# Patient Record
Sex: FEMALE | Race: WHITE | NOT HISPANIC OR LATINO | Employment: OTHER | URBAN - METROPOLITAN AREA
[De-identification: names, ages, dates, MRNs, and addresses within clinical notes are randomized per-mention and may not be internally consistent; named-entity substitution may affect disease eponyms.]

---

## 2017-02-16 ENCOUNTER — PATIENT MESSAGE (OUTPATIENT)
Dept: WOUND CARE | Facility: CLINIC | Age: 52
End: 2017-02-16

## 2017-02-17 ENCOUNTER — OFFICE VISIT (OUTPATIENT)
Dept: WOUND CARE | Facility: CLINIC | Age: 52
End: 2017-02-17
Payer: MEDICARE

## 2017-02-17 VITALS — WEIGHT: 247.56 LBS | BODY MASS INDEX: 41.84 KG/M2

## 2017-02-17 DIAGNOSIS — Z43.2 ATTENTION TO ILEOSTOMY: Primary | ICD-10-CM

## 2017-02-17 DIAGNOSIS — L98.499: ICD-10-CM

## 2017-02-17 PROCEDURE — 99999 PR PBB SHADOW E&M-EST. PATIENT-LVL II: CPT | Mod: PBBFAC,,, | Performed by: CLINICAL NURSE SPECIALIST

## 2017-02-17 PROCEDURE — 99213 OFFICE O/P EST LOW 20 MIN: CPT | Mod: S$PBB,,, | Performed by: CLINICAL NURSE SPECIALIST

## 2017-02-17 PROCEDURE — 99212 OFFICE O/P EST SF 10 MIN: CPT | Mod: PBBFAC | Performed by: CLINICAL NURSE SPECIALIST

## 2017-02-17 NOTE — PROGRESS NOTES
"Subjective:       Patient ID: Ida Orozco is a 51 y.o. female.    Chief Complaint: Ileostomy    HPI Comments: This is a follow up visit and she has had ileo for some time, had revision 8/2014 by Dr gamez and now post gastric bypass, she has lost about 80 lbs and this is causing some issue with  Her ileo pch , additionally she has a slight erosion next to stoma that has not totally resolved. , there were problems at first and skin issues seemed to settle down, She needs plan of care and help with keeping pch secure now that "belly" hangs with wt loss. She does look good, has great attitude and faithful to diet restrictions     Review of Systems   Constitutional: Positive for appetite change. Negative for activity change and fever.   HENT: Negative for rhinorrhea and sneezing.    Cardiovascular: Negative for chest pain and leg swelling.   Gastrointestinal: Negative for abdominal pain and nausea.   Genitourinary: Negative.    Neurological: Negative.        Objective:      Physical Exam   Constitutional: She is oriented to person, place, and time. She appears well-developed.   Obese   Pulmonary/Chest: Effort normal.   Abdominal: Soft. She exhibits no distension. There is no tenderness.   RLQ ileostomy 11/8 budded end  She has chronic erosion/   Musculoskeletal: Normal range of motion.   Neurological: She is alert and oriented to person, place, and time.   Skin: No rash noted. No erythema.       Pouch , is GADIEL convex light,  The edges pop off with movement, she is suggested to try the BRAVA  Curve and Y strips, will get samples to her   Nu hope comfort belt should help this issue as well, fitted for belt and ordering info given        Assessment:       1. Attention to ileostomy    2. Chronic skin ulcer, with unspecified severity        Plan:       Fitted for NU HOPE comfort belt for extra support   Strips to be sent by [employer]   Continue wt loss program toward bypass goals  I have reviewed the plan of care with the " patient  she express understanding. I spent over 50% of this 20 min  visit in face to face counseling.

## 2017-08-10 RX ORDER — MONTELUKAST SODIUM 10 MG/1
10 TABLET ORAL NIGHTLY
Qty: 30 TABLET | Refills: 0 | Status: SHIPPED | OUTPATIENT
Start: 2017-08-10 | End: 2018-03-21 | Stop reason: SDUPTHER

## 2017-08-10 NOTE — TELEPHONE ENCOUNTER
----- Message from Mona Radford sent at 8/7/2017  2:45 PM CDT -----  Contact:  call //[phone]    Calling to  Get a refill on   Singular 10  Mg // please call  For   details  [id number] - [place], [place] - [facility] [address] [address]  Phone: [phone] Fax: [phone]

## 2017-09-15 RX ORDER — TRAZODONE HYDROCHLORIDE 100 MG/1
TABLET ORAL
Qty: 120 TABLET | Refills: 4 | Status: SHIPPED | OUTPATIENT
Start: 2017-09-15 | End: 2017-10-13

## 2017-09-20 ENCOUNTER — TELEPHONE (OUTPATIENT)
Dept: PRIMARY CARE CLINIC | Facility: CLINIC | Age: 52
End: 2017-09-20

## 2017-09-20 RX ORDER — FLUCONAZOLE 100 MG/1
100 TABLET ORAL DAILY
Qty: 10 TABLET | Refills: 0 | Status: SHIPPED | OUTPATIENT
Start: 2017-09-20 | End: 2017-09-30

## 2017-09-20 NOTE — TELEPHONE ENCOUNTER
Pt says she has gotten yeast infection sin her creases before. She says this time it hurts and it's bleeding. Pt likes the pills because the cream is too messy

## 2017-09-20 NOTE — TELEPHONE ENCOUNTER
----- Message from Alexandra Disla sent at 2017  1:36 PM CDT -----  Contact: Patient  Ida, patient [phone], Calling for same day appointment, yeast infection in belly button and it is bleeding, site of . Please advise. Thanks.

## 2017-10-02 ENCOUNTER — PATIENT MESSAGE (OUTPATIENT)
Dept: PRIMARY CARE CLINIC | Facility: CLINIC | Age: 52
End: 2017-10-02

## 2017-10-05 RX ORDER — LORATADINE 10 MG/1
TABLET ORAL
Qty: 90 TABLET | Refills: 1 | Status: SHIPPED | OUTPATIENT
Start: 2017-10-05 | End: 2018-03-31 | Stop reason: SDUPTHER

## 2017-10-13 ENCOUNTER — OFFICE VISIT (OUTPATIENT)
Dept: PRIMARY CARE CLINIC | Facility: CLINIC | Age: 52
End: 2017-10-13
Payer: MEDICARE

## 2017-10-13 VITALS
BODY MASS INDEX: 39.15 KG/M2 | TEMPERATURE: 98 F | OXYGEN SATURATION: 96 % | RESPIRATION RATE: 18 BRPM | DIASTOLIC BLOOD PRESSURE: 62 MMHG | SYSTOLIC BLOOD PRESSURE: 97 MMHG | WEIGHT: 235 LBS | HEART RATE: 69 BPM | HEIGHT: 65 IN

## 2017-10-13 DIAGNOSIS — E78.5 TYPE 2 DIABETES MELLITUS WITH HYPERLIPIDEMIA: ICD-10-CM

## 2017-10-13 DIAGNOSIS — Z93.2 ILEOSTOMY STATUS: ICD-10-CM

## 2017-10-13 DIAGNOSIS — E55.9 VITAMIN D DEFICIENCY: ICD-10-CM

## 2017-10-13 DIAGNOSIS — E11.69 TYPE 2 DIABETES MELLITUS WITH HYPERLIPIDEMIA: ICD-10-CM

## 2017-10-13 DIAGNOSIS — Z23 NEED FOR VACCINATION: ICD-10-CM

## 2017-10-13 DIAGNOSIS — E78.00 HYPERCHOLESTEROLEMIA: ICD-10-CM

## 2017-10-13 DIAGNOSIS — E53.8 VITAMIN B12 DEFICIENCY: ICD-10-CM

## 2017-10-13 DIAGNOSIS — K50.919 CROHN'S DISEASE WITH COMPLICATION, UNSPECIFIED GASTROINTESTINAL TRACT LOCATION: Primary | ICD-10-CM

## 2017-10-13 DIAGNOSIS — D52.0 DIETARY FOLATE DEFICIENCY ANEMIA: ICD-10-CM

## 2017-10-13 DIAGNOSIS — Z12.31 ENCOUNTER FOR SCREENING MAMMOGRAM FOR BREAST CANCER: ICD-10-CM

## 2017-10-13 PROBLEM — J45.40 MODERATE PERSISTENT ASTHMA WITHOUT COMPLICATION: Status: ACTIVE | Noted: 2017-10-13

## 2017-10-13 PROBLEM — M51.369 DDD (DEGENERATIVE DISC DISEASE), LUMBAR: Status: ACTIVE | Noted: 2017-10-13

## 2017-10-13 PROBLEM — F32.A DEPRESSION: Status: ACTIVE | Noted: 2017-10-13

## 2017-10-13 PROBLEM — F41.9 ANXIETY: Status: ACTIVE | Noted: 2017-10-13

## 2017-10-13 PROBLEM — M51.36 DDD (DEGENERATIVE DISC DISEASE), LUMBAR: Status: ACTIVE | Noted: 2017-10-13

## 2017-10-13 PROCEDURE — 99214 OFFICE O/P EST MOD 30 MIN: CPT | Mod: S$PBB,,, | Performed by: FAMILY MEDICINE

## 2017-10-13 PROCEDURE — 99499 UNLISTED E&M SERVICE: CPT | Mod: S$PBB,,, | Performed by: FAMILY MEDICINE

## 2017-10-13 PROCEDURE — 99999 PR PBB SHADOW E&M-EST. PATIENT-LVL IV: CPT | Mod: PBBFAC,,, | Performed by: FAMILY MEDICINE

## 2017-10-13 PROCEDURE — G0008 ADMIN INFLUENZA VIRUS VAC: HCPCS | Mod: PBBFAC,PN

## 2017-10-13 PROCEDURE — 99214 OFFICE O/P EST MOD 30 MIN: CPT | Mod: PBBFAC,PN | Performed by: FAMILY MEDICINE

## 2017-10-13 RX ORDER — BUPROPION HYDROCHLORIDE 75 MG/1
75 TABLET ORAL EVERY OTHER DAY
COMMUNITY
Start: 2017-09-13 | End: 2019-02-07

## 2017-10-13 RX ORDER — ESCITALOPRAM OXALATE 20 MG/1
30 TABLET ORAL EVERY MORNING
COMMUNITY
Start: 2017-10-03 | End: 2018-07-06

## 2017-10-13 RX ORDER — MESALAMINE 500 MG/1
500 CAPSULE, EXTENDED RELEASE ORAL 4 TIMES DAILY
Qty: 120 CAPSULE | Refills: 5 | Status: SHIPPED | OUTPATIENT
Start: 2017-10-13 | End: 2018-01-05 | Stop reason: ALTCHOICE

## 2017-10-13 RX ORDER — TRAZODONE HYDROCHLORIDE 50 MG/1
50 TABLET ORAL NIGHTLY
COMMUNITY
End: 2020-03-25 | Stop reason: SDUPTHER

## 2017-10-13 RX ORDER — DICLOFENAC SODIUM 10 MG/G
2 GEL TOPICAL 4 TIMES DAILY
COMMUNITY
End: 2018-04-03

## 2017-10-13 NOTE — PROGRESS NOTES
"Subjective:       Patient ID: Ida Orozco is a 52 y.o. female.    Chief Complaint: Abdominal Cramping (pt states when she made the appt, she was having cramping. )    Had ~1 week of crampy abdominal pain 3 weeks ago, says stool from ileostomy had more mucus than usual, and passed small amount of blood and mucus per rectum, says she had been told her colitis would flare up like this from time to time. Had mild myalgias during the week of GI symptoms, subsequently resolved.   Has been having more frequent hot flashes for ~2 months, but had been doing fine previously.      Review of Systems   Constitutional: Negative for fever and weight loss.   HENT: Negative for trouble swallowing.    Respiratory: Negative for stridor.    Cardiovascular: Negative for chest pain.   Gastrointestinal: Positive for abdominal pain, diarrhea and nausea. Negative for anorexia, constipation, flatus, hematochezia, melena and vomiting.   Genitourinary: Negative for dysuria, frequency and hematuria.   Musculoskeletal: Positive for arthralgias and myalgias.   Skin: Negative for rash.   Neurological: Positive for headaches.   Psychiatric/Behavioral: The patient is nervous/anxious.        Objective:      Vitals:    10/13/17 1339   BP: 97/62   BP Location: Left arm   Patient Position: Sitting   BP Method: Large (Automatic)   Pulse: 69   Resp: 18   Temp: 98.4 °F (36.9 °C)   TempSrc: Oral   SpO2: 96%   Weight: 106.6 kg (235 lb)   Height: 5' 5" (1.651 m)     Physical Exam   Constitutional: She is oriented to person, place, and time. She appears well-developed and well-nourished.   HENT:   Head: Normocephalic and atraumatic.   Cardiovascular: Normal rate, regular rhythm and normal heart sounds.    Pulmonary/Chest: Effort normal and breath sounds normal.   Abdominal: Soft. There is no tenderness. There is no guarding.   Right sided ileostomy with liquid brown stool   Musculoskeletal: She exhibits no edema.   Neurological: She is alert and oriented to " person, place, and time.   Skin: Skin is warm and dry.   Psychiatric: She has a normal mood and affect.   Vitals reviewed.      Assessment:       1. Crohn's disease with complication, unspecified gastrointestinal tract location    2. Ileostomy status    3. Need for vaccination    4. Encounter for screening mammogram for breast cancer    5. Type 2 diabetes mellitus with hyperlipidemia    6. Dietary folate deficiency anemia    7. Hypercholesterolemia    8. Vitamin B12 deficiency    9. Vitamin D deficiency        Plan:       Crohn's disease with complication, unspecified gastrointestinal tract location  Comments:  pt advised to get reestablished with gastroenterologist  Orders:  -     mesalamine (PENTASA) 500 MG CR capsule; Take 1 capsule (500 mg total) by mouth 4 (four) times daily.  Dispense: 120 capsule; Refill: 5  -     Ambulatory Referral to Gastroenterology    Ileostomy status  -     Ambulatory Referral to Gastroenterology    Need for vaccination  -     Influenza - Quadrivalent (3 years & older) (PF)    Encounter for screening mammogram for breast cancer  -     Mammo Digital Screening Bilat with CAD; Future; Expected date: 10/20/2017    Type 2 diabetes mellitus with hyperlipidemia  -     Hemoglobin A1c; Future; Expected date: 10/13/2017  -     Microalbumin/creatinine urine ratio; Future; Expected date: 10/13/2017    Dietary folate deficiency anemia  -     CBC auto differential; Future; Expected date: 10/13/2017  -     Folate; Future; Expected date: 10/13/2017    Hypercholesterolemia  -     Comprehensive metabolic panel; Future; Expected date: 10/13/2017  -     Lipid panel; Future; Expected date: 10/13/2017    Vitamin B12 deficiency  -     CBC auto differential; Future; Expected date: 10/13/2017  -     Vitamin B12; Future; Expected date: 10/13/2017    Vitamin D deficiency  -     Vitamin D; Future; Expected date: 10/13/2017      Medication List with Changes/Refills   New Medications    MESALAMINE (PENTASA) 500 MG  CR CAPSULE    Take 1 capsule (500 mg total) by mouth 4 (four) times daily.   Current Medications    BUDESONIDE-FORMOTEROL 80-4.5 MCG (SYMBICORT) 80-4.5 MCG/ACTUATION HFAA    Inhale 2 puffs into the lungs.    BUPROPION (WELLBUTRIN) 75 MG TABLET    Take 2 tablets by mouth 3 (three) times daily.    CLONAZEPAM (KLONOPIN) 1 MG TABLET    Take 1 mg by mouth 3 (three) times daily.     DICLOFENAC SODIUM (VOLTAREN) 1 % GEL    Apply 2 g topically 4 (four) times daily.    ESCITALOPRAM OXALATE (LEXAPRO) 10 MG TABLET    Take 1 tablet by mouth once daily.    ESOMEPRAZOLE (NEXIUM) 40 MG CAPSULE    Take 40 mg by mouth every evening.    FLUTICASONE (FLONASE) 50 MCG/ACTUATION NASAL SPRAY    1 spray by Each Nare route once daily.    LORATADINE (CLARITIN) 10 MG TABLET    TAKE ONE TABLET BY MOUTH ONCE DAILY    MONTELUKAST (SINGULAIR) 10 MG TABLET    Take 1 tablet (10 mg total) by mouth every evening.    TRAZODONE (DESYREL) 50 MG TABLET    Take 50 mg by mouth every evening.   Discontinued Medications    ALENDRONATE (FOSAMAX) 70 MG TABLET    Take 70 mg by mouth every 7 days.    CANASA 1,000 MG SUPP    Place 1,000 mg rectally 2 (two) times daily.    IBUPROFEN (ADVIL,MOTRIN) 600 MG TABLET    Take 1 tablet (600 mg total) by mouth 4 (four) times daily.    OXYCODONE-ACETAMINOPHEN (PERCOCET) 5-325 MG PER TABLET    Take 1-2 tablets by mouth every 6 (six) hours as needed for Pain.    TRAZODONE (DESYREL) 100 MG TABLET    TAKE 3 TO 4 TABLETS BY MOUTH AT BEDTIME ONCE DAILY

## 2017-10-16 ENCOUNTER — PATIENT MESSAGE (OUTPATIENT)
Dept: PRIMARY CARE CLINIC | Facility: CLINIC | Age: 52
End: 2017-10-16

## 2017-10-18 ENCOUNTER — PATIENT MESSAGE (OUTPATIENT)
Dept: PRIMARY CARE CLINIC | Facility: CLINIC | Age: 52
End: 2017-10-18

## 2017-10-19 ENCOUNTER — CLINICAL SUPPORT (OUTPATIENT)
Dept: PRIMARY CARE CLINIC | Facility: CLINIC | Age: 52
End: 2017-10-19
Payer: MEDICARE

## 2017-10-19 DIAGNOSIS — D52.0 DIETARY FOLATE DEFICIENCY ANEMIA: ICD-10-CM

## 2017-10-19 DIAGNOSIS — E78.00 HYPERCHOLESTEROLEMIA: ICD-10-CM

## 2017-10-19 DIAGNOSIS — E78.5 TYPE 2 DIABETES MELLITUS WITH HYPERLIPIDEMIA: ICD-10-CM

## 2017-10-19 DIAGNOSIS — E55.9 VITAMIN D DEFICIENCY: ICD-10-CM

## 2017-10-19 DIAGNOSIS — E53.8 VITAMIN B12 DEFICIENCY: ICD-10-CM

## 2017-10-19 DIAGNOSIS — E11.69 TYPE 2 DIABETES MELLITUS WITH HYPERLIPIDEMIA: ICD-10-CM

## 2017-10-19 LAB
25(OH)D3+25(OH)D2 SERPL-MCNC: 25 NG/ML
ALBUMIN SERPL BCP-MCNC: 3.4 G/DL
ALP SERPL-CCNC: 145 U/L
ALT SERPL W/O P-5'-P-CCNC: 43 U/L
ANION GAP SERPL CALC-SCNC: 7 MMOL/L
AST SERPL-CCNC: 42 U/L
BASOPHILS # BLD AUTO: 0.09 K/UL
BASOPHILS NFR BLD: 1.4 %
BILIRUB SERPL-MCNC: 0.5 MG/DL
BUN SERPL-MCNC: 9 MG/DL
CALCIUM SERPL-MCNC: 9.4 MG/DL
CHLORIDE SERPL-SCNC: 108 MMOL/L
CHOLEST SERPL-MCNC: 180 MG/DL
CHOLEST/HDLC SERPL: 2.4 {RATIO}
CO2 SERPL-SCNC: 24 MMOL/L
CREAT SERPL-MCNC: 0.9 MG/DL
CREAT UR-MCNC: 200 MG/DL
DIFFERENTIAL METHOD: ABNORMAL
EOSINOPHIL # BLD AUTO: 0.9 K/UL
EOSINOPHIL NFR BLD: 13.7 %
ERYTHROCYTE [DISTWIDTH] IN BLOOD BY AUTOMATED COUNT: 13.5 %
EST. GFR  (AFRICAN AMERICAN): >60 ML/MIN/1.73 M^2
EST. GFR  (NON AFRICAN AMERICAN): >60 ML/MIN/1.73 M^2
ESTIMATED AVG GLUCOSE: 105 MG/DL
FOLATE SERPL-MCNC: 8 NG/ML
GLUCOSE SERPL-MCNC: 85 MG/DL
HBA1C MFR BLD HPLC: 5.3 %
HCT VFR BLD AUTO: 44.9 %
HDLC SERPL-MCNC: 74 MG/DL
HDLC SERPL: 41.1 %
HGB BLD-MCNC: 13.8 G/DL
IMM GRANULOCYTES # BLD AUTO: 0.01 K/UL
IMM GRANULOCYTES NFR BLD AUTO: 0.2 %
LDLC SERPL CALC-MCNC: 89 MG/DL
LYMPHOCYTES # BLD AUTO: 1.7 K/UL
LYMPHOCYTES NFR BLD: 26.5 %
MCH RBC QN AUTO: 29.3 PG
MCHC RBC AUTO-ENTMCNC: 30.7 G/DL
MCV RBC AUTO: 95 FL
MICROALBUMIN UR DL<=1MG/L-MCNC: 15 UG/ML
MICROALBUMIN/CREATININE RATIO: 7.5 UG/MG
MONOCYTES # BLD AUTO: 0.5 K/UL
MONOCYTES NFR BLD: 7.6 %
NEUTROPHILS # BLD AUTO: 3.2 K/UL
NEUTROPHILS NFR BLD: 50.6 %
NONHDLC SERPL-MCNC: 106 MG/DL
NRBC BLD-RTO: 0 /100 WBC
PLATELET # BLD AUTO: 299 K/UL
PMV BLD AUTO: 10.3 FL
POTASSIUM SERPL-SCNC: 4.2 MMOL/L
PROT SERPL-MCNC: 7.5 G/DL
RBC # BLD AUTO: 4.71 M/UL
SODIUM SERPL-SCNC: 139 MMOL/L
TRIGL SERPL-MCNC: 85 MG/DL
VIT B12 SERPL-MCNC: 292 PG/ML
WBC # BLD AUTO: 6.35 K/UL

## 2017-10-19 PROCEDURE — 82570 ASSAY OF URINE CREATININE: CPT

## 2017-10-19 PROCEDURE — 82746 ASSAY OF FOLIC ACID SERUM: CPT

## 2017-10-19 PROCEDURE — 85025 COMPLETE CBC W/AUTO DIFF WBC: CPT

## 2017-10-19 PROCEDURE — 80053 COMPREHEN METABOLIC PANEL: CPT

## 2017-10-19 PROCEDURE — 99212 OFFICE O/P EST SF 10 MIN: CPT | Mod: PBBFAC,PN

## 2017-10-19 PROCEDURE — 83036 HEMOGLOBIN GLYCOSYLATED A1C: CPT

## 2017-10-19 PROCEDURE — 99999 PR PBB SHADOW E&M-EST. PATIENT-LVL II: CPT | Mod: PBBFAC,,,

## 2017-10-19 PROCEDURE — 80061 LIPID PANEL: CPT

## 2017-10-19 PROCEDURE — 82306 VITAMIN D 25 HYDROXY: CPT

## 2017-10-19 PROCEDURE — 36415 COLL VENOUS BLD VENIPUNCTURE: CPT

## 2017-10-19 PROCEDURE — 82607 VITAMIN B-12: CPT

## 2017-10-31 ENCOUNTER — PATIENT MESSAGE (OUTPATIENT)
Dept: PRIMARY CARE CLINIC | Facility: CLINIC | Age: 52
End: 2017-10-31

## 2017-11-08 ENCOUNTER — PATIENT MESSAGE (OUTPATIENT)
Dept: PRIMARY CARE CLINIC | Facility: CLINIC | Age: 52
End: 2017-11-08

## 2017-11-08 DIAGNOSIS — R23.3 SPONTANEOUS BRUISING: Primary | ICD-10-CM

## 2017-11-08 DIAGNOSIS — R79.9 ABNORMAL FINDING OF BLOOD CHEMISTRY: ICD-10-CM

## 2017-11-16 ENCOUNTER — PATIENT MESSAGE (OUTPATIENT)
Dept: PRIMARY CARE CLINIC | Facility: CLINIC | Age: 52
End: 2017-11-16

## 2017-11-16 ENCOUNTER — PATIENT MESSAGE (OUTPATIENT)
Dept: WOUND CARE | Facility: CLINIC | Age: 52
End: 2017-11-16

## 2017-11-17 DIAGNOSIS — Z43.2 ATTENTION TO ILEOSTOMY: Primary | ICD-10-CM

## 2017-11-21 ENCOUNTER — CLINICAL SUPPORT (OUTPATIENT)
Dept: PRIMARY CARE CLINIC | Facility: CLINIC | Age: 52
End: 2017-11-21
Payer: MEDICARE

## 2017-11-21 DIAGNOSIS — R23.3 SPONTANEOUS BRUISING: ICD-10-CM

## 2017-11-21 DIAGNOSIS — R79.9 ABNORMAL FINDING OF BLOOD CHEMISTRY: ICD-10-CM

## 2017-11-21 LAB
BASOPHILS # BLD AUTO: 0.07 K/UL
BASOPHILS NFR BLD: 1.1 %
DIFFERENTIAL METHOD: ABNORMAL
EOSINOPHIL # BLD AUTO: 1.1 K/UL
EOSINOPHIL NFR BLD: 16.5 %
ERYTHROCYTE [DISTWIDTH] IN BLOOD BY AUTOMATED COUNT: 13.4 %
FERRITIN SERPL-MCNC: 52 NG/ML
HCT VFR BLD AUTO: 42.7 %
HGB BLD-MCNC: 13.6 G/DL
IMM GRANULOCYTES # BLD AUTO: 0.02 K/UL
IMM GRANULOCYTES NFR BLD AUTO: 0.3 %
IRON SERPL-MCNC: 65 UG/DL
LYMPHOCYTES # BLD AUTO: 1.7 K/UL
LYMPHOCYTES NFR BLD: 25.6 %
MCH RBC QN AUTO: 30.2 PG
MCHC RBC AUTO-ENTMCNC: 31.9 G/DL
MCV RBC AUTO: 95 FL
MONOCYTES # BLD AUTO: 0.5 K/UL
MONOCYTES NFR BLD: 7.4 %
NEUTROPHILS # BLD AUTO: 3.2 K/UL
NEUTROPHILS NFR BLD: 49.1 %
NRBC BLD-RTO: 0 /100 WBC
PLATELET # BLD AUTO: 278 K/UL
PMV BLD AUTO: 11.2 FL
RBC # BLD AUTO: 4.5 M/UL
SATURATED IRON: 16 %
TOTAL IRON BINDING CAPACITY: 419 UG/DL
TRANSFERRIN SERPL-MCNC: 283 MG/DL
WBC # BLD AUTO: 6.6 K/UL

## 2017-11-21 PROCEDURE — 99999 PR PBB SHADOW E&M-EST. PATIENT-LVL II: CPT | Mod: PBBFAC,,,

## 2017-11-21 PROCEDURE — 83540 ASSAY OF IRON: CPT

## 2017-11-21 PROCEDURE — 82728 ASSAY OF FERRITIN: CPT

## 2017-11-21 PROCEDURE — 85025 COMPLETE CBC W/AUTO DIFF WBC: CPT

## 2017-12-04 ENCOUNTER — PATIENT MESSAGE (OUTPATIENT)
Dept: GASTROENTEROLOGY | Facility: CLINIC | Age: 52
End: 2017-12-04

## 2017-12-08 ENCOUNTER — PATIENT MESSAGE (OUTPATIENT)
Dept: PRIMARY CARE CLINIC | Facility: CLINIC | Age: 52
End: 2017-12-08

## 2017-12-08 RX ORDER — BUDESONIDE AND FORMOTEROL FUMARATE DIHYDRATE 80; 4.5 UG/1; UG/1
2 AEROSOL RESPIRATORY (INHALATION) DAILY
Qty: 10.2 G | Refills: 0 | Status: SHIPPED | OUTPATIENT
Start: 2017-12-08 | End: 2018-01-03 | Stop reason: SDUPTHER

## 2017-12-08 RX ORDER — ALBUTEROL SULFATE 90 UG/1
2 AEROSOL, METERED RESPIRATORY (INHALATION) EVERY 4 HOURS PRN
Qty: 1 INHALER | Refills: 5 | Status: SHIPPED | OUTPATIENT
Start: 2017-12-08 | End: 2020-09-18 | Stop reason: SDUPTHER

## 2017-12-12 ENCOUNTER — OFFICE VISIT (OUTPATIENT)
Dept: GASTROENTEROLOGY | Facility: CLINIC | Age: 52
End: 2017-12-12
Payer: MEDICARE

## 2017-12-12 VITALS
WEIGHT: 233 LBS | DIASTOLIC BLOOD PRESSURE: 74 MMHG | HEIGHT: 65 IN | BODY MASS INDEX: 38.82 KG/M2 | TEMPERATURE: 99 F | RESPIRATION RATE: 18 BRPM | SYSTOLIC BLOOD PRESSURE: 115 MMHG | HEART RATE: 74 BPM

## 2017-12-12 DIAGNOSIS — R10.9 ABDOMINAL PAIN, UNSPECIFIED ABDOMINAL LOCATION: ICD-10-CM

## 2017-12-12 DIAGNOSIS — Z93.2 ILEOSTOMY STATUS: ICD-10-CM

## 2017-12-12 DIAGNOSIS — E66.01 MORBID OBESITY WITH BMI OF 45.0-49.9, ADULT: ICD-10-CM

## 2017-12-12 DIAGNOSIS — F32.A DEPRESSION, UNSPECIFIED DEPRESSION TYPE: ICD-10-CM

## 2017-12-12 DIAGNOSIS — K22.10 ULCER OF ESOPHAGUS WITHOUT BLEEDING: ICD-10-CM

## 2017-12-12 DIAGNOSIS — F41.9 ANXIETY: ICD-10-CM

## 2017-12-12 DIAGNOSIS — K50.819 CROHN'S DISEASE OF SMALL AND LARGE INTESTINES WITH COMPLICATION: Primary | ICD-10-CM

## 2017-12-12 DIAGNOSIS — K21.00 GASTROESOPHAGEAL REFLUX DISEASE WITH ESOPHAGITIS: ICD-10-CM

## 2017-12-12 PROCEDURE — 99999 PR PBB SHADOW E&M-EST. PATIENT-LVL IV: CPT | Mod: PBBFAC,,, | Performed by: INTERNAL MEDICINE

## 2017-12-12 PROCEDURE — 99499 UNLISTED E&M SERVICE: CPT | Mod: S$GLB,,, | Performed by: INTERNAL MEDICINE

## 2017-12-12 PROCEDURE — 99213 OFFICE O/P EST LOW 20 MIN: CPT | Mod: S$GLB,,, | Performed by: INTERNAL MEDICINE

## 2017-12-12 NOTE — PROGRESS NOTES
Subjective:       Patient ID: Ida Orozco is a 52 y.o. female.    This is an established patient who has been seen by Dr. Latham and Dr. Virgen at [facility] in the past.    Chief Complaint: Abdominal Pain    Abdominal Pain   This is a new problem. The current episode started more than 1 month ago (started in late september and lasted about 3 weeks). The onset quality is undetermined. The problem occurs constantly. Duration: variable. The problem has been resolved. The pain is located in the generalized abdominal region. The pain is at a severity of 9/10. The pain is severe. The quality of the pain is cramping. The abdominal pain does not radiate. Associated symptoms include diarrhea. Pertinent negatives include no anorexia, constipation, hematochezia, melena, nausea, vomiting or weight loss. Nothing aggravates the pain. The pain is relieved by nothing. Treatments tried: pentasa prescribed by PCP and she is now being told that insurance will not cover it any longer. Prior diagnostic workup includes GI consult, lower endoscopy, upper endoscopy, surgery and CT scan (Multiple studies and surgeries with very complicated history.). Her past medical history is significant for abdominal surgery (multiple), Crohn's disease, GERD and irritable bowel syndrome.     She relates a very long and complex history and has been seen at multiple facilities in the past.  She states that Dr. Wilton Murphy in [place] diagnosed her with UC in 2010.  He gave her high dose oral steroids and 5 ASA to which she did not respond.  She states that she was then placed on remicade for about 6 months but was a primary nonresponder.  At some point, she ended up under the care of Dr. Perez who had her on Humira and Enbrel to which she also did not respond.  She states that during that period (2011) she also had multiple hospitalizations at multiple facilities for various infections.  At some point, Dr. Perez told her she needed a  "colectomy and she had subtotal colectomy with end ileostomy.  This was in April of 2012.  She has been on no IBD treatment since that time.  Her post operative course was complicated by multiple infections, stenosis of the stoma, and fistulization to a "subcutaneous pocket" which had to be treated surgically.  At some point, her st[place] was taken down and [place] [facility] and she states that she has not had problems since then.  She then saw Dr. Hunter to ask about gastric sleeve and he recommended against it.  She subsequently had it done any way at [facility] in 06/2016.  She has lost about 120 lbs since her sleeve surgery.  She then did well.  Recently she developed symptoms as above and these improved once she was placed on pentasa by her PCP.  She states that her insurance has informed her that pentasa will no longer be covered.  She had ileoscopy with Dr. Latham at [facility] in 2014 which showed stenotic opening and fistulization but normal small bowel.  She had EGD which showed esophageal ulcer by Dr. Hunter in 2015.    Review of Systems   Constitutional: Negative for chills, fatigue and weight loss.   HENT: Negative for sore throat and trouble swallowing.    Respiratory: Negative for cough, shortness of breath and wheezing.    Cardiovascular: Negative for chest pain and palpitations.   Gastrointestinal: Positive for abdominal pain and diarrhea. Negative for anorexia, blood in stool, constipation, hematochezia, melena, nausea and vomiting.   All other systems reviewed and are negative.      Objective:       Vitals:    12/12/17 1435   BP: 115/74   Pulse: 74   Resp: 18   Temp: 98.9 °F (37.2 °C)   TempSrc: Oral   Weight: 105.7 kg (233 lb)   Height: 5' 5" (1.651 m)       Physical Exam   Constitutional: She is oriented to person, place, and time. She appears well-developed and well-nourished.   HENT:   Head: Normocephalic and atraumatic.   Eyes: Pupils are equal, round, and reactive to " light. No scleral icterus.   Neck: Normal range of motion.   Cardiovascular: Normal rate and regular rhythm.    No murmur heard.  Pulmonary/Chest: Effort normal and breath sounds normal. She has no wheezes.   Abdominal: Soft. Bowel sounds are normal. She exhibits no distension. There is tenderness (mild diffuse).   Morbidly obese with intact healthy appearing ileostomy and multiple well healed surgical scars.  No evidence of fistulae.   Lymphadenopathy:     She has no cervical adenopathy.   Neurological: She is alert and oriented to person, place, and time.   Vitals reviewed.        Lab Results   Component Value Date    WBC 6.60 11/21/2017    HGB 13.6 11/21/2017    HCT 42.7 11/21/2017    MCV 95 11/21/2017     11/21/2017       CMP  Sodium   Date Value Ref Range Status   10/19/2017 139 136 - 145 mmol/L Final     Potassium   Date Value Ref Range Status   10/19/2017 4.2 3.5 - 5.1 mmol/L Final     Chloride   Date Value Ref Range Status   10/19/2017 108 95 - 110 mmol/L Final     CO2   Date Value Ref Range Status   10/19/2017 24 23 - 29 mmol/L Final     Glucose   Date Value Ref Range Status   10/19/2017 85 70 - 110 mg/dL Final     BUN, Bld   Date Value Ref Range Status   10/19/2017 9 6 - 20 mg/dL Final     Creatinine   Date Value Ref Range Status   10/19/2017 0.9 0.5 - 1.4 mg/dL Final     Calcium   Date Value Ref Range Status   10/19/2017 9.4 8.7 - 10.5 mg/dL Final     Total Protein   Date Value Ref Range Status   10/19/2017 7.5 6.0 - 8.4 g/dL Final     Albumin   Date Value Ref Range Status   10/19/2017 3.4 (L) 3.5 - 5.2 g/dL Final     Total Bilirubin   Date Value Ref Range Status   10/19/2017 0.5 0.1 - 1.0 mg/dL Final     Comment:     For infants and newborns, interpretation of results should be based  on gestational age, weight and in agreement with clinical  observations.  Premature Infant recommended reference ranges:  Up to 24 hours.............<8.0 mg/dL  Up to 48 hours............<12.0 mg/dL  3-5  days..................<15.0 mg/dL  6-29 days.................<15.0 mg/dL       Alkaline Phosphatase   Date Value Ref Range Status   10/19/2017 145 (H) 55 - 135 U/L Final     AST   Date Value Ref Range Status   10/19/2017 42 (H) 10 - 40 U/L Final     ALT   Date Value Ref Range Status   10/19/2017 43 10 - 44 U/L Final     Anion Gap   Date Value Ref Range Status   10/19/2017 7 (L) 8 - 16 mmol/L Final     eGFR if    Date Value Ref Range Status   10/19/2017 >60.0 >60 mL/min/1.73 m^2 Final     eGFR if non    Date Value Ref Range Status   10/19/2017 >60.0 >60 mL/min/1.73 m^2 Final     Comment:     Calculation used to obtain the estimated glomerular filtration  rate (eGFR) is the CKD-EPI equation. Since race is unknown   in our information system, the eGFR values for   -American and Non--American patients are given   for each creatinine result.         Old records from Dr. Latham reviewed and are as summarized above in the HPI.      Past CT was independently visualized and reviewed by me and showed peristomal contained leak with surrounding inflammation.        Assessment:       1. Crohn's disease of small and large intestines with complication    2. Ulcer of esophagus without bleeding    3. Gastroesophageal reflux disease with esophagitis    4. Ileostomy status    5. Morbid obesity with BMI of 45.0-49.9, adult    6. Anxiety    7. Depression, unspecified depression type    8. Abdominal pain, unspecified abdominal location        Plan:       1.  No acute intervention at this time as symptoms have completely resolved and patient has no bleeding, lab abnormalities, pain, or unintended weight loss.  She also states that her stoma output is returned to baseline.  2.  Continue current medications.  3.  Given her complex history and failure of multiple drug regimens in the past, as well as her complex surgical history, I feel her case is far beyond the scope of our capabilities at this  facility.  I have discussed this at length with the patient who agrees and I recommend follow up and continued care at [facility] with GI and with colorectal surgery whom she has seen in the past.  She states that she understands and that she will follow up.  4.  Referrals as above.

## 2017-12-12 NOTE — LETTER
December 12, 2017      Eliecer Jones MD  [address]  [address]  [facility] [address]           [facility] - Gastroenterology  [address]  [place] [address]  Phone: [phone]          Patient: Ida Orozco   MR Number: [id number]   YOB: 1965   Date of Visit: 12/12/2017       Dear Dr. Eliecer Jones:    Thank you for referring Ida Orozco to me for evaluation. Attached you will find relevant portions of my assessment and plan of care.    If you have questions, please do not hesitate to call me. I look forward to following Ida Orozco along with you.    Sincerely,    Tim Fitch MD    Enclosure  CC:  No Recipients    If you would like to receive this communication electronically, please contact [email or web address] or [phone] to request more information on [facility] access.    For providers and/or their staff who would like to refer a patient to [facility], please contact us through our one-stop-shop provider referral line, [facility], at [phone].    If you feel you have received this communication in error or would no longer like to receive these types of communications, please e-mail [email or web address]

## 2017-12-29 ENCOUNTER — TELEPHONE (OUTPATIENT)
Dept: GASTROENTEROLOGY | Facility: CLINIC | Age: 52
End: 2017-12-29

## 2017-12-29 NOTE — TELEPHONE ENCOUNTER
----- Message from Diane Saleh sent at 12/29/2017  3:17 PM CST -----  Contact: self  Pt is calling in regards to scheduling an appt. Per pt she has been referred by Dr. Tim LEARY. The first available appt is on 03/09/18. Per pt she really needs something sooner because she has been in pain and she is not currently on any medication that will help with the issues.     Pt would like a call back [phone].    Thank you

## 2018-01-04 ENCOUNTER — TELEPHONE (OUTPATIENT)
Dept: PRIMARY CARE CLINIC | Facility: CLINIC | Age: 53
End: 2018-01-04

## 2018-01-04 RX ORDER — BUDESONIDE AND FORMOTEROL FUMARATE DIHYDRATE 80; 4.5 UG/1; UG/1
2 AEROSOL RESPIRATORY (INHALATION) DAILY
Qty: 11 G | Refills: 0 | Status: SHIPPED | OUTPATIENT
Start: 2018-01-04 | End: 2018-01-05 | Stop reason: DRUGHIGH

## 2018-01-04 NOTE — TELEPHONE ENCOUNTER
----- Message from Nadja Clarke sent at 1/4/2018 11:09 AM CST -----  Contact: Patient  Patient is trying to schedule an appointment for today because she has been having green stuff coming out of her nose, congestion, runny nose and lots of mucus & wheezing.  Call Back#[phone]  Thanks

## 2018-01-05 ENCOUNTER — PATIENT MESSAGE (OUTPATIENT)
Dept: PRIMARY CARE CLINIC | Facility: CLINIC | Age: 53
End: 2018-01-05

## 2018-01-05 ENCOUNTER — OFFICE VISIT (OUTPATIENT)
Dept: PRIMARY CARE CLINIC | Facility: CLINIC | Age: 53
End: 2018-01-05
Payer: MEDICARE

## 2018-01-05 VITALS
SYSTOLIC BLOOD PRESSURE: 113 MMHG | HEART RATE: 69 BPM | HEIGHT: 64 IN | TEMPERATURE: 98 F | DIASTOLIC BLOOD PRESSURE: 60 MMHG | RESPIRATION RATE: 18 BRPM | OXYGEN SATURATION: 96 %

## 2018-01-05 DIAGNOSIS — Z93.2 ILEOSTOMY STATUS: Primary | ICD-10-CM

## 2018-01-05 DIAGNOSIS — J01.90 ACUTE BACTERIAL SINUSITIS: ICD-10-CM

## 2018-01-05 DIAGNOSIS — J45.909 ASTHMA, UNSPECIFIED ASTHMA SEVERITY, UNSPECIFIED WHETHER COMPLICATED, UNSPECIFIED WHETHER PERSISTENT: ICD-10-CM

## 2018-01-05 DIAGNOSIS — Z87.891 FORMER SMOKER: ICD-10-CM

## 2018-01-05 DIAGNOSIS — B96.89 ACUTE BACTERIAL SINUSITIS: ICD-10-CM

## 2018-01-05 PROCEDURE — 99999 PR PBB SHADOW E&M-EST. PATIENT-LVL IV: CPT | Mod: PBBFAC,,, | Performed by: NURSE PRACTITIONER

## 2018-01-05 PROCEDURE — 99214 OFFICE O/P EST MOD 30 MIN: CPT | Mod: S$GLB,,, | Performed by: NURSE PRACTITIONER

## 2018-01-05 RX ORDER — PROMETHAZINE HYDROCHLORIDE AND CODEINE PHOSPHATE 6.25; 1 MG/5ML; MG/5ML
5 SOLUTION ORAL EVERY 6 HOURS PRN
Qty: 180 ML | Refills: 0 | Status: SHIPPED | OUTPATIENT
Start: 2018-01-05 | End: 2018-03-15

## 2018-01-05 RX ORDER — BUDESONIDE AND FORMOTEROL FUMARATE DIHYDRATE 160; 4.5 UG/1; UG/1
2 AEROSOL RESPIRATORY (INHALATION) EVERY 12 HOURS
Qty: 6 G | Refills: 2 | Status: SHIPPED | OUTPATIENT
Start: 2018-01-05 | End: 2018-04-04 | Stop reason: SDUPTHER

## 2018-01-05 RX ORDER — DOXYCYCLINE HYCLATE 100 MG/1
100 TABLET, DELAYED RELEASE ORAL 2 TIMES DAILY
Qty: 20 TABLET | Refills: 0 | Status: SHIPPED | OUTPATIENT
Start: 2018-01-05 | End: 2018-03-15

## 2018-01-05 RX ORDER — ALBUTEROL SULFATE 0.83 MG/ML
2.5 SOLUTION RESPIRATORY (INHALATION) EVERY 6 HOURS PRN
Qty: 1 BOX | Refills: 2 | Status: SHIPPED | OUTPATIENT
Start: 2018-01-05 | End: 2018-10-25 | Stop reason: SDUPTHER

## 2018-01-05 NOTE — PROGRESS NOTES
Chief Complaint  Chief Complaint   Patient presents with    Cough    Chest Congestion       HPI  Ida Orozco is a 52 y.o. female with multiple medical diagnoses as listed in the medical history and problem list that presents for cough, congestion.  Patient is new to me but is known to this clinic with her last appointment being 2017.  Reports 2 weeks of cough and congestion. Cough is green and productive. Congestion nasal, with green rhinorrhea. Worsening. Facial pain and pressure. Left ear pain. No sore throat. No n/v. Patient with chronic diarrhea due to ileostomy. Reports wheezing at night. Currently using inhalers X 2 with noted relief of wheezing. Smoking history quit 9 years ago. Patient with chronic sinusitis and tooth abscess previously treated with augmentin last dose last week. Reports an improvement in tooth pain but no improvement in nasal congestion or facial pain. No fevers. Patient denies cp, sob, palpitations.       PAST MEDICAL HISTORY:  Past Medical History:   Diagnosis Date    Anxiety     Crohn's colitis     Diabetes mellitus     Fatty liver disease, nonalcoholic     GERD (gastroesophageal reflux disease)     History of sleeve gastrectomy 2016    Hypertension        PAST SURGICAL HISTORY:  Past Surgical History:   Procedure Laterality Date    APPENDECTOMY       SECTION, LOW TRANSVERSE      CHOLECYSTECTOMY      open    COLECTOMY      total- 2013    HYSTERECTOMY      ILEOSTOMY REVISION      2014; 2014    thumb surgery      TONSILLECTOMY, ADENOIDECTOMY      WISDOM TOOTH EXTRACTION         SOCIAL HISTORY:  Social History     Social History    Marital status: Legally      Spouse name: N/A    Number of children: N/A    Years of education: N/A     Occupational History    Not on file.     Social History Main Topics    Smoking status: Former Smoker    Smokeless tobacco: Former User     Quit date: 4/15/2007    Alcohol use No       Comment: occ    Drug use: No    Sexual activity: Yes     Partners: Male     Other Topics Concern    Not on file     Social History Narrative    No narrative on file       FAMILY HISTORY:  Family History   Problem Relation Age of Onset    Cancer Mother     Heart disease Father     Cancer Paternal Grandfather     Cancer Maternal Grandfather        ALLERGIES AND MEDICATIONS: updated and reviewed.  Review of patient's allergies indicates:   Allergen Reactions    Adhesive     Dilaudid [hydromorphone] Nausea And Vomiting    Sulfa (sulfonamide antibiotics) Hives     Current Outpatient Prescriptions   Medication Sig Dispense Refill    albuterol 90 mcg/actuation inhaler Inhale 2 puffs into the lungs every 4 (four) hours as needed for Wheezing or Shortness of Breath. Rescue 1 Inhaler 5    buPROPion (WELLBUTRIN) 75 MG tablet Take 2 tablets by mouth 3 (three) times daily.      clonazePAM (KLONOPIN) 1 MG tablet Take 1 mg by mouth 3 (three) times daily.       diclofenac sodium (VOLTAREN) 1 % Gel Apply 2 g topically 4 (four) times daily.      escitalopram oxalate (LEXAPRO) 10 MG tablet Take 1 tablet by mouth once daily.      esomeprazole (NEXIUM) 40 MG capsule Take 40 mg by mouth every evening.      fluticasone (FLONASE) 50 mcg/actuation nasal spray 1 spray by Each Nare route once daily.      loratadine (CLARITIN) 10 mg tablet TAKE ONE TABLET BY MOUTH ONCE DAILY 90 tablet 1    montelukast (SINGULAIR) 10 mg tablet Take 1 tablet (10 mg total) by mouth every evening. 30 tablet 0    SYMBICORT 80-4.5 mcg/actuation HFAA INHALE 2 PUFFS INTO THE LUNGS ONCE DAILY. 11 g 0    trazodone (DESYREL) 50 MG tablet Take 50 mg by mouth every evening.      albuterol (PROVENTIL) 2.5 mg /3 mL (0.083 %) nebulizer solution Take 3 mLs (2.5 mg total) by nebulization every 6 (six) hours as needed for Wheezing. Rescue 1 Box 2    doxycycline (DORYX) 100 MG EC tablet Take 1 tablet (100 mg total) by mouth 2 (two) times daily. 20 tablet 0  "   promethazine-codeine 6.25-10 mg/5 ml (PHENERGAN WITH CODEINE) 6.25-10 mg/5 mL syrup Take 5 mLs by mouth every 6 (six) hours as needed. 180 mL 0     No current facility-administered medications for this visit.          ROS  Review of Systems   Constitutional: Positive for fatigue. Negative for chills and fever.   HENT: Positive for congestion, ear pain, postnasal drip, rhinorrhea, sinus pain and sinus pressure. Negative for sore throat.    Respiratory: Positive for cough and wheezing. Negative for shortness of breath.    Cardiovascular: Negative for chest pain and palpitations.   Gastrointestinal: Negative for abdominal pain, diarrhea, nausea and vomiting.   Genitourinary: Negative for dysuria, frequency and urgency.   Musculoskeletal: Negative for arthralgias and joint swelling.   Skin: Negative for rash and wound.   Neurological: Positive for headaches. Negative for dizziness and weakness.   Psychiatric/Behavioral: Positive for sleep disturbance. Negative for dysphoric mood. The patient is not nervous/anxious.          PHYSICAL EXAM  Vitals:    01/05/18 1030   BP: 113/60   BP Location: Right arm   Patient Position: Sitting   BP Method: Medium (Automatic)   Pulse: 69   Resp: 18   Temp: 98.3 °F (36.8 °C)   TempSrc: Oral   SpO2: 96%   Height: 5' 4" (1.626 m)    There is no height or weight on file to calculate BMI.      Height: 5' 4" (162.6 cm)     Physical Exam   Constitutional: She is oriented to person, place, and time. She appears well-developed and well-nourished.   HENT:   Head: Normocephalic.   Right Ear: Tympanic membrane normal.   Left Ear: Tympanic membrane normal.   Nose: Mucosal edema and rhinorrhea present. Right sinus exhibits frontal sinus tenderness. Left sinus exhibits frontal sinus tenderness.   Mouth/Throat: Uvula is midline and mucous membranes are normal. Posterior oropharyngeal erythema present. Tonsils are 0 on the right. Tonsils are 0 on the left.   Eyes: Conjunctivae are normal. "   Cardiovascular: Normal rate, regular rhythm, normal heart sounds and normal pulses.    No murmur heard.  Pulses:       Radial pulses are 2+ on the right side, and 2+ on the left side.   Pulmonary/Chest: Effort normal and breath sounds normal. She has no wheezes.   Abdominal: Soft. Bowel sounds are normal. There is no tenderness.   Musculoskeletal: She exhibits no edema.   Lymphadenopathy:     She has no cervical adenopathy.   Neurological: She is alert and oriented to person, place, and time.   Skin: Skin is warm and dry. No rash noted.   Psychiatric: She has a normal mood and affect.         Health Maintenance       Date Due Completion Date    Foot Exam 07/27/1977 ---    Eye Exam 07/27/1977 ---    TETANUS VACCINE 07/27/1985 ---    Pneumococcal PPSV23 (Medium Risk) (1) 07/27/1985 ---    Mammogram 07/27/2007 ---    Hemoglobin A1c 04/19/2018 10/19/2017    Lipid Panel 10/19/2018 10/19/2017    Urine Microalbumin 10/19/2018 10/19/2017            Assessment & Plan    Ida was seen today for cough and chest congestion.    Diagnoses and all orders for this visit:    Ileostomy status        - The current medical regimen is effective;  continue present plan and medications.    Former smoker    Asthma, unspecified asthma severity, unspecified whether complicated, unspecified whether persistent  -     NEBULIZER FOR HOME USE  -     albuterol (PROVENTIL) 2.5 mg /3 mL (0.083 %) nebulizer solution; Take 3 mLs (2.5 mg total) by nebulization every 6 (six) hours as needed for Wheezing. Rescue  - Patient without wheezing or sob. The current medical regimen is effective;  continue present plan and medications.    Acute bacterial sinusitis  -     doxycycline (DORYX) 100 MG EC tablet; Take 1 tablet (100 mg total) by mouth 2 (two) times daily.        -     promethazine-codeine 6.25-10 mg/5 ml (PHENERGAN WITH CODEINE) 6.25-10 mg/5 mL syrup; Take 5 mLs by mouth every 6 (six) hours as needed.          Follow-up: Return if symptoms worsen  or fail to improve.

## 2018-01-09 ENCOUNTER — TELEPHONE (OUTPATIENT)
Dept: PRIMARY CARE CLINIC | Facility: CLINIC | Age: 53
End: 2018-01-09

## 2018-01-09 NOTE — TELEPHONE ENCOUNTER
----- Message from Chiquita Reis sent at 1/9/2018  4:18 PM CST -----  Contact: Priscilla with ph# [phone] option 3   Priscilla with ph# [phone] option 3   Requesting orders and clinicals for nebulizer   Fax#[phone]

## 2018-01-10 NOTE — TELEPHONE ENCOUNTER
----- Message from Leila Spann sent at 1/9/2018  3:45 PM CST -----  Contact: Ida  Patient is calling as has still not received the nebulizer machine but does have medication. Sending message on son, Chris, for same. Please call to let know status [phone]. Thanks!

## 2018-01-15 ENCOUNTER — HOSPITAL ENCOUNTER (OUTPATIENT)
Dept: RADIOLOGY | Facility: HOSPITAL | Age: 53
Discharge: HOME | End: 2018-01-15
Attending: INTERNAL MEDICINE
Payer: MEDICARE

## 2018-01-15 ENCOUNTER — OFFICE VISIT (OUTPATIENT)
Dept: GASTROENTEROLOGY | Facility: CLINIC | Age: 53
End: 2018-01-15
Payer: MEDICARE

## 2018-01-15 VITALS
SYSTOLIC BLOOD PRESSURE: 112 MMHG | HEIGHT: 65 IN | BODY MASS INDEX: 38.93 KG/M2 | DIASTOLIC BLOOD PRESSURE: 67 MMHG | WEIGHT: 233.69 LBS | HEART RATE: 61 BPM

## 2018-01-15 DIAGNOSIS — Z98.84 H/O BARIATRIC SURGERY: ICD-10-CM

## 2018-01-15 DIAGNOSIS — K62.5 RECTAL BLEEDING: ICD-10-CM

## 2018-01-15 DIAGNOSIS — K52.9 INFLAMMATORY BOWEL DISEASE: Primary | ICD-10-CM

## 2018-01-15 DIAGNOSIS — R10.9 ABDOMINAL PAIN, UNSPECIFIED ABDOMINAL LOCATION: ICD-10-CM

## 2018-01-15 DIAGNOSIS — K22.10 ULCER OF ESOPHAGUS WITHOUT BLEEDING: ICD-10-CM

## 2018-01-15 DIAGNOSIS — Z93.2 ILEOSTOMY IN PLACE: ICD-10-CM

## 2018-01-15 DIAGNOSIS — K52.9 INFLAMMATORY BOWEL DISEASE: ICD-10-CM

## 2018-01-15 PROCEDURE — 99215 OFFICE O/P EST HI 40 MIN: CPT | Mod: GC,S$GLB,, | Performed by: INTERNAL MEDICINE

## 2018-01-15 PROCEDURE — 71046 X-RAY EXAM CHEST 2 VIEWS: CPT | Mod: 26,,, | Performed by: RADIOLOGY

## 2018-01-15 PROCEDURE — 71046 X-RAY EXAM CHEST 2 VIEWS: CPT | Mod: TC

## 2018-01-15 PROCEDURE — 99999 PR PBB SHADOW E&M-EST. PATIENT-LVL IV: CPT | Mod: PBBFAC,GC,, | Performed by: INTERNAL MEDICINE

## 2018-01-15 NOTE — PATIENT INSTRUCTIONS
MRI ENTEROGRAPHY   X-Ray Chest PA And Lateral   ESR (SEDIMENTATION RATE, MANUAL)   C-reactive protein   Quantiferon Gold TB   Thiopurine Methyltrans, RBC   TSH   TISSUE TRANSGLUTAMINASE, IGA   IGA   CBC auto differential   Comprehensive metabolic panel   Case request GI: ESOPHAGOGASTRODUODENOSCOPY (EGD), ILEOSCOPY, SIGMOIDOSCOPY-FLEXIBLE

## 2018-01-15 NOTE — PROGRESS NOTES
GASTROENTEROLOGY CLINIC NOTE    Reason for visit: The primary encounter diagnosis was Inflammatory bowel disease. Diagnoses of Abdominal pain, unspecified abdominal location, Rectal bleeding, Ulcer of esophagus without bleeding, H/O bariatric surgery, and Ileostomy in place were also pertinent to this visit.  Referring provider/PCP: Eliecer Jones MD    CC: rectal bleeding and mucus, abdominal discomfort.     HPI:  Ida Orozco is a 52 y.o. female with past medical history of hypertension, diabetes, anxiety, GERD, esophageal ulcers, morbid obesity, status post sleeve gastrectomy in June 2016, fatty liver disease/nonalcoholic, and inflammatory bowel disease/favoring ulcerative colitis was diagnosed in 2010 now status post total colectomy and ileostomy in 2012 with rectum in situ.  She reports that she was in her usual state of health up until 2010 where she developed rectal pain, diarrhea and rectal bleeding.  At that time she was seen by gastroenterologist, and diagnosed with ulcerative colitis.  Although this was unclear how long she was on medical therapy, she was briefly treated with high doses of prednisone, Asacol, Remicade, Humira and Enbrel.  She reports she had some improvement with Remicade and Humira, although given insurance problems she was unable to continue.  Subsequently she underwent total colectomy (although previous records listed as proctocolectomy, review of operative note as well as pathology indicates this was total colectomy and rectum was not removed) and and ileostomy in 2012.  She did well up until 2014 where she started to have obstructive symptoms.  She was seen by Dr. Virgen and Dr. Latham at the time.  She underwent ileoscopy, which showed stenotic stoma, biopsies from small bowel did not show active inflammation.  Of note her surgical post-op course in 2012 was complicated by abscess and likely subsequent fistula around the stoma site.  With these symptoms in 2014, Dr. Virgen  did enterocutaneous fistula repair and stoma relocation.  She reports doing fine after this surgery. She also underwent sleeve gastrectomy in June 2016 and reports losing more than 120 pounds. She reports doing well up until 6-7 months where she started to have some rectal bleeding and mucus from rectum, associated with abdominal discomfort.  Her abdominal discomfort is short lasting, usually lasts 3-4 minutes, intermittent, described as squeezing abdominal pain, no radiation, no association with food/or fasting.  Reports, having similar ostomy output with no change and no blood.  She also has intermittent oral aphthous, no fever/no chills. No travel. No recent abx.    Of note, she has not been on any IBD medications recently nor had any follow-up with gastroenterologist for her inflammatory bowel disease.    She denies having any first-degree relative of colon cancer, reports grandfather had colorectal cancer.  No family history of inflammatory bowel disease, celiac disease, or multiple sclerosis.  She reports quitting smoking 9 years ago, no alcohol use, no illicit drug use, lives with her , and disabled.    Review of Systems   Constitutional: Negative for chills, fever and weight loss.   HENT: Negative for sore throat.         Oral afts   Eyes: Negative for blurred vision and double vision.   Respiratory: Negative for cough, hemoptysis and stridor.    Cardiovascular: Negative for chest pain and palpitations.   Gastrointestinal: Positive for abdominal pain, diarrhea (normal ostomy output) and heartburn. Negative for blood in stool, constipation, melena, nausea and vomiting.        Rectal mucus and blood   Genitourinary: Negative for dysuria and flank pain.   Musculoskeletal: Positive for joint pain.   Skin: Negative for rash.   Neurological: Negative for dizziness and seizures.   Endo/Heme/Allergies: Negative for polydipsia.   Psychiatric/Behavioral: Negative for depression and suicidal ideas.       Past  Medical History: has a past medical history of Anxiety; Crohn's colitis; Diabetes mellitus; Fatty liver disease, nonalcoholic; GERD (gastroesophageal reflux disease); History of sleeve gastrectomy; and Hypertension.    Past Surgical History: has a past surgical history that includes Hysterectomy; Ileostomy revision;  section, low transverse; Cholecystectomy; TONSILLECTOMY, ADENOIDECTOMY; thumb surgery; [place] tooth extraction; Appendectomy; and Colectomy.    Family History:family history includes Cancer in her maternal grandfather, mother, and paternal grandfather; Heart disease in her father.    Allergies:   Review of patient's allergies indicates:   Allergen Reactions    Adhesive      Cause blisters    Dilaudid [hydromorphone] Nausea And Vomiting    Sulfa (sulfonamide antibiotics) Hives       Social History: reports that she has quit smoking. She quit smokeless tobacco use about 10 years ago. She reports that she does not drink alcohol or use drugs.    Home medications:   Current Outpatient Prescriptions on File Prior to Visit   Medication Sig Dispense Refill    albuterol (PROVENTIL) 2.5 mg /3 mL (0.083 %) nebulizer solution Take 3 mLs (2.5 mg total) by nebulization every 6 (six) hours as needed for Wheezing. Rescue 1 Box 2    albuterol 90 mcg/actuation inhaler Inhale 2 puffs into the lungs every 4 (four) hours as needed for Wheezing or Shortness of Breath. Rescue 1 Inhaler 5    budesonide-formoterol 160-4.5 mcg (SYMBICORT) 160-4.5 mcg/actuation HFAA Inhale 2 puffs into the lungs every 12 (twelve) hours. Controller 6 g 2    buPROPion (WELLBUTRIN) 75 MG tablet Take 2 tablets by mouth 3 (three) times daily.      clonazePAM (KLONOPIN) 1 MG tablet Take 1 mg by mouth 3 (three) times daily.       diclofenac sodium (VOLTAREN) 1 % Gel Apply 2 g topically 4 (four) times daily.      doxycycline (DORYX) 100 MG EC tablet Take 1 tablet (100 mg total) by mouth 2 (two) times daily. 20 tablet 0    escitalopram  "oxalate (LEXAPRO) 10 MG tablet Take 1 tablet by mouth once daily.      esomeprazole (NEXIUM) 40 MG capsule Take 40 mg by mouth every evening.      fluticasone (FLONASE) 50 mcg/actuation nasal spray 1 spray by Each Nare route once daily.      loratadine (CLARITIN) 10 mg tablet TAKE ONE TABLET BY MOUTH ONCE DAILY 90 tablet 1    montelukast (SINGULAIR) 10 mg tablet Take 1 tablet (10 mg total) by mouth every evening. 30 tablet 0    trazodone (DESYREL) 50 MG tablet Take 50 mg by mouth every evening.      promethazine-codeine 6.25-10 mg/5 ml (PHENERGAN WITH CODEINE) 6.25-10 mg/5 mL syrup Take 5 mLs by mouth every 6 (six) hours as needed. 180 mL 0     No current facility-administered medications on file prior to visit.        Vital signs:  /67   Pulse 61   Ht 5' 4.5" (1.638 m)   Wt 106 kg (233 lb 11 oz)   BMI 39.49 kg/m²     Physical Exam   Constitutional: She is oriented to person, place, and time. She appears well-developed and well-nourished.   obese   HENT:   Head: Normocephalic and atraumatic.   Eyes: No scleral icterus.   Neck: Neck supple.   Cardiovascular: Normal rate and regular rhythm.  Exam reveals no gallop.    Pulmonary/Chest: Effort normal and breath sounds normal. No respiratory distress.   Abdominal: Soft. Bowel sounds are normal. She exhibits no distension and no mass. There is no tenderness. There is no rebound and no guarding. No hernia.   Ostomy and surgical scars   Musculoskeletal: She exhibits no edema.   Neurological: She is alert and oriented to person, place, and time.   Skin: Skin is warm.   Psychiatric: She has a normal mood and affect. Her behavior is normal.       Routine labs:  Lab Results   Component Value Date    WBC 6.60 11/21/2017    HGB 13.6 11/21/2017    HCT 42.7 11/21/2017    MCV 95 11/21/2017     11/21/2017     Lab Results   Component Value Date    INR 1.0 08/01/2014     Lab Results   Component Value Date    IRON 65 11/21/2017    FERRITIN 52 11/21/2017    TIBC " 419 11/21/2017    FESATURATED 16 (L) 11/21/2017     Lab Results   Component Value Date     10/19/2017    K 4.2 10/19/2017     10/19/2017    CO2 24 10/19/2017    BUN 9 10/19/2017    CREATININE 0.9 10/19/2017     Lab Results   Component Value Date    ALBUMIN 3.4 (L) 10/19/2017    ALT 43 10/19/2017    AST 42 (H) 10/19/2017    ALKPHOS 145 (H) 10/19/2017    BILITOT 0.5 10/19/2017     No results found for: GLUCOSE    Imaging:  As noted in HPI.    Assessment:  Ida Orozco is a 52 y.o. female with history of hypertension, diabetes, anxiety, GERD, esophageal ulcers, morbid obesity, status post sleeve gastrectomy in June 2016, fatty liver disease/nonalcoholic, and inflammatory bowel disease/favoring ulcerative colitis was diagnosed in 2010 now status post total colectomy and ileostomy with rectum in situ. Her presentation is still favoring towards UC. Likely fistula was related to her complicated post-op course and intraabdominal abscess. She has no high output from ostomy but now has rectal bleeding/mucus concerning for rectal inflammation. Proctitis still could be the source of abdominal pain. We will do extensive eval of small bowel to ensure no small bowel disease/crohn's disease. Meanwhile, we will rule out other mimickers with EGD/Flex/ileoscopy with biopsies.   Although less common and likely, if abdominal pain continues and below w/u unremarkable, we will consider SMA syndrome as she had lost significant weight in a short period of time.     1. Inflammatory bowel disease - favor UC   2. Abdominal pain, mid abdomen    3. Rectal bleeding    4. Ulcer of esophagus without bleeding - resolved   5. H/O bariatric surgery    6. Ileostomy in place        Plan:  Orders Placed This Encounter    MRI ENTEROGRAPHY    X-Ray Chest PA And Lateral    ESR (SEDIMENTATION RATE, MANUAL)    C-reactive protein    Quantiferon Gold TB    Thiopurine Methyltrans, RBC    TSH    TISSUE TRANSGLUTAMINASE, IGA    IGA    CBC  auto differential    Comprehensive metabolic panel    Case request GI: ESOPHAGOGASTRODUODENOSCOPY (EGD), ILEOSCOPY, SIGMOIDOSCOPY-FLEXIBLE  CRS follow up as scheduled.      She will return to clinic in 6 weeks, we will also discuss preventive measures during that time.     We have spent more than 70 minutes for reviewing records and teaching.     The patient was advised that NSAID-type medications have two very important potential side effects: gastrointestinal irritation including hemorrhage and renal injuries. The patient expresses understanding of these issues and questions were answered.    Robin Kaiser MD  Gastroenterology & Hepatology Fellow  Pager: [phone]

## 2018-01-16 NOTE — PROGRESS NOTES
I have discussed the case at length with the fellow and I agree with the note and plan as outlined above.

## 2018-01-17 ENCOUNTER — LAB VISIT (OUTPATIENT)
Dept: LAB | Facility: HOSPITAL | Age: 53
End: 2018-01-17
Payer: MEDICARE

## 2018-01-17 ENCOUNTER — OFFICE VISIT (OUTPATIENT)
Dept: SURGERY | Facility: CLINIC | Age: 53
End: 2018-01-17
Payer: MEDICARE

## 2018-01-17 VITALS
HEIGHT: 65 IN | WEIGHT: 239.88 LBS | DIASTOLIC BLOOD PRESSURE: 61 MMHG | BODY MASS INDEX: 39.97 KG/M2 | HEART RATE: 82 BPM | SYSTOLIC BLOOD PRESSURE: 119 MMHG

## 2018-01-17 DIAGNOSIS — R10.9 ABDOMINAL PAIN, UNSPECIFIED ABDOMINAL LOCATION: ICD-10-CM

## 2018-01-17 DIAGNOSIS — K52.9 INFLAMMATORY BOWEL DISEASE: ICD-10-CM

## 2018-01-17 DIAGNOSIS — K51.911 ACUTE ULCERATIVE COLITIS WITH RECTAL BLEEDING: Primary | ICD-10-CM

## 2018-01-17 DIAGNOSIS — K62.5 RECTAL BLEEDING: ICD-10-CM

## 2018-01-17 LAB
ALBUMIN SERPL BCP-MCNC: 3.1 G/DL
ALP SERPL-CCNC: 156 U/L
ALT SERPL W/O P-5'-P-CCNC: 33 U/L
ANION GAP SERPL CALC-SCNC: 7 MMOL/L
AST SERPL-CCNC: 27 U/L
BASOPHILS # BLD AUTO: 0.09 K/UL
BASOPHILS NFR BLD: 1.2 %
BILIRUB SERPL-MCNC: 0.4 MG/DL
BUN SERPL-MCNC: 17 MG/DL
CALCIUM SERPL-MCNC: 9.1 MG/DL
CHLORIDE SERPL-SCNC: 111 MMOL/L
CO2 SERPL-SCNC: 23 MMOL/L
CREAT SERPL-MCNC: 0.9 MG/DL
CRP SERPL-MCNC: 3.1 MG/L
DIFFERENTIAL METHOD: ABNORMAL
EOSINOPHIL # BLD AUTO: 1.1 K/UL
EOSINOPHIL NFR BLD: 14.4 %
ERYTHROCYTE [DISTWIDTH] IN BLOOD BY AUTOMATED COUNT: 12.9 %
ERYTHROCYTE [SEDIMENTATION RATE] IN BLOOD BY WESTERGREN METHOD: 9 MM/HR
EST. GFR  (AFRICAN AMERICAN): >60 ML/MIN/1.73 M^2
EST. GFR  (NON AFRICAN AMERICAN): >60 ML/MIN/1.73 M^2
GLUCOSE SERPL-MCNC: 118 MG/DL
HCT VFR BLD AUTO: 43.9 %
HGB BLD-MCNC: 13.9 G/DL
IGA SERPL-MCNC: 393 MG/DL
IMM GRANULOCYTES # BLD AUTO: 0.02 K/UL
IMM GRANULOCYTES NFR BLD AUTO: 0.3 %
LYMPHOCYTES # BLD AUTO: 2.3 K/UL
LYMPHOCYTES NFR BLD: 29.5 %
MCH RBC QN AUTO: 29 PG
MCHC RBC AUTO-ENTMCNC: 31.7 G/DL
MCV RBC AUTO: 92 FL
MONOCYTES # BLD AUTO: 0.5 K/UL
MONOCYTES NFR BLD: 5.9 %
NEUTROPHILS # BLD AUTO: 3.7 K/UL
NEUTROPHILS NFR BLD: 48.7 %
NRBC BLD-RTO: 0 /100 WBC
PLATELET # BLD AUTO: 337 K/UL
PMV BLD AUTO: 9.5 FL
POTASSIUM SERPL-SCNC: 3.7 MMOL/L
PROT SERPL-MCNC: 7.2 G/DL
RBC # BLD AUTO: 4.79 M/UL
SODIUM SERPL-SCNC: 141 MMOL/L
TSH SERPL DL<=0.005 MIU/L-ACNC: 1.05 UIU/ML
WBC # BLD AUTO: 7.64 K/UL

## 2018-01-17 PROCEDURE — 99499 UNLISTED E&M SERVICE: CPT | Mod: S$GLB,,, | Performed by: COLON & RECTAL SURGERY

## 2018-01-17 PROCEDURE — 86140 C-REACTIVE PROTEIN: CPT

## 2018-01-17 PROCEDURE — 84443 ASSAY THYROID STIM HORMONE: CPT

## 2018-01-17 PROCEDURE — 83516 IMMUNOASSAY NONANTIBODY: CPT

## 2018-01-17 PROCEDURE — 82657 ENZYME CELL ACTIVITY: CPT

## 2018-01-17 PROCEDURE — 85651 RBC SED RATE NONAUTOMATED: CPT

## 2018-01-17 PROCEDURE — 99999 PR PBB SHADOW E&M-EST. PATIENT-LVL III: CPT | Mod: PBBFAC,,, | Performed by: COLON & RECTAL SURGERY

## 2018-01-17 PROCEDURE — 85025 COMPLETE CBC W/AUTO DIFF WBC: CPT

## 2018-01-17 PROCEDURE — 80053 COMPREHEN METABOLIC PANEL: CPT

## 2018-01-17 PROCEDURE — 82784 ASSAY IGA/IGD/IGG/IGM EACH: CPT

## 2018-01-17 PROCEDURE — 99213 OFFICE O/P EST LOW 20 MIN: CPT | Mod: S$GLB,,, | Performed by: COLON & RECTAL SURGERY

## 2018-01-17 RX ORDER — AMOXICILLIN 500 MG/1
CAPSULE ORAL
COMMUNITY
Start: 2017-12-21 | End: 2018-03-15

## 2018-01-17 RX ORDER — DOXYCYCLINE 100 MG/1
CAPSULE ORAL
COMMUNITY
Start: 2018-01-05 | End: 2018-03-15

## 2018-01-17 NOTE — LETTER
January 20, 2018      Tim Fitch MD  [address]  [address]  [facility] [address]           [facility]  [address]  [facility] [address]  Phone: [phone]          Patient: Ida Orozco   MR Number: [id number]   YOB: 1965   Date of Visit: 1/17/2018       Dear Dr. Tim Fitch:    Thank you for referring Ida Orozco to me for evaluation. Attached you will find relevant portions of my assessment and plan of care.    If you have questions, please do not hesitate to call me. I look forward to following Ida Orozco along with you.    Sincerely,    JENNY Virgen MD    Enclosure  CC:  No Recipients    If you would like to receive this communication electronically, please contact [email or web address] or [phone] to request more information on [employer] access.    For providers and/or their staff who would like to refer a patient to [facility], please contact us through our one-stop-shop provider referral line, [facility], at [phone].    If you feel you have received this communication in error or would no longer like to receive these types of communications, please e-mail [email or web address]

## 2018-01-18 ENCOUNTER — HOSPITAL ENCOUNTER (OUTPATIENT)
Dept: RADIOLOGY | Facility: HOSPITAL | Age: 53
Discharge: HOME | End: 2018-01-18
Attending: INTERNAL MEDICINE
Payer: MEDICARE

## 2018-01-18 DIAGNOSIS — R10.9 ABDOMINAL PAIN, UNSPECIFIED ABDOMINAL LOCATION: ICD-10-CM

## 2018-01-18 DIAGNOSIS — K62.5 RECTAL BLEEDING: ICD-10-CM

## 2018-01-18 DIAGNOSIS — K52.9 INFLAMMATORY BOWEL DISEASE: ICD-10-CM

## 2018-01-18 PROCEDURE — A9585 GADOBUTROL INJECTION: HCPCS | Performed by: INTERNAL MEDICINE

## 2018-01-18 PROCEDURE — 72197 MRI PELVIS W/O & W/DYE: CPT | Mod: 26,,, | Performed by: RADIOLOGY

## 2018-01-18 PROCEDURE — 25500020 PHARM REV CODE 255: Performed by: INTERNAL MEDICINE

## 2018-01-18 PROCEDURE — 74183 MRI ABD W/O CNTR FLWD CNTR: CPT | Mod: 26,,, | Performed by: RADIOLOGY

## 2018-01-18 PROCEDURE — 72197 MRI PELVIS W/O & W/DYE: CPT | Mod: TC

## 2018-01-18 RX ORDER — GADOBUTROL 604.72 MG/ML
10 INJECTION INTRAVENOUS
Status: COMPLETED | OUTPATIENT
Start: 2018-01-18 | End: 2018-01-18

## 2018-01-18 RX ADMIN — GADOBUTROL 10 ML: 604.72 INJECTION INTRAVENOUS at 10:01

## 2018-01-19 LAB — TTG IGA SER IA-ACNC: 11 UNITS

## 2018-01-21 NOTE — PROGRESS NOTES
CRS Office Visit    SUBJECTIVE:       History of Present Illness:  Patient is a 52 y.o. female with history of IBD (believe UC but unclear) s/p TAC with end ileostomy in  with subsequent fistula at stoma site leading to revision and relocation of ostomy in . She recently underwent a sleeve gastrectomy in  and has lost over 120 lbs since. She denies any issues with her ostomy at this time. She has expressed some interest in possible proctectomy with IPAA.    Review of patient's allergies indicates:   Allergen Reactions    Adhesive      Cause blisters    Dilaudid [hydromorphone] Nausea And Vomiting    Sulfa (sulfonamide antibiotics) Hives       Past Medical History:   Diagnosis Date    Anxiety     Crohn's colitis     Diabetes mellitus     Fatty liver disease, nonalcoholic     GERD (gastroesophageal reflux disease)     History of sleeve gastrectomy 2016    Hypertension      Past Surgical History:   Procedure Laterality Date    APPENDECTOMY       SECTION, LOW TRANSVERSE      CHOLECYSTECTOMY      open    COLECTOMY      total- 2013    HYSTERECTOMY      ILEOSTOMY REVISION      2014; 2014    thumb surgery      TONSILLECTOMY, ADENOIDECTOMY      WISDOM TOOTH EXTRACTION       Family History   Problem Relation Age of Onset    Cancer Mother     Heart disease Father     Cancer Paternal Grandfather     Cancer Maternal Grandfather      Social History   Substance Use Topics    Smoking status: Former Smoker    Smokeless tobacco: Former User     Quit date: 4/15/2007    Alcohol use No      Comment: occ       OBJECTIVE:     Vital Signs (Most Recent)  Pulse: 82 (18 1417)  BP: 119/61 (18 1417)    Physical Exam:  General: White female in NAD sitting in chair in clinic  Neuro: aaox4 maex4 perrl  Respiratory: resps even unlabored  Cardiac: cap refill <2 sec  Abdomen: multiple well-healed surgical incisions, ostomy on right abdomen, pink mucosa, gas/stool in  bag  Extremities: Warm dry and intact    ASSESSMENT/PLAN:     We discussed her next potential surgical options. At this time, she has been doing very well. She has continued to lose weight, and we advised her to continue with her weight loss and to return to clinic in another 4-6 weeks to re-evaluate surgical options.      I have personally obtained a history and performed a physical exam with and independent to my resident and discussed the findings and plan with the patient.  I agree with the above findings and plan with the following exceptions:  None    H, Gucci Virgen MD, FACS, FASCRS  Staff Surgeon  Dept of Colon and Rectal Surgery  [facility] [place], [place]

## 2018-01-22 NOTE — PROGRESS NOTES
Patient, Ida Orozco (MRN #[id number]), presented with a recorded BMI of 40.54 kg/m^2 consistent with the definition of morbid obesity (ICD-10 E66.01). The patient's morbid obesity was monitored, evaluated, addressed and/or treated. This addendum to the medical record is made on 01/22/2018.

## 2018-01-23 LAB
6-METHYLMERCAPTOPURINE RIBOSIDE: 8.26 NMOL/ML/H (ref 5.04–9.57)
6-METHYLMERCAPTOPURINE: 4.62 NMOL/ML/H (ref 3–6.66)
6-METHYLTHIOGUANINE RIBOSIDE: 4.53 NMOL/ML/H (ref 2.7–5.84)
TPMT INTERPRETATION: NORMAL
TPMT REVIEWED BY: NORMAL

## 2018-01-26 ENCOUNTER — TELEPHONE (OUTPATIENT)
Dept: GASTROENTEROLOGY | Facility: CLINIC | Age: 53
End: 2018-01-26

## 2018-01-26 NOTE — TELEPHONE ENCOUNTER
----- Message from Robin Kaiser MD sent at 1/24/2018  4:29 PM CST -----  Your MRI showed no evidence of inflammation in small bowel but some activity in remaining rectum. Otherwise, labs are reassuring. Please make to sure to have your scheduled procedures.

## 2018-02-28 ENCOUNTER — PATIENT MESSAGE (OUTPATIENT)
Dept: PRIMARY CARE CLINIC | Facility: CLINIC | Age: 53
End: 2018-02-28

## 2018-02-28 RX ORDER — ESOMEPRAZOLE MAGNESIUM 40 MG/1
40 CAPSULE, DELAYED RELEASE ORAL DAILY
Qty: 30 CAPSULE | Refills: 5 | Status: SHIPPED | OUTPATIENT
Start: 2018-02-28 | End: 2018-04-03

## 2018-03-14 ENCOUNTER — TELEPHONE (OUTPATIENT)
Dept: PRIMARY CARE CLINIC | Facility: CLINIC | Age: 53
End: 2018-03-14

## 2018-03-14 ENCOUNTER — PATIENT MESSAGE (OUTPATIENT)
Dept: PRIMARY CARE CLINIC | Facility: CLINIC | Age: 53
End: 2018-03-14

## 2018-03-15 ENCOUNTER — TELEPHONE (OUTPATIENT)
Dept: GASTROENTEROLOGY | Facility: CLINIC | Age: 53
End: 2018-03-15

## 2018-03-15 ENCOUNTER — OFFICE VISIT (OUTPATIENT)
Dept: PRIMARY CARE CLINIC | Facility: CLINIC | Age: 53
End: 2018-03-15
Payer: MEDICARE

## 2018-03-15 VITALS
SYSTOLIC BLOOD PRESSURE: 107 MMHG | OXYGEN SATURATION: 97 % | HEIGHT: 64 IN | DIASTOLIC BLOOD PRESSURE: 68 MMHG | TEMPERATURE: 98 F | HEART RATE: 67 BPM | RESPIRATION RATE: 18 BRPM | BODY MASS INDEX: 41.52 KG/M2 | WEIGHT: 243.19 LBS

## 2018-03-15 DIAGNOSIS — N39.0 URINARY TRACT INFECTION WITH HEMATURIA, SITE UNSPECIFIED: Primary | ICD-10-CM

## 2018-03-15 DIAGNOSIS — E78.00 HYPERCHOLESTEROLEMIA: ICD-10-CM

## 2018-03-15 DIAGNOSIS — L98.7 REDUNDANT SKIN OF ABDOMEN: ICD-10-CM

## 2018-03-15 DIAGNOSIS — E55.9 VITAMIN D DEFICIENCY: ICD-10-CM

## 2018-03-15 DIAGNOSIS — R31.9 URINARY TRACT INFECTION WITH HEMATURIA, SITE UNSPECIFIED: Primary | ICD-10-CM

## 2018-03-15 DIAGNOSIS — K50.819 CROHN'S DISEASE OF SMALL AND LARGE INTESTINES WITH COMPLICATION: ICD-10-CM

## 2018-03-15 DIAGNOSIS — D52.0 DIETARY FOLATE DEFICIENCY ANEMIA: ICD-10-CM

## 2018-03-15 DIAGNOSIS — E78.5 TYPE 2 DIABETES MELLITUS WITH HYPERLIPIDEMIA: ICD-10-CM

## 2018-03-15 DIAGNOSIS — E11.69 TYPE 2 DIABETES MELLITUS WITH HYPERLIPIDEMIA: ICD-10-CM

## 2018-03-15 DIAGNOSIS — Z98.84 BARIATRIC SURGERY STATUS: ICD-10-CM

## 2018-03-15 DIAGNOSIS — E53.8 VITAMIN B12 DEFICIENCY: ICD-10-CM

## 2018-03-15 LAB
BILIRUB SERPL-MCNC: ABNORMAL MG/DL
BLOOD URINE, POC: ABNORMAL
COLOR, POC UA: YELLOW
GLUCOSE UR QL STRIP: NORMAL
KETONES UR QL STRIP: ABNORMAL
LEUKOCYTE ESTERASE URINE, POC: ABNORMAL
NITRITE, POC UA: ABNORMAL
PH, POC UA: 5
PROTEIN, POC: ABNORMAL
SPECIFIC GRAVITY, POC UA: 1.02
UROBILINOGEN, POC UA: NORMAL

## 2018-03-15 PROCEDURE — 81001 URINALYSIS AUTO W/SCOPE: CPT | Mod: QW,S$GLB,, | Performed by: FAMILY MEDICINE

## 2018-03-15 PROCEDURE — 3044F HG A1C LEVEL LT 7.0%: CPT | Mod: CPTII,S$GLB,, | Performed by: FAMILY MEDICINE

## 2018-03-15 PROCEDURE — 99999 PR PBB SHADOW E&M-EST. PATIENT-LVL IV: CPT | Mod: PBBFAC,,, | Performed by: FAMILY MEDICINE

## 2018-03-15 PROCEDURE — 99499 UNLISTED E&M SERVICE: CPT | Mod: S$GLB,,, | Performed by: FAMILY MEDICINE

## 2018-03-15 PROCEDURE — 87086 URINE CULTURE/COLONY COUNT: CPT

## 2018-03-15 PROCEDURE — 99213 OFFICE O/P EST LOW 20 MIN: CPT | Mod: 25,S$GLB,, | Performed by: FAMILY MEDICINE

## 2018-03-15 RX ORDER — IBUPROFEN 600 MG/1
600 TABLET ORAL EVERY 8 HOURS PRN
Qty: 30 TABLET | Refills: 2 | Status: ON HOLD | OUTPATIENT
Start: 2018-03-15 | End: 2018-05-28 | Stop reason: HOSPADM

## 2018-03-15 RX ORDER — CIPROFLOXACIN 500 MG/1
500 TABLET ORAL 2 TIMES DAILY
Qty: 14 TABLET | Refills: 0 | Status: SHIPPED | OUTPATIENT
Start: 2018-03-15 | End: 2018-03-22

## 2018-03-15 NOTE — PROGRESS NOTES
"Subjective:       Patient ID: Ida Orozco is a 52 y.o. female.    Chief Complaint: Abdominal Pain (right side of back pain and difficulty urinating x 1 month)    Has been having lower abdominal/pelvic pain and pressure off and on past few weeks, but acutely worse last night. Felt urinary urgency, but couldn't void. Feeling better today. Subjective fever 2 nights ago.  Pt has lost ~100 pounds since gastric sleeve in 2016. Having issues with getting ileostomy bag to stick in place due to redundant abdominal skin      Review of Systems   Gastrointestinal: Positive for abdominal pain and nausea. Negative for vomiting.   Genitourinary: Positive for flank pain, pelvic pain and urgency. Negative for dysuria and hematuria.       Objective:      Vitals:    03/15/18 1509   BP: 107/68   BP Location: Right arm   Patient Position: Sitting   BP Method: Large (Automatic)   Pulse: 67   Resp: 18   Temp: 98.3 °F (36.8 °C)   TempSrc: Oral   SpO2: 97%   Weight: 110.3 kg (243 lb 3.2 oz)   Height: 5' 4" (1.626 m)     Physical Exam   Constitutional: She is oriented to person, place, and time. She appears well-developed and well-nourished.   HENT:   Head: Normocephalic and atraumatic.   Cardiovascular: Normal rate, regular rhythm and normal heart sounds.    Pulmonary/Chest: Effort normal and breath sounds normal.   Abdominal: Soft. Bowel sounds are normal. There is tenderness in the suprapubic area. There is CVA tenderness (right).   Musculoskeletal: She exhibits no edema.   Neurological: She is alert and oriented to person, place, and time.   Skin: Skin is warm and dry.   Psychiatric: She has a normal mood and affect. Her behavior is normal.   Nursing note and vitals reviewed.      Assessment:       1. Urinary tract infection with hematuria, site unspecified    2. Vitamin B12 deficiency    3. Vitamin D deficiency    4. Type 2 diabetes mellitus with hyperlipidemia    5. Hypercholesterolemia    6. Dietary folate deficiency anemia    7. " Crohn's disease of small and large intestines with complication    8. Bariatric surgery status    9. Redundant skin of abdomen        Plan:       Urinary tract infection with hematuria, site unspecified  -     Urine culture  -     POCT urinalysis, dipstick or tablet reag  -     ciprofloxacin HCl (CIPRO) 500 MG tablet; Take 1 tablet (500 mg total) by mouth 2 (two) times daily.  Dispense: 14 tablet; Refill: 0    Vitamin B12 deficiency  -     CBC auto differential; Future; Expected date: 09/15/2018  -     Vitamin B12; Future; Expected date: 09/15/2018  -     Iron and TIBC; Future; Expected date: 09/15/2018    Vitamin D deficiency  -     Vitamin D; Future; Expected date: 09/15/2018    Type 2 diabetes mellitus with hyperlipidemia  -     Hemoglobin A1c; Future; Expected date: 09/15/2018  -     Microalbumin/creatinine urine ratio; Future; Expected date: 09/15/2018    Hypercholesterolemia  -     Comprehensive metabolic panel; Future; Expected date: 09/15/2018  -     Lipid panel; Future; Expected date: 09/15/2018    Dietary folate deficiency anemia  -     CBC auto differential; Future; Expected date: 09/15/2018  -     Iron and TIBC; Future; Expected date: 09/15/2018  -     Folate; Future; Expected date: 09/15/2018    Crohn's disease of small and large intestines with complication  -     Iron and TIBC; Future; Expected date: 09/15/2018    Bariatric surgery status    Redundant skin of abdomen  Comments:  may ultimately need abdominoplasty    Other orders  -     ibuprofen (ADVIL,MOTRIN) 600 MG tablet; Take 1 tablet (600 mg total) by mouth every 8 (eight) hours as needed for Pain.  Dispense: 30 tablet; Refill: 2      Medication List with Changes/Refills   New Medications    CIPROFLOXACIN HCL (CIPRO) 500 MG TABLET    Take 1 tablet (500 mg total) by mouth 2 (two) times daily.    IBUPROFEN (ADVIL,MOTRIN) 600 MG TABLET    Take 1 tablet (600 mg total) by mouth every 8 (eight) hours as needed for Pain.   Current Medications     ALBUTEROL (PROVENTIL) 2.5 MG /3 ML (0.083 %) NEBULIZER SOLUTION    Take 3 mLs (2.5 mg total) by nebulization every 6 (six) hours as needed for Wheezing. Rescue    ALBUTEROL 90 MCG/ACTUATION INHALER    Inhale 2 puffs into the lungs every 4 (four) hours as needed for Wheezing or Shortness of Breath. Rescue    BUDESONIDE-FORMOTEROL 160-4.5 MCG (SYMBICORT) 160-4.5 MCG/ACTUATION HFAA    Inhale 2 puffs into the lungs every 12 (twelve) hours. Controller    BUPROPION (WELLBUTRIN) 75 MG TABLET    Take 2 tablets by mouth 3 (three) times daily.    CLONAZEPAM (KLONOPIN) 1 MG TABLET    Take 1 mg by mouth once daily.     DICLOFENAC SODIUM (VOLTAREN) 1 % GEL    Apply 2 g topically 4 (four) times daily.    ESCITALOPRAM OXALATE (LEXAPRO) 20 MG TABLET    Take 20 mg by mouth once daily.     ESOMEPRAZOLE (NEXIUM) 40 MG CAPSULE    Take 1 capsule (40 mg total) by mouth once daily.    FLUTICASONE (FLONASE) 50 MCG/ACTUATION NASAL SPRAY    1 spray by Each Nare route once daily.    LORATADINE (CLARITIN) 10 MG TABLET    TAKE ONE TABLET BY MOUTH ONCE DAILY    MONTELUKAST (SINGULAIR) 10 MG TABLET    Take 1 tablet (10 mg total) by mouth every evening.    TRAZODONE (DESYREL) 50 MG TABLET    Take 50 mg by mouth every evening.   Discontinued Medications    AMOXICILLIN (AMOXIL) 500 MG CAPSULE        DOXYCYCLINE (DORYX) 100 MG EC TABLET    Take 1 tablet (100 mg total) by mouth 2 (two) times daily.    DOXYCYCLINE (VIBRAMYCIN) 100 MG CAP        PROMETHAZINE-CODEINE 6.25-10 MG/5 ML (PHENERGAN WITH CODEINE) 6.25-10 MG/5 ML SYRUP    Take 5 mLs by mouth every 6 (six) hours as needed.

## 2018-03-15 NOTE — TELEPHONE ENCOUNTER
----- Message from Angelica Powell sent at 3/15/2018 10:32 AM CDT -----  Contact: Self  Pt is calling to be scheduled for an appt.  Pt says she has been trying to schedule an appt since 3/11/18 and has not received a response from Staff.  She is requesting a returned call.   was unable to make the appt due to an exhausted template.    She can be reached at [phone].    Thank you.

## 2018-03-17 LAB
BACTERIA UR CULT: NORMAL
BACTERIA UR CULT: NORMAL

## 2018-03-20 ENCOUNTER — TELEPHONE (OUTPATIENT)
Dept: GASTROENTEROLOGY | Facility: CLINIC | Age: 53
End: 2018-03-20

## 2018-03-21 RX ORDER — MONTELUKAST SODIUM 10 MG/1
TABLET ORAL
Qty: 90 TABLET | Refills: 1 | Status: SHIPPED | OUTPATIENT
Start: 2018-03-21 | End: 2018-09-09 | Stop reason: SDUPTHER

## 2018-04-02 RX ORDER — LORATADINE 10 MG/1
TABLET ORAL
Qty: 90 TABLET | Refills: 1 | Status: SHIPPED | OUTPATIENT
Start: 2018-04-02 | End: 2018-12-08 | Stop reason: SDUPTHER

## 2018-04-03 ENCOUNTER — OFFICE VISIT (OUTPATIENT)
Dept: PRIMARY CARE CLINIC | Facility: CLINIC | Age: 53
End: 2018-04-03
Payer: MEDICARE

## 2018-04-03 VITALS
OXYGEN SATURATION: 96 % | WEIGHT: 241 LBS | TEMPERATURE: 98 F | HEIGHT: 64 IN | BODY MASS INDEX: 41.15 KG/M2 | DIASTOLIC BLOOD PRESSURE: 66 MMHG | RESPIRATION RATE: 18 BRPM | SYSTOLIC BLOOD PRESSURE: 105 MMHG | HEART RATE: 68 BPM

## 2018-04-03 DIAGNOSIS — J20.9 ACUTE BRONCHITIS, UNSPECIFIED ORGANISM: Primary | ICD-10-CM

## 2018-04-03 PROCEDURE — 99214 OFFICE O/P EST MOD 30 MIN: CPT | Mod: S$GLB,,, | Performed by: FAMILY MEDICINE

## 2018-04-03 PROCEDURE — 99999 PR PBB SHADOW E&M-EST. PATIENT-LVL III: CPT | Mod: PBBFAC,,, | Performed by: FAMILY MEDICINE

## 2018-04-03 RX ORDER — DOXYCYCLINE 100 MG/1
100 CAPSULE ORAL EVERY 12 HOURS
Qty: 20 CAPSULE | Refills: 0 | Status: SHIPPED | OUTPATIENT
Start: 2018-04-03 | End: 2018-04-13

## 2018-04-03 RX ORDER — METHYLPREDNISOLONE 4 MG/1
TABLET ORAL
Qty: 1 PACKAGE | Refills: 0 | Status: ON HOLD | OUTPATIENT
Start: 2018-04-03 | End: 2018-04-11 | Stop reason: HOSPADM

## 2018-04-03 NOTE — PROGRESS NOTES
"Subjective:       Patient ID: Ida Orozco is a 52 y.o. female.    Chief Complaint: Cough (Patient has had a cough since last Monday )    Cough   This is a new problem. The current episode started 1 to 4 weeks ago. The problem has been unchanged. The problem occurs every few minutes. The cough is productive of purulent sputum. Associated symptoms include chills, a fever (subjective), nasal congestion, shortness of breath and wheezing. Pertinent negatives include no chest pain, hemoptysis or rash. She has tried a beta-agonist inhaler for the symptoms. The treatment provided mild relief. Her past medical history is significant for asthma.     Review of Systems   Constitutional: Positive for chills and fever (subjective).   HENT: Positive for congestion. Negative for trouble swallowing.    Eyes: Negative for visual disturbance.   Respiratory: Positive for cough, shortness of breath and wheezing. Negative for hemoptysis.    Cardiovascular: Negative for chest pain.   Gastrointestinal: Negative for diarrhea and vomiting.   Genitourinary: Negative for difficulty urinating.   Skin: Negative for rash.   Neurological: Positive for light-headedness.   Psychiatric/Behavioral: Negative for agitation and confusion.       Objective:      Vitals:    04/03/18 1331   BP: 105/66   BP Location: Left arm   Patient Position: Sitting   BP Method: Large (Automatic)   Pulse: 68   Resp: 18   Temp: 98.3 °F (36.8 °C)   TempSrc: Oral   SpO2: 96%   Weight: 109.3 kg (241 lb)   Height: 5' 4" (1.626 m)     Physical Exam   Constitutional: She is oriented to person, place, and time. She appears well-developed and well-nourished.   HENT:   Head: Normocephalic and atraumatic.   Right Ear: Tympanic membrane normal.   Left Ear: Tympanic membrane normal.   Mouth/Throat: Oropharynx is clear and moist.   Cardiovascular: Normal rate, regular rhythm and normal heart sounds.    Pulmonary/Chest: Effort normal. She has wheezes (diffuse). She has rhonchi in the " left lower field.   Musculoskeletal: She exhibits no edema.   Neurological: She is alert and oriented to person, place, and time.   Skin: Skin is warm and dry.   Nursing note and vitals reviewed.      Assessment:       1. Acute bronchitis, unspecified organism        Plan:       Acute bronchitis, unspecified organism  Comments:  Chest x-ray reviewed, no infiltrate or other acute abnormalities noted  Orders:  -     X-Ray Chest PA And Lateral; Future; Expected date: 04/03/2018  -     doxycycline (VIBRAMYCIN) 100 MG Cap; Take 1 capsule (100 mg total) by mouth every 12 (twelve) hours.  Dispense: 20 capsule; Refill: 0  -     methylPREDNISolone (MEDROL DOSEPACK) 4 mg tablet; use as directed  Dispense: 1 Package; Refill: 0      Medication List with Changes/Refills   New Medications    DOXYCYCLINE (VIBRAMYCIN) 100 MG CAP    Take 1 capsule (100 mg total) by mouth every 12 (twelve) hours.    METHYLPREDNISOLONE (MEDROL DOSEPACK) 4 MG TABLET    use as directed   Current Medications    ALBUTEROL (PROVENTIL) 2.5 MG /3 ML (0.083 %) NEBULIZER SOLUTION    Take 3 mLs (2.5 mg total) by nebulization every 6 (six) hours as needed for Wheezing. Rescue    ALBUTEROL 90 MCG/ACTUATION INHALER    Inhale 2 puffs into the lungs every 4 (four) hours as needed for Wheezing or Shortness of Breath. Rescue    BUDESONIDE-FORMOTEROL 160-4.5 MCG (SYMBICORT) 160-4.5 MCG/ACTUATION HFAA    Inhale 2 puffs into the lungs every 12 (twelve) hours. Controller    BUPROPION (WELLBUTRIN) 75 MG TABLET    Take 2 tablets by mouth 3 (three) times daily.    CLONAZEPAM (KLONOPIN) 1 MG TABLET    Take 1 mg by mouth once daily.     ESCITALOPRAM OXALATE (LEXAPRO) 20 MG TABLET    Take 20 mg by mouth once daily.     FLUTICASONE (FLONASE) 50 MCG/ACTUATION NASAL SPRAY    1 spray by Each Nare route once daily.    IBUPROFEN (ADVIL,MOTRIN) 600 MG TABLET    Take 1 tablet (600 mg total) by mouth every 8 (eight) hours as needed for Pain.    LORATADINE (CLARITIN) 10 MG TABLET    TAKE  ONE TABLET BY MOUTH ONCE DAILY    MONTELUKAST (SINGULAIR) 10 MG TABLET    TAKE ONE TABLET BY MOUTH EVERY EVENING    TRAZODONE (DESYREL) 50 MG TABLET    Take 50 mg by mouth every evening.   Discontinued Medications    DICLOFENAC SODIUM (VOLTAREN) 1 % GEL    Apply 2 g topically 4 (four) times daily.    ESOMEPRAZOLE (NEXIUM) 40 MG CAPSULE    Take 1 capsule (40 mg total) by mouth once daily.

## 2018-04-04 RX ORDER — BUDESONIDE AND FORMOTEROL FUMARATE DIHYDRATE 160; 4.5 UG/1; UG/1
2 AEROSOL RESPIRATORY (INHALATION) EVERY 12 HOURS
Qty: 11 G | Refills: 1 | Status: SHIPPED | OUTPATIENT
Start: 2018-04-04 | End: 2019-08-22 | Stop reason: SDUPTHER

## 2018-04-11 ENCOUNTER — SURGERY (OUTPATIENT)
Age: 53
End: 2018-04-11

## 2018-04-11 ENCOUNTER — ANESTHESIA EVENT (OUTPATIENT)
Dept: ENDOSCOPY | Facility: HOSPITAL | Age: 53
End: 2018-04-11
Payer: MEDICARE

## 2018-04-11 ENCOUNTER — HOSPITAL ENCOUNTER (OUTPATIENT)
Facility: HOSPITAL | Age: 53
Discharge: HOME | End: 2018-04-11
Attending: INTERNAL MEDICINE | Admitting: INTERNAL MEDICINE
Payer: MEDICARE

## 2018-04-11 ENCOUNTER — ANESTHESIA (OUTPATIENT)
Dept: ENDOSCOPY | Facility: HOSPITAL | Age: 53
End: 2018-04-11
Payer: MEDICARE

## 2018-04-11 ENCOUNTER — PATIENT MESSAGE (OUTPATIENT)
Dept: PRIMARY CARE CLINIC | Facility: CLINIC | Age: 53
End: 2018-04-11

## 2018-04-11 VITALS
HEIGHT: 65 IN | TEMPERATURE: 98 F | RESPIRATION RATE: 16 BRPM | OXYGEN SATURATION: 97 % | HEART RATE: 57 BPM | DIASTOLIC BLOOD PRESSURE: 55 MMHG | BODY MASS INDEX: 39.32 KG/M2 | SYSTOLIC BLOOD PRESSURE: 105 MMHG | WEIGHT: 236 LBS

## 2018-04-11 DIAGNOSIS — K50.819 CROHN'S DISEASE OF SMALL AND LARGE INTESTINES WITH COMPLICATION: ICD-10-CM

## 2018-04-11 DIAGNOSIS — K52.9 INFLAMMATORY BOWEL DISEASE: Primary | ICD-10-CM

## 2018-04-11 DIAGNOSIS — K21.00 GASTROESOPHAGEAL REFLUX DISEASE WITH ESOPHAGITIS: ICD-10-CM

## 2018-04-11 PROCEDURE — 37000008 HC ANESTHESIA 1ST 15 MINUTES: Performed by: INTERNAL MEDICINE

## 2018-04-11 PROCEDURE — 37000009 HC ANESTHESIA EA ADD 15 MINS: Performed by: INTERNAL MEDICINE

## 2018-04-11 PROCEDURE — D9220A PRA ANESTHESIA: Mod: CRNA,,, | Performed by: NURSE ANESTHETIST, CERTIFIED REGISTERED

## 2018-04-11 PROCEDURE — 63600175 PHARM REV CODE 636 W HCPCS: Performed by: NURSE ANESTHETIST, CERTIFIED REGISTERED

## 2018-04-11 PROCEDURE — 44380 SMALL BOWEL ENDOSCOPY BR/WA: CPT | Mod: ,,, | Performed by: INTERNAL MEDICINE

## 2018-04-11 PROCEDURE — 45330 DIAGNOSTIC SIGMOIDOSCOPY: CPT | Mod: 51,,, | Performed by: INTERNAL MEDICINE

## 2018-04-11 PROCEDURE — 27201012 HC FORCEPS, HOT/COLD, DISP: Performed by: INTERNAL MEDICINE

## 2018-04-11 PROCEDURE — 44380 SMALL BOWEL ENDOSCOPY BR/WA: CPT | Performed by: INTERNAL MEDICINE

## 2018-04-11 PROCEDURE — 43239 EGD BIOPSY SINGLE/MULTIPLE: CPT | Mod: 51,,, | Performed by: INTERNAL MEDICINE

## 2018-04-11 PROCEDURE — 25000003 PHARM REV CODE 250: Performed by: INTERNAL MEDICINE

## 2018-04-11 PROCEDURE — D9220A PRA ANESTHESIA: Mod: ANES,,, | Performed by: ANESTHESIOLOGY

## 2018-04-11 PROCEDURE — 88305 TISSUE EXAM BY PATHOLOGIST: CPT | Mod: 26,,, | Performed by: PATHOLOGY

## 2018-04-11 PROCEDURE — 88305 TISSUE EXAM BY PATHOLOGIST: CPT | Performed by: PATHOLOGY

## 2018-04-11 PROCEDURE — 43239 EGD BIOPSY SINGLE/MULTIPLE: CPT | Performed by: INTERNAL MEDICINE

## 2018-04-11 PROCEDURE — 45330 DIAGNOSTIC SIGMOIDOSCOPY: CPT | Performed by: INTERNAL MEDICINE

## 2018-04-11 RX ORDER — MIDAZOLAM HYDROCHLORIDE 1 MG/ML
INJECTION, SOLUTION INTRAMUSCULAR; INTRAVENOUS
Status: DISCONTINUED | OUTPATIENT
Start: 2018-04-11 | End: 2018-04-11

## 2018-04-11 RX ORDER — PROPOFOL 10 MG/ML
VIAL (ML) INTRAVENOUS
Status: DISCONTINUED | OUTPATIENT
Start: 2018-04-11 | End: 2018-04-11

## 2018-04-11 RX ORDER — SODIUM CHLORIDE 9 MG/ML
INJECTION, SOLUTION INTRAVENOUS CONTINUOUS
Status: DISCONTINUED | OUTPATIENT
Start: 2018-04-11 | End: 2018-04-11 | Stop reason: HOSPADM

## 2018-04-11 RX ORDER — LIDOCAINE HCL/PF 100 MG/5ML
SYRINGE (ML) INTRAVENOUS
Status: DISCONTINUED | OUTPATIENT
Start: 2018-04-11 | End: 2018-04-11

## 2018-04-11 RX ORDER — PROPOFOL 10 MG/ML
VIAL (ML) INTRAVENOUS CONTINUOUS PRN
Status: DISCONTINUED | OUTPATIENT
Start: 2018-04-11 | End: 2018-04-11

## 2018-04-11 RX ADMIN — LIDOCAINE HYDROCHLORIDE 50 MG: 20 INJECTION, SOLUTION INTRAVENOUS at 09:04

## 2018-04-11 RX ADMIN — PROPOFOL 150 MCG/KG/MIN: 10 INJECTION, EMULSION INTRAVENOUS at 09:04

## 2018-04-11 RX ADMIN — SODIUM CHLORIDE: 9 INJECTION, SOLUTION INTRAVENOUS at 08:04

## 2018-04-11 RX ADMIN — PROPOFOL 75 MG: 10 INJECTION, EMULSION INTRAVENOUS at 09:04

## 2018-04-11 RX ADMIN — MIDAZOLAM HYDROCHLORIDE 2 MG: 1 INJECTION, SOLUTION INTRAMUSCULAR; INTRAVENOUS at 09:04

## 2018-04-11 RX ADMIN — SODIUM CHLORIDE: 0.9 INJECTION, SOLUTION INTRAVENOUS at 09:04

## 2018-04-11 NOTE — ANESTHESIA POSTPROCEDURE EVALUATION
"Anesthesia Post Evaluation    Patient: Ida Orozco    Procedure(s) Performed: Procedure(s) (LRB):  ESOPHAGOGASTRODUODENOSCOPY (EGD) (N/A)  ILEOSCOPY (N/A)  SIGMOIDOSCOPY-FLEXIBLE (N/A)    Final Anesthesia Type: general  Patient location during evaluation: PACU  Patient participation: Yes- Able to Participate  Level of consciousness: awake and alert  Post-procedure vital signs: reviewed and stable  Pain management: adequate  Airway patency: patent  PONV status at discharge: No PONV  Anesthetic complications: no      Cardiovascular status: blood pressure returned to baseline  Respiratory status: unassisted  Hydration status: euvolemic  Follow-up not needed.        Visit Vitals  BP (!) 105/55 (BP Location: Left arm, Patient Position: Lying)   Pulse (!) 57   Temp 36.7 °C (98.1 °F) (Temporal)   Resp 16   Ht 5' 4.5" (1.638 m)   Wt 107 kg (236 lb)   SpO2 97%   Breastfeeding? No   BMI 39.88 kg/m²       Pain/Tito Score: Pain Assessment Performed: Yes (4/11/2018  8:40 AM)  Presence of Pain: denies (4/11/2018  9:55 AM)  Tito Score: 10 (4/11/2018 10:31 AM)      "

## 2018-04-11 NOTE — TRANSFER OF CARE
"Anesthesia Transfer of Care Note    Patient: Ida Orozco    Procedure(s) Performed: Procedure(s) (LRB):  ESOPHAGOGASTRODUODENOSCOPY (EGD) (N/A)  ILEOSCOPY (N/A)  SIGMOIDOSCOPY-FLEXIBLE (N/A)    Patient location: PACU    Anesthesia Type: general    Transport from OR: Transported from OR on room air with adequate spontaneous ventilation    Post pain: adequate analgesia    Post assessment: no apparent anesthetic complications    Post vital signs: stable    Level of consciousness: sedated    Nausea/Vomiting: no nausea/vomiting    Complications: none    Transfer of care protocol was followed      Last vitals:   Visit Vitals  BP (!) 102/58 (BP Location: Left arm, Patient Position: Lying)   Pulse (!) 59   Temp 36.7 °C (98.1 °F) (Temporal)   Resp 16   Ht 5' 4.5" (1.638 m)   Wt 107 kg (236 lb)   SpO2 95%   Breastfeeding? No   BMI 39.88 kg/m²     "

## 2018-04-11 NOTE — PROVATION PATIENT INSTRUCTIONS
Discharge Summary/Instructions after an Endoscopic Procedure  Patient Name: Ida Orozco  Patient MRN: [id number]  Patient YOB: 1965 Wednesday, April 11, 2018  Chris Castorena MD  RESTRICTIONS:  During your procedure today, you received medications for sedation.  These   medications may affect your judgment, balance and coordination.  Therefore,   for 24 hours, you have the following restrictions:   - DO NOT drive a car, operate machinery, make legal/financial decisions,   sign important papers or drink alcohol.    ACTIVITY:  The following day: return to full activity including work, except no heavy   lifting, straining or running for 3 days if polyps were removed.  DIET:  Eat and drink normally unless instructed otherwise.     TREATMENT FOR COMMON SIDE EFFECTS:  - Mild abdominal pain, nausea, belching, bloating or excessive gas:  rest,   eat lightly and use a heating pad.  - Sore Throat: treat with throat lozenges and/or gargle with warm salt   water.  - Because air was used during the procedure, expelling large amounts of air   from your rectum or belching is normal.  - If a bowel prep was taken, you may not have a bowel movement for 1-3 days.    This is normal.  SYMPTOMS TO WATCH FOR AND REPORT TO YOUR PHYSICIAN:  1. Abdominal pain or bloating, other than gas cramps.  2. Chest pain.  3. Back pain.  4. Signs of infection such as: chills or fever occurring within 24 hours   after the procedure.  5. Rectal bleeding, which would show as bright red, maroon, or black stools.   (A tablespoon of blood from the rectum is not serious, especially if   hemorrhoids are present.)  6. Vomiting.  7. Weakness or dizziness.  GO DIRECTLY TO THE NEAREST EMERGENCY ROOM IF YOU HAVE ANY OF THE FOLLOWING:      Difficulty breathing              Chills and/or fever over 101 F   Persistent vomiting and/or vomiting blood   Severe abdominal pain   Severe chest pain   Black, tarry stools   Bleeding- more than one tablespoon   Any  other symptom or condition that you feel may need urgent attention  Your doctor recommends these additional instructions:  If any biopsies were taken, your doctors clinic will contact you in 1 to 2   weeks with any results.  - Await pathology results.   - Telephone GI clinic for pathology results in 1 week.   - Perform an ileoscopy now.  - See ileoscopy report for further details.  For questions, problems or results please call your physician - Chris Castorena MD at Work:  [phone].  [facility], EMERGENCY ROOM PHONE NUMBER: [phone]  IF A COMPLICATION OR EMERGENCY SITUATION ARISES AND YOU ARE UNABLE TO REACH   YOUR PHYSICIAN - GO DIRECTLY TO THE EMERGENCY ROOM.  Chris Castorena MD  4/11/2018 10:09:37 AM  This report has been verified and signed electronically.

## 2018-04-11 NOTE — PROVATION PATIENT INSTRUCTIONS
Discharge Summary/Instructions after an Endoscopic Procedure  Patient Name: Ida Orozco  Patient MRN: [id number]  Patient YOB: 1965 Wednesday, April 11, 2018  Chris Castorena MD  RESTRICTIONS:  During your procedure today, you received medications for sedation.  These   medications may affect your judgment, balance and coordination.  Therefore,   for 24 hours, you have the following restrictions:   - DO NOT drive a car, operate machinery, make legal/financial decisions,   sign important papers or drink alcohol.    ACTIVITY:  The following day: return to full activity including work, except no heavy   lifting, straining or running for 3 days if polyps were removed.  DIET:  Eat and drink normally unless instructed otherwise.     TREATMENT FOR COMMON SIDE EFFECTS:  - Mild abdominal pain, nausea, belching, bloating or excessive gas:  rest,   eat lightly and use a heating pad.  - Sore Throat: treat with throat lozenges and/or gargle with warm salt   water.  - Because air was used during the procedure, expelling large amounts of air   from your rectum or belching is normal.  - If a bowel prep was taken, you may not have a bowel movement for 1-3 days.    This is normal.  SYMPTOMS TO WATCH FOR AND REPORT TO YOUR PHYSICIAN:  1. Abdominal pain or bloating, other than gas cramps.  2. Chest pain.  3. Back pain.  4. Signs of infection such as: chills or fever occurring within 24 hours   after the procedure.  5. Rectal bleeding, which would show as bright red, maroon, or black stools.   (A tablespoon of blood from the rectum is not serious, especially if   hemorrhoids are present.)  6. Vomiting.  7. Weakness or dizziness.  GO DIRECTLY TO THE NEAREST EMERGENCY ROOM IF YOU HAVE ANY OF THE FOLLOWING:      Difficulty breathing              Chills and/or fever over 101 F   Persistent vomiting and/or vomiting blood   Severe abdominal pain   Severe chest pain   Black, tarry stools   Bleeding- more than one tablespoon   Any  other symptom or condition that you feel may need urgent attention  Your doctor recommends these additional instructions:  If any biopsies were taken, your doctors clinic will contact you in 1 to 2   weeks with any results.  - Perform a flexible sigmoidoscopy now.   - See flex sig report for further details.  For questions, problems or results please call your physician - Chris Castorena MD at Work:  [phone].  [facility], EMERGENCY ROOM PHONE NUMBER: [phone]  IF A COMPLICATION OR EMERGENCY SITUATION ARISES AND YOU ARE UNABLE TO REACH   YOUR PHYSICIAN - GO DIRECTLY TO THE EMERGENCY ROOM.  Chris Castorena MD  4/11/2018 9:56:31 AM  This report has been verified and signed electronically.

## 2018-04-11 NOTE — PLAN OF CARE
Pt verbalized an understanding of discharge instructions including diet, s/s to notify md, and follow up.  Pt refused wheel chair and will ambulate off unit with spouse.

## 2018-04-11 NOTE — ANESTHESIA PREPROCEDURE EVALUATION
04/11/2018  Ida Orozco is a 52 y.o., female     Patient Active Problem List   Diagnosis    Type 2 diabetes mellitus with hyperlipidemia    GERD (gastroesophageal reflux disease)    Esophageal ulcer    Ileostomy status    Morbid obesity with BMI of 40.0-44.9, adult    Plica of knee    Dietary folate deficiency anemia    Vitamin B12 deficiency    Hypercholesterolemia    Vitamin D deficiency    Moderate persistent asthma without complication    Anxiety    Depression    DDD (degenerative disc disease), lumbar    Crohn's disease with complication    Former smoker    Asthma    Bariatric surgery status    Redundant skin of abdomen    Inflammatory bowel disease         Anesthesia Evaluation    I have reviewed the Patient Summary Reports.    I have reviewed the Nursing Notes.   I have reviewed the Medications.     Review of Systems      Physical Exam  General:  Obesity    Airway/Jaw/Neck:  Airway Findings: Mouth Opening: Normal Tongue: Normal  General Airway Assessment: Adult  Mallampati: II  Improves to I with phonation.  TM Distance: Normal, at least 6 cm       Chest/Lungs:  Chest/Lungs Findings: Clear to auscultation, Normal Respiratory Rate     Heart/Vascular:  Heart Findings: Rate: Normal  Rhythm: Regular Rhythm             Anesthesia Plan  Type of Anesthesia, risks & benefits discussed:  Anesthesia Type:  general, MAC  Patient's Preference:   Intra-op Monitoring Plan: standard ASA monitors  Intra-op Monitoring Plan Comments:   Post Op Pain Control Plan:   Post Op Pain Control Plan Comments:   Induction:   IV  Beta Blocker:  Patient is not currently on a Beta-Blocker (No further documentation required).       Informed Consent: Patient understands risks and agrees with Anesthesia plan.  Questions answered. Anesthesia consent signed with patient.  ASA Score: 3     Day of Surgery Review of  History & Physical:            Ready For Surgery From Anesthesia Perspective.

## 2018-04-11 NOTE — PROVATION PATIENT INSTRUCTIONS
Discharge Summary/Instructions after an Endoscopic Procedure  Patient Name: Ida Orozco  Patient MRN: [id number]  Patient YOB: 1965 Wednesday, April 11, 2018  Chris Castorena MD  RESTRICTIONS:  During your procedure today, you received medications for sedation.  These   medications may affect your judgment, balance and coordination.  Therefore,   for 24 hours, you have the following restrictions:   - DO NOT drive a car, operate machinery, make legal/financial decisions,   sign important papers or drink alcohol.    ACTIVITY:  The following day: return to full activity including work, except no heavy   lifting, straining or running for 3 days if polyps were removed.  DIET:  Eat and drink normally unless instructed otherwise.     TREATMENT FOR COMMON SIDE EFFECTS:  - Mild abdominal pain, nausea, belching, bloating or excessive gas:  rest,   eat lightly and use a heating pad.  - Sore Throat: treat with throat lozenges and/or gargle with warm salt   water.  - Because air was used during the procedure, expelling large amounts of air   from your rectum or belching is normal.  - If a bowel prep was taken, you may not have a bowel movement for 1-3 days.    This is normal.  SYMPTOMS TO WATCH FOR AND REPORT TO YOUR PHYSICIAN:  1. Abdominal pain or bloating, other than gas cramps.  2. Chest pain.  3. Back pain.  4. Signs of infection such as: chills or fever occurring within 24 hours   after the procedure.  5. Rectal bleeding, which would show as bright red, maroon, or black stools.   (A tablespoon of blood from the rectum is not serious, especially if   hemorrhoids are present.)  6. Vomiting.  7. Weakness or dizziness.  GO DIRECTLY TO THE NEAREST EMERGENCY ROOM IF YOU HAVE ANY OF THE FOLLOWING:      Difficulty breathing              Chills and/or fever over 101 F   Persistent vomiting and/or vomiting blood   Severe abdominal pain   Severe chest pain   Black, tarry stools   Bleeding- more than one tablespoon   Any  other symptom or condition that you feel may need urgent attention  Your doctor recommends these additional instructions:  If any biopsies were taken, your doctors clinic will contact you in 1 to 2   weeks with any results.  - Discharge patient to home.   - Patient has a contact number available for emergencies.  The signs and   symptoms of potential delayed complications were discussed with the   patient.  Return to normal activities tomorrow.  Written discharge   instructions were provided to the patient.   - Resume previous diet.   - Continue present medications.   - Return to Dr. Virgen' clinic as previously scheduled.  For questions, problems or results please call your physician - Chris Castorena MD at Work:  [phone].  [facility], EMERGENCY ROOM PHONE NUMBER: [phone]  IF A COMPLICATION OR EMERGENCY SITUATION ARISES AND YOU ARE UNABLE TO REACH   YOUR PHYSICIAN - GO DIRECTLY TO THE EMERGENCY ROOM.  Chris Castorena MD  4/11/2018 9:52:27 AM  This report has been verified and signed electronically.

## 2018-04-11 NOTE — H&P
Short Stay Endoscopy History and Physical    PCP - Eliecer Jones MD    Procedure - EGD/Ileoscopy/Flex-sig  ASA - per anesthesia  Mallampati - per anesthesia  History of Anesthesia problems - no  Family history Anesthesia problems -  no   Plan of anesthesia - MAC    HPI:  This is a 52 y.o. female with IBD (suspected UC) s/p TAC with end ileostomy here for evaluation of abdominal pain, history of reflux esophagitis, and also rectal bleeding.        ROS:  Constitutional: No fevers, chills, No weight loss  CV: No chest pain  Pulm: No cough, No shortness of breath  Ophtho: No vision changes  GI: see HPI    Medical History:  has a past medical history of Anxiety; Crohn's colitis; Diabetes mellitus; Fatty liver disease, nonalcoholic; GERD (gastroesophageal reflux disease); History of sleeve gastrectomy (2016); and Hypertension.    Surgical History:  has a past surgical history that includes Hysterectomy; Ileostomy revision;  section, low transverse; Cholecystectomy; TONSILLECTOMY, ADENOIDECTOMY; thumb surgery; [place] tooth extraction; Appendectomy; and Colectomy.    Family History: family history includes Cancer in her maternal grandfather, mother, and paternal grandfather; Heart disease in her father.. Otherwise no colon cancer, inflammatory bowel disease, or GI malignancies.    Social History:  reports that she has quit smoking. She quit smokeless tobacco use about 10 years ago. She reports that she does not drink alcohol or use drugs.    Review of patient's allergies indicates:   Allergen Reactions    Adhesive      Cause blisters    Dilaudid [hydromorphone] Nausea And Vomiting    Sulfa (sulfonamide antibiotics) Hives       Medications:   Prescriptions Prior to Admission   Medication Sig Dispense Refill Last Dose    albuterol (PROVENTIL) 2.5 mg /3 mL (0.083 %) nebulizer solution Take 3 mLs (2.5 mg total) by nebulization every 6 (six) hours as needed for Wheezing. Rescue 1 Box 2 Past Week at Unknown  time    buPROPion (WELLBUTRIN) 75 MG tablet Take 2 tablets by mouth 3 (three) times daily.   4/10/2018 at Unknown time    clonazePAM (KLONOPIN) 1 MG tablet Take 1 mg by mouth once daily.    4/10/2018 at Unknown time    doxycycline (VIBRAMYCIN) 100 MG Cap Take 1 capsule (100 mg total) by mouth every 12 (twelve) hours. 20 capsule 0 4/10/2018 at Unknown time    escitalopram oxalate (LEXAPRO) 20 MG tablet Take 20 mg by mouth once daily.    4/10/2018 at Unknown time    loratadine (CLARITIN) 10 mg tablet TAKE ONE TABLET BY MOUTH ONCE DAILY 90 tablet 1 4/10/2018 at Unknown time    montelukast (SINGULAIR) 10 mg tablet TAKE ONE TABLET BY MOUTH EVERY EVENING 90 tablet 1 4/10/2018 at Unknown time    trazodone (DESYREL) 50 MG tablet Take 50 mg by mouth every evening.   4/10/2018 at Unknown time    albuterol 90 mcg/actuation inhaler Inhale 2 puffs into the lungs every 4 (four) hours as needed for Wheezing or Shortness of Breath. Rescue 1 Inhaler 5 4/8/2018    fluticasone (FLONASE) 50 mcg/actuation nasal spray 1 spray by Each Nare route once daily.   4/8/2018    ibuprofen (ADVIL,MOTRIN) 600 MG tablet Take 1 tablet (600 mg total) by mouth every 8 (eight) hours as needed for Pain. 30 tablet 2 4/9/2018    methylPREDNISolone (MEDROL DOSEPACK) 4 mg tablet use as directed 1 Package 0     SYMBICORT 160-4.5 mcg/actuation HFAA INHALE 2 PUFFS INTO THE LUNGS EVERY 12 (TWELVE) HOURS. CONTROLLER 11 g 1 4/6/2018       Physical Exam:    Vital Signs:   Vitals:    04/11/18 0847   BP: (!) 101/54   Pulse: 60   Resp: 16   Temp: 97.9 °F (36.6 °C)       General Appearance: Well appearing in no acute distress  Eyes:    No scleral icterus  ENT: Neck supple, Lips, mucosa, and tongue normal; teeth and gums normal  Lungs: CTA anteriorly  Heart:  Regular rate, S1, S2 normal, no murmurs heard.  Abdomen: Soft, non tender, non distended with normal bowel sounds. No hepatosplenomegaly, ascites, or mass.    Labs:  Lab Results   Component Value Date     WBC 7.64 01/17/2018    HGB 13.9 01/17/2018    HCT 43.9 01/17/2018     01/17/2018    CHOL 180 10/19/2017    TRIG 85 10/19/2017    HDL 74 10/19/2017    ALT 33 01/17/2018    AST 27 01/17/2018     01/17/2018    K 3.7 01/17/2018     (H) 01/17/2018    CREATININE 0.9 01/17/2018    BUN 17 01/17/2018    CO2 23 01/17/2018    TSH 1.051 01/17/2018    INR 1.0 08/01/2014    HGBA1C 5.3 10/19/2017         Assessment:  52 y.o. female with abdominal pain, history of reflux esophagitis, and rectal bleeding.    Plan:  Proceed with EGD, ileoscopy, and flex sig today.  I have explained the risks and benefits of endoscopy procedures to the patient including but not limited to bleeding, perforation, infection, and death.  All questions answered.        Chris Castorena MD

## 2018-04-13 ENCOUNTER — PATIENT MESSAGE (OUTPATIENT)
Dept: PRIMARY CARE CLINIC | Facility: CLINIC | Age: 53
End: 2018-04-13

## 2018-04-13 ENCOUNTER — TELEPHONE (OUTPATIENT)
Dept: PRIMARY CARE CLINIC | Facility: CLINIC | Age: 53
End: 2018-04-13

## 2018-04-13 RX ORDER — PANTOPRAZOLE SODIUM 40 MG/1
40 TABLET, DELAYED RELEASE ORAL DAILY
Qty: 90 TABLET | Refills: 3 | Status: SHIPPED | OUTPATIENT
Start: 2018-04-13 | End: 2019-04-06 | Stop reason: SDUPTHER

## 2018-04-13 NOTE — TELEPHONE ENCOUNTER
Patients insurance denied Nexium. They want her to try Dexilant or Pantoprazole. Can you send over a rx for one of them, please?

## 2018-04-17 ENCOUNTER — OFFICE VISIT (OUTPATIENT)
Dept: SURGERY | Facility: CLINIC | Age: 53
End: 2018-04-17
Payer: MEDICARE

## 2018-04-17 VITALS
BODY MASS INDEX: 41.74 KG/M2 | SYSTOLIC BLOOD PRESSURE: 136 MMHG | HEIGHT: 64 IN | HEART RATE: 60 BPM | WEIGHT: 244.5 LBS | DIASTOLIC BLOOD PRESSURE: 71 MMHG

## 2018-04-17 DIAGNOSIS — K51.211 CHRONIC ULCERATIVE PROCTITIS WITH RECTAL BLEEDING: Primary | ICD-10-CM

## 2018-04-17 PROCEDURE — 99214 OFFICE O/P EST MOD 30 MIN: CPT | Mod: S$GLB,,, | Performed by: COLON & RECTAL SURGERY

## 2018-04-17 PROCEDURE — 99999 PR PBB SHADOW E&M-EST. PATIENT-LVL II: CPT | Mod: PBBFAC,,, | Performed by: COLON & RECTAL SURGERY

## 2018-04-17 NOTE — PROGRESS NOTES
CRS Office Visit History and Physical    Referring Md:   No referring provider defined for this encounter.    SUBJECTIVE:     Chief Complaint: rectal bleeding    History of Present Illness:  Patient is a 52 y.o. female presents with persistent bloody mucous from the rectum.  No abd pain.        Past Medical History:   Diagnosis Date    Anxiety     Crohn's colitis     Diabetes mellitus     Fatty liver disease, nonalcoholic     GERD (gastroesophageal reflux disease)     History of sleeve gastrectomy 2016    Hypertension        Past Surgical History:   Procedure Laterality Date    APPENDECTOMY       SECTION, LOW TRANSVERSE      CHOLECYSTECTOMY      open    COLECTOMY      total- 2013    HYSTERECTOMY      ILEOSTOMY REVISION      2014; 2014    thumb surgery      TONSILLECTOMY, ADENOIDECTOMY      WISDOM TOOTH EXTRACTION         Review of patient's allergies indicates:   Allergen Reactions    Adhesive      Cause blisters    Dilaudid [hydromorphone] Nausea And Vomiting    Sulfa (sulfonamide antibiotics) Hives       Current Outpatient Prescriptions on File Prior to Visit   Medication Sig Dispense Refill    albuterol (PROVENTIL) 2.5 mg /3 mL (0.083 %) nebulizer solution Take 3 mLs (2.5 mg total) by nebulization every 6 (six) hours as needed for Wheezing. Rescue 1 Box 2    albuterol 90 mcg/actuation inhaler Inhale 2 puffs into the lungs every 4 (four) hours as needed for Wheezing or Shortness of Breath. Rescue 1 Inhaler 5    buPROPion (WELLBUTRIN) 75 MG tablet Take 2 tablets by mouth 3 (three) times daily.      clonazePAM (KLONOPIN) 1 MG tablet Take 1 mg by mouth once daily.       escitalopram oxalate (LEXAPRO) 20 MG tablet Take 20 mg by mouth once daily.       fluticasone (FLONASE) 50 mcg/actuation nasal spray 1 spray by Each Nare route once daily.      ibuprofen (ADVIL,MOTRIN) 600 MG tablet Take 1 tablet (600 mg total) by mouth every 8 (eight) hours as needed for Pain.  "30 tablet 2    loratadine (CLARITIN) 10 mg tablet TAKE ONE TABLET BY MOUTH ONCE DAILY 90 tablet 1    montelukast (SINGULAIR) 10 mg tablet TAKE ONE TABLET BY MOUTH EVERY EVENING 90 tablet 1    pantoprazole (PROTONIX) 40 MG tablet Take 1 tablet (40 mg total) by mouth once daily. 90 tablet 3    SYMBICORT 160-4.5 mcg/actuation HFAA INHALE 2 PUFFS INTO THE LUNGS EVERY 12 (TWELVE) HOURS. CONTROLLER 11 g 1    trazodone (DESYREL) 50 MG tablet Take 50 mg by mouth every evening.       No current facility-administered medications on file prior to visit.        Family History   Problem Relation Age of Onset    Cancer Mother     Heart disease Father     Cancer Paternal Grandfather     Cancer Maternal Grandfather        Social History   Substance Use Topics    Smoking status: Former Smoker    Smokeless tobacco: Former User     Quit date: 4/15/2007    Alcohol use No      Comment: occ        Review of Systems:  ROS:  GENERAL: No fever, chills, fatigability or weight loss.  Integument:  No rashes, redness, icterus  CHEST: Denies BURRELL, cyanosis, wheezing, cough and sputum production.  CARDIOVASCULAR: Denies chest pain, PND, orthopnea or reduced exercise tolerance.  GI:  Denies abd pain, dysphagia, nausea, vomiting, no hematemesis, no rectal pain  : Denies burning on urination, no hematuria, no bacteriuria  MSK:  No deformities, swelling, joint pain swelling  Neurologic:  No HAs, seizures, weakness, paresthesias, gait problems      OBJECTIVE:     Vital Signs (Most Recent)  /71   Pulse 60   Ht 5' 4.49" (1.638 m)   Wt 110.9 kg (244 lb 7.8 oz)   BMI 41.33 kg/m²     Physical Exam:  General: White female in no distress   Skin/ Sclera anicteric  HEENT: anicteric, normocephalic, extraocular movements intact   Neck trachea midline, thyroid non enlarged  Chest symmetric, nl excursions, no retractions  Respiratory: respirations are even and unlabored  COR RRR   Abdomen - inspection   soft NT ND.  No masses, no " organomegaly  Extremities: Warm dry and intact.  NO CCE  Neuro: alert and oriented x 4.  Moves all extremities.         ASSESSMENT/PLAN:   Ulcerative proctitis    Recommend  Completion proctectomy.  Laparoscopic assisted.  Risks of conversion to open discussed.  Expected hospital course, recuperation and risks discussed with pt.

## 2018-04-18 ENCOUNTER — TELEPHONE (OUTPATIENT)
Dept: ENDOSCOPY | Facility: HOSPITAL | Age: 53
End: 2018-04-18

## 2018-04-18 RX ORDER — METRONIDAZOLE 500 MG/1
TABLET ORAL
Qty: 3 TABLET | Refills: 0 | Status: ON HOLD | OUTPATIENT
Start: 2018-04-18 | End: 2018-05-24

## 2018-04-18 RX ORDER — NEOMYCIN SULFATE 500 MG/1
TABLET ORAL
Qty: 6 TABLET | Refills: 0 | Status: ON HOLD | OUTPATIENT
Start: 2018-04-18 | End: 2018-05-24

## 2018-04-19 ENCOUNTER — PATIENT MESSAGE (OUTPATIENT)
Dept: SURGERY | Facility: CLINIC | Age: 53
End: 2018-04-19

## 2018-04-19 DIAGNOSIS — K51.813 OTHER ULCERATIVE COLITIS WITH FISTULA: Primary | ICD-10-CM

## 2018-04-19 DIAGNOSIS — K51.90 UC (ULCERATIVE COLITIS): ICD-10-CM

## 2018-04-19 RX ORDER — HYDROMORPHONE HYDROCHLORIDE 2 MG/ML
1 INJECTION, SOLUTION INTRAMUSCULAR; INTRAVENOUS; SUBCUTANEOUS EVERY 4 HOURS PRN
Status: CANCELLED | OUTPATIENT
Start: 2018-04-19

## 2018-04-19 RX ORDER — DEXTROSE MONOHYDRATE, SODIUM CHLORIDE, AND POTASSIUM CHLORIDE 50; 1.49; 4.5 G/1000ML; G/1000ML; G/1000ML
INJECTION, SOLUTION INTRAVENOUS CONTINUOUS
Status: CANCELLED | OUTPATIENT
Start: 2018-04-19

## 2018-04-19 RX ORDER — SODIUM CHLORIDE 9 MG/ML
INJECTION, SOLUTION INTRAVENOUS CONTINUOUS
Status: CANCELLED | OUTPATIENT
Start: 2018-04-19

## 2018-04-19 RX ORDER — ACETAMINOPHEN 10 MG/ML
1000 INJECTION, SOLUTION INTRAVENOUS
Status: CANCELLED | OUTPATIENT
Start: 2018-04-19 | End: 2018-04-19

## 2018-04-19 RX ORDER — ENOXAPARIN SODIUM 100 MG/ML
40 INJECTION SUBCUTANEOUS EVERY 24 HOURS
Status: CANCELLED | OUTPATIENT
Start: 2018-04-19

## 2018-04-19 RX ORDER — ONDANSETRON 2 MG/ML
4 INJECTION INTRAMUSCULAR; INTRAVENOUS EVERY 6 HOURS PRN
Status: CANCELLED | OUTPATIENT
Start: 2018-04-19

## 2018-04-19 RX ORDER — IBUPROFEN 200 MG
16 TABLET ORAL
Status: CANCELLED | OUTPATIENT
Start: 2018-04-19

## 2018-04-19 RX ORDER — GLUCAGON 1 MG
1 KIT INJECTION CONTINUOUS PRN
Status: CANCELLED | OUTPATIENT
Start: 2018-04-19

## 2018-04-19 RX ORDER — NALOXONE HCL 0.4 MG/ML
0.02 VIAL (ML) INJECTION
Status: CANCELLED | OUTPATIENT
Start: 2018-04-19

## 2018-04-19 RX ORDER — LIDOCAINE HYDROCHLORIDE 10 MG/ML
1 INJECTION, SOLUTION EPIDURAL; INFILTRATION; INTRACAUDAL; PERINEURAL
Status: CANCELLED | OUTPATIENT
Start: 2018-04-19

## 2018-04-19 RX ORDER — HYDROMORPHONE HYDROCHLORIDE 2 MG/ML
1 INJECTION, SOLUTION INTRAMUSCULAR; INTRAVENOUS; SUBCUTANEOUS EVERY 4 HOURS PRN
Status: CANCELLED | OUTPATIENT
Start: 2018-04-19 | End: 2018-04-20

## 2018-04-19 RX ORDER — OXYCODONE HYDROCHLORIDE 5 MG/1
10 TABLET ORAL EVERY 4 HOURS PRN
Status: CANCELLED | OUTPATIENT
Start: 2018-04-19

## 2018-04-19 RX ORDER — MUPIROCIN 20 MG/G
1 OINTMENT TOPICAL
Status: CANCELLED | OUTPATIENT
Start: 2018-04-19

## 2018-04-19 RX ORDER — ACETAMINOPHEN 10 MG/ML
1000 INJECTION, SOLUTION INTRAVENOUS EVERY 8 HOURS
Status: CANCELLED | OUTPATIENT
Start: 2018-04-19 | End: 2018-04-20

## 2018-04-19 RX ORDER — OXYCODONE HYDROCHLORIDE 5 MG/1
5 TABLET ORAL EVERY 4 HOURS PRN
Status: CANCELLED | OUTPATIENT
Start: 2018-04-19

## 2018-04-19 RX ORDER — HYDROMORPHONE HYDROCHLORIDE 2 MG/ML
0.5 INJECTION, SOLUTION INTRAMUSCULAR; INTRAVENOUS; SUBCUTANEOUS
Status: CANCELLED | OUTPATIENT
Start: 2018-04-19 | End: 2018-04-20

## 2018-04-19 RX ORDER — IBUPROFEN 200 MG
24 TABLET ORAL
Status: CANCELLED | OUTPATIENT
Start: 2018-04-19

## 2018-04-19 RX ORDER — SODIUM CHLORIDE 9 MG/ML
INJECTION, SOLUTION INTRAVENOUS
Status: CANCELLED | OUTPATIENT
Start: 2018-04-19

## 2018-04-19 RX ORDER — HEPARIN SODIUM 5000 [USP'U]/ML
5000 INJECTION, SOLUTION INTRAVENOUS; SUBCUTANEOUS EVERY 8 HOURS
Status: CANCELLED | OUTPATIENT
Start: 2018-04-19 | End: 2018-04-20

## 2018-04-19 RX ORDER — METRONIDAZOLE 500 MG/100ML
500 INJECTION, SOLUTION INTRAVENOUS
Status: CANCELLED | OUTPATIENT
Start: 2018-04-19

## 2018-04-20 ENCOUNTER — OFFICE VISIT (OUTPATIENT)
Dept: PRIMARY CARE CLINIC | Facility: CLINIC | Age: 53
End: 2018-04-20
Payer: MEDICARE

## 2018-04-20 VITALS
TEMPERATURE: 98 F | WEIGHT: 242 LBS | RESPIRATION RATE: 18 BRPM | SYSTOLIC BLOOD PRESSURE: 97 MMHG | BODY MASS INDEX: 40.91 KG/M2 | DIASTOLIC BLOOD PRESSURE: 65 MMHG | OXYGEN SATURATION: 96 % | HEART RATE: 68 BPM

## 2018-04-20 DIAGNOSIS — S60.221A CONTUSION OF RIGHT HAND, INITIAL ENCOUNTER: ICD-10-CM

## 2018-04-20 DIAGNOSIS — J45.41 MODERATE PERSISTENT ASTHMATIC BRONCHITIS WITH ACUTE EXACERBATION: Primary | ICD-10-CM

## 2018-04-20 PROCEDURE — 99214 OFFICE O/P EST MOD 30 MIN: CPT | Mod: S$GLB,,, | Performed by: FAMILY MEDICINE

## 2018-04-20 PROCEDURE — 99999 PR PBB SHADOW E&M-EST. PATIENT-LVL III: CPT | Mod: PBBFAC,,, | Performed by: FAMILY MEDICINE

## 2018-04-20 RX ORDER — CODEINE PHOSPHATE AND GUAIFENESIN 10; 100 MG/5ML; MG/5ML
5 SOLUTION ORAL EVERY 6 HOURS PRN
Qty: 180 ML | Refills: 1 | Status: SHIPPED | OUTPATIENT
Start: 2018-04-20 | End: 2018-06-07

## 2018-04-20 RX ORDER — LEVOFLOXACIN 500 MG/1
500 TABLET, FILM COATED ORAL DAILY
Qty: 7 TABLET | Refills: 0 | Status: SHIPPED | OUTPATIENT
Start: 2018-04-20 | End: 2018-04-27

## 2018-04-20 NOTE — PROGRESS NOTES
Subjective:       Patient ID: Ida Orozco is a 52 y.o. female.    Chief Complaint: Cough (patient says she is still not better. ) and Hand Pain (patient says a propain tank was dropped on her hand )    Complains persistent cough, chest congestion and wheezing.  Cough nonproductive.  Completed course of doxycycline and prednisone without significant relief.  No fevers.  Also complains of right hand pain for the past couple of days since her father approximately dropped a propane tank on her hand.  Has moderate swelling.  No weakness or numbness.      Review of Systems   Constitutional: Negative for fever.   HENT: Negative for trouble swallowing.    Eyes: Negative for visual disturbance.   Respiratory: Positive for cough, shortness of breath and wheezing.    Cardiovascular: Negative for chest pain.   Gastrointestinal: Positive for nausea. Negative for vomiting.   Genitourinary: Negative for difficulty urinating.   Musculoskeletal: Positive for arthralgias.   Skin: Negative for rash.   Neurological: Negative for dizziness.   Psychiatric/Behavioral: Negative for agitation and confusion.       Objective:      Vitals:    04/20/18 1452   BP: 97/65   BP Location: Left arm   Patient Position: Sitting   BP Method: Large (Automatic)   Pulse: 68   Resp: 18   Temp: 98.3 °F (36.8 °C)   TempSrc: Oral   SpO2: 96%   Weight: 109.8 kg (242 lb)     Physical Exam   Constitutional: She is oriented to person, place, and time. She appears well-developed and well-nourished.   HENT:   Head: Normocephalic and atraumatic.   Right Ear: Tympanic membrane normal.   Left Ear: Tympanic membrane normal.   Mouth/Throat: Oropharynx is clear and moist.   Cardiovascular: Normal rate, regular rhythm and normal heart sounds.    Pulmonary/Chest: Effort normal. No respiratory distress. She has wheezes (diffuse). She has no rhonchi. She has no rales.   Musculoskeletal: She exhibits no edema.        Right hand: She exhibits tenderness, bony tenderness and  swelling. She exhibits normal range of motion and no deformity.        Hands:  Neurological: She is alert and oriented to person, place, and time.   Skin: Skin is warm and dry.   Nursing note and vitals reviewed.      Assessment:       1. Moderate persistent asthmatic bronchitis with acute exacerbation    2. Contusion of right hand, initial encounter        Plan:       Moderate persistent asthmatic bronchitis with acute exacerbation  Comments:  to ER for any new or worsening symptoms  Orders:  -     levoFLOXacin (LEVAQUIN) 500 MG tablet; Take 1 tablet (500 mg total) by mouth once daily.  Dispense: 7 tablet; Refill: 0  -     guaifenesin-codeine 100-10 mg/5 ml (TUSSI-ORGANIDIN NR)  mg/5 mL syrup; Take 5 mLs by mouth every 6 (six) hours as needed for Cough.  Dispense: 180 mL; Refill: 1    Contusion of right hand, initial encounter  Comments:  No acute findings on x-ray  Orders:  -     X-Ray Hand 3 View Right; Future; Expected date: 04/20/2018      Medication List with Changes/Refills   New Medications    GUAIFENESIN-CODEINE 100-10 MG/5 ML (TUSSI-ORGANIDIN NR)  MG/5 ML SYRUP    Take 5 mLs by mouth every 6 (six) hours as needed for Cough.    LEVOFLOXACIN (LEVAQUIN) 500 MG TABLET    Take 1 tablet (500 mg total) by mouth once daily.   Current Medications    ALBUTEROL (PROVENTIL) 2.5 MG /3 ML (0.083 %) NEBULIZER SOLUTION    Take 3 mLs (2.5 mg total) by nebulization every 6 (six) hours as needed for Wheezing. Rescue    ALBUTEROL 90 MCG/ACTUATION INHALER    Inhale 2 puffs into the lungs every 4 (four) hours as needed for Wheezing or Shortness of Breath. Rescue    BUPROPION (WELLBUTRIN) 75 MG TABLET    Take 2 tablets by mouth 3 (three) times daily.    CLONAZEPAM (KLONOPIN) 1 MG TABLET    Take 1 mg by mouth 2 (two) times daily.     ESCITALOPRAM OXALATE (LEXAPRO) 20 MG TABLET    Take 30 mg by mouth once daily.     FLUTICASONE (FLONASE) 50 MCG/ACTUATION NASAL SPRAY    1 spray by Each Nare route once daily.     IBUPROFEN (ADVIL,MOTRIN) 600 MG TABLET    Take 1 tablet (600 mg total) by mouth every 8 (eight) hours as needed for Pain.    LORATADINE (CLARITIN) 10 MG TABLET    TAKE ONE TABLET BY MOUTH ONCE DAILY    METRONIDAZOLE (FLAGYL) 500 MG TABLET    On the day before your surgery, take 1 tablet (500mg) at 1pm, 2pm and 11pm.    MONTELUKAST (SINGULAIR) 10 MG TABLET    TAKE ONE TABLET BY MOUTH EVERY EVENING    NEOMYCIN (MYCIFRADIN) 500 MG TAB    On the day before your surgery, take 2 tablets (1,000mg) by mouth at 1pm, 2pm and 11pm.    PANTOPRAZOLE (PROTONIX) 40 MG TABLET    Take 1 tablet (40 mg total) by mouth once daily.    SYMBICORT 160-4.5 MCG/ACTUATION HFAA    INHALE 2 PUFFS INTO THE LUNGS EVERY 12 (TWELVE) HOURS. CONTROLLER    TRAZODONE (DESYREL) 50 MG TABLET    Take 50 mg by mouth every evening.

## 2018-04-25 ENCOUNTER — PATIENT MESSAGE (OUTPATIENT)
Dept: GASTROENTEROLOGY | Facility: CLINIC | Age: 53
End: 2018-04-25

## 2018-04-25 ENCOUNTER — TELEPHONE (OUTPATIENT)
Dept: GASTROENTEROLOGY | Facility: CLINIC | Age: 53
End: 2018-04-25

## 2018-04-25 NOTE — TELEPHONE ENCOUNTER
Attempted to contact patient, but person who answered the phone, stated the number on file is incorrect.    [employer] message sent to patient to contact our department for test results.

## 2018-04-25 NOTE — TELEPHONE ENCOUNTER
----- Message from Robin Kaiser MD sent at 4/25/2018  2:37 PM CDT -----  Stomach biopsy results are benign.

## 2018-04-27 RX ORDER — IBUPROFEN 600 MG/1
TABLET ORAL
Qty: 30 TABLET | Refills: 1 | Status: ON HOLD | OUTPATIENT
Start: 2018-04-27 | End: 2018-05-28 | Stop reason: HOSPADM

## 2018-05-07 ENCOUNTER — INITIAL CONSULT (OUTPATIENT)
Dept: INTERNAL MEDICINE | Facility: CLINIC | Age: 53
End: 2018-05-07
Payer: MEDICARE

## 2018-05-07 VITALS
SYSTOLIC BLOOD PRESSURE: 100 MMHG | HEART RATE: 58 BPM | BODY MASS INDEX: 41.05 KG/M2 | DIASTOLIC BLOOD PRESSURE: 68 MMHG | WEIGHT: 246.38 LBS | TEMPERATURE: 97 F | OXYGEN SATURATION: 98 % | HEIGHT: 65 IN

## 2018-05-07 DIAGNOSIS — Z01.818 PREOP EXAMINATION: Primary | ICD-10-CM

## 2018-05-07 DIAGNOSIS — F32.A DEPRESSION, UNSPECIFIED DEPRESSION TYPE: ICD-10-CM

## 2018-05-07 DIAGNOSIS — Z98.84 BARIATRIC SURGERY STATUS: ICD-10-CM

## 2018-05-07 DIAGNOSIS — J45.40 MODERATE PERSISTENT ASTHMA WITHOUT COMPLICATION: ICD-10-CM

## 2018-05-07 DIAGNOSIS — K52.9 INFLAMMATORY BOWEL DISEASE: ICD-10-CM

## 2018-05-07 DIAGNOSIS — L98.7 REDUNDANT SKIN OF ABDOMEN: ICD-10-CM

## 2018-05-07 DIAGNOSIS — Z87.891 FORMER SMOKER: ICD-10-CM

## 2018-05-07 DIAGNOSIS — E78.5 TYPE 2 DIABETES MELLITUS WITH HYPERLIPIDEMIA: ICD-10-CM

## 2018-05-07 DIAGNOSIS — K21.9 GASTROESOPHAGEAL REFLUX DISEASE, ESOPHAGITIS PRESENCE NOT SPECIFIED: ICD-10-CM

## 2018-05-07 DIAGNOSIS — F41.9 ANXIETY: ICD-10-CM

## 2018-05-07 DIAGNOSIS — E11.69 TYPE 2 DIABETES MELLITUS WITH HYPERLIPIDEMIA: ICD-10-CM

## 2018-05-07 PROCEDURE — 99499 UNLISTED E&M SERVICE: CPT | Mod: S$GLB,,, | Performed by: HOSPITALIST

## 2018-05-07 PROCEDURE — 3044F HG A1C LEVEL LT 7.0%: CPT | Mod: CPTII,S$GLB,, | Performed by: HOSPITALIST

## 2018-05-07 PROCEDURE — 99214 OFFICE O/P EST MOD 30 MIN: CPT | Mod: S$GLB,,, | Performed by: HOSPITALIST

## 2018-05-07 PROCEDURE — 3008F BODY MASS INDEX DOCD: CPT | Mod: CPTII,S$GLB,, | Performed by: HOSPITALIST

## 2018-05-07 PROCEDURE — 99999 PR PBB SHADOW E&M-EST. PATIENT-LVL III: CPT | Mod: PBBFAC,,, | Performed by: HOSPITALIST

## 2018-05-07 RX ORDER — ESCITALOPRAM OXALATE 10 MG/1
10 TABLET ORAL EVERY MORNING
COMMUNITY
End: 2018-05-07 | Stop reason: CLARIF

## 2018-05-07 NOTE — PROGRESS NOTES
[facility] - Pre Op Consult  Progress Note    Patient Name: Ida Orozco  MRN: [id number]  Date of Evaluation- 05/07/2018  PCP- Eliecer Jones MD    Future cases for Ida Orozco [[id number]]     Case ID Status Date Time Adam Procedure Provider Location    [id number] [facility] 5/24/2018  7:00  PROCTECTOMY-LAPAROSCOPIC H. Gucci Virgen MD [[id number]] [facility] OR 2ND FLR          HPI:  History of present illness- I had the pleasure of meeting this pleasant 52 y.o. lady in the pre op clinic prior to her elective Abdominal surgery. The patient is new to me     I have obtained the history by speaking to the patient and by reviewing the electronic health records.    Events leading up to surgery / History of presenting illness -    Other ulcerative colitis with fistula  Blood and mucus per rectum   She has been troubled with severe  rectal /anal area  pain for 1 year  . Randomly occurs and relieves spontaneously      Relevant health conditions of significance for the perioperative period/ History of presenting illness -    Patient Active Problem List    Diagnosis Date Noted    Inflammatory bowel disease 04/11/2018     IBD (suspected UC) s/p TAC with end ileostomy   bloody mucous from the rectum  Ulcerative proctitis      Bariatric surgery status-Gastric sleeve  03/15/2018     Gastric sleeve       Redundant skin of abdomen 03/15/2018    Former smoker 01/05/2018     Quit in 2009      Dietary folate deficiency anemia 10/13/2017    Vitamin B12 deficiency 10/13/2017    Hypercholesterolemia 10/13/2017    Vitamin D deficiency 10/13/2017    Moderate persistent asthma without complication 10/13/2017     Not using Symbicort as supposed to   Using as needed   Had an upper respiratory illness in April ,causing Asthma flare  lasting for about 1 month  Lately better   CXR 4/3/2018 The lungs are clear, with normal appearance of pulmonary vasculature and no pleural effusion or pneumothorax.      Anxiety 10/13/2017     Om Klonopin twice daily    Under Psychiatry care       Depression 10/13/2017     Controlled      DDD (degenerative disc disease), lumbar 10/13/2017    Crohn's disease with complication 10/13/2017     pt advised to get reestablished with gastroenterologist      Sanjeev of knee 08/25/2016    Morbid obesity with BMI of 40.0-44.9, adult 09/02/2015    Ileostomy status 08/24/2015    GERD (gastroesophageal reflux disease) 12/16/2014     Controled       Esophageal ulcer 12/16/2014    Type 2 diabetes mellitus with hyperlipidemia 12/10/2014     Last A1c from October 2017- 5.3           Subjective/ Objective:          Chief complaint-Preoperative evaluation, Perioperative Medical management, complication reduction plan     Active cardiac conditions- none    Revised cardiac risk index predictors- none    Functional capacity -Examples of physical activity,can walk 2 miles,   can take 1 flight of stairs----- She can undertake all the above activities without  chest pain,chest tightness, Shortness of breath ,dizziness,lightheadedness making her exercise tolerance more,   than 4 Mets.   Used to be Winded on exertion,resolved with weight loss     Review of Systems   Constitutional: Negative for chills and fever.   HENT:        STOPBANG score  3/ 8    Tiredness/ Napping during the day   BMI> 35   Age over 50      Eyes:        No new visual changes   Respiratory:          Occasional Cough with white  phlegm   No change in color , consistency, amount of phlegm - none since last month  No Hemoptysis   Cardiovascular:        As noted   Gastrointestinal:        No overt upperGI/ blood losses  Bowel movements- ileostomy    Endocrine:        Prednisone use > 20 mg daily for 3 weeks- none   Genitourinary: Negative for dysuria.        No urinary hesitancy    Musculoskeletal:          No unusual, muscle, joint pains   Neurological: Negative for syncope.        No unilateral weakness   Hematological:        Current use of Anticoagulants  Current use of  Antiplatelet agents  None   Psychiatric/Behavioral:          Depression, Anxiety - Controlled        No vascular stenting         Past Surgical History:   Procedure Laterality Date    APPENDECTOMY       SECTION, LOW TRANSVERSE      CHOLECYSTECTOMY      open    COLECTOMY      total- 2013    HYSTERECTOMY      ILEOSTOMY REVISION      2014; 2014    thumb surgery      TONSILLECTOMY, ADENOIDECTOMY      WISDOM TOOTH EXTRACTION         No anesthesia, bleeding, cardiac problems , PONV  with previous surgeries/procedures.  Medications and Allergies reviewed in epic.   FH- No anesthesia, thrombosis/ bleeding  , early onset heart disease in family     Physical Exam   HENT:   Head: Normocephalic.       Physical Exam   HENT:   Head: Normocephalic.     Constitutional- Vitals - Body mass index is 41.64 kg/m².,   Vitals:    18 0952   Pulse: (!) 58   Temp: 97.3 °F (36.3 °C)     General appearance-Conscious,Coherent  Eyes- No conjunctival icterus,pupils  round  and reactive to light   ENT-Oral cavity- moist  , Hearing grossly normal   Neck- No thyromegaly ,Trachea -central, No jugular venous distension,   No Carotid Bruit   Cardiovascular -Heart Sounds- Normal  and  no murmur   , No gallop rhythm   Respiratory - Normal Respiratory Effort, Normal breath sounds,  no wheeze  and  no forced expiratory wheeze    Peripheral pitting pedal edema-- none , no calf pain   Gastrointestinal -Soft abdomen, No palpable masses, Non Tender,Liver,Spleen not palpable. No-- free fluid and shifting dullness  Musculoskeletal- No finger Clubbing. Strength grossly normal   Lymphatic-No Palpable cervical, axillary,Inguinal lymphadenopathy   Psychiatric - normal effect,Orientation  Rt Dorsalis pedis pulses-palpable    Lt Dorsalis pedis pulses- palpable   Rt Posterior tibial pulses -palpable   Left posterior tibial pulses -palpable   Miscellaneous -  no asterixis,  no dupuytren's contracture,  Surgical scarabdomen  and   "no renal bruit  Pulse (!) 58, temperature 97.3 °F (36.3 °C), temperature source Oral, height 5' 4.5" (1.638 m), weight 111.8 kg (246 lb 6.4 oz), SpO2 98 %.   Lives with 5 dogs , son going to come and stay to help       Investigations  Lab and Imaging have been reviewed in [facility].    Review of Medicine tests    EKG- I had independently reviewed the EKG from--8/15/2016   It was reported to be showing     Sinus bradycardia  LVH with repolarization abnormality  Cannot rule out Septal infarct ,age undetermined  Abnormal ECG  No previous ECGs available    Had stress test , prior to gastric sleeve -- no CAD to her understanding     Review of clinical lab tests:  Lab Results   Component Value Date    CREATININE 0.9 01/17/2018    HGB 13.9 01/17/2018     01/17/2018     Alk phos elevation - suggested follow up     Review of old records- Was done and information gathered regards to events leading to surgery and health conditions of significance in the perioperative period.        Preoperative cardiac risk assessment-  The patient does not have any active cardiac conditions . Revised cardiac risk index predictors- 0---.Functional capacity is more than 4 Mets. She will be undergoing a Abdominal procedure that carries a intermediate risk     The estimated risk of the rate of adverse cardiac outcomes  0.4%    No further cardiac work up is indicated prior to proceeding with the surgery          American Society of Anesthesiologists Physical status classification ( ASA ) class: 3     Postoperative pulmonary complication risk assessment:      ARISCAT ( Canet) risk index- risk class -  Low    Assessment/Plan:     Depression  I suggest monitoring the sodium as SIADH from Lexapro, Wellbutrin  use and hypersecretion of ADH associated with surgery can reduce sodium in the perioperative period    Redundant skin of abdomen  Having difficulty putting the ileostomy bag     Moderate persistent asthma without complication  Educated about " scheduled use and rinsing mouth after use    Type 2 diabetes mellitus with hyperlipidemia  Encouraged weight loss    Bariatric surgery status-Gastric sleeve   Lost 111 pounds so far   Care with NSAID use  discussed     GERD (gastroesophageal reflux disease)  GERD-  I suggest continuation of the Proton pump inhibitor in the perioperative period . I suggest aspiration precautions    Inflammatory bowel disease  For surgery    Former smoker  Not known to have COPD  No chronic phlegm         Preventive perioperative care    Thromboembolic prophylaxis:  Her risk factors for thrombosis include morbid obesity, surgical procedure and age.I suggest  thromboembolic prophylaxis ( mechanical/pharmacological, weighing the risk benefits of pharmacological agent use considering [facility]ia procedural bleeding )  during the perioperative period.I suggested being active in the post operative period.      Postoperative pulmonary complication prophylaxis-Risk factors for post operative pulmonary complications include ASA class >2- I suggest incentive spirometry use, early ambulation, end tidal carbon dioxide monitoring and pain control so as to avoid diaphragmatic splinting  , oral care , head end of bed elevation     Renal complication prophylaxis- I suggest keeping her well hydrated  2 litre's of water a day      Surgical site Infection Prophylaxis-I  suggest appropriate antibiotic for Prophylaxis against Surgical site infections     Delirium prophylaxis-Risk factors - benzodiazepine use - I suggest avoidance / minimizing the use of  Benzodiazepines ( unless the patient has been taking it on a regular basis ),Anticholinergic medication,Antihistamines ( like  Benadryl).I suggest minimizing the use of opioid medication and use of IV tylenol,if it is appropriate. I suggest using the lowest possible dose of opioids for the shortest duration possible in the perioperative period. I suggest to Keep shades/blinds open during the day, lights off and  "shades closed at night to encourage normal sleep/wake cycle.I encourage the presence of the family member with the patient at all times, if at all possible as mental status changes can be picked up early by the family members and they help with reorientation. I encouraged the presence of family to help with orientation in the perioperative period. Benadryl avoidance suggested      This visit was focused on Preoperative evaluation, Perioperative Medical management, complication reduction plans. I suggest that the patient follows up with primary care or relevant sub specialists for ongoing health care.    I appreciate the opportunity to be involved in this patients care. Please feel free to contact me if there were any questions about this consultation.    Patient is optimized     Patient  was instructed to call and update me about any changes to health, changes to medication, office visits ,testing out side of the [facility] operative care center , hospitalizations between now and surgery     Elaine Elias MD  Perioperative Medicine  [facility]   Pager [phone]  --  5/7- 18 38     /68 Comment: lt manual  Pulse (!) 58   Temp 97.3 °F (36.3 °C) (Oral)   Ht 5' 4.5" (1.638 m)   Wt 111.8 kg (246 lb 6.4 oz)   SpO2 98%   BMI 41.64 kg/m²    ---  5/23- 12 57     Called to follow up   Doing fine  No fever   Allergies   No changes to Medication  No wheezing   No dizziness, lightheadedness   "

## 2018-05-07 NOTE — ASSESSMENT & PLAN NOTE
I suggest monitoring the sodium as SIADH from Lexapro, Wellbutrin  use and hypersecretion of ADH associated with surgery can reduce sodium in the perioperative period

## 2018-05-07 NOTE — OUTPATIENT SUBJECTIVE & OBJECTIVE
Outpatient Subjective & Objective     Chief complaint-Preoperative evaluation, Perioperative Medical management, complication reduction plan     Active cardiac conditions- none    Revised cardiac risk index predictors- none    Functional capacity -Examples of physical activity,can walk 2 miles,   can take 1 flight of stairs----- She can undertake all the above activities without  chest pain,chest tightness, Shortness of breath ,dizziness,lightheadedness making her exercise tolerance more,   than 4 Mets.   Used to be Winded on exertion,resolved with weight loss     Review of Systems   Constitutional: Negative for chills and fever.   HENT:        STOPBANG score  3/ 8    Tiredness/ Napping during the day   BMI> 35   Age over 50      Eyes:        No new visual changes   Respiratory:          Occasional Cough with white  phlegm   No change in color , consistency, amount of phlegm - none since last month  No Hemoptysis   Cardiovascular:        As noted   Gastrointestinal:        No overt upperGI/ blood losses  Bowel movements- ileostomy    Endocrine:        Prednisone use > 20 mg daily for 3 weeks- none   Genitourinary: Negative for dysuria.        No urinary hesitancy    Musculoskeletal:          No unusual, muscle, joint pains   Neurological: Negative for syncope.        No unilateral weakness   Hematological:        Current use of Anticoagulants  Current use of Antiplatelet agents  None   Psychiatric/Behavioral:          Depression, Anxiety - Controlled        No vascular stenting         Past Surgical History:   Procedure Laterality Date    APPENDECTOMY       SECTION, LOW TRANSVERSE      CHOLECYSTECTOMY      open    COLECTOMY      total- 2013    HYSTERECTOMY      ILEOSTOMY REVISION      2014; 2014    thumb surgery      TONSILLECTOMY, ADENOIDECTOMY      WISDOM TOOTH EXTRACTION         No anesthesia, bleeding, cardiac problems , PONV  with previous surgeries/procedures.  Medications  "and Allergies reviewed in epic.   FH- No anesthesia, thrombosis/ bleeding  , early onset heart disease in family     Physical Exam   HENT:   Head: Normocephalic.       Physical Exam   HENT:   Head: Normocephalic.     Constitutional- Vitals - Body mass index is 41.64 kg/m².,   Vitals:    05/07/18 0952   Pulse: (!) 58   Temp: 97.3 °F (36.3 °C)     General appearance-Conscious,Coherent  Eyes- No conjunctival icterus,pupils  round  and reactive to light   ENT-Oral cavity- moist  , Hearing grossly normal   Neck- No thyromegaly ,Trachea -central, No jugular venous distension,   No Carotid Bruit   Cardiovascular -Heart Sounds- Normal  and  no murmur   , No gallop rhythm   Respiratory - Normal Respiratory Effort, Normal breath sounds,  no wheeze  and  no forced expiratory wheeze    Peripheral pitting pedal edema-- none , no calf pain   Gastrointestinal -Soft abdomen, No palpable masses, Non Tender,Liver,Spleen not palpable. No-- free fluid and shifting dullness  Musculoskeletal- No finger Clubbing. Strength grossly normal   Lymphatic-No Palpable cervical, axillary,Inguinal lymphadenopathy   Psychiatric - normal effect,Orientation  Rt Dorsalis pedis pulses-palpable    Lt Dorsalis pedis pulses- palpable   Rt Posterior tibial pulses -palpable   Left posterior tibial pulses -palpable   Miscellaneous -  no asterixis,  no dupuytren's contracture,  Surgical scarabdomen  and  no renal bruit  Pulse (!) 58, temperature 97.3 °F (36.3 °C), temperature source Oral, height 5' 4.5" (1.638 m), weight 111.8 kg (246 lb 6.4 oz), SpO2 98 %.   Lives with 5 dogs , son going to come and stay to help       Investigations  Lab and Imaging have been reviewed in [facility].    Review of Medicine tests    EKG- I had independently reviewed the EKG from--8/15/2016   It was reported to be showing     Sinus bradycardia  LVH with repolarization abnormality  Cannot rule out Septal infarct ,age undetermined  Abnormal ECG  No previous ECGs available    Had stress " test , prior to gastric sleeve -- no CAD to her understanding     Review of clinical lab tests:  Lab Results   Component Value Date    CREATININE 0.9 01/17/2018    HGB 13.9 01/17/2018     01/17/2018     Alk phos elevation - suggested follow up     Review of old records- Was done and information gathered regards to events leading to surgery and health conditions of significance in the perioperative period.    Outpatient Subjective & Objective

## 2018-05-07 NOTE — PATIENT INSTRUCTIONS
Your surgery has been scheduled for:__Thurs 5/24__     You should report to:  _____[facility], located on the [place] side of the first floor of the [facility] ([phone])  ___X___[facility], located on the [facility] side of the Second floor of the [facility] ([phone])  ______3rd Floor [facility] located on the [facility] side of the [facility] [phone]    Please Note  -Tell your doctors if you take asprin, products containing aspirin, herbal medications or blood thinners, such as Coumadin, Ticlid, or Plavix. (Consult your provider regarding holding or stopping before surgery).  -Arrange for someone to drive you home following surgery. You will not be allowed to leave the surgical facility alone or drive yourself home following sedation and anesthesia.      Outpatient Encounter Prescriptions as of 5/7/2018   Medication Sig Note Dispense Refill    albuterol (PROVENTIL) 2.5 mg /3 mL (0.083 %) nebulizer solution Take 3 mLs (2.5 mg total) by nebulization every 6 (six) hours as needed for Wheezing. Rescue 5/4/2018: Use as needed   1 Box 2    albuterol 90 mcg/actuation inhaler Inhale 2 puffs into the lungs every 4 (four) hours as needed for Wheezing or Shortness of Breath. Rescue 5/4/2018: Use as needed and bring AM of surgery 1 Inhaler 5    buPROPion (WELLBUTRIN) 75 MG tablet Take 2 tablets by mouth 3 (three) times daily. 5/4/2018: Take AM of surgery      clonazePAM (KLONOPIN) 1 MG tablet Take 1 mg by mouth 2 (two) times daily.  5/4/2018: Take AM of surgery      escitalopram oxalate (LEXAPRO) 20 MG tablet Take 30 mg by mouth every morning. Taking a total of 30 mg 5/4/2018: Take AM of surgery      fluticasone (FLONASE) 50 mcg/actuation nasal spray 1 spray by Each Nare route every evening. As needed for allergies with the cold front 5/4/2018: Use as sched PM       mg tablet TAKE 1 TABLET (600 MG TOTAL) BY  MOUTH EVERY 8 (EIGHT) HOURS AS NEEDED FOR PAIN. 5/4/2018: Hold 1 wk prior to surgery 30 tablet 1    loratadine (CLARITIN) 10 mg tablet TAKE ONE TABLET BY MOUTH ONCE DAILY (Patient taking differently: TAKE ONE TABLET BY MOUTH ONCE DAILY pm) 5/4/2018: Take as sched PM 90 tablet 1    montelukast (SINGULAIR) 10 mg tablet TAKE ONE TABLET BY MOUTH EVERY EVENING (Patient taking differently: TAKE ONE TABLET BY MOUTH EVERY) 5/4/2018: May take AM of surgery 90 tablet 1    pantoprazole (PROTONIX) 40 MG tablet Take 1 tablet (40 mg total) by mouth once daily. (Patient taking differently: Take 40 mg by mouth every morning. ) 5/4/2018: Take AM of surgery 90 tablet 3    SYMBICORT 160-4.5 mcg/actuation HFAA INHALE 2 PUFFS INTO THE LUNGS EVERY 12 (TWELVE) HOURS. CONTROLLER (Patient taking differently: Inhale 2 puffs into the lungs every 12 (twelve) hours as needed. Controller) 5/4/2018: Use as needed and bring AM of surgery 11 g 1    trazodone (DESYREL) 50 MG tablet Take 50 mg by mouth every evening. 5/4/2018: Take as sched PM      [DISCONTINUED] escitalopram oxalate (LEXAPRO) 10 MG tablet Take 10 mg by mouth every morning. Taking a total of 30 mg 5/7/2018: Take AM of surgery      guaifenesin-codeine 100-10 mg/5 ml (TUSSI-ORGANIDIN NR)  mg/5 mL syrup Take 5 mLs by mouth every 6 (six) hours as needed for Cough. 5/4/2018: Take as needed 180 mL 1    ibuprofen (ADVIL,MOTRIN) 600 MG tablet Take 1 tablet (600 mg total) by mouth every 8 (eight) hours as needed for Pain. 5/4/2018: refills 30 tablet 2    metroNIDAZOLE (FLAGYL) 500 MG tablet On the day before your surgery, take 1 tablet (500mg) at 1pm, 2pm and 11pm. 5/4/2018: Take as directed 3 tablet 0    neomycin (MYCIFRADIN) 500 mg Tab On the day before your surgery, take 2 tablets (1,000mg) by mouth at 1pm, 2pm and 11pm. 5/4/2018: Take as directed 6 tablet 0     No facility-administered encounter medications on file as of 5/7/2018.          Please review the instructions  regarding your above listed medications.    Before Surgery  -Stop taking all herbal medications 14 days prior to surgery  -Stop taking asprin, products containing asprin 7 days before surgery  -Stop taking blood thinners   days before surgery  -Refrain from drinking alcoholic beverages for 24 hours before and after surgery  -Stop or limit smoking   days before surgery    Night before Surgery  -DO NOT EAT OR DRINK ANYTHING AFTER MIDNIGHT, INCLUDING GUM, HARD CANDY, MINTS, OR CHEWING TOBACCO  -Take a shower or bath (shower is recommended). Bathe with Hibiciens soap or an antibacterial soap from the neck down. If not supplied by your surgeon, Hibiciens soap will need to be purchased over the counter in the pharmacy. Rinse soap off thoroughly.  -Shampoo your hair with your regular shampoo    The Day of Surgery  -Take another bath or shower with Hibiciens or any antibacterial soap, to reduce the chance of infection.  -Take heart and blood pressure medications with a small sip of water, as advised by the perioperative team.  -Do not take fluid pills  -You may brush your teeth and rinse your mouth, but do not swallow any additional water.  -Do not apply perfumes, powder, body lotions, or deodorant on the day of surgery.  -Nail polish should be removed  -Do not wear makeup or moisturizer  -Wear comfortable clothes, such as a button front shirt and loose fitting pants.  -Leave all jewelry, including body piercings, and valuables at home.  -Bring any devices you will need after surgery such as crutches or canes.  -If you have sleep apnea, please bring your CPAP machine    In the event of your physical conditions including the onset of a cold or respiratory illness, or if you have to delay or cancel your surgery, please notify your surgeon.     If you have any questions or concerns, please don't hesitate to call.    Sincerely,    Deborah [phone]

## 2018-05-07 NOTE — HPI
History of present illness- I had the pleasure of meeting this pleasant 52 y.o. lady in the pre op clinic prior to her elective Abdominal surgery. The patient is new to me     I have obtained the history by speaking to the patient and by reviewing the electronic health records.    Events leading up to surgery / History of presenting illness -    Other ulcerative colitis with fistula  Blood and mucus per rectum   She has been troubled with severe  rectal /anal area  pain for 1 year  . Randomly occurs and relieves spontaneously      Relevant health conditions of significance for the perioperative period/ History of presenting illness -    Patient Active Problem List    Diagnosis Date Noted    Inflammatory bowel disease 04/11/2018     IBD (suspected UC) s/p TAC with end ileostomy   bloody mucous from the rectum  Ulcerative proctitis      Bariatric surgery status-Gastric sleeve  03/15/2018     Gastric sleeve       Redundant skin of abdomen 03/15/2018    Former smoker 01/05/2018     Quit in 2009      Dietary folate deficiency anemia 10/13/2017    Vitamin B12 deficiency 10/13/2017    Hypercholesterolemia 10/13/2017    Vitamin D deficiency 10/13/2017    Moderate persistent asthma without complication 10/13/2017     Not using Symbicort as supposed to   Using as needed   Had an upper respiratory illness in April ,causing Asthma flare  lasting for about 1 month  Lately better   CXR 4/3/2018 The lungs are clear, with normal appearance of pulmonary vasculature and no pleural effusion or pneumothorax.      Anxiety 10/13/2017     Om Klonopin twice daily   Under Psychiatry care       Depression 10/13/2017     Controlled      DDD (degenerative disc disease), lumbar 10/13/2017    Crohn's disease with complication 10/13/2017     pt advised to get reestablished with gastroenterologist      Sanjeev of knee 08/25/2016    Morbid obesity with BMI of 40.0-44.9, adult 09/02/2015    Ileostomy status 08/24/2015    GERD  (gastroesophageal reflux disease) 12/16/2014     Controled       Esophageal ulcer 12/16/2014    Type 2 diabetes mellitus with hyperlipidemia 12/10/2014     Last A1c from October 2017- 5.3

## 2018-05-07 NOTE — LETTER
May 7, 2018      JENNY Virgen MD  [address] [facility] [address]           [facility] - Pre Op Consult  [address] [facility] [address]  Phone: [phone]          Patient: Ida Orozco   MR Number: [id number]   YOB: 1965   Date of Visit: 5/7/2018       Dear Dr. JENNY Virgen:    Thank you for referring Ida Orozco to me for evaluation. Attached you will find relevant portions of my assessment and plan of care.    If you have questions, please do not hesitate to call me. I look forward to following Ida Orozco along with you.    Sincerely,    Elaine Elias MD    Enclosure  CC:  No Recipients    If you would like to receive this communication electronically, please contact [email or web address] or [phone] to request more information on Parse Link access.    For providers and/or their staff who would like to refer a patient to [facility], please contact us through our one-stop-shop provider referral line, [facility], at [phone].    If you feel you have received this communication in error or would no longer like to receive these types of communications, please e-mail [email or web address]

## 2018-05-23 ENCOUNTER — ANESTHESIA EVENT (OUTPATIENT)
Dept: SURGERY | Facility: HOSPITAL | Age: 53
DRG: 330 | End: 2018-05-23
Payer: MEDICARE

## 2018-05-23 ENCOUNTER — TELEPHONE (OUTPATIENT)
Dept: SURGERY | Facility: CLINIC | Age: 53
End: 2018-05-23

## 2018-05-24 ENCOUNTER — SURGERY (OUTPATIENT)
Age: 53
End: 2018-05-24

## 2018-05-24 ENCOUNTER — ANESTHESIA (OUTPATIENT)
Dept: SURGERY | Facility: HOSPITAL | Age: 53
DRG: 330 | End: 2018-05-24
Payer: MEDICARE

## 2018-05-24 ENCOUNTER — HOSPITAL ENCOUNTER (INPATIENT)
Facility: HOSPITAL | Age: 53
LOS: 4 days | Discharge: HOME HEALTH | DRG: 330 | End: 2018-05-28
Attending: COLON & RECTAL SURGERY | Admitting: COLON & RECTAL SURGERY
Payer: MEDICARE

## 2018-05-24 DIAGNOSIS — K51.90 UC (ULCERATIVE COLITIS): ICD-10-CM

## 2018-05-24 LAB
ABO + RH BLD: NORMAL
BLD GP AB SCN CELLS X3 SERPL QL: NORMAL
POCT GLUCOSE: 164 MG/DL (ref 70–110)

## 2018-05-24 PROCEDURE — 45110 REMOVAL OF RECTUM: CPT | Mod: 22,,, | Performed by: COLON & RECTAL SURGERY

## 2018-05-24 PROCEDURE — 37000008 HC ANESTHESIA 1ST 15 MINUTES: Performed by: COLON & RECTAL SURGERY

## 2018-05-24 PROCEDURE — D9220A PRA ANESTHESIA: Mod: CRNA,,, | Performed by: NURSE ANESTHETIST, CERTIFIED REGISTERED

## 2018-05-24 PROCEDURE — 63600175 PHARM REV CODE 636 W HCPCS: Performed by: NURSE ANESTHETIST, CERTIFIED REGISTERED

## 2018-05-24 PROCEDURE — 63600175 PHARM REV CODE 636 W HCPCS: Performed by: ANESTHESIOLOGY

## 2018-05-24 PROCEDURE — 0D1B0Z4 BYPASS ILEUM TO CUTANEOUS, OPEN APPROACH: ICD-10-PCS | Performed by: COLON & RECTAL SURGERY

## 2018-05-24 PROCEDURE — 25000003 PHARM REV CODE 250: Performed by: NURSE PRACTITIONER

## 2018-05-24 PROCEDURE — 20600001 HC STEP DOWN PRIVATE ROOM

## 2018-05-24 PROCEDURE — 63600175 PHARM REV CODE 636 W HCPCS: Performed by: NURSE PRACTITIONER

## 2018-05-24 PROCEDURE — 82962 GLUCOSE BLOOD TEST: CPT | Performed by: COLON & RECTAL SURGERY

## 2018-05-24 PROCEDURE — 88307 TISSUE EXAM BY PATHOLOGIST: CPT | Mod: 26,,, | Performed by: PATHOLOGY

## 2018-05-24 PROCEDURE — C1758 CATHETER, URETERAL: HCPCS | Performed by: COLON & RECTAL SURGERY

## 2018-05-24 PROCEDURE — 27201423 OPTIME MED/SURG SUP & DEVICES STERILE SUPPLY: Performed by: COLON & RECTAL SURGERY

## 2018-05-24 PROCEDURE — 0DJD8ZZ INSPECTION OF LOWER INTESTINAL TRACT, VIA NATURAL OR ARTIFICIAL OPENING ENDOSCOPIC: ICD-10-PCS | Performed by: COLON & RECTAL SURGERY

## 2018-05-24 PROCEDURE — 36000710: Performed by: COLON & RECTAL SURGERY

## 2018-05-24 PROCEDURE — 25000242 PHARM REV CODE 250 ALT 637 W/ HCPCS: Performed by: NURSE ANESTHETIST, CERTIFIED REGISTERED

## 2018-05-24 PROCEDURE — 25000003 PHARM REV CODE 250: Performed by: COLON & RECTAL SURGERY

## 2018-05-24 PROCEDURE — 71000039 HC RECOVERY, EACH ADD'L HOUR: Performed by: COLON & RECTAL SURGERY

## 2018-05-24 PROCEDURE — 0DN80ZZ RELEASE SMALL INTESTINE, OPEN APPROACH: ICD-10-PCS | Performed by: COLON & RECTAL SURGERY

## 2018-05-24 PROCEDURE — 94799 UNLISTED PULMONARY SVC/PX: CPT

## 2018-05-24 PROCEDURE — 88307 TISSUE EXAM BY PATHOLOGIST: CPT | Performed by: PATHOLOGY

## 2018-05-24 PROCEDURE — 94640 AIRWAY INHALATION TREATMENT: CPT

## 2018-05-24 PROCEDURE — S0028 INJECTION, FAMOTIDINE, 20 MG: HCPCS | Performed by: STUDENT IN AN ORGANIZED HEALTH CARE EDUCATION/TRAINING PROGRAM

## 2018-05-24 PROCEDURE — 25000003 PHARM REV CODE 250: Performed by: NURSE ANESTHETIST, CERTIFIED REGISTERED

## 2018-05-24 PROCEDURE — 27100025 HC TUBING, SET FLUID WARMER: Performed by: NURSE ANESTHETIST, CERTIFIED REGISTERED

## 2018-05-24 PROCEDURE — 0DTP0ZZ RESECTION OF RECTUM, OPEN APPROACH: ICD-10-PCS | Performed by: COLON & RECTAL SURGERY

## 2018-05-24 PROCEDURE — 25000003 PHARM REV CODE 250: Performed by: STUDENT IN AN ORGANIZED HEALTH CARE EDUCATION/TRAINING PROGRAM

## 2018-05-24 PROCEDURE — 63600175 PHARM REV CODE 636 W HCPCS

## 2018-05-24 PROCEDURE — 36000711: Performed by: COLON & RECTAL SURGERY

## 2018-05-24 PROCEDURE — D9220A PRA ANESTHESIA: Mod: ANES,,, | Performed by: ANESTHESIOLOGY

## 2018-05-24 PROCEDURE — C9290 INJ, BUPIVACAINE LIPOSOME: HCPCS | Performed by: COLON & RECTAL SURGERY

## 2018-05-24 PROCEDURE — S0030 INJECTION, METRONIDAZOLE: HCPCS | Performed by: NURSE PRACTITIONER

## 2018-05-24 PROCEDURE — 25000242 PHARM REV CODE 250 ALT 637 W/ HCPCS: Performed by: STUDENT IN AN ORGANIZED HEALTH CARE EDUCATION/TRAINING PROGRAM

## 2018-05-24 PROCEDURE — 71000033 HC RECOVERY, INTIAL HOUR: Performed by: COLON & RECTAL SURGERY

## 2018-05-24 PROCEDURE — 27000221 HC OXYGEN, UP TO 24 HOURS

## 2018-05-24 PROCEDURE — 25000003 PHARM REV CODE 250: Performed by: ANESTHESIOLOGY

## 2018-05-24 PROCEDURE — 86850 RBC ANTIBODY SCREEN: CPT

## 2018-05-24 PROCEDURE — 94761 N-INVAS EAR/PLS OXIMETRY MLT: CPT

## 2018-05-24 PROCEDURE — 37000009 HC ANESTHESIA EA ADD 15 MINS: Performed by: COLON & RECTAL SURGERY

## 2018-05-24 PROCEDURE — 63600175 PHARM REV CODE 636 W HCPCS: Performed by: COLON & RECTAL SURGERY

## 2018-05-24 RX ORDER — ONDANSETRON 4 MG/1
4 TABLET, ORALLY DISINTEGRATING ORAL EVERY 6 HOURS PRN
Status: DISCONTINUED | OUTPATIENT
Start: 2018-05-24 | End: 2018-05-28 | Stop reason: HOSPADM

## 2018-05-24 RX ORDER — ALBUTEROL SULFATE 90 UG/1
AEROSOL, METERED RESPIRATORY (INHALATION)
Status: DISCONTINUED | OUTPATIENT
Start: 2018-05-24 | End: 2018-05-24

## 2018-05-24 RX ORDER — IBUPROFEN 200 MG
16 TABLET ORAL
Status: DISCONTINUED | OUTPATIENT
Start: 2018-05-24 | End: 2018-05-28 | Stop reason: HOSPADM

## 2018-05-24 RX ORDER — HYDROMORPHONE HYDROCHLORIDE 1 MG/ML
1 INJECTION, SOLUTION INTRAMUSCULAR; INTRAVENOUS; SUBCUTANEOUS EVERY 4 HOURS PRN
Status: DISCONTINUED | OUTPATIENT
Start: 2018-05-24 | End: 2018-05-24

## 2018-05-24 RX ORDER — HEPARIN SODIUM 5000 [USP'U]/ML
5000 INJECTION, SOLUTION INTRAVENOUS; SUBCUTANEOUS EVERY 8 HOURS
Status: COMPLETED | OUTPATIENT
Start: 2018-05-24 | End: 2018-05-24

## 2018-05-24 RX ORDER — ROCURONIUM BROMIDE 10 MG/ML
INJECTION, SOLUTION INTRAVENOUS
Status: DISCONTINUED | OUTPATIENT
Start: 2018-05-24 | End: 2018-05-24

## 2018-05-24 RX ORDER — FENTANYL CITRATE 50 UG/ML
INJECTION, SOLUTION INTRAMUSCULAR; INTRAVENOUS
Status: DISCONTINUED | OUTPATIENT
Start: 2018-05-24 | End: 2018-05-24

## 2018-05-24 RX ORDER — FAMOTIDINE 10 MG/ML
20 INJECTION INTRAVENOUS 2 TIMES DAILY
Status: DISCONTINUED | OUTPATIENT
Start: 2018-05-24 | End: 2018-05-28 | Stop reason: HOSPADM

## 2018-05-24 RX ORDER — GLUCAGON 1 MG
1 KIT INJECTION CONTINUOUS PRN
Status: DISCONTINUED | OUTPATIENT
Start: 2018-05-24 | End: 2018-05-28 | Stop reason: HOSPADM

## 2018-05-24 RX ORDER — ONDANSETRON 2 MG/ML
4 INJECTION INTRAMUSCULAR; INTRAVENOUS EVERY 6 HOURS PRN
Status: DISCONTINUED | OUTPATIENT
Start: 2018-05-24 | End: 2018-05-24

## 2018-05-24 RX ORDER — MORPHINE SULFATE 2 MG/ML
5 INJECTION, SOLUTION INTRAMUSCULAR; INTRAVENOUS EVERY 4 HOURS PRN
Status: DISCONTINUED | OUTPATIENT
Start: 2018-05-24 | End: 2018-05-24

## 2018-05-24 RX ORDER — DEXAMETHASONE SODIUM PHOSPHATE 4 MG/ML
INJECTION, SOLUTION INTRA-ARTICULAR; INTRALESIONAL; INTRAMUSCULAR; INTRAVENOUS; SOFT TISSUE
Status: DISCONTINUED | OUTPATIENT
Start: 2018-05-24 | End: 2018-05-24

## 2018-05-24 RX ORDER — BUPIVACAINE HYDROCHLORIDE 2.5 MG/ML
INJECTION, SOLUTION EPIDURAL; INFILTRATION; INTRACAUDAL
Status: DISCONTINUED | OUTPATIENT
Start: 2018-05-24 | End: 2018-05-24 | Stop reason: HOSPADM

## 2018-05-24 RX ORDER — ACETAMINOPHEN 10 MG/ML
1000 INJECTION, SOLUTION INTRAVENOUS EVERY 8 HOURS
Status: DISPENSED | OUTPATIENT
Start: 2018-05-24 | End: 2018-05-25

## 2018-05-24 RX ORDER — SODIUM CHLORIDE 9 MG/ML
INJECTION, SOLUTION INTRAVENOUS CONTINUOUS
Status: DISCONTINUED | OUTPATIENT
Start: 2018-05-24 | End: 2018-05-26

## 2018-05-24 RX ORDER — NALOXONE HCL 0.4 MG/ML
0.02 VIAL (ML) INJECTION
Status: DISCONTINUED | OUTPATIENT
Start: 2018-05-24 | End: 2018-05-28 | Stop reason: HOSPADM

## 2018-05-24 RX ORDER — IBUPROFEN 200 MG
24 TABLET ORAL
Status: DISCONTINUED | OUTPATIENT
Start: 2018-05-24 | End: 2018-05-28 | Stop reason: HOSPADM

## 2018-05-24 RX ORDER — SODIUM CHLORIDE 0.9 % (FLUSH) 0.9 %
3 SYRINGE (ML) INJECTION
Status: DISCONTINUED | OUTPATIENT
Start: 2018-05-24 | End: 2018-05-28 | Stop reason: HOSPADM

## 2018-05-24 RX ORDER — MIDAZOLAM HYDROCHLORIDE 1 MG/ML
INJECTION, SOLUTION INTRAMUSCULAR; INTRAVENOUS
Status: DISCONTINUED | OUTPATIENT
Start: 2018-05-24 | End: 2018-05-24

## 2018-05-24 RX ORDER — FENTANYL CITRATE 50 UG/ML
INJECTION, SOLUTION INTRAMUSCULAR; INTRAVENOUS
Status: COMPLETED
Start: 2018-05-24 | End: 2018-05-24

## 2018-05-24 RX ORDER — PROPOFOL 10 MG/ML
VIAL (ML) INTRAVENOUS
Status: DISCONTINUED | OUTPATIENT
Start: 2018-05-24 | End: 2018-05-24

## 2018-05-24 RX ORDER — NEOSTIGMINE METHYLSULFATE 1 MG/ML
INJECTION, SOLUTION INTRAVENOUS
Status: DISCONTINUED | OUTPATIENT
Start: 2018-05-24 | End: 2018-05-24

## 2018-05-24 RX ORDER — NALOXONE HCL 0.4 MG/ML
0.02 VIAL (ML) INJECTION
Status: DISCONTINUED | OUTPATIENT
Start: 2018-05-24 | End: 2018-05-26

## 2018-05-24 RX ORDER — SUCCINYLCHOLINE CHLORIDE 20 MG/ML
INJECTION INTRAMUSCULAR; INTRAVENOUS
Status: DISCONTINUED | OUTPATIENT
Start: 2018-05-24 | End: 2018-05-24

## 2018-05-24 RX ORDER — ACETAMINOPHEN 10 MG/ML
1000 INJECTION, SOLUTION INTRAVENOUS
Status: COMPLETED | OUTPATIENT
Start: 2018-05-24 | End: 2018-05-24

## 2018-05-24 RX ORDER — GLYCOPYRROLATE 0.2 MG/ML
INJECTION INTRAMUSCULAR; INTRAVENOUS
Status: DISCONTINUED | OUTPATIENT
Start: 2018-05-24 | End: 2018-05-24

## 2018-05-24 RX ORDER — METRONIDAZOLE 500 MG/100ML
500 INJECTION, SOLUTION INTRAVENOUS
Status: COMPLETED | OUTPATIENT
Start: 2018-05-24 | End: 2018-05-24

## 2018-05-24 RX ORDER — MORPHINE SULFATE 1 MG/ML
INJECTION INTRAVENOUS CONTINUOUS
Status: DISCONTINUED | OUTPATIENT
Start: 2018-05-24 | End: 2018-05-26

## 2018-05-24 RX ORDER — ALBUTEROL SULFATE 0.83 MG/ML
2.5 SOLUTION RESPIRATORY (INHALATION) EVERY 4 HOURS
Status: DISCONTINUED | OUTPATIENT
Start: 2018-05-24 | End: 2018-05-27

## 2018-05-24 RX ORDER — SODIUM CHLORIDE 9 MG/ML
INJECTION, SOLUTION INTRAVENOUS
Status: COMPLETED | OUTPATIENT
Start: 2018-05-24 | End: 2018-05-24

## 2018-05-24 RX ORDER — FENTANYL CITRATE 50 UG/ML
25 INJECTION, SOLUTION INTRAMUSCULAR; INTRAVENOUS EVERY 5 MIN PRN
Status: COMPLETED | OUTPATIENT
Start: 2018-05-24 | End: 2018-05-24

## 2018-05-24 RX ORDER — FLUTICASONE FUROATE AND VILANTEROL 100; 25 UG/1; UG/1
1 POWDER RESPIRATORY (INHALATION) DAILY
Status: DISCONTINUED | OUTPATIENT
Start: 2018-05-25 | End: 2018-05-28 | Stop reason: HOSPADM

## 2018-05-24 RX ORDER — FENTANYL CITRATE 50 UG/ML
25 INJECTION, SOLUTION INTRAMUSCULAR; INTRAVENOUS EVERY 5 MIN PRN
Status: DISCONTINUED | OUTPATIENT
Start: 2018-05-24 | End: 2018-05-24 | Stop reason: HOSPADM

## 2018-05-24 RX ORDER — ACETAMINOPHEN 10 MG/ML
INJECTION, SOLUTION INTRAVENOUS
Status: DISCONTINUED | OUTPATIENT
Start: 2018-05-24 | End: 2018-05-24

## 2018-05-24 RX ORDER — KETAMINE HYDROCHLORIDE 100 MG/ML
INJECTION, SOLUTION INTRAMUSCULAR; INTRAVENOUS
Status: DISCONTINUED | OUTPATIENT
Start: 2018-05-24 | End: 2018-05-24

## 2018-05-24 RX ORDER — HYDROMORPHONE HYDROCHLORIDE 1 MG/ML
0.5 INJECTION, SOLUTION INTRAMUSCULAR; INTRAVENOUS; SUBCUTANEOUS
Status: DISCONTINUED | OUTPATIENT
Start: 2018-05-24 | End: 2018-05-24

## 2018-05-24 RX ORDER — LIDOCAINE HYDROCHLORIDE 10 MG/ML
1 INJECTION, SOLUTION EPIDURAL; INFILTRATION; INTRACAUDAL; PERINEURAL
Status: DISCONTINUED | OUTPATIENT
Start: 2018-05-24 | End: 2018-05-24 | Stop reason: HOSPADM

## 2018-05-24 RX ORDER — LIDOCAINE HCL/PF 100 MG/5ML
SYRINGE (ML) INTRAVENOUS
Status: DISCONTINUED | OUTPATIENT
Start: 2018-05-24 | End: 2018-05-24

## 2018-05-24 RX ORDER — MUPIROCIN 20 MG/G
1 OINTMENT TOPICAL
Status: COMPLETED | OUTPATIENT
Start: 2018-05-24 | End: 2018-05-24

## 2018-05-24 RX ORDER — BACITRACIN 50000 [IU]/1
INJECTION, POWDER, FOR SOLUTION INTRAMUSCULAR
Status: DISCONTINUED | OUTPATIENT
Start: 2018-05-24 | End: 2018-05-24 | Stop reason: HOSPADM

## 2018-05-24 RX ADMIN — KETAMINE HYDROCHLORIDE 25 MG: 100 INJECTION, SOLUTION, CONCENTRATE INTRAMUSCULAR; INTRAVENOUS at 07:05

## 2018-05-24 RX ADMIN — FENTANYL CITRATE 100 MCG: 50 INJECTION, SOLUTION INTRAMUSCULAR; INTRAVENOUS at 07:05

## 2018-05-24 RX ADMIN — FENTANYL CITRATE 25 MCG: 50 INJECTION INTRAMUSCULAR; INTRAVENOUS at 02:05

## 2018-05-24 RX ADMIN — IBUPROFEN 800 MG: 800 INJECTION INTRAVENOUS at 06:05

## 2018-05-24 RX ADMIN — SODIUM CHLORIDE, SODIUM GLUCONATE, SODIUM ACETATE, POTASSIUM CHLORIDE, MAGNESIUM CHLORIDE, SODIUM PHOSPHATE, DIBASIC, AND POTASSIUM PHOSPHATE: .53; .5; .37; .037; .03; .012; .00082 INJECTION, SOLUTION INTRAVENOUS at 09:05

## 2018-05-24 RX ADMIN — IBUPROFEN 800 MG: 800 INJECTION INTRAVENOUS at 09:05

## 2018-05-24 RX ADMIN — FENTANYL CITRATE 25 MCG: 50 INJECTION INTRAMUSCULAR; INTRAVENOUS at 01:05

## 2018-05-24 RX ADMIN — ALBUTEROL SULFATE 2.5 MG: 2.5 SOLUTION RESPIRATORY (INHALATION) at 11:05

## 2018-05-24 RX ADMIN — KETAMINE HYDROCHLORIDE 10 MG: 100 INJECTION, SOLUTION, CONCENTRATE INTRAMUSCULAR; INTRAVENOUS at 09:05

## 2018-05-24 RX ADMIN — SUCCINYLCHOLINE CHLORIDE 120 MG: 20 INJECTION, SOLUTION INTRAMUSCULAR; INTRAVENOUS at 07:05

## 2018-05-24 RX ADMIN — METRONIDAZOLE 500 MG: 500 INJECTION, SOLUTION INTRAVENOUS at 07:05

## 2018-05-24 RX ADMIN — DEXAMETHASONE SODIUM PHOSPHATE 8 MG: 4 INJECTION, SOLUTION INTRAMUSCULAR; INTRAVENOUS at 07:05

## 2018-05-24 RX ADMIN — SODIUM CHLORIDE, SODIUM GLUCONATE, SODIUM ACETATE, POTASSIUM CHLORIDE, MAGNESIUM CHLORIDE, SODIUM PHOSPHATE, DIBASIC, AND POTASSIUM PHOSPHATE: .53; .5; .37; .037; .03; .012; .00082 INJECTION, SOLUTION INTRAVENOUS at 12:05

## 2018-05-24 RX ADMIN — LIDOCAINE HYDROCHLORIDE 100 MG: 20 INJECTION, SOLUTION INTRAVENOUS at 07:05

## 2018-05-24 RX ADMIN — CEFTRIAXONE 2 G: 2 INJECTION, SOLUTION INTRAVENOUS at 07:05

## 2018-05-24 RX ADMIN — GLYCOPYRROLATE 0.6 MG: 0.2 INJECTION, SOLUTION INTRAMUSCULAR; INTRAVENOUS at 12:05

## 2018-05-24 RX ADMIN — Medication: at 08:05

## 2018-05-24 RX ADMIN — ACETAMINOPHEN 1000 MG: 10 INJECTION, SOLUTION INTRAVENOUS at 03:05

## 2018-05-24 RX ADMIN — HEPARIN SODIUM 5000 UNITS: 5000 INJECTION, SOLUTION INTRAVENOUS; SUBCUTANEOUS at 06:05

## 2018-05-24 RX ADMIN — MIDAZOLAM HYDROCHLORIDE 2 MG: 1 INJECTION, SOLUTION INTRAMUSCULAR; INTRAVENOUS at 07:05

## 2018-05-24 RX ADMIN — SODIUM CHLORIDE 500 ML: 0.9 INJECTION, SOLUTION INTRAVENOUS at 02:05

## 2018-05-24 RX ADMIN — IBUPROFEN 800 MG: 800 INJECTION INTRAVENOUS at 02:05

## 2018-05-24 RX ADMIN — NEOSTIGMINE METHYLSULFATE 5 MG: 1 INJECTION INTRAVENOUS at 12:05

## 2018-05-24 RX ADMIN — ROCURONIUM BROMIDE 10 MG: 10 INJECTION, SOLUTION INTRAVENOUS at 09:05

## 2018-05-24 RX ADMIN — BUPIVACAINE 20 ML: 13.3 INJECTION, SUSPENSION, LIPOSOMAL INFILTRATION at 10:05

## 2018-05-24 RX ADMIN — MORPHINE SULFATE 5 MG: 2 INJECTION, SOLUTION INTRAMUSCULAR; INTRAVENOUS at 05:05

## 2018-05-24 RX ADMIN — ROCURONIUM BROMIDE 35 MG: 10 INJECTION, SOLUTION INTRAVENOUS at 08:05

## 2018-05-24 RX ADMIN — PROPOFOL 200 MG: 10 INJECTION, EMULSION INTRAVENOUS at 07:05

## 2018-05-24 RX ADMIN — FENTANYL CITRATE 50 MCG: 50 INJECTION, SOLUTION INTRAMUSCULAR; INTRAVENOUS at 08:05

## 2018-05-24 RX ADMIN — ACETAMINOPHEN 1000 MG: 10 INJECTION, SOLUTION INTRAVENOUS at 06:05

## 2018-05-24 RX ADMIN — ACETAMINOPHEN 1000 MG: 10 INJECTION, SOLUTION INTRAVENOUS at 07:05

## 2018-05-24 RX ADMIN — ROCURONIUM BROMIDE 10 MG: 10 INJECTION, SOLUTION INTRAVENOUS at 11:05

## 2018-05-24 RX ADMIN — BACITRACIN 100000 UNITS: 50000 INJECTION, POWDER, LYOPHILIZED, FOR SOLUTION INTRAMUSCULAR at 12:05

## 2018-05-24 RX ADMIN — SODIUM CHLORIDE: 0.9 INJECTION, SOLUTION INTRAVENOUS at 07:05

## 2018-05-24 RX ADMIN — ALBUTEROL SULFATE 10 PUFF: 90 AEROSOL, METERED RESPIRATORY (INHALATION) at 11:05

## 2018-05-24 RX ADMIN — SODIUM CHLORIDE: 0.9 INJECTION, SOLUTION INTRAVENOUS at 01:05

## 2018-05-24 RX ADMIN — ROCURONIUM BROMIDE 5 MG: 10 INJECTION, SOLUTION INTRAVENOUS at 07:05

## 2018-05-24 RX ADMIN — FAMOTIDINE 20 MG: 10 INJECTION INTRAVENOUS at 08:05

## 2018-05-24 RX ADMIN — ACETAMINOPHEN 1000 MG: 10 INJECTION, SOLUTION INTRAVENOUS at 08:05

## 2018-05-24 RX ADMIN — MUPIROCIN 1 G: 20 OINTMENT TOPICAL at 06:05

## 2018-05-24 RX ADMIN — BUPIVACAINE HYDROCHLORIDE 30 ML: 2.5 INJECTION, SOLUTION EPIDURAL; INFILTRATION; INTRACAUDAL; PERINEURAL at 10:05

## 2018-05-24 RX ADMIN — ALBUTEROL SULFATE 2.5 MG: 2.5 SOLUTION RESPIRATORY (INHALATION) at 07:05

## 2018-05-24 RX ADMIN — SODIUM CHLORIDE, SODIUM GLUCONATE, SODIUM ACETATE, POTASSIUM CHLORIDE, MAGNESIUM CHLORIDE, SODIUM PHOSPHATE, DIBASIC, AND POTASSIUM PHOSPHATE: .53; .5; .37; .037; .03; .012; .00082 INJECTION, SOLUTION INTRAVENOUS at 07:05

## 2018-05-24 NOTE — ANESTHESIA PREPROCEDURE EVALUATION
05/24/2018  Ida Orozco is a 52 y.o., female.    Anesthesia Evaluation         Review of Systems  Anesthesia Hx:  No problems with previous Anesthesia   Social:  Former Smoker, No Alcohol Use    Cardiovascular:   Exercise tolerance: good Hypertension, well controlled ECG has been reviewed.    Pulmonary:   Asthma mild    Hepatic/GI:   GERD, well controlled S/p sleeve   Neurological:  Neurology Normal    Endocrine:   Denies Diabetes.        Physical Exam  General:  Obesity    Airway/Jaw/Neck:  Airway Findings: Mouth Opening: Normal Tongue: Normal  General Airway Assessment: Adult  Mallampati: II  Improves to II with phonation.  TM Distance: Normal, at least 6 cm  Jaw/Neck Findings:  Neck ROM: Normal ROM      Dental:  Dental Findings: In tact    Chest/Lungs:  Chest/Lungs Findings: Clear to auscultation         Mental Status:  Mental Status Findings:  Cooperative         Anesthesia Plan  Type of Anesthesia, risks & benefits discussed:  Anesthesia Type:  general  Patient's Preference:   Intra-op Monitoring Plan: standard ASA monitors  Intra-op Monitoring Plan Comments:   Post Op Pain Control Plan: multimodal analgesia, IV/PO Opioids PRN and per primary service following discharge from PACU  Post Op Pain Control Plan Comments:   Induction:   IV  Beta Blocker:  Patient is not currently on a Beta-Blocker (No further documentation required).       Informed Consent: Patient understands risks and agrees with Anesthesia plan.  Questions answered. Anesthesia consent signed with patient.  ASA Score: 2     Day of Surgery Review of History & Physical:    H&P update referred to the surgeon.         Ready For Surgery From Anesthesia Perspective.

## 2018-05-24 NOTE — TRANSFER OF CARE
"Anesthesia Transfer of Care Note    Patient: Ida Orozco    Procedure(s) Performed: Procedure(s) (LRB):  PROCTECTOMY-LAPAROSCOPIC/CONVERTED TO OPEN (N/A)  LYSIS, ADHESIONS/ more than 2hours  SIGMOIDOSCOPY, FLEXIBLE    Patient location: PACU    Anesthesia Type: general    Transport from OR: Transported from OR on 6-10 L/min O2 by face mask with adequate spontaneous ventilation    Post pain: adequate analgesia    Post assessment: no apparent anesthetic complications    Post vital signs: stable    Level of consciousness: awake    Nausea/Vomiting: no nausea/vomiting    Complications: none    Transfer of care protocol was followed      Last vitals:   Visit Vitals  /60 (BP Location: Right arm, Patient Position: Lying)   Pulse 65   Temp 36.8 °C (98.2 °F) (Oral)   Resp 18   Ht 5' 4" (1.626 m)   Wt 112.5 kg (248 lb)   SpO2 96%   Breastfeeding? No   BMI 42.57 kg/m²     "

## 2018-05-24 NOTE — NURSING TRANSFER
Nursing Transfer Note      5/24/2018     Transfer To: 648    Transfer via stretcher    Transfer with cardiac monitoring    Transported by RN x2    Medicines sent: ivf    Chart send with patient: Yes    Notified: spouse    Patient reassessed at: to be done by receiving RN (date, time)    Upon arrival to floor: cardiac monitor applied, patient oriented to room, call bell in reach and bed in lowest position

## 2018-05-24 NOTE — H&P
No changes since H&P below on 18.     Coco Rao MD  Colon and Rectal Surgery Fellow        CRS Office Visit History and Physical     Referring Md:   No referring provider defined for this encounter.     SUBJECTIVE:      Chief Complaint: rectal bleeding     History of Present Illness:  Patient is a 52 y.o. female presents with persistent bloody mucous from the rectum.  No abd pain.               Past Medical History:   Diagnosis Date    Anxiety      Crohn's colitis      Diabetes mellitus      Fatty liver disease, nonalcoholic      GERD (gastroesophageal reflux disease)      History of sleeve gastrectomy 2016    Hypertension                 Past Surgical History:   Procedure Laterality Date    APPENDECTOMY         SECTION, LOW TRANSVERSE        CHOLECYSTECTOMY         open    COLECTOMY         total- 2013    HYSTERECTOMY        ILEOSTOMY REVISION         2014; 2014    thumb surgery        TONSILLECTOMY, ADENOIDECTOMY        WISDOM TOOTH EXTRACTION                   Review of patient's allergies indicates:   Allergen Reactions    Adhesive         Cause blisters    Dilaudid [hydromorphone] Nausea And Vomiting    Sulfa (sulfonamide antibiotics) Hives                Current Outpatient Prescriptions on File Prior to Visit   Medication Sig Dispense Refill    albuterol (PROVENTIL) 2.5 mg /3 mL (0.083 %) nebulizer solution Take 3 mLs (2.5 mg total) by nebulization every 6 (six) hours as needed for Wheezing. Rescue 1 Box 2    albuterol 90 mcg/actuation inhaler Inhale 2 puffs into the lungs every 4 (four) hours as needed for Wheezing or Shortness of Breath. Rescue 1 Inhaler 5    buPROPion (WELLBUTRIN) 75 MG tablet Take 2 tablets by mouth 3 (three) times daily.        clonazePAM (KLONOPIN) 1 MG tablet Take 1 mg by mouth once daily.         escitalopram oxalate (LEXAPRO) 20 MG tablet Take 20 mg by mouth once daily.         fluticasone (FLONASE) 50 mcg/actuation  "nasal spray 1 spray by Each Nare route once daily.        ibuprofen (ADVIL,MOTRIN) 600 MG tablet Take 1 tablet (600 mg total) by mouth every 8 (eight) hours as needed for Pain. 30 tablet 2    loratadine (CLARITIN) 10 mg tablet TAKE ONE TABLET BY MOUTH ONCE DAILY 90 tablet 1    montelukast (SINGULAIR) 10 mg tablet TAKE ONE TABLET BY MOUTH EVERY EVENING 90 tablet 1    pantoprazole (PROTONIX) 40 MG tablet Take 1 tablet (40 mg total) by mouth once daily. 90 tablet 3    SYMBICORT 160-4.5 mcg/actuation HFAA INHALE 2 PUFFS INTO THE LUNGS EVERY 12 (TWELVE) HOURS. CONTROLLER 11 g 1    trazodone (DESYREL) 50 MG tablet Take 50 mg by mouth every evening.          No current facility-administered medications on file prior to visit.                Family History   Problem Relation Age of Onset    Cancer Mother      Heart disease Father      Cancer Paternal Grandfather      Cancer Maternal Grandfather                  Social History   Substance Use Topics    Smoking status: Former Smoker    Smokeless tobacco: Former User       Quit date: 4/15/2007    Alcohol use No         Comment: occ         Review of Systems:  ROS:  GENERAL: No fever, chills, fatigability or weight loss.  Integument:  No rashes, redness, icterus  CHEST: Denies BURRELL, cyanosis, wheezing, cough and sputum production.  CARDIOVASCULAR: Denies chest pain, PND, orthopnea or reduced exercise tolerance.  GI:  Denies abd pain, dysphagia, nausea, vomiting, no hematemesis, no rectal pain  : Denies burning on urination, no hematuria, no bacteriuria  MSK:  No deformities, swelling, joint pain swelling  Neurologic:  No HAs, seizures, weakness, paresthesias, gait problems        OBJECTIVE:      Vital Signs (Most Recent)  /71   Pulse 60   Ht 5' 4.49" (1.638 m)   Wt 110.9 kg (244 lb 7.8 oz)   BMI 41.33 kg/m²      Physical Exam:  General: White female in no distress   Skin/ Sclera anicteric  HEENT: anicteric, normocephalic, extraocular movements intact "   Neck trachea midline, thyroid non enlarged  Chest symmetric, nl excursions, no retractions  Respiratory: respirations are even and unlabored  COR RRR   Abdomen - inspection   soft NT ND.  No masses, no organomegaly  Extremities: Warm dry and intact.  NO CCE  Neuro: alert and oriented x 4.  Moves all extremities.           ASSESSMENT/PLAN:   Ulcerative proctitis     Recommend  Completion proctectomy.  Laparoscopic assisted.  Risks of conversion to open discussed.  Expected hospital course, recuperation and risks discussed with pt.

## 2018-05-25 LAB
ANION GAP SERPL CALC-SCNC: 6 MMOL/L
BASOPHILS # BLD AUTO: 0.06 K/UL
BASOPHILS NFR BLD: 0.7 %
BUN SERPL-MCNC: 8 MG/DL
CALCIUM SERPL-MCNC: 8.1 MG/DL
CHLORIDE SERPL-SCNC: 110 MMOL/L
CO2 SERPL-SCNC: 24 MMOL/L
CREAT SERPL-MCNC: 0.8 MG/DL
CRP SERPL-MCNC: 54.1 MG/L
DIFFERENTIAL METHOD: ABNORMAL
EOSINOPHIL # BLD AUTO: 0 K/UL
EOSINOPHIL NFR BLD: 0.5 %
ERYTHROCYTE [DISTWIDTH] IN BLOOD BY AUTOMATED COUNT: 13.5 %
EST. GFR  (AFRICAN AMERICAN): >60 ML/MIN/1.73 M^2
EST. GFR  (NON AFRICAN AMERICAN): >60 ML/MIN/1.73 M^2
GLUCOSE SERPL-MCNC: 100 MG/DL
HCT VFR BLD AUTO: 31.4 %
HGB BLD-MCNC: 9.8 G/DL
IMM GRANULOCYTES # BLD AUTO: 0.02 K/UL
IMM GRANULOCYTES NFR BLD AUTO: 0.2 %
LYMPHOCYTES # BLD AUTO: 1.6 K/UL
LYMPHOCYTES NFR BLD: 18.9 %
MCH RBC QN AUTO: 29.9 PG
MCHC RBC AUTO-ENTMCNC: 31.2 G/DL
MCV RBC AUTO: 96 FL
MONOCYTES # BLD AUTO: 0.7 K/UL
MONOCYTES NFR BLD: 8.5 %
NEUTROPHILS # BLD AUTO: 6.1 K/UL
NEUTROPHILS NFR BLD: 71.2 %
NRBC BLD-RTO: 0 /100 WBC
PLATELET # BLD AUTO: 226 K/UL
PMV BLD AUTO: 10.7 FL
POTASSIUM SERPL-SCNC: 3.7 MMOL/L
RBC # BLD AUTO: 3.28 M/UL
SODIUM SERPL-SCNC: 140 MMOL/L
WBC # BLD AUTO: 8.62 K/UL

## 2018-05-25 PROCEDURE — 27000221 HC OXYGEN, UP TO 24 HOURS

## 2018-05-25 PROCEDURE — 25000003 PHARM REV CODE 250: Performed by: NURSE PRACTITIONER

## 2018-05-25 PROCEDURE — 94761 N-INVAS EAR/PLS OXIMETRY MLT: CPT

## 2018-05-25 PROCEDURE — 36569 INSJ PICC 5 YR+ W/O IMAGING: CPT

## 2018-05-25 PROCEDURE — 25000003 PHARM REV CODE 250: Performed by: STUDENT IN AN ORGANIZED HEALTH CARE EDUCATION/TRAINING PROGRAM

## 2018-05-25 PROCEDURE — S0028 INJECTION, FAMOTIDINE, 20 MG: HCPCS | Performed by: STUDENT IN AN ORGANIZED HEALTH CARE EDUCATION/TRAINING PROGRAM

## 2018-05-25 PROCEDURE — 20600001 HC STEP DOWN PRIVATE ROOM

## 2018-05-25 PROCEDURE — 86140 C-REACTIVE PROTEIN: CPT

## 2018-05-25 PROCEDURE — 85025 COMPLETE CBC W/AUTO DIFF WBC: CPT

## 2018-05-25 PROCEDURE — 63600175 PHARM REV CODE 636 W HCPCS: Performed by: NURSE PRACTITIONER

## 2018-05-25 PROCEDURE — 36415 COLL VENOUS BLD VENIPUNCTURE: CPT

## 2018-05-25 PROCEDURE — 76937 US GUIDE VASCULAR ACCESS: CPT

## 2018-05-25 PROCEDURE — C1751 CATH, INF, PER/CENT/MIDLINE: HCPCS

## 2018-05-25 PROCEDURE — 63600175 PHARM REV CODE 636 W HCPCS: Performed by: STUDENT IN AN ORGANIZED HEALTH CARE EDUCATION/TRAINING PROGRAM

## 2018-05-25 PROCEDURE — 25000242 PHARM REV CODE 250 ALT 637 W/ HCPCS: Performed by: STUDENT IN AN ORGANIZED HEALTH CARE EDUCATION/TRAINING PROGRAM

## 2018-05-25 PROCEDURE — 94640 AIRWAY INHALATION TREATMENT: CPT

## 2018-05-25 PROCEDURE — 99900035 HC TECH TIME PER 15 MIN (STAT)

## 2018-05-25 PROCEDURE — 80048 BASIC METABOLIC PNL TOTAL CA: CPT

## 2018-05-25 PROCEDURE — 25000003 PHARM REV CODE 250: Performed by: COLON & RECTAL SURGERY

## 2018-05-25 RX ORDER — DIPHENHYDRAMINE HCL 12.5MG/5ML
12.5 ELIXIR ORAL EVERY 6 HOURS PRN
Status: DISCONTINUED | OUTPATIENT
Start: 2018-05-25 | End: 2018-05-25

## 2018-05-25 RX ORDER — DIPHENHYDRAMINE HYDROCHLORIDE 50 MG/ML
25 INJECTION INTRAMUSCULAR; INTRAVENOUS EVERY 6 HOURS PRN
Status: DISCONTINUED | OUTPATIENT
Start: 2018-05-25 | End: 2018-05-28 | Stop reason: HOSPADM

## 2018-05-25 RX ORDER — DIPHENHYDRAMINE HYDROCHLORIDE 50 MG/ML
6.25 INJECTION INTRAMUSCULAR; INTRAVENOUS ONCE
Status: COMPLETED | OUTPATIENT
Start: 2018-05-25 | End: 2018-05-25

## 2018-05-25 RX ORDER — OXYCODONE AND ACETAMINOPHEN 10; 325 MG/1; MG/1
1 TABLET ORAL ONCE
Status: COMPLETED | OUTPATIENT
Start: 2018-05-25 | End: 2018-05-25

## 2018-05-25 RX ORDER — OXYCODONE AND ACETAMINOPHEN 5; 325 MG/1; MG/1
1 TABLET ORAL ONCE
Status: DISCONTINUED | OUTPATIENT
Start: 2018-05-25 | End: 2018-05-25

## 2018-05-25 RX ADMIN — DIPHENHYDRAMINE HYDROCHLORIDE 6.25 MG: 50 INJECTION, SOLUTION INTRAMUSCULAR; INTRAVENOUS at 02:05

## 2018-05-25 RX ADMIN — FAMOTIDINE 20 MG: 10 INJECTION INTRAVENOUS at 09:05

## 2018-05-25 RX ADMIN — DIPHENHYDRAMINE HYDROCHLORIDE 25 MG: 50 INJECTION, SOLUTION INTRAMUSCULAR; INTRAVENOUS at 10:05

## 2018-05-25 RX ADMIN — IBUPROFEN 800 MG: 800 INJECTION INTRAVENOUS at 05:05

## 2018-05-25 RX ADMIN — OXYCODONE HYDROCHLORIDE AND ACETAMINOPHEN 1 TABLET: 10; 325 TABLET ORAL at 01:05

## 2018-05-25 RX ADMIN — IBUPROFEN 800 MG: 800 INJECTION INTRAVENOUS at 10:05

## 2018-05-25 RX ADMIN — SODIUM CHLORIDE: 0.9 INJECTION, SOLUTION INTRAVENOUS at 04:05

## 2018-05-25 RX ADMIN — Medication: at 09:05

## 2018-05-25 RX ADMIN — ALBUTEROL SULFATE 2.5 MG: 2.5 SOLUTION RESPIRATORY (INHALATION) at 08:05

## 2018-05-25 RX ADMIN — IBUPROFEN 800 MG: 800 INJECTION INTRAVENOUS at 04:05

## 2018-05-25 RX ADMIN — ALBUTEROL SULFATE 2.5 MG: 2.5 SOLUTION RESPIRATORY (INHALATION) at 03:05

## 2018-05-25 NOTE — CONSULTS
Single lumen 18g x 08cm midline placed to (R) CHEPHALICvein.  Max dwell date 06.23.2018, Lot# [id number]  Needle advances into vein under realtime Ultrasound guidance, image recorded and saved.

## 2018-05-25 NOTE — PLAN OF CARE
Problem: Patient Care Overview  Goal: Plan of Care Review  Outcome: Ongoing (interventions implemented as appropriate)  Plan of care reviewed with the patient, a 52 year old female S/P proctectomy - lap converted to open,,, alert and oriented times 4 , pain controlled with PCA ,,, tolerating well,, complained of itching around all adhesive sites,( IV,, telemetry pads,,)  On call notified and Benadryl 6.5 given, patient rested nicely,,, on 2L NC,,,has rangel,,,, uneventful night,, bed locked and low call light in reach,,,,,

## 2018-05-25 NOTE — PLAN OF CARE
CM completed discharge assessment and planning with patient. Patient verbalized understanding. Patient currently lives alone. Patient reports she has good family support and transportation home. CM and SW will continue to follow for any additional needs.    PCP: Eliecer Jones MD    Pharmacy:   [id number] - [place], [place] - [address] [facility]  [address] [facility] [address]  Phone: [phone] Fax: [phone]    Payor: [employer] MEDICARE / Plan: [employer] HE[employer] / Product Type: Medicare Advantage /      05/25/18 1420   Discharge Assessment   Assessment Type Discharge Planning Assessment   Confirmed/corrected address and phone number on facesheet? Yes   Assessment information obtained from? Patient   Communicated expected length of stay with patient/caregiver no   Prior to hospitilization cognitive status: Alert/Oriented   Prior to hospitalization functional status: Independent   Current cognitive status: Alert/Oriented   Current Functional Status: Independent   Lives With alone   Able to Return to Prior Arrangements yes   Is patient able to care for self after discharge? Yes   Patient's perception of discharge disposition home or selfcare   Readmission Within The Last 30 Days no previous admission in last 30 days   Patient currently being followed by outpatient case management? No   Patient currently receives any other outside agency services? No   Equipment Currently Used at Home rollator;shower chair   Do you have any problems affording any of your prescribed medications? TBD   Is the patient taking medications as prescribed? yes   Does the patient have transportation home? Yes   Transportation Available family or friend will provide   Dialysis Name and Scheduled days N/A   Does the patient receive services at the Coumadin Clinic? No   Discharge Plan A Home   Discharge Plan B Home;Home Health   Patient/Family In Agreement With Plan yes

## 2018-05-25 NOTE — ANESTHESIA POSTPROCEDURE EVALUATION
"Anesthesia Post Evaluation    Patient: Ida Orozco    Procedure(s) Performed: Procedure(s) (LRB):  PROCTECTOMY-LAPAROSCOPIC/CONVERTED TO OPEN (N/A)  LYSIS, ADHESIONS/ more than 2hours  SIGMOIDOSCOPY, FLEXIBLE    Final Anesthesia Type: general  Patient location during evaluation: PACU  Patient participation: Yes- Able to Participate  Level of consciousness: awake and alert  Post-procedure vital signs: reviewed and stable  Pain management: adequate  Airway patency: patent  PONV status at discharge: No PONV  Anesthetic complications: no      Cardiovascular status: blood pressure returned to baseline  Respiratory status: unassisted  Hydration status: euvolemic  Follow-up not needed.        Visit Vitals  BP (!) 100/51 (BP Location: Right arm, Patient Position: Lying)   Pulse 83   Temp 37.2 °C (99 °F) (Oral)   Resp 14   Ht 5' 4" (1.626 m)   Wt 112.5 kg (247 lb 15.9 oz)   SpO2 95%   Breastfeeding? No   BMI 42.57 kg/m²       Pain/Tito Score: Pain Assessment Performed: Yes (5/25/2018  5:56 AM)  Presence of Pain: denies (5/25/2018  5:56 AM)  Pain Rating Prior to Med Admin: 5 (5/25/2018  9:43 AM)  Pain Rating Post Med Admin: 4 (5/24/2018 10:00 PM)  Tito Score: 9 (5/24/2018  3:30 PM)      "

## 2018-05-25 NOTE — BRIEF OP NOTE
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 Dense adhesions throughout the abdomen. Long rectal stump remaining.     Complications: No     Estimated Blood Loss: 475 mL           Specimens:   Specimen (12h ago through future)    None          Implants: * No implants in log *    Post-Operative Care:         Disposition: PACU - hemodynamically stable.           Condition: Good

## 2018-05-25 NOTE — CONSULTS
Ostomy consult received for ileostomy.  Pt has been independent with ostomy care since 2014, present admission for proctectomy with Dr. Virgen.  Pt has all supplies needed and would just like 3M cavilon spray.  Provided pt with product and will initiate pressure injury prevention interventions for ASHLY bed surface and chair cushion.  Pt has contact number to call with for any wound/ostomy needs.  [phone]

## 2018-05-25 NOTE — PLAN OF CARE
Problem: Patient Care Overview  Goal: Plan of Care Review  D/C rangel this am, and pt unable to urinate. Reinserted rangel per MD order, obtained 800 cc urine. Rangel left in until tomorrow. Changed draining dressing due to oversaturation. Both IVs not pattent,  Removed and midline inserted. Illiostomy emptied 3-4 times

## 2018-05-25 NOTE — PROGRESS NOTES
Patient concerned about not being on home meds,, on call notified and patients concerns were discussed and he said not to resume home meds till closer to discharge time,, patient notified and understood

## 2018-05-25 NOTE — OP NOTE
DATE OF PROCEDURE:  05/24/2018    PREOPERATIVE DIAGNOSIS:  Inflammatory bowel disease, status post total abdominal   colectomy with end ileostomy.    POSTOPERATIVE DIAGNOSIS:  Inflammatory bowel disease, status post total   abdominal colectomy with end ileostomy.    PROCEDURE PERFORMED:  1.  Laparoscopic converted to open proctectomy.  2.  Extensive lysis of adhesions over two hours, modifier 22 requested.  3.  Flexible sigmoidoscopy.    ATTENDING SURGEON:  JENNY Virgen M.D.    ASSISTANT:  Nannette Rao M.D., fellow.    ANESTHESIA:  General endotracheal anesthesia.    COMPLICATIONS:  None apparent.    SPECIMENS:  Rectum and anus for permanent.    ESTIMATED BLOOD LOSS:  475 mL.    DISPOSITION:  Extubated to PACU.    INDICATIONS FOR THE PROCEDURE:  Ida Orozco is a 52-year-old female with   history of morbid obesity and inflammatory bowel disease, thought to be   ulcerative colitis, who underwent a total abdominal colectomy with end ileostomy   at an outside institution.  Her course was complicated by a fistula at the   stoma site requiring revision and relocation in 2014.  She has subsequently   undergone a sleeve gastrectomy in 2016.  She has had rectal bleeding and   therefore discussions regarding a proctectomy were held with the patient.    Discussion of proctectomy with end ileostomy versus pouch were discussed and she   preferred to have an end ileostomy.  Therefore, the risks, benefits, and   alternatives of the procedure were discussed with the patient and she elected to   proceed.  Consent was obtained.    INTRAOPERATIVE FINDINGS:  There was a rectal stricture noted on colonoscopy at   approximately 6 cm.  The scope was unable to be passed beyond this.  There were   dense adhesions throughout the abdomen requiring extensive lysis of adhesions.    She had a long remaining rectal stump, which was removed and conversion to open   was performed due to the extensive adhesions.    DESCRIPTION OF THE  PROCEDURE IN DETAIL:  The patient was identified in the   preoperative holding area and brought to the Operating Room where general   endotracheal anesthesia was administered.  She was then placed in the lithotomy   position.  She received preoperative antibiotics and an OG tube was placed as   well as a Khan catheter.  The patient's perineum and abdomen were then prepped   and draped in the usual sterile fashion placing a Ray-Blaine and Tegaderm over her   ostomy site.  A preoperative surgical safety time-out was then performed.  We   then attempted to enter the abdomen by performing an open Stu technique at   the umbilicus.  We were able to get beneath the fascia; however, it appeared to   be bowel just beneath this and using the balloon trocar, we were unable to   insufflate the abdomen.  Therefore, we then proceeded with a Veress technique in   the Rodríguez's point in the left upper quadrant just off the edge of the rib.    This was also performed; however, the pre-peritoneum was then insufflated and we   were unable to obtain intraabdominal insufflation.  We attempted to visualize   intraabdominally with the 5 mm camera and Optiview and were able to get past the   fascia, but again there were dense adhesions of the omentum up in the left   upper quadrant and we were unable to obtain laparoscopic access.  Therefore, the   decision was made to perform an open proctectomy.  Using her previous incision,   we made a lower midline incision all the way to the level of the pubis and   extending this few centimeters above the umbilicus.  The fascia was then entered   sharply with a 10 blade scalpel.  There was bowel adhesed to the anterior   midline.  Therefore, we used [facility] and took this down sharply using Ding   scissors.  We then performed intra-abdominal lysis of adhesions of small bowel   loops to each other.  We were finally able to get the Talat wound protector in   place.  We then continued to perform lysis  of adhesions of small bowel and small   bowel down to the pelvis, which took well over two hours and this was all done   sharply.  We only had one serosal tear, which was oversewn with a 3-0 Vicryl in   Lembert fashion.  We then identified the rectal stump and we proceeded with the   lateral dissection of this off the sidewall.  The SYLVAIN was still intact and this   was identified at the level of the sacral promontory and dissected all the way   up to the takeoff at the aorta.  This was then encircled with Gissel clamps and   suture ligatured with 2-0 Vicryl suture.  We then carried this mesenteric   dissection down to the level of the TME plane.  This was entered posteriorly,   taking care to protect the nerves and bilateral ureters.  This was carried   around then in circumferential fashion laterally and anteriorly until we reached   the level of the pelvic floor and were able to dissect the levators off the   rectum.  Once we had done this, we confirmed our level, performing a digital   exam and indeed we were below the level of the stricture.  Therefore, we then   proceeded with the dissection from below.  We did this in an intersphincteric   plane.  We put her in the high lithotomy position and used a L[place] Star retractor   to help retract making an incision at the intersphincteric groove.  This was   done with sharp Metzenbaum scissors and carried up to the level of our   intra-abdominal dissection.  We took care to protect the vagina during this   dissection.  The specimen was then removed.  Hemostasis was achieved using 2-0   Vicryl sutures and electrocautery from below and above using Mar.  The   perineal wound was then closed in two layers using 2-0 Vicryl interrupted deep   and then an 0 Prolene suture in pursestring fashion.  We then changed gown and   gloves and went back to the abdomen.  The pelvis was inspected and was noted to   be hemostatic.  A 10-Romanian Louie drain was then placed into the pelvis  and   secured to the skin with 2-0 nylon suture in the left lower quadrant.  The   abdomen was inspected.  We did look into the upper abdomen and there was no   injury from our initial attempts at entry into the abdomen.  We then placed   Seprafilm beneath the midline incision.  We were unable to perform TAP blocks   given the adhesions in that area, but we did use Exparel and used this in the   fascia and the skin.  We then closed the fascia using #1 PDS suture in running   fashion.  We then irrigated the wound extensively and closed the wound in layers   with deep dermals of 3-0 Vicryl and then 4-0 Monocryl.  A Prevena skin wound   VAC was then placed over the wound and placed to suction the Tegaderm.  A   Ray-Blaine was taken off the ostomy and an ostomy appliance was applied.  The   patient tolerated the procedure well.  She was extubated and taken to   Postanesthesia Recovery Unit in stable condition.    The patient's attending surgeon, Dr. Gucci Virgen, was present and scrubbed for   the duration of the procedure.    DICTATED BY:  SHITAL Barragan  dd: 05/25/2018 16:11:34 (CDT)  td: 05/25/2018 17:50:05 (CDT)  Doc ID   #[id number]  Job ID #[id number]    CC:

## 2018-05-25 NOTE — ANESTHESIA RELEASE NOTE
"Anesthesia Release from PACU Note    Patient: Ida Orozco    Procedure(s) Performed: Procedure(s) (LRB):  PROCTECTOMY-LAPAROSCOPIC/CONVERTED TO OPEN (N/A)  LYSIS, ADHESIONS/ more than 2hours  SIGMOIDOSCOPY, FLEXIBLE    Anesthesia type: general    Post pain: Adequate analgesia    Post assessment: no apparent anesthetic complications    Last Vitals:   Visit Vitals  BP (!) 100/51 (BP Location: Right arm, Patient Position: Lying)   Pulse 83   Temp 37.2 °C (99 °F) (Oral)   Resp 14   Ht 5' 4" (1.626 m)   Wt 112.5 kg (247 lb 15.9 oz)   SpO2 95%   Breastfeeding? No   BMI 42.57 kg/m²       Post vital signs: stable    Level of consciousness: awake    Nausea/Vomiting: no nausea/no vomiting    Complications: none    Airway Patency: patent    Respiratory: unassisted    Cardiovascular: stable and blood pressure at baseline    Hydration: euvolemic     " "Spoke with patient, the weight loss program she is in has changed her medication( was on adipex, now on different med,injections) she has been gaining 2-5 pounds a week and took her off all meds. Will start diurex tomorrow under their supervision but the requested she speak with PCP as she has continued swelling to feet/ankles. Met with  at [place] yesterday and was told she had +150# water weight. She was advised to speak with PCP to change lasix as she "has build a tolerance and not working." message sent to Dr Tejeda  "

## 2018-05-26 LAB
ALBUMIN SERPL BCP-MCNC: 2.4 G/DL
ALP SERPL-CCNC: 139 U/L
ALT SERPL W/O P-5'-P-CCNC: 61 U/L
ANION GAP SERPL CALC-SCNC: 5 MMOL/L
AST SERPL-CCNC: 38 U/L
BASOPHILS # BLD AUTO: 0.05 K/UL
BASOPHILS NFR BLD: 0.6 %
BILIRUB SERPL-MCNC: 0.7 MG/DL
BUN SERPL-MCNC: 6 MG/DL
CALCIUM SERPL-MCNC: 8 MG/DL
CHLORIDE SERPL-SCNC: 111 MMOL/L
CO2 SERPL-SCNC: 23 MMOL/L
CREAT SERPL-MCNC: 0.8 MG/DL
DIFFERENTIAL METHOD: ABNORMAL
EOSINOPHIL # BLD AUTO: 1.4 K/UL
EOSINOPHIL NFR BLD: 17.4 %
ERYTHROCYTE [DISTWIDTH] IN BLOOD BY AUTOMATED COUNT: 13.7 %
EST. GFR  (AFRICAN AMERICAN): >60 ML/MIN/1.73 M^2
EST. GFR  (NON AFRICAN AMERICAN): >60 ML/MIN/1.73 M^2
GLUCOSE SERPL-MCNC: 96 MG/DL
HCT VFR BLD AUTO: 28.8 %
HGB BLD-MCNC: 8.8 G/DL
IMM GRANULOCYTES # BLD AUTO: 0.02 K/UL
IMM GRANULOCYTES NFR BLD AUTO: 0.2 %
LYMPHOCYTES # BLD AUTO: 1.8 K/UL
LYMPHOCYTES NFR BLD: 22 %
MAGNESIUM SERPL-MCNC: 2.1 MG/DL
MCH RBC QN AUTO: 29.6 PG
MCHC RBC AUTO-ENTMCNC: 30.6 G/DL
MCV RBC AUTO: 97 FL
MONOCYTES # BLD AUTO: 0.7 K/UL
MONOCYTES NFR BLD: 8.1 %
NEUTROPHILS # BLD AUTO: 4.3 K/UL
NEUTROPHILS NFR BLD: 51.7 %
NRBC BLD-RTO: 0 /100 WBC
PHOSPHATE SERPL-MCNC: 2.5 MG/DL
PLATELET # BLD AUTO: 213 K/UL
PMV BLD AUTO: 10.3 FL
POTASSIUM SERPL-SCNC: 4.2 MMOL/L
PROT SERPL-MCNC: 5 G/DL
RBC # BLD AUTO: 2.97 M/UL
SODIUM SERPL-SCNC: 139 MMOL/L
WBC # BLD AUTO: 8.29 K/UL

## 2018-05-26 PROCEDURE — 20600001 HC STEP DOWN PRIVATE ROOM

## 2018-05-26 PROCEDURE — 84100 ASSAY OF PHOSPHORUS: CPT

## 2018-05-26 PROCEDURE — S0028 INJECTION, FAMOTIDINE, 20 MG: HCPCS | Performed by: STUDENT IN AN ORGANIZED HEALTH CARE EDUCATION/TRAINING PROGRAM

## 2018-05-26 PROCEDURE — 99900035 HC TECH TIME PER 15 MIN (STAT)

## 2018-05-26 PROCEDURE — 25000003 PHARM REV CODE 250: Performed by: NURSE PRACTITIONER

## 2018-05-26 PROCEDURE — 94761 N-INVAS EAR/PLS OXIMETRY MLT: CPT

## 2018-05-26 PROCEDURE — 85025 COMPLETE CBC W/AUTO DIFF WBC: CPT

## 2018-05-26 PROCEDURE — 63600175 PHARM REV CODE 636 W HCPCS: Performed by: NURSE PRACTITIONER

## 2018-05-26 PROCEDURE — 36415 COLL VENOUS BLD VENIPUNCTURE: CPT

## 2018-05-26 PROCEDURE — 83735 ASSAY OF MAGNESIUM: CPT

## 2018-05-26 PROCEDURE — 25000003 PHARM REV CODE 250: Performed by: STUDENT IN AN ORGANIZED HEALTH CARE EDUCATION/TRAINING PROGRAM

## 2018-05-26 PROCEDURE — 80053 COMPREHEN METABOLIC PANEL: CPT

## 2018-05-26 RX ORDER — OXYCODONE HYDROCHLORIDE 5 MG/1
10 TABLET ORAL EVERY 4 HOURS PRN
Status: DISCONTINUED | OUTPATIENT
Start: 2018-05-26 | End: 2018-05-28 | Stop reason: HOSPADM

## 2018-05-26 RX ORDER — OXYCODONE HYDROCHLORIDE 5 MG/1
10 TABLET ORAL EVERY 4 HOURS PRN
Status: DISCONTINUED | OUTPATIENT
Start: 2018-05-26 | End: 2018-05-26

## 2018-05-26 RX ORDER — ENOXAPARIN SODIUM 100 MG/ML
40 INJECTION SUBCUTANEOUS EVERY 24 HOURS
Status: DISCONTINUED | OUTPATIENT
Start: 2018-05-26 | End: 2018-05-28 | Stop reason: HOSPADM

## 2018-05-26 RX ORDER — OXYCODONE HYDROCHLORIDE 5 MG/1
5 TABLET ORAL EVERY 4 HOURS PRN
Status: DISCONTINUED | OUTPATIENT
Start: 2018-05-26 | End: 2018-05-26

## 2018-05-26 RX ORDER — HYDROMORPHONE HYDROCHLORIDE 1 MG/ML
1 INJECTION, SOLUTION INTRAMUSCULAR; INTRAVENOUS; SUBCUTANEOUS EVERY 4 HOURS PRN
Status: DISCONTINUED | OUTPATIENT
Start: 2018-05-26 | End: 2018-05-28 | Stop reason: HOSPADM

## 2018-05-26 RX ORDER — DEXTROSE MONOHYDRATE, SODIUM CHLORIDE, AND POTASSIUM CHLORIDE 50; 1.49; 4.5 G/1000ML; G/1000ML; G/1000ML
INJECTION, SOLUTION INTRAVENOUS CONTINUOUS
Status: DISCONTINUED | OUTPATIENT
Start: 2018-05-26 | End: 2018-05-26

## 2018-05-26 RX ORDER — OXYCODONE HYDROCHLORIDE 5 MG/1
15 TABLET ORAL EVERY 4 HOURS PRN
Status: DISCONTINUED | OUTPATIENT
Start: 2018-05-26 | End: 2018-05-28 | Stop reason: HOSPADM

## 2018-05-26 RX ADMIN — OXYCODONE HYDROCHLORIDE 15 MG: 5 TABLET ORAL at 10:05

## 2018-05-26 RX ADMIN — DIPHENHYDRAMINE HYDROCHLORIDE 25 MG: 50 INJECTION, SOLUTION INTRAMUSCULAR; INTRAVENOUS at 10:05

## 2018-05-26 RX ADMIN — FAMOTIDINE 20 MG: 10 INJECTION INTRAVENOUS at 08:05

## 2018-05-26 RX ADMIN — POTASSIUM CHLORIDE, DEXTROSE MONOHYDRATE AND SODIUM CHLORIDE: 150; 5; 450 INJECTION, SOLUTION INTRAVENOUS at 05:05

## 2018-05-26 RX ADMIN — DIPHENHYDRAMINE HYDROCHLORIDE 25 MG: 50 INJECTION, SOLUTION INTRAMUSCULAR; INTRAVENOUS at 06:05

## 2018-05-26 RX ADMIN — FAMOTIDINE 20 MG: 10 INJECTION INTRAVENOUS at 09:05

## 2018-05-26 RX ADMIN — IBUPROFEN 800 MG: 800 INJECTION INTRAVENOUS at 02:05

## 2018-05-26 RX ADMIN — IBUPROFEN 800 MG: 800 INJECTION INTRAVENOUS at 06:05

## 2018-05-26 RX ADMIN — IBUPROFEN 800 MG: 800 INJECTION INTRAVENOUS at 09:05

## 2018-05-26 RX ADMIN — OXYCODONE HYDROCHLORIDE 15 MG: 5 TABLET ORAL at 08:05

## 2018-05-26 RX ADMIN — OXYCODONE HYDROCHLORIDE 15 MG: 5 TABLET ORAL at 04:05

## 2018-05-26 RX ADMIN — ENOXAPARIN SODIUM 40 MG: 100 INJECTION SUBCUTANEOUS at 04:05

## 2018-05-26 RX ADMIN — DIPHENHYDRAMINE HYDROCHLORIDE 25 MG: 50 INJECTION, SOLUTION INTRAMUSCULAR; INTRAVENOUS at 05:05

## 2018-05-26 NOTE — PROGRESS NOTES
[facility]  Colorectal Surgery  Progress Note    Patient Name: Ida Orozco  MRN: [id number]  Admission Date: 5/24/2018  Hospital Length of Stay: 2 days  Attending Physician: JENNY Virgen MD    Subjective:     Interval History: s/p lap converted to open proctectomy, LILIANE and flex sig.   No acute events overnight. Vitals stable. Pain well controlled. Incision intact. Tolerating clears with no nausea or vomiting. 325cc drain ss output. Ileostomy output 550cc.    Post-Op Info:  Procedure(s) (LRB):  PROCTECTOMY-LAPAROSCOPIC/CONVERTED TO OPEN (N/A)  LYSIS, ADHESIONS/ more than 2hours  SIGMOIDOSCOPY, FLEXIBLE   2 Days Post-Op      Medications:  Continuous Infusions:   glucagon (human recombinant)       Scheduled Meds:   albuterol sulfate  2.5 mg Nebulization Q4H    enoxaparin  40 mg Subcutaneous Daily    famotidine (PF)  20 mg Intravenous BID    fluticasone-vilanterol  1 puff Inhalation Daily    ibuprofen  800 mg Intravenous Q8H     PRN Meds:   dextrose 50%    dextrose 50%    dextrose 50%    diphenhydrAMINE    glucagon (human recombinant)    glucose    glucose    glucose    HYDROmorphone    naloxone    ondansetron    oxyCODONE    oxyCODONE    promethazine (PHENERGAN) IVPB    sodium chloride 0.9%        Objective:     Vital Signs (Most Recent):  Temp: 98.2 °F (36.8 °C) (05/26/18 0800)  Pulse: 67 (05/26/18 0808)  Resp: 16 (05/26/18 0808)  BP: (!) 87/47 (05/26/18 0800)  SpO2: 95 % (05/26/18 0808) Vital Signs (24h Range):  Temp:  [98.2 °F (36.8 °C)-98.9 °F (37.2 °C)] 98.2 °F (36.8 °C)  Pulse:  [59-73] 67  Resp:  [16-18] 16  SpO2:  [95 %-99 %] 95 %  BP: (85-94)/(40-50) 87/47     Intake/Output - Last 3 Shifts       05/24 0700 - 05/25 0659 05/25 0700 - 05/26 0659 05/26 0700 - 05/27 0659    P.O. 20 1340     I.V. (mL/kg) 5270 (46.8) 2275 (20.2)     IV Piggyback 1350 250     Total Intake(mL/kg) 6640 (59) 3865 (34.4)     Urine (mL/kg/hr) 1040 (0.4) 2000 (0.7)     Emesis/NG output 0 (0) 0 (0)      Drains 285 (0.1) 325 (0.1) 100 (0.2)    Other 10 (0) 0 (0)     Stool 0 (0) 550 (0.2) 200 (0.4)    Blood 475 (0.2)      Total Output 1810 2875 300    Net +4830 +990 -300           Urine Occurrence 0 x 0 x     Stool Occurrence 0 x 0 x     Emesis Occurrence 0 x 0 x           Physical Exam   Constitutional: She appears well-developed and well-nourished. No distress.   HENT:   Head: Normocephalic and atraumatic.   Cardiovascular: Normal rate.    Pulmonary/Chest: Effort normal. No respiratory distress.   Abdominal: Soft. She exhibits no distension. There is tenderness (appropriate).   Previna on midline incision - in place   Genitourinary:   Genitourinary Comments: Perineal incision c/d/i   Skin: She is not diaphoretic.     Significant Labs:  BMP:   Recent Labs  Lab 05/26/18  0422   GLU 96      K 4.2   *   CO2 23   BUN 6   CREATININE 0.8   CALCIUM 8.0*   MG 2.1     CBC:   Recent Labs  Lab 05/26/18  0422   WBC 8.29   RBC 2.97*   HGB 8.8*   HCT 28.8*      MCV 97   MCH 29.6   MCHC 30.6*     CMP:   Recent Labs  Lab 05/26/18  0422   GLU 96   CALCIUM 8.0*   ALBUMIN 2.4*   PROT 5.0*      K 4.2   CO2 23   *   BUN 6   CREATININE 0.8   ALKPHOS 139*   ALT 61*   AST 38   BILITOT 0.7     CRP:   Recent Labs  Lab 05/25/18  0807   CRP 54.1*         Assessment/Plan:     UC (ulcerative colitis)    53 yo female s/p lap converted to open proctectomy, LILIANE and flex sig. Doing well post operatively.     - Advance to regular diet as tolerated   - PRN pain medications   - encourage ambulation   - rangel replaced overnight. Will leave in place and likely remove tomorrow   - IS   -dvt ppx              Ida Daniels MD  Colorectal Surgery  [facility]

## 2018-05-26 NOTE — SUBJECTIVE & OBJECTIVE
Subjective:     Interval History: s/p lap converted to open proctectomy, LILIANE and flex sig.   No acute events overnight. Vitals stable. Pain well controlled. Incision intact. Tolerating clears with no nausea or vomiting. 325cc drain ss output. Ileostomy output 550cc.    Post-Op Info:  Procedure(s) (LRB):  PROCTECTOMY-LAPAROSCOPIC/CONVERTED TO OPEN (N/A)  LYSIS, ADHESIONS/ more than 2hours  SIGMOIDOSCOPY, FLEXIBLE   2 Days Post-Op      Medications:  Continuous Infusions:   glucagon (human recombinant)       Scheduled Meds:   albuterol sulfate  2.5 mg Nebulization Q4H    enoxaparin  40 mg Subcutaneous Daily    famotidine (PF)  20 mg Intravenous BID    fluticasone-vilanterol  1 puff Inhalation Daily    ibuprofen  800 mg Intravenous Q8H     PRN Meds:   dextrose 50%    dextrose 50%    dextrose 50%    diphenhydrAMINE    glucagon (human recombinant)    glucose    glucose    glucose    HYDROmorphone    naloxone    ondansetron    oxyCODONE    oxyCODONE    promethazine (PHENERGAN) IVPB    sodium chloride 0.9%        Objective:     Vital Signs (Most Recent):  Temp: 98.2 °F (36.8 °C) (05/26/18 0800)  Pulse: 67 (05/26/18 0808)  Resp: 16 (05/26/18 0808)  BP: (!) 87/47 (05/26/18 0800)  SpO2: 95 % (05/26/18 0808) Vital Signs (24h Range):  Temp:  [98.2 °F (36.8 °C)-98.9 °F (37.2 °C)] 98.2 °F (36.8 °C)  Pulse:  [59-73] 67  Resp:  [16-18] 16  SpO2:  [95 %-99 %] 95 %  BP: (85-94)/(40-50) 87/47     Intake/Output - Last 3 Shifts       05/24 0700 - 05/25 0659 05/25 0700 - 05/26 0659 05/26 0700 - 05/27 0659    P.O. 20 1340     I.V. (mL/kg) 5270 (46.8) 2275 (20.2)     IV Piggyback 1350 250     Total Intake(mL/kg) 6640 (59) 3865 (34.4)     Urine (mL/kg/hr) 1040 (0.4) 2000 (0.7)     Emesis/NG output 0 (0) 0 (0)     Drains 285 (0.1) 325 (0.1) 100 (0.2)    Other 10 (0) 0 (0)     Stool 0 (0) 550 (0.2) 200 (0.4)    Blood 475 (0.2)      Total Output 1810 2875 300    Net +4830 +990 -300           Urine Occurrence 0 x 0 x      Stool Occurrence 0 x 0 x     Emesis Occurrence 0 x 0 x           Physical Exam   Constitutional: She appears well-developed and well-nourished. No distress.   HENT:   Head: Normocephalic and atraumatic.   Cardiovascular: Normal rate.    Pulmonary/Chest: Effort normal. No respiratory distress.   Abdominal: Soft. She exhibits no distension. There is tenderness (appropriate).   Previna on midline incision - in place   Genitourinary:   Genitourinary Comments: Perineal incision c/d/i   Skin: She is not diaphoretic.     Significant Labs:  BMP:   Recent Labs  Lab 05/26/18  0422   GLU 96      K 4.2   *   CO2 23   BUN 6   CREATININE 0.8   CALCIUM 8.0*   MG 2.1     CBC:   Recent Labs  Lab 05/26/18  0422   WBC 8.29   RBC 2.97*   HGB 8.8*   HCT 28.8*      MCV 97   MCH 29.6   MCHC 30.6*     CMP:   Recent Labs  Lab 05/26/18  0422   GLU 96   CALCIUM 8.0*   ALBUMIN 2.4*   PROT 5.0*      K 4.2   CO2 23   *   BUN 6   CREATININE 0.8   ALKPHOS 139*   ALT 61*   AST 38   BILITOT 0.7     CRP:   Recent Labs  Lab 05/25/18  0807   CRP 54.1*

## 2018-05-26 NOTE — PLAN OF CARE
Problem: Patient Care Overview  Goal: Plan of Care Review  Outcome: Ongoing (interventions implemented as appropriate)  Plan of care reviewed with the patient,, a 52 year old female with the DX of Ulcerative colitis,,, rangel was pulled yesterday but had to be replaced,,, patient noted a tinge of blood in urine,, possibly from the reinsertion of the rangel,,,, WCTM,,, alert and oriented times 4, independent on ileostomy bag change and empty,, has a midline with wound vac,,,, Saravanan drain left lower quadrant,,,, PCA pump,, pain seems to be to a minimum,,,, still complains of itching,,,, gave benadryl last night,,, uneventful night,, no falls, rested nicely,,,, bed locked and low call light in reach,,,,,,

## 2018-05-26 NOTE — ASSESSMENT & PLAN NOTE
51 yo female s/p lap converted to open proctectomy, LILIANE and flex sig. Doing well post operatively.     - Advance to regular diet as tolerated   - PRN pain medications   - encourage ambulation   - rangel replaced overnight. Will leave in place and likely remove tomorrow   - IS   -dvt ppx

## 2018-05-27 LAB
ALBUMIN SERPL BCP-MCNC: 2.3 G/DL
ALP SERPL-CCNC: 132 U/L
ALT SERPL W/O P-5'-P-CCNC: 42 U/L
ANION GAP SERPL CALC-SCNC: 5 MMOL/L
AST SERPL-CCNC: 22 U/L
BASOPHILS # BLD AUTO: 0.04 K/UL
BASOPHILS NFR BLD: 0.5 %
BILIRUB SERPL-MCNC: 0.5 MG/DL
BILIRUB UR QL STRIP: NEGATIVE
BUN SERPL-MCNC: 8 MG/DL
CALCIUM SERPL-MCNC: 8.1 MG/DL
CHLORIDE SERPL-SCNC: 110 MMOL/L
CLARITY UR REFRACT.AUTO: CLEAR
CO2 SERPL-SCNC: 22 MMOL/L
COLOR UR AUTO: ABNORMAL
CREAT SERPL-MCNC: 0.8 MG/DL
DIFFERENTIAL METHOD: ABNORMAL
EOSINOPHIL # BLD AUTO: 1.5 K/UL
EOSINOPHIL NFR BLD: 20.2 %
ERYTHROCYTE [DISTWIDTH] IN BLOOD BY AUTOMATED COUNT: 13.8 %
EST. GFR  (AFRICAN AMERICAN): >60 ML/MIN/1.73 M^2
EST. GFR  (NON AFRICAN AMERICAN): >60 ML/MIN/1.73 M^2
GLUCOSE SERPL-MCNC: 102 MG/DL
GLUCOSE UR QL STRIP: NEGATIVE
HCT VFR BLD AUTO: 27.1 %
HGB BLD-MCNC: 8.2 G/DL
HGB UR QL STRIP: ABNORMAL
IMM GRANULOCYTES # BLD AUTO: 0.02 K/UL
IMM GRANULOCYTES NFR BLD AUTO: 0.3 %
KETONES UR QL STRIP: NEGATIVE
LEUKOCYTE ESTERASE UR QL STRIP: ABNORMAL
LYMPHOCYTES # BLD AUTO: 1.8 K/UL
LYMPHOCYTES NFR BLD: 24.4 %
MAGNESIUM SERPL-MCNC: 1.6 MG/DL
MCH RBC QN AUTO: 29.7 PG
MCHC RBC AUTO-ENTMCNC: 30.3 G/DL
MCV RBC AUTO: 98 FL
MICROSCOPIC COMMENT: NORMAL
MONOCYTES # BLD AUTO: 0.6 K/UL
MONOCYTES NFR BLD: 8.3 %
NEUTROPHILS # BLD AUTO: 3.4 K/UL
NEUTROPHILS NFR BLD: 46.3 %
NITRITE UR QL STRIP: NEGATIVE
NRBC BLD-RTO: 0 /100 WBC
PH UR STRIP: 6 [PH] (ref 5–8)
PHOSPHATE SERPL-MCNC: 2.8 MG/DL
PLATELET # BLD AUTO: 222 K/UL
PMV BLD AUTO: 10.8 FL
POTASSIUM SERPL-SCNC: 4.2 MMOL/L
PROT SERPL-MCNC: 4.8 G/DL
PROT UR QL STRIP: NEGATIVE
RBC # BLD AUTO: 2.76 M/UL
RBC #/AREA URNS AUTO: 0 /HPF (ref 0–4)
SODIUM SERPL-SCNC: 137 MMOL/L
SP GR UR STRIP: 1 (ref 1–1.03)
URN SPEC COLLECT METH UR: ABNORMAL
UROBILINOGEN UR STRIP-ACNC: NEGATIVE EU/DL
WBC # BLD AUTO: 7.33 K/UL
WBC #/AREA URNS AUTO: 0 /HPF (ref 0–5)

## 2018-05-27 PROCEDURE — 25000003 PHARM REV CODE 250: Performed by: STUDENT IN AN ORGANIZED HEALTH CARE EDUCATION/TRAINING PROGRAM

## 2018-05-27 PROCEDURE — 81001 URINALYSIS AUTO W/SCOPE: CPT

## 2018-05-27 PROCEDURE — 80053 COMPREHEN METABOLIC PANEL: CPT

## 2018-05-27 PROCEDURE — 99900035 HC TECH TIME PER 15 MIN (STAT)

## 2018-05-27 PROCEDURE — 84100 ASSAY OF PHOSPHORUS: CPT

## 2018-05-27 PROCEDURE — 63600175 PHARM REV CODE 636 W HCPCS: Performed by: NURSE PRACTITIONER

## 2018-05-27 PROCEDURE — 36415 COLL VENOUS BLD VENIPUNCTURE: CPT

## 2018-05-27 PROCEDURE — 20600001 HC STEP DOWN PRIVATE ROOM

## 2018-05-27 PROCEDURE — 25000003 PHARM REV CODE 250: Performed by: NURSE PRACTITIONER

## 2018-05-27 PROCEDURE — S0028 INJECTION, FAMOTIDINE, 20 MG: HCPCS | Performed by: STUDENT IN AN ORGANIZED HEALTH CARE EDUCATION/TRAINING PROGRAM

## 2018-05-27 PROCEDURE — 94761 N-INVAS EAR/PLS OXIMETRY MLT: CPT

## 2018-05-27 PROCEDURE — 85025 COMPLETE CBC W/AUTO DIFF WBC: CPT

## 2018-05-27 PROCEDURE — 83735 ASSAY OF MAGNESIUM: CPT

## 2018-05-27 RX ORDER — ALBUTEROL SULFATE 0.83 MG/ML
2.5 SOLUTION RESPIRATORY (INHALATION) EVERY 4 HOURS PRN
Status: DISCONTINUED | OUTPATIENT
Start: 2018-05-27 | End: 2018-05-28 | Stop reason: HOSPADM

## 2018-05-27 RX ORDER — TAMSULOSIN HYDROCHLORIDE 0.4 MG/1
0.4 CAPSULE ORAL DAILY
Status: DISCONTINUED | OUTPATIENT
Start: 2018-05-27 | End: 2018-05-28 | Stop reason: HOSPADM

## 2018-05-27 RX ADMIN — IBUPROFEN 800 MG: 800 INJECTION INTRAVENOUS at 01:05

## 2018-05-27 RX ADMIN — OXYCODONE HYDROCHLORIDE 15 MG: 5 TABLET ORAL at 09:05

## 2018-05-27 RX ADMIN — DIPHENHYDRAMINE HYDROCHLORIDE 25 MG: 50 INJECTION, SOLUTION INTRAMUSCULAR; INTRAVENOUS at 06:05

## 2018-05-27 RX ADMIN — ENOXAPARIN SODIUM 40 MG: 100 INJECTION SUBCUTANEOUS at 05:05

## 2018-05-27 RX ADMIN — OXYCODONE HYDROCHLORIDE 15 MG: 5 TABLET ORAL at 05:05

## 2018-05-27 RX ADMIN — IBUPROFEN 800 MG: 800 INJECTION INTRAVENOUS at 09:05

## 2018-05-27 RX ADMIN — OXYCODONE HYDROCHLORIDE 15 MG: 5 TABLET ORAL at 01:05

## 2018-05-27 RX ADMIN — TAMSULOSIN HYDROCHLORIDE 0.4 MG: 0.4 CAPSULE ORAL at 09:05

## 2018-05-27 RX ADMIN — FAMOTIDINE 20 MG: 10 INJECTION INTRAVENOUS at 09:05

## 2018-05-27 RX ADMIN — HYDROMORPHONE HYDROCHLORIDE 1 MG: 1 INJECTION, SOLUTION INTRAMUSCULAR; INTRAVENOUS; SUBCUTANEOUS at 11:05

## 2018-05-27 RX ADMIN — DIPHENHYDRAMINE HYDROCHLORIDE 25 MG: 50 INJECTION, SOLUTION INTRAMUSCULAR; INTRAVENOUS at 12:05

## 2018-05-27 RX ADMIN — IBUPROFEN 800 MG: 800 INJECTION INTRAVENOUS at 05:05

## 2018-05-27 NOTE — PROGRESS NOTES
[facility]  Colorectal Surgery  Progress Note    Patient Name: Ida Orozco  MRN: [id number]  Admission Date: 5/24/2018  Hospital Length of Stay: 3 days  Attending Physician: JENNY Virgen MD    Subjective:     Interval History: s/p lap converted to open proctectomy, LILIANE and flex sig.   No acute events overnight. Vitals stable. Pain controlled.  Incision intact. Tolerating clears with no nausea or vomiting. 450cc drain ss output. Ileostomy output 1300cc.    Post-Op Info:  Procedure(s) (LRB):  PROCTECTOMY-LAPAROSCOPIC/CONVERTED TO OPEN (N/A)  LYSIS, ADHESIONS/ more than 2hours  SIGMOIDOSCOPY, FLEXIBLE   3 Days Post-Op      Medications:  Continuous Infusions:   glucagon (human recombinant)       Scheduled Meds:   enoxaparin  40 mg Subcutaneous Daily    famotidine (PF)  20 mg Intravenous BID    fluticasone-vilanterol  1 puff Inhalation Daily    ibuprofen  800 mg Intravenous Q8H    tamsulosin  0.4 mg Oral Daily     PRN Meds:   albuterol sulfate    dextrose 50%    dextrose 50%    dextrose 50%    diphenhydrAMINE    glucagon (human recombinant)    glucose    glucose    glucose    HYDROmorphone    naloxone    ondansetron    oxyCODONE    oxyCODONE    promethazine (PHENERGAN) IVPB    sodium chloride 0.9%        Objective:     Vital Signs (Most Recent):  Temp: 98.4 °F (36.9 °C) (05/27/18 0733)  Pulse: 63 (05/27/18 0733)  Resp: 16 (05/27/18 0733)  BP: (!) 104/56 (05/27/18 0733)  SpO2: 96 % (05/27/18 0733) Vital Signs (24h Range):  Temp:  [98.2 °F (36.8 °C)-99.2 °F (37.3 °C)] 98.4 °F (36.9 °C)  Pulse:  [60-84] 63  Resp:  [16-18] 16  SpO2:  [93 %-99 %] 96 %  BP: ()/(43-70) 104/56     Intake/Output - Last 3 Shifts       05/25 0700 - 05/26 0659 05/26 0700 - 05/27 0659 05/27 0700 - 05/28 0659    P.O. 1340 2100     I.V. (mL/kg) 2275 (20.2)      IV Piggyback 250 500     Total Intake(mL/kg) 3865 (34.4) 2600 (23.1)     Urine (mL/kg/hr) 2000 (0.7) 2300 (0.9)     Emesis/NG output 0 (0) 0  (0)     Drains 325 (0.1) 450 (0.2)     Other 0 (0) 0 (0)     Stool 550 (0.2) 1300 (0.5)     Total Output 2875 4050      Net +990 -1450             Urine Occurrence 0 x 0 x     Stool Occurrence 0 x 0 x     Emesis Occurrence 0 x 0 x           Physical Exam   Constitutional: She appears well-developed and well-nourished. No distress.   HENT:   Head: Normocephalic and atraumatic.   Cardiovascular: Normal rate.    Pulmonary/Chest: Effort normal. No respiratory distress.   Abdominal: Soft. She exhibits no distension. There is tenderness (appropriate).   Previna on midline incision - in place   Genitourinary:   Genitourinary Comments: Perineal incision c/d/i   Skin: She is not diaphoretic.     Significant Labs:  BMP:     Recent Labs  Lab 05/27/18  0358         K 4.2      CO2 22*   BUN 8   CREATININE 0.8   CALCIUM 8.1*   MG 1.6     CBC:     Recent Labs  Lab 05/27/18  0358   WBC 7.33   RBC 2.76*   HGB 8.2*   HCT 27.1*      MCV 98   MCH 29.7   MCHC 30.3*     CMP:     Recent Labs  Lab 05/27/18  0358      CALCIUM 8.1*   ALBUMIN 2.3*   PROT 4.8*      K 4.2   CO2 22*      BUN 8   CREATININE 0.8   ALKPHOS 132   ALT 42   AST 22   BILITOT 0.5     CRP:     Recent Labs  Lab 05/25/18  0807   CRP 54.1*         Assessment/Plan:     UC (ulcerative colitis)    51 yo female s/p lap converted to open proctectomy, LILIANE and flex sig. Doing well post operatively.     - Regular diet   - Ostomy with 1300cc output- discussed thickening foods with patient.   - PRN pain medications   - encourage ambulation   - d/c rangel today   - UA pending  - Previna in place- will stay in place until Tuesday  - IS   -dvt ppx              Ida Daniels MD  Colorectal Surgery  [facility]

## 2018-05-27 NOTE — PLAN OF CARE
Problem: Patient Care Overview  Goal: Plan of Care Review  Outcome: Ongoing (interventions implemented as appropriate)  Plan of care reviewed with the patient, a 52 year old female admitted on the 24 th of this month,,,, her pain pump has been D/C'd , her fluids have been D/C'd also,, her Saravanan is still collecting a good amount of drainage,,, wound vac is not,,, ileostomy has good discharge,,, up with assist,, her rangel had to be placed again due to retention,,,, this is the 2nd time she had to have it placed back in,,,, vitals are stable,, her BP runs on the low side of normal,,,,,bed locked and low call light in reach

## 2018-05-27 NOTE — ASSESSMENT & PLAN NOTE
51 yo female s/p lap converted to open proctectomy, LILIANE and flex sig. Doing well post operatively.     - Regular diet   - Ostomy with 1300cc output- discussed thickening foods with patient.   - PRN pain medications   - encourage ambulation   - d/c rangel today   - UA pending  - Previna in place- will stay in place until Tuesday  - IS   -dvt ppx

## 2018-05-27 NOTE — SUBJECTIVE & OBJECTIVE
Subjective:     Interval History: s/p lap converted to open proctectomy, LILIANE and flex sig.   No acute events overnight. Vitals stable. Pain controlled.  Incision intact. Tolerating clears with no nausea or vomiting. 450cc drain ss output. Ileostomy output 1300cc.    Post-Op Info:  Procedure(s) (LRB):  PROCTECTOMY-LAPAROSCOPIC/CONVERTED TO OPEN (N/A)  LYSIS, ADHESIONS/ more than 2hours  SIGMOIDOSCOPY, FLEXIBLE   3 Days Post-Op      Medications:  Continuous Infusions:   glucagon (human recombinant)       Scheduled Meds:   enoxaparin  40 mg Subcutaneous Daily    famotidine (PF)  20 mg Intravenous BID    fluticasone-vilanterol  1 puff Inhalation Daily    ibuprofen  800 mg Intravenous Q8H    tamsulosin  0.4 mg Oral Daily     PRN Meds:   albuterol sulfate    dextrose 50%    dextrose 50%    dextrose 50%    diphenhydrAMINE    glucagon (human recombinant)    glucose    glucose    glucose    HYDROmorphone    naloxone    ondansetron    oxyCODONE    oxyCODONE    promethazine (PHENERGAN) IVPB    sodium chloride 0.9%        Objective:     Vital Signs (Most Recent):  Temp: 98.4 °F (36.9 °C) (05/27/18 0733)  Pulse: 63 (05/27/18 0733)  Resp: 16 (05/27/18 0733)  BP: (!) 104/56 (05/27/18 0733)  SpO2: 96 % (05/27/18 0733) Vital Signs (24h Range):  Temp:  [98.2 °F (36.8 °C)-99.2 °F (37.3 °C)] 98.4 °F (36.9 °C)  Pulse:  [60-84] 63  Resp:  [16-18] 16  SpO2:  [93 %-99 %] 96 %  BP: ()/(43-70) 104/56     Intake/Output - Last 3 Shifts       05/25 0700 - 05/26 0659 05/26 0700 - 05/27 0659 05/27 0700 - 05/28 0659    P.O. 1340 2100     I.V. (mL/kg) 2275 (20.2)      IV Piggyback 250 500     Total Intake(mL/kg) 3865 (34.4) 2600 (23.1)     Urine (mL/kg/hr) 2000 (0.7) 2300 (0.9)     Emesis/NG output 0 (0) 0 (0)     Drains 325 (0.1) 450 (0.2)     Other 0 (0) 0 (0)     Stool 550 (0.2) 1300 (0.5)     Total Output 2875 4050      Net +990 -1450             Urine Occurrence 0 x 0 x     Stool Occurrence 0 x 0 x     Emesis  Occurrence 0 x 0 x           Physical Exam   Constitutional: She appears well-developed and well-nourished. No distress.   HENT:   Head: Normocephalic and atraumatic.   Cardiovascular: Normal rate.    Pulmonary/Chest: Effort normal. No respiratory distress.   Abdominal: Soft. She exhibits no distension. There is tenderness (appropriate).   Previna on midline incision - in place   Genitourinary:   Genitourinary Comments: Perineal incision c/d/i   Skin: She is not diaphoretic.     Significant Labs:  BMP:     Recent Labs  Lab 05/27/18  0358         K 4.2      CO2 22*   BUN 8   CREATININE 0.8   CALCIUM 8.1*   MG 1.6     CBC:     Recent Labs  Lab 05/27/18  0358   WBC 7.33   RBC 2.76*   HGB 8.2*   HCT 27.1*      MCV 98   MCH 29.7   MCHC 30.3*     CMP:     Recent Labs  Lab 05/27/18  0358      CALCIUM 8.1*   ALBUMIN 2.3*   PROT 4.8*      K 4.2   CO2 22*      BUN 8   CREATININE 0.8   ALKPHOS 132   ALT 42   AST 22   BILITOT 0.5     CRP:     Recent Labs  Lab 05/25/18  0807   CRP 54.1*

## 2018-05-28 ENCOUNTER — NURSE TRIAGE (OUTPATIENT)
Dept: ADMINISTRATIVE | Facility: CLINIC | Age: 53
End: 2018-05-28

## 2018-05-28 VITALS
RESPIRATION RATE: 18 BRPM | SYSTOLIC BLOOD PRESSURE: 110 MMHG | HEIGHT: 64 IN | TEMPERATURE: 99 F | BODY MASS INDEX: 42.34 KG/M2 | HEART RATE: 87 BPM | DIASTOLIC BLOOD PRESSURE: 51 MMHG | WEIGHT: 248 LBS | OXYGEN SATURATION: 97 %

## 2018-05-28 PROCEDURE — 63600175 PHARM REV CODE 636 W HCPCS: Performed by: NURSE PRACTITIONER

## 2018-05-28 PROCEDURE — 25000003 PHARM REV CODE 250: Performed by: STUDENT IN AN ORGANIZED HEALTH CARE EDUCATION/TRAINING PROGRAM

## 2018-05-28 PROCEDURE — 25000003 PHARM REV CODE 250: Performed by: NURSE PRACTITIONER

## 2018-05-28 PROCEDURE — S0028 INJECTION, FAMOTIDINE, 20 MG: HCPCS | Performed by: STUDENT IN AN ORGANIZED HEALTH CARE EDUCATION/TRAINING PROGRAM

## 2018-05-28 RX ORDER — ONDANSETRON 4 MG/1
4 TABLET, ORALLY DISINTEGRATING ORAL EVERY 6 HOURS PRN
Qty: 30 TABLET | Refills: 1 | Status: SHIPPED | OUTPATIENT
Start: 2018-05-28 | End: 2018-06-18

## 2018-05-28 RX ORDER — OXYCODONE HYDROCHLORIDE 15 MG/1
15 TABLET ORAL EVERY 4 HOURS PRN
Qty: 30 TABLET | Refills: 0 | Status: SHIPPED | OUTPATIENT
Start: 2018-05-28 | End: 2018-06-07

## 2018-05-28 RX ADMIN — FAMOTIDINE 20 MG: 10 INJECTION INTRAVENOUS at 08:05

## 2018-05-28 RX ADMIN — OXYCODONE HYDROCHLORIDE 15 MG: 5 TABLET ORAL at 01:05

## 2018-05-28 RX ADMIN — DIPHENHYDRAMINE HYDROCHLORIDE 25 MG: 50 INJECTION, SOLUTION INTRAMUSCULAR; INTRAVENOUS at 10:05

## 2018-05-28 RX ADMIN — TAMSULOSIN HYDROCHLORIDE 0.4 MG: 0.4 CAPSULE ORAL at 08:05

## 2018-05-28 RX ADMIN — OXYCODONE HYDROCHLORIDE 15 MG: 5 TABLET ORAL at 04:05

## 2018-05-28 RX ADMIN — IBUPROFEN 800 MG: 800 INJECTION INTRAVENOUS at 08:05

## 2018-05-28 RX ADMIN — DIPHENHYDRAMINE HYDROCHLORIDE 25 MG: 50 INJECTION, SOLUTION INTRAMUSCULAR; INTRAVENOUS at 01:05

## 2018-05-28 RX ADMIN — OXYCODONE HYDROCHLORIDE 15 MG: 5 TABLET ORAL at 08:05

## 2018-05-28 NOTE — PLAN OF CARE
CM placed call to patient to inquire if patient would like to receive Home Health services. Patient reports she is in agreement to receive Home Health services and does not have a Home Health agency preference.    CM informed SW of  pt's HH orders and pt does not have HH agency preference.

## 2018-05-28 NOTE — PLAN OF CARE
[facility]    HOME HEALTH ORDERS  FACE TO FACE ENCOUNTER    Patient Name: Ida Orozco  YOB: 1965    PCP: Eliecer Jones MD   PCP Address: [address] / [place] [address]  PCP Phone Number: [phone]  PCP Fax: [phone]    Encounter Date: 05/28/2018    Admit to Home Health    Diagnoses:  Active Hospital Problems    Diagnosis  POA    *UC (ulcerative colitis) [K51.90]  Yes      Resolved Hospital Problems    Diagnosis Date Resolved POA   No resolved problems to display.       No future appointments.  Follow-up Information     H Gucci Virgen MD In 1 week.    Specialty:  Colon and Rectal Surgery  Contact information:  [facility]  [facility] [address] [phone]                     I have seen and examined this patient face to face today. My clinical findings that support the need for the home health skilled services and home bound status are the following:  Weakness/numbness causing balance and gait disturbance due to Surgery making it taxing to leave home.    Allergies:  Review of patient's allergies indicates:   Allergen Reactions    Adhesive      Cause blisters    Dilaudid [hydromorphone] Nausea And Vomiting    Sulfa (sulfonamide antibiotics) Hives       Diet: regular diet    Activities: no lifting, Driving, or Strenuous exercise for 6 weeks    Nursing:   SN to complete comprehensive assessment including routine vital signs. Instruct on disease process and s/s of complications to report to MD. Review/verify medication list sent home with the patient at time of discharge  and instruct patient/caregiver as needed. Frequency may be adjusted depending on start of care date.    Notify MD if SBP > 160 or < 90; DBP > 90 or < 50; HR > 120 or < 50; Temp > 101; Other:         CONSULTS:     to evaluate for community resources/long-range planning.    MISCELLANEOUS CARE:  Colostomy Care:  Instruct patient/caregiver to empty bag when  full and PRN., Change and clean site every 48 hours and Monitor skin integrity.    WOUND CARE ORDERS  yes:  Surgical Wound:  Location: abd  ANNA drain in place, empty drain daily and record amount, bring that information to office appt   Consult ET nurse        Apply the following to wound: wound cleanse and keep clean and dry        Continue care and education         Call office for any output from iloestomy greater then 1200cc/24 hours           Medications: Review discharge medications with patient and family and provide education.      Current Discharge Medication List      START taking these medications    Details   ondansetron (ZOFRAN-ODT) 4 MG TbDL Take 1 tablet (4 mg total) by mouth every 6 (six) hours as needed.  Qty: 30 tablet, Refills: 1      oxyCODONE (ROXICODONE) 15 MG Tab Take 1 tablet (15 mg total) by mouth every 4 (four) hours as needed.  Qty: 30 tablet, Refills: 0         CONTINUE these medications which have NOT CHANGED    Details   albuterol 90 mcg/actuation inhaler Inhale 2 puffs into the lungs every 4 (four) hours as needed for Wheezing or Shortness of Breath. Rescue  Qty: 1 Inhaler, Refills: 5      buPROPion (WELLBUTRIN) 75 MG tablet Take 2 tablets by mouth 3 (three) times daily.      clonazePAM (KLONOPIN) 1 MG tablet Take 1 mg by mouth 2 (two) times daily.       escitalopram oxalate (LEXAPRO) 20 MG tablet Take 30 mg by mouth every morning. Taking a total of 30 mg      fluticasone (FLONASE) 50 mcg/actuation nasal spray 1 spray by Each Nare route every evening. As needed for allergies with the cold front      loratadine (CLARITIN) 10 mg tablet TAKE ONE TABLET BY MOUTH ONCE DAILY  Qty: 90 tablet, Refills: 1      montelukast (SINGULAIR) 10 mg tablet TAKE ONE TABLET BY MOUTH EVERY EVENING  Qty: 90 tablet, Refills: 1      pantoprazole (PROTONIX) 40 MG tablet Take 1 tablet (40 mg total) by mouth once daily.  Qty: 90 tablet, Refills: 3      SYMBICORT 160-4.5 mcg/actuation HFAA INHALE 2 PUFFS INTO THE  LUNGS EVERY 12 (TWELVE) HOURS. CONTROLLER  Qty: 11 g, Refills: 1      trazodone (DESYREL) 50 MG tablet Take 50 mg by mouth every evening.      albuterol (PROVENTIL) 2.5 mg /3 mL (0.083 %) nebulizer solution Take 3 mLs (2.5 mg total) by nebulization every 6 (six) hours as needed for Wheezing. Rescue  Qty: 1 Box, Refills: 2    Associated Diagnoses: Asthma, unspecified asthma severity, unspecified whether complicated, unspecified whether persistent      guaifenesin-codeine 100-10 mg/5 ml (TUSSI-ORGANIDIN NR)  mg/5 mL syrup Take 5 mLs by mouth every 6 (six) hours as needed for Cough.  Qty: 180 mL, Refills: 1    Associated Diagnoses: Moderate persistent asthmatic bronchitis with acute exacerbation         STOP taking these medications        mg tablet Comments:   Reason for Stopping:         ibuprofen (ADVIL,MOTRIN) 600 MG tablet Comments:   Reason for Stopping:         metroNIDAZOLE (FLAGYL) 500 MG tablet Comments:   Reason for Stopping:         neomycin (MYCIFRADIN) 500 mg Tab Comments:   Reason for Stopping:               I certify that this patient is confined to her home and needs intermittent skilled nursing care.

## 2018-05-28 NOTE — PLAN OF CARE
Problem: Patient Care Overview  Goal: Plan of Care Review  Outcome: Ongoing (interventions implemented as appropriate)  Pt alert/ oriented x4. Ambulating in room and crane independently with steady gait. Pt voiding overnight to BR, clear yellow urine. Pt states difficulty urinating however put out 1000+ mls. ANNA draining serosanguinous fluid. Wound vac in place, no output noted, drsg intact. Colostomy draining liquid to semiformed stool. Medicated for pain with oxy 15 x2. Safety maintained. VSS. Will continue to monitor.

## 2018-05-28 NOTE — PROGRESS NOTES
"Pt plans for discharge and wound care nurse was asked to remove Prevena incisional vac dressing prior to d/c. Vac dressing removed and noted incision is approximated except to umbilical site with small amount of  serosanguinous drainage. Pt states "this happen last time and it just stays like that". Discussed with pt to keep umbilical site clean and dry, protecting intact skin with barrier film spray and gauze dressing.  Pt verbalized understanding stating that she has appt with Cynthia Gonzales NP in outpt clinic. Applied alginate dressing to umbilical site with barrier film spray.  Pt has all supplies needed for home wound care. [id number]       05/28/18 1355       Incision/Site 05/24/18 1006 Abdomen   Date First Assessed/Time First Assessed: 05/24/18 1006   Location: Abdomen   Wound Image    Incision WDL ex   Dressing Appearance Intact   Drainage Amount None   Periwound Area Intact   Wound Edges Approximated     "

## 2018-05-28 NOTE — PLAN OF CARE
SW informed by CM that pt  In need of HH services.  Pt has no HH preference.  Pt insured by N.  KADEEM sent referral to [facility] for review and assignment.             Jackelin Diaz, LMSW [facility]  [id number]

## 2018-05-28 NOTE — HOSPITAL COURSE
Pt underwent above surgery, she had a normal post op course.  Once bowel function resumed diet was slowly advanced.  On discharge day she is papa low fiber diet, good ostomy output, adequate pain control with oral meds, inc line healing well, skin vac removed by wound care and inc line healing well, ANNA drain intact with serous output, amb in [facility] diff, VS stable and AF, discharge home to fu with Dr. Virgen in one week for drain removal

## 2018-05-28 NOTE — PLAN OF CARE
CM met with patient to discuss today expected discharge plan to Home with Home Health. Patient in agreement with discharge plan. CM informed patient of follow up appt with ZAKIA Huddleston and PAULA Marie WOCN in a week and f/u appt with Dr Virgen in 3 weeks. Patient verbalized understanding.    Future Appointments  Date Time Provider Department Center   6/5/2018 1:00 PM PAULA Krishnan [facility] ENTERO [facility]   6/5/2018 2:20 PM Karo Mahmood NP [facility] KEHINDE [facility]   6/20/2018 3:15 PM JENNY Virgen MD [facility]        05/28/18 1030   Final Note   Assessment Type Final Discharge Note   Discharge Disposition Home-Health   Hospital Follow Up  Appt(s) scheduled? Yes   Discharge plans and expectations educations in teach back method with documentation complete? Yes

## 2018-05-28 NOTE — DISCHARGE SUMMARY
[facility]  Colorectal Surgery  Discharge Summary      Patient Name: Ida Orozco  MRN: [id number]  Admission Date: 5/24/2018  Hospital Length of Stay: 4 days  Discharge Date and Time: 5/28/2018  2:32 PM  Attending Physician: Ivette att. providers found   Discharging Provider: Callum Chambers NP  Primary Care Provider: Eliecer Jones MD     HPI:  Ida Orozco is a 52-year-old female with   history of morbid obesity and inflammatory bowel disease, thought to be   ulcerative colitis, who underwent a total abdominal colectomy with end ileostomy   at an outside institution.  Her course was complicated by a fistula at the   stoma site requiring revision and relocation in 2014.  She has subsequently   undergone a sleeve gastrectomy in 2016.  She has had rectal bleeding and   therefore discussions regarding a proctectomy were held with the patient.    Discussion of proctectomy with end ileostomy versus pouch were discussed and she   preferred to have an end ileostomy.  Therefore, the risks, benefits, and   alternatives of the procedure were discussed with the patient and she elected to   proceed.  Consent was obtained.    Procedure(s) (LRB):  PROCTECTOMY-LAPAROSCOPIC/CONVERTED TO OPEN (N/A)  LYSIS, ADHESIONS/ more than 2hours  SIGMOIDOSCOPY, FLEXIBLE     Hospital Course:  Pt underwent above surgery, she had a normal post op course.  Once bowel function resumed diet was slowly advanced.  On discharge day she is papa low fiber diet, good ostomy output, adequate pain control with oral meds, inc line healing well, skin vac removed by wound care and inc line healing well, ANNA drain intact with serous output, amb in crane wihtout diff, VS stable and AF, discharge home to fu with Dr. Virgen in one week for drain removal       Consults         Status Ordering Provider     Inpatient consult to Social Work/Case Management  Once     Provider:  (Not yet assigned)    Acknowledged CALLUM CHAMBERS          Significant  Diagnostic Studies: Labs:   BMP:   Recent Labs  Lab 05/27/18  0358         K 4.2      CO2 22*   BUN 8   CREATININE 0.8   CALCIUM 8.1*   MG 1.6    and CBC   Recent Labs  Lab 05/27/18  0358   WBC 7.33   HGB 8.2*   HCT 27.1*          Pending Diagnostic Studies:     None        Final Active Diagnoses:    Diagnosis Date Noted POA    PRINCIPAL PROBLEM:  UC (ulcerative colitis) [K51.90] 05/24/2018 Yes      Problems Resolved During this Admission:    Diagnosis Date Noted Date Resolved POA      Discharged Condition: good    Disposition: Home or Self Care    Follow Up:  Follow-up Information     H Gucci Virgen MD In 1 week.    Specialty:  Colon and Rectal Surgery  Contact information:  [address] [place] [address] [id number] [phone]                 Patient Instructions:     Diet Adult Regular   Order Comments: Low fiber diet, no fresh fruit or fresh vegetables. No nuts, popcorn, grapes or raisins for approx 6 weeks   Scheduling Instructions: Call office for any output greater then 1200cc/24 hours  Office [phone]     Lifting restrictions   Order Comments: No lifting anything greater than 5 pounds     Call MD for:  persistent nausea and vomiting or diarrhea     Call MD for:  severe uncontrolled pain     Call MD for:  redness, tenderness, or signs of infection (pain, swelling, redness, odor or green/yellow discharge around incision site)     Call MD for:  difficulty breathing or increased cough     Call MD for:  severe persistent headache     Call MD for:  worsening rash     Call MD for:  persistent dizziness, light-headedness, or visual disturbances     Call MD for:  increased confusion or weakness     Call MD for:   Order Comments: Call for temp above 101       Medications:  Reconciled Home Medications:      Medication List      START taking these medications    ondansetron 4 MG Tbdl  Commonly known as:  ZOFRAN-ODT  Take 1 tablet (4 mg total) by mouth every 6 (six) hours as needed.      oxyCODONE 15 MG Tab  Commonly known as:  ROXICODONE  Take 1 tablet (15 mg total) by mouth every 4 (four) hours as needed.        CHANGE how you take these medications    loratadine 10 mg tablet  Commonly known as:  CLARITIN  TAKE ONE TABLET BY MOUTH ONCE DAILY  What changed:  See the new instructions.     montelukast 10 mg tablet  Commonly known as:  SINGULAIR  TAKE ONE TABLET BY MOUTH EVERY EVENING  What changed:  See the new instructions.     pantoprazole 40 MG tablet  Commonly known as:  PROTONIX  Take 1 tablet (40 mg total) by mouth once daily.  What changed:  when to take this     SYMBICORT 160-4.5 mcg/actuation Hfaa  Generic drug:  budesonide-formoterol 160-4.5 mcg  INHALE 2 PUFFS INTO THE LUNGS EVERY 12 (TWELVE) HOURS. CONTROLLER  What changed:  · when to take this  · reasons to take this  · additional instructions        CONTINUE taking these medications    * albuterol 90 mcg/actuation inhaler  Inhale 2 puffs into the lungs every 4 (four) hours as needed for Wheezing or Shortness of Breath. Rescue     * albuterol 2.5 mg /3 mL (0.083 %) nebulizer solution  Commonly known as:  PROVENTIL  Take 3 mLs (2.5 mg total) by nebulization every 6 (six) hours as needed for Wheezing. Rescue     buPROPion 75 MG tablet  Commonly known as:  WELLBUTRIN  Take 2 tablets by mouth 3 (three) times daily.     clonazePAM 1 MG tablet  Commonly known as:  KLONOPIN  Take 1 mg by mouth 2 (two) times daily.     escitalopram oxalate 20 MG tablet  Commonly known as:  LEXAPRO  Take 30 mg by mouth every morning. Taking a total of 30 mg     fluticasone 50 mcg/actuation nasal spray  Commonly known as:  FLONASE  1 spray by Each Nare route every evening. As needed for allergies with the cold front     guaifenesin-codeine 100-10 mg/5 ml  mg/5 mL syrup  Commonly known as:  TUSSI-ORGANIDIN NR  Take 5 mLs by mouth every 6 (six) hours as needed for Cough.     traZODone 50 MG tablet  Commonly known as:  DESYREL  Take 50 mg by mouth every  evening.        * This list has 2 medication(s) that are the same as other medications prescribed for you. Read the directions carefully, and ask your doctor or other care provider to review them with you.            STOP taking these medications     MG tablet  Generic drug:  ibuprofen     ibuprofen 600 MG tablet  Commonly known as:  ADVIL,MOTRIN     metroNIDAZOLE 500 MG tablet  Commonly known as:  FLAGYL     neomycin 500 mg Tab  Commonly known as:  MYCIFRADIN            Karo Mahmood NP  Colorectal Surgery  [facility]

## 2018-05-28 NOTE — PROGRESS NOTES
Discharge instructions reviewed. Prescriptions reviewed and given to patient. Pt . Verbalized understanding. All questions answered. Midline d'c'd with cath tip intact and discarded. Pt is being transported by spouse.

## 2018-05-28 NOTE — HPI
Ida Orozco is a 52-year-old female with   history of morbid obesity and inflammatory bowel disease, thought to be   ulcerative colitis, who underwent a total abdominal colectomy with end ileostomy   at an outside institution.  Her course was complicated by a fistula at the   stoma site requiring revision and relocation in 2014.  She has subsequently   undergone a sleeve gastrectomy in 2016.  She has had rectal bleeding and   therefore discussions regarding a proctectomy were held with the patient.    Discussion of proctectomy with end ileostomy versus pouch were discussed and she   preferred to have an end ileostomy.  Therefore, the risks, benefits, and   alternatives of the procedure were discussed with the patient and she elected to   proceed.  Consent was obtained.

## 2018-05-29 RX ORDER — TAMSULOSIN HYDROCHLORIDE 0.4 MG/1
0.4 CAPSULE ORAL DAILY
Qty: 30 CAPSULE | Refills: 0 | Status: SHIPPED | OUTPATIENT
Start: 2018-05-29 | End: 2018-06-18

## 2018-05-29 NOTE — TELEPHONE ENCOUNTER
"  Reason for Disposition   Surgical incision symptoms and questions   Dressing soaked with blood or body fluid (e.g., drainage)    Answer Assessment - Initial Assessment Questions  1. SYMPTOM: "What's the main symptom you're concerned about?" (e.g., redness, pain, drainage)      Bleeding at incision at umbilicus  2. ONSET: "When did ________  start?"      Noted within past 30 min  3. SURGERY: "What surgery was performed?"      Had anus and rest colon removed   4. DATE of SURGERY: "When was surgery performed?"       5/24/18- discharged from hospital today  5. INCISION SITE: "Where is the incision located?"       As above  6. REDNESS: "Is there any redness at the incision site?" If yes, ask: "How wide across is the redness?" (Inches, centimeters)       n/a  7. PAIN: "Is there any pain?" If so, ask: "How bad is it?"  (Scale 1-10; or mild, moderate, severe)      n/a  8. BLEEDING: "Is there any bleeding?" If so, ask: "How much?" and "Where?"     Pt reported the vacuum had been removed from area today- still has drain,she noted blood on the dressing and when she removed it noted continued bleeding. Has used 3 dressings which were soaked in past 10  Min- had not been applying pressure but is doing so now.   9. DRAINAGE: "Is there any drainage from the incision site?" If yes, ask: "What color and how much?" (e.g., red, cloudy, pus; drops, teaspoon)      n/a  10. FEVER: "Do you have a fever?" If so, ask: "What is your temperature, how was it measured, and when did it start?"        n/a  11. OTHER SYMPTOMS: "Do you have any other symptoms?" (e.g., shaking chills, weakness, rash elsewhere on body)        Pt denied weakness, not light-headed but panicked when she saw the blood.    Protocols used: ST POST-OP SYMPTOMS AND [id number], ST POST-OP INCISION SYMPTOMS-A-    "

## 2018-06-05 ENCOUNTER — OFFICE VISIT (OUTPATIENT)
Dept: WOUND CARE | Facility: CLINIC | Age: 53
End: 2018-06-05
Payer: MEDICARE

## 2018-06-05 ENCOUNTER — OFFICE VISIT (OUTPATIENT)
Dept: SURGERY | Facility: CLINIC | Age: 53
End: 2018-06-05
Payer: MEDICARE

## 2018-06-05 ENCOUNTER — TELEPHONE (OUTPATIENT)
Dept: SURGERY | Facility: CLINIC | Age: 53
End: 2018-06-05

## 2018-06-05 DIAGNOSIS — Z43.2 ATTENTION TO ILEOSTOMY: Primary | ICD-10-CM

## 2018-06-05 DIAGNOSIS — B37.9 CANDIDIASIS: ICD-10-CM

## 2018-06-05 DIAGNOSIS — K51.211 ULCERATIVE PROCTITIS WITH RECTAL BLEEDING: Primary | ICD-10-CM

## 2018-06-05 DIAGNOSIS — T81.89XS NON-HEALING SURGICAL WOUND, SEQUELA: ICD-10-CM

## 2018-06-05 PROCEDURE — 99024 POSTOP FOLLOW-UP VISIT: CPT | Mod: S$GLB,,, | Performed by: CLINICAL NURSE SPECIALIST

## 2018-06-05 PROCEDURE — 99999 PR PBB SHADOW E&M-EST. PATIENT-LVL I: CPT | Mod: PBBFAC,,, | Performed by: CLINICAL NURSE SPECIALIST

## 2018-06-05 PROCEDURE — 99024 POSTOP FOLLOW-UP VISIT: CPT | Mod: S$GLB,,, | Performed by: COLON & RECTAL SURGERY

## 2018-06-05 RX ORDER — NYSTATIN 100000 [USP'U]/G
POWDER TOPICAL 2 TIMES DAILY
Qty: 1 BOTTLE | Refills: 6 | Status: SHIPPED | OUTPATIENT
Start: 2018-06-05 | End: 2019-07-17 | Stop reason: SDUPTHER

## 2018-06-05 NOTE — PROGRESS NOTES
Pt here for post op seeing Dr Virgen,  He removed her drain, it has been low output, slight increase today ,  She has leak of serous fluid that persists from her umbilicus , this occurred after last surgery as well,    Perineal wound was assessed and healing fine.   Ileo ok, managing with y barrier strips,

## 2018-06-05 NOTE — PROGRESS NOTES
HPI:  Ida Orozco is a 52 y.o. female with history of   Chronic ulcerative colitis status post completion proctectomy.  End ileostomy remained in place.    She feels well.  She has some serous drainage from her umbilicus.  Minimal spotting of blood from her perineal wound.    No nausea or vomiting.  No fever.       Past Medical History:   Diagnosis Date    Anxiety     Crohn's colitis     Diabetes mellitus     Fatty liver disease, nonalcoholic     GERD (gastroesophageal reflux disease)     History of sleeve gastrectomy 2016    Hypertension         Past Surgical History:   Procedure Laterality Date    APPENDECTOMY       SECTION, LOW TRANSVERSE      CHOLECYSTECTOMY      open    COLECTOMY      total- 2013    FLEXIBLE SIGMOIDOSCOPY  2018    Procedure: SIGMOIDOSCOPY, FLEXIBLE;  Surgeon: JENNY Virgen MD;  Location: [facility] OR 2ND FLR;  Service: Colon and Rectal;;    HYSTERECTOMY      ILEOSTOMY REVISION      2014; 2014    LAPAROSCOPIC PROCTECTOMY N/A 2018    Procedure: PROCTECTOMY-LAPAROSCOPIC/CONVERTED TO OPEN;  Surgeon: JENNY Virgen MD;  Location: [facility] OR 2ND FLR;  Service: Colon and Rectal;  Laterality: N/A;    LYSIS OF ADHESIONS  2018    Procedure: LYSIS, ADHESIONS/ more than 2hours;  Surgeon: JENNY Virgen MD;  Location: [facility] OR 2ND FLR;  Service: Colon and Rectal;;    thumb surgery      TONSILLECTOMY, ADENOIDECTOMY      WISDOM TOOTH EXTRACTION         Review of patient's allergies indicates:   Allergen Reactions    Adhesive      Cause blisters    Dilaudid [hydromorphone] Nausea And Vomiting    Sulfa (sulfonamide antibiotics) Hives       Family History   Problem Relation Age of Onset    Cancer Mother         skin    Heart disease Father 67    Cancer Paternal Grandfather         ?    Cancer Maternal Grandfather         colon        Social History     Social History    Marital status: Legally      Spouse name: N/A    Number of  children: N/A    Years of education: N/A     Social History Main Topics    Smoking status: Former Smoker     Types: Cigarettes     Quit date: 2009    Smokeless tobacco: Never Used    Alcohol use No      Comment: occ    Drug use: No    Sexual activity: Yes     Partners: Male     Other Topics Concern    Not on file     Social History Narrative    No narrative on file       ROS:  GENERAL: No fever, chills, fatigability or weight loss.  Integument: No rashes, redness, icterus  CHEST: Denies BURRELL, cyanosis, wheezing, cough and sputum production.  CARDIOVASCULAR: Denies chest pain, PND, orthopnea or reduced exercise tolerance.  GI: Denies abd pain, dysphagia, nausea, vomiting, no hematemesis   : Denies burning on urination, no hematuria, no bacteriuria  MSK: No deformities, swelling, joint pain swelling  Neurologic: No HAs, seizures, weakness, paresthesias, gait problems    PE:  General appearance   Healthy  There were no vitals taken for this visit.  Sclera/ Skin anicteric  AT NC EOMI  Neck supple trachea midline   Chest symmetric, nl excursion, no retractions, breathing comfortably  Abdomen -  Incision healed, thin serous fluid from umbilicus.    No erythema.  No tenderness.    Perineal wound -  Healed.  No erythema.  No tenderness.  ND soft NT.  no masses, no organomegaly  EXT - no CCE  Neuro:  Mood/ affect nl, alert and oriented x 3, moves all ext's, gait nl      pathology report reviewed, no neoplasia    Assessment:   Wound seroma without evidence of infection   perineal wound healed    Plan:  local wound care, dressing to the umbilicus.  Return office 1 month  pelvic drain removed

## 2018-06-06 ENCOUNTER — PATIENT MESSAGE (OUTPATIENT)
Dept: WOUND CARE | Facility: CLINIC | Age: 53
End: 2018-06-06

## 2018-06-06 ENCOUNTER — PATIENT MESSAGE (OUTPATIENT)
Dept: SURGERY | Facility: CLINIC | Age: 53
End: 2018-06-06

## 2018-06-07 ENCOUNTER — OFFICE VISIT (OUTPATIENT)
Dept: PRIMARY CARE CLINIC | Facility: CLINIC | Age: 53
End: 2018-06-07
Payer: MEDICARE

## 2018-06-07 VITALS
SYSTOLIC BLOOD PRESSURE: 91 MMHG | HEART RATE: 68 BPM | BODY MASS INDEX: 42.57 KG/M2 | RESPIRATION RATE: 18 BRPM | WEIGHT: 248 LBS | TEMPERATURE: 99 F | DIASTOLIC BLOOD PRESSURE: 49 MMHG | OXYGEN SATURATION: 95 %

## 2018-06-07 DIAGNOSIS — K51.219 ULCERATIVE PROCTITIS WITH COMPLICATION: Primary | ICD-10-CM

## 2018-06-07 PROCEDURE — 99999 PR PBB SHADOW E&M-EST. PATIENT-LVL III: CPT | Mod: PBBFAC,,, | Performed by: FAMILY MEDICINE

## 2018-06-07 PROCEDURE — 3008F BODY MASS INDEX DOCD: CPT | Mod: CPTII,S$GLB,, | Performed by: FAMILY MEDICINE

## 2018-06-07 PROCEDURE — 99213 OFFICE O/P EST LOW 20 MIN: CPT | Mod: S$GLB,,, | Performed by: FAMILY MEDICINE

## 2018-06-07 RX ORDER — OXYCODONE AND ACETAMINOPHEN 10; 325 MG/1; MG/1
1 TABLET ORAL EVERY 4 HOURS PRN
Qty: 30 TABLET | Refills: 0 | Status: SHIPPED | OUTPATIENT
Start: 2018-06-07 | End: 2018-06-07

## 2018-06-07 RX ORDER — OXYCODONE AND ACETAMINOPHEN 5; 325 MG/1; MG/1
1 TABLET ORAL EVERY 8 HOURS PRN
Qty: 20 TABLET | Refills: 0 | Status: SHIPPED | OUTPATIENT
Start: 2018-06-07 | End: 2018-06-18 | Stop reason: SDUPTHER

## 2018-06-07 NOTE — PROGRESS NOTES
Subjective:       Patient ID: Ida Orozco is a 52 y.o. female.    Chief Complaint: Follow-up (patient is here to follow up since her recent surgery )    Underwent proctectomy late last week, discharged home on Monday.  Has a persistent umbilical surrounding a with continuous drainage.  Packing with stoma powder and sterile gauze.  Complains of low-grade temp, but no fevers.  No vomiting.  Complains of postoperative pain, says she is out of all of her pain medications, requesting a refill for now.      Review of Systems   Constitutional: Positive for activity change. Negative for unexpected weight change.   HENT: Negative for hearing loss, rhinorrhea and trouble swallowing.    Eyes: Negative for discharge and visual disturbance.   Respiratory: Negative for chest tightness and wheezing.    Cardiovascular: Negative for chest pain and palpitations.   Gastrointestinal: Negative for blood in stool, constipation, diarrhea and vomiting.   Endocrine: Negative for polydipsia and polyuria.   Genitourinary: Positive for difficulty urinating and dysuria. Negative for hematuria and menstrual problem.   Musculoskeletal: Positive for arthralgias and joint swelling. Negative for neck pain.   Skin: Positive for wound.   Neurological: Negative for weakness and headaches.   Psychiatric/Behavioral: Positive for confusion and dysphoric mood.       Objective:      Vitals:    06/07/18 1612   BP: (!) 91/49   BP Location: Left arm   Patient Position: Sitting   BP Method: Large (Automatic)   Pulse: 68   Resp: 18   Temp: 99.2 °F (37.3 °C)   TempSrc: Oral   SpO2: 95%   Weight: 112.5 kg (248 lb)     Physical Exam   Constitutional: She is oriented to person, place, and time. She appears well-developed and well-nourished.   HENT:   Head: Normocephalic and atraumatic.   Cardiovascular: Normal rate, regular rhythm and normal heart sounds.    Pulmonary/Chest: Effort normal and breath sounds normal.   Abdominal: Soft. Bowel sounds are normal. She  exhibits no distension. There is no tenderness. There is no guarding.   Lower abdominal midline incision healing nicely; umbilicus packed with gauze and stoma powder, dry, no apparent drainage at present; no erythema or induration   Musculoskeletal: She exhibits no edema.   Neurological: She is alert and oriented to person, place, and time.   Skin: Skin is warm and dry.   Nursing note and vitals reviewed.      Assessment:       1. Ulcerative proctitis with complication        Plan:       Ulcerative proctitis with complication  Comments:  Follow-up with surgeon as scheduled  Orders:  -     oxyCODONE-acetaminophen (PERCOCET) 5-325 mg per tablet; Take 1 tablet by mouth every 8 (eight) hours as needed for Pain.  Dispense: 20 tablet; Refill: 0      Medication List with Changes/Refills   New Medications    OXYCODONE-ACETAMINOPHEN (PERCOCET) 5-325 MG PER TABLET    Take 1 tablet by mouth every 8 (eight) hours as needed for Pain.   Current Medications    ALBUTEROL (PROVENTIL) 2.5 MG /3 ML (0.083 %) NEBULIZER SOLUTION    Take 3 mLs (2.5 mg total) by nebulization every 6 (six) hours as needed for Wheezing. Rescue    ALBUTEROL 90 MCG/ACTUATION INHALER    Inhale 2 puffs into the lungs every 4 (four) hours as needed for Wheezing or Shortness of Breath. Rescue    BUPROPION (WELLBUTRIN) 75 MG TABLET    Take 2 tablets by mouth 3 (three) times daily.    CLONAZEPAM (KLONOPIN) 1 MG TABLET    Take 1 mg by mouth 2 (two) times daily.     ESCITALOPRAM OXALATE (LEXAPRO) 20 MG TABLET    Take 30 mg by mouth every morning. Taking a total of 30 mg    FLUTICASONE (FLONASE) 50 MCG/ACTUATION NASAL SPRAY    1 spray by Each Nare route every evening. As needed for allergies with the cold front    LORATADINE (CLARITIN) 10 MG TABLET    TAKE ONE TABLET BY MOUTH ONCE DAILY    MONTELUKAST (SINGULAIR) 10 MG TABLET    TAKE ONE TABLET BY MOUTH EVERY EVENING    NYSTATIN (MYCOSTATIN) POWDER    Apply topically 2 (two) times daily.    ONDANSETRON (ZOFRAN-ODT) 4  MG TBDL    Take 1 tablet (4 mg total) by mouth every 6 (six) hours as needed.    PANTOPRAZOLE (PROTONIX) 40 MG TABLET    Take 1 tablet (40 mg total) by mouth once daily.    SYMBICORT 160-4.5 MCG/ACTUATION HFAA    INHALE 2 PUFFS INTO THE LUNGS EVERY 12 (TWELVE) HOURS. CONTROLLER    TAMSULOSIN (FLOMAX) 0.4 MG CP24    Take 1 capsule (0.4 mg total) by mouth once daily.    TRAZODONE (DESYREL) 50 MG TABLET    Take 50 mg by mouth every evening.   Discontinued Medications    GUAIFENESIN-CODEINE 100-10 MG/5 ML (TUSSI-ORGANIDIN NR)  MG/5 ML SYRUP    Take 5 mLs by mouth every 6 (six) hours as needed for Cough.    LIDOCAINE/HYDROCORTISONE AC (LIDOCAINE HCL-HYDROCORTISON AC) 3 %-1 % (7 GRAM) CREA    Place 1 application rectally 2 (two) times daily.    OXYCODONE (ROXICODONE) 15 MG TAB    Take 1 tablet (15 mg total) by mouth every 4 (four) hours as needed.    OXYCODONE-ACETAMINOPHEN (PERCOCET)  MG PER TABLET    Take 1 tablet by mouth every 4 (four) hours as needed for Pain.

## 2018-06-08 ENCOUNTER — PATIENT MESSAGE (OUTPATIENT)
Dept: PRIMARY CARE CLINIC | Facility: CLINIC | Age: 53
End: 2018-06-08

## 2018-06-08 ENCOUNTER — PATIENT MESSAGE (OUTPATIENT)
Dept: SURGERY | Facility: CLINIC | Age: 53
End: 2018-06-08

## 2018-06-14 DIAGNOSIS — Z43.2 ATTENTION TO ILEOSTOMY: Primary | ICD-10-CM

## 2018-06-16 ENCOUNTER — HOSPITAL ENCOUNTER (EMERGENCY)
Facility: HOSPITAL | Age: 53
Discharge: HOME | End: 2018-06-16
Attending: EMERGENCY MEDICINE
Payer: MEDICARE

## 2018-06-16 VITALS
SYSTOLIC BLOOD PRESSURE: 119 MMHG | DIASTOLIC BLOOD PRESSURE: 66 MMHG | BODY MASS INDEX: 41.15 KG/M2 | TEMPERATURE: 99 F | RESPIRATION RATE: 18 BRPM | HEIGHT: 65 IN | OXYGEN SATURATION: 96 % | WEIGHT: 247 LBS | HEART RATE: 69 BPM
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
## 2018-06-16 DIAGNOSIS — R07.89 CHEST WALL PAIN: Primary | ICD-10-CM

## 2018-06-16 PROCEDURE — 25000003 PHARM REV CODE 250: Performed by: PHYSICIAN ASSISTANT

## 2018-06-16 PROCEDURE — 99284 EMERGENCY DEPT VISIT MOD MDM: CPT | Mod: 25

## 2018-06-16 PROCEDURE — 99284 EMERGENCY DEPT VISIT MOD MDM: CPT | Mod: ,,, | Performed by: PHYSICIAN ASSISTANT

## 2018-06-16 RX ORDER — NAPROXEN 500 MG/1
500 TABLET ORAL
Status: COMPLETED | OUTPATIENT
Start: 2018-06-16 | End: 2018-06-16

## 2018-06-16 RX ORDER — NAPROXEN 500 MG/1
500 TABLET ORAL 2 TIMES DAILY WITH MEALS
Qty: 20 TABLET | Refills: 0 | Status: SHIPPED | OUTPATIENT
Start: 2018-06-16 | End: 2018-07-06

## 2018-06-16 RX ORDER — METHOCARBAMOL 500 MG/1
1000 TABLET, FILM COATED ORAL 3 TIMES DAILY
Qty: 30 TABLET | Refills: 0 | Status: SHIPPED | OUTPATIENT
Start: 2018-06-16 | End: 2018-06-18

## 2018-06-16 RX ADMIN — NAPROXEN 500 MG: 500 TABLET ORAL at 03:06
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
## 2018-06-18 ENCOUNTER — OFFICE VISIT (OUTPATIENT)
Dept: PRIMARY CARE CLINIC | Facility: CLINIC | Age: 53
End: 2018-06-18
Payer: MEDICARE

## 2018-06-18 ENCOUNTER — PATIENT MESSAGE (OUTPATIENT)
Dept: PRIMARY CARE CLINIC | Facility: CLINIC | Age: 53
End: 2018-06-18

## 2018-06-18 ENCOUNTER — PATIENT MESSAGE (OUTPATIENT)
Dept: SURGERY | Facility: CLINIC | Age: 53
End: 2018-06-18

## 2018-06-18 ENCOUNTER — CLINICAL SUPPORT (OUTPATIENT)
Dept: PRIMARY CARE CLINIC | Facility: CLINIC | Age: 53
End: 2018-06-18
Payer: MEDICARE

## 2018-06-18 VITALS
OXYGEN SATURATION: 98 % | HEART RATE: 60 BPM | DIASTOLIC BLOOD PRESSURE: 66 MMHG | BODY MASS INDEX: 41.48 KG/M2 | SYSTOLIC BLOOD PRESSURE: 103 MMHG | WEIGHT: 249 LBS | HEIGHT: 65 IN | RESPIRATION RATE: 18 BRPM | TEMPERATURE: 99 F
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
## 2018-06-18 DIAGNOSIS — R07.89 CHEST WALL PAIN: ICD-10-CM

## 2018-06-18 DIAGNOSIS — K51.219 ULCERATIVE PROCTITIS WITH COMPLICATION: ICD-10-CM

## 2018-06-18 DIAGNOSIS — R39.11 URINARY HESITANCY: ICD-10-CM

## 2018-06-18 PROCEDURE — 99999 PR PBB SHADOW E&M-EST. PATIENT-LVL I: CPT | Mod: PBBFAC,,,

## 2018-06-18 PROCEDURE — 3008F BODY MASS INDEX DOCD: CPT | Mod: CPTII,S$GLB,, | Performed by: NURSE PRACTITIONER

## 2018-06-18 PROCEDURE — 99214 OFFICE O/P EST MOD 30 MIN: CPT | Mod: S$GLB,,, | Performed by: NURSE PRACTITIONER

## 2018-06-18 PROCEDURE — 87086 URINE CULTURE/COLONY COUNT: CPT

## 2018-06-18 PROCEDURE — 99999 PR PBB SHADOW E&M-EST. PATIENT-LVL V: CPT | Mod: PBBFAC,,, | Performed by: NURSE PRACTITIONER

## 2018-06-18 RX ORDER — OXYCODONE AND ACETAMINOPHEN 5; 325 MG/1; MG/1
1 TABLET ORAL EVERY 8 HOURS PRN
Qty: 15 TABLET | Refills: 0 | Status: SHIPPED | OUTPATIENT
Start: 2018-06-18 | End: 2018-07-06 | Stop reason: SDUPTHER

## 2018-06-18 RX ORDER — CYCLOBENZAPRINE HCL 5 MG
5 TABLET ORAL 3 TIMES DAILY PRN
Qty: 28 TABLET | Refills: 0 | Status: SHIPPED | OUTPATIENT
Start: 2018-06-18 | End: 2018-06-28 | Stop reason: SDUPTHER
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
## 2018-06-20 ENCOUNTER — PATIENT MESSAGE (OUTPATIENT)
Dept: PRIMARY CARE CLINIC | Facility: CLINIC | Age: 53
End: 2018-06-20

## 2018-06-20 ENCOUNTER — TELEPHONE (OUTPATIENT)
Dept: PRIMARY CARE CLINIC | Facility: CLINIC | Age: 53
End: 2018-06-20

## 2018-06-20 DIAGNOSIS — R39.11 URINARY HESITANCY: Primary | ICD-10-CM

## 2018-06-20 LAB — BACTERIA UR CULT: NORMAL

## 2018-06-20 NOTE — TELEPHONE ENCOUNTER
"Patient needs a referral to Dr. Banks because "she still can not go to the bathroom". Advised her I will call when referral is done  "

## 2018-06-20 NOTE — TELEPHONE ENCOUNTER
----- Message from Janet Guardado NP sent at 6/20/2018 12:33 PM CDT -----  Please call the patient and let her know that I have received the results of her urine culture and it was negative for any bacteria.

## 2018-06-25 RX ORDER — TAMSULOSIN HYDROCHLORIDE 0.4 MG/1
CAPSULE ORAL
Qty: 30 CAPSULE | Refills: 0 | OUTPATIENT
Start: 2018-06-25

## 2018-06-28 ENCOUNTER — PATIENT MESSAGE (OUTPATIENT)
Dept: PRIMARY CARE CLINIC | Facility: CLINIC | Age: 53
End: 2018-06-28

## 2018-06-28 DIAGNOSIS — R07.89 CHEST WALL PAIN: ICD-10-CM

## 2018-06-28 RX ORDER — CYCLOBENZAPRINE HCL 5 MG
5 TABLET ORAL 3 TIMES DAILY PRN
Qty: 28 TABLET | Refills: 1 | Status: SHIPPED | OUTPATIENT
Start: 2018-06-28 | End: 2018-07-06

## 2018-06-28 RX ORDER — CYCLOBENZAPRINE HCL 5 MG
5 TABLET ORAL 3 TIMES DAILY PRN
Qty: 28 TABLET | Refills: 0 | OUTPATIENT
Start: 2018-06-28 | End: 2018-07-08

## 2018-07-02 ENCOUNTER — PATIENT MESSAGE (OUTPATIENT)
Dept: PRIMARY CARE CLINIC | Facility: CLINIC | Age: 53
End: 2018-07-02

## 2018-07-06 ENCOUNTER — OFFICE VISIT (OUTPATIENT)
Dept: PRIMARY CARE CLINIC | Facility: CLINIC | Age: 53
End: 2018-07-06
Payer: MEDICARE

## 2018-07-06 VITALS
HEART RATE: 57 BPM | HEIGHT: 64 IN | SYSTOLIC BLOOD PRESSURE: 101 MMHG | BODY MASS INDEX: 42.34 KG/M2 | WEIGHT: 248 LBS | TEMPERATURE: 98 F | DIASTOLIC BLOOD PRESSURE: 66 MMHG | OXYGEN SATURATION: 99 % | RESPIRATION RATE: 18 BRPM

## 2018-07-06 DIAGNOSIS — R07.81 RIB PAIN ON LEFT SIDE: Primary | ICD-10-CM

## 2018-07-06 DIAGNOSIS — R30.0 DYSURIA: ICD-10-CM

## 2018-07-06 DIAGNOSIS — K51.219 ULCERATIVE PROCTITIS WITH COMPLICATION: ICD-10-CM

## 2018-07-06 LAB
BILIRUB SERPL-MCNC: NORMAL MG/DL
BLOOD URINE, POC: NORMAL
COLOR, POC UA: YELLOW
GLUCOSE UR QL STRIP: NORMAL
KETONES UR QL STRIP: NORMAL
LEUKOCYTE ESTERASE URINE, POC: NORMAL
NITRITE, POC UA: NORMAL
PH, POC UA: 5
PROTEIN, POC: NORMAL
SPECIFIC GRAVITY, POC UA: 1.01
UROBILINOGEN, POC UA: NORMAL

## 2018-07-06 PROCEDURE — 99214 OFFICE O/P EST MOD 30 MIN: CPT | Mod: 25,S$GLB,, | Performed by: NURSE PRACTITIONER

## 2018-07-06 PROCEDURE — 81002 URINALYSIS NONAUTO W/O SCOPE: CPT | Mod: S$GLB,,, | Performed by: NURSE PRACTITIONER

## 2018-07-06 PROCEDURE — 99999 PR PBB SHADOW E&M-EST. PATIENT-LVL IV: CPT | Mod: PBBFAC,,, | Performed by: NURSE PRACTITIONER

## 2018-07-06 PROCEDURE — 3008F BODY MASS INDEX DOCD: CPT | Mod: CPTII,S$GLB,, | Performed by: NURSE PRACTITIONER

## 2018-07-06 RX ORDER — ESCITALOPRAM OXALATE 10 MG/1
10 TABLET ORAL DAILY
COMMUNITY
Start: 2018-06-25 | End: 2019-03-27

## 2018-07-06 RX ORDER — OXYCODONE AND ACETAMINOPHEN 5; 325 MG/1; MG/1
1 TABLET ORAL EVERY 8 HOURS PRN
Qty: 15 TABLET | Refills: 0 | Status: SHIPPED | OUTPATIENT
Start: 2018-07-06 | End: 2018-08-17

## 2018-07-06 RX ORDER — CYCLOBENZAPRINE HCL 10 MG
10 TABLET ORAL 3 TIMES DAILY PRN
Qty: 30 TABLET | Refills: 0 | Status: SHIPPED | OUTPATIENT
Start: 2018-07-06 | End: 2018-07-16
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
## 2018-07-10 ENCOUNTER — OFFICE VISIT (OUTPATIENT)
Dept: SURGERY | Facility: CLINIC | Age: 53
End: 2018-07-10
Payer: MEDICARE

## 2018-07-10 VITALS
HEIGHT: 64 IN | DIASTOLIC BLOOD PRESSURE: 74 MMHG | WEIGHT: 251.31 LBS | BODY MASS INDEX: 42.9 KG/M2 | HEART RATE: 83 BPM | SYSTOLIC BLOOD PRESSURE: 141 MMHG

## 2018-07-10 DIAGNOSIS — K51.211 ULCERATIVE PROCTITIS WITH RECTAL BLEEDING: Primary | ICD-10-CM

## 2018-07-10 PROCEDURE — 99999 PR PBB SHADOW E&M-EST. PATIENT-LVL II: CPT | Mod: PBBFAC,,, | Performed by: COLON & RECTAL SURGERY

## 2018-07-10 PROCEDURE — 99024 POSTOP FOLLOW-UP VISIT: CPT | Mod: S$GLB,,, | Performed by: COLON & RECTAL SURGERY

## 2018-07-12 DIAGNOSIS — R93.89 ABNORMAL CT OF THE CHEST: Primary | ICD-10-CM

## 2018-07-16 ENCOUNTER — OFFICE VISIT (OUTPATIENT)
Dept: UROLOGY | Facility: CLINIC | Age: 53
End: 2018-07-16
Payer: MEDICARE

## 2018-07-16 VITALS
DIASTOLIC BLOOD PRESSURE: 67 MMHG | BODY MASS INDEX: 41.82 KG/M2 | HEART RATE: 77 BPM | HEIGHT: 65 IN | WEIGHT: 251 LBS | SYSTOLIC BLOOD PRESSURE: 110 MMHG

## 2018-07-16 DIAGNOSIS — R33.9 INCOMPLETE BLADDER EMPTYING: Primary | ICD-10-CM

## 2018-07-16 DIAGNOSIS — R31.0 GROSS HEMATURIA: ICD-10-CM

## 2018-07-16 DIAGNOSIS — N39.46 MIXED STRESS AND URGE URINARY INCONTINENCE: ICD-10-CM

## 2018-07-16 LAB
BILIRUB SERPL-MCNC: ABNORMAL MG/DL
BLOOD URINE, POC: ABNORMAL
COLOR, POC UA: ABNORMAL
GLUCOSE UR QL STRIP: ABNORMAL
KETONES UR QL STRIP: ABNORMAL
LEUKOCYTE ESTERASE URINE, POC: ABNORMAL
NITRITE, POC UA: ABNORMAL
PH, POC UA: 5
POC RESIDUAL URINE VOLUME: 20 ML (ref 0–100)
PROTEIN, POC: ABNORMAL
SPECIFIC GRAVITY, POC UA: 1.02
UROBILINOGEN, POC UA: ABNORMAL

## 2018-07-16 PROCEDURE — 51798 US URINE CAPACITY MEASURE: CPT | Mod: 59,S$GLB,, | Performed by: UROLOGY

## 2018-07-16 PROCEDURE — 99999 PR PBB SHADOW E&M-EST. PATIENT-LVL IV: CPT | Mod: PBBFAC,,, | Performed by: UROLOGY

## 2018-07-16 PROCEDURE — 81002 URINALYSIS NONAUTO W/O SCOPE: CPT | Mod: S$GLB,,, | Performed by: UROLOGY

## 2018-07-16 PROCEDURE — 3008F BODY MASS INDEX DOCD: CPT | Mod: CPTII,S$GLB,, | Performed by: UROLOGY

## 2018-07-16 PROCEDURE — 51701 INSERT BLADDER CATHETER: CPT | Mod: S$GLB,,, | Performed by: UROLOGY

## 2018-07-16 PROCEDURE — 87086 URINE CULTURE/COLONY COUNT: CPT

## 2018-07-16 PROCEDURE — 99204 OFFICE O/P NEW MOD 45 MIN: CPT | Mod: 25,S$GLB,, | Performed by: UROLOGY

## 2018-07-16 RX ORDER — LIDOCAINE HYDROCHLORIDE 20 MG/ML
JELLY TOPICAL ONCE
Status: CANCELLED | OUTPATIENT
Start: 2018-07-16 | End: 2018-07-16

## 2018-07-16 RX ORDER — TAMSULOSIN HYDROCHLORIDE 0.4 MG/1
0.4 CAPSULE ORAL DAILY
Qty: 30 CAPSULE | Refills: 11 | Status: SHIPPED | OUTPATIENT
Start: 2018-07-16 | End: 2019-01-09

## 2018-07-16 NOTE — PROGRESS NOTES
Subjective:      Ida Orozco is a 52 y.o. female who returns today regarding her     Incomplete bladder emptying since proctectomy last month.    Pt is on multiple meds which may impair emptying    Had gross hematuria once approx 1 week post op    She says flomax did not help  Rangel was replaced    She currently does not have a rangel but has a very slow stream and has difficulty emptying    Also has leakage without warning  This happens when she is walking but no with cough or sneeze    +freq    The following portions of the patient's history were reviewed and updated as appropriate: allergies, current medications, past family history, past medical history, past social history, past surgical history and problem list.    Review of Systems  Pertinent items are noted in HPI.  A comprehensive multipoint review of systems was negative except as otherwise stated in the HPI.    Past Medical History:   Diagnosis Date    Anxiety     Asthma     Crohn's colitis     Diabetes mellitus     Fatty liver disease, nonalcoholic     GERD (gastroesophageal reflux disease)     History of sleeve gastrectomy 2016    Hypertension      Past Surgical History:   Procedure Laterality Date    APPENDECTOMY       SECTION, LOW TRANSVERSE      CHOLECYSTECTOMY      open    COLECTOMY      total- 2013    FLEXIBLE SIGMOIDOSCOPY  2018    Procedure: SIGMOIDOSCOPY, FLEXIBLE;  Surgeon: JENNY Virgen MD;  Location: [facility] OR [address] [place] [address];  Service: Colon and Rectal;;    GASTRIC SLEEVE       HYSTERECTOMY      ILEOSTOMY REVISION      2014; 2014    LAPAROSCOPIC PROCTECTOMY N/A 2018    Procedure: PROCTECTOMY-LAPAROSCOPIC/CONVERTED TO OPEN;  Surgeon: JENNY Virgen MD;  Location: [facility] OR [address] [place] [address];  Service: Colon and Rectal;  Laterality: N/A;    LYSIS OF ADHESIONS  2018    Procedure: LYSIS, ADHESIONS/ more than 2hours;  Surgeon: JENNY Virgen MD;  Location: [facility] OR [address] [place] [address];  Service: Colon and  "Rectal;;    thumb surgery      TONSILLECTOMY, ADENOIDECTOMY      WISDOM TOOTH EXTRACTION         Review of patient's allergies indicates:   Allergen Reactions    Adhesive      Cause blisters    Dilaudid [hydromorphone] Nausea And Vomiting    Sulfa (sulfonamide antibiotics) Hives          Objective:   Vitals: /67   Pulse 77   Ht 5' 4.5" (1.638 m)   Wt 113.9 kg (251 lb)   BMI 42.42 kg/m²     Physical Exam   General: alert and oriented, no acute distress  Respiratory: Symmetric expansion, non-labored breathing  Cardiovascular: no peripheral edema  Abdomen: multiple scars  RLQ ileostomy   Skin: normal coloration and turgor, no rashes, no suspicious skin lesions noted  Neuro: no gross deficits  Psych: normal judgment and insight, normal mood/affect and non-anxious  Pelvic  No masses  Decreased tone of pelvic floor    Cath PVR 200cc    Physical Exam    Lab Review   Urinalysis demonstrates trace wbc  Nitrite neg  Blood neg  Cath urine sent to lab    Lab Results   Component Value Date    WBC 7.33 05/27/2018    HGB 8.2 (L) 05/27/2018    HCT 27.1 (L) 05/27/2018    MCV 98 05/27/2018     05/27/2018     Lab Results   Component Value Date    CREATININE 0.8 05/27/2018    BUN 8 05/27/2018       Imaging  PVR 20cc (?accurateness due to pannus and scars in SP area)    Assessment and Plan:   Incomplete bladder emptying  -     Urine culture  -     Basic metabolic panel; Future; Expected date: 07/16/2018    Gross hematuria  -     Urine culture  -     Basic metabolic panel; Future; Expected date: 07/16/2018  -     CT Urogram Abd Pelvis W WO; Future; Expected date: 07/16/2018  -     Vital Signs ; Standing  -     POCT glucose; Standing  -     Notify physician ; Standing  -     Diet NPO; Standing  -     Case Request Operating Room: CYSTOSCOPY, WITH RETROGRADE PYELOGRAM  -     Place in Outpatient; Standing  -     Place JUAN hose; Standing  -     Place sequential compression device; Standing    Mixed stress and urge " urinary incontinence  -     Urine culture  -     Ambulatory consult to Physical Therapy    Other orders  -     tamsulosin (FLOMAX) 0.4 mg Cap; Take 1 capsule (0.4 mg total) by mouth once daily.  Dispense: 30 capsule; Refill: 11  -     IP VTE LOW RISK PATIENT; Standing  -     ceFAZolin (ANCEF) 2 g in dextrose 5 % 50 mL IVPB; Inject 2 g into the vein On call Procedure (Surgery).  -     lidocaine HCl 2% urojet; by Mucous Membrane route once.    see PCP to review psych and allergy meds as these may contribute to incomplete bladder emptying  Consider FUDS after above  Pt has questions about prilosec and her kidney function; I asked her to address these questions with the physician who is prescribing this

## 2018-07-16 NOTE — LETTER
July 16, 2018      Janet Guardado, ZAKIA  [address] [place] [address]           [facility]  [address]  [place] [address]  Phone: [phone]  Fax: [phone]          Patient: Ida Orozco   MR Number: [id number]   YOB: 1965   Date of Visit: 7/16/2018       Dear Janet Guardado:    Thank you for referring Ida Orozco to me for evaluation. Attached you will find relevant portions of my assessment and plan of care.    If you have questions, please do not hesitate to call me. I look forward to following Ida Orozco along with you.    Sincerely,    Butch Banks MD    Enclosure  CC:  No Recipients    If you would like to receive this communication electronically, please contact [email or web address] or [phone] to request more information on [employer] access.    For providers and/or their staff who would like to refer a patient to [facility], please contact us through our one-stop-shop provider referral line, [facility], at [phone].    If you feel you have received this communication in error or would no longer like to receive these types of communications, please e-mail [email or web address]

## 2018-07-17 NOTE — PROGRESS NOTES
HPI:  Ida Orozco is a 52 y.o. female with history of chronic ulcerative colitis status post completion proctectomy.  She is doing very well.  She has had some minimal discomfort in her perineal wound and she believes that a stitch is protruding.  She denies any abdominal pain or nausea or vomiting.  She denies any fever.  She denies any problems related to her midline incision.        Past Medical History:   Diagnosis Date    Anxiety     Asthma     Crohn's colitis     Diabetes mellitus     Fatty liver disease, nonalcoholic     GERD (gastroesophageal reflux disease)     History of sleeve gastrectomy 2016    Hypertension         Past Surgical History:   Procedure Laterality Date    APPENDECTOMY       SECTION, LOW TRANSVERSE      CHOLECYSTECTOMY      open    COLECTOMY      total- 2013    FLEXIBLE SIGMOIDOSCOPY  2018    Procedure: SIGMOIDOSCOPY, FLEXIBLE;  Surgeon: JENNY Virgen MD;  Location: [facility] OR [address] [place] [address];  Service: Colon and Rectal;;    GASTRIC SLEEVE       HYSTERECTOMY      ILEOSTOMY REVISION      2014; 2014    LAPAROSCOPIC PROCTECTOMY N/A 2018    Procedure: PROCTECTOMY-LAPAROSCOPIC/CONVERTED TO OPEN;  Surgeon: JENNY Virgen MD;  [facility];  Service: Colon and Rectal;  Laterality: N/A;    LYSIS OF ADHESIONS  2018    Procedure: LYSIS, ADHESIONS/ more than 2hours;  Surgeon: JENNY Virgen MD;  Location: [facility] OR [facility];  Service: Colon and Rectal;;    thumb surgery      TONSILLECTOMY, ADENOIDECTOMY      WISDOM TOOTH EXTRACTION         Review of patient's allergies indicates:   Allergen Reactions    Adhesive      Cause blisters    Dilaudid [hydromorphone] Nausea And Vomiting    Sulfa (sulfonamide antibiotics) Hives       Family History   Problem Relation Age of Onset    Cancer Mother         skin    Heart disease Father 67    Cancer Paternal Grandfather         ?    Cancer Maternal Grandfather         colon   "      Social History     Social History    Marital status: Legally      Spouse name: N/A    Number of children: N/A    Years of education: N/A     Social History Main Topics    Smoking status: Former Smoker     Types: Cigarettes     Quit date: 2009    Smokeless tobacco: Never Used    Alcohol use No    Drug use: No    Sexual activity: No     Other Topics Concern    Not on file     Social History Narrative    No narrative on file       ROS:  GENERAL: No fever, chills, fatigability or weight loss.  Integument: No rashes, redness, icterus  CHEST: Denies BURRELL, cyanosis, wheezing, cough and sputum production.  CARDIOVASCULAR: Denies chest pain, PND, orthopnea or reduced exercise tolerance.  GI: Denies abd pain, dysphagia, nausea, vomiting, no hematemesis   : Denies burning on urination, no hematuria, no bacteriuria  MSK: No deformities, swelling, joint pain swelling  Neurologic: No HAs, seizures, weakness, paresthesias, gait problems    PE:  General appearance healthy  BP (!) 141/74   Pulse 83   Ht 5' 4.02" (1.626 m)   Wt 114 kg (251 lb 5.2 oz)   BMI 43.12 kg/m²   Sclera/ Skin anicteric  AT NC EOMI  Neck supple trachea midline   Chest symmetric, nl excursion, no retractions, breathing comfortably  Abdomen - Wound healed primarily  ND soft NT.  No masses, no organomegaly  EXT - no CCE  Neuro:  Mood/ affect nl, alert and oriented x 3, moves all ext's, gait nl    Rectal  Inspection - Wound healed.  Heavy Vicryl suture identified and trimmed.      Assessment:  Wound healed primarily following completion proctectomy.  GI function returned without complication.    Plan:  Return to clinic p.r.n.    "

## 2018-07-18 LAB — BACTERIA UR CULT: NO GROWTH

## 2018-07-19 ENCOUNTER — TELEPHONE (OUTPATIENT)
Dept: UROLOGY | Facility: CLINIC | Age: 53
End: 2018-07-19

## 2018-07-19 NOTE — TELEPHONE ENCOUNTER
Spoke with patient.  She is scheduled for a procedure with Dr Darren Narayan on 7/30.  She will be out of town on that day and would like to reschedule.  Patient's procedure rescheduled to 8/20.  [facility] neela called

## 2018-07-20 ENCOUNTER — PATIENT MESSAGE (OUTPATIENT)
Dept: ADMINISTRATIVE | Facility: OTHER | Age: 53
End: 2018-07-20

## 2018-07-31 ENCOUNTER — PATIENT MESSAGE (OUTPATIENT)
Dept: PRIMARY CARE CLINIC | Facility: CLINIC | Age: 53
End: 2018-07-31

## 2018-07-31 NOTE — TELEPHONE ENCOUNTER
Should go to ER if she is feeling that poorly, especially if she had what sounds like a syncopal episode/loss of consciousness.

## 2018-08-11 ENCOUNTER — TELEPHONE (OUTPATIENT)
Dept: ADMINISTRATIVE | Facility: CLINIC | Age: 53
End: 2018-08-11

## 2018-08-20 PROBLEM — R33.9 INCOMPLETE BLADDER EMPTYING: Status: RESOLVED | Noted: 2018-07-16 | Resolved: 2018-08-20

## 2018-08-22 ENCOUNTER — PATIENT MESSAGE (OUTPATIENT)
Dept: UROLOGY | Facility: CLINIC | Age: 53
End: 2018-08-22

## 2018-09-10 RX ORDER — MONTELUKAST SODIUM 10 MG/1
TABLET ORAL
Qty: 90 TABLET | Refills: 1 | Status: SHIPPED | OUTPATIENT
Start: 2018-09-10 | End: 2019-03-04 | Stop reason: SDUPTHER

## 2018-09-12 ENCOUNTER — PATIENT MESSAGE (OUTPATIENT)
Dept: PRIMARY CARE CLINIC | Facility: CLINIC | Age: 53
End: 2018-09-12

## 2018-09-12 RX ORDER — AZITHROMYCIN 250 MG/1
TABLET, FILM COATED ORAL
Qty: 6 TABLET | Refills: 0 | Status: SHIPPED | OUTPATIENT
Start: 2018-09-12 | End: 2018-09-16

## 2018-09-15 ENCOUNTER — PATIENT MESSAGE (OUTPATIENT)
Dept: PRIMARY CARE CLINIC | Facility: CLINIC | Age: 53
End: 2018-09-15

## 2018-09-18 ENCOUNTER — OFFICE VISIT (OUTPATIENT)
Dept: PRIMARY CARE CLINIC | Facility: CLINIC | Age: 53
End: 2018-09-18
Payer: MEDICARE

## 2018-09-18 VITALS
HEART RATE: 83 BPM | OXYGEN SATURATION: 98 % | RESPIRATION RATE: 18 BRPM | HEIGHT: 65 IN | SYSTOLIC BLOOD PRESSURE: 117 MMHG | DIASTOLIC BLOOD PRESSURE: 71 MMHG | WEIGHT: 258 LBS | TEMPERATURE: 98 F | BODY MASS INDEX: 42.99 KG/M2

## 2018-09-18 DIAGNOSIS — J01.00 ACUTE NON-RECURRENT MAXILLARY SINUSITIS: Primary | ICD-10-CM

## 2018-09-18 PROCEDURE — 96372 THER/PROPH/DIAG INJ SC/IM: CPT | Mod: PBBFAC,PN

## 2018-09-18 PROCEDURE — 99999 PR PBB SHADOW E&M-EST. PATIENT-LVL III: CPT | Mod: PBBFAC,,, | Performed by: FAMILY MEDICINE

## 2018-09-18 PROCEDURE — 3008F BODY MASS INDEX DOCD: CPT | Mod: CPTII,,, | Performed by: FAMILY MEDICINE

## 2018-09-18 PROCEDURE — 99213 OFFICE O/P EST LOW 20 MIN: CPT | Mod: PBBFAC,PN,25 | Performed by: FAMILY MEDICINE

## 2018-09-18 PROCEDURE — 99214 OFFICE O/P EST MOD 30 MIN: CPT | Mod: S$PBB,,, | Performed by: FAMILY MEDICINE

## 2018-09-18 RX ORDER — AMOXICILLIN AND CLAVULANATE POTASSIUM 875; 125 MG/1; MG/1
1 TABLET, FILM COATED ORAL EVERY 12 HOURS
Qty: 20 TABLET | Refills: 0 | Status: SHIPPED | OUTPATIENT
Start: 2018-09-18 | End: 2018-09-27 | Stop reason: ALTCHOICE

## 2018-09-18 RX ORDER — BETAMETHASONE SODIUM PHOSPHATE AND BETAMETHASONE ACETATE 3; 3 MG/ML; MG/ML
6 INJECTION, SUSPENSION INTRA-ARTICULAR; INTRALESIONAL; INTRAMUSCULAR; SOFT TISSUE
Status: COMPLETED | OUTPATIENT
Start: 2018-09-18 | End: 2018-09-18

## 2018-09-18 RX ORDER — ESCITALOPRAM OXALATE 20 MG/1
1 TABLET ORAL DAILY
COMMUNITY
Start: 2018-07-23 | End: 2019-03-27

## 2018-09-18 RX ADMIN — BETAMETHASONE SODIUM PHOSPHATE AND BETAMETHASONE ACETATE 6 MG: 3; 3 INJECTION, SUSPENSION INTRA-ARTICULAR; INTRALESIONAL; INTRAMUSCULAR at 09:09

## 2018-09-18 NOTE — PROGRESS NOTES
Patient ID by name and . Allergies verified. Celestone 6 mg IM injection given in right ventrogluteal using aseptic technique. Aspirated with no blood noted. Patient tolerated well. Given per physicians order. No adverse reaction noted.

## 2018-09-18 NOTE — PROGRESS NOTES
"Subjective:       Patient ID: Ida Orozco is a 53 y.o. female.    Chief Complaint: Cough and Axillary Lymphadenopathy    C/o sinus congestion, swollen cervical lymph nodes, postnasal drip and cough productive of green mucus since last week      Otalgia    There is pain in both ears. This is a new problem. The current episode started in the past 7 days. The problem occurs every few minutes. The problem has been gradually worsening. The maximum temperature recorded prior to her arrival was 100.4 - 100.9 F. The fever has been present for 1 to 2 days. The pain is at a severity of 6/10. The pain is moderate. Associated symptoms include coughing, headaches and rhinorrhea. Pertinent negatives include no rash or vomiting. She has tried nothing for the symptoms. The treatment provided no relief. There is no history of a chronic ear infection, hearing loss or a tympanostomy tube.     Review of Systems   Constitutional: Positive for fever.   HENT: Positive for ear pain and rhinorrhea.    Respiratory: Positive for cough. Negative for shortness of breath.    Cardiovascular: Negative for chest pain.   Gastrointestinal: Positive for nausea. Negative for vomiting.   Genitourinary: Positive for dysuria.   Skin: Negative for rash.   Neurological: Positive for headaches.       Objective:      Vitals:    09/18/18 0822   BP: 117/71   BP Location: Left arm   Patient Position: Sitting   BP Method: Large (Automatic)   Pulse: 83   Resp: 18   Temp: 98.4 °F (36.9 °C)   TempSrc: Oral   SpO2: 98%   Weight: 117 kg (258 lb)   Height: 5' 4.5" (1.638 m)     Physical Exam   Constitutional: She is oriented to person, place, and time. She appears well-developed and well-nourished.   HENT:   Head: Normocephalic and atraumatic.   Right Ear: Tympanic membrane normal.   Left Ear: Tympanic membrane normal.   Mouth/Throat: Posterior oropharyngeal erythema present. No oropharyngeal exudate.   Eyes: EOM are normal. Pupils are equal, round, and reactive to " light.   Neck: Neck supple. No JVD present.   Cardiovascular: Normal rate, regular rhythm and normal heart sounds.   Pulmonary/Chest: Effort normal and breath sounds normal.   Musculoskeletal: She exhibits no edema.   Lymphadenopathy:     She has cervical adenopathy.   Neurological: She is alert and oriented to person, place, and time.   Skin: Skin is warm and dry.   Psychiatric: She has a normal mood and affect. Her behavior is normal.   Nursing note and vitals reviewed.      Assessment:       1. Acute non-recurrent maxillary sinusitis        Plan:       Acute non-recurrent maxillary sinusitis  -     betamethasone acetate-betamethasone sodium phosphate injection 6 mg; Inject 1 mL (6 mg total) into the muscle one time.  -     amoxicillin-clavulanate 875-125mg (AUGMENTIN) 875-125 mg per tablet; Take 1 tablet by mouth every 12 (twelve) hours. for 10 days  Dispense: 20 tablet; Refill: 0         Medication List           Accurate as of 9/18/18  8:50 AM. If you have any questions, ask your nurse or doctor.               START taking these medications    amoxicillin-clavulanate 875-125mg 875-125 mg per tablet  Commonly known as:  AUGMENTIN  Take 1 tablet by mouth every 12 (twelve) hours. for 10 days  Started by:  Eliecer Jones MD        CHANGE how you take these medications    loratadine 10 mg tablet  Commonly known as:  CLARITIN  TAKE ONE TABLET BY MOUTH ONCE DAILY  What changed:  See the new instructions.     pantoprazole 40 MG tablet  Commonly known as:  PROTONIX  Take 1 tablet (40 mg total) by mouth once daily.  What changed:  when to take this     SYMBICORT 160-4.5 mcg/actuation [facility]  Generic drug:  budesonide-formoterol 160-4.5 mcg  INHALE 2 PUFFS INTO THE LUNGS EVERY 12 (TWELVE) HOURS. CONTROLLER  What changed:    · when to take this  · reasons to take this  · additional instructions        CONTINUE taking these medications    * albuterol 90 mcg/actuation inhaler  Commonly known as:  PROVENTIL/VENTOLIN  HFA  Inhale 2 puffs into the lungs every 4 (four) hours as needed for Wheezing or Shortness of Breath. Rescue     * albuterol 2.5 mg /3 mL (0.083 %) nebulizer solution  Commonly known as:  PROVENTIL  Take 3 mLs (2.5 mg total) by nebulization every 6 (six) hours as needed for Wheezing. Rescue     buPROPion 75 MG tablet  Commonly known as:  WELLBUTRIN     clonazePAM 1 MG tablet  Commonly known as:  KLONOPIN     * escitalopram oxalate 10 MG tablet  Commonly known as:  LEXAPRO     * escitalopram oxalate 20 MG tablet  Commonly known as:  LEXAPRO     fluticasone 50 mcg/actuation nasal spray  Commonly known as:  FLONASE     montelukast 10 mg tablet  Commonly known as:  SINGULAIR  TAKE ONE TABLET BY MOUTH EVERY EVENING     nystatin powder  Commonly known as:  MYCOSTATIN  Apply topically 2 (two) times daily.     tamsulosin 0.4 mg Cap  Commonly known as:  FLOMAX  Take 1 capsule (0.4 mg total) by mouth once daily.     traZODone 50 MG tablet  Commonly known as:  DESYREL         * This list has 4 medication(s) that are the same as other medications prescribed for you. Read the directions carefully, and ask your doctor or other care provider to review them with you.               Where to Get Your Medications      These medications were sent to [id number] - [place] [id number]    Phone:  [phone]   · amoxicillin-clavulanate 875-125mg 875-125 mg per tablet

## 2018-09-24 ENCOUNTER — PATIENT MESSAGE (OUTPATIENT)
Dept: UROLOGY | Facility: CLINIC | Age: 53
End: 2018-09-24

## 2018-09-25 ENCOUNTER — CLINICAL SUPPORT (OUTPATIENT)
Dept: PRIMARY CARE CLINIC | Facility: CLINIC | Age: 53
End: 2018-09-25
Payer: MEDICARE

## 2018-09-25 ENCOUNTER — PATIENT MESSAGE (OUTPATIENT)
Dept: PRIMARY CARE CLINIC | Facility: CLINIC | Age: 53
End: 2018-09-25

## 2018-09-25 ENCOUNTER — PATIENT MESSAGE (OUTPATIENT)
Dept: UROLOGY | Facility: CLINIC | Age: 53
End: 2018-09-25

## 2018-09-25 DIAGNOSIS — N30.90 CYSTITIS: ICD-10-CM

## 2018-09-25 DIAGNOSIS — N30.90 CYSTITIS: Primary | ICD-10-CM

## 2018-09-25 PROCEDURE — 87086 URINE CULTURE/COLONY COUNT: CPT

## 2018-09-25 PROCEDURE — 99212 OFFICE O/P EST SF 10 MIN: CPT | Mod: PBBFAC,PN

## 2018-09-25 PROCEDURE — 99999 PR PBB SHADOW E&M-EST. PATIENT-LVL II: CPT | Mod: PBBFAC,,,

## 2018-09-25 RX ORDER — CIPROFLOXACIN 250 MG/1
250 TABLET, FILM COATED ORAL 2 TIMES DAILY
Qty: 10 TABLET | Refills: 0 | Status: SHIPPED | OUTPATIENT
Start: 2018-09-25 | End: 2018-09-26

## 2018-09-25 RX ORDER — PHENAZOPYRIDINE HYDROCHLORIDE 200 MG/1
200 TABLET, FILM COATED ORAL 3 TIMES DAILY
Qty: 6 TABLET | Refills: 0 | Status: SHIPPED | OUTPATIENT
Start: 2018-09-25 | End: 2018-09-27

## 2018-09-26 LAB — BACTERIA UR CULT: NO GROWTH

## 2018-09-27 ENCOUNTER — OFFICE VISIT (OUTPATIENT)
Dept: PRIMARY CARE CLINIC | Facility: CLINIC | Age: 53
End: 2018-09-27
Payer: MEDICARE

## 2018-09-27 VITALS
BODY MASS INDEX: 42.99 KG/M2 | RESPIRATION RATE: 18 BRPM | SYSTOLIC BLOOD PRESSURE: 117 MMHG | TEMPERATURE: 99 F | WEIGHT: 258 LBS | HEIGHT: 65 IN | HEART RATE: 65 BPM | DIASTOLIC BLOOD PRESSURE: 74 MMHG | OXYGEN SATURATION: 99 %

## 2018-09-27 DIAGNOSIS — K11.20 SALIVARY GLAND INFECTION: ICD-10-CM

## 2018-09-27 DIAGNOSIS — N30.90 CYSTITIS: Primary | ICD-10-CM

## 2018-09-27 PROCEDURE — 99214 OFFICE O/P EST MOD 30 MIN: CPT | Mod: S$PBB,,, | Performed by: NURSE PRACTITIONER

## 2018-09-27 PROCEDURE — 99999 PR PBB SHADOW E&M-EST. PATIENT-LVL V: CPT | Mod: PBBFAC,,, | Performed by: NURSE PRACTITIONER

## 2018-09-27 PROCEDURE — 99215 OFFICE O/P EST HI 40 MIN: CPT | Mod: PBBFAC,PN | Performed by: NURSE PRACTITIONER

## 2018-09-27 PROCEDURE — 3008F BODY MASS INDEX DOCD: CPT | Mod: CPTII,,, | Performed by: NURSE PRACTITIONER

## 2018-09-27 NOTE — PROGRESS NOTES
Chief Complaint  Chief Complaint   Patient presents with    Adenopathy       HPI  Ida Orozco is a 53 y.o. female with multiple medical diagnoses as listed in the medical history and problem list that presents for unilateral facial swelling, ER follow-up.     Unilateral facial swelling-patient to the ER yesterday for unilateral facial swelling and pain. Reports right-sided facial swelling and tenderness onset yesterday.  Worsening at that time so patient to the emergency room and diagnosed with lymphadenopathy and sialoadenitis.   Placed on Levaquin, meloxicam at this time.  Patient states that she did not ever start that she is concerned that she is not being treated appropriately.  Presents to clinic today with reported improvement in unilateral facial swelling. Noted lump, knot near right ear.  Reports auricular tenderness, denies inner ear pain. No ear drainage.  Reports occasional fever, chills.  Has been treating with over-the-counter warm compresses over night.  Did not ever start prescribed antibiotics.  Patient with history of blocked salivary gland in the past on the opposite side.      PAST MEDICAL HISTORY:  Past Medical History:   Diagnosis Date    Anxiety     Asthma     Crohn's colitis     Fatty liver disease, nonalcoholic     GERD (gastroesophageal reflux disease)     History of sleeve gastrectomy 2016       PAST SURGICAL HISTORY:  Past Surgical History:   Procedure Laterality Date    APPENDECTOMY      ARTHROSCOPY-KNEE W/ CHONDROPLASTY Right 2016    Performed [facility]     SECTION, LOW TRANSVERSE      CHOLECYSTECTOMY      open    COLECTOMY      total- 2013    CYSTOSCOPY W/ RETROGRADES N/A 2018    Procedure: CYSTOSCOPY, WITH RETROGRADE PYELOGRAM;  Surgeon: Butch Banks MD;  Location: [facility];  Service: Urology;  Laterality: N/A;  HANSEN SURGICAL CONFIRMED     CYSTOSCOPY, WITH RETROGRADE PYELOGRAM N/A 2018    [facility]  [facility]    ENTEROSCOPY N/A 8/4/2014    Performed by Tavo Latham MD at [facility] ENDO (2ND FLR)    ESOPHAGOGASTRODUODENOSCOPY (EGD) N/A 4/11/2018    Performed by Chris Castorena MD at [facility] ENDO (4TH FLR)    ESOPHAGOGASTRODUODENOSCOPY (EGD) N/A 5/29/2015    Performed by Domingo Hunter [facility] at [facility] ENDO    ESOPHAGOGASTRODUODENOSCOPY (EGD) N/A 12/16/2014    Performed by Domingo Hunter [facility] at [facility] ENDO    EXCISION-PLICA Right 8/25/2016    Performed by [facility] at [facility] OR    FLEXIBLE SIGMOIDOSCOPY  5/24/2018    Procedure: SIGMOIDOSCOPY, FLEXIBLE;  Surgeon: JENNY Virgen MD;  Location: [facility] OR 2ND FLR;  Service: Colon and Rectal;;    GASTRIC SLEEVE       HYSTERECTOMY      ILEOSCOPY N/A 4/11/2018    [facility] ([facility])    ILEOSTOMY REVISION      april 2014; august 2014    LAPAROSCOPIC PROCTECTOMY N/A 5/24/2018    Procedure: PROCTECTOMY-LAPAROSCOPIC/CONVERTED TO OPEN;  Surgeon: JENNY Virgen MD;  Location: [facility] OR 2ND FLR;  Service: Colon and Rectal;  Laterality: N/A;    LYSIS OF ADHESIONS  5/24/2018    Procedure: LYSIS, ADHESIONS/ more than 2hours;  Surgeon: JENNY Virgen MD;  Location: [facility] OR 2ND FLR;  Service: Colon and Rectal;;    LYSIS, ADHESIONS/ more than 2hours  5/24/2018    Performed by JENNY Virgen MD at [facility] OR 2ND FLR    LYSIS-ADHESION N/A 8/15/2014    Performed by JENNY Virgen MD at [facility] OR 2ND FLR    PROCTECTOMY-LAPAROSCOPIC/CONVERTED TO OPEN N/A 5/24/2018    Performed by JENNY Virgen MD at [facility] OR 2ND FLR    REVISION-ILEOSTOMY N/A 8/15/2014    Performed by JENNY Virgen MD at [facility] OR 2ND FLR    SIGMOIDOSCOPY, FLEXIBLE  5/24/2018    Performed by JENNY Virgen MD at [facility] OR 2ND FLR    SIGMOIDOSCOPY-FLEXIBLE N/A 4/11/2018    Performed by Chris Castorena MD at [facility] ENDO (4TH FLR)    SIGMOIDOSCOPY-FLEXIBLE N/A 8/12/2014    Performed by JENNY Virgen MD at [facility] ENDO (4TH FLR)    thumb  surgery      TONSILLECTOMY, ADENOIDECTOMY      WISDOM TOOTH EXTRACTION         SOCIAL HISTORY:  Social History     Socioeconomic History    Marital status: Legally      Spouse name: Not on file    Number of children: Not on file    Years of education: Not on file    Highest education level: Not on file   Social Needs    Financial resource strain: Not on file    Food insecurity - worry: Not on file    Food insecurity - inability: Not on file    Transportation needs - medical: Not on file    Transportation needs - non-medical: Not on file   Occupational History    Not on file   Tobacco Use    Smoking status: Former Smoker     Types: Cigarettes     Last attempt to quit:      Years since quittin.7    Smokeless tobacco: Never Used   Substance and Sexual Activity    Alcohol use: No    Drug use: No    Sexual activity: No   Other Topics Concern    Not on file   Social History Narrative    Not on file       FAMILY HISTORY:  Family History   Problem Relation Age of Onset    Cancer Mother         skin    Heart disease Father 67    Cancer Paternal Grandfather         ?    Cancer Maternal Grandfather         colon        ALLERGIES AND MEDICATIONS: updated and reviewed.  Review of patient's allergies indicates:   Allergen Reactions    Adhesive      Cause blisters    Dilaudid [hydromorphone] Nausea And Vomiting    Sulfa (sulfonamide antibiotics) Hives     Current Outpatient Medications   Medication Sig Dispense Refill    albuterol (PROVENTIL) 2.5 mg /3 mL (0.083 %) nebulizer solution Take 3 mLs (2.5 mg total) by nebulization every 6 (six) hours as needed for Wheezing. Rescue 1 Box 2    albuterol 90 mcg/actuation inhaler Inhale 2 puffs into the lungs every 4 (four) hours as needed for Wheezing or Shortness of Breath. Rescue 1 Inhaler 5    buPROPion (WELLBUTRIN) 75 MG tablet Take 75 mg by mouth every other day.       clonazePAM (KLONOPIN) 1 MG tablet Take 1 mg by mouth 2 (two) times  daily.       escitalopram oxalate (LEXAPRO) 10 MG tablet Take 10 mg by mouth once daily.       escitalopram oxalate (LEXAPRO) 20 MG tablet Take 1 tablet by mouth once daily.      fluticasone (FLONASE) 50 mcg/actuation nasal spray 1 spray by Each Nare route every evening. As needed for allergies with the cold front      levoFLOXacin (LEVAQUIN) 500 MG tablet Take 1 tablet (500 mg total) by mouth once daily. for 7 days 7 tablet 0    loratadine (CLARITIN) 10 mg tablet TAKE ONE TABLET BY MOUTH ONCE DAILY (Patient taking differently: TAKE ONE TABLET BY MOUTH ONCE DAILY pm) 90 tablet 1    meloxicam (MOBIC) 15 MG tablet Take 1 tablet (15 mg total) by mouth once daily. 10 tablet 0    montelukast (SINGULAIR) 10 mg tablet TAKE ONE TABLET BY MOUTH EVERY EVENING 90 tablet 1    nystatin (MYCOSTATIN) powder Apply topically 2 (two) times daily. 1 Bottle 6    pantoprazole (PROTONIX) 40 MG tablet Take 1 tablet (40 mg total) by mouth once daily. (Patient taking differently: Take 40 mg by mouth every other day. ) 90 tablet 3    phenazopyridine (PYRIDIUM) 200 MG tablet Take 1 tablet (200 mg total) by mouth 3 (three) times daily. for 2 days 6 tablet 0    SYMBICORT 160-4.5 mcg/actuation HFAA INHALE 2 PUFFS INTO THE LUNGS EVERY 12 (TWELVE) HOURS. CONTROLLER (Patient taking differently: Inhale 2 puffs into the lungs every 12 (twelve) hours as needed. Controller) 11 g 1    tamsulosin (FLOMAX) 0.4 mg Cap Take 1 capsule (0.4 mg total) by mouth once daily. 30 capsule 11    trazodone (DESYREL) 50 MG tablet Take 50 mg by mouth every evening.       No current facility-administered medications for this visit.          ROS  Review of Systems   Constitutional: Negative for chills, fatigue and fever.   HENT: Positive for ear pain and facial swelling. Negative for congestion, rhinorrhea, sinus pressure and sore throat.    Respiratory: Negative for cough, chest tightness and shortness of breath.    Cardiovascular: Negative for chest pain  "and palpitations.   Gastrointestinal: Negative for blood in stool, diarrhea, nausea and vomiting.   Genitourinary: Negative for dysuria, frequency, hematuria and urgency.   Musculoskeletal: Negative for arthralgias and back pain.   Skin: Negative for rash and wound.   Neurological: Negative for dizziness and headaches.   Psychiatric/Behavioral: Negative for dysphoric mood and sleep disturbance. The patient is not nervous/anxious.          PHYSICAL EXAM  Vitals:    09/27/18 1411   BP: 117/74   BP Location: Right arm   Patient Position: Sitting   BP Method: Medium (Automatic)   Pulse: 65   Resp: 18   Temp: 98.6 °F (37 °C)   TempSrc: Oral   SpO2: 99%   Weight: 117 kg (258 lb)   Height: 5' 4.5" (1.638 m)    Body mass index is 43.6 kg/m².  Weight: 117 kg (258 lb)   Height: 5' 4.5" (163.8 cm)     Physical Exam   Constitutional: She is oriented to person, place, and time. She appears well-developed and well-nourished.   HENT:   Head: Normocephalic.       Right Ear: Tympanic membrane normal.   Left Ear: Tympanic membrane normal.   Mouth/Throat: Uvula is midline, oropharynx is clear and moist and mucous membranes are normal. No dental abscesses.   Bilateral TM clear, no swelling erythema.  TM intact positive light reflex.  No postauricular tenderness. Uvula midline.  No tonsillar abscess noted.  No dental abscesses noted.   Eyes: Conjunctivae are normal.   Cardiovascular: Normal rate, regular rhythm, normal heart sounds and normal pulses.   No murmur heard.  Pulses:       Radial pulses are 2+ on the right side, and 2+ on the left side.   No LE swelling noted   Pulmonary/Chest: Effort normal and breath sounds normal. She has no wheezes.   Abdominal: Soft. Bowel sounds are normal. There is no tenderness.   Musculoskeletal: She exhibits no edema.   Lymphadenopathy:     She has no cervical adenopathy.   Neurological: She is alert and oriented to person, place, and time.   Skin: Skin is warm and dry. No rash noted.   Psychiatric: " She has a normal mood and affect.         Health Maintenance       Date Due Completion Date    Foot Exam 07/27/1977 ---    Eye Exam 07/27/1977 ---    TETANUS VACCINE 07/27/1985 ---    Low Dose Statin 07/27/1988 ---    Hemoglobin A1c 04/19/2018 10/19/2017    Influenza Vaccine 08/01/2018 10/13/2017    Urine Microalbumin 10/19/2018 10/19/2017    Lipid Panel 10/19/2018 10/19/2017    Mammogram 10/19/2019 10/19/2017    Pneumococcal PPSV23 (Medium Risk) (2) 07/27/2032 4/4/2016            Assessment & Plan    Ida was seen today for adenopathy.    Diagnoses and all orders for this visit:    Cystitis  -     Ambulatory Referral to Urogynecology  Patient previously seen by Dr. Banks ?? And noted to have a deviated urethra per patient report.  Questioning to see additional urologist for further evaluation.  Continues to complain of dysuria, burning with urination.    Salivary gland infection  -     Ambulatory Referral to ENT  Instructed patient to start the previously prescribed antibiotics.          Follow-up: No Follow-up on file.\

## 2018-10-09 ENCOUNTER — OFFICE VISIT (OUTPATIENT)
Dept: PRIMARY CARE CLINIC | Facility: CLINIC | Age: 53
End: 2018-10-09
Payer: MEDICARE

## 2018-10-09 ENCOUNTER — PATIENT MESSAGE (OUTPATIENT)
Dept: PRIMARY CARE CLINIC | Facility: CLINIC | Age: 53
End: 2018-10-09

## 2018-10-09 VITALS
OXYGEN SATURATION: 96 % | BODY MASS INDEX: 44.68 KG/M2 | WEIGHT: 261.69 LBS | RESPIRATION RATE: 18 BRPM | SYSTOLIC BLOOD PRESSURE: 104 MMHG | HEART RATE: 61 BPM | TEMPERATURE: 99 F | HEIGHT: 64 IN | DIASTOLIC BLOOD PRESSURE: 62 MMHG

## 2018-10-09 DIAGNOSIS — Z98.84 BARIATRIC SURGERY STATUS: ICD-10-CM

## 2018-10-09 DIAGNOSIS — D51.9 ANEMIA DUE TO VITAMIN B12 DEFICIENCY, UNSPECIFIED B12 DEFICIENCY TYPE: ICD-10-CM

## 2018-10-09 DIAGNOSIS — E55.9 VITAMIN D DEFICIENCY: ICD-10-CM

## 2018-10-09 DIAGNOSIS — Z93.2 ILEOSTOMY STATUS: ICD-10-CM

## 2018-10-09 DIAGNOSIS — D50.8 IRON DEFICIENCY ANEMIA SECONDARY TO INADEQUATE DIETARY IRON INTAKE: Primary | ICD-10-CM

## 2018-10-09 DIAGNOSIS — E53.8 VITAMIN B12 DEFICIENCY: ICD-10-CM

## 2018-10-09 PROCEDURE — 99215 OFFICE O/P EST HI 40 MIN: CPT | Mod: PBBFAC,PN | Performed by: NURSE PRACTITIONER

## 2018-10-09 PROCEDURE — 99214 OFFICE O/P EST MOD 30 MIN: CPT | Mod: S$PBB,,, | Performed by: NURSE PRACTITIONER

## 2018-10-09 PROCEDURE — 99999 PR PBB SHADOW E&M-EST. PATIENT-LVL V: CPT | Mod: PBBFAC,,, | Performed by: NURSE PRACTITIONER

## 2018-10-09 PROCEDURE — 3008F BODY MASS INDEX DOCD: CPT | Mod: CPTII,,, | Performed by: NURSE PRACTITIONER

## 2018-10-09 RX ORDER — FERROUS SULFATE 325(65) MG
325 TABLET, DELAYED RELEASE (ENTERIC COATED) ORAL
Qty: 30 TABLET | Refills: 2 | Status: SHIPPED | OUTPATIENT
Start: 2018-10-09 | End: 2019-02-07

## 2018-10-09 RX ORDER — FERROUS SULFATE 325(65) MG
325 TABLET ORAL
Refills: 0 | COMMUNITY
Start: 2018-10-09 | End: 2018-10-09

## 2018-10-09 RX ORDER — ERGOCALCIFEROL 1.25 MG/1
50000 CAPSULE ORAL
Qty: 4 CAPSULE | Refills: 2 | Status: SHIPPED | OUTPATIENT
Start: 2018-10-09 | End: 2019-01-01 | Stop reason: SDUPTHER

## 2018-10-09 RX ORDER — CYANOCOBALAMIN 1000 UG/ML
INJECTION, SOLUTION INTRAMUSCULAR; SUBCUTANEOUS
Qty: 30 ML | Refills: 2 | Status: SHIPPED | OUTPATIENT
Start: 2018-10-09 | End: 2019-05-14

## 2018-10-09 NOTE — PROGRESS NOTES
Chief Complaint  Chief Complaint   Patient presents with    Follow-up    Results       HPI  Ida Orozco is a 53 y.o. female with multiple medical diagnoses as listed in the medical history and problem list that presents for follow-up for lab work.    Iron deficiency anemia-recent lab work was showing iron deficiency anemia with saturated iron of 7 and elevated TIBC.  Hemoglobin of 11.2.  Patient complains of chronic fatigue, myalgias, lower extremity pain. Reports shortness of breath with exertion.  History of anemia in the past.  Patient also with a history of gastric sleeve placement in 2015.  Patient is currently postmenopausal no menstrual period since  with total abdominal hysterectomy.  Of note, total colectomy in  as complication of of Crohn's disease.  Not currently on any iron supplementation or vitamins.    B12 deficiency-recent lab work in October of this year with a B12 160.  Patient with history of gastric sleeve in the past.  Not currently on any vitamin supplementation of B12 injections.    Vitamin-D deficiency-vitamin-D level of 19 and lab work as of 2018.  Patient not currently on any multivitamins vitamin-D supplementation.  Complains of chronic myalgias and lower extremity pain.      PAST MEDICAL HISTORY:  Past Medical History:   Diagnosis Date    Anxiety     Asthma     Crohn's colitis     Fatty liver disease, nonalcoholic     GERD (gastroesophageal reflux disease)     History of sleeve gastrectomy 2016       PAST SURGICAL HISTORY:  Past Surgical History:   Procedure Laterality Date    APPENDECTOMY      ARTHROSCOPY-KNEE W/ CHONDROPLASTY Right 2016    [facility]     SECTION, LOW TRANSVERSE      CHOLECYSTECTOMY      open    COLECTOMY      total- 2013    CYSTOSCOPY W/ RETROGRADES N/A 2018    Procedure: CYSTOSCOPY, WITH RETROGRADE PYELOGRAM;  Surgeon: Butch Banks MD;  Location: [facility] OR;   Service: Urology;  Laterality: N/A;  [facility] SURGICAL CONFIRMED 8/17/DME    CYSTOSCOPY, WITH RETROGRADE PYELOGRAM N/A 8/20/2018    [facility]    ENTEROSCOPY N/A 8/4/2014    Performed by Tavo Latham MD at [facility] ENDO (2ND FLR)    ESOPHAGOGASTRODUODENOSCOPY (EGD) N/A 4/11/2018    Performed by Chris Castorena MD at [facility] ENDO (4TH FLR)    ESOPHAGOGASTRODUODENOSCOPY (EGD) N/A 5/29/2015    [facility] at [facility] ENDO    ESOPHAGOGASTRODUODENOSCOPY (EGD) N/A 12/16/2014    [facility] at [facility] ENDO    EXCISION-PLICA Right 8/25/2016    [facility] at [facility] OR    FLEXIBLE SIGMOIDOSCOPY  5/24/2018    Procedure: SIGMOIDOSCOPY, FLEXIBLE;  Surgeon: JENNY Virgen MD;  Location: [facility] OR 2ND FLR;  Service: Colon and Rectal;;    GASTRIC SLEEVE       HYSTERECTOMY      ILEOSCOPY N/A 4/11/2018    Performed by Chris Castorena MD at [facility] ENDO (4TH FLR)    ILEOSTOMY REVISION      april 2014; august 2014    LAPAROSCOPIC PROCTECTOMY N/A 5/24/2018    Procedure: PROCTECTOMY-LAPAROSCOPIC/CONVERTED TO OPEN;  Surgeon: JENNY Virgen MD;  [facility];  Service: Colon and Rectal;  Laterality: N/A;    LYSIS OF ADHESIONS  5/24/2018    Procedure: LYSIS, ADHESIONS/ more than 2hours;  Surgeon: JENNY Virgen MD;  [facility];  Service: Colon and Rectal;;    LYSIS, ADHESIONS/ more than 2hours  5/24/2018    Performed by JENNY Virgen MD at [facility] OR 2ND FLR    LYSIS-ADHESION N/A 8/15/2014    Performed by J[facility] Virgen MD at [facility] OR 2ND FLR    PROCTECTOMY-LAPAROSCOPIC/CONVERTED TO OPEN N/A 5/24/2018    Performed by J[facility] Virgen MD at [facility] OR 2ND FLR    REVISION-ILEOSTOMY N/A 8/15/2014    Performed by J[facility]NY Virgen MD at [facility] OR 2ND FLR    SIGMOIDOSCOPY, FLEXIBLE  5/24/2018    Performed by J[facility]NY Virgen MD at [facility] OR 2ND FLR    SIGMOIDOSCOPY-FLEXIBLE N/A 4/11/2018    Performed by Chris Castorena,  MD at [facility] ENDO (4TH FLR)    SIGMOIDOSCOPY-FLEXIBLE N/A 2014    [facility] at [facility] ENDO (4TH FLR)    thumb surgery      TONSILLECTOMY, ADENOIDECTOMY      WISDOM TOOTH EXTRACTION         SOCIAL HISTORY:  Social History     Socioeconomic History    Marital status: Legally      Spouse name: Not on file    Number of children: Not on file    Years of education: Not on file    Highest education level: Not on file   Social Needs    Financial resource strain: Not on file    Food insecurity - worry: Not on file    Food insecurity - inability: Not on file    Transportation needs - medical: Not on file    Transportation needs - non-medical: Not on file   Occupational History    Not on file   Tobacco Use    Smoking status: Former Smoker     Types: Cigarettes     Last attempt to quit: 2009     Years since quittin.7    Smokeless tobacco: Never Used   Substance and Sexual Activity    Alcohol use: No    Drug use: No    Sexual activity: No   Other Topics Concern    Not on file   Social History Narrative    Not on file       FAMILY HISTORY:  Family History   Problem Relation Age of Onset    Cancer Mother         skin    Heart disease Father 67    Cancer Paternal Grandfather         ?    Cancer Maternal Grandfather         colon        ALLERGIES AND MEDICATIONS: updated and reviewed.  Review of patient's allergies indicates:   Allergen Reactions    Adhesive      Cause blisters    Dilaudid [hydromorphone] Nausea And Vomiting    Sulfa (sulfonamide antibiotics) Hives     Current Outpatient Medications   Medication Sig Dispense Refill    albuterol (PROVENTIL) 2.5 mg /3 mL (0.083 %) nebulizer solution Take 3 mLs (2.5 mg total) by nebulization every 6 (six) hours as needed for Wheezing. Rescue 1 Box 2    albuterol 90 mcg/actuation inhaler Inhale 2 puffs into the lungs every 4 (four) hours as needed for Wheezing or Shortness of Breath. Rescue 1 Inhaler 5    buPROPion  "(WELLBUTRIN) 75 MG tablet Take 75 mg by mouth every other day.       clonazePAM (KLONOPIN) 1 MG tablet Take 1 mg by mouth 2 (two) times daily.       cyanocobalamin 1,000 mcg/mL injection Inject 1 mL (1,000 mcg total) into the skin once daily for 7 days, THEN 1 mL (1,000 mcg total) every 7 days for 30 days, THEN 1 mL (1,000 mcg total) every 30 days. 30 mL 2    ergocalciferol (ERGOCALCIFEROL) 50,000 unit Cap Take 1 capsule (50,000 Units total) by mouth every 7 days. 4 capsule 2    escitalopram oxalate (LEXAPRO) 10 MG tablet Take 10 mg by mouth once daily.       escitalopram oxalate (LEXAPRO) 20 MG tablet Take 1 tablet by mouth once daily.      ferrous sulfate 325 (65 FE) MG EC tablet Take 1 tablet (325 mg total) by mouth 3 (three) times daily with meals. 30 tablet 2    fluticasone (FLONASE) 50 mcg/actuation nasal spray 1 spray by Each Nare route every evening. As needed for allergies with the cold front      loratadine (CLARITIN) 10 mg tablet TAKE ONE TABLET BY MOUTH ONCE DAILY (Patient taking differently: TAKE ONE TABLET BY MOUTH ONCE DAILY pm) 90 tablet 1    meloxicam (MOBIC) 15 MG tablet Take 1 tablet (15 mg total) by mouth once daily. 10 tablet 0    montelukast (SINGULAIR) 10 mg tablet TAKE ONE TABLET BY MOUTH EVERY EVENING 90 tablet 1    needle, disp, 25 gauge 25 gauge x 7/8" Ndle 1 each by Misc.(Non-Drug; Combo Route) route once daily. 100 each 2    nystatin (MYCOSTATIN) powder Apply topically 2 (two) times daily. 1 Bottle 6    pantoprazole (PROTONIX) 40 MG tablet Take 1 tablet (40 mg total) by mouth once daily. (Patient taking differently: Take 40 mg by mouth every other day. ) 90 tablet 3    SYMBICORT 160-4.5 mcg/actuation HFAA INHALE 2 PUFFS INTO THE LUNGS EVERY 12 (TWELVE) HOURS. CONTROLLER (Patient taking differently: Inhale 2 puffs into the lungs every 12 (twelve) hours as needed. Controller) 11 g 1    syringe, disposable, 1 mL Syrg 1 Syringe by Misc.(Non-Drug; Combo Route) route once daily. " "100 Syringe 2    tamsulosin (FLOMAX) 0.4 mg Cap Take 1 capsule (0.4 mg total) by mouth once daily. 30 capsule 11    trazodone (DESYREL) 50 MG tablet Take 50 mg by mouth every evening.       No current facility-administered medications for this visit.          ROS  Review of Systems   Constitutional: Positive for activity change and fatigue. Negative for chills and fever.   HENT: Negative for congestion, rhinorrhea, sinus pressure and sore throat.    Respiratory: Negative for cough, chest tightness and shortness of breath.    Cardiovascular: Negative for chest pain and palpitations.   Gastrointestinal: Positive for abdominal pain (Suprapubic pain) and diarrhea (Ileostomy). Negative for blood in stool (Chronic diarrhea.  Denies noted blood in her ileostomy bag.), nausea and vomiting.   Genitourinary: Positive for dysuria (Urinary frequency, dribbling, new onset incontinence.) and flank pain. Negative for frequency, hematuria and urgency.   Musculoskeletal: Positive for arthralgias, back pain and myalgias.   Skin: Negative for rash and wound.   Neurological: Negative for dizziness and headaches.   Psychiatric/Behavioral: Negative for dysphoric mood and sleep disturbance. The patient is not nervous/anxious.          PHYSICAL EXAM  Vitals:    10/09/18 0815   BP: 104/62   BP Location: Right arm   Patient Position: Sitting   BP Method: Large (Automatic)   Pulse: 61   Resp: 18   Temp: 99 °F (37.2 °C)   TempSrc: Oral   SpO2: 96%   Weight: 118.7 kg (261 lb 11.2 oz)   Height: 5' 4" (1.626 m)    Body mass index is 44.92 kg/m².  Weight: 118.7 kg (261 lb 11.2 oz)   Height: 5' 4" (162.6 cm)     Physical Exam   Constitutional: She is oriented to person, place, and time. She appears well-developed and well-nourished.   HENT:   Head: Normocephalic.   Right Ear: Tympanic membrane normal.   Left Ear: Tympanic membrane normal.   Mouth/Throat: Uvula is midline, oropharynx is clear and moist and mucous membranes are normal.   Eyes: " "Conjunctivae are normal.   Cardiovascular: Normal rate, regular rhythm, normal heart sounds and normal pulses.   No murmur heard.  Pulses:       Radial pulses are 2+ on the right side, and 2+ on the left side.   No LE swelling noted   Pulmonary/Chest: Effort normal and breath sounds normal. She has no wheezes.   Abdominal: Soft. Bowel sounds are normal. There is tenderness in the suprapubic area.   Musculoskeletal: She exhibits no edema.   Lymphadenopathy:     She has no cervical adenopathy.   Neurological: She is alert and oriented to person, place, and time.   Skin: Skin is warm and dry. No rash noted.   Psychiatric: She has a normal mood and affect.         Health Maintenance       Date Due Completion Date    Foot Exam 07/27/1977 ---    Eye Exam 07/27/1977 ---    TETANUS VACCINE 07/27/1985 ---    Low Dose Statin 07/27/1988 ---    Influenza Vaccine 08/01/2018 10/13/2017    Hemoglobin A1c 04/02/2019 10/2/2018    Lipid Panel 10/02/2019 10/2/2018    Urine Microalbumin 10/02/2019 10/2/2018    Mammogram 10/19/2019 10/19/2017    Pneumococcal PPSV23 (Medium Risk) (2) 07/27/2032 4/4/2016            Assessment & Plan    Ida was seen today for follow-up and results.    Diagnoses and all orders for this visit:    Ida was seen today for follow-up and results.    Diagnoses and all orders for this visit:    Iron deficiency anemia secondary to inadequate dietary iron intake  -     ferrous sulfate 325 (65 FE) MG EC tablet; Take 1 tablet (325 mg total) by mouth 3 (three) times daily with meals.    Vitamin B12 deficiency  -     cyanocobalamin 1,000 mcg/mL injection; Inject 1 mL (1,000 mcg total) into the skin once daily for 7 days, THEN 1 mL (1,000 mcg total) every 7 days for 30 days, THEN 1 mL (1,000 mcg total) every 30 days.  -     syringe, disposable, 1 mL Syrg; 1 Syringe by Misc.(Non-Drug; Combo Route) route once daily.  -     needle, disp, 25 gauge 25 gauge x 7/8" Ndle; 1 each by Misc.(Non-Drug; Combo Route) route once " daily.    Vitamin D deficiency  -     ergocalciferol (ERGOCALCIFEROL) 50,000 unit Cap; Take 1 capsule (50,000 Units total) by mouth every 7 days.    Bariatric surgery status-Gastric sleeve   -     ferrous sulfate 325 (65 FE) MG EC tablet; Take 1 tablet (325 mg total) by mouth 3 (three) times daily with meals.    Ileostomy status              Follow-up: Follow-up in about 2 months (around 12/9/2018).

## 2018-10-12 ENCOUNTER — CLINICAL SUPPORT (OUTPATIENT)
Dept: PRIMARY CARE CLINIC | Facility: CLINIC | Age: 53
End: 2018-10-12
Payer: MEDICARE

## 2018-10-12 DIAGNOSIS — Z23 NEED FOR PROPHYLACTIC VACCINATION AND INOCULATION AGAINST INFLUENZA: Primary | ICD-10-CM

## 2018-10-12 PROCEDURE — 90686 IIV4 VACC NO PRSV 0.5 ML IM: CPT | Mod: PBBFAC,PN

## 2018-10-12 NOTE — PROGRESS NOTES
Patient ID by name and . Allergies verified. Influenza vaccine given IM in right deltoid using aseptic technique. Aspirated with no blood noted. Patient tolerated well. Given per physicians order. No adverse reaction noted.

## 2018-10-12 NOTE — TELEPHONE ENCOUNTER
Spoke with patient she states she is no longer having a problem with decreased absorption of iron.  States that she avoids drinking water within 30 min after taking her pill and it is not coming out in her bag.

## 2018-10-17 ENCOUNTER — INITIAL CONSULT (OUTPATIENT)
Dept: OTOLARYNGOLOGY | Facility: CLINIC | Age: 53
End: 2018-10-17
Payer: MEDICARE

## 2018-10-17 VITALS
DIASTOLIC BLOOD PRESSURE: 79 MMHG | HEART RATE: 64 BPM | SYSTOLIC BLOOD PRESSURE: 118 MMHG | WEIGHT: 258.19 LBS | BODY MASS INDEX: 44.31 KG/M2

## 2018-10-17 DIAGNOSIS — R60.9 PAROTID SWELLING: Primary | ICD-10-CM

## 2018-10-17 PROCEDURE — 99213 OFFICE O/P EST LOW 20 MIN: CPT | Mod: PBBFAC | Performed by: OTOLARYNGOLOGY

## 2018-10-17 PROCEDURE — 99999 PR PBB SHADOW E&M-EST. PATIENT-LVL III: CPT | Mod: PBBFAC,,, | Performed by: OTOLARYNGOLOGY

## 2018-10-17 PROCEDURE — 99203 OFFICE O/P NEW LOW 30 MIN: CPT | Mod: S$PBB,,, | Performed by: OTOLARYNGOLOGY

## 2018-10-17 PROCEDURE — 3008F BODY MASS INDEX DOCD: CPT | Mod: CPTII,,, | Performed by: OTOLARYNGOLOGY

## 2018-10-17 NOTE — PROGRESS NOTES
Chief Complaint   Patient presents with    Consult     lymph node swelled up on right side of face         53 y.o. female presents with recent right parotid swelling. No associated pain or fever. Seen in ED and given antibiotics and told to eat sour candies and perform facial massage. Symptoms then resolved. She has noted a smaller area of swelling on this side in the past but none to this degree.        Review of Systems     Constitutional: Negative for fatigue and unexpected weight change.   HENT: per HPI.  Eyes: Negative for visual disturbance.   Respiratory: Negative for shortness of breath, hemoptysis  Cardiovascular: Negative for chest pain and palpitations.   Genitourinary: Negative for dysuria and difficulty urinating.   Musculoskeletal: Negative for decreased ROM, back pain.   Skin: Negative for rash, sunburn, itching.   Neurological: Negative for dizziness and seizures.   Hematological: Negative for adenopathy. Does not bruise/bleed easily.   Psychiatric/Behavioral: Negative for agitation. The patient is not nervous/anxious.   Endocrine: Negative for rapid weight loss/weight gain, heat/cold intolerance.     Past Medical History:   Diagnosis Date    Anxiety     Asthma     Crohn's colitis     Fatty liver disease, nonalcoholic     GERD (gastroesophageal reflux disease)     History of sleeve gastrectomy 2016       Past Surgical History:   Procedure Laterality Date    APPENDECTOMY      ARTHROSCOPY-KNEE W/ CHONDROPLASTY Right 2016    [facility] OR     SECTION, LOW TRANSVERSE      CHOLECYSTECTOMY      open    COLECTOMY      total- 2013    CYSTOSCOPY W/ RETROGRADES N/A 2018    Procedure: CYSTOSCOPY, WITH RETROGRADE PYELOGRAM;  Surgeon: Butch Banks MD;  Location: [facility] OR;  Service: Urology;  Laterality: N/A;  [facility] SURGICAL CONFIRMED     CYSTOSCOPY, WITH RETROGRADE PYELOGRAM N/A 2018    Per[facility] by Butch Banks MD at [facility]  OR    ENTEROSCOPY N/A 8/4/2014    Performed by T[facility] Latham MD at [facility] ENDO (2ND FLR)    ESOPHAGOGASTRODUODENOSCOPY (EGD) N/A 4/11/2018    Performed by Chris Castorena MD at [facility] ENDO (4TH FLR)    ESOPHAGOGASTRODUODENOSCOPY (EGD) N/A 5/29/2015    Performed by Domingo Hunter MD at [facility] ENDO    ESOPHAGOGASTRODUODENOSCOPY (EGD) N/A 12/16/2014    Performed by Domingo Hunter MD at [facility] ENDO    EXCISION-PLICA Right 8/25/2016    Performed by [facility]igan MD at [facility] OR    FLEXIBLE SIGMOIDOSCOPY  5/24/2018    Procedure: SIGMOIDOSCOPY, FLEXIBLE;  Surgeon: JENNY Virgen MD;  Location: [facility] OR 2ND FLR;  Service: Colon and Rectal;;    GASTRIC SLEEVE       HYSTERECTOMY      ILEOSCOPY N/A 4/11/2018    Performed by Chris Castorena MD at [facility] ENDO (4TH FLR)    ILEOSTOMY REVISION      april 2014; august 2014    LAPAROSCOPIC PROCTECTOMY N/A 5/24/2018    Procedure: PROCTECTOMY-LAPAROSCOPIC/CONVERTED TO OPEN;  Surgeon: JENNY Virgen MD;  Location: [facility] OR 2ND FLR;  Service: Colon and Rectal;  Laterality: N/A;    LYSIS OF ADHESIONS  5/24/2018    Procedure: LYSIS, ADHESIONS/ more than 2hours;  Surgeon: JENNY Virgen MD;  Location: [facility] OR 2ND FLR;  Service: Colon and Rectal;;    LYSIS, ADHESIONS/ more than 2hours  5/24/2018    Performed by J[facility]gen MD at [facility] OR 2ND FLR    LYSIS-ADHESION N/A 8/15/2014    Performed by JENNY Virgen MD at [facility] OR 2ND FLR    PROCTECTOMY-LAPAROSCOPIC/CONVERTED TO OPEN N/A 5/24/2018    Performed by JENNY Virgen MD at [facility] OR 2ND FLR    REVISION-ILEOSTOMY N/A 8/15/2014    Performed by JENNY Virgen MD at [facility] OR 2ND FLR    SIGMOIDOSCOPY, FLEXIBLE  5/24/2018    Performed by JENNY Virgen MD at [facility] OR 2ND FLR    SIGMOIDOSCOPY-FLEXIBLE N/A 4/11/2018    Performed by Chris Castorena MD at [facility] ENDO (4TH FLR)    SIGMOIDOSCOPY-FLEXIBLE N/A 8/12/2014    Performed by JENNY Virgen MD at [facility] ENDO (4TH FLR)    thumb surgery      TONSILLECTOMY,  ADENOIDECTOMY      WISDOM TOOTH EXTRACTION         family history includes Cancer in her maternal grandfather, mother, and paternal grandfather; Heart disease (age of onset: 67) in her father.    Pt  reports that she quit smoking about 9 years ago. Her smoking use included cigarettes. she has never used smokeless tobacco. She reports that she does not drink alcohol or use drugs.    Review of patient's allergies indicates:   Allergen Reactions    Adhesive      Cause blisters    Dilaudid [hydromorphone] Nausea And Vomiting    Sulfa (sulfonamide antibiotics) Hives        Physical Exam    Vitals:    10/17/18 1103   BP: 118/79   Pulse: 64     Body mass index is 44.31 kg/m².    Physical Exam   Constitutional: She is oriented to person, place, and time. She appears well-developed and well-nourished. No distress.   HENT:   Head: Normocephalic and atraumatic.   Right Ear: Hearing, tympanic membrane, external ear and ear canal normal. Tympanic membrane mobility is normal. No middle ear effusion. No decreased hearing is noted.   Left Ear: Hearing, tympanic membrane, external ear and ear canal normal. Tympanic membrane mobility is normal.  No middle ear effusion. No decreased hearing is noted.   Nose: Nose normal.   Mouth/Throat: Uvula is midline, oropharynx is clear and moist and mucous membranes are normal.   No masses or lesions of bilateral parotid and submandibular glands.   Eyes: Conjunctivae, EOM and lids are normal. Pupils are equal, round, and reactive to light. Right eye exhibits no discharge. Left eye exhibits no discharge.   Neck: Trachea normal, normal range of motion and phonation normal. Neck supple. No tracheal tenderness present. No tracheal deviation, no edema and no erythema present. No thyroid mass and no thyromegaly present.   Cardiovascular: Normal heart sounds.   Pulmonary/Chest: Breath sounds normal. No stridor.   Abdominal: Soft.   Lymphadenopathy:     She has no cervical adenopathy.   Neurological:  She is alert and oriented to person, place, and time.   Skin: Skin is warm and dry. No rash noted. She is not diaphoretic. No erythema. No pallor.   Psychiatric: She has a normal mood and affect.   Nursing note and vitals reviewed.        Assessment     1. Parotid swelling          Plan  In summary, Ms. Orozco is a 53 year old female with recent right parotid swelling. Not likely infectious. May have had some temporary blockage of the duct. Discussed treatment with hydration, sialogogues, massage and warm compresses if symptoms recur. All questions were answered. RTC prn.

## 2018-10-17 NOTE — LETTER
October 19, 2018      Janet Guardado, ZAKIA  [address] [address]           [facility]  [address]  [facility] [address]  Phone: [phone]  Fax: [phone]          Patient: Ida Orozco   MR Number: [id number]   YOB: 1965   Date of Visit: 10/17/2018       Dear Janet Guardado:    Thank you for referring Ida Orozco to me for evaluation. Attached you will find relevant portions of my assessment and plan of care.    If you have questions, please do not hesitate to call me. I look forward to following Ida Orozco along with you.    Sincerely,    Bindu Clark MD    Enclosure  CC:  No Recipients    If you would like to receive this communication electronically, please contact [email or web address] or [phone] to request more information on [employer] access.    For providers and/or their staff who would like to refer a patient to [facility], please contact us through our one-stop-shop provider referral line, [facility], at [phone].    If you feel you have received this communication in error or would no longer like to receive these types of communications, please e-mail [email or web address]

## 2018-10-22 ENCOUNTER — PATIENT MESSAGE (OUTPATIENT)
Dept: PRIMARY CARE CLINIC | Facility: CLINIC | Age: 53
End: 2018-10-22

## 2018-10-22 DIAGNOSIS — Z12.31 ENCOUNTER FOR SCREENING MAMMOGRAM FOR BREAST CANCER: Primary | ICD-10-CM

## 2018-10-23 ENCOUNTER — PATIENT MESSAGE (OUTPATIENT)
Dept: PRIMARY CARE CLINIC | Facility: CLINIC | Age: 53
End: 2018-10-23

## 2018-10-23 ENCOUNTER — OFFICE VISIT (OUTPATIENT)
Dept: OPTOMETRY | Facility: CLINIC | Age: 53
End: 2018-10-23
Payer: MEDICARE

## 2018-10-23 DIAGNOSIS — H52.203 MYOPIA WITH ASTIGMATISM AND PRESBYOPIA, BILATERAL: Primary | ICD-10-CM

## 2018-10-23 DIAGNOSIS — H52.13 MYOPIA WITH ASTIGMATISM AND PRESBYOPIA, BILATERAL: Primary | ICD-10-CM

## 2018-10-23 DIAGNOSIS — H52.4 MYOPIA WITH ASTIGMATISM AND PRESBYOPIA, BILATERAL: Primary | ICD-10-CM

## 2018-10-23 PROCEDURE — 99999 PR PBB SHADOW E&M-EST. PATIENT-LVL III: CPT | Mod: PBBFAC,,, | Performed by: OPTOMETRIST

## 2018-10-23 PROCEDURE — 92004 COMPRE OPH EXAM NEW PT 1/>: CPT | Mod: S$PBB,,, | Performed by: OPTOMETRIST

## 2018-10-23 PROCEDURE — 92015 DETERMINE REFRACTIVE STATE: CPT | Mod: ,,, | Performed by: OPTOMETRIST

## 2018-10-23 PROCEDURE — 99213 OFFICE O/P EST LOW 20 MIN: CPT | Mod: PBBFAC | Performed by: OPTOMETRIST

## 2018-10-23 NOTE — TELEPHONE ENCOUNTER
[employer] only flags it as delinquent every 2 years, but I typically still offer every year.  Screening guidelines vary depending upon agency.  Order placed, please schedule.

## 2018-10-23 NOTE — PROGRESS NOTES
HPI     DLS x1 year ago @ a [facility]     54yo female     VA is stable OU with glasses on for DVA---- NVA unstable not using OTC or   current glasses for reading ----    Fmhx +Macular Degeneration/maternal gmother   -eye pain  -headaches  -double vision  -flashes  -floaters       Gtts None     Last edited by Megha Dunbar on 10/23/2018 10:15 AM. (History)        ROS     Negative for: Constitutional, Gastrointestinal, Neurological, Skin,   Genitourinary, Musculoskeletal, HENT, Endocrine, Cardiovascular, Eyes,   Respiratory, Psychiatric, Allergic/Imm, Heme/Lymph    Last edited by Serge Collins, OD on 10/23/2018 10:40 AM. (History)        Assessment /Plan     For exam results, see Encounter Report.    Myopia with astigmatism and presbyopia, bilateral      Srx updated. Normal ocular health. RTC 1 year.

## 2018-10-24 ENCOUNTER — PATIENT MESSAGE (OUTPATIENT)
Dept: PRIMARY CARE CLINIC | Facility: CLINIC | Age: 53
End: 2018-10-24

## 2018-10-24 ENCOUNTER — OFFICE VISIT (OUTPATIENT)
Dept: PRIMARY CARE CLINIC | Facility: CLINIC | Age: 53
End: 2018-10-24
Payer: MEDICARE

## 2018-10-24 VITALS
HEART RATE: 70 BPM | SYSTOLIC BLOOD PRESSURE: 102 MMHG | HEIGHT: 65 IN | TEMPERATURE: 99 F | WEIGHT: 262 LBS | OXYGEN SATURATION: 96 % | BODY MASS INDEX: 43.65 KG/M2 | RESPIRATION RATE: 16 BRPM | DIASTOLIC BLOOD PRESSURE: 67 MMHG

## 2018-10-24 DIAGNOSIS — Z23 NEED FOR VACCINATION: ICD-10-CM

## 2018-10-24 DIAGNOSIS — R30.0 DYSURIA: Primary | ICD-10-CM

## 2018-10-24 DIAGNOSIS — E66.01 MORBID OBESITY WITH BMI OF 40.0-44.9, ADULT: ICD-10-CM

## 2018-10-24 DIAGNOSIS — E78.5 TYPE 2 DIABETES MELLITUS WITH HYPERLIPIDEMIA: ICD-10-CM

## 2018-10-24 DIAGNOSIS — E11.69 TYPE 2 DIABETES MELLITUS WITH HYPERLIPIDEMIA: ICD-10-CM

## 2018-10-24 LAB
BILIRUB SERPL-MCNC: NORMAL MG/DL
BLOOD URINE, POC: NORMAL
COLOR, POC UA: YELLOW
GLUCOSE UR QL STRIP: NORMAL
KETONES UR QL STRIP: NORMAL
LEUKOCYTE ESTERASE URINE, POC: NORMAL
NITRITE, POC UA: NORMAL
PH, POC UA: 5
PROTEIN, POC: NORMAL
SPECIFIC GRAVITY, POC UA: 1
UROBILINOGEN, POC UA: NORMAL

## 2018-10-24 PROCEDURE — 3008F BODY MASS INDEX DOCD: CPT | Mod: CPTII,,, | Performed by: FAMILY MEDICINE

## 2018-10-24 PROCEDURE — 3044F HG A1C LEVEL LT 7.0%: CPT | Mod: CPTII,,, | Performed by: FAMILY MEDICINE

## 2018-10-24 PROCEDURE — 81001 URINALYSIS AUTO W/SCOPE: CPT | Mod: PBBFAC,PN | Performed by: FAMILY MEDICINE

## 2018-10-24 PROCEDURE — 99999 PR PBB SHADOW E&M-EST. PATIENT-LVL IV: CPT | Mod: PBBFAC,,, | Performed by: FAMILY MEDICINE

## 2018-10-24 PROCEDURE — 90714 TD VACC NO PRESV 7 YRS+ IM: CPT | Mod: PBBFAC,PN

## 2018-10-24 PROCEDURE — 99214 OFFICE O/P EST MOD 30 MIN: CPT | Mod: S$PBB,,, | Performed by: FAMILY MEDICINE

## 2018-10-24 PROCEDURE — 87086 URINE CULTURE/COLONY COUNT: CPT

## 2018-10-24 PROCEDURE — 99214 OFFICE O/P EST MOD 30 MIN: CPT | Mod: PBBFAC,PN,25 | Performed by: FAMILY MEDICINE

## 2018-10-24 RX ORDER — HYDROXYZINE PAMOATE 25 MG/1
25 CAPSULE ORAL 3 TIMES DAILY PRN
Qty: 30 CAPSULE | Refills: 2 | Status: SHIPPED | OUTPATIENT
Start: 2018-10-24 | End: 2018-12-19

## 2018-10-24 RX ORDER — FLUTICASONE PROPIONATE 50 MCG
2 SPRAY, SUSPENSION (ML) NASAL NIGHTLY
Qty: 16 G | Refills: 5 | Status: SHIPPED | OUTPATIENT
Start: 2018-10-24 | End: 2019-04-23 | Stop reason: SDUPTHER

## 2018-10-24 NOTE — PROGRESS NOTES
"Subjective:       Patient ID: Ida Orozco is a 53 y.o. female.    Chief Complaint: Dysuria    Dysuria    This is a chronic problem. The current episode started more than 1 month ago. The problem occurs intermittently. The problem has been gradually worsening. The quality of the pain is described as burning and stabbing. The pain is moderate. There has been no fever. Associated symptoms include chills, frequency, hesitancy, nausea and sweats. Pertinent negatives include no behavior changes, discharge, flank pain, hematuria, possible pregnancy, vomiting or constipation. She has tried antibiotics and increased fluids for the symptoms. The treatment provided no relief. Her past medical history is significant for diabetes mellitus, recurrent UTIs and a urological procedure.    Cystoscopy fairly unremarkable.  Has an initial consultation with Urogynecology next month.  Review of Systems   Constitutional: Positive for chills. Negative for fever.   Cardiovascular: Negative for chest pain.   Gastrointestinal: Positive for abdominal pain and nausea. Negative for constipation, diarrhea and vomiting.   Genitourinary: Positive for frequency and hesitancy. Negative for dysuria, flank pain and hematuria.   Musculoskeletal: Positive for arthralgias. Negative for myalgias.   Neurological: Positive for headaches.   Hematological: Bruises/bleeds easily.       Objective:      Vitals:    10/24/18 1446   BP: 102/67   BP Location: Left arm   Patient Position: Sitting   BP Method: Large (Automatic)   Pulse: 70   Resp: 16   Temp: 99.1 °F (37.3 °C)   TempSrc: Oral   SpO2: 96%   Weight: 118.8 kg (262 lb)   Height: 5' 4.5" (1.638 m)     Physical Exam   Constitutional: She is oriented to person, place, and time. She appears well-developed and well-nourished.   HENT:   Head: Normocephalic and atraumatic.   Cardiovascular: Normal rate, regular rhythm and normal heart sounds.   Pulses:       Dorsalis pedis pulses are 2+ on the right side, and " 2+ on the left side.   Pulmonary/Chest: Effort normal and breath sounds normal.   Abdominal: Soft. She exhibits no distension. There is no tenderness. There is no guarding.   Musculoskeletal: She exhibits no edema.   Feet:   Right Foot:   Protective Sensation: 9 sites tested. 9 sites sensed.   Skin Integrity: Negative for ulcer, blister or skin breakdown.   Left Foot:   Protective Sensation: 9 sites tested. 9 sites sensed.   Skin Integrity: Negative for ulcer, blister or skin breakdown.   Neurological: She is alert and oriented to person, place, and time.   Skin: Skin is warm and dry.   Psychiatric: She has a normal mood and affect. Her behavior is normal.   Nursing note and vitals reviewed.      Assessment:       1. Dysuria    2. Morbid obesity with BMI of 40.0-44.9, adult    3. Type 2 diabetes mellitus with hyperlipidemia    4. Need for vaccination        Plan:       Dysuria  -     Urine culture  -     POCT urinalysis, dipstick or tablet reag  -     hydrOXYzine pamoate (VISTARIL) 25 MG Cap; Take 1 capsule (25 mg total) by mouth 3 (three) times daily as needed (bladder pain).  Dispense: 30 capsule; Refill: 2  No evidence of infection.  Follow-up with urogynecology as scheduled  Morbid obesity with BMI of 40.0-44.9, adult  Comments:  limit carb intake, increase aerobic exercise to facilitate weight loss    Type 2 diabetes mellitus with hyperlipidemia  -      DIABETES FOOT EXAM  Not currently on any diabetic meds, A1c normal, fasting glucose normal.  Will remove from problem list.  Need for vaccination  -     Td Vaccine (Adult) - Preservative Free    Other orders  -     fluticasone (FLONASE) 50 mcg/actuation nasal spray; 2 sprays (100 mcg total) by Each Nare route every evening.  Dispense: 16 g; Refill: 5         Medication List           Accurate as of 10/24/18  5:07 PM. If you have any questions, ask your nurse or doctor.               START taking these medications    hydrOXYzine pamoate 25 MG Cap  Commonly  known as:  VISTARIL  Take 1 capsule (25 mg total) by mouth 3 (three) times daily as needed (bladder pain).  Started by:  Eliecer Jones MD        CHANGE how you take these medications    fluticasone 50 mcg/actuation nasal spray  Commonly known as:  FLONASE  2 sprays (100 mcg total) by Each Nare route every evening.  What changed:    · how much to take  · additional instructions  Changed by:  Eliecer Jones MD     loratadine 10 mg tablet  Commonly known as:  CLARITIN  TAKE ONE TABLET BY MOUTH ONCE DAILY  What changed:    · how much to take  · how to take this  · when to take this     pantoprazole 40 MG tablet  Commonly known as:  PROTONIX  Take 1 tablet (40 mg total) by mouth once daily.  What changed:  when to take this     SYMBICORT 160-4.5 mcg/actuation [facility]  Generic drug:  budesonide-formoterol 160-4.5 mcg  INHALE 2 PUFFS INTO THE LUNGS EVERY 12 (TWELVE) HOURS. CONTROLLER  What changed:    · when to take this  · reasons to take this  · additional instructions        CONTINUE taking these medications    * albuterol 90 mcg/actuation inhaler  Commonly known as:  PROVENTIL/VENTOLIN HFA  Inhale 2 puffs into the lungs every 4 (four) hours as needed for Wheezing or Shortness of Breath. Rescue     * albuterol 2.5 mg /3 mL (0.083 %) nebulizer solution  Commonly known as:  PROVENTIL  Take 3 mLs (2.5 mg total) by nebulization every 6 (six) hours as needed for Wheezing. Rescue     buPROPion 75 MG tablet  Commonly known as:  WELLBUTRIN     clonazePAM 1 MG tablet  Commonly known as:  KLONOPIN     cyanocobalamin 1,000 mcg/mL injection  Inject 1 mL (1,000 mcg total) into the skin once daily for 7 days, THEN 1 mL (1,000 mcg total) every 7 days for 30 days, THEN 1 mL (1,000 mcg total) every 30 days.  Start taking on:  10/9/2018     ergocalciferol 50,000 unit Cap  Commonly known as:  ERGOCALCIFEROL  Take 1 capsule (50,000 Units total) by mouth every 7 days.     * escitalopram oxalate 10 MG tablet  Commonly known as:  LEXAPRO    "  * escitalopram oxalate 20 MG tablet  Commonly known as:  LEXAPRO     ferrous sulfate 325 (65 FE) MG EC tablet  Take 1 tablet (325 mg total) by mouth 3 (three) times daily with meals.     meloxicam 15 MG tablet  Commonly known as:  MOBIC  Take 1 tablet (15 mg total) by mouth once daily.     montelukast 10 mg tablet  Commonly known as:  SINGULAIR  TAKE ONE TABLET BY MOUTH EVERY EVENING     needle (disp) 25 gauge 25 gauge x 7/8" Ndle  1 each by Misc.(Non-Drug; Combo Route) route once daily.     nystatin powder  Commonly known as:  MYCOSTATIN  Apply topically 2 (two) times daily.     syringe (disposable) 1 mL Syrg  1 Syringe by Misc.(Non-Drug; Combo Route) route once daily.     tamsulosin 0.4 mg Cap  Commonly known as:  FLOMAX  Take 1 capsule (0.4 mg total) by mouth once daily.     traZODone 50 MG tablet  Commonly known as:  DESYREL         * This list has 4 medication(s) that are the same as other medications prescribed for you. Read the directions carefully, and ask your doctor or other care provider to review them with you.               Where to Get Your Medications      These medications were sent to [id number] - [place], [place] - [address]  [address] [address]    Phone:  [phone]   · fluticasone 50 mcg/actuation nasal spray  · hydrOXYzine pamoate 25 MG Cap           "

## 2018-10-25 DIAGNOSIS — J45.909 ASTHMA, UNSPECIFIED ASTHMA SEVERITY, UNSPECIFIED WHETHER COMPLICATED, UNSPECIFIED WHETHER PERSISTENT: ICD-10-CM

## 2018-10-25 RX ORDER — ALBUTEROL SULFATE 0.83 MG/ML
SOLUTION RESPIRATORY (INHALATION)
Qty: 150 ML | Refills: 3 | Status: SHIPPED | OUTPATIENT
Start: 2018-10-25 | End: 2020-09-18 | Stop reason: SDUPTHER

## 2018-10-26 LAB — BACTERIA UR CULT: NORMAL

## 2018-10-29 ENCOUNTER — PATIENT MESSAGE (OUTPATIENT)
Dept: PRIMARY CARE CLINIC | Facility: CLINIC | Age: 53
End: 2018-10-29

## 2018-11-12 ENCOUNTER — PATIENT MESSAGE (OUTPATIENT)
Dept: PRIMARY CARE CLINIC | Facility: CLINIC | Age: 53
End: 2018-11-12

## 2018-11-15 ENCOUNTER — OFFICE VISIT (OUTPATIENT)
Dept: PRIMARY CARE CLINIC | Facility: CLINIC | Age: 53
End: 2018-11-15
Payer: MEDICARE

## 2018-11-15 VITALS
OXYGEN SATURATION: 98 % | BODY MASS INDEX: 44.52 KG/M2 | HEART RATE: 60 BPM | TEMPERATURE: 98 F | HEIGHT: 65 IN | RESPIRATION RATE: 18 BRPM | WEIGHT: 267.19 LBS | DIASTOLIC BLOOD PRESSURE: 62 MMHG | SYSTOLIC BLOOD PRESSURE: 118 MMHG

## 2018-11-15 DIAGNOSIS — J32.9 SINUSITIS, UNSPECIFIED CHRONICITY, UNSPECIFIED LOCATION: Primary | ICD-10-CM

## 2018-11-15 PROCEDURE — 99214 OFFICE O/P EST MOD 30 MIN: CPT | Mod: S$GLB,,, | Performed by: NURSE PRACTITIONER

## 2018-11-15 PROCEDURE — 99999 PR PBB SHADOW E&M-EST. PATIENT-LVL V: CPT | Mod: PBBFAC,,, | Performed by: NURSE PRACTITIONER

## 2018-11-15 PROCEDURE — 3008F BODY MASS INDEX DOCD: CPT | Mod: CPTII,S$GLB,, | Performed by: NURSE PRACTITIONER

## 2018-11-15 RX ORDER — PROMETHAZINE HYDROCHLORIDE AND CODEINE PHOSPHATE 6.25; 1 MG/5ML; MG/5ML
5 SOLUTION ORAL EVERY 6 HOURS PRN
Qty: 180 ML | Refills: 0 | Status: SHIPPED | OUTPATIENT
Start: 2018-11-15 | End: 2018-11-26

## 2018-11-15 RX ORDER — AMOXICILLIN AND CLAVULANATE POTASSIUM 875; 125 MG/1; MG/1
1 TABLET, FILM COATED ORAL EVERY 12 HOURS
Qty: 20 TABLET | Refills: 0 | Status: SHIPPED | OUTPATIENT
Start: 2018-11-15 | End: 2018-11-26

## 2018-11-15 NOTE — PROGRESS NOTES
Chief Complaint  Chief Complaint   Patient presents with    Cough    Sore Throat    Fever       HPI  Ida Orozco is a 53 y.o. female with multiple medical diagnoses as listed in the medical history and problem list that presents for sore throat, cough, congestion.    Reports the onset of sore throat, cough, congestion approximately 10 days ago.  Cough green, productive.  Intermittent wheezing.  Reports diarrhea.  Fever of 101 last night.  Bilateral ear pain. Using her inhaler twice daily as needed for shortness of breath and wheezing.  No over-the-counter medications.        PAST MEDICAL HISTORY:  Past Medical History:   Diagnosis Date    Anxiety     Asthma     Crohn's colitis     Fatty liver disease, nonalcoholic     GERD (gastroesophageal reflux disease)     History of sleeve gastrectomy 2016       PAST SURGICAL HISTORY:  Past Surgical History:   Procedure Laterality Date    APPENDECTOMY      ARTHROSCOPY-KNEE W/ CHONDROPLASTY Right 2016    Performed by [facility] at [facility] OR     SECTION, LOW TRANSVERSE      CHOLECYSTECTOMY      open    COLECTOMY      total- 2013    CYSTOSCOPY W/ RETROGRADES N/A 2018    Procedure: CYSTOSCOPY, WITH RETROGRADE PYELOGRAM;  Surgeon: Butch Banks MD;  Location: [facility];  Service: Urology;  Laterality: N/A;  HANSEN SURGICAL CONFIRMED     CYSTOSCOPY, WITH RETROGRADE PYELOGRAM N/A 2018    [facility]    ENTEROSCOPY N/A 2014    [facility] (2ND FLR)    ESOPHAGOGASTRODUODENOSCOPY (EGD) N/A 2018    [facility] (4TH FLR)    ESOPHAGOGASTRODUODENOSCOPY (EGD) N/A 2015    Performed by Domingo Hunter MD at [facility] ENDO    ESOPHAGOGASTRODUODENOSCOPY (EGD) N/A 2014    Performed by Domingo Hunter MD at [facility] ENDO    EXCISION-PLICA Right 2016    Performed by [facility]igan MD at [facility] OR     FLEXIBLE SIGMOIDOSCOPY  5/24/2018    Procedure: SIGMOIDOSCOPY, FLEXIBLE;  Surgeon: JENNY Virgen MD;  Location: [facility] OR 2ND FLR;  Service: Colon and Rectal;;    GASTRIC SLEEVE       HYSTERECTOMY      ILEOSCOPY N/A 4/11/2018    Performed by Chris Castorena MD at [facility] ENDO (4TH FLR)    ILEOSTOMY REVISION      april 2014; august 2014    LAPAROSCOPIC PROCTECTOMY N/A 5/24/2018    Procedure: PROCTECTOMY-LAPAROSCOPIC/CONVERTED TO OPEN;  Surgeon: JENNY Virgen MD;  [facility];  Service: Colon and Rectal;  Laterality: N/A;    LYSIS OF ADHESIONS  5/24/2018    Procedure: LYSIS, ADHESIONS/ more than 2hours;  Surgeon: JENNY Virgen MD;  [facility];  Service: Colon and Rectal;;    LYSIS, ADHESIONS/ more than 2hours  5/24/2018    Performed by JENNY Virgen MD at [facility] OR 2ND FLR    LYSIS-ADHESION N/A 8/15/2014    Performed by JENNY Virgen MD at [facility] OR 2ND FLR    PROCTECTOMY-LAPAROSCOPIC/CONVERTED TO OPEN N/A 5/24/2018    Performed by JENNY Virgen MD at [facility] OR 2ND FLR    REVISION-ILEOSTOMY N/A 8/15/2014    Performed by JENNY Virgen MD at [facility] OR 2ND FLR    SIGMOIDOSCOPY, FLEXIBLE  5/24/2018    Performed by JENNY Virgen MD at [facility] OR 2ND FLR    SIGMOIDOSCOPY-FLEXIBLE N/A 4/11/2018    Performed by Chris Castorena MD at [facility] ENDO (4TH FLR)    SIGMOIDOSCOPY-FLEXIBLE N/A 8/12/2014    Performed by JENNY Virgen MD at [facility] ENDO (4TH FLR)    thumb surgery      TONSILLECTOMY, ADENOIDECTOMY      WISDOM TOOTH EXTRACTION         SOCIAL HISTORY:  Social History     Socioeconomic History    Marital status: Legally      Spouse name: Not on file    Number of children: Not on file    Years of education: Not on file    Highest education level: Not on file   Social Needs    Financial resource strain: Not on file    Food insecurity - worry: Not on file    Food insecurity - inability: Not on file    Transportation needs - medical: Not on file     Transportation needs - non-medical: Not on file   Occupational History    Not on file   Tobacco Use    Smoking status: Former Smoker     Types: Cigarettes     Last attempt to quit: 2009     Years since quittin.8    Smokeless tobacco: Never Used   Substance and Sexual Activity    Alcohol use: No    Drug use: No    Sexual activity: No   Other Topics Concern    Not on file   Social History Narrative    Not on file       FAMILY HISTORY:  Family History   Problem Relation Age of Onset    Cancer Mother         skin    Heart disease Father 67    Cancer Paternal Grandfather         ?    Cancer Maternal Grandfather         colon        ALLERGIES AND MEDICATIONS: updated and reviewed.  Review of patient's allergies indicates:   Allergen Reactions    Adhesive      Cause blisters    Dilaudid [hydromorphone] Nausea And Vomiting    Sulfa (sulfonamide antibiotics) Hives     Current Outpatient Medications   Medication Sig Dispense Refill    albuterol (PROVENTIL) 2.5 mg /3 mL (0.083 %) nebulizer solution INHALE ONE VIAL VIA NEBULIZER EVERY SIX HOURS 150 mL 3    albuterol 90 mcg/actuation inhaler Inhale 2 puffs into the lungs every 4 (four) hours as needed for Wheezing or Shortness of Breath. Rescue 1 Inhaler 5    buPROPion (WELLBUTRIN) 75 MG tablet Take 75 mg by mouth every other day.       clonazePAM (KLONOPIN) 1 MG tablet Take 1 mg by mouth 2 (two) times daily.       cyanocobalamin 1,000 mcg/mL injection Inject 1 mL (1,000 mcg total) into the skin once daily for 7 days, THEN 1 mL (1,000 mcg total) every 7 days for 30 days, THEN 1 mL (1,000 mcg total) every 30 days. 30 mL 2    ergocalciferol (ERGOCALCIFEROL) 50,000 unit Cap Take 1 capsule (50,000 Units total) by mouth every 7 days. 4 capsule 2    escitalopram oxalate (LEXAPRO) 10 MG tablet Take 10 mg by mouth once daily.       escitalopram oxalate (LEXAPRO) 20 MG tablet Take 1 tablet by mouth once daily.      ferrous sulfate 325 (65 FE) MG EC tablet Take  "1 tablet (325 mg total) by mouth 3 (three) times daily with meals. 30 tablet 2    fluticasone (FLONASE) 50 mcg/actuation nasal spray 2 sprays (100 mcg total) by Each Nare route every evening. 16 g 5    hydrOXYzine pamoate (VISTARIL) 25 MG Cap Take 1 capsule (25 mg total) by mouth 3 (three) times daily as needed (bladder pain). 30 capsule 2    loratadine (CLARITIN) 10 mg tablet TAKE ONE TABLET BY MOUTH ONCE DAILY (Patient taking differently: TAKE ONE TABLET BY MOUTH ONCE DAILY pm) 90 tablet 1    meloxicam (MOBIC) 15 MG tablet Take 1 tablet (15 mg total) by mouth once daily. 10 tablet 0    montelukast (SINGULAIR) 10 mg tablet TAKE ONE TABLET BY MOUTH EVERY EVENING 90 tablet 1    needle, disp, 25 gauge 25 gauge x 7/8" Ndle 1 each by Misc.(Non-Drug; Combo Route) route once daily. 100 each 2    nystatin (MYCOSTATIN) powder Apply topically 2 (two) times daily. 1 Bottle 6    pantoprazole (PROTONIX) 40 MG tablet Take 1 tablet (40 mg total) by mouth once daily. (Patient taking differently: Take 40 mg by mouth every other day. ) 90 tablet 3    SYMBICORT 160-4.5 mcg/actuation HFAA INHALE 2 PUFFS INTO THE LUNGS EVERY 12 (TWELVE) HOURS. CONTROLLER (Patient taking differently: Inhale 2 puffs into the lungs every 12 (twelve) hours as needed. Controller) 11 g 1    syringe, disposable, 1 mL Syrg 1 Syringe by Misc.(Non-Drug; Combo Route) route once daily. 100 Syringe 2    trazodone (DESYREL) 50 MG tablet Take 50 mg by mouth every evening.      amoxicillin-clavulanate 875-125mg (AUGMENTIN) 875-125 mg per tablet Take 1 tablet by mouth every 12 (twelve) hours. for 10 days 20 tablet 0    promethazine-codeine 6.25-10 mg/5 ml (PHENERGAN WITH CODEINE) 6.25-10 mg/5 mL syrup Take 5 mLs by mouth every 6 (six) hours as needed for Cough. 180 mL 0    tamsulosin (FLOMAX) 0.4 mg Cap Take 1 capsule (0.4 mg total) by mouth once daily. 30 capsule 11     No current facility-administered medications for this visit.          ROS  Review of " "Systems   Constitutional: Positive for fatigue. Negative for chills and fever.   HENT: Positive for congestion, ear pain, postnasal drip, rhinorrhea, sinus pressure, sinus pain and sore throat.    Respiratory: Positive for cough. Negative for shortness of breath and wheezing.    Cardiovascular: Negative for chest pain and palpitations.   Gastrointestinal: Negative for abdominal pain, diarrhea, nausea and vomiting.   Genitourinary: Negative for dysuria, frequency and urgency.   Musculoskeletal: Negative for arthralgias and joint swelling.   Skin: Negative for rash and wound.   Neurological: Positive for headaches. Negative for dizziness and weakness.   Psychiatric/Behavioral: Negative for dysphoric mood and sleep disturbance. The patient is not nervous/anxious.          PHYSICAL EXAM  Vitals:    11/15/18 1124   BP: 118/62   BP Location: Right arm   Patient Position: Sitting   BP Method: Medium (Automatic)   Pulse: 60   Resp: 18   Temp: 98.4 °F (36.9 °C)   TempSrc: Oral   SpO2: 98%   Weight: 121.2 kg (267 lb 3.2 oz)   Height: 5' 4.5" (1.638 m)    Body mass index is 45.16 kg/m².  Weight: 121.2 kg (267 lb 3.2 oz)   Height: 5' 4.5" (163.8 cm)     Physical Exam   Constitutional: She is oriented to person, place, and time. She appears well-developed and well-nourished.   HENT:   Head: Normocephalic.   Right Ear: Tympanic membrane normal.   Left Ear: Tympanic membrane normal.   Mouth/Throat: Uvula is midline, oropharynx is clear and moist and mucous membranes are normal.   Eyes: Conjunctivae are normal.   Cardiovascular: Normal rate, regular rhythm, normal heart sounds and normal pulses.   No murmur heard.  Pulses:       Radial pulses are 2+ on the right side, and 2+ on the left side.   No LE swelling noted   Pulmonary/Chest: Effort normal and breath sounds normal. She has no wheezes.   Abdominal: Soft. Bowel sounds are normal. There is no tenderness.   Musculoskeletal: She exhibits no edema.   Lymphadenopathy:     She has " no cervical adenopathy.   Neurological: She is alert and oriented to person, place, and time.   Skin: Skin is warm and dry. No rash noted.   Psychiatric: She has a normal mood and affect.         Health Maintenance       Date Due Completion Date    Low Dose Statin 10/24/2019 (Originally 7/27/1988) ---    Hemoglobin A1c 04/02/2019 10/2/2018    Lipid Panel 10/02/2019 10/2/2018    Urine Microalbumin 10/02/2019 10/2/2018    Eye Exam 10/23/2019 10/23/2018    Foot Exam 10/24/2019 10/24/2018    Mammogram 10/24/2020 10/24/2018    TETANUS VACCINE 10/24/2028 10/24/2018    Pneumococcal PPSV23 (Medium Risk) (2) 07/27/2032 4/4/2016            Assessment & Plan    Ida was seen today for cough, sore throat and fever.    Diagnoses and all orders for this visit:    Sinusitis, unspecified chronicity, unspecified location  -     amoxicillin-clavulanate 875-125mg (AUGMENTIN) 875-125 mg per tablet; Take 1 tablet by mouth every 12 (twelve) hours. for 10 days  -     promethazine-codeine 6.25-10 mg/5 ml (PHENERGAN WITH CODEINE) 6.25-10 mg/5 mL syrup; Take 5 mLs by mouth every 6 (six) hours as needed for Cough.  Patient not interested in steroids at this time.  Continue with inhaler for wheezing.          Follow-up: Follow-up if symptoms worsen or fail to improve.

## 2018-11-26 ENCOUNTER — OFFICE VISIT (OUTPATIENT)
Dept: PRIMARY CARE CLINIC | Facility: CLINIC | Age: 53
End: 2018-11-26
Payer: MEDICARE

## 2018-11-26 VITALS
OXYGEN SATURATION: 99 % | DIASTOLIC BLOOD PRESSURE: 66 MMHG | BODY MASS INDEX: 46.61 KG/M2 | SYSTOLIC BLOOD PRESSURE: 103 MMHG | RESPIRATION RATE: 16 BRPM | WEIGHT: 273 LBS | HEIGHT: 64 IN | HEART RATE: 59 BPM | TEMPERATURE: 98 F

## 2018-11-26 DIAGNOSIS — S62.524A CLOSED NONDISPLACED FRACTURE OF DISTAL PHALANX OF RIGHT THUMB, INITIAL ENCOUNTER: Primary | ICD-10-CM

## 2018-11-26 PROCEDURE — 3008F BODY MASS INDEX DOCD: CPT | Mod: CPTII,S$GLB,, | Performed by: FAMILY MEDICINE

## 2018-11-26 PROCEDURE — 99214 OFFICE O/P EST MOD 30 MIN: CPT | Mod: S$GLB,,, | Performed by: FAMILY MEDICINE

## 2018-11-26 PROCEDURE — 99999 PR PBB SHADOW E&M-EST. PATIENT-LVL IV: CPT | Mod: PBBFAC,,, | Performed by: FAMILY MEDICINE

## 2018-11-26 RX ORDER — OXYCODONE AND ACETAMINOPHEN 5; 325 MG/1; MG/1
1 TABLET ORAL EVERY 4 HOURS PRN
Qty: 30 TABLET | Refills: 0 | Status: SHIPPED | OUTPATIENT
Start: 2018-11-26 | End: 2018-12-19

## 2018-11-26 NOTE — PROGRESS NOTES
"Subjective:       Patient ID: Ida Orozco is a 53 y.o. female.    Chief Complaint: Hand Pain (Patient broke her thumb and was told to follow up with PCP to be referred to Ortho. )    Injured right thumb breaking up fight between her dogs 2 days ago. Went to ER, told had thumb fracture, though radiologist's report indicated no fracture, but has surgical hardware in proximal phalanx from prior surgery. Still in pain, says she cannot move IP joint.    Norco not helping pain  Review of Systems   Constitutional: Positive for activity change and unexpected weight change.   HENT: Negative for hearing loss, rhinorrhea and trouble swallowing.    Eyes: Negative for discharge.   Respiratory: Negative for chest tightness and wheezing.    Cardiovascular: Negative for chest pain and palpitations.   Gastrointestinal: Negative for blood in stool, constipation, diarrhea and vomiting.   Endocrine: Positive for polydipsia and polyuria.   Genitourinary: Positive for difficulty urinating and dysuria. Negative for hematuria and menstrual problem.   Musculoskeletal: Positive for arthralgias and joint swelling. Negative for neck pain.   Neurological: Negative for weakness and headaches.   Psychiatric/Behavioral: Positive for dysphoric mood. Negative for confusion.       Objective:      Vitals:    11/26/18 0927   BP: 103/66   BP Location: Left arm   Patient Position: Sitting   BP Method: Large (Automatic)   Pulse: (!) 59   Resp: 16   Temp: 98.1 °F (36.7 °C)   TempSrc: Oral   SpO2: 99%   Weight: 123.8 kg (273 lb)   Height: 5' 4" (1.626 m)     Physical Exam   Constitutional: She is oriented to person, place, and time. She appears well-developed and well-nourished.   HENT:   Head: Normocephalic and atraumatic.   Cardiovascular: Normal rate, regular rhythm and normal heart sounds.   Pulses:       Radial pulses are 2+ on the right side, and 2+ on the left side.   Pulmonary/Chest: Effort normal and breath sounds normal.   Musculoskeletal: She " exhibits no edema.        Right hand: She exhibits decreased range of motion, tenderness and bony tenderness. She exhibits normal capillary refill.        Hands:  Neurological: She is alert and oriented to person, place, and time.   Skin: Skin is warm and dry.   Nursing note and vitals reviewed.      Assessment:       1. Closed nondisplaced fracture of distal phalanx of right thumb, initial encounter        Plan:       Closed nondisplaced fracture of distal phalanx of right thumb, initial encounter  -     oxyCODONE-acetaminophen (PERCOCET) 5-325 mg per tablet; Take 1 tablet by mouth every 4 (four) hours as needed for Pain.  Dispense: 30 tablet; Refill: 0  X-rays reviewed personally. Appears to have nondisplaced fracture at base of distal phalanx. Continue to use finger splint for now, f/u with ortho for definitive tx (pt referred to Dr. Anderson)       Medication List           Accurate as of 11/26/18  9:55 AM. If you have any questions, ask your nurse or doctor.               START taking these medications    oxyCODONE-acetaminophen 5-325 mg per tablet  Commonly known as:  PERCOCET  Take 1 tablet by mouth every 4 (four) hours as needed for Pain.  Started by:  Eliecer Jones MD        CHANGE how you take these medications    loratadine 10 mg tablet  Commonly known as:  CLARITIN  TAKE ONE TABLET BY MOUTH ONCE DAILY  What changed:    · how much to take  · how to take this  · when to take this     pantoprazole 40 MG tablet  Commonly known as:  PROTONIX  Take 1 tablet (40 mg total) by mouth once daily.  What changed:  when to take this     SYMBICORT 160-4.5 mcg/actuation [facility]  Generic drug:  budesonide-formoterol 160-4.5 mcg  INHALE 2 PUFFS INTO THE LUNGS EVERY 12 (TWELVE) HOURS. CONTROLLER  What changed:    · when to take this  · reasons to take this  · additional instructions        CONTINUE taking these medications    * albuterol 90 mcg/actuation inhaler  Commonly known as:  PROVENTIL/VENTOLIN HFA  Inhale 2 puffs  "into the lungs every 4 (four) hours as needed for Wheezing or Shortness of Breath. Rescue     * albuterol 2.5 mg /3 mL (0.083 %) nebulizer solution  Commonly known as:  PROVENTIL  INHALE ONE VIAL VIA NEBULIZER EVERY SIX HOURS     amoxicillin-clavulanate 875-125mg 875-125 mg per tablet  Commonly known as:  AUGMENTIN  Take 1 tablet by mouth every 12 (twelve) hours. for 10 days     buPROPion 75 MG tablet  Commonly known as:  WELLBUTRIN     clonazePAM 1 MG tablet  Commonly known as:  KLONOPIN     cyanocobalamin 1,000 mcg/mL injection  Inject 1 mL (1,000 mcg total) into the skin once daily for 7 days, THEN 1 mL (1,000 mcg total) every 7 days for 30 days, THEN 1 mL (1,000 mcg total) every 30 days.  Start taking on:  10/9/2018     ergocalciferol 50,000 unit Cap  Commonly known as:  ERGOCALCIFEROL  Take 1 capsule (50,000 Units total) by mouth every 7 days.     * escitalopram oxalate 10 MG tablet  Commonly known as:  LEXAPRO     * escitalopram oxalate 20 MG tablet  Commonly known as:  LEXAPRO     ferrous sulfate 325 (65 FE) MG EC tablet  Take 1 tablet (325 mg total) by mouth 3 (three) times daily with meals.     fluticasone 50 mcg/actuation nasal spray  Commonly known as:  FLONASE  2 sprays (100 mcg total) by Each Nare route every evening.     hydrOXYzine pamoate 25 MG Cap  Commonly known as:  VISTARIL  Take 1 capsule (25 mg total) by mouth 3 (three) times daily as needed (bladder pain).     meloxicam 15 MG tablet  Commonly known as:  MOBIC  Take 1 tablet (15 mg total) by mouth once daily.     montelukast 10 mg tablet  Commonly known as:  SINGULAIR  TAKE ONE TABLET BY MOUTH EVERY EVENING     mupirocin 2 % ointment  Commonly known as:  BACTROBAN  Apply topically 3 (three) times daily.     needle (disp) 25 gauge 25 gauge x 7/8" Ndle  1 each by Misc.(Non-Drug; Combo Route) route once daily.     nystatin powder  Commonly known as:  MYCOSTATIN  Apply topically 2 (two) times daily.     syringe (disposable) 1 mL Syrg  1 Syringe by " Misc.(Non-Drug; Combo Route) route once daily.     tamsulosin 0.4 mg Cap  Commonly known as:  FLOMAX  Take 1 capsule (0.4 mg total) by mouth once daily.     traZODone 50 MG tablet  Commonly known as:  DESYREL         * This list has 4 medication(s) that are the same as other medications prescribed for you. Read the directions carefully, and ask your doctor or other care provider to review them with you.            STOP taking these medications    HYDROcodone-acetaminophen 5-325 mg per tablet  Commonly known as:  NORCO  Stopped by:  Eliecer Jones MD     promethazine-codeine 6.25-10 mg/5 ml 6.25-10 mg/5 mL syrup  Commonly known as:  PHENERGAN with CODEINE  Stopped by:  Eliecer Jones MD           Where to Get Your Medications      These medications were sent to [id number] [place] [address]    Phone:  [phone]   · oxyCODONE-acetaminophen 5-325 mg per tablet

## 2018-11-27 ENCOUNTER — PATIENT MESSAGE (OUTPATIENT)
Dept: ORTHOPEDICS | Facility: CLINIC | Age: 53
End: 2018-11-27

## 2018-11-30 ENCOUNTER — OFFICE VISIT (OUTPATIENT)
Dept: ORTHOPEDICS | Facility: CLINIC | Age: 53
End: 2018-11-30
Payer: MEDICARE

## 2018-11-30 ENCOUNTER — HOSPITAL ENCOUNTER (OUTPATIENT)
Dept: RADIOLOGY | Facility: HOSPITAL | Age: 53
Discharge: HOME | End: 2018-11-30
Attending: ORTHOPAEDIC SURGERY
Payer: MEDICARE

## 2018-11-30 VITALS
SYSTOLIC BLOOD PRESSURE: 123 MMHG | HEART RATE: 55 BPM | WEIGHT: 272.94 LBS | DIASTOLIC BLOOD PRESSURE: 68 MMHG | HEIGHT: 64 IN | BODY MASS INDEX: 46.6 KG/M2

## 2018-11-30 DIAGNOSIS — M79.641 RIGHT HAND PAIN: Primary | ICD-10-CM

## 2018-11-30 DIAGNOSIS — M79.641 RIGHT HAND PAIN: ICD-10-CM

## 2018-11-30 DIAGNOSIS — S62.521A CLOSED DISPLACED FRACTURE OF DISTAL PHALANX OF RIGHT THUMB, INITIAL ENCOUNTER: Primary | ICD-10-CM

## 2018-11-30 PROCEDURE — 99999 PR PBB SHADOW E&M-EST. PATIENT-LVL III: CPT | Mod: PBBFAC,,, | Performed by: ORTHOPAEDIC SURGERY

## 2018-11-30 PROCEDURE — 73130 X-RAY EXAM OF HAND: CPT | Mod: TC,PN,RT

## 2018-11-30 PROCEDURE — 73130 X-RAY EXAM OF HAND: CPT | Mod: 26,RT,, | Performed by: RADIOLOGY

## 2018-11-30 PROCEDURE — 99214 OFFICE O/P EST MOD 30 MIN: CPT | Mod: S$GLB,,, | Performed by: ORTHOPAEDIC SURGERY

## 2018-11-30 PROCEDURE — 3008F BODY MASS INDEX DOCD: CPT | Mod: CPTII,S$GLB,, | Performed by: ORTHOPAEDIC SURGERY

## 2018-12-03 NOTE — PROGRESS NOTES
Past Medical History:   Diagnosis Date    Anxiety     Asthma     Crohn's colitis     Fatty liver disease, nonalcoholic     GERD (gastroesophageal reflux disease)     History of sleeve gastrectomy 2016       Past Surgical History:   Procedure Laterality Date    APPENDECTOMY      ARTHROSCOPY-KNEE W/ CHONDROPLASTY Right 2016    [facility] at [facility]     SECTION, LOW TRANSVERSE      CHOLECYSTECTOMY      open    COLECTOMY      total- 2013    CYSTOSCOPY W/ RETROGRADES N/A 2018    Procedure: CYSTOSCOPY, WITH RETROGRADE PYELOGRAM;  Surgeon: Butch Bnaks MD;  [facility];  Service: Urology;  Laterality: N/A;  HANSEN SURGICAL CONFIRMED     CYSTOSCOPY, WITH RETROGRADE PYELOGRAM N/A 2018    [facility]    ENTEROSCOPY N/A 2014    Performed by Tavo Latham MD at [facility] ENDO (2ND FLR)    ESOPHAGOGASTRODUODENOSCOPY (EGD) N/A 2018    Performed by Chris Castorena MD at [facility] ENDO (4TH FLR)    ESOPHAGOGASTRODUODENOSCOPY (EGD) N/A 2015    Performed by Domingo Hunter MD at [facility] ENDO    ESOPHAGOGASTRODUODENOSCOPY (EGD) N/A 2014    Performed by Domingo Hunter MD at [facility] ENDO    EXCISION-PLICA Right 2016    [facility] at [facility] OR    FLEXIBLE SIGMOIDOSCOPY  2018    Procedure: SIGMOIDOSCOPY, FLEXIBLE;  Surgeon: JENNY Virgen MD;  Location: [facility] 2ND FLR;  Service: Colon and Rectal;;    GASTRIC SLEEVE       HYSTERECTOMY      ILEOSCOPY N/A 2018    Performed by Chris Castorena MD at [facility] ENDO (4TH FLR)    ILEOSTOMY REVISION      2014; 2014    LAPAROSCOPIC PROCTECTOMY N/A 2018    Procedure: PROCTECTOMY-LAPAROSCOPIC/CONVERTED TO OPEN;  Surgeon: JENNY Virgen MD;  [facility];  Service: Colon and Rectal;  Laterality: N/A;    LYSIS OF ADHESIONS  2018    Procedure: LYSIS, ADHESIONS/ more than 2hours;  Surgeon:  JENNY Virgen MD;  [facility];  Service: Colon and Rectal;;    LYSIS, ADHESIONS/ more than 2hours  5/24/2018    Performed by JENNY Virgen MD at [facility] OR 2ND FLR    LYSIS-ADHESION N/A 8/15/2014    Performed by JENNY Virgen MD at [facility] OR 2ND FLR    PROCTECTOMY-LAPAROSCOPIC/CONVERTED TO OPEN N/A 5/24/2018    Performed by JENNY Virgen MD at [facility] OR 2ND FLR    REVISION-ILEOSTOMY N/A 8/15/2014    Performed by JENNY Virgen MD at [facility] OR 2ND FLR    SIGMOIDOSCOPY, FLEXIBLE  5/24/2018    Performed by JENNY Virgen MD at [facility] OR [facility]    SIGMOIDOSCOPY-FLEXIBLE N/A 4/11/2018    Performed by Chris Castorena MD at [facility] ENDO (4TH FLR)    SIGMOIDOSCOPY-FLEXIBLE N/A 8/12/2014    Performed by JENNY Virgen MD at [facility] ENDO (4TH FLR)    thumb surgery      TONSILLECTOMY, ADENOIDECTOMY      WISDOM TOOTH EXTRACTION         Current Outpatient Medications   Medication Sig    albuterol (PROVENTIL) 2.5 mg /3 mL (0.083 %) nebulizer solution INHALE ONE VIAL VIA NEBULIZER EVERY SIX HOURS    albuterol 90 mcg/actuation inhaler Inhale 2 puffs into the lungs every 4 (four) hours as needed for Wheezing or Shortness of Breath. Rescue    buPROPion (WELLBUTRIN) 75 MG tablet Take 75 mg by mouth every other day.     clonazePAM (KLONOPIN) 1 MG tablet Take 1 mg by mouth 2 (two) times daily.     cyanocobalamin 1,000 mcg/mL injection Inject 1 mL (1,000 mcg total) into the skin once daily for 7 days, THEN 1 mL (1,000 mcg total) every 7 days for 30 days, THEN 1 mL (1,000 mcg total) every 30 days.    ergocalciferol (ERGOCALCIFEROL) 50,000 unit Cap Take 1 capsule (50,000 Units total) by mouth every 7 days.    escitalopram oxalate (LEXAPRO) 10 MG tablet Take 10 mg by mouth once daily.     escitalopram oxalate (LEXAPRO) 20 MG tablet Take 1 tablet by mouth once daily.     ferrous sulfate 325 (65 FE) MG EC tablet Take 1 tablet (325 mg total) by mouth 3 (three) times daily with meals.    fluticasone  "(FLONASE) 50 mcg/actuation nasal spray 2 sprays (100 mcg total) by Each Nare route every evening.    hydrOXYzine pamoate (VISTARIL) 25 MG Cap Take 1 capsule (25 mg total) by mouth 3 (three) times daily as needed (bladder pain).    loratadine (CLARITIN) 10 mg tablet TAKE ONE TABLET BY MOUTH ONCE DAILY (Patient taking differently: TAKE ONE TABLET BY MOUTH ONCE DAILY pm)    meloxicam (MOBIC) 15 MG tablet Take 1 tablet (15 mg total) by mouth once daily.    montelukast (SINGULAIR) 10 mg tablet TAKE ONE TABLET BY MOUTH EVERY EVENING    mupirocin (BACTROBAN) 2 % ointment Apply topically 3 (three) times daily.    needle, disp, 25 gauge 25 gauge x 7/8" Ndle 1 each by Misc.(Non-Drug; Combo Route) route once daily.    nystatin (MYCOSTATIN) powder Apply topically 2 (two) times daily.    oxyCODONE-acetaminophen (PERCOCET) 5-325 mg per tablet Take 1 tablet by mouth every 4 (four) hours as needed for Pain.    pantoprazole (PROTONIX) 40 MG tablet Take 1 tablet (40 mg total) by mouth once daily. (Patient taking differently: Take 40 mg by mouth every other day. )    SYMBICORT 160-4.5 mcg/actuation HFAA INHALE 2 PUFFS INTO THE LUNGS EVERY 12 (TWELVE) HOURS. CONTROLLER (Patient taking differently: Inhale 2 puffs into the lungs every 12 (twelve) hours as needed. Controller)    syringe, disposable, 1 mL Syrg 1 Syringe by Misc.(Non-Drug; Combo Route) route once daily.    trazodone (DESYREL) 50 MG tablet Take 50 mg by mouth every evening.    tamsulosin (FLOMAX) 0.4 mg Cap Take 1 capsule (0.4 mg total) by mouth once daily.     No current facility-administered medications for this visit.        Review of patient's allergies indicates:   Allergen Reactions    Adhesive      Cause blisters    Dilaudid [hydromorphone] Nausea And Vomiting    Sulfa (sulfonamide antibiotics) Hives       Family History   Problem Relation Age of Onset    Cancer Mother         skin    Heart disease Father 67    Cancer Paternal Grandfather         ? " "   Cancer Maternal Grandfather         colon        Social History     Socioeconomic History    Marital status: Legally      Spouse name: Not on file    Number of children: Not on file    Years of education: Not on file    Highest education level: Not on file   Social Needs    Financial resource strain: Not on file    Food insecurity - worry: Not on file    Food insecurity - inability: Not on file    Transportation needs - medical: Not on file    Transportation needs - non-medical: Not on file   Occupational History    Not on file   Tobacco Use    Smoking status: Former Smoker     Types: Cigarettes     Last attempt to quit:      Years since quittin.9    Smokeless tobacco: Never Used   Substance and Sexual Activity    Alcohol use: No    Drug use: No    Sexual activity: No   Other Topics Concern    Not on file   Social History Narrative    Not on file       Chief Complaint:   Chief Complaint   Patient presents with    Right Hand - Injury       Consulting Physician: No ref. provider found    History of present illness:    This is a 53 y.o. female who complains of right thumb pain after dog injury 18. Pain 4/10. Seen in ER.    Review of Systems:    Constitution: Denies chills, fever, and sweats.  HENT: Denies headaches or blurry vision.  Cardiovascular: Denies chest pain or irregular heart beat.  Respiratory: Denies cough or shortness of breath.  Gastrointestinal: Denies abdominal pain, nausea, or vomiting.  Musculoskeletal:  Denies muscle cramps.  Neurological: Denies dizziness or focal weakness.  Psychiatric/Behavioral: Normal mental status.  Hematologic/Lymphatic: Denies bleeding problem or easy bruising/bleeding.  Skin: Denies rash or suspicious lesions.    Examination:    Vital Signs:    Vitals:    18 1147   BP: 123/68   Pulse: (!) 55   Weight: 123.8 kg (272 lb 14.9 oz)   Height: 5' 4" (1.626 m)   PainSc:   4   PainLoc: Hand       Body mass index is 46.85 " kg/m².    This a well-developed, well nourished patient in no acute distress.    Alert and oriented x 3 and cooperative to examination.       Physical Exam: Right Hand Exam    Skin  Scars:   None  Rash:   None    Inspection  Erythema:  None  Bruising:  Mild  Swelling:  Mild  Masses:  None  Lymphadenopathy: None    Coordination:  Normal  Instability:  Not tested due to fracture    Range of Motion Not tested due to fracture    Strength:  Not tested due to fracture    Tenderness:  Diffuse    Pulse:   2+ radial  Capillary Refill: Normal    Sensation:  Intact            Imaging: X-rays ordered and images interpreted today personally by me of right hand shows thumb displaced distal phalanx intraarticular fracture        Assessment: Closed displaced fracture of distal phalanx of right thumb, initial encounter        Plan:  Will splint and allow gentle ROM. 4 weeks.      DISCLAIMER: This note may have been dictated using voice recognition software and may contain grammatical errors.     NOTE: Consult report sent to referring provider via [employer] EMR.

## 2018-12-09 RX ORDER — LORATADINE 10 MG/1
TABLET ORAL
Qty: 90 TABLET | Refills: 3 | Status: SHIPPED | OUTPATIENT
Start: 2018-12-09 | End: 2020-02-04

## 2018-12-19 ENCOUNTER — INITIAL CONSULT (OUTPATIENT)
Dept: UROGYNECOLOGY | Facility: CLINIC | Age: 53
End: 2018-12-19
Payer: MEDICARE

## 2018-12-19 VITALS
WEIGHT: 272.94 LBS | HEIGHT: 64 IN | SYSTOLIC BLOOD PRESSURE: 120 MMHG | DIASTOLIC BLOOD PRESSURE: 60 MMHG | BODY MASS INDEX: 46.6 KG/M2

## 2018-12-19 DIAGNOSIS — N39.46 URINARY INCONTINENCE, MIXED: ICD-10-CM

## 2018-12-19 DIAGNOSIS — N95.2 VAGINAL ATROPHY: Primary | ICD-10-CM

## 2018-12-19 DIAGNOSIS — R39.15 URINARY URGENCY: ICD-10-CM

## 2018-12-19 DIAGNOSIS — R39.89 BLADDER PAIN: ICD-10-CM

## 2018-12-19 PROCEDURE — 87186 SC STD MICRODIL/AGAR DIL: CPT

## 2018-12-19 PROCEDURE — 51701 INSERT BLADDER CATHETER: CPT | Mod: S$GLB,,, | Performed by: OBSTETRICS & GYNECOLOGY

## 2018-12-19 PROCEDURE — 87088 URINE BACTERIA CULTURE: CPT

## 2018-12-19 PROCEDURE — 87077 CULTURE AEROBIC IDENTIFY: CPT

## 2018-12-19 PROCEDURE — 99999 PR PBB SHADOW E&M-EST. PATIENT-LVL III: CPT | Mod: PBBFAC,,, | Performed by: OBSTETRICS & GYNECOLOGY

## 2018-12-19 PROCEDURE — 3008F BODY MASS INDEX DOCD: CPT | Mod: CPTII,S$GLB,, | Performed by: OBSTETRICS & GYNECOLOGY

## 2018-12-19 PROCEDURE — 87086 URINE CULTURE/COLONY COUNT: CPT

## 2018-12-19 PROCEDURE — 99205 OFFICE O/P NEW HI 60 MIN: CPT | Mod: 25,S$GLB,, | Performed by: OBSTETRICS & GYNECOLOGY

## 2018-12-19 RX ORDER — ESTRADIOL 0.1 MG/G
CREAM VAGINAL
Qty: 42.5 G | Refills: 11 | Status: SHIPPED | OUTPATIENT
Start: 2018-12-19 | End: 2019-11-12 | Stop reason: CLARIF
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
## 2018-12-19 NOTE — PATIENT INSTRUCTIONS
Bladder Irritants  Certain foods and drinks have been associated with worsening symptoms of urinary frequency, urgency, urge incontinence, or bladder pain. If you suffer from any of these conditions, you may wish to try eliminating one or more of these foods from your diet and see if your symptoms improve. If bladder symptoms are related to dietary factors, strict adherence to a diet thateliminates the food should bring marked relief in 10 days. Once you are feeling better, you can begin to add foods back into your diet, one at a time. If symptoms return, you will be able to identify the irritant. As you add foods back to your diet it is very important that you drink significant amounts of water.    -----------------------------------------------------------------------------------------------  List of Common Bladder Irritants*  Alcoholic beverages  Apples and apple juice  Cantaloupe  Carbonated beverages  Chili and spicy foods  Chocolate  Citrus fruit  Coffee (including decaffeinated)  Cranberries and cranberry juice  Grapes  Guava  Milk Products: milk, cheese, cottage cheese, yogurt, ice cream  Peaches  Pineapple  Plums  Strawberries  Sugar especially artificial sweeteners, saccharin, aspartame, corn sweeteners, honey, fructose, sucrose, lactose  Tea  Tomatoes and tomato juice  Vitamin B complex  Vinegar  *Most people are not sensitive to ALL of these products; your goal is to find the foods that make YOUR symptoms worse.  ---------------------------------------------------------------------------------------------------    Low-acid fruit substitutions include apricots, papaya, pears and watermelon. Coffee drinkers can drink Kava or other lowacid instant drinks. Tea drinkers can substitute non-citrus herbal and sun brewed teas. Calcium carbonate co-buffered with calcium ascorbate can be substituted for Vitamin C. Prelief is a dietary supplement that works as an acid blocker for the bladder.    Where to get more  information:        Overcoming Bladder Disorders by Shayla Guzman and Josephine Lomeli, 1990        You Dont Have to Live with Cystitis! By Junais Peng, 1988  · http://www.urologymanagement.org/oab    ----------------------------------------------------------------------------------------------    1)  Pain when needs to urinate, relieved with bladder emptying + mixed urinary incontinence:  --urine C&S  --work on getting some regular exercise; even losing 10-15 lbs can help  --suspect bladder spasms/OAB  --treat vaginal dryness    --Empty bladder every 3 hours.  Empty well: wait a minute, lean forward on toilet.    --Avoid dietary irritants (see sheet).  Keep diary x 3-5 days to determine your irritants.  --start pelvic floor PT.  Call in a few days to make appt:  Lauren Shepley ([facility]).  (p) [phone].  Or [phone].   --URGE: Start Mirabegron 50 mg daily.  Takes 2-4 weeks to see if will have effect.  For dry mouth: get sour, sugar free lozenge or gum.    --STRESS:  Pessary vs. Sling.     2)  Vaginal atrophy (dryness):  Use 0.5 gram of estrogen cream in vagina with applicator nightly x 2 weeks, then twice a week thereafter.  Also use dime-sized amount with fingers around vaginal opening and inner lips at same frequency.   --this can help with bladder spasms, urgency/frequency    3)  RTC 3-4 months.

## 2018-12-21 ENCOUNTER — PATIENT MESSAGE (OUTPATIENT)
Dept: PRIMARY CARE CLINIC | Facility: CLINIC | Age: 53
End: 2018-12-21

## 2018-12-22 LAB — BACTERIA UR CULT: NORMAL

## 2018-12-23 ENCOUNTER — TELEPHONE (OUTPATIENT)
Dept: UROGYNECOLOGY | Facility: CLINIC | Age: 53
End: 2018-12-23

## 2018-12-23 DIAGNOSIS — N39.0 ACUTE UTI: Primary | ICD-10-CM

## 2018-12-23 RX ORDER — CIPROFLOXACIN 500 MG/1
500 TABLET ORAL 2 TIMES DAILY
Qty: 14 TABLET | Refills: 0 | Status: SHIPPED | OUTPATIENT
Start: 2018-12-23 | End: 2018-12-30

## 2018-12-24 DIAGNOSIS — M79.641 RIGHT HAND PAIN: Primary | ICD-10-CM

## 2018-12-24 NOTE — TELEPHONE ENCOUNTER
Spoke with pt and relayed message:  Please let patient know that it looks like she has a UTI, which could be contributing to the discomfort she's experiencing with urination.  I sent in Cipro BID x 7 days to her [facility].  Please remind her to /start.       Please also remind her that doing the things we discussed at her visit can help her symptoms, too, and may prevent her from getting frequent UTIs: ventura using the vaginal estrogen cream. I would still advise that she go to physical therapy, as well. Pt voiced understanding and call was ended.

## 2018-12-28 PROBLEM — R39.15 URINARY URGENCY: Status: ACTIVE | Noted: 2018-12-28

## 2018-12-28 PROBLEM — N95.2 VAGINAL ATROPHY: Status: ACTIVE | Noted: 2018-12-28

## 2018-12-28 PROBLEM — R39.89 BLADDER PAIN: Status: ACTIVE | Noted: 2018-12-28

## 2018-12-28 PROBLEM — N39.46 URINARY INCONTINENCE, MIXED: Status: ACTIVE | Noted: 2018-12-28

## 2018-12-29 ENCOUNTER — PATIENT MESSAGE (OUTPATIENT)
Dept: UROGYNECOLOGY | Facility: CLINIC | Age: 53
End: 2018-12-29

## 2018-12-31 NOTE — TELEPHONE ENCOUNTER
Spoke with pt. Who stated she has lower center area abdominal pain, and right sided pain as well, she is not constipated, or having a hard time to pee, but just hurts in the bladder ,after she pees.  Pt. Stated she is continuing to take her prescribed meds. From her last visit here with , pt. Did say she has had a kidney stone in the past, at this moment its just bladder pain.  I  recommended ,Tylenol or Motrin over the counter, and she refused, she would like some pain meds.    I instructed her to please go to ED for evaluation, there they will decide what pain meds. To give her, or maybe they will recommend an u/s, it will be the ER  On call decision.   She said okay and thank you for calling her.

## 2019-01-01 DIAGNOSIS — E55.9 VITAMIN D DEFICIENCY: ICD-10-CM

## 2019-01-02 RX ORDER — ERGOCALCIFEROL 1.25 MG/1
50000 CAPSULE ORAL
Qty: 4 CAPSULE | Refills: 1 | Status: SHIPPED | OUTPATIENT
Start: 2019-01-02 | End: 2019-03-02 | Stop reason: SDUPTHER

## 2019-01-09 ENCOUNTER — OFFICE VISIT (OUTPATIENT)
Dept: ORTHOPEDICS | Facility: CLINIC | Age: 54
End: 2019-01-09
Attending: ORTHOPAEDIC SURGERY
Payer: MEDICARE

## 2019-01-09 ENCOUNTER — HOSPITAL ENCOUNTER (OUTPATIENT)
Dept: RADIOLOGY | Facility: HOSPITAL | Age: 54
Discharge: HOME | End: 2019-01-09
Attending: ORTHOPAEDIC SURGERY
Payer: MEDICARE

## 2019-01-09 VITALS
SYSTOLIC BLOOD PRESSURE: 112 MMHG | HEIGHT: 64 IN | DIASTOLIC BLOOD PRESSURE: 52 MMHG | BODY MASS INDEX: 46.4 KG/M2 | WEIGHT: 271.81 LBS | HEART RATE: 66 BPM

## 2019-01-09 DIAGNOSIS — M79.641 RIGHT HAND PAIN: ICD-10-CM

## 2019-01-09 DIAGNOSIS — S62.521D CLOSED DISPLACED FRACTURE OF DISTAL PHALANX OF RIGHT THUMB WITH ROUTINE HEALING, SUBSEQUENT ENCOUNTER: Primary | ICD-10-CM

## 2019-01-09 PROCEDURE — 99999 PR PBB SHADOW E&M-EST. PATIENT-LVL III: CPT | Mod: PBBFAC,,, | Performed by: ORTHOPAEDIC SURGERY

## 2019-01-09 PROCEDURE — 73130 XR HAND COMPLETE 3 VIEW RIGHT: ICD-10-PCS | Mod: 26,RT,, | Performed by: RADIOLOGY

## 2019-01-09 PROCEDURE — 73130 X-RAY EXAM OF HAND: CPT | Mod: TC,PN,RT

## 2019-01-09 PROCEDURE — 99213 OFFICE O/P EST LOW 20 MIN: CPT | Mod: S$GLB,,, | Performed by: ORTHOPAEDIC SURGERY

## 2019-01-09 PROCEDURE — 99999 PR PBB SHADOW E&M-EST. PATIENT-LVL III: ICD-10-PCS | Mod: PBBFAC,,, | Performed by: ORTHOPAEDIC SURGERY

## 2019-01-09 PROCEDURE — 3008F PR BODY MASS INDEX (BMI) DOCUMENTED: ICD-10-PCS | Mod: CPTII,S$GLB,, | Performed by: ORTHOPAEDIC SURGERY

## 2019-01-09 PROCEDURE — 73130 X-RAY EXAM OF HAND: CPT | Mod: 26,RT,, | Performed by: RADIOLOGY

## 2019-01-09 PROCEDURE — 99213 PR OFFICE/OUTPT VISIT, EST, LEVL III, 20-29 MIN: ICD-10-PCS | Mod: S$GLB,,, | Performed by: ORTHOPAEDIC SURGERY

## 2019-01-09 PROCEDURE — 3008F BODY MASS INDEX DOCD: CPT | Mod: CPTII,S$GLB,, | Performed by: ORTHOPAEDIC SURGERY

## 2019-01-09 NOTE — PROGRESS NOTES
Past Medical History:   Diagnosis Date    Anxiety     Asthma     Crohn's colitis     Fatty liver disease, nonalcoholic     GERD (gastroesophageal reflux disease)     History of sleeve gastrectomy 2016       Past Surgical History:   Procedure Laterality Date    APPENDECTOMY      ARTHROSCOPY-KNEE W/ CHONDROPLASTY Right 2016    Performed by Jonathan Milligan MD at [facility] OR     SECTION, LOW TRANSVERSE      CHOLECYSTECTOMY      open    COLECTOMY      total- 2013    CYSTOSCOPY, WITH RETROGRADE PYELOGRAM N/A 2018    Performed by Butch Banks MD at [facility]    ENTEROSCOPY N/A 2014    Performed by Tavo Latham MD at [facility] ENDO (2ND FLR)    ESOPHAGOGASTRODUODENOSCOPY (EGD) N/A 2018    Performed by Chris Castorena MD at [facility] ENDO (4TH FLR)    ESOPHAGOGASTRODUODENOSCOPY (EGD) N/A 2015    Performed by Domingo Hunter MD at [facility] ENDO    ESOPHAGOGASTRODUODENOSCOPY (EGD) N/A 2014    Performed by Domingo Hunter MD at [facility] ENDO    EXCISION-PLICA Right 2016    Performed by Jonathan Milligan MD at [facility] OR    GASTRIC SLEEVE       HYSTERECTOMY      ILEOSCOPY N/A 2018    Performed by Chris Castorena MD at [facility] ENDO (4TH FLR)    ILEOSTOMY REVISION      2014; 2014    LYSIS, ADHESIONS/ more than 2hours  2018    Performed by JENNY Virgen MD at [facility] OR 2ND FLR    LYSIS-ADHESION N/A 8/15/2014    Performed by JENNY Virgen MD at [facility] OR 2ND FLR    PROCTECTOMY-LAPAROSCOPIC/CONVERTED TO OPEN N/A 2018    Performed by JENNY Virgen MD at [facility] OR 2ND FLR    REVISION-ILEOSTOMY N/A 8/15/2014    Performed by JENNY Virgen MD at [facility] OR 2ND FLR    SIGMOIDOSCOPY, FLEXIBLE  2018    Performed by JENNY Virgen MD at [facility] OR 2ND FLR    SIGMOIDOSCOPY-FLEXIBLE N/A 2018    Performed by Chris Castorena MD at [facility] ENDO (4TH FLR)    SIGMOIDOSCOPY-FLEXIBLE N/A 2014    Performed by JENNY Virgen MD at [facility]  "ENDO (4TH FLR)    thumb surgery      TONSILLECTOMY, ADENOIDECTOMY      WISDOM TOOTH EXTRACTION         Current Outpatient Medications   Medication Sig    albuterol (PROVENTIL) 2.5 mg /3 mL (0.083 %) nebulizer solution INHALE ONE VIAL VIA NEBULIZER EVERY SIX HOURS    albuterol 90 mcg/actuation inhaler Inhale 2 puffs into the lungs every 4 (four) hours as needed for Wheezing or Shortness of Breath. Rescue    buPROPion (WELLBUTRIN) 75 MG tablet Take 75 mg by mouth every other day.     clonazePAM (KLONOPIN) 1 MG tablet Take 1 mg by mouth 2 (two) times daily.     cyanocobalamin 1,000 mcg/mL injection Inject 1 mL (1,000 mcg total) into the skin once daily for 7 days, THEN 1 mL (1,000 mcg total) every 7 days for 30 days, THEN 1 mL (1,000 mcg total) every 30 days.    ergocalciferol (ERGOCALCIFEROL) 50,000 unit Cap TAKE 1 CAPSULE (50,000 UNITS TOTAL) BY MOUTH EVERY 7 DAYS.    escitalopram oxalate (LEXAPRO) 10 MG tablet Take 10 mg by mouth once daily.     escitalopram oxalate (LEXAPRO) 20 MG tablet Take 1 tablet by mouth once daily.     estradiol (ESTRACE) 0.01 % (0.1 mg/gram) vaginal cream Use 0.5 g inside vagina and dime-sized amount around opening/inner lips nightly x 2 weeks, then 2x/week thereafter.    ferrous sulfate 325 (65 FE) MG EC tablet Take 1 tablet (325 mg total) by mouth 3 (three) times daily with meals.    fluticasone (FLONASE) 50 mcg/actuation nasal spray 2 sprays (100 mcg total) by Each Nare route every evening.    loratadine (CLARITIN) 10 mg tablet TAKE ONE TABLET BY MOUTH ONCE DAILY    mirabegron (MYRBETRIQ) 50 mg Tb24 Take 1 tablet (50 mg total) by mouth once daily.    montelukast (SINGULAIR) 10 mg tablet TAKE ONE TABLET BY MOUTH EVERY EVENING    mupirocin (BACTROBAN) 2 % ointment Apply topically 3 (three) times daily.    needle, disp, 25 gauge 25 gauge x 7/8" Ndle 1 each by Misc.(Non-Drug; Combo Route) route once daily.    nystatin (MYCOSTATIN) powder Apply topically 2 (two) times " daily.    ondansetron (ZOFRAN) 4 MG tablet Take 1 tablet (4 mg total) by mouth every 8 (eight) hours as needed for Nausea.    pantoprazole (PROTONIX) 40 MG tablet Take 1 tablet (40 mg total) by mouth once daily. (Patient taking differently: Take 40 mg by mouth every other day. )    SYMBICORT 160-4.5 mcg/actuation HFAA INHALE 2 PUFFS INTO THE LUNGS EVERY 12 (TWELVE) HOURS. CONTROLLER (Patient taking differently: Inhale 2 puffs into the lungs every 12 (twelve) hours as needed. Controller)    syringe, disposable, 1 mL Syrg 1 Syringe by Misc.(Non-Drug; Combo Route) route once daily.    trazodone (DESYREL) 50 MG tablet Take 50 mg by mouth every evening.     No current facility-administered medications for this visit.        Review of patient's allergies indicates:   Allergen Reactions    Adhesive      Cause blisters    Dilaudid [hydromorphone] Nausea And Vomiting    Sulfa (sulfonamide antibiotics) Hives       Family History   Problem Relation Age of Onset    Cancer Mother         skin    Heart disease Father 67    Cancer Paternal Grandfather         ?    Cancer Maternal Grandfather         colon        Social History     Socioeconomic History    Marital status: Legally      Spouse name: Not on file    Number of children: Not on file    Years of education: Not on file    Highest education level: Not on file   Social Needs    Financial resource strain: Not on file    Food insecurity - worry: Not on file    Food insecurity - inability: Not on file    Transportation needs - medical: Not on file    Transportation needs - non-medical: Not on file   Occupational History    Not on file   Tobacco Use    Smoking status: Former Smoker     Types: Cigarettes     Last attempt to quit: 2009     Years since quitting: 10.0    Smokeless tobacco: Never Used   Substance and Sexual Activity    Alcohol use: No    Drug use: No    Sexual activity: No     Birth control/protection: See Surgical Hx   Other  "Topics Concern    Not on file   Social History Narrative    Not on file       Chief Complaint:   Chief Complaint   Patient presents with    Right Hand - Injury       Consulting Physician: Jonathan Milligan MD    History of present illness:    This is a 53 y.o. female who complains of right thumb pain after dog injury 11-24-18. Pain 4/10.    Review of Systems:    Constitution: Denies chills, fever, and sweats.  HENT: Denies headaches or blurry vision.  Cardiovascular: Denies chest pain or irregular heart beat.  Respiratory: Denies cough or shortness of breath.  Gastrointestinal: Denies abdominal pain, nausea, or vomiting.  Musculoskeletal:  Denies muscle cramps.  Neurological: Denies dizziness or focal weakness.  Psychiatric/Behavioral: Normal mental status.  Hematologic/Lymphatic: Denies bleeding problem or easy bruising/bleeding.  Skin: Denies rash or suspicious lesions.    Examination:    Vital Signs:    Vitals:    01/09/19 1016   BP: (!) 112/52   Pulse: 66   Weight: 123.3 kg (271 lb 13.2 oz)   Height: 5' 4" (1.626 m)   PainSc:   8   PainLoc: Hand       Body mass index is 46.66 kg/m².    This a well-developed, well nourished patient in no acute distress.    Alert and oriented x 3 and cooperative to examination.       Physical Exam: Right Hand Exam    Skin  Scars:   None  Rash:   None    Inspection  Erythema:  None  Bruising:  none  Swelling:  none  Masses:  None  Lymphadenopathy: None    Coordination:  Normal  Instability:  Not tested    Range of Motion Near normal    Strength:  Not tested due to fracture    Tenderness:  Over fracture    Pulse:   2+ radial  Capillary Refill: Normal    Sensation:  Intact            Imaging: X-rays ordered and images interpreted today personally by me of right hand shows thumb displaced distal phalanx intraarticular fracture        Assessment: Closed displaced fracture of distal phalanx of right thumb with routine healing, subsequent encounter        Plan:  Will dc splint and " allow gentle ROM but no lifting. Back 6 weeks.      DISCLAIMER: This note may have been dictated using voice recognition software and may contain grammatical errors.     NOTE: Consult report sent to referring provider via [employer] EMR.

## 2019-01-14 ENCOUNTER — OFFICE VISIT (OUTPATIENT)
Dept: PRIMARY CARE CLINIC | Facility: CLINIC | Age: 54
End: 2019-01-14
Payer: MEDICARE

## 2019-01-14 VITALS
TEMPERATURE: 98 F | DIASTOLIC BLOOD PRESSURE: 63 MMHG | HEART RATE: 60 BPM | WEIGHT: 278.31 LBS | OXYGEN SATURATION: 98 % | HEIGHT: 64 IN | SYSTOLIC BLOOD PRESSURE: 97 MMHG | BODY MASS INDEX: 47.51 KG/M2 | RESPIRATION RATE: 18 BRPM

## 2019-01-14 DIAGNOSIS — E66.01 MORBID OBESITY WITH BMI OF 45.0-49.9, ADULT: ICD-10-CM

## 2019-01-14 DIAGNOSIS — Z93.2 ILEOSTOMY STATUS: ICD-10-CM

## 2019-01-14 DIAGNOSIS — K51.211 ULCERATIVE PROCTITIS WITH RECTAL BLEEDING: ICD-10-CM

## 2019-01-14 DIAGNOSIS — N39.0 URINARY TRACT INFECTION WITHOUT HEMATURIA, SITE UNSPECIFIED: Primary | ICD-10-CM

## 2019-01-14 DIAGNOSIS — K50.819 CROHN'S DISEASE OF SMALL AND LARGE INTESTINES WITH COMPLICATION: ICD-10-CM

## 2019-01-14 LAB
BILIRUB SERPL-MCNC: NORMAL MG/DL
BLOOD URINE, POC: NORMAL
COLOR, POC UA: YELLOW
GLUCOSE UR QL STRIP: NORMAL
KETONES UR QL STRIP: NORMAL
LEUKOCYTE ESTERASE URINE, POC: NORMAL
NITRITE, POC UA: NORMAL
PH, POC UA: 5
PROTEIN, POC: NORMAL
SPECIFIC GRAVITY, POC UA: 1.02
UROBILINOGEN, POC UA: NORMAL

## 2019-01-14 PROCEDURE — 99999 PR PBB SHADOW E&M-EST. PATIENT-LVL III: ICD-10-PCS | Mod: PBBFAC,,, | Performed by: FAMILY MEDICINE

## 2019-01-14 PROCEDURE — 99999 PR PBB SHADOW E&M-EST. PATIENT-LVL III: CPT | Mod: PBBFAC,,, | Performed by: FAMILY MEDICINE

## 2019-01-14 PROCEDURE — 3008F BODY MASS INDEX DOCD: CPT | Mod: CPTII,S$GLB,, | Performed by: FAMILY MEDICINE

## 2019-01-14 PROCEDURE — 99214 PR OFFICE/OUTPT VISIT, EST, LEVL IV, 30-39 MIN: ICD-10-PCS | Mod: 25,S$GLB,, | Performed by: FAMILY MEDICINE

## 2019-01-14 PROCEDURE — 99214 OFFICE O/P EST MOD 30 MIN: CPT | Mod: 25,S$GLB,, | Performed by: FAMILY MEDICINE

## 2019-01-14 PROCEDURE — 81001 POCT URINALYSIS, DIPSTICK OR TABLET REAGENT, AUTOMATED, WITH MICROSCOP: ICD-10-PCS | Mod: S$GLB,,, | Performed by: FAMILY MEDICINE

## 2019-01-14 PROCEDURE — 99499 RISK ADDL DX/OHS AUDIT: ICD-10-PCS | Mod: S$GLB,,, | Performed by: FAMILY MEDICINE

## 2019-01-14 PROCEDURE — 81001 URINALYSIS AUTO W/SCOPE: CPT | Mod: S$GLB,,, | Performed by: FAMILY MEDICINE

## 2019-01-14 PROCEDURE — 99499 UNLISTED E&M SERVICE: CPT | Mod: S$GLB,,, | Performed by: FAMILY MEDICINE

## 2019-01-14 PROCEDURE — 3008F PR BODY MASS INDEX (BMI) DOCUMENTED: ICD-10-PCS | Mod: CPTII,S$GLB,, | Performed by: FAMILY MEDICINE

## 2019-01-14 PROCEDURE — 87086 URINE CULTURE/COLONY COUNT: CPT

## 2019-01-14 RX ORDER — CEFDINIR 300 MG/1
300 CAPSULE ORAL 2 TIMES DAILY
Qty: 14 CAPSULE | Refills: 0 | Status: SHIPPED | OUTPATIENT
Start: 2019-01-14 | End: 2019-01-21

## 2019-01-14 NOTE — PROGRESS NOTES
"Subjective:       Patient ID: Ida Orozco is a 53 y.o. female.    Chief Complaint: Abdominal Pain and Urinary Tract Infection    Dysuria    This is a chronic problem. The current episode started more than 1 year ago. The problem occurs every urination. The problem has been gradually worsening. The quality of the pain is described as burning and stabbing. The pain is at a severity of 8/10. The pain is moderate. The maximum temperature recorded prior to her arrival was 100 - 100.9 F. The fever has been present for 1 - 2 days. She is not sexually active. There is a history of pyelonephritis. Associated symptoms include flank pain, frequency, hesitancy, nausea, sweats and urgency. Pertinent negatives include no behavior changes, chills, discharge, hematuria, possible pregnancy, vomiting, weight loss, constipation, rash or withholding. She has tried acetaminophen and NSAIDs for the symptoms. The treatment provided no relief. Her past medical history is significant for catheterization, kidney stones, recurrent UTIs and a urological procedure. There is no history of diabetes insipidus, diabetes mellitus, genitourinary reflux, hypertension, a single kidney, STD or urinary stasis.     Review of Systems   Constitutional: Negative for chills and weight loss.   Gastrointestinal: Positive for nausea. Negative for constipation and vomiting.   Genitourinary: Positive for dysuria, flank pain, frequency, hesitancy and urgency. Negative for hematuria.   Skin: Negative for rash.       Objective:      Vitals:    01/14/19 1008   BP: 97/63   BP Location: Right arm   Patient Position: Sitting   BP Method: Large (Automatic)   Pulse: 60   Resp: 18   Temp: 98.3 °F (36.8 °C)   TempSrc: Oral   SpO2: 98%   Weight: 126.2 kg (278 lb 4.8 oz)   Height: 5' 4" (1.626 m)     Physical Exam   Constitutional: She is oriented to person, place, and time. She appears well-developed and well-nourished.   HENT:   Head: Normocephalic and atraumatic. "   Cardiovascular: Normal rate, regular rhythm and normal heart sounds.   Pulmonary/Chest: Effort normal and breath sounds normal.   Abdominal: Bowel sounds are normal. There is tenderness in the suprapubic area. There is CVA tenderness (left). There is no rigidity and no guarding.       Musculoskeletal: She exhibits no edema.   Neurological: She is alert and oriented to person, place, and time.   Skin: Skin is warm and dry.   Nursing note and vitals reviewed.      Assessment:       1. Urinary tract infection without hematuria, site unspecified    2. Morbid obesity with BMI of 45.0-49.9, adult    3. Ulcerative proctitis with rectal bleeding    4. Ileostomy status    5. Crohn's disease of small and large intestines with complication        Plan:       Urinary tract infection without hematuria, site unspecified  -     Urine culture  -     POCT urinalysis, dipstick or tablet reag  -     cefdinir (OMNICEF) 300 MG capsule; Take 1 capsule (300 mg total) by mouth 2 (two) times daily. for 7 days  Dispense: 14 capsule; Refill: 0  Follow-up with urogynecology as previously advised  Morbid obesity with BMI of 45.0-49.9, adult  Needs to limit carbohydrate intake facilitate weight loss  Ulcerative proctitis with rectal bleeding  Stable  Ileostomy status  Stable  Crohn's disease of small and large intestines with complication  Stable       Medication List           Accurate as of 1/14/19 10:32 AM. If you have any questions, ask your nurse or doctor.               START taking these medications    cefdinir 300 MG capsule  Commonly known as:  OMNICEF  Take 1 capsule (300 mg total) by mouth 2 (two) times daily. for 7 days  Started by:  Eliecer Jones MD        CHANGE how you take these medications    pantoprazole 40 MG tablet  Commonly known as:  PROTONIX  Take 1 tablet (40 mg total) by mouth once daily.  What changed:  when to take this     SYMBICORT 160-4.5 mcg/actuation [facility]  Generic drug:  budesonide-formoterol 160-4.5  mcg  INHALE 2 PUFFS INTO THE LUNGS EVERY 12 (TWELVE) HOURS. CONTROLLER  What changed:    · when to take this  · reasons to take this  · additional instructions        CONTINUE taking these medications    * albuterol 90 mcg/actuation inhaler  Commonly known as:  PROVENTIL/VENTOLIN HFA  Inhale 2 puffs into the lungs every 4 (four) hours as needed for Wheezing or Shortness of Breath. Rescue     * albuterol 2.5 mg /3 mL (0.083 %) nebulizer solution  Commonly known as:  PROVENTIL  INHALE ONE VIAL VIA NEBULIZER EVERY SIX HOURS     buPROPion 75 MG tablet  Commonly known as:  WELLBUTRIN     clonazePAM 1 MG tablet  Commonly known as:  KLONOPIN     cyanocobalamin 1,000 mcg/mL injection  Inject 1 mL (1,000 mcg total) into the skin once daily for 7 days, THEN 1 mL (1,000 mcg total) every 7 days for 30 days, THEN 1 mL (1,000 mcg total) every 30 days.  Start taking on:  10/9/2018     ergocalciferol 50,000 unit Cap  Commonly known as:  ERGOCALCIFEROL  TAKE 1 CAPSULE (50,000 UNITS TOTAL) BY MOUTH EVERY 7 DAYS.     * escitalopram oxalate 10 MG tablet  Commonly known as:  LEXAPRO     * escitalopram oxalate 20 MG tablet  Commonly known as:  LEXAPRO     estradiol 0.01 % (0.1 mg/gram) vaginal cream  Commonly known as:  ESTRACE  Use 0.5 g inside vagina and dime-sized amount around opening/inner lips nightly x 2 weeks, then 2x/week thereafter.     ferrous sulfate 325 (65 FE) MG EC tablet  Take 1 tablet (325 mg total) by mouth 3 (three) times daily with meals.     fluticasone 50 mcg/actuation nasal spray  Commonly known as:  FLONASE  2 sprays (100 mcg total) by Each Nare route every evening.     loratadine 10 mg tablet  Commonly known as:  CLARITIN  TAKE ONE TABLET BY MOUTH ONCE DAILY     mirabegron 50 mg Tb24  Commonly known as:  MYRBETRIQ  Take 1 tablet (50 mg total) by mouth once daily.     montelukast 10 mg tablet  Commonly known as:  SINGULAIR  TAKE ONE TABLET BY MOUTH EVERY EVENING     mupirocin 2 % ointment  Commonly known as:   "BACTROBAN  Apply topically 3 (three) times daily.     needle (disp) 25 gauge 25 gauge x 7/8" Ndle  1 each by Misc.(Non-Drug; Combo Route) route once daily.     nystatin powder  Commonly known as:  MYCOSTATIN  Apply topically 2 (two) times daily.     ondansetron 4 MG tablet  Commonly known as:  ZOFRAN  Take 1 tablet (4 mg total) by mouth every 8 (eight) hours as needed for Nausea.     syringe (disposable) 1 mL Syrg  1 Syringe by Misc.(Non-Drug; Combo Route) route once daily.     traZODone 50 MG tablet  Commonly known as:  DESYREL         * This list has 4 medication(s) that are the same as other medications prescribed for you. Read the directions carefully, and ask your doctor or other care provider to review them with you.               Where to Get Your Medications      These medications were sent to [id number] - [place] [id number] [address]    Phone:  [phone]   · cefdinir 300 MG capsule           "

## 2019-01-15 LAB
BACTERIA UR CULT: NORMAL
BACTERIA UR CULT: NORMAL

## 2019-01-25 DIAGNOSIS — M79.641 RIGHT HAND PAIN: Primary | ICD-10-CM

## 2019-01-29 ENCOUNTER — HOSPITAL ENCOUNTER (OUTPATIENT)
Dept: RADIOLOGY | Facility: HOSPITAL | Age: 54
Discharge: HOME | End: 2019-01-29
Attending: ORTHOPAEDIC SURGERY
Payer: MEDICARE

## 2019-01-29 ENCOUNTER — OFFICE VISIT (OUTPATIENT)
Dept: ORTHOPEDICS | Facility: CLINIC | Age: 54
End: 2019-01-29
Payer: MEDICARE

## 2019-01-29 VITALS
SYSTOLIC BLOOD PRESSURE: 108 MMHG | HEART RATE: 58 BPM | HEIGHT: 64 IN | DIASTOLIC BLOOD PRESSURE: 54 MMHG | WEIGHT: 278.25 LBS | BODY MASS INDEX: 47.5 KG/M2

## 2019-01-29 DIAGNOSIS — S62.521D CLOSED DISPLACED FRACTURE OF DISTAL PHALANX OF RIGHT THUMB WITH ROUTINE HEALING, SUBSEQUENT ENCOUNTER: Primary | ICD-10-CM

## 2019-01-29 DIAGNOSIS — M79.641 RIGHT HAND PAIN: ICD-10-CM

## 2019-01-29 PROCEDURE — 73130 X-RAY EXAM OF HAND: CPT | Mod: TC,PN,RT

## 2019-01-29 PROCEDURE — 99213 PR OFFICE/OUTPT VISIT, EST, LEVL III, 20-29 MIN: ICD-10-PCS | Mod: S$GLB,,, | Performed by: ORTHOPAEDIC SURGERY

## 2019-01-29 PROCEDURE — 99213 OFFICE O/P EST LOW 20 MIN: CPT | Mod: S$GLB,,, | Performed by: ORTHOPAEDIC SURGERY

## 2019-01-29 PROCEDURE — 73130 XR HAND COMPLETE 3 VIEW RIGHT: ICD-10-PCS | Mod: 26,RT,, | Performed by: RADIOLOGY

## 2019-01-29 PROCEDURE — 99999 PR PBB SHADOW E&M-EST. PATIENT-LVL III: CPT | Mod: PBBFAC,,, | Performed by: ORTHOPAEDIC SURGERY

## 2019-01-29 PROCEDURE — 73130 X-RAY EXAM OF HAND: CPT | Mod: 26,RT,, | Performed by: RADIOLOGY

## 2019-01-29 PROCEDURE — 3008F BODY MASS INDEX DOCD: CPT | Mod: CPTII,S$GLB,, | Performed by: ORTHOPAEDIC SURGERY

## 2019-01-29 PROCEDURE — 99999 PR PBB SHADOW E&M-EST. PATIENT-LVL III: ICD-10-PCS | Mod: PBBFAC,,, | Performed by: ORTHOPAEDIC SURGERY

## 2019-01-29 PROCEDURE — 3008F PR BODY MASS INDEX (BMI) DOCUMENTED: ICD-10-PCS | Mod: CPTII,S$GLB,, | Performed by: ORTHOPAEDIC SURGERY

## 2019-01-29 RX ORDER — DICLOFENAC SODIUM 10 MG/G
2 GEL TOPICAL 4 TIMES DAILY
Qty: 100 G | Refills: 0 | Status: SHIPPED | OUTPATIENT
Start: 2019-01-29 | End: 2019-04-15 | Stop reason: SDUPTHER

## 2019-02-07 ENCOUNTER — TELEPHONE (OUTPATIENT)
Dept: PRIMARY CARE CLINIC | Facility: CLINIC | Age: 54
End: 2019-02-07

## 2019-02-07 ENCOUNTER — OFFICE VISIT (OUTPATIENT)
Dept: PRIMARY CARE CLINIC | Facility: CLINIC | Age: 54
End: 2019-02-07
Payer: MEDICARE

## 2019-02-07 VITALS
RESPIRATION RATE: 18 BRPM | TEMPERATURE: 99 F | HEART RATE: 67 BPM | DIASTOLIC BLOOD PRESSURE: 67 MMHG | BODY MASS INDEX: 45.82 KG/M2 | WEIGHT: 275 LBS | SYSTOLIC BLOOD PRESSURE: 100 MMHG | OXYGEN SATURATION: 97 % | HEIGHT: 65 IN

## 2019-02-07 DIAGNOSIS — K13.21 ORAL LEUKOPLAKIA: ICD-10-CM

## 2019-02-07 DIAGNOSIS — N30.01 ACUTE CYSTITIS WITH HEMATURIA: Primary | ICD-10-CM

## 2019-02-07 LAB
BILIRUB SERPL-MCNC: ABNORMAL MG/DL
BLOOD URINE, POC: 50
COLOR, POC UA: YELLOW
GLUCOSE UR QL STRIP: ABNORMAL
KETONES UR QL STRIP: ABNORMAL
LEUKOCYTE ESTERASE URINE, POC: ABNORMAL
NITRITE, POC UA: ABNORMAL
PH, POC UA: 5
PROTEIN, POC: 30
SPECIFIC GRAVITY, POC UA: 1.01
UROBILINOGEN, POC UA: ABNORMAL

## 2019-02-07 PROCEDURE — 3008F PR BODY MASS INDEX (BMI) DOCUMENTED: ICD-10-PCS | Mod: CPTII,S$GLB,, | Performed by: FAMILY MEDICINE

## 2019-02-07 PROCEDURE — 87186 SC STD MICRODIL/AGAR DIL: CPT

## 2019-02-07 PROCEDURE — 87088 URINE BACTERIA CULTURE: CPT

## 2019-02-07 PROCEDURE — 87086 URINE CULTURE/COLONY COUNT: CPT

## 2019-02-07 PROCEDURE — 99999 PR PBB SHADOW E&M-EST. PATIENT-LVL IV: CPT | Mod: PBBFAC,,, | Performed by: FAMILY MEDICINE

## 2019-02-07 PROCEDURE — 99214 OFFICE O/P EST MOD 30 MIN: CPT | Mod: 25,S$GLB,, | Performed by: FAMILY MEDICINE

## 2019-02-07 PROCEDURE — 99999 PR PBB SHADOW E&M-EST. PATIENT-LVL IV: ICD-10-PCS | Mod: PBBFAC,,, | Performed by: FAMILY MEDICINE

## 2019-02-07 PROCEDURE — 87077 CULTURE AEROBIC IDENTIFY: CPT

## 2019-02-07 PROCEDURE — 3008F BODY MASS INDEX DOCD: CPT | Mod: CPTII,S$GLB,, | Performed by: FAMILY MEDICINE

## 2019-02-07 PROCEDURE — 99214 PR OFFICE/OUTPT VISIT, EST, LEVL IV, 30-39 MIN: ICD-10-PCS | Mod: 25,S$GLB,, | Performed by: FAMILY MEDICINE

## 2019-02-07 PROCEDURE — 81001 POCT URINALYSIS, DIPSTICK OR TABLET REAGENT, AUTOMATED, WITH MICROSCOP: ICD-10-PCS | Mod: S$GLB,,, | Performed by: FAMILY MEDICINE

## 2019-02-07 PROCEDURE — 81001 URINALYSIS AUTO W/SCOPE: CPT | Mod: S$GLB,,, | Performed by: FAMILY MEDICINE

## 2019-02-07 RX ORDER — HYDROCODONE BITARTRATE AND ACETAMINOPHEN 5; 325 MG/1; MG/1
1 TABLET ORAL NIGHTLY
COMMUNITY
Start: 2019-01-30 | End: 2019-03-06

## 2019-02-07 RX ORDER — CIPROFLOXACIN 250 MG/1
250 TABLET, FILM COATED ORAL 2 TIMES DAILY
Qty: 14 TABLET | Refills: 0 | Status: SHIPPED | OUTPATIENT
Start: 2019-02-07 | End: 2019-02-14

## 2019-02-07 RX ORDER — PHENAZOPYRIDINE HYDROCHLORIDE 200 MG/1
200 TABLET, FILM COATED ORAL 3 TIMES DAILY
Qty: 6 TABLET | Refills: 0 | Status: SHIPPED | OUTPATIENT
Start: 2019-02-07 | End: 2019-02-09

## 2019-02-07 NOTE — PROGRESS NOTES
"Subjective:       Patient ID: Ida Orozco is a 53 y.o. female.    Chief Complaint: Dysuria (patient says she was up all night going to the bathroom but says it was small amounts each time ) and Sore Throat (says the dentist says she needs a referral to ENT due to the white spots in the back of her throat )    Urinary Tract Infection    This is a new problem. The current episode started acute onset. The problem occurs every urination. The problem has been unchanged. The quality of the pain is described as burning. There has been no fever. There is a history of pyelonephritis. Associated symptoms include frequency, nausea, sweats and urgency. Pertinent negatives include no discharge, flank pain, possible pregnancy or vomiting. Her past medical history is significant for recurrent UTIs.    Underwent biopsy of 2 palate lesions at [facility] last week, went back for follow-up yesterday and was told that the to palate lesions are benign oral leukoplakia, advised to follow up with ENT for evaluation of tonsillar lesions and possible laryngoscopic evaluation.  Patient is a former smoker, also has Crohn's disease, was told these findings are common in patients with Crohn's.  Review of Systems   Constitutional: Negative for fever.   HENT: Positive for mouth sores.    Respiratory: Negative for shortness of breath.    Cardiovascular: Negative for chest pain.   Gastrointestinal: Positive for nausea. Negative for vomiting.   Endocrine: Negative for polyuria.   Genitourinary: Positive for frequency and urgency. Negative for flank pain.       Objective:      Vitals:    02/07/19 0949   BP: 100/67   BP Location: Left arm   Patient Position: Sitting   BP Method: Large (Automatic)   Pulse: 67   Resp: 18   Temp: 98.7 °F (37.1 °C)   TempSrc: Oral   SpO2: 97%   Weight: 124.7 kg (275 lb)   Height: 5' 4.5" (1.638 m)     Physical Exam   Constitutional: She is oriented to person, place, and time. She appears well-developed and " well-nourished.   HENT:   Head: Normocephalic and atraumatic.   Mouth/Throat:       To superficial biopsy sites on palate, also has 2 subcentimeter hypopigmented lesions to tonsillar region bilaterally   Cardiovascular: Normal rate, regular rhythm and normal heart sounds.   Pulmonary/Chest: Effort normal and breath sounds normal.   Abdominal: There is tenderness in the suprapubic area. There is no rigidity, no rebound, no guarding and no CVA tenderness.   Musculoskeletal: She exhibits no edema.   Neurological: She is alert and oriented to person, place, and time.   Skin: Skin is warm and dry.   Nursing note and vitals reviewed.      Assessment:       1. Acute cystitis with hematuria    2. Oral leukoplakia        Plan:       Acute cystitis with hematuria  -     Urine culture  -     POCT urinalysis, dipstick or tablet reag  -     ciprofloxacin HCl (CIPRO) 250 MG tablet; Take 1 tablet (250 mg total) by mouth 2 (two) times daily. for 7 days  Dispense: 14 tablet; Refill: 0  -     phenazopyridine (PYRIDIUM) 200 MG tablet; Take 1 tablet (200 mg total) by mouth 3 (three) times daily. for 2 days  Dispense: 6 tablet; Refill: 0    Oral leukoplakia  -     Ambulatory Referral to ENT      Medication List with Changes/Refills   New Medications    CIPROFLOXACIN HCL (CIPRO) 250 MG TABLET    Take 1 tablet (250 mg total) by mouth 2 (two) times daily. for 7 days    PHENAZOPYRIDINE (PYRIDIUM) 200 MG TABLET    Take 1 tablet (200 mg total) by mouth 3 (three) times daily. for 2 days   Current Medications    ALBUTEROL (PROVENTIL) 2.5 MG /3 ML (0.083 %) NEBULIZER SOLUTION    INHALE ONE VIAL VIA NEBULIZER EVERY SIX HOURS    ALBUTEROL 90 MCG/ACTUATION INHALER    Inhale 2 puffs into the lungs every 4 (four) hours as needed for Wheezing or Shortness of Breath. Rescue    CLONAZEPAM (KLONOPIN) 1 MG TABLET    Take 1 mg by mouth 2 (two) times daily.     CYANOCOBALAMIN 1,000 MCG/ML INJECTION    Inject 1 mL (1,000 mcg total) into the skin once daily  "for 7 days, THEN 1 mL (1,000 mcg total) every 7 days for 30 days, THEN 1 mL (1,000 mcg total) every 30 days.    DICLOFENAC SODIUM (VOLTAREN) 1 % GEL    Apply 2 g topically 4 (four) times daily.    ERGOCALCIFEROL (ERGOCALCIFEROL) 50,000 UNIT CAP    TAKE 1 CAPSULE (50,000 UNITS TOTAL) BY MOUTH EVERY 7 DAYS.    ESCITALOPRAM OXALATE (LEXAPRO) 10 MG TABLET    Take 10 mg by mouth once daily.     ESCITALOPRAM OXALATE (LEXAPRO) 20 MG TABLET    Take 1 tablet by mouth once daily.     ESTRADIOL (ESTRACE) 0.01 % (0.1 MG/GRAM) VAGINAL CREAM    Use 0.5 g inside vagina and dime-sized amount around opening/inner lips nightly x 2 weeks, then 2x/week thereafter.    FLUTICASONE (FLONASE) 50 MCG/ACTUATION NASAL SPRAY    2 sprays (100 mcg total) by Each Nare route every evening.    HYDROCODONE-ACETAMINOPHEN (NORCO) 5-325 MG PER TABLET    Take 1 tablet by mouth every evening.    LORATADINE (CLARITIN) 10 MG TABLET    TAKE ONE TABLET BY MOUTH ONCE DAILY    MIRABEGRON (MYRBETRIQ) 50 MG TB24    Take 1 tablet (50 mg total) by mouth once daily.    MONTELUKAST (SINGULAIR) 10 MG TABLET    TAKE ONE TABLET BY MOUTH EVERY EVENING    MUPIROCIN (BACTROBAN) 2 % OINTMENT    Apply topically 3 (three) times daily.    NEEDLE, DISP, 25 GAUGE 25 GAUGE X 7/8" NDLE    1 each by Misc.(Non-Drug; Combo Route) route once daily.    NYSTATIN (MYCOSTATIN) POWDER    Apply topically 2 (two) times daily.    ONDANSETRON (ZOFRAN) 4 MG TABLET    Take 1 tablet (4 mg total) by mouth every 8 (eight) hours as needed for Nausea.    PANTOPRAZOLE (PROTONIX) 40 MG TABLET    Take 1 tablet (40 mg total) by mouth once daily.    SYMBICORT 160-4.5 MCG/ACTUATION HFAA    INHALE 2 PUFFS INTO THE LUNGS EVERY 12 (TWELVE) HOURS. CONTROLLER    SYRINGE, DISPOSABLE, 1 ML SYRG    1 Syringe by Misc.(Non-Drug; Combo Route) route once daily.    TRAZODONE (DESYREL) 50 MG TABLET    Take 50 mg by mouth every evening.   Discontinued Medications    BUPROPION (WELLBUTRIN) 75 MG TABLET    Take 75 mg by " mouth every other day.     FERROUS SULFATE 325 (65 FE) MG EC TABLET    Take 1 tablet (325 mg total) by mouth 3 (three) times daily with meals.

## 2019-02-11 LAB — BACTERIA UR CULT: NORMAL

## 2019-02-20 ENCOUNTER — OFFICE VISIT (OUTPATIENT)
Dept: UROGYNECOLOGY | Facility: CLINIC | Age: 54
End: 2019-02-20
Payer: MEDICARE

## 2019-02-20 VITALS — WEIGHT: 280.19 LBS | HEIGHT: 64 IN | BODY MASS INDEX: 47.83 KG/M2

## 2019-02-20 DIAGNOSIS — R10.84 GENERALIZED ABDOMINAL PAIN: ICD-10-CM

## 2019-02-20 DIAGNOSIS — Z29.9 PROPHYLACTIC MEASURE: Primary | ICD-10-CM

## 2019-02-20 DIAGNOSIS — N95.2 VAGINAL ATROPHY: ICD-10-CM

## 2019-02-20 DIAGNOSIS — R39.89 BLADDER PAIN: ICD-10-CM

## 2019-02-20 DIAGNOSIS — N39.46 URINARY INCONTINENCE, MIXED: ICD-10-CM

## 2019-02-20 DIAGNOSIS — R39.15 URINARY URGENCY: ICD-10-CM

## 2019-02-20 PROCEDURE — 99213 PR OFFICE/OUTPT VISIT, EST, LEVL III, 20-29 MIN: ICD-10-PCS | Mod: S$GLB,,, | Performed by: OBSTETRICS & GYNECOLOGY

## 2019-02-20 PROCEDURE — 99999 PR PBB SHADOW E&M-EST. PATIENT-LVL III: ICD-10-PCS | Mod: PBBFAC,,, | Performed by: OBSTETRICS & GYNECOLOGY

## 2019-02-20 PROCEDURE — 99213 OFFICE O/P EST LOW 20 MIN: CPT | Mod: S$GLB,,, | Performed by: OBSTETRICS & GYNECOLOGY

## 2019-02-20 PROCEDURE — 3008F PR BODY MASS INDEX (BMI) DOCUMENTED: ICD-10-PCS | Mod: CPTII,S$GLB,, | Performed by: OBSTETRICS & GYNECOLOGY

## 2019-02-20 PROCEDURE — 3008F BODY MASS INDEX DOCD: CPT | Mod: CPTII,S$GLB,, | Performed by: OBSTETRICS & GYNECOLOGY

## 2019-02-20 PROCEDURE — 99999 PR PBB SHADOW E&M-EST. PATIENT-LVL III: CPT | Mod: PBBFAC,,, | Performed by: OBSTETRICS & GYNECOLOGY

## 2019-02-20 RX ORDER — LIDOCAINE HYDROCHLORIDE 20 MG/ML
SOLUTION ORAL; TOPICAL
COMMUNITY
Start: 2019-02-07 | End: 2019-04-11
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
## 2019-02-20 NOTE — PATIENT INSTRUCTIONS
1)  Pain when needs to urinate, relieved with bladder emptying + mixed urinary incontinence:  --work on getting some regular exercise; even losing 10-15 lbs can help  --suspect bladder spasms/OAB  --treat vaginal dryness  --Empty bladder every 3 hours.  Empty well: wait a minute, lean forward on toilet.    --Avoid dietary irritants (see sheet).  Keep diary x 3-5 days to determine your irritants.  --start pelvic floor PT.    --URGE: continue Mirabegron 50 mg daily.  Takes 2-4 weeks to see if will have effect.  For dry mouth: get sour, sugar free lozenge or gum.    --STRESS:  Pessary vs. Sling.   --urinate and wash hands/use wipes before empting ileostomy bag    2)  Vaginal atrophy   --continue vaginal estrogen cream twice weekly    3)concern for kidney stone  --CT renal stone study    4)RTC 3-4 months.

## 2019-02-21 ENCOUNTER — PATIENT MESSAGE (OUTPATIENT)
Dept: UROGYNECOLOGY | Facility: CLINIC | Age: 54
End: 2019-02-21

## 2019-02-24 PROBLEM — R10.84 GENERALIZED ABDOMINAL PAIN: Status: ACTIVE | Noted: 2019-02-24

## 2019-02-25 PROBLEM — R53.1 WEAKNESS: Status: ACTIVE | Noted: 2019-02-25

## 2019-02-25 PROBLEM — M62.89 PELVIC FLOOR DYSFUNCTION: Status: ACTIVE | Noted: 2019-02-25

## 2019-02-25 PROBLEM — N39.42 URINARY INCONTINENCE WITHOUT SENSORY AWARENESS: Status: ACTIVE | Noted: 2019-02-25

## 2019-03-02 DIAGNOSIS — E55.9 VITAMIN D DEFICIENCY: ICD-10-CM

## 2019-03-04 RX ORDER — MONTELUKAST SODIUM 10 MG/1
TABLET ORAL
Qty: 90 TABLET | Refills: 1 | Status: SHIPPED | OUTPATIENT
Start: 2019-03-04 | End: 2019-08-26 | Stop reason: SDUPTHER

## 2019-03-04 RX ORDER — ERGOCALCIFEROL 1.25 MG/1
50000 CAPSULE ORAL
Qty: 4 CAPSULE | Refills: 5 | Status: SHIPPED | OUTPATIENT
Start: 2019-03-04 | End: 2019-08-12 | Stop reason: SDUPTHER

## 2019-03-06 ENCOUNTER — OFFICE VISIT (OUTPATIENT)
Dept: PRIMARY CARE CLINIC | Facility: CLINIC | Age: 54
End: 2019-03-06
Payer: MEDICARE

## 2019-03-06 VITALS
BODY MASS INDEX: 46.48 KG/M2 | TEMPERATURE: 99 F | RESPIRATION RATE: 18 BRPM | DIASTOLIC BLOOD PRESSURE: 68 MMHG | OXYGEN SATURATION: 98 % | WEIGHT: 279 LBS | HEART RATE: 71 BPM | SYSTOLIC BLOOD PRESSURE: 109 MMHG | HEIGHT: 65 IN

## 2019-03-06 DIAGNOSIS — H66.002 ACUTE SUPPURATIVE OTITIS MEDIA OF LEFT EAR: Primary | ICD-10-CM

## 2019-03-06 PROCEDURE — 99999 PR PBB SHADOW E&M-EST. PATIENT-LVL III: CPT | Mod: PBBFAC,,, | Performed by: FAMILY MEDICINE

## 2019-03-06 PROCEDURE — 99214 PR OFFICE/OUTPT VISIT, EST, LEVL IV, 30-39 MIN: ICD-10-PCS | Mod: S$GLB,,, | Performed by: FAMILY MEDICINE

## 2019-03-06 PROCEDURE — 99214 OFFICE O/P EST MOD 30 MIN: CPT | Mod: S$GLB,,, | Performed by: FAMILY MEDICINE

## 2019-03-06 PROCEDURE — 3008F PR BODY MASS INDEX (BMI) DOCUMENTED: ICD-10-PCS | Mod: CPTII,S$GLB,, | Performed by: FAMILY MEDICINE

## 2019-03-06 PROCEDURE — 99999 PR PBB SHADOW E&M-EST. PATIENT-LVL III: ICD-10-PCS | Mod: PBBFAC,,, | Performed by: FAMILY MEDICINE

## 2019-03-06 PROCEDURE — 3008F BODY MASS INDEX DOCD: CPT | Mod: CPTII,S$GLB,, | Performed by: FAMILY MEDICINE

## 2019-03-06 RX ORDER — AMOXICILLIN AND CLAVULANATE POTASSIUM 875; 125 MG/1; MG/1
1 TABLET, FILM COATED ORAL 2 TIMES DAILY
Qty: 20 TABLET | Refills: 0 | Status: SHIPPED | OUTPATIENT
Start: 2019-03-06 | End: 2019-03-13 | Stop reason: SDUPTHER

## 2019-03-06 NOTE — PROGRESS NOTES
"Subjective:       Patient ID: Ida Orozco is a 53 y.o. female.    Chief Complaint: Otalgia (Left ear pain since yesterday ) and Numbness (right great toe numbness x1 month )    Also complains of numbness to the plantar surface of her left great toe past month.  No known injury.  However, she has been on her feet a lot more since starting a new job recently.      Otalgia    There is pain in the left ear. This is a new problem. The current episode started yesterday. The problem occurs constantly. The problem has been gradually worsening. There has been no fever. The pain is moderate. Associated symptoms include a sore throat. Pertinent negatives include no abdominal pain, coughing, ear discharge, hearing loss, rash or vomiting. She has tried nothing for the symptoms. There is no history of a chronic ear infection, hearing loss or a tympanostomy tube.     Review of Systems   Constitutional: Negative for fever.   HENT: Positive for ear pain and sore throat. Negative for ear discharge and hearing loss.    Eyes: Negative for visual disturbance.   Respiratory: Negative for cough.    Gastrointestinal: Negative for abdominal pain and vomiting.   Skin: Negative for rash.       Objective:      Vitals:    03/06/19 0906   BP: 109/68   BP Location: Left arm   Patient Position: Sitting   BP Method: Large (Automatic)   Pulse: 71   Resp: 18   Temp: 99.2 °F (37.3 °C)   TempSrc: Oral   SpO2: 98%   Weight: 126.6 kg (279 lb)   Height: 5' 4.5" (1.638 m)     Physical Exam   Constitutional: She is oriented to person, place, and time. She appears well-developed and well-nourished.   HENT:   Head: Normocephalic and atraumatic.   Right Ear: Tympanic membrane normal.   Left Ear: Tympanic membrane is erythematous and bulging. A middle ear effusion is present.   Mouth/Throat: Oropharynx is clear and moist.   Cardiovascular: Normal rate, regular rhythm and normal heart sounds.   Pulses:       Dorsalis pedis pulses are 2+ on the right side, and " 2+ on the left side.   Pulmonary/Chest: Effort normal and breath sounds normal.   Musculoskeletal: She exhibits no edema.   Neurological: She is alert and oriented to person, place, and time.   Skin: Skin is warm and dry.   Nursing note and vitals reviewed.      Assessment:       1. Acute suppurative otitis media of left ear        Plan:       Acute suppurative otitis media of left ear  -     amoxicillin-clavulanate 875-125mg (AUGMENTIN) 875-125 mg per tablet; Take 1 tablet by mouth 2 (two) times daily. for 10 days  Dispense: 20 tablet; Refill: 0  Increase fluid intake, Mucinex as needed    Medication List with Changes/Refills   New Medications    AMOXICILLIN-CLAVULANATE 875-125MG (AUGMENTIN) 875-125 MG PER TABLET    Take 1 tablet by mouth 2 (two) times daily. for 10 days   Current Medications    ALBUTEROL (PROVENTIL) 2.5 MG /3 ML (0.083 %) NEBULIZER SOLUTION    INHALE ONE VIAL VIA NEBULIZER EVERY SIX HOURS    ALBUTEROL 90 MCG/ACTUATION INHALER    Inhale 2 puffs into the lungs every 4 (four) hours as needed for Wheezing or Shortness of Breath. Rescue    CLONAZEPAM (KLONOPIN) 1 MG TABLET    Take 1 mg by mouth 2 (two) times daily.     CYANOCOBALAMIN 1,000 MCG/ML INJECTION    Inject 1 mL (1,000 mcg total) into the skin once daily for 7 days, THEN 1 mL (1,000 mcg total) every 7 days for 30 days, THEN 1 mL (1,000 mcg total) every 30 days.    DICLOFENAC SODIUM (VOLTAREN) 1 % GEL    Apply 2 g topically 4 (four) times daily.    ERGOCALCIFEROL (ERGOCALCIFEROL) 50,000 UNIT CAP    TAKE 1 CAPSULE (50,000 UNITS TOTAL) BY MOUTH EVERY 7 DAYS.    ESCITALOPRAM OXALATE (LEXAPRO) 10 MG TABLET    Take 10 mg by mouth once daily.     ESCITALOPRAM OXALATE (LEXAPRO) 20 MG TABLET    Take 1 tablet by mouth once daily.     ESTRADIOL (ESTRACE) 0.01 % (0.1 MG/GRAM) VAGINAL CREAM    Use 0.5 g inside vagina and dime-sized amount around opening/inner lips nightly x 2 weeks, then 2x/week thereafter.    FLUTICASONE (FLONASE) 50 MCG/ACTUATION NASAL  "SPRAY    2 sprays (100 mcg total) by Each Nare route every evening.    LIDOCAINE VISCOUS 2 % SOLUTION        LORATADINE (CLARITIN) 10 MG TABLET    TAKE ONE TABLET BY MOUTH ONCE DAILY    MIRABEGRON (MYRBETRIQ) 50 MG TB24    Take 1 tablet (50 mg total) by mouth once daily.    MONTELUKAST (SINGULAIR) 10 MG TABLET    TAKE ONE TABLET BY MOUTH EVERY EVENING    MUPIROCIN (BACTROBAN) 2 % OINTMENT    Apply topically 3 (three) times daily.    NEEDLE, DISP, 25 GAUGE 25 GAUGE X 7/8" NDLE    1 each by Misc.(Non-Drug; Combo Route) route once daily.    NYSTATIN (MYCOSTATIN) POWDER    Apply topically 2 (two) times daily.    ONDANSETRON (ZOFRAN) 4 MG TABLET    Take 1 tablet (4 mg total) by mouth every 8 (eight) hours as needed for Nausea.    PANTOPRAZOLE (PROTONIX) 40 MG TABLET    Take 1 tablet (40 mg total) by mouth once daily.    SYMBICORT 160-4.5 MCG/ACTUATION HFAA    INHALE 2 PUFFS INTO THE LUNGS EVERY 12 (TWELVE) HOURS. CONTROLLER    SYRINGE, DISPOSABLE, 1 ML SYRG    1 Syringe by Misc.(Non-Drug; Combo Route) route once daily.    TRAZODONE (DESYREL) 50 MG TABLET    Take 50 mg by mouth every evening.   Discontinued Medications    HYDROCODONE-ACETAMINOPHEN (NORCO) 5-325 MG PER TABLET    Take 1 tablet by mouth every evening.         "

## 2019-03-08 DIAGNOSIS — E61.1 DIETARY IRON DEFICIENCY: ICD-10-CM

## 2019-03-08 RX ORDER — FERROUS GLUCONATE 324(38)MG
324 TABLET ORAL
Qty: 90 TABLET | Refills: 1 | Status: SHIPPED | OUTPATIENT
Start: 2019-03-08 | End: 2019-08-31 | Stop reason: SDUPTHER

## 2019-03-13 ENCOUNTER — PATIENT MESSAGE (OUTPATIENT)
Dept: PRIMARY CARE CLINIC | Facility: CLINIC | Age: 54
End: 2019-03-13

## 2019-03-13 DIAGNOSIS — H66.002 ACUTE SUPPURATIVE OTITIS MEDIA OF LEFT EAR: ICD-10-CM

## 2019-03-13 RX ORDER — AMOXICILLIN AND CLAVULANATE POTASSIUM 875; 125 MG/1; MG/1
1 TABLET, FILM COATED ORAL 2 TIMES DAILY
Qty: 20 TABLET | Refills: 0 | Status: SHIPPED | OUTPATIENT
Start: 2019-03-13 | End: 2019-03-23

## 2019-03-27 ENCOUNTER — OFFICE VISIT (OUTPATIENT)
Dept: OTOLARYNGOLOGY | Facility: CLINIC | Age: 54
End: 2019-03-27
Payer: MEDICARE

## 2019-03-27 VITALS
BODY MASS INDEX: 44.37 KG/M2 | HEART RATE: 65 BPM | DIASTOLIC BLOOD PRESSURE: 59 MMHG | TEMPERATURE: 98 F | SYSTOLIC BLOOD PRESSURE: 100 MMHG | WEIGHT: 262.56 LBS

## 2019-03-27 DIAGNOSIS — K13.70 ORAL LESION: ICD-10-CM

## 2019-03-27 DIAGNOSIS — R49.0 HOARSENESS: Primary | ICD-10-CM

## 2019-03-27 PROCEDURE — 31575 DIAGNOSTIC LARYNGOSCOPY: CPT | Mod: S$GLB,,, | Performed by: OTOLARYNGOLOGY

## 2019-03-27 PROCEDURE — 99214 OFFICE O/P EST MOD 30 MIN: CPT | Mod: 25,S$GLB,, | Performed by: OTOLARYNGOLOGY

## 2019-03-27 PROCEDURE — 99999 PR PBB SHADOW E&M-EST. PATIENT-LVL IV: CPT | Mod: PBBFAC,,, | Performed by: OTOLARYNGOLOGY

## 2019-03-27 PROCEDURE — 31575 PR LARYNGOSCOPY, FLEXIBLE; DIAGNOSTIC: ICD-10-PCS | Mod: S$GLB,,, | Performed by: OTOLARYNGOLOGY

## 2019-03-27 PROCEDURE — 3008F PR BODY MASS INDEX (BMI) DOCUMENTED: ICD-10-PCS | Mod: CPTII,S$GLB,, | Performed by: OTOLARYNGOLOGY

## 2019-03-27 PROCEDURE — 3008F BODY MASS INDEX DOCD: CPT | Mod: CPTII,S$GLB,, | Performed by: OTOLARYNGOLOGY

## 2019-03-27 PROCEDURE — 99214 PR OFFICE/OUTPT VISIT, EST, LEVL IV, 30-39 MIN: ICD-10-PCS | Mod: 25,S$GLB,, | Performed by: OTOLARYNGOLOGY

## 2019-03-27 PROCEDURE — 99999 PR PBB SHADOW E&M-EST. PATIENT-LVL IV: ICD-10-PCS | Mod: PBBFAC,,, | Performed by: OTOLARYNGOLOGY

## 2019-03-27 RX ORDER — FLUOXETINE 10 MG/1
20 CAPSULE ORAL DAILY
COMMUNITY
End: 2019-04-11

## 2019-03-27 NOTE — LETTER
April 2, 2019      Eliecer Jones MD  [address]  [address]  [facility] [address]           [facility]  [address]  [facility] [address]  Phone: [phone]  Fax: [phone]          Patient: Ida Orozco   MR Number: [id number]   YOB: 1965   Date of Visit: 3/27/2019       Dear Dr. Eliecer Jones:    Thank you for referring Ida Orozco to me for evaluation. Attached you will find relevant portions of my assessment and plan of care.    If you have questions, please do not hesitate to call me. I look forward to following Ida Orozco along with you.    Sincerely,    Bindu Clark MD    Enclosure  CC:  No Recipients    If you would like to receive this communication electronically, please contact [email or web address] or [phone] to request more information on [employer] access.    For providers and/or their staff who would like to refer a patient to [facility], please contact us through our one-stop-shop provider referral line, [facility], [place], at [phone].    If you feel you have received this communication in error or would no longer like to receive these types of communications, please e-mail [email or web address]

## 2019-04-02 NOTE — PROGRESS NOTES
Chief Complaint   Patient presents with    consult/oral leukpplankia, hoarseness, and white spots in ba         53 y.o. female presents with history of Chron's disease who was referred to evaluate for possible leukoplakia. No current oral pain. She also has some chronic hoarseness that she is concerned about. No cough or throat pain. No hemoptysis.      Review of Systems     Constitutional: Negative for fatigue and unexpected weight change.   HENT: per HPI.  Eyes: Negative for visual disturbance.   Respiratory: Negative for shortness of breath, hemoptysis  Cardiovascular: Negative for chest pain and palpitations.   Genitourinary: Negative for dysuria and difficulty urinating.   Musculoskeletal: Negative for decreased ROM, back pain.   Skin: Negative for rash, sunburn, itching.   Neurological: Negative for dizziness and seizures.   Hematological: Negative for adenopathy. Does not bruise/bleed easily.   Psychiatric/Behavioral: Negative for agitation. The patient is not nervous/anxious.   Endocrine: Negative for rapid weight loss/weight gain, heat/cold intolerance.     Past Medical History:   Diagnosis Date    Anxiety     Asthma     Crohn's colitis     Fatty liver disease, nonalcoholic     GERD (gastroesophageal reflux disease)     History of sleeve gastrectomy 2016       Past Surgical History:   Procedure Laterality Date    APPENDECTOMY      ARTHROSCOPY-KNEE W/ CHONDROPLASTY Right 2016    Per[facility] at [facility]     SECTION, LOW TRANSVERSE      CHOLECYSTECTOMY      open    COLECTOMY      total- 2013    CYSTOSCOPY, WITH RETROGRADE PYELOGRAM N/A 2018    [facility] OR    ENTEROSCOPY N/A 2014    Performed by Tavo Latham MD at [facility] ENDO (2ND FLR)    ESOPHAGOGASTRODUODENOSCOPY (EGD) N/A 2018    Performed by Chris Castorena MD at [facility] ENDO (4TH FLR)    ESOPHAGOGASTRODUODENOSCOPY (EGD) N/A 2015    Performed by  Domingo Hunter MD at [facility] ENDO    ESOPHAGOGASTRODUODENOSCOPY (EGD) N/A 12/16/2014    Performed by Domingo Hunter [facility] at [facility] ENDO    EXCISION-PLICA Right 8/25/2016    Performed by [facility]igan [facility] at [facility] OR    GASTRIC SLEEVE       HYSTERECTOMY      ILEOSCOPY N/A 4/11/2018    Performed by [facility]na MD at [facility] ENDO (4TH FLR)    ILEOSTOMY REVISION      april 2014; august 2014    LYSIS, ADHESIONS/ more than 2hours  5/24/2018    Performed by JENNY Virgen MD at [facility] OR 2ND FLR    LYSIS-ADHESION N/A 8/15/2014    Performed by JENNY Virgen MD at [facility] OR 2ND FLR    PROCTECTOMY-LAPAROSCOPIC/CONVERTED TO OPEN N/A 5/24/2018    Performed by JENNY Virgen MD at [facility] OR 2ND FLR    REVISION-ILEOSTOMY N/A 8/15/2014    Performed by JENNY Virgen MD at [facility] OR 2ND FLR    SIGMOIDOSCOPY, FLEXIBLE  5/24/2018    Performed by JENNY Virgen MD at [facility] OR 2ND FLR    SIGMOIDOSCOPY-FLEXIBLE N/A 4/11/2018    Performed by Chris Castorena MD at [facility] ENDO (4TH FLR)    SIGMOIDOSCOPY-FLEXIBLE N/A 8/12/2014    Performed by JENNY Virgen MD at [facility] ENDO (4TH FLR)    thumb surgery      TONSILLECTOMY, ADENOIDECTOMY      WISDOM TOOTH EXTRACTION         family history includes Cancer in her maternal grandfather, mother, and paternal grandfather; Heart disease (age of onset: 67) in her father.    Pt  reports that she quit smoking about 10 years ago. Her smoking use included cigarettes. She has never used smokeless tobacco. She reports that she does not drink alcohol or use drugs.    Review of patient's allergies indicates:   Allergen Reactions    Adhesive      Cause blisters    Dilaudid [hydromorphone] Nausea And Vomiting    Sulfa (sulfonamide antibiotics) Hives        Physical Exam    Vitals:    03/27/19 0944   BP: (!) 100/59   Pulse: 65   Temp: 98.1 °F (36.7 °C)     Body mass index is 44.37 kg/m².    Physical Exam   Constitutional: She is oriented to person, place, and time. She appears  well-developed and well-nourished. No distress.   HENT:   Head: Normocephalic and atraumatic.   Right Ear: Hearing, tympanic membrane, external ear and ear canal normal. Tympanic membrane mobility is normal. No middle ear effusion. No decreased hearing is noted.   Left Ear: Hearing, tympanic membrane, external ear and ear canal normal. Tympanic membrane mobility is normal.  No middle ear effusion. No decreased hearing is noted.   Nose: Nose normal.   Mouth/Throat: Uvula is midline, oropharynx is clear and moist and mucous membranes are normal.       Eyes: Pupils are equal, round, and reactive to light. Conjunctivae, EOM and lids are normal. Right eye exhibits no discharge. Left eye exhibits no discharge.   Neck: Trachea normal, normal range of motion and phonation normal. Neck supple. No tracheal tenderness present. No tracheal deviation, no edema and no erythema present. No thyroid mass and no thyromegaly present.   Cardiovascular: Normal heart sounds.   Pulmonary/Chest: Breath sounds normal. No stridor.   Abdominal: Soft.   Lymphadenopathy:     She has no cervical adenopathy.   Neurological: She is alert and oriented to person, place, and time.   Skin: Skin is warm and dry. No rash noted. She is not diaphoretic. No erythema. No pallor.   Psychiatric: She has a normal mood and affect.   Nursing note and vitals reviewed.    Procedure: Flexible laryngoscopy  In order to fully examine the upper aerodigestive tract, including the larynx, in a patient with a hyperactive gag reflex, flexible endoscopy is required.  After explaining the procedure and obtaining verbal consent, a timeout was performed with the patient's participation according to the universal protocol. Both nasal cavities were anesthetized with 4% Xylocaine spray mixed with Holland-Synephrine. The flexible laryngoscope was inserted into the nasal cavity and advanced to visualize the nasal cavity, nasopharynx, the posterior oropharynx, hypopharynx, and the  endolarynx with the above findings noted. The scope was removed and the procedure terminated. The patient tolerated this procedure well without apparent complication.     Nasopharynx - the torus is clear. There are no lesions of the posterior wall.   Oropharynx - no lesions of the tongue base. There is no obvious fullness or asymmetry.  Hypopharynx - there are no lesions of the pyriform sinuses or postcricoid region    Larynx - there are no lesions of the supraglottic or glottic larynx. Vocal fold mobility is normal with complete closure.     Assessment     1. Hoarseness    2. Oral lesion          Plan  In summary, Ms. Orozco is a 53 year old female with Chron's disease, no areas of leukoplakia or other concern on exam today, reassurance given. Also with chronic hoarseness, laryngeal exam normal. All questions were answered. Return to clinic as needed.

## 2019-04-04 ENCOUNTER — PATIENT MESSAGE (OUTPATIENT)
Dept: PRIMARY CARE CLINIC | Facility: CLINIC | Age: 54
End: 2019-04-04

## 2019-04-04 ENCOUNTER — TELEPHONE (OUTPATIENT)
Dept: PRIMARY CARE CLINIC | Facility: CLINIC | Age: 54
End: 2019-04-04

## 2019-04-04 RX ORDER — VARENICLINE TARTRATE 1 MG/1
1 TABLET, FILM COATED ORAL 2 TIMES DAILY
Qty: 60 TABLET | Refills: 4 | Status: SHIPPED | OUTPATIENT
Start: 2019-04-04 | End: 2019-10-07

## 2019-04-04 RX ORDER — VARENICLINE TARTRATE 0.5 (11)-1
KIT ORAL
Qty: 1 PACKAGE | Refills: 0 | Status: SHIPPED | OUTPATIENT
Start: 2019-04-04 | End: 2019-05-28

## 2019-04-04 NOTE — TELEPHONE ENCOUNTER
Patient says she is getting the urge to smoke again. She wants an appointment to get a rx for Chantix. Can you send this over for her?

## 2019-04-08 ENCOUNTER — PATIENT MESSAGE (OUTPATIENT)
Dept: PRIMARY CARE CLINIC | Facility: CLINIC | Age: 54
End: 2019-04-08

## 2019-04-08 RX ORDER — PANTOPRAZOLE SODIUM 40 MG/1
TABLET, DELAYED RELEASE ORAL
Qty: 90 TABLET | Refills: 3 | Status: SHIPPED | OUTPATIENT
Start: 2019-04-08 | End: 2020-04-05 | Stop reason: SDUPTHER

## 2019-04-11 ENCOUNTER — OFFICE VISIT (OUTPATIENT)
Dept: PRIMARY CARE CLINIC | Facility: CLINIC | Age: 54
End: 2019-04-11
Payer: MEDICARE

## 2019-04-11 VITALS
BODY MASS INDEX: 46.48 KG/M2 | TEMPERATURE: 99 F | HEIGHT: 65 IN | HEART RATE: 69 BPM | DIASTOLIC BLOOD PRESSURE: 74 MMHG | OXYGEN SATURATION: 96 % | SYSTOLIC BLOOD PRESSURE: 120 MMHG | WEIGHT: 279 LBS | RESPIRATION RATE: 18 BRPM

## 2019-04-11 DIAGNOSIS — M51.36 DDD (DEGENERATIVE DISC DISEASE), LUMBAR: Primary | ICD-10-CM

## 2019-04-11 DIAGNOSIS — L98.7 REDUNDANT SKIN OF ABDOMEN: ICD-10-CM

## 2019-04-11 DIAGNOSIS — B37.2 CANDIDAL INTERTRIGO: ICD-10-CM

## 2019-04-11 DIAGNOSIS — R93.7 ABNORMAL X-RAY OF LUMBAR SPINE: ICD-10-CM

## 2019-04-11 PROCEDURE — 3008F BODY MASS INDEX DOCD: CPT | Mod: CPTII,S$GLB,, | Performed by: FAMILY MEDICINE

## 2019-04-11 PROCEDURE — 99214 OFFICE O/P EST MOD 30 MIN: CPT | Mod: S$GLB,,, | Performed by: FAMILY MEDICINE

## 2019-04-11 PROCEDURE — 99999 PR PBB SHADOW E&M-EST. PATIENT-LVL IV: ICD-10-PCS | Mod: PBBFAC,,, | Performed by: FAMILY MEDICINE

## 2019-04-11 PROCEDURE — 3008F PR BODY MASS INDEX (BMI) DOCUMENTED: ICD-10-PCS | Mod: CPTII,S$GLB,, | Performed by: FAMILY MEDICINE

## 2019-04-11 PROCEDURE — 99214 PR OFFICE/OUTPT VISIT, EST, LEVL IV, 30-39 MIN: ICD-10-PCS | Mod: S$GLB,,, | Performed by: FAMILY MEDICINE

## 2019-04-11 PROCEDURE — 99999 PR PBB SHADOW E&M-EST. PATIENT-LVL IV: CPT | Mod: PBBFAC,,, | Performed by: FAMILY MEDICINE

## 2019-04-11 RX ORDER — MUPIROCIN 20 MG/G
OINTMENT TOPICAL 3 TIMES DAILY
Qty: 15 G | Refills: 1 | Status: SHIPPED | OUTPATIENT
Start: 2019-04-11 | End: 2020-01-06

## 2019-04-11 RX ORDER — HYDROCODONE BITARTRATE AND ACETAMINOPHEN 5; 325 MG/1; MG/1
1 TABLET ORAL EVERY 6 HOURS PRN
Qty: 20 TABLET | Refills: 0 | Status: SHIPPED | OUTPATIENT
Start: 2019-04-11 | End: 2019-05-03

## 2019-04-11 RX ORDER — FLUCONAZOLE 100 MG/1
TABLET ORAL
Qty: 28 TABLET | Refills: 1 | Status: SHIPPED | OUTPATIENT
Start: 2019-04-11 | End: 2019-05-03 | Stop reason: ALTCHOICE

## 2019-04-11 NOTE — PROGRESS NOTES
"Subjective:       Patient ID: Ida Orozco is a 53 y.o. female.    Chief Complaint: Back Pain (Back pain since seeing the chiropractor 3 weeks ago after a fall )    Tripped over a shoe and fell at home ~3 weeks ago, injured lower back. Went to chiropractor for treatment, says it made her back feel worse. Continues to have severe midline lower back pain radiating to both legs, worse with leg movement while lying down. Having trouble sleeping due to pain. Feels better when standing or sitting. No LE numbness or weakness. Chronic urinary incontinence, no recent changes. Some perineal numbness when lying down. Had x-rays down at chiropractor's office, told has DDD in lower lumbar/lumbosacral levels.  Also c/o persistent, chronic candidal intertrigo. Using topical nystatin, wicking material, but still having issues. Also has a lot of redundant abdominal wall skin, starting to have issues with ileostomy bag not fitting properly    Review of Systems   Constitutional: Negative for fever.   HENT: Negative for trouble swallowing.    Gastrointestinal: Negative for blood in stool and vomiting.   Genitourinary: Positive for difficulty urinating.   Musculoskeletal: Positive for arthralgias and back pain.   Skin: Positive for rash.   Psychiatric/Behavioral: Positive for sleep disturbance. Negative for agitation.       Objective:      Vitals:    04/11/19 0944   BP: 120/74   BP Location: Right arm   Patient Position: Sitting   BP Method: Large (Automatic)   Pulse: 69   Resp: 18   Temp: 98.5 °F (36.9 °C)   TempSrc: Oral   SpO2: 96%   Weight: 126.6 kg (279 lb)   Height: 5' 4.5" (1.638 m)     Physical Exam   Constitutional: She is oriented to person, place, and time. She appears well-developed and well-nourished.   HENT:   Head: Normocephalic and atraumatic.   Cardiovascular: Normal rate, regular rhythm and normal heart sounds.   Pulmonary/Chest: Effort normal and breath sounds normal.   Musculoskeletal: She exhibits no edema.        " Lumbar back: She exhibits decreased range of motion, tenderness and bony tenderness. She exhibits no edema and no deformity.        Back:    Neurological: She is alert and oriented to person, place, and time. She has normal strength.   Reflex Scores:       Patellar reflexes are 2+ on the right side and 2+ on the left side.  Skin: Skin is warm and dry.   Intertrigo to skin folds of abdominal pannus   Nursing note and vitals reviewed.      Assessment:       1. DDD (degenerative disc disease), lumbar    2. Redundant skin of abdomen    3. Candidal intertrigo    4. Abnormal x-ray of lumbar spine        Plan:       DDD (degenerative disc disease), lumbar  -     MRI Lumbar Spine Without Contrast; Future; Expected date: 04/11/2019  -     HYDROcodone-acetaminophen (NORCO) 5-325 mg per tablet; Take 1 tablet by mouth every 6 (six) hours as needed for Pain.  Dispense: 20 tablet; Refill: 0    Redundant skin of abdomen  -     Ambulatory Referral to Plastic Surgery    Candidal intertrigo  -     fluconazole (DIFLUCAN) 100 MG tablet; 1 tablet once daily for 7 days, repeat as needed for severe yeast infections  Dispense: 28 tablet; Refill: 1    Abnormal x-ray of lumbar spine  -     MRI Lumbar Spine Without Contrast; Future; Expected date: 04/11/2019    Other orders  -     mupirocin (BACTROBAN) 2 % ointment; Apply topically 3 (three) times daily.  Dispense: 15 g; Refill: 1      Medication List with Changes/Refills   New Medications    FLUCONAZOLE (DIFLUCAN) 100 MG TABLET    1 tablet once daily for 7 days, repeat as needed for severe yeast infections    HYDROCODONE-ACETAMINOPHEN (NORCO) 5-325 MG PER TABLET    Take 1 tablet by mouth every 6 (six) hours as needed for Pain.   Current Medications    ALBUTEROL (PROVENTIL) 2.5 MG /3 ML (0.083 %) NEBULIZER SOLUTION    INHALE ONE VIAL VIA NEBULIZER EVERY SIX HOURS    ALBUTEROL 90 MCG/ACTUATION INHALER    Inhale 2 puffs into the lungs every 4 (four) hours as needed for Wheezing or Shortness of  "Breath. Rescue    CLONAZEPAM (KLONOPIN) 1 MG TABLET    Take 1 mg by mouth 3 (three) times daily.     CYANOCOBALAMIN 1,000 MCG/ML INJECTION    Inject 1 mL (1,000 mcg total) into the skin once daily for 7 days, THEN 1 mL (1,000 mcg total) every 7 days for 30 days, THEN 1 mL (1,000 mcg total) every 30 days.    DICLOFENAC SODIUM (VOLTAREN) 1 % GEL    Apply 2 g topically 4 (four) times daily.    ERGOCALCIFEROL (ERGOCALCIFEROL) 50,000 UNIT CAP    TAKE 1 CAPSULE (50,000 UNITS TOTAL) BY MOUTH EVERY 7 DAYS.    ESTRADIOL (ESTRACE) 0.01 % (0.1 MG/GRAM) VAGINAL CREAM    Use 0.5 g inside vagina and dime-sized amount around opening/inner lips nightly x 2 weeks, then 2x/week thereafter.    FERROUS GLUCONATE (FERGON) 324 MG TABLET    Take 1 tablet (324 mg total) by mouth daily with breakfast.    FLUTICASONE (FLONASE) 50 MCG/ACTUATION NASAL SPRAY    2 sprays (100 mcg total) by Each Nare route every evening.    LORATADINE (CLARITIN) 10 MG TABLET    TAKE ONE TABLET BY MOUTH ONCE DAILY    MIRABEGRON (MYRBETRIQ) 50 MG TB24    Take 1 tablet (50 mg total) by mouth once daily.    MONTELUKAST (SINGULAIR) 10 MG TABLET    TAKE ONE TABLET BY MOUTH EVERY EVENING    NEEDLE, DISP, 25 GAUGE 25 GAUGE X 7/8" NDLE    1 each by Misc.(Non-Drug; Combo Route) route once daily.    NYSTATIN (MYCOSTATIN) POWDER    Apply topically 2 (two) times daily.    ONDANSETRON (ZOFRAN) 4 MG TABLET    Take 1 tablet (4 mg total) by mouth every 8 (eight) hours as needed for Nausea.    PANTOPRAZOLE (PROTONIX) 40 MG TABLET    TAKE ONE TABLET BY MOUTH ONCE DAILY    SYMBICORT 160-4.5 MCG/ACTUATION HFAA    INHALE 2 PUFFS INTO THE LUNGS EVERY 12 (TWELVE) HOURS. CONTROLLER    SYRINGE, DISPOSABLE, 1 ML SYRG    1 Syringe by Misc.(Non-Drug; Combo Route) route once daily.    TRAZODONE (DESYREL) 50 MG TABLET    Take 50 mg by mouth every evening.    VARENICLINE (CHANTIX STARTING MONTH BOX) 0.5 MG (11)- 1 MG (42) TABLET    Take one 0.5mg tab by mouth once daily X3 days,then increase to " one 0.5mg tab twice daily X4 days,then increase to one 1mg tab twice daily    VARENICLINE (CHANTIX) 1 MG TAB    Take 1 tablet (1 mg total) by mouth 2 (two) times daily.   Changed and/or Refilled Medications    Modified Medication Previous Medication    MUPIROCIN (BACTROBAN) 2 % OINTMENT mupirocin (BACTROBAN) 2 % ointment       Apply topically 3 (three) times daily.    Apply topically 3 (three) times daily.   Discontinued Medications    FLUOXETINE 10 MG CAPSULE    Take 20 mg by mouth once daily.    LIDOCAINE VISCOUS 2 % SOLUTION

## 2019-04-15 RX ORDER — DICLOFENAC SODIUM 10 MG/G
GEL TOPICAL
Qty: 100 G | Refills: 0 | Status: SHIPPED | OUTPATIENT
Start: 2019-04-15 | End: 2019-05-23 | Stop reason: SDUPTHER

## 2019-04-17 ENCOUNTER — OFFICE VISIT (OUTPATIENT)
Dept: PRIMARY CARE CLINIC | Facility: CLINIC | Age: 54
End: 2019-04-17
Payer: MEDICARE

## 2019-04-17 VITALS
TEMPERATURE: 98 F | RESPIRATION RATE: 18 BRPM | SYSTOLIC BLOOD PRESSURE: 102 MMHG | WEIGHT: 278.63 LBS | OXYGEN SATURATION: 98 % | HEIGHT: 64 IN | HEART RATE: 71 BPM | DIASTOLIC BLOOD PRESSURE: 62 MMHG | BODY MASS INDEX: 47.57 KG/M2

## 2019-04-17 DIAGNOSIS — K52.9 GASTROENTERITIS: Primary | ICD-10-CM

## 2019-04-17 PROCEDURE — 99214 OFFICE O/P EST MOD 30 MIN: CPT | Mod: 25,S$GLB,, | Performed by: NURSE PRACTITIONER

## 2019-04-17 PROCEDURE — 96372 PR INJECTION,THERAP/PROPH/DIAG2ST, IM OR SUBCUT: ICD-10-PCS | Mod: S$GLB,,, | Performed by: NURSE PRACTITIONER

## 2019-04-17 PROCEDURE — 96372 THER/PROPH/DIAG INJ SC/IM: CPT | Mod: S$GLB,,, | Performed by: NURSE PRACTITIONER

## 2019-04-17 PROCEDURE — 99999 PR PBB SHADOW E&M-EST. PATIENT-LVL V: CPT | Mod: PBBFAC,,, | Performed by: NURSE PRACTITIONER

## 2019-04-17 PROCEDURE — 99999 PR PBB SHADOW E&M-EST. PATIENT-LVL V: ICD-10-PCS | Mod: PBBFAC,,, | Performed by: NURSE PRACTITIONER

## 2019-04-17 PROCEDURE — 3008F BODY MASS INDEX DOCD: CPT | Mod: CPTII,S$GLB,, | Performed by: NURSE PRACTITIONER

## 2019-04-17 PROCEDURE — 3008F PR BODY MASS INDEX (BMI) DOCUMENTED: ICD-10-PCS | Mod: CPTII,S$GLB,, | Performed by: NURSE PRACTITIONER

## 2019-04-17 PROCEDURE — 99214 PR OFFICE/OUTPT VISIT, EST, LEVL IV, 30-39 MIN: ICD-10-PCS | Mod: 25,S$GLB,, | Performed by: NURSE PRACTITIONER

## 2019-04-17 RX ORDER — ONDANSETRON 2 MG/ML
4 INJECTION INTRAMUSCULAR; INTRAVENOUS ONCE
Status: DISCONTINUED | OUTPATIENT
Start: 2019-04-17 | End: 2019-04-17

## 2019-04-17 RX ORDER — ONDANSETRON 2 MG/ML
4 INJECTION INTRAMUSCULAR; INTRAVENOUS ONCE
Status: COMPLETED | OUTPATIENT
Start: 2019-04-17 | End: 2019-04-17

## 2019-04-17 RX ORDER — ONDANSETRON HYDROCHLORIDE 8 MG/1
8 TABLET, FILM COATED ORAL EVERY 8 HOURS PRN
Qty: 6 TABLET | Refills: 0 | Status: SHIPPED | OUTPATIENT
Start: 2019-04-17 | End: 2019-10-28

## 2019-04-17 RX ORDER — ONDANSETRON 4 MG/1
8 TABLET, FILM COATED ORAL ONCE
Status: DISCONTINUED | OUTPATIENT
Start: 2019-04-17 | End: 2019-04-17

## 2019-04-17 RX ORDER — ONDANSETRON 2 MG/ML
4 INJECTION INTRAMUSCULAR; INTRAVENOUS
Status: CANCELLED | OUTPATIENT
Start: 2019-04-17

## 2019-04-17 RX ADMIN — ONDANSETRON 4 MG: 2 INJECTION INTRAMUSCULAR; INTRAVENOUS at 01:04

## 2019-04-17 NOTE — PROGRESS NOTES
Verified pt ID using name and . Verified alleriges. Administered 4 mg zofran in left VG per physician order using aseptic technique. Aspirated and no blood return noted. Pt tolerated well with no adverse reactions noted.

## 2019-04-17 NOTE — PROGRESS NOTES
Chief Complaint  Chief Complaint   Patient presents with    Abdominal Pain       HPI    Ida Orozco is a 53 y.o. female that presents for abdominal pain, nausea, vomiting.     Reports the onset of symptoms yesterday. Reports nausea, vomiting starting the night before. Severe abdominal pain and cramping. Patient with constant diarrhea, worsening since the start of the episode (patient with h/o ileostomy placement). No blood noted in her stool. Fever and chills, 101 two nights ago. Difficulty tolerating foods. Able to tolerate liquids at this time. Hydrating well. Urinating. Treating with gas X tablets, heat. Symptoms described as gradually improving. Expresses concern for salmonella poisoning due to eating pineapple that was soon after recalled.     PAST MEDICAL HISTORY:  Past Medical History:   Diagnosis Date    Anxiety     Asthma     Crohn's colitis     Fatty liver disease, nonalcoholic     GERD (gastroesophageal reflux disease)     History of sleeve gastrectomy 2016       PAST SURGICAL HISTORY:  Past Surgical History:   Procedure Laterality Date    APPENDECTOMY      ARTHROSCOPY-KNEE W/ CHONDROPLASTY Right 2016    Performed by Jonathan Milligan [facility] at [facility] OR     SECTION, LOW TRANSVERSE      CHOLECYSTECTOMY      open    COLECTOMY      total- 2013    CYSTOSCOPY, WITH RETROGRADE PYELOGRAM N/A 2018    [facility]    ENTEROSCOPY N/A 2014    [facility] (2ND FLR)    ESOPHAGOGASTRODUODENOSCOPY (EGD) N/A 2018    [facility] END[facility] (4TH FLR)    ESOPHAGOGASTRODUODENOSCOPY (EGD) N/A 2015    Performed by Domingo Hunter MD at [facility] ENDO    ESOPHAGOGASTRODUODENOSCOPY (EGD) N/A 2014    Performed by Domingo Hunter MD at [facility] ENDO    EXCISION-PLICA Right 2016    Performed by [facility]igan [facility] at [facility] OR    GASTRIC SLEEVE       HYSTERECTOMY       ILEOSCOPY N/A 4/11/2018    Performed by [facility] at [facility] ENDO (4TH FLR)    ILEOSTOMY REVISION      april 2014; august 2014    LYSIS, ADHESIONS/ more than 2hours  5/24/2018    Performed by JENNY Virgen MD at [facility] OR 2ND FLR    LYSIS-ADHESION N/A 8/15/2014    Performed by [facility]gen [facility] at [facility] OR 2ND FLR    PROCTECTOMY-LAPAROSCOPIC/CONVERTED TO OPEN N/A 5/24/2018    Performed by [facility]gen [facility] at [facility] OR 2ND FLR    REVISION-ILEOSTOMY N/A 8/15/2014    Performed by [facility]gen [facility] at [facility] OR 2ND FLR    SIGMOIDOSCOPY, FLEXIBLE  5/24/2018    Performed by [facility] at [facility] OR 2ND FLR    SIGMOIDOSCOPY-FLEXIBLE N/A 4/11/2018    Performed by Chris Castorena MD at [facility] ENDO (4TH FLR)    SIGMOIDOSCOPY-FLEXIBLE N/A 8/12/2014    Performed by J[facility]gen MD at [facility] ENDO (4TH FLR)    thumb surgery      TONSILLECTOMY, ADENOIDECTOMY      WISDOM TOOTH EXTRACTION         SOCIAL HISTORY:  Social History     Socioeconomic History    Marital status: Legally      Spouse name: Not on file    Number of children: Not on file    Years of education: Not on file    Highest education level: Not on file   Occupational History    Not on file   Social Needs    Financial resource strain: Not on file    Food insecurity:     Worry: Not on file     Inability: Not on file    Transportation needs:     Medical: Not on file     Non-medical: Not on file   Tobacco Use    Smoking status: Former Smoker     Types: Cigarettes     Last attempt to quit: 2009     Years since quitting: 10.2    Smokeless tobacco: Never Used   Substance and Sexual Activity    Alcohol use: No    Drug use: No    Sexual activity: Never     Birth control/protection: See Surgical Hx   Lifestyle    Physical activity:     Days per week: Not on file     Minutes per session: Not on file    Stress: Not on file   Relationships    Social connections:     Talks on phone: Not on file     Gets together: Not on file      Attends [employer] service: Not on file     Active member of club or organization: Not on file     Attends meetings of clubs or organizations: Not on file     Relationship status: Not on file   Other Topics Concern    Not on file   Social History Narrative    Not on file       FAMILY HISTORY:  Family History   Problem Relation Age of Onset    Cancer Mother         skin    Heart disease Father 67    Cancer Paternal Grandfather         ?    Cancer Maternal Grandfather         colon        ALLERGIES AND MEDICATIONS: updated and reviewed.  Review of patient's allergies indicates:   Allergen Reactions    Adhesive      Cause blisters    Dilaudid [hydromorphone] Nausea And Vomiting    Sulfa (sulfonamide antibiotics) Hives     Current Outpatient Medications   Medication Sig Dispense Refill    albuterol (PROVENTIL) 2.5 mg /3 mL (0.083 %) nebulizer solution INHALE ONE VIAL VIA NEBULIZER EVERY SIX HOURS 150 mL 3    albuterol 90 mcg/actuation inhaler Inhale 2 puffs into the lungs every 4 (four) hours as needed for Wheezing or Shortness of Breath. Rescue 1 Inhaler 5    clonazePAM (KLONOPIN) 1 MG tablet Take 1 mg by mouth 3 (three) times daily.       cyanocobalamin 1,000 mcg/mL injection Inject 1 mL (1,000 mcg total) into the skin once daily for 7 days, THEN 1 mL (1,000 mcg total) every 7 days for 30 days, THEN 1 mL (1,000 mcg total) every 30 days. 30 mL 2    ergocalciferol (ERGOCALCIFEROL) 50,000 unit Cap TAKE 1 CAPSULE (50,000 UNITS TOTAL) BY MOUTH EVERY 7 DAYS. 4 capsule 5    estradiol (ESTRACE) 0.01 % (0.1 mg/gram) vaginal cream Use 0.5 g inside vagina and dime-sized amount around opening/inner lips nightly x 2 weeks, then 2x/week thereafter. 42.5 g 11    ferrous gluconate (FERGON) 324 MG tablet Take 1 tablet (324 mg total) by mouth daily with breakfast. 90 tablet 1    fluticasone (FLONASE) 50 mcg/actuation nasal spray 2 sprays (100 mcg total) by Each Nare route every evening. 16 g 5    loratadine (CLARITIN)  "10 mg tablet TAKE ONE TABLET BY MOUTH ONCE DAILY 90 tablet 3    mirabegron (MYRBETRIQ) 50 mg Tb24 Take 1 tablet (50 mg total) by mouth once daily. 30 tablet 11    montelukast (SINGULAIR) 10 mg tablet TAKE ONE TABLET BY MOUTH EVERY EVENING 90 tablet 1    mupirocin (BACTROBAN) 2 % ointment Apply topically 3 (three) times daily. 15 g 1    needle, disp, 25 gauge 25 gauge x 7/8" Ndle 1 each by Misc.(Non-Drug; Combo Route) route once daily. 100 each 2    nystatin (MYCOSTATIN) powder Apply topically 2 (two) times daily. 1 Bottle 6    pantoprazole (PROTONIX) 40 MG tablet TAKE ONE TABLET BY MOUTH ONCE DAILY 90 tablet 3    SYMBICORT 160-4.5 mcg/actuation HFAA INHALE 2 PUFFS INTO THE LUNGS EVERY 12 (TWELVE) HOURS. CONTROLLER (Patient taking differently: Inhale 2 puffs into the lungs every 12 (twelve) hours as needed. Controller) 11 g 1    syringe, disposable, 1 mL Syrg 1 Syringe by Misc.(Non-Drug; Combo Route) route once daily. 100 Syringe 2    trazodone (DESYREL) 50 MG tablet Take 50 mg by mouth every evening.      varenicline (CHANTIX STARTING MONTH BOX) 0.5 mg (11)- 1 mg (42) tablet Take one 0.5mg tab by mouth once daily X3 days,then increase to one 0.5mg tab twice daily X4 days,then increase to one 1mg tab twice daily 1 Package 0    varenicline (CHANTIX) 1 mg Tab Take 1 tablet (1 mg total) by mouth 2 (two) times daily. 60 tablet 4    fluconazole (DIFLUCAN) 100 MG tablet 1 tablet once daily for 7 days, repeat as needed for severe yeast infections 28 tablet 1    HYDROcodone-acetaminophen (NORCO) 5-325 mg per tablet Take 1 tablet by mouth every 6 (six) hours as needed for Pain. 20 tablet 0    ondansetron (ZOFRAN) 8 MG tablet Take 1 tablet (8 mg total) by mouth every 8 (eight) hours as needed for Nausea. 6 tablet 0    VOLTAREN 1 % Gel APPLY TWO GRAMS FOUR TIMES A  g 0     Current Facility-Administered Medications   Medication Dose Route Frequency Provider Last Rate Last Dose    ondansetron injection 4 mg " " 4 mg Intramuscular Once ZAKIA Reardon  Review of Systems   Constitutional: Positive for chills, fatigue and fever.   HENT: Negative for ear pain, postnasal drip and sinus pain.    Respiratory: Negative for cough and shortness of breath.    Cardiovascular: Negative for chest pain.   Gastrointestinal: Positive for abdominal pain, diarrhea, nausea and vomiting. Negative for abdominal distention and blood in stool.           PHYSICAL EXAM  Vitals:    04/17/19 1328   BP: 102/62   BP Location: Left arm   Patient Position: Sitting   BP Method: Large (Automatic)   Pulse: 71   Resp: 18   Temp: 98.4 °F (36.9 °C)   TempSrc: Oral   SpO2: 98%   Weight: 126.4 kg (278 lb 9.6 oz)   Height: 5' 4" (1.626 m)    Body mass index is 47.82 kg/m².  Weight: 126.4 kg (278 lb 9.6 oz)   Height: 5' 4" (162.6 cm)     Physical Exam   Constitutional: She is oriented to person, place, and time. She appears well-developed and well-nourished.   HENT:   Head: Normocephalic.   Right Ear: Tympanic membrane normal.   Left Ear: Tympanic membrane normal.   Mouth/Throat: Uvula is midline, oropharynx is clear and moist and mucous membranes are normal.   Eyes: Conjunctivae are normal.   Cardiovascular: Normal rate, regular rhythm, normal heart sounds and normal pulses.   No murmur heard.  Pulses:       Radial pulses are 2+ on the right side, and 2+ on the left side.   No LE swelling noted   Pulmonary/Chest: Effort normal and breath sounds normal. She has no wheezes.   Abdominal: Soft. Bowel sounds are normal. There is tenderness in the left upper quadrant and left lower quadrant.       Mild, left sided abdominal pain and tenderness to deep palpation. No distension.    Musculoskeletal: She exhibits no edema.   Lymphadenopathy:     She has no cervical adenopathy.   Neurological: She is alert and oriented to person, place, and time.   Skin: Skin is warm and dry. No rash noted. She is not diaphoretic. No pallor.   Mucous membranes moist. " No tachycardia.    Psychiatric: She has a normal mood and affect.         Health Maintenance       Date Due Completion Date    Sign Pain Contract 07/27/1985 ---    Complete Opioid Risk Tool 07/27/1985 ---    Urine Drug Screen 07/27/1985 ---    Naloxone Prescription 07/27/1985 ---    Hemoglobin A1c 04/24/2019 10/24/2018    Low Dose Statin 10/24/2019 (Originally 7/27/1988) ---    Lipid Panel 10/02/2019 10/2/2018    Eye Exam 10/23/2019 10/23/2018    Foot Exam 10/24/2019 10/24/2018    Urine Microalbumin 10/24/2019 10/24/2018    Mammogram 10/24/2020 10/24/2018    TETANUS VACCINE 10/24/2028 10/24/2018            Assessment & Plan    Problem List Items Addressed This Visit     None      Visit Diagnoses     Gastroenteritis    -  Primary    Relevant Medications    ondansetron (ZOFRAN) 8 MG tablet    ondansetron injection 4 mg (Start on 4/17/2019  3:15 PM)          Follow-up: Follow up in about 2 weeks (around 5/1/2019), or if symptoms worsen or fail to improve.    Janet Guardado    Medication List with Changes/Refills   New Medications    ONDANSETRON (ZOFRAN) 8 MG TABLET    Take 1 tablet (8 mg total) by mouth every 8 (eight) hours as needed for Nausea.   Current Medications    ALBUTEROL (PROVENTIL) 2.5 MG /3 ML (0.083 %) NEBULIZER SOLUTION    INHALE ONE VIAL VIA NEBULIZER EVERY SIX HOURS    ALBUTEROL 90 MCG/ACTUATION INHALER    Inhale 2 puffs into the lungs every 4 (four) hours as needed for Wheezing or Shortness of Breath. Rescue    CLONAZEPAM (KLONOPIN) 1 MG TABLET    Take 1 mg by mouth 3 (three) times daily.     CYANOCOBALAMIN 1,000 MCG/ML INJECTION    Inject 1 mL (1,000 mcg total) into the skin once daily for 7 days, THEN 1 mL (1,000 mcg total) every 7 days for 30 days, THEN 1 mL (1,000 mcg total) every 30 days.    ERGOCALCIFEROL (ERGOCALCIFEROL) 50,000 UNIT CAP    TAKE 1 CAPSULE (50,000 UNITS TOTAL) BY MOUTH EVERY 7 DAYS.    ESTRADIOL (ESTRACE) 0.01 % (0.1 MG/GRAM) VAGINAL CREAM    Use 0.5 g inside vagina and  "dime-sized amount around opening/inner lips nightly x 2 weeks, then 2x/week thereafter.    FERROUS GLUCONATE (FERGON) 324 MG TABLET    Take 1 tablet (324 mg total) by mouth daily with breakfast.    FLUCONAZOLE (DIFLUCAN) 100 MG TABLET    1 tablet once daily for 7 days, repeat as needed for severe yeast infections    FLUTICASONE (FLONASE) 50 MCG/ACTUATION NASAL SPRAY    2 sprays (100 mcg total) by Each Nare route every evening.    HYDROCODONE-ACETAMINOPHEN (NORCO) 5-325 MG PER TABLET    Take 1 tablet by mouth every 6 (six) hours as needed for Pain.    LORATADINE (CLARITIN) 10 MG TABLET    TAKE ONE TABLET BY MOUTH ONCE DAILY    MIRABEGRON (MYRBETRIQ) 50 MG TB24    Take 1 tablet (50 mg total) by mouth once daily.    MONTELUKAST (SINGULAIR) 10 MG TABLET    TAKE ONE TABLET BY MOUTH EVERY EVENING    MUPIROCIN (BACTROBAN) 2 % OINTMENT    Apply topically 3 (three) times daily.    NEEDLE, DISP, 25 GAUGE 25 GAUGE X 7/8" NDLE    1 each by Misc.(Non-Drug; Combo Route) route once daily.    NYSTATIN (MYCOSTATIN) POWDER    Apply topically 2 (two) times daily.    PANTOPRAZOLE (PROTONIX) 40 MG TABLET    TAKE ONE TABLET BY MOUTH ONCE DAILY    SYMBICORT 160-4.5 MCG/ACTUATION HFAA    INHALE 2 PUFFS INTO THE LUNGS EVERY 12 (TWELVE) HOURS. CONTROLLER    SYRINGE, DISPOSABLE, 1 ML SYRG    1 Syringe by Misc.(Non-Drug; Combo Route) route once daily.    TRAZODONE (DESYREL) 50 MG TABLET    Take 50 mg by mouth every evening.    VARENICLINE (CHANTIX STARTING MONTH BOX) 0.5 MG (11)- 1 MG (42) TABLET    Take one 0.5mg tab by mouth once daily X3 days,then increase to one 0.5mg tab twice daily X4 days,then increase to one 1mg tab twice daily    VARENICLINE (CHANTIX) 1 MG TAB    Take 1 tablet (1 mg total) by mouth 2 (two) times daily.    VOLTAREN 1 % GEL    APPLY TWO GRAMS FOUR TIMES A DAY   Discontinued Medications    ONDANSETRON (ZOFRAN) 4 MG TABLET    Take 1 tablet (4 mg total) by mouth every 8 (eight) hours as needed for Nausea.     "

## 2019-04-22 ENCOUNTER — PATIENT MESSAGE (OUTPATIENT)
Dept: UROGYNECOLOGY | Facility: CLINIC | Age: 54
End: 2019-04-22

## 2019-04-22 ENCOUNTER — PATIENT MESSAGE (OUTPATIENT)
Dept: PRIMARY CARE CLINIC | Facility: CLINIC | Age: 54
End: 2019-04-22

## 2019-04-22 ENCOUNTER — TELEPHONE (OUTPATIENT)
Dept: UROGYNECOLOGY | Facility: CLINIC | Age: 54
End: 2019-04-22

## 2019-04-22 DIAGNOSIS — N39.0 URINARY TRACT INFECTION WITHOUT HEMATURIA, SITE UNSPECIFIED: Primary | ICD-10-CM

## 2019-04-22 NOTE — TELEPHONE ENCOUNTER
Spoke to pt, she states she has pain on urination and cloudy urine x2 days. Pt is requesting to drop off a urine specimen for culture on 4/23/2019. Pt denies back pain  F/C/N/V.  Pt also states she is taking aleve for pain.   Pt will wait for culture to come back for treatment. She was informed culture take 2-3 days to be resulted. Pt aware and verbalizes understanding. Orders placed

## 2019-04-22 NOTE — TELEPHONE ENCOUNTER
----- Message from Sarah Travis sent at 4/22/2019  4:21 PM CDT -----  Contact: ANIRUDH REDMOND [[id number]]  Type:  Patient Returning Call    Who Called: ANIRUDH REDMOND [[id number]]    Who Left Message for Patient: Mihai Decker MA      Does the patient know what this is regarding?Cloudy urine.    Best Call Back Number:[phone]    Additional Information:

## 2019-04-23 RX ORDER — FLUTICASONE PROPIONATE 50 MCG
SPRAY, SUSPENSION (ML) NASAL
Qty: 16 G | Refills: 5 | Status: SHIPPED | OUTPATIENT
Start: 2019-04-23 | End: 2021-07-14

## 2019-04-24 NOTE — TELEPHONE ENCOUNTER
As of 4/23 PM, patient has not dropped of urine for UA, C&S.  Can you please call her and remind her that tests take a few days to come back.  I want to try to help her feel better but need her to drop urine sample 1st. Thanks!

## 2019-04-24 NOTE — TELEPHONE ENCOUNTER
Called to check on pt dropping off a urine specimen. Pt states her car broke down and her urine is now clear. Strongly encouraged her to come and drop off a urine specimen or give us a call if problems worsen. Discusses ER precautions. Pt voiced understanding and call ended.

## 2019-05-01 ENCOUNTER — TELEPHONE (OUTPATIENT)
Dept: PLASTIC SURGERY | Facility: CLINIC | Age: 54
End: 2019-05-01

## 2019-05-01 NOTE — TELEPHONE ENCOUNTER
Left message for patient to inform her that her appt date/time would have to be changed . Pt didn't answer.

## 2019-05-01 NOTE — TELEPHONE ENCOUNTER
I spoke with pt and confirmed new appt for 1:00 . Pt verbalized understanding of appt time date and location.

## 2019-05-03 ENCOUNTER — OFFICE VISIT (OUTPATIENT)
Dept: PRIMARY CARE CLINIC | Facility: CLINIC | Age: 54
End: 2019-05-03
Payer: MEDICARE

## 2019-05-03 VITALS
WEIGHT: 278 LBS | BODY MASS INDEX: 47.46 KG/M2 | HEART RATE: 82 BPM | OXYGEN SATURATION: 99 % | SYSTOLIC BLOOD PRESSURE: 106 MMHG | RESPIRATION RATE: 18 BRPM | HEIGHT: 64 IN | TEMPERATURE: 98 F | DIASTOLIC BLOOD PRESSURE: 68 MMHG

## 2019-05-03 DIAGNOSIS — S39.012A LUMBAR STRAIN, INITIAL ENCOUNTER: Primary | ICD-10-CM

## 2019-05-03 PROCEDURE — 96372 PR INJECTION,THERAP/PROPH/DIAG2ST, IM OR SUBCUT: ICD-10-PCS | Mod: S$GLB,,, | Performed by: NURSE PRACTITIONER

## 2019-05-03 PROCEDURE — 99214 PR OFFICE/OUTPT VISIT, EST, LEVL IV, 30-39 MIN: ICD-10-PCS | Mod: 25,S$GLB,, | Performed by: NURSE PRACTITIONER

## 2019-05-03 PROCEDURE — 99999 PR PBB SHADOW E&M-EST. PATIENT-LVL V: ICD-10-PCS | Mod: PBBFAC,,, | Performed by: NURSE PRACTITIONER

## 2019-05-03 PROCEDURE — 99214 OFFICE O/P EST MOD 30 MIN: CPT | Mod: 25,S$GLB,, | Performed by: NURSE PRACTITIONER

## 2019-05-03 PROCEDURE — 96372 THER/PROPH/DIAG INJ SC/IM: CPT | Mod: S$GLB,,, | Performed by: NURSE PRACTITIONER

## 2019-05-03 PROCEDURE — 3008F PR BODY MASS INDEX (BMI) DOCUMENTED: ICD-10-PCS | Mod: CPTII,S$GLB,, | Performed by: NURSE PRACTITIONER

## 2019-05-03 PROCEDURE — 3008F BODY MASS INDEX DOCD: CPT | Mod: CPTII,S$GLB,, | Performed by: NURSE PRACTITIONER

## 2019-05-03 PROCEDURE — 99999 PR PBB SHADOW E&M-EST. PATIENT-LVL V: CPT | Mod: PBBFAC,,, | Performed by: NURSE PRACTITIONER

## 2019-05-03 RX ORDER — TIZANIDINE 4 MG/1
4 TABLET ORAL EVERY 6 HOURS PRN
Qty: 28 TABLET | Refills: 0 | Status: SHIPPED | OUTPATIENT
Start: 2019-05-03 | End: 2019-05-13

## 2019-05-03 RX ORDER — METHYLPREDNISOLONE 4 MG/1
TABLET ORAL
Qty: 1 PACKAGE | Refills: 0 | Status: SHIPPED | OUTPATIENT
Start: 2019-05-03 | End: 2019-05-28

## 2019-05-03 RX ORDER — TRIAMCINOLONE ACETONIDE 40 MG/ML
40 INJECTION, SUSPENSION INTRA-ARTICULAR; INTRAMUSCULAR ONCE
Status: COMPLETED | OUTPATIENT
Start: 2019-05-03 | End: 2019-05-03

## 2019-05-03 RX ORDER — KETOROLAC TROMETHAMINE 30 MG/ML
60 INJECTION, SOLUTION INTRAMUSCULAR; INTRAVENOUS ONCE
Status: DISCONTINUED | OUTPATIENT
Start: 2019-05-03 | End: 2019-05-03

## 2019-05-03 RX ORDER — KETOROLAC TROMETHAMINE 30 MG/ML
60 INJECTION, SOLUTION INTRAMUSCULAR; INTRAVENOUS
Status: COMPLETED | OUTPATIENT
Start: 2019-05-03 | End: 2019-05-03

## 2019-05-03 RX ADMIN — KETOROLAC TROMETHAMINE 60 MG: 30 INJECTION, SOLUTION INTRAMUSCULAR; INTRAVENOUS at 08:05

## 2019-05-03 RX ADMIN — TRIAMCINOLONE ACETONIDE 40 MG: 40 INJECTION, SUSPENSION INTRA-ARTICULAR; INTRAMUSCULAR at 08:05

## 2019-05-03 NOTE — PROGRESS NOTES
Chief Complaint  Chief Complaint   Patient presents with    Back Pain       HPI    Ida Orozco is a 53 y.o. female that presents for left-sided back pain.    Patient reports the onset of symptoms yesterday after lifting hot water heater to move a when she noted the severe pain in her left-sided back.  No associated fall or trauma.  Pain worsening over night.  Described as 10/10, throbbing, tightness.  Pain constant.  Increased with pressure is the left leg.  No radiating pain.  Patient with history of degenerative disc disease but states that this pain is different.  No bowel or bladder incontinence.  No left-sided weakness or foot drop.  Has currently been treating with Voltaren topically and Biofreeze gel with moderate improvement pain.  Also need heat and ice with moderate improvement.  Also taking previously prescribed Norco with no improvement pain.    PAST MEDICAL HISTORY:  Past Medical History:   Diagnosis Date    Anxiety     Asthma     Crohn's colitis     Fatty liver disease, nonalcoholic     GERD (gastroesophageal reflux disease)     History of sleeve gastrectomy 2016       PAST SURGICAL HISTORY:  Past Surgical History:   Procedure Laterality Date    APPENDECTOMY      ARTHROSCOPY-KNEE W/ CHONDROPLASTY Right 2016    Performed by [facility] OR     SECTION, LOW TRANSVERSE      CHOLECYSTECTOMY      open    COLECTOMY      total- 2013    CYSTOSCOPY, WITH RETROGRADE PYELOGRAM N/A 2018    [facility]    ENTEROSCOPY N/A 2014    Performed by [facility]atham [facility] at [facility] ENDO (2ND FLR)    ESOPHAGOGASTRODUODENOSCOPY (EGD) N/A 2018    Performed by Chris Castorena [facility] at [facility] ENDO (4TH FLR)    ESOPHAGOGASTRODUODENOSCOPY (EGD) N/A 2015    Performed by Domingo Hunter MD at [facility] ENDO    ESOPHAGOGASTRODUODENOSCOPY (EGD) N/A 2014    Performed by Domingo Hunter [facility] at [facility] ENDO    EXCISION-PLICA Right  8/25/2016    [facility]    GASTRIC SLEEVE       HYSTERECTOMY      ILEOSCOPY N/A 4/11/2018    Performed by [facility]na MD at [facility] ENDO (4TH FLR)    ILEOSTOMY REVISION      april 2014; august 2014    LYSIS, ADHESIONS/ more than 2hours  5/24/2018    Performed by JENNY Virgen MD at [facility] OR 2ND FLR    LYSIS-ADHESION N/A 8/15/2014    Performed by JENNY Virgen MD at [facility] OR 2ND FLR    PROCTECTOMY-LAPAROSCOPIC/CONVERTED TO OPEN N/A 5/24/2018    Performed by JENNY Virgen MD at [facility] OR 2ND FLR    REVISION-ILEOSTOMY N/A 8/15/2014    Performed by JENNY Virgen MD at [facility] OR 2ND FLR    SIGMOIDOSCOPY, FLEXIBLE  5/24/2018    Performed by JENNY Virgen MD at [facility] OR 2ND FLR    SIGMOIDOSCOPY-FLEXIBLE N/A 4/11/2018    Performed by Chris Castorena MD at [facility] ENDO (4TH FLR)    SIGMOIDOSCOPY-FLEXIBLE N/A 8/12/2014    Performed by JENNY Virgen MD at [facility] ENDO (4TH FLR)    thumb surgery      TONSILLECTOMY, ADENOIDECTOMY      WISDOM TOOTH EXTRACTION         SOCIAL HISTORY:  Social History     Socioeconomic History    Marital status: Legally      Spouse name: Not on file    Number of children: Not on file    Years of education: Not on file    Highest education level: Not on file   Occupational History    Not on file   Social Needs    Financial resource strain: Not on file    Food insecurity:     Worry: Not on file     Inability: Not on file    Transportation needs:     Medical: Not on file     Non-medical: Not on file   Tobacco Use    Smoking status: Former Smoker     Types: Cigarettes     Last attempt to quit: 2009     Years since quitting: 10.3    Smokeless tobacco: Never Used   Substance and Sexual Activity    Alcohol use: No    Drug use: No    Sexual activity: Never     Birth control/protection: See Surgical Hx   Lifestyle    Physical activity:     Days per week: Not on file     Minutes per session: Not on file    Stress: Not on file    Relationships    Social connections:     Talks on phone: Not on file     Gets together: Not on file     Attends Zoroastrian service: Not on file     Active member of club or organization: Not on file     Attends meetings of clubs or organizations: Not on file     Relationship status: Not on file   Other Topics Concern    Not on file   Social History Narrative    Not on file       FAMILY HISTORY:  Family History   Problem Relation Age of Onset    Cancer Mother         skin    Heart disease Father 67    Cancer Paternal Grandfather         ?    Cancer Maternal Grandfather         colon        ALLERGIES AND MEDICATIONS: updated and reviewed.  Review of patient's allergies indicates:   Allergen Reactions    Adhesive      Cause blisters    Dilaudid [hydromorphone] Nausea And Vomiting    Sulfa (sulfonamide antibiotics) Hives     Current Outpatient Medications   Medication Sig Dispense Refill    clonazePAM (KLONOPIN) 1 MG tablet Take 1 mg by mouth 3 (three) times daily.       cyanocobalamin 1,000 mcg/mL injection Inject 1 mL (1,000 mcg total) into the skin once daily for 7 days, THEN 1 mL (1,000 mcg total) every 7 days for 30 days, THEN 1 mL (1,000 mcg total) every 30 days. 30 mL 2    ergocalciferol (ERGOCALCIFEROL) 50,000 unit Cap TAKE 1 CAPSULE (50,000 UNITS TOTAL) BY MOUTH EVERY 7 DAYS. 4 capsule 5    estradiol (ESTRACE) 0.01 % (0.1 mg/gram) vaginal cream Use 0.5 g inside vagina and dime-sized amount around opening/inner lips nightly x 2 weeks, then 2x/week thereafter. 42.5 g 11    ferrous gluconate (FERGON) 324 MG tablet Take 1 tablet (324 mg total) by mouth daily with breakfast. 90 tablet 1    fluticasone (FLONASE) 50 mcg/actuation nasal spray INSTILL TWO SPRAYS IN EACH NOSTRIL IN EACH NOSTRIL EVERY EVENING 16 g 5    loratadine (CLARITIN) 10 mg tablet TAKE ONE TABLET BY MOUTH ONCE DAILY 90 tablet 3    mirabegron (MYRBETRIQ) 50 mg Tb24 Take 1 tablet (50 mg total) by mouth once daily. 30 tablet 11     "montelukast (SINGULAIR) 10 mg tablet TAKE ONE TABLET BY MOUTH EVERY EVENING 90 tablet 1    mupirocin (BACTROBAN) 2 % ointment Apply topically 3 (three) times daily. 15 g 1    needle, disp, 25 gauge 25 gauge x 7/8" Ndle 1 each by Misc.(Non-Drug; Combo Route) route once daily. 100 each 2    nystatin (MYCOSTATIN) powder Apply topically 2 (two) times daily. 1 Bottle 6    ondansetron (ZOFRAN) 8 MG tablet Take 1 tablet (8 mg total) by mouth every 8 (eight) hours as needed for Nausea. 6 tablet 0    pantoprazole (PROTONIX) 40 MG tablet TAKE ONE TABLET BY MOUTH ONCE DAILY 90 tablet 3    SYMBICORT 160-4.5 mcg/actuation HFAA INHALE 2 PUFFS INTO THE LUNGS EVERY 12 (TWELVE) HOURS. CONTROLLER (Patient taking differently: Inhale 2 puffs into the lungs every 12 (twelve) hours as needed. Controller) 11 g 1    syringe, disposable, 1 mL Syrg 1 Syringe by Misc.(Non-Drug; Combo Route) route once daily. 100 Syringe 2    trazodone (DESYREL) 50 MG tablet Take 50 mg by mouth every evening.      varenicline (CHANTIX STARTING MONTH BOX) 0.5 mg (11)- 1 mg (42) tablet Take one 0.5mg tab by mouth once daily X3 days,then increase to one 0.5mg tab twice daily X4 days,then increase to one 1mg tab twice daily 1 Package 0    varenicline (CHANTIX) 1 mg Tab Take 1 tablet (1 mg total) by mouth 2 (two) times daily. 60 tablet 4    VOLTAREN 1 % Gel APPLY TWO GRAMS FOUR TIMES A  g 0    albuterol (PROVENTIL) 2.5 mg /3 mL (0.083 %) nebulizer solution INHALE ONE VIAL VIA NEBULIZER EVERY SIX HOURS 150 mL 3    albuterol 90 mcg/actuation inhaler Inhale 2 puffs into the lungs every 4 (four) hours as needed for Wheezing or Shortness of Breath. Rescue 1 Inhaler 5    methylPREDNISolone (MEDROL DOSEPACK) 4 mg tablet use as directed 1 Package 0    tiZANidine (ZANAFLEX) 4 MG tablet Take 1 tablet (4 mg total) by mouth every 6 (six) hours as needed. 28 tablet 0     Current Facility-Administered Medications   Medication Dose Route Frequency Provider " "Last Rate Last Dose    ketorolac injection 60 mg  60 mg Intramuscular Once Janet W. ZAKIA Guardado        triamcinolone acetonide injection 40 mg  40 mg Intramuscular Once Janet W. ZAKIA Guardado  Review of Systems   Constitutional: Negative for chills and fever.   HENT: Negative for ear pain, postnasal drip and sinus pain.    Respiratory: Negative for cough and shortness of breath.    Cardiovascular: Negative for chest pain.   Gastrointestinal: Negative for diarrhea, nausea and vomiting.   Musculoskeletal: Positive for back pain, gait problem and myalgias. Negative for neck pain and neck stiffness.           PHYSICAL EXAM  Vitals:    05/03/19 0812   BP: 106/68   BP Location: Right arm   Patient Position: Sitting   BP Method: Large (Automatic)   Pulse: 82   Resp: 18   Temp: 98.1 °F (36.7 °C)   TempSrc: Oral   SpO2: 99%   Weight: 126.1 kg (278 lb)   Height: 5' 4" (1.626 m)    Body mass index is 47.72 kg/m².  Weight: 126.1 kg (278 lb)   Height: 5' 4" (162.6 cm)     Physical Exam   Constitutional: She is oriented to person, place, and time. She appears well-developed and well-nourished.   HENT:   Head: Normocephalic.   Right Ear: Tympanic membrane normal.   Left Ear: Tympanic membrane normal.   Mouth/Throat: Uvula is midline, oropharynx is clear and moist and mucous membranes are normal.   Eyes: Conjunctivae are normal.   Cardiovascular: Normal rate, regular rhythm, normal heart sounds and normal pulses.   No murmur heard.  Pulses:       Radial pulses are 2+ on the right side, and 2+ on the left side.   No LE swelling noted   Pulmonary/Chest: Effort normal and breath sounds normal. She has no wheezes.   Abdominal: Soft. Bowel sounds are normal. There is no tenderness.   Musculoskeletal: She exhibits no edema.        Lumbar back: She exhibits decreased range of motion and tenderness. She exhibits no swelling.        Back:    Lymphadenopathy:     She has no cervical adenopathy.   Neurological: She is alert " and oriented to person, place, and time.   Skin: Skin is warm and dry. No rash noted.   Psychiatric: She has a normal mood and affect.         Health Maintenance       Date Due Completion Date    Hemoglobin A1c 04/24/2019 10/24/2018    Low Dose Statin 10/24/2019 (Originally 7/27/1988) ---    Influenza Vaccine 08/01/2019 10/12/2018    Lipid Panel 10/02/2019 10/2/2018    Eye Exam 10/23/2019 10/23/2018    Foot Exam 10/24/2019 10/24/2018    Urine Microalbumin 10/24/2019 10/24/2018    Mammogram 10/24/2020 10/24/2018    TETANUS VACCINE 10/24/2028 10/24/2018            Assessment & Plan    Problem List Items Addressed This Visit     None      Visit Diagnoses     Lumbar strain, initial encounter    -  Primary    Relevant Medications    tiZANidine (ZANAFLEX) 4 MG tablet    ketorolac injection 60 mg (Start on 5/3/2019  9:45 AM)    triamcinolone acetonide injection 40 mg (Start on 5/3/2019  9:45 AM)    methylPREDNISolone (MEDROL DOSEPACK) 4 mg tablet          Follow-up: Follow up in about 2 weeks (around 5/17/2019).    Janet Guardado    Medication List with Changes/Refills   New Medications    METHYLPREDNISOLONE (MEDROL DOSEPACK) 4 MG TABLET    use as directed    TIZANIDINE (ZANAFLEX) 4 MG TABLET    Take 1 tablet (4 mg total) by mouth every 6 (six) hours as needed.   Current Medications    ALBUTEROL (PROVENTIL) 2.5 MG /3 ML (0.083 %) NEBULIZER SOLUTION    INHALE ONE VIAL VIA NEBULIZER EVERY SIX HOURS    ALBUTEROL 90 MCG/ACTUATION INHALER    Inhale 2 puffs into the lungs every 4 (four) hours as needed for Wheezing or Shortness of Breath. Rescue    CLONAZEPAM (KLONOPIN) 1 MG TABLET    Take 1 mg by mouth 3 (three) times daily.     CYANOCOBALAMIN 1,000 MCG/ML INJECTION    Inject 1 mL (1,000 mcg total) into the skin once daily for 7 days, THEN 1 mL (1,000 mcg total) every 7 days for 30 days, THEN 1 mL (1,000 mcg total) every 30 days.    ERGOCALCIFEROL (ERGOCALCIFEROL) 50,000 UNIT CAP    TAKE 1 CAPSULE (50,000 UNITS TOTAL) BY MOUTH  "EVERY 7 DAYS.    ESTRADIOL (ESTRACE) 0.01 % (0.1 MG/GRAM) VAGINAL CREAM    Use 0.5 g inside vagina and dime-sized amount around opening/inner lips nightly x 2 weeks, then 2x/week thereafter.    FERROUS GLUCONATE (FERGON) 324 MG TABLET    Take 1 tablet (324 mg total) by mouth daily with breakfast.    FLUTICASONE (FLONASE) 50 MCG/ACTUATION NASAL SPRAY    INSTILL TWO SPRAYS IN EACH NOSTRIL IN EACH NOSTRIL EVERY EVENING    LORATADINE (CLARITIN) 10 MG TABLET    TAKE ONE TABLET BY MOUTH ONCE DAILY    MIRABEGRON (MYRBETRIQ) 50 MG TB24    Take 1 tablet (50 mg total) by mouth once daily.    MONTELUKAST (SINGULAIR) 10 MG TABLET    TAKE ONE TABLET BY MOUTH EVERY EVENING    MUPIROCIN (BACTROBAN) 2 % OINTMENT    Apply topically 3 (three) times daily.    NEEDLE, DISP, 25 GAUGE 25 GAUGE X 7/8" NDLE    1 each by Misc.(Non-Drug; Combo Route) route once daily.    NYSTATIN (MYCOSTATIN) POWDER    Apply topically 2 (two) times daily.    ONDANSETRON (ZOFRAN) 8 MG TABLET    Take 1 tablet (8 mg total) by mouth every 8 (eight) hours as needed for Nausea.    PANTOPRAZOLE (PROTONIX) 40 MG TABLET    TAKE ONE TABLET BY MOUTH ONCE DAILY    SYMBICORT 160-4.5 MCG/ACTUATION HFAA    INHALE 2 PUFFS INTO THE LUNGS EVERY 12 (TWELVE) HOURS. CONTROLLER    SYRINGE, DISPOSABLE, 1 ML SYRG    1 Syringe by Misc.(Non-Drug; Combo Route) route once daily.    TRAZODONE (DESYREL) 50 MG TABLET    Take 50 mg by mouth every evening.    VARENICLINE (CHANTIX STARTING MONTH BOX) 0.5 MG (11)- 1 MG (42) TABLET    Take one 0.5mg tab by mouth once daily X3 days,then increase to one 0.5mg tab twice daily X4 days,then increase to one 1mg tab twice daily    VARENICLINE (CHANTIX) 1 MG TAB    Take 1 tablet (1 mg total) by mouth 2 (two) times daily.    VOLTAREN 1 % GEL    APPLY TWO GRAMS FOUR TIMES A DAY   Discontinued Medications    FLUCONAZOLE (DIFLUCAN) 100 MG TABLET    1 tablet once daily for 7 days, repeat as needed for severe yeast infections    HYDROCODONE-ACETAMINOPHEN " (NORCO) 5-325 MG PER TABLET    Take 1 tablet by mouth every 6 (six) hours as needed for Pain.

## 2019-05-03 NOTE — PROGRESS NOTES
Verified pt ID using name and . Verified allergies. Administered 40 mg kenalog in left VG and 60 mg ketorolac in right VG per physician order using aseptic technique. Aspirated and no blood return noted. Pt tolerated well with no adverse reactions noted.

## 2019-05-13 DIAGNOSIS — E11.9 TYPE 2 DIABETES MELLITUS WITHOUT COMPLICATION: ICD-10-CM

## 2019-05-23 ENCOUNTER — OFFICE VISIT (OUTPATIENT)
Dept: PRIMARY CARE CLINIC | Facility: CLINIC | Age: 54
End: 2019-05-23
Payer: MEDICARE

## 2019-05-23 ENCOUNTER — PATIENT MESSAGE (OUTPATIENT)
Dept: PRIMARY CARE CLINIC | Facility: CLINIC | Age: 54
End: 2019-05-23

## 2019-05-23 VITALS
HEIGHT: 64 IN | SYSTOLIC BLOOD PRESSURE: 110 MMHG | BODY MASS INDEX: 47.72 KG/M2 | HEART RATE: 54 BPM | DIASTOLIC BLOOD PRESSURE: 60 MMHG | OXYGEN SATURATION: 94 % | RESPIRATION RATE: 16 BRPM

## 2019-05-23 DIAGNOSIS — E66.01 CLASS 3 SEVERE OBESITY WITH BODY MASS INDEX (BMI) OF 40.0 TO 44.9 IN ADULT, UNSPECIFIED OBESITY TYPE, UNSPECIFIED WHETHER SERIOUS COMORBIDITY PRESENT: ICD-10-CM

## 2019-05-23 DIAGNOSIS — F33.0 MILD EPISODE OF RECURRENT MAJOR DEPRESSIVE DISORDER: ICD-10-CM

## 2019-05-23 DIAGNOSIS — F41.9 ANXIETY: ICD-10-CM

## 2019-05-23 DIAGNOSIS — S39.012S LUMBAR STRAIN, SEQUELA: Primary | ICD-10-CM

## 2019-05-23 PROCEDURE — 99999 PR PBB SHADOW E&M-EST. PATIENT-LVL V: CPT | Mod: PBBFAC,,, | Performed by: NURSE PRACTITIONER

## 2019-05-23 PROCEDURE — 99214 PR OFFICE/OUTPT VISIT, EST, LEVL IV, 30-39 MIN: ICD-10-PCS | Mod: S$GLB,,, | Performed by: NURSE PRACTITIONER

## 2019-05-23 PROCEDURE — 3008F BODY MASS INDEX DOCD: CPT | Mod: CPTII,S$GLB,, | Performed by: NURSE PRACTITIONER

## 2019-05-23 PROCEDURE — 3008F PR BODY MASS INDEX (BMI) DOCUMENTED: ICD-10-PCS | Mod: CPTII,S$GLB,, | Performed by: NURSE PRACTITIONER

## 2019-05-23 PROCEDURE — 99499 UNLISTED E&M SERVICE: CPT | Mod: S$GLB,,, | Performed by: NURSE PRACTITIONER

## 2019-05-23 PROCEDURE — 99999 PR PBB SHADOW E&M-EST. PATIENT-LVL V: ICD-10-PCS | Mod: PBBFAC,,, | Performed by: NURSE PRACTITIONER

## 2019-05-23 PROCEDURE — 99214 OFFICE O/P EST MOD 30 MIN: CPT | Mod: S$GLB,,, | Performed by: NURSE PRACTITIONER

## 2019-05-23 PROCEDURE — 99499 RISK ADDL DX/OHS AUDIT: ICD-10-PCS | Mod: S$GLB,,, | Performed by: NURSE PRACTITIONER

## 2019-05-23 RX ORDER — DICLOFENAC SODIUM 10 MG/G
GEL TOPICAL
Qty: 100 G | Refills: 0 | Status: SHIPPED | OUTPATIENT
Start: 2019-05-23 | End: 2020-01-10

## 2019-05-23 NOTE — PROGRESS NOTES
Chief Complaint  Chief Complaint   Patient presents with    Back Pain     x 2 and a half months        HPI    Ida Orozco is a 53 y.o. female that presents for back pain.    Back pain-patient reports the onset of back pain approximately 2 months ago.  Previously seated clinic and treated with Zanaflex with moderate improvement pain.  MRI 4/11 completed with mild degenerative disc disease. Presents to clinic today complaining of continued pain.  Pain present midline described as stabbing, aching, 10/10, intermittent.  Pain exacerbated by standing.  Radiating upper back.  States that the pain initially occurred when she saw a chiropractor approximately 3 months ago reports down to her lower back with a roller and the pain occurred.  No lower extremity numbness or weakness.  Of note, patient continues to gain weight current weight 278 lb.  Refusing and scale today.  Gastric sleeve approximately 3 years ago with no weight loss.  States that she continues to gain weight although she is minimizing carbohydrates minimizing portions.  States that she knows that the weight is contributing to her back pain but is unsure as to how to lose it.  Expresses frustration with her continued weight gain and her inability to control it.    Anxiety-patient with history of depression, anxiety reports frustration with her moods requesting to reestablish with a psychiatrist at this time.  Previously established with Psychiatry and placed on Klonopin 1 mg 3 times a day but continues to complain of dysphoric mood, anhedonia, withdrawal.  Patient was previously on SSRI in the past including Prozac and several others which all caused weight gain.  Expressing interest in adjusting her medication regimen at this time.  Continues to sleep well on trazodone.  Of note-history of hospitalization in the past when she checked herself into [facility] with suicidal homicidal ideation.    PAST MEDICAL HISTORY:  Past Medical  History:   Diagnosis Date    Anxiety     Asthma     Crohn's colitis     Fatty liver disease, nonalcoholic     GERD (gastroesophageal reflux disease)     History of sleeve gastrectomy 2016       PAST SURGICAL HISTORY:  Past Surgical History:   Procedure Laterality Date    APPENDECTOMY      ARTHROSCOPY-KNEE W/ CHONDROPLASTY Right 2016    Per[facility]     SECTION, LOW TRANSVERSE      CHOLECYSTECTOMY      open    COLECTOMY      total- 2013    CYSTOSCOPY, WITH RETROGRADE PYELOGRAM N/A 2018    Performed by [facility]ch Banks [facility] at [facility] OR    ENTEROSCOPY N/A 2014    Performed by Tavo Latham MD at [facility] ENDO (2ND FLR)    ESOPHAGOGASTRODUODENOSCOPY (EGD) N/A 2018    Performed by Chris Castorena MD at [facility] ENDO (4TH FLR)    ESOPHAGOGASTRODUODENOSCOPY (EGD) N/A 2015    Performed by Domingo Hunter MD at [facility] ENDO    ESOPHAGOGASTRODUODENOSCOPY (EGD) N/A 2014    Performed by Domingo Hunter [facility] at [facility] ENDO    EXCISION-PLICA Right 2016    Performed by [facility]igan [facility] at [facility] OR    GASTRIC SLEEVE       HYSTERECTOMY      ILEOSCOPY N/A 2018    Performed by Chris Castorena MD at [facility] ENDO (4TH FLR)    ILEOSTOMY REVISION      2014; 2014    LYSIS, ADHESIONS/ more than 2hours  2018    Performed by JENNY Virgen MD at [facility] OR 2ND FLR    LYSIS-ADHESION N/A 8/15/2014    Performed by JENNY Virgen MD at [facility] OR 2ND FLR    PROCTECTOMY-LAPAROSCOPIC/CONVERTED TO OPEN N/A 2018    Performed by JENNY Virgen MD at [facility] OR 2ND FLR    REVISION-ILEOSTOMY N/A 8/15/2014    Performed by JENNY Virgen MD at [facility] OR 2ND FLR    SIGMOIDOSCOPY, FLEXIBLE  2018    Performed by JENNY Virgen MD at [facility] OR 2ND FLR    SIGMOIDOSCOPY-FLEXIBLE N/A 2018    Performed by Chris Castorena MD at [facility] ENDO (4TH FLR)    SIGMOIDOSCOPY-FLEXIBLE N/A 2014    Performed by [facility]gen [facility]  at [facility] ENDO (4TH FLR)    thumb surgery      TONSILLECTOMY, ADENOIDECTOMY      WISDOM TOOTH EXTRACTION         SOCIAL HISTORY:  Social History     Socioeconomic History    Marital status: Legally      Spouse name: Not on file    Number of children: Not on file    Years of education: Not on file    Highest education level: Not on file   Occupational History    Not on file   Social Needs    Financial resource strain: Not on file    Food insecurity:     Worry: Not on file     Inability: Not on file    Transportation needs:     Medical: Not on file     Non-medical: Not on file   Tobacco Use    Smoking status: Former Smoker     Types: Cigarettes     Last attempt to quit: 2009     Years since quitting: 10.3    Smokeless tobacco: Never Used   Substance and Sexual Activity    Alcohol use: No    Drug use: No    Sexual activity: Never     Birth control/protection: See Surgical Hx   Lifestyle    Physical activity:     Days per week: Not on file     Minutes per session: Not on file    Stress: Not on file   Relationships    Social connections:     Talks on phone: Not on file     Gets together: Not on file     Attends Scientologist service: Not on file     Active member of club or organization: Not on file     Attends meetings of clubs or organizations: Not on file     Relationship status: Not on file   Other Topics Concern    Not on file   Social History Narrative    Not on file       FAMILY HISTORY:  Family History   Problem Relation Age of Onset    Cancer Mother         skin    Heart disease Father 67    Cancer Paternal Grandfather         ?    Cancer Maternal Grandfather         colon        ALLERGIES AND MEDICATIONS: updated and reviewed.  Review of patient's allergies indicates:   Allergen Reactions    Adhesive      Cause blisters    Dilaudid [hydromorphone] Nausea And Vomiting    Sulfa (sulfonamide antibiotics) Hives     Current Outpatient Medications   Medication Sig Dispense Refill     "albuterol (PROVENTIL) 2.5 mg /3 mL (0.083 %) nebulizer solution INHALE ONE VIAL VIA NEBULIZER EVERY SIX HOURS 150 mL 3    albuterol 90 mcg/actuation inhaler Inhale 2 puffs into the lungs every 4 (four) hours as needed for Wheezing or Shortness of Breath. Rescue 1 Inhaler 5    clonazePAM (KLONOPIN) 1 MG tablet Take 1 mg by mouth 3 (three) times daily.       diclofenac sodium (VOLTAREN) 1 % Gel APPLY TWO GRAMS FOUR TIMES A  g 0    ergocalciferol (ERGOCALCIFEROL) 50,000 unit Cap TAKE 1 CAPSULE (50,000 UNITS TOTAL) BY MOUTH EVERY 7 DAYS. 4 capsule 5    estradiol (ESTRACE) 0.01 % (0.1 mg/gram) vaginal cream Use 0.5 g inside vagina and dime-sized amount around opening/inner lips nightly x 2 weeks, then 2x/week thereafter. 42.5 g 11    ferrous gluconate (FERGON) 324 MG tablet Take 1 tablet (324 mg total) by mouth daily with breakfast. 90 tablet 1    fluticasone (FLONASE) 50 mcg/actuation nasal spray INSTILL TWO SPRAYS IN EACH NOSTRIL IN EACH NOSTRIL EVERY EVENING 16 g 5    loratadine (CLARITIN) 10 mg tablet TAKE ONE TABLET BY MOUTH ONCE DAILY 90 tablet 3    mirabegron (MYRBETRIQ) 50 mg Tb24 Take 1 tablet (50 mg total) by mouth once daily. 30 tablet 11    montelukast (SINGULAIR) 10 mg tablet TAKE ONE TABLET BY MOUTH EVERY EVENING 90 tablet 1    mupirocin (BACTROBAN) 2 % ointment Apply topically 3 (three) times daily. 15 g 1    needle, disp, 25 gauge 25 gauge x 7/8" Ndle 1 each by Misc.(Non-Drug; Combo Route) route once daily. 100 each 2    nystatin (MYCOSTATIN) powder Apply topically 2 (two) times daily. 1 Bottle 6    ondansetron (ZOFRAN) 8 MG tablet Take 1 tablet (8 mg total) by mouth every 8 (eight) hours as needed for Nausea. 6 tablet 0    pantoprazole (PROTONIX) 40 MG tablet TAKE ONE TABLET BY MOUTH ONCE DAILY 90 tablet 3    SYMBICORT 160-4.5 mcg/actuation HFAA INHALE 2 PUFFS INTO THE LUNGS EVERY 12 (TWELVE) HOURS. CONTROLLER (Patient taking differently: Inhale 2 puffs into the lungs every 12 " "(twelve) hours as needed. Controller) 11 g 1    syringe, disposable, 1 mL Syrg 1 Syringe by Misc.(Non-Drug; Combo Route) route once daily. 100 Syringe 2    trazodone (DESYREL) 50 MG tablet Take 50 mg by mouth every evening.      varenicline (CHANTIX STARTING MONTH BOX) 0.5 mg (11)- 1 mg (42) tablet Take one 0.5mg tab by mouth once daily X3 days,then increase to one 0.5mg tab twice daily X4 days,then increase to one 1mg tab twice daily 1 Package 0    varenicline (CHANTIX) 1 mg Tab Take 1 tablet (1 mg total) by mouth 2 (two) times daily. 60 tablet 4    methylPREDNISolone (MEDROL DOSEPACK) 4 mg tablet use as directed 1 Package 0     No current facility-administered medications for this visit.          ROS  Review of Systems   Constitutional: Positive for fatigue and unexpected weight change. Negative for chills and fever.   HENT: Negative for ear pain, postnasal drip and sinus pain.    Respiratory: Negative for cough and shortness of breath.    Cardiovascular: Negative for chest pain.   Gastrointestinal: Negative for diarrhea, nausea and vomiting.   Musculoskeletal: Positive for back pain.   Psychiatric/Behavioral: Positive for agitation and dysphoric mood. Negative for sleep disturbance. The patient is nervous/anxious.            PHYSICAL EXAM  Vitals:    05/23/19 0801   BP: 110/60   Pulse: (!) 54   Resp: 16   SpO2: (!) 94%   Height: 5' 4" (1.626 m)    Body mass index is 47.72 kg/m².      Height: 5' 4" (162.6 cm)     Physical Exam   Constitutional: She is oriented to person, place, and time. She appears well-developed and well-nourished.   HENT:   Head: Normocephalic.   Right Ear: Tympanic membrane normal.   Left Ear: Tympanic membrane normal.   Mouth/Throat: Uvula is midline, oropharynx is clear and moist and mucous membranes are normal.   Eyes: Conjunctivae are normal.   Cardiovascular: Normal rate, regular rhythm, normal heart sounds and normal pulses.   No murmur heard.  Pulses:       Radial pulses are 2+ on " the right side, and 2+ on the left side.   No LE swelling noted   Pulmonary/Chest: Effort normal and breath sounds normal. She has no wheezes.   Abdominal: Soft. Bowel sounds are normal. There is no tenderness.   Musculoskeletal: She exhibits no edema.        Back:    Lymphadenopathy:     She has no cervical adenopathy.   Neurological: She is alert and oriented to person, place, and time.   Skin: Skin is warm and dry. No rash noted.   Psychiatric: Her affect is labile. She is agitated (Frustration).         Health Maintenance       Date Due Completion Date    Hemoglobin A1c 04/24/2019 10/24/2018    Low Dose Statin 10/24/2019 (Originally 7/27/1988) ---    Influenza Vaccine 08/01/2019 10/12/2018    Lipid Panel 10/02/2019 10/2/2018    Eye Exam 10/23/2019 10/23/2018    Foot Exam 10/24/2019 10/24/2018    Urine Microalbumin 10/24/2019 10/24/2018    Mammogram 10/24/2020 10/24/2018    TETANUS VACCINE 10/24/2028 10/24/2018            Assessment & Plan    Problem List Items Addressed This Visit        Unprioritized    Anxiety    Overview     Om Klonopin twice daily   Under Psychiatry care   Discussed with patient my concern with taking Klonopin 2 to 3 times a day as needed for anxiety and leaving her depression untreated.  Standard of care is treatment with an SSRI, SNRI.  Encouraged that there are several medications that may not cause her to gain weight.  Patient expresses interest in establishing with Psychiatry at this time.  Instructed her that the psychiatrist will recommend weaning off of such a frequent dose of Klonopin.         Relevant Orders    Ambulatory referral to Psychiatry-several wall    Depression    Overview     Controlled         Relevant Orders    Ambulatory referral to Psychiatry      Other Visit Diagnoses     Lumbar strain, sequela    -  Primary    Relevant Medications    diclofenac sodium (VOLTAREN) 1 % Gel  Continue on Zanaflex.  Consider physical therapy for back strengthening exercises.  Discussed  the importance of weight loss in alleviation of back pain.      Other Relevant Orders    Ambulatory Referral to Physical/Occupational Therapy    Class 3 severe obesity with body mass index (BMI) of 40.0 to 44.9 in adult, unspecified obesity type, unspecified whether serious comorbidity present      Patient expresses interest in Contrave.  Concerned about insurance coverage at this time.  Also concerned about absorption due to her ileostomy status.  Interested in seeing dietitian at this time but unable to get insurance coverage.          Follow-up: Follow up in about 3 months (around 8/23/2019).    Janet Guardado    Medication List with Changes/Refills   Current Medications    ALBUTEROL (PROVENTIL) 2.5 MG /3 ML (0.083 %) NEBULIZER SOLUTION    INHALE ONE VIAL VIA NEBULIZER EVERY SIX HOURS    ALBUTEROL 90 MCG/ACTUATION INHALER    Inhale 2 puffs into the lungs every 4 (four) hours as needed for Wheezing or Shortness of Breath. Rescue    CLONAZEPAM (KLONOPIN) 1 MG TABLET    Take 1 mg by mouth 3 (three) times daily.     ERGOCALCIFEROL (ERGOCALCIFEROL) 50,000 UNIT CAP    TAKE 1 CAPSULE (50,000 UNITS TOTAL) BY MOUTH EVERY 7 DAYS.    ESTRADIOL (ESTRACE) 0.01 % (0.1 MG/GRAM) VAGINAL CREAM    Use 0.5 g inside vagina and dime-sized amount around opening/inner lips nightly x 2 weeks, then 2x/week thereafter.    FERROUS GLUCONATE (FERGON) 324 MG TABLET    Take 1 tablet (324 mg total) by mouth daily with breakfast.    FLUTICASONE (FLONASE) 50 MCG/ACTUATION NASAL SPRAY    INSTILL TWO SPRAYS IN EACH NOSTRIL IN EACH NOSTRIL EVERY EVENING    LORATADINE (CLARITIN) 10 MG TABLET    TAKE ONE TABLET BY MOUTH ONCE DAILY    METHYLPREDNISOLONE (MEDROL DOSEPACK) 4 MG TABLET    use as directed    MIRABEGRON (MYRBETRIQ) 50 MG TB24    Take 1 tablet (50 mg total) by mouth once daily.    MONTELUKAST (SINGULAIR) 10 MG TABLET    TAKE ONE TABLET BY MOUTH EVERY EVENING    MUPIROCIN (BACTROBAN) 2 % OINTMENT    Apply topically 3 (three) times daily.     "NEEDLE, DISP, 25 GAUGE 25 GAUGE X 7/8" NDLE    1 each by Misc.(Non-Drug; Combo Route) route once daily.    NYSTATIN (MYCOSTATIN) POWDER    Apply topically 2 (two) times daily.    ONDANSETRON (ZOFRAN) 8 MG TABLET    Take 1 tablet (8 mg total) by mouth every 8 (eight) hours as needed for Nausea.    PANTOPRAZOLE (PROTONIX) 40 MG TABLET    TAKE ONE TABLET BY MOUTH ONCE DAILY    SYMBICORT 160-4.5 MCG/ACTUATION HFAA    INHALE 2 PUFFS INTO THE LUNGS EVERY 12 (TWELVE) HOURS. CONTROLLER    SYRINGE, DISPOSABLE, 1 ML SYRG    1 Syringe by Misc.(Non-Drug; Combo Route) route once daily.    TRAZODONE (DESYREL) 50 MG TABLET    Take 50 mg by mouth every evening.    VARENICLINE (CHANTIX STARTING MONTH BOX) 0.5 MG (11)- 1 MG (42) TABLET    Take one 0.5mg tab by mouth once daily X3 days,then increase to one 0.5mg tab twice daily X4 days,then increase to one 1mg tab twice daily    VARENICLINE (CHANTIX) 1 MG TAB    Take 1 tablet (1 mg total) by mouth 2 (two) times daily.   Changed and/or Refilled Medications    Modified Medication Previous Medication    DICLOFENAC SODIUM (VOLTAREN) 1 % GEL VOLTAREN 1 % Gel       APPLY TWO GRAMS FOUR TIMES A DAY    APPLY TWO GRAMS FOUR TIMES A DAY     "

## 2019-05-27 ENCOUNTER — PATIENT MESSAGE (OUTPATIENT)
Dept: PRIMARY CARE CLINIC | Facility: CLINIC | Age: 54
End: 2019-05-27

## 2019-05-27 RX ORDER — TIZANIDINE 4 MG/1
TABLET ORAL
Qty: 28 TABLET | Refills: 0 | Status: SHIPPED | OUTPATIENT
Start: 2019-05-27 | End: 2019-05-31 | Stop reason: SDUPTHER

## 2019-05-28 ENCOUNTER — OFFICE VISIT (OUTPATIENT)
Dept: PRIMARY CARE CLINIC | Facility: CLINIC | Age: 54
End: 2019-05-28
Payer: MEDICARE

## 2019-05-28 VITALS
RESPIRATION RATE: 18 BRPM | HEART RATE: 65 BPM | WEIGHT: 282 LBS | SYSTOLIC BLOOD PRESSURE: 109 MMHG | TEMPERATURE: 98 F | HEIGHT: 64 IN | BODY MASS INDEX: 48.14 KG/M2 | DIASTOLIC BLOOD PRESSURE: 66 MMHG | OXYGEN SATURATION: 97 %

## 2019-05-28 DIAGNOSIS — B35.4 TINEA CORPORIS: Primary | ICD-10-CM

## 2019-05-28 DIAGNOSIS — J45.41 MODERATE PERSISTENT ASTHMA WITH ACUTE EXACERBATION: ICD-10-CM

## 2019-05-28 PROCEDURE — 3008F PR BODY MASS INDEX (BMI) DOCUMENTED: ICD-10-PCS | Mod: CPTII,S$GLB,, | Performed by: FAMILY MEDICINE

## 2019-05-28 PROCEDURE — 99999 PR PBB SHADOW E&M-EST. PATIENT-LVL III: CPT | Mod: PBBFAC,,, | Performed by: FAMILY MEDICINE

## 2019-05-28 PROCEDURE — 99214 PR OFFICE/OUTPT VISIT, EST, LEVL IV, 30-39 MIN: ICD-10-PCS | Mod: S$GLB,,, | Performed by: FAMILY MEDICINE

## 2019-05-28 PROCEDURE — 99214 OFFICE O/P EST MOD 30 MIN: CPT | Mod: S$GLB,,, | Performed by: FAMILY MEDICINE

## 2019-05-28 PROCEDURE — 99999 PR PBB SHADOW E&M-EST. PATIENT-LVL III: ICD-10-PCS | Mod: PBBFAC,,, | Performed by: FAMILY MEDICINE

## 2019-05-28 PROCEDURE — 3008F BODY MASS INDEX DOCD: CPT | Mod: CPTII,S$GLB,, | Performed by: FAMILY MEDICINE

## 2019-05-28 RX ORDER — CLOTRIMAZOLE AND BETAMETHASONE DIPROPIONATE 10; .64 MG/G; MG/G
CREAM TOPICAL 2 TIMES DAILY
Qty: 15 G | Refills: 1 | Status: SHIPPED | OUTPATIENT
Start: 2019-05-28 | End: 2020-02-17

## 2019-05-28 RX ORDER — METHYLPREDNISOLONE 4 MG/1
TABLET ORAL
Qty: 1 PACKAGE | Refills: 0 | Status: SHIPPED | OUTPATIENT
Start: 2019-05-28 | End: 2019-06-24

## 2019-05-28 NOTE — PROGRESS NOTES
Subjective:       Patient ID: Ida Orozco is a 53 y.o. female.    Chief Complaint: Rash (patient has a rash like spot on her leg ) and Chest Congestion    Cough and wheezing, chest congestion since the weekend. Started after she was at a seafood restaurant and she breathed in the fumes from crab boil. Feels like symptoms getting worse  Also has itchy red patch to lateral aspect of left thigh for the past few days. Has several dogs    Back Pain   This is a chronic problem. The current episode started more than 1 month ago. The problem occurs constantly. The problem has been gradually worsening since onset. The pain is present in the lumbar spine and sacro-iliac. The quality of the pain is described as shooting and stabbing. The pain does not radiate. The pain is at a severity of 6/10. The pain is moderate. The pain is worse during the night. The symptoms are aggravated by sitting and standing. Stiffness is present in the morning and at night. Associated symptoms include abdominal pain, bladder incontinence and dysuria. Pertinent negatives include no bowel incontinence, chest pain, fever, headaches, leg pain, numbness, paresis, paresthesias, pelvic pain, perianal numbness, tingling, weakness or weight loss. Risk factors include history of osteoporosis, history of steroid use, lack of exercise, menopause, obesity and recent trauma. She has tried analgesics, NSAIDs, heat, ice, muscle relaxant and walking for the symptoms. The treatment provided no relief.     Review of Systems   Constitutional: Negative for fever and weight loss.   Eyes: Negative for visual disturbance.   Respiratory: Positive for cough.    Cardiovascular: Negative for chest pain.   Gastrointestinal: Positive for abdominal pain. Negative for bowel incontinence.   Genitourinary: Positive for bladder incontinence and dysuria. Negative for pelvic pain.   Musculoskeletal: Positive for back pain.   Skin: Positive for rash.   Neurological: Negative for  "tingling, weakness, numbness, headaches and paresthesias.       Objective:      Vitals:    05/28/19 0834   BP: 109/66   BP Location: Right arm   Patient Position: Sitting   BP Method: Large (Automatic)   Pulse: 65   Resp: 18   Temp: 98.1 °F (36.7 °C)   TempSrc: Oral   SpO2: 97%   Weight: 127.9 kg (282 lb)   Height: 5' 4" (1.626 m)     Physical Exam   Constitutional: She is oriented to person, place, and time. She appears well-developed and well-nourished.   HENT:   Head: Normocephalic and atraumatic.   Cardiovascular: Normal rate, regular rhythm and normal heart sounds.   Pulmonary/Chest: Effort normal. She has wheezes (diffuse expiratory). She has no rales.   Musculoskeletal: She exhibits no edema.   Neurological: She is alert and oriented to person, place, and time.   Skin: Skin is warm and dry. Rash (1.5 cm erythematous circumferential patch to left lateral thigh) noted.   Psychiatric: She has a normal mood and affect. Her behavior is normal.   Nursing note and vitals reviewed.      Assessment:       1. Tinea corporis    2. Moderate persistent asthma with acute exacerbation        Plan:       Tinea corporis  -     clotrimazole-betamethasone 1-0.05% (LOTRISONE) cream; Apply topically 2 (two) times daily.  Dispense: 15 g; Refill: 1    Moderate persistent asthma with acute exacerbation  -     X-Ray Chest PA And Lateral; Future; Expected date: 05/28/2019  -     methylPREDNISolone (MEDROL DOSEPACK) 4 mg tablet; use as directed  Dispense: 1 Package; Refill: 0  Use albuterol PRN for chest congestion and wheezing    Medication List with Changes/Refills   New Medications    CLOTRIMAZOLE-BETAMETHASONE 1-0.05% (LOTRISONE) CREAM    Apply topically 2 (two) times daily.    METHYLPREDNISOLONE (MEDROL DOSEPACK) 4 MG TABLET    use as directed   Current Medications    ALBUTEROL (PROVENTIL) 2.5 MG /3 ML (0.083 %) NEBULIZER SOLUTION    INHALE ONE VIAL VIA NEBULIZER EVERY SIX HOURS    ALBUTEROL 90 MCG/ACTUATION INHALER    Inhale 2 " "puffs into the lungs every 4 (four) hours as needed for Wheezing or Shortness of Breath. Rescue    CLONAZEPAM (KLONOPIN) 1 MG TABLET    Take 1 mg by mouth 3 (three) times daily.     DICLOFENAC SODIUM (VOLTAREN) 1 % GEL    APPLY TWO GRAMS FOUR TIMES A DAY    ERGOCALCIFEROL (ERGOCALCIFEROL) 50,000 UNIT CAP    TAKE 1 CAPSULE (50,000 UNITS TOTAL) BY MOUTH EVERY 7 DAYS.    ESTRADIOL (ESTRACE) 0.01 % (0.1 MG/GRAM) VAGINAL CREAM    Use 0.5 g inside vagina and dime-sized amount around opening/inner lips nightly x 2 weeks, then 2x/week thereafter.    FERROUS GLUCONATE (FERGON) 324 MG TABLET    Take 1 tablet (324 mg total) by mouth daily with breakfast.    FLUTICASONE (FLONASE) 50 MCG/ACTUATION NASAL SPRAY    INSTILL TWO SPRAYS IN EACH NOSTRIL IN EACH NOSTRIL EVERY EVENING    LORATADINE (CLARITIN) 10 MG TABLET    TAKE ONE TABLET BY MOUTH ONCE DAILY    MIRABEGRON (MYRBETRIQ) 50 MG TB24    Take 1 tablet (50 mg total) by mouth once daily.    MONTELUKAST (SINGULAIR) 10 MG TABLET    TAKE ONE TABLET BY MOUTH EVERY EVENING    MUPIROCIN (BACTROBAN) 2 % OINTMENT    Apply topically 3 (three) times daily.    NEEDLE, DISP, 25 GAUGE 25 GAUGE X 7/8" NDLE    1 each by Misc.(Non-Drug; Combo Route) route once daily.    NYSTATIN (MYCOSTATIN) POWDER    Apply topically 2 (two) times daily.    ONDANSETRON (ZOFRAN) 8 MG TABLET    Take 1 tablet (8 mg total) by mouth every 8 (eight) hours as needed for Nausea.    PANTOPRAZOLE (PROTONIX) 40 MG TABLET    TAKE ONE TABLET BY MOUTH ONCE DAILY    SYMBICORT 160-4.5 MCG/ACTUATION HFAA    INHALE 2 PUFFS INTO THE LUNGS EVERY 12 (TWELVE) HOURS. CONTROLLER    SYRINGE, DISPOSABLE, 1 ML SYRG    1 Syringe by Misc.(Non-Drug; Combo Route) route once daily.    TIZANIDINE (ZANAFLEX) 4 MG TABLET    TAKE 1 TABLET (4 MG TOTAL) BY MOUTH EVERY 6 (SIX) HOURS AS NEEDED.    TRAZODONE (DESYREL) 50 MG TABLET    Take 50 mg by mouth every evening.    VARENICLINE (CHANTIX) 1 MG TAB    Take 1 tablet (1 mg total) by mouth 2 (two) " times daily.   Discontinued Medications    METHYLPREDNISOLONE (MEDROL DOSEPACK) 4 MG TABLET    use as directed    VARENICLINE (CHANTIX STARTING MONTH BOX) 0.5 MG (11)- 1 MG (42) TABLET    Take one 0.5mg tab by mouth once daily X3 days,then increase to one 0.5mg tab twice daily X4 days,then increase to one 1mg tab twice daily

## 2019-05-30 ENCOUNTER — CLINICAL SUPPORT (OUTPATIENT)
Dept: PRIMARY CARE CLINIC | Facility: CLINIC | Age: 54
End: 2019-05-30
Payer: MEDICARE

## 2019-05-30 DIAGNOSIS — E11.9 TYPE 2 DIABETES MELLITUS WITHOUT COMPLICATION: ICD-10-CM

## 2019-05-30 LAB
ESTIMATED AVG GLUCOSE: 114 MG/DL (ref 68–131)
HBA1C MFR BLD HPLC: 5.6 % (ref 4–5.6)

## 2019-05-30 PROCEDURE — 83036 HEMOGLOBIN GLYCOSYLATED A1C: CPT

## 2019-05-31 RX ORDER — TIZANIDINE 4 MG/1
TABLET ORAL
Qty: 28 TABLET | Refills: 0 | Status: SHIPPED | OUTPATIENT
Start: 2019-05-31 | End: 2019-06-24

## 2019-06-07 PROBLEM — S39.012S LUMBAR SPINE STRAIN, SEQUELA: Status: ACTIVE | Noted: 2019-06-07

## 2019-06-21 ENCOUNTER — TELEPHONE (OUTPATIENT)
Dept: PRIMARY CARE CLINIC | Facility: CLINIC | Age: 54
End: 2019-06-21

## 2019-06-21 NOTE — TELEPHONE ENCOUNTER
----- Message from Mook Newell sent at 6/21/2019  9:23 AM CDT -----  Type:  Same Day Appointment Request    Caller is requesting a same day appointment.  Caller declined first available appointment listed below.      Name of Caller:  Patient  When is the first available appointment?  07/02  Symptoms:  Cough, post nasal drip, fever  Best Call Back Number:  [phone]  Additional Information:

## 2019-06-21 NOTE — TELEPHONE ENCOUNTER
Soonest appointment with Janet 7/2/2019, pt declined. Offered to schedule patient with Dr. Cornejo on Monday at 0845, pt declined

## 2019-06-24 ENCOUNTER — OFFICE VISIT (OUTPATIENT)
Dept: PRIMARY CARE CLINIC | Facility: CLINIC | Age: 54
End: 2019-06-24
Payer: MEDICARE

## 2019-06-24 ENCOUNTER — TELEPHONE (OUTPATIENT)
Dept: PRIMARY CARE CLINIC | Facility: CLINIC | Age: 54
End: 2019-06-24

## 2019-06-24 VITALS
TEMPERATURE: 98 F | SYSTOLIC BLOOD PRESSURE: 109 MMHG | BODY MASS INDEX: 47.46 KG/M2 | OXYGEN SATURATION: 98 % | WEIGHT: 278 LBS | DIASTOLIC BLOOD PRESSURE: 68 MMHG | HEART RATE: 65 BPM | RESPIRATION RATE: 18 BRPM | HEIGHT: 64 IN

## 2019-06-24 DIAGNOSIS — J20.9 ACUTE BRONCHITIS, UNSPECIFIED ORGANISM: Primary | ICD-10-CM

## 2019-06-24 PROCEDURE — 3008F PR BODY MASS INDEX (BMI) DOCUMENTED: ICD-10-PCS | Mod: CPTII,S$GLB,, | Performed by: FAMILY MEDICINE

## 2019-06-24 PROCEDURE — 99999 PR PBB SHADOW E&M-EST. PATIENT-LVL III: ICD-10-PCS | Mod: PBBFAC,,, | Performed by: FAMILY MEDICINE

## 2019-06-24 PROCEDURE — 3008F BODY MASS INDEX DOCD: CPT | Mod: CPTII,S$GLB,, | Performed by: FAMILY MEDICINE

## 2019-06-24 PROCEDURE — 99999 PR PBB SHADOW E&M-EST. PATIENT-LVL III: CPT | Mod: PBBFAC,,, | Performed by: FAMILY MEDICINE

## 2019-06-24 PROCEDURE — 99214 PR OFFICE/OUTPT VISIT, EST, LEVL IV, 30-39 MIN: ICD-10-PCS | Mod: S$GLB,,, | Performed by: FAMILY MEDICINE

## 2019-06-24 PROCEDURE — 99214 OFFICE O/P EST MOD 30 MIN: CPT | Mod: S$GLB,,, | Performed by: FAMILY MEDICINE

## 2019-06-24 RX ORDER — DOXYCYCLINE 100 MG/1
100 CAPSULE ORAL EVERY 12 HOURS
Qty: 20 CAPSULE | Refills: 0 | Status: SHIPPED | OUTPATIENT
Start: 2019-06-24 | End: 2019-07-04

## 2019-06-24 RX ORDER — CODEINE PHOSPHATE AND GUAIFENESIN 10; 100 MG/5ML; MG/5ML
5 SOLUTION ORAL EVERY 6 HOURS PRN
Qty: 120 ML | Refills: 0 | Status: SHIPPED | OUTPATIENT
Start: 2019-06-24 | End: 2019-07-11

## 2019-06-24 RX ORDER — PREDNISONE 20 MG/1
TABLET ORAL
Qty: 7 TABLET | Refills: 0 | Status: SHIPPED | OUTPATIENT
Start: 2019-06-24 | End: 2019-06-29

## 2019-06-24 NOTE — PROGRESS NOTES
"Subjective:       Patient ID: Ida Orozco is a 53 y.o. female.    Chief Complaint: Cough (since Thursday ); Sore Throat; and Nasal Congestion    Cough   This is a new problem. The current episode started in the past 7 days. The problem has been gradually worsening. The problem occurs every few minutes. The cough is productive of sputum. Associated symptoms include chills, a fever (subjective), headaches, postnasal drip, shortness of breath and wheezing. Pertinent negatives include no chest pain or hemoptysis. She has tried a beta-agonist inhaler for the symptoms. The treatment provided mild relief. Her past medical history is significant for asthma.     Review of Systems   Constitutional: Positive for chills and fever (subjective).   HENT: Positive for postnasal drip.    Eyes: Negative for visual disturbance.   Respiratory: Positive for cough, shortness of breath and wheezing. Negative for hemoptysis.    Cardiovascular: Negative for chest pain.   Neurological: Positive for headaches.       Objective:      Vitals:    06/24/19 1129   BP: 109/68   BP Location: Right arm   Patient Position: Sitting   BP Method: Large (Automatic)   Pulse: 65   Resp: 18   Temp: 98.4 °F (36.9 °C)   TempSrc: Oral   SpO2: 98%   Weight: 126.1 kg (278 lb)   Height: 5' 4" (1.626 m)     Physical Exam   Constitutional: She is oriented to person, place, and time. She appears well-developed and well-nourished.   HENT:   Head: Normocephalic and atraumatic.   Cardiovascular: Normal rate, regular rhythm and normal heart sounds.   Pulmonary/Chest: Effort normal. No respiratory distress. She has wheezes (diffuse expiratory). She has rhonchi in the left lower field. She has no rales.   Musculoskeletal: She exhibits no edema.   Neurological: She is alert and oriented to person, place, and time.   Skin: Skin is warm and dry.   Nursing note and vitals reviewed.      Assessment:       1. Acute bronchitis, unspecified organism        Plan:       Acute " bronchitis, unspecified organism  -     X-Ray Chest PA And Lateral; Future; Expected date: 06/24/2019  -     doxycycline (VIBRAMYCIN) 100 MG Cap; Take 1 capsule (100 mg total) by mouth every 12 (twelve) hours. for 10 days  Dispense: 20 capsule; Refill: 0  -     predniSONE (DELTASONE) 20 MG tablet; Take 2 tablets (40 mg total) by mouth once daily for 2 days, THEN 1 tablet (20 mg total) once daily for 3 days.  Dispense: 7 tablet; Refill: 0  -     guaifenesin-codeine 100-10 mg/5 ml (TUSSI-ORGANIDIN NR)  mg/5 mL syrup; Take 5 mLs by mouth every 6 (six) hours as needed for Cough.  Dispense: 120 mL; Refill: 0  CXR reviewed, no infiltrates noted    Medication List with Changes/Refills   New Medications    DOXYCYCLINE (VIBRAMYCIN) 100 MG CAP    Take 1 capsule (100 mg total) by mouth every 12 (twelve) hours. for 10 days    GUAIFENESIN-CODEINE 100-10 MG/5 ML (TUSSI-ORGANIDIN NR)  MG/5 ML SYRUP    Take 5 mLs by mouth every 6 (six) hours as needed for Cough.    PREDNISONE (DELTASONE) 20 MG TABLET    Take 2 tablets (40 mg total) by mouth once daily for 2 days, THEN 1 tablet (20 mg total) once daily for 3 days.   Current Medications    ALBUTEROL (PROVENTIL) 2.5 MG /3 ML (0.083 %) NEBULIZER SOLUTION    INHALE ONE VIAL VIA NEBULIZER EVERY SIX HOURS    ALBUTEROL 90 MCG/ACTUATION INHALER    Inhale 2 puffs into the lungs every 4 (four) hours as needed for Wheezing or Shortness of Breath. Rescue    CLONAZEPAM (KLONOPIN) 1 MG TABLET    Take 1 mg by mouth every evening.     CLOTRIMAZOLE-BETAMETHASONE 1-0.05% (LOTRISONE) CREAM    Apply topically 2 (two) times daily.    DICLOFENAC SODIUM (VOLTAREN) 1 % GEL    APPLY TWO GRAMS FOUR TIMES A DAY    ERGOCALCIFEROL (ERGOCALCIFEROL) 50,000 UNIT CAP    TAKE 1 CAPSULE (50,000 UNITS TOTAL) BY MOUTH EVERY 7 DAYS.    ESTRADIOL (ESTRACE) 0.01 % (0.1 MG/GRAM) VAGINAL CREAM    Use 0.5 g inside vagina and dime-sized amount around opening/inner lips nightly x 2 weeks, then 2x/week thereafter.  "   FERROUS GLUCONATE (FERGON) 324 MG TABLET    Take 1 tablet (324 mg total) by mouth daily with breakfast.    FLUTICASONE (FLONASE) 50 MCG/ACTUATION NASAL SPRAY    INSTILL TWO SPRAYS IN EACH NOSTRIL IN EACH NOSTRIL EVERY EVENING    LORATADINE (CLARITIN) 10 MG TABLET    TAKE ONE TABLET BY MOUTH ONCE DAILY    MIRABEGRON (MYRBETRIQ) 50 MG TB24    Take 1 tablet (50 mg total) by mouth once daily.    MONTELUKAST (SINGULAIR) 10 MG TABLET    TAKE ONE TABLET BY MOUTH EVERY EVENING    MUPIROCIN (BACTROBAN) 2 % OINTMENT    Apply topically 3 (three) times daily.    NYSTATIN (MYCOSTATIN) POWDER    Apply topically 2 (two) times daily.    ONDANSETRON (ZOFRAN) 8 MG TABLET    Take 1 tablet (8 mg total) by mouth every 8 (eight) hours as needed for Nausea.    PANTOPRAZOLE (PROTONIX) 40 MG TABLET    TAKE ONE TABLET BY MOUTH ONCE DAILY    SYMBICORT 160-4.5 MCG/ACTUATION HFAA    INHALE 2 PUFFS INTO THE LUNGS EVERY 12 (TWELVE) HOURS. CONTROLLER    TRAZODONE (DESYREL) 50 MG TABLET    Take 50 mg by mouth every evening.    VARENICLINE (CHANTIX) 1 MG TAB    Take 1 tablet (1 mg total) by mouth 2 (two) times daily.   Discontinued Medications    METHYLPREDNISOLONE (MEDROL DOSEPACK) 4 MG TABLET    use as directed    NEEDLE, DISP, 25 GAUGE 25 GAUGE X 7/8" NDLE    1 each by Misc.(Non-Drug; Combo Route) route once daily.    SYRINGE, DISPOSABLE, 1 ML SYRG    1 Syringe by Misc.(Non-Drug; Combo Route) route once daily.    TIZANIDINE (ZANAFLEX) 4 MG TABLET    TAKE 1 TABLET (4 MG TOTAL) BY MOUTH EVERY 6 (SIX) HOURS AS NEEDED.         "

## 2019-06-24 NOTE — TELEPHONE ENCOUNTER
----- Message from Alexandra Disla sent at 6/24/2019  1:36 PM CDT -----  Contact: Elaine with [facility] phone [phone]  Elaine with [facility] phone [phone], Calling because Rx predniSONE (DELTASONE) 20 MG tablet is on backorder, but they do have 5mg. Please advise. Thanks.

## 2019-07-03 ENCOUNTER — OFFICE VISIT (OUTPATIENT)
Dept: PRIMARY CARE CLINIC | Facility: CLINIC | Age: 54
End: 2019-07-03
Payer: MEDICARE

## 2019-07-03 VITALS
TEMPERATURE: 98 F | WEIGHT: 280.13 LBS | HEART RATE: 60 BPM | SYSTOLIC BLOOD PRESSURE: 109 MMHG | HEIGHT: 64 IN | BODY MASS INDEX: 47.82 KG/M2 | RESPIRATION RATE: 18 BRPM | DIASTOLIC BLOOD PRESSURE: 71 MMHG

## 2019-07-03 DIAGNOSIS — K08.89 PAIN, DENTAL: Primary | ICD-10-CM

## 2019-07-03 PROCEDURE — 3008F BODY MASS INDEX DOCD: CPT | Mod: CPTII,S$GLB,, | Performed by: NURSE PRACTITIONER

## 2019-07-03 PROCEDURE — 99999 PR PBB SHADOW E&M-EST. PATIENT-LVL V: ICD-10-PCS | Mod: PBBFAC,,, | Performed by: NURSE PRACTITIONER

## 2019-07-03 PROCEDURE — 3008F PR BODY MASS INDEX (BMI) DOCUMENTED: ICD-10-PCS | Mod: CPTII,S$GLB,, | Performed by: NURSE PRACTITIONER

## 2019-07-03 PROCEDURE — 99213 OFFICE O/P EST LOW 20 MIN: CPT | Mod: S$GLB,,, | Performed by: NURSE PRACTITIONER

## 2019-07-03 PROCEDURE — 99213 PR OFFICE/OUTPT VISIT, EST, LEVL III, 20-29 MIN: ICD-10-PCS | Mod: S$GLB,,, | Performed by: NURSE PRACTITIONER

## 2019-07-03 PROCEDURE — 99999 PR PBB SHADOW E&M-EST. PATIENT-LVL V: CPT | Mod: PBBFAC,,, | Performed by: NURSE PRACTITIONER

## 2019-07-03 RX ORDER — VENLAFAXINE HYDROCHLORIDE 150 MG/1
150 CAPSULE, EXTENDED RELEASE ORAL DAILY
COMMUNITY
Start: 2019-07-02 | End: 2019-10-07

## 2019-07-03 NOTE — PROGRESS NOTES
Chief Complaint  Chief Complaint   Patient presents with    Facial Pain     left side of face       HPI    Ida Orozco is a 53 y.o. female that presents for left-sided facial pain and swelling.    Patient reports the onset of left sided facial pain and swelling approximately 2 weeks ago.  He intermittent.  Pain located behind ear radiating to jaw.  Described as stabbing, 10/10, intermittent.  No exacerbating factors.  No relieving factors.  Denies fever or chills.  Currently on doxycycline for sinusitis as prescribed by her PCP.  No improvement with antibiotics.  Does report recent placement of a partial to her left jaw which she believes is exacerbating the pain. Patient is also seen ENT recently for evaluation with no noted abnormality.      PAST MEDICAL HISTORY:  Past Medical History:   Diagnosis Date    Anxiety     Asthma     Crohn's colitis     Fatty liver disease, nonalcoholic     GERD (gastroesophageal reflux disease)     History of sleeve gastrectomy 2016       PAST SURGICAL HISTORY:  Past Surgical History:   Procedure Laterality Date    APPENDECTOMY      ARTHROSCOPY-KNEE W/ CHONDROPLASTY Right 2016    [facility]     SECTION, LOW TRANSVERSE      CHOLECYSTECTOMY      open    COLECTOMY      total- 2013    CYSTOSCOPY, WITH RETROGRADE PYELOGRAM N/A 2018    [facility]    ENTEROSCOPY N/A 2014    Performed by [facility]atham [facility] at [facility] ENDO (2ND FLR)    ESOPHAGOGASTRODUODENOSCOPY (EGD) N/A 2018    Performed by Chris Castorena [facility] at [facility] ENDO (4TH FLR)    ESOPHAGOGASTRODUODENOSCOPY (EGD) N/A 2015    Performed by Domingo Hunter [facility] at [facility] ENDO    ESOPHAGOGASTRODUODENOSCOPY (EGD) N/A 2014    Per[facility] at [facility] ENDO    EXCISION-PLICA Right 2016    [facility] at [facility] OR    GASTRIC SLEEVE       HYSTERECTOMY      ILEOSCOPY N/A  4/11/2018    [facility] at [facility] ENDO (4TH FLR)    ILEOSTOMY REVISION      april 2014; august 2014    LYSIS, ADHESIONS/ more than 2hours  5/24/2018    Performed by JENNY Virgen [facility] at [facility] OR 2ND FLR    LYSIS-ADHESION N/A 8/15/2014    Performed by [facility] at [facility] OR 2ND FLR    PROCTECTOMY-LAPAROSCOPIC/CONVERTED TO OPEN N/A 5/24/2018    Performed by [facility] at [facility] OR 2ND FLR    REVISION-ILEOSTOMY N/A 8/15/2014    Performed by [facility] at [facility] OR 2ND FLR    SIGMOIDOSCOPY, FLEXIBLE  5/24/2018    Performed by [facility]gen [facility] at [facility] OR 2ND FLR    SIGMOIDOSCOPY-FLEXIBLE N/A 4/11/2018    Performed by [facility] Cast[facility]na [facility] at [facility] ENDO (4TH FLR)    SIGMOIDOSCOPY-FLEXIBLE N/A 8/12/2014    Performed by [facility]gen [facility] at [facility] ENDO (4TH FLR)    thumb surgery      TONSILLECTOMY, ADENOIDECTOMY      WISDOM TOOTH EXTRACTION         SOCIAL HISTORY:  Social History     Socioeconomic History    Marital status: Legally      Spouse name: Not on file    Number of children: Not on file    Years of education: Not on file    Highest education level: Not on file   Occupational History    Not on file   Social Needs    Financial resource strain: Not on file    Food insecurity:     Worry: Not on file     Inability: Not on file    Transportation needs:     Medical: Not on file     Non-medical: Not on file   Tobacco Use    Smoking status: Former Smoker     Types: Cigarettes     Last attempt to quit: 2009     Years since quitting: 10.5    Smokeless tobacco: Never Used   Substance and Sexual Activity    Alcohol use: No    Drug use: No    Sexual activity: Never     Birth control/protection: See Surgical Hx   Lifestyle    Physical activity:     Days per week: Not on file     Minutes per session: Not on file    Stress: Not on file   Relationships    Social connections:     Talks on phone: Not on file     Gets together: Not on file     Attends  [employer] service: Not on file     Active member of club or organization: Not on file     Attends meetings of clubs or organizations: Not on file     Relationship status: Not on file   Other Topics Concern    Not on file   Social History Narrative    Not on file       FAMILY HISTORY:  Family History   Problem Relation Age of Onset    Cancer Mother         skin    Heart disease Father 67    Cancer Paternal Grandfather         ?    Cancer Maternal Grandfather         colon        ALLERGIES AND MEDICATIONS: updated and reviewed.  Review of patient's allergies indicates:   Allergen Reactions    Adhesive      Cause blisters    Dilaudid [hydromorphone] Nausea And Vomiting    Sulfa (sulfonamide antibiotics) Hives     Current Outpatient Medications   Medication Sig Dispense Refill    albuterol (PROVENTIL) 2.5 mg /3 mL (0.083 %) nebulizer solution INHALE ONE VIAL VIA NEBULIZER EVERY SIX HOURS 150 mL 3    albuterol 90 mcg/actuation inhaler Inhale 2 puffs into the lungs every 4 (four) hours as needed for Wheezing or Shortness of Breath. Rescue 1 Inhaler 5    clonazePAM (KLONOPIN) 1 MG tablet Take 1 mg by mouth every evening.       clotrimazole-betamethasone 1-0.05% (LOTRISONE) cream Apply topically 2 (two) times daily. 15 g 1    diclofenac sodium (VOLTAREN) 1 % Gel APPLY TWO GRAMS FOUR TIMES A  g 0    doxycycline (VIBRAMYCIN) 100 MG Cap Take 1 capsule (100 mg total) by mouth every 12 (twelve) hours. for 10 days 20 capsule 0    ergocalciferol (ERGOCALCIFEROL) 50,000 unit Cap TAKE 1 CAPSULE (50,000 UNITS TOTAL) BY MOUTH EVERY 7 DAYS. 4 capsule 5    estradiol (ESTRACE) 0.01 % (0.1 mg/gram) vaginal cream Use 0.5 g inside vagina and dime-sized amount around opening/inner lips nightly x 2 weeks, then 2x/week thereafter. 42.5 g 11    ferrous gluconate (FERGON) 324 MG tablet Take 1 tablet (324 mg total) by mouth daily with breakfast. 90 tablet 1    fluticasone (FLONASE) 50 mcg/actuation nasal spray INSTILL  TWO SPRAYS IN EACH NOSTRIL IN EACH NOSTRIL EVERY EVENING 16 g 5    guaifenesin-codeine 100-10 mg/5 ml (TUSSI-ORGANIDIN NR)  mg/5 mL syrup Take 5 mLs by mouth every 6 (six) hours as needed for Cough. 120 mL 0    loratadine (CLARITIN) 10 mg tablet TAKE ONE TABLET BY MOUTH ONCE DAILY 90 tablet 3    mirabegron (MYRBETRIQ) 50 mg Tb24 Take 1 tablet (50 mg total) by mouth once daily. 30 tablet 11    montelukast (SINGULAIR) 10 mg tablet TAKE ONE TABLET BY MOUTH EVERY EVENING 90 tablet 1    mupirocin (BACTROBAN) 2 % ointment Apply topically 3 (three) times daily. 15 g 1    nystatin (MYCOSTATIN) powder Apply topically 2 (two) times daily. 1 Bottle 6    ondansetron (ZOFRAN) 8 MG tablet Take 1 tablet (8 mg total) by mouth every 8 (eight) hours as needed for Nausea. 6 tablet 0    pantoprazole (PROTONIX) 40 MG tablet TAKE ONE TABLET BY MOUTH ONCE DAILY 90 tablet 3    SYMBICORT 160-4.5 mcg/actuation HFAA INHALE 2 PUFFS INTO THE LUNGS EVERY 12 (TWELVE) HOURS. CONTROLLER (Patient taking differently: Inhale 2 puffs into the lungs every 12 (twelve) hours as needed. Controller) 11 g 1    trazodone (DESYREL) 50 MG tablet Take 50 mg by mouth every evening.      varenicline (CHANTIX) 1 mg Tab Take 1 tablet (1 mg total) by mouth 2 (two) times daily. 60 tablet 4    venlafaxine (EFFEXOR) 37.5 MG Tab Take 1 tablet by mouth once daily.       No current facility-administered medications for this visit.          ROS  Review of Systems   Constitutional: Negative for chills and fever.   HENT: Positive for dental problem, ear pain and facial swelling (Jaw pain). Negative for postnasal drip, sinus pain, sore throat and trouble swallowing.    Respiratory: Negative for cough and shortness of breath.    Cardiovascular: Negative for chest pain.   Gastrointestinal: Negative for diarrhea, nausea and vomiting.           PHYSICAL EXAM  Vitals:    07/03/19 0901   BP: 109/71   BP Location: Right arm   Patient Position: Sitting   BP Method:  "Large (Automatic)   Pulse: 60   Resp: 18   Temp: 98.2 °F (36.8 °C)   TempSrc: Oral   Weight: 127.1 kg (280 lb 1.6 oz)   Height: 5' 4" (1.626 m)    Body mass index is 48.08 kg/m².  Weight: 127.1 kg (280 lb 1.6 oz)   Height: 5' 4" (162.6 cm)     Physical Exam   Constitutional: She is oriented to person, place, and time. She appears well-developed and well-nourished.   HENT:   Head: Normocephalic.       Right Ear: Tympanic membrane normal.   Left Ear: Tympanic membrane normal.   Mouth/Throat: Uvula is midline, oropharynx is clear and moist and mucous membranes are normal.   Eyes: Conjunctivae are normal.   Neck: No edema present.   Cardiovascular: Normal rate, regular rhythm, normal heart sounds and normal pulses.   No murmur heard.  Pulses:       Radial pulses are 2+ on the right side, and 2+ on the left side.   No LE swelling noted   Pulmonary/Chest: Effort normal and breath sounds normal. She has no wheezes.   Abdominal: Soft. Bowel sounds are normal. There is no tenderness.   Musculoskeletal: She exhibits no edema.   Lymphadenopathy:     She has no cervical adenopathy.   Neurological: She is alert and oriented to person, place, and time.   Skin: Skin is warm and dry. No rash noted.   Psychiatric: She has a normal mood and affect.         Health Maintenance       Date Due Completion Date    Shingles Vaccine (1 of 2) 07/27/2017 ---    Low Dose Statin 10/24/2019 (Originally 7/27/1986) ---    Influenza Vaccine 08/01/2019 10/12/2018    Lipid Panel 10/02/2019 10/2/2018    Eye Exam 10/23/2019 10/23/2018    Foot Exam 10/24/2019 10/24/2018    Urine Microalbumin 10/24/2019 10/24/2018    Hemoglobin A1c 11/30/2019 5/30/2019    Mammogram 10/24/2020 10/24/2018    TETANUS VACCINE 10/24/2028 10/24/2018            Assessment & Plan    Problem List Items Addressed This Visit     None      Visit Diagnoses     Pain, dental    -  Primary  Discussed with patient that pain is likely dental in nature.  Discussed differential diagnosis " including TMJ due to location of her pain.          Follow-up: Follow up if symptoms worsen or fail to improve.    Janet Guardado    Medication List with Changes/Refills   Current Medications    ALBUTEROL (PROVENTIL) 2.5 MG /3 ML (0.083 %) NEBULIZER SOLUTION    INHALE ONE VIAL VIA NEBULIZER EVERY SIX HOURS    ALBUTEROL 90 MCG/ACTUATION INHALER    Inhale 2 puffs into the lungs every 4 (four) hours as needed for Wheezing or Shortness of Breath. Rescue    CLONAZEPAM (KLONOPIN) 1 MG TABLET    Take 1 mg by mouth every evening.     CLOTRIMAZOLE-BETAMETHASONE 1-0.05% (LOTRISONE) CREAM    Apply topically 2 (two) times daily.    DICLOFENAC SODIUM (VOLTAREN) 1 % GEL    APPLY TWO GRAMS FOUR TIMES A DAY    DOXYCYCLINE (VIBRAMYCIN) 100 MG CAP    Take 1 capsule (100 mg total) by mouth every 12 (twelve) hours. for 10 days    ERGOCALCIFEROL (ERGOCALCIFEROL) 50,000 UNIT CAP    TAKE 1 CAPSULE (50,000 UNITS TOTAL) BY MOUTH EVERY 7 DAYS.    ESTRADIOL (ESTRACE) 0.01 % (0.1 MG/GRAM) VAGINAL CREAM    Use 0.5 g inside vagina and dime-sized amount around opening/inner lips nightly x 2 weeks, then 2x/week thereafter.    FERROUS GLUCONATE (FERGON) 324 MG TABLET    Take 1 tablet (324 mg total) by mouth daily with breakfast.    FLUTICASONE (FLONASE) 50 MCG/ACTUATION NASAL SPRAY    INSTILL TWO SPRAYS IN EACH NOSTRIL IN EACH NOSTRIL EVERY EVENING    GUAIFENESIN-CODEINE 100-10 MG/5 ML (TUSSI-ORGANIDIN NR)  MG/5 ML SYRUP    Take 5 mLs by mouth every 6 (six) hours as needed for Cough.    LORATADINE (CLARITIN) 10 MG TABLET    TAKE ONE TABLET BY MOUTH ONCE DAILY    MIRABEGRON (MYRBETRIQ) 50 MG TB24    Take 1 tablet (50 mg total) by mouth once daily.    MONTELUKAST (SINGULAIR) 10 MG TABLET    TAKE ONE TABLET BY MOUTH EVERY EVENING    MUPIROCIN (BACTROBAN) 2 % OINTMENT    Apply topically 3 (three) times daily.    NYSTATIN (MYCOSTATIN) POWDER    Apply topically 2 (two) times daily.    ONDANSETRON (ZOFRAN) 8 MG TABLET    Take 1 tablet (8 mg total)  by mouth every 8 (eight) hours as needed for Nausea.    PANTOPRAZOLE (PROTONIX) 40 MG TABLET    TAKE ONE TABLET BY MOUTH ONCE DAILY    SYMBICORT 160-4.5 MCG/ACTUATION HFAA    INHALE 2 PUFFS INTO THE LUNGS EVERY 12 (TWELVE) HOURS. CONTROLLER    TRAZODONE (DESYREL) 50 MG TABLET    Take 50 mg by mouth every evening.    VARENICLINE (CHANTIX) 1 MG TAB    Take 1 tablet (1 mg total) by mouth 2 (two) times daily.    VENLAFAXINE (EFFEXOR) 37.5 MG TAB    Take 1 tablet by mouth once daily.

## 2019-07-08 DIAGNOSIS — Z43.2 ATTENTION TO ILEOSTOMY: Primary | ICD-10-CM

## 2019-07-11 ENCOUNTER — OFFICE VISIT (OUTPATIENT)
Dept: PRIMARY CARE CLINIC | Facility: CLINIC | Age: 54
End: 2019-07-11
Payer: MEDICARE

## 2019-07-11 VITALS
WEIGHT: 279 LBS | TEMPERATURE: 99 F | HEART RATE: 76 BPM | BODY MASS INDEX: 47.63 KG/M2 | RESPIRATION RATE: 18 BRPM | OXYGEN SATURATION: 98 % | DIASTOLIC BLOOD PRESSURE: 69 MMHG | HEIGHT: 64 IN | SYSTOLIC BLOOD PRESSURE: 101 MMHG

## 2019-07-11 DIAGNOSIS — B35.4 TINEA CORPORIS: Primary | ICD-10-CM

## 2019-07-11 PROCEDURE — 99214 PR OFFICE/OUTPT VISIT, EST, LEVL IV, 30-39 MIN: ICD-10-PCS | Mod: S$GLB,,, | Performed by: FAMILY MEDICINE

## 2019-07-11 PROCEDURE — 3008F PR BODY MASS INDEX (BMI) DOCUMENTED: ICD-10-PCS | Mod: CPTII,S$GLB,, | Performed by: FAMILY MEDICINE

## 2019-07-11 PROCEDURE — 99214 OFFICE O/P EST MOD 30 MIN: CPT | Mod: S$GLB,,, | Performed by: FAMILY MEDICINE

## 2019-07-11 PROCEDURE — 99999 PR PBB SHADOW E&M-EST. PATIENT-LVL III: ICD-10-PCS | Mod: PBBFAC,,, | Performed by: FAMILY MEDICINE

## 2019-07-11 PROCEDURE — 99999 PR PBB SHADOW E&M-EST. PATIENT-LVL III: CPT | Mod: PBBFAC,,, | Performed by: FAMILY MEDICINE

## 2019-07-11 PROCEDURE — 3008F BODY MASS INDEX DOCD: CPT | Mod: CPTII,S$GLB,, | Performed by: FAMILY MEDICINE

## 2019-07-11 RX ORDER — GABAPENTIN 300 MG/1
300 CAPSULE ORAL DAILY
COMMUNITY
End: 2019-09-06 | Stop reason: ALTCHOICE

## 2019-07-11 RX ORDER — FLUCONAZOLE 100 MG/1
100 TABLET ORAL DAILY
Qty: 10 TABLET | Refills: 0 | Status: SHIPPED | OUTPATIENT
Start: 2019-07-11 | End: 2019-07-21

## 2019-07-11 NOTE — PROGRESS NOTES
"Subjective:       Patient ID: Ida Orozco is a 53 y.o. female.    Chief Complaint: Ring Worm    Rash   This is a recurrent problem. The current episode started more than 1 month ago. The problem has been gradually worsening since onset. The affected locations include the left upper leg and abdomentorso. The rash is characterized by redness. She was exposed to nothing. Associated symptoms include coughing, diarrhea, a fever, rhinorrhea and a sore throat. Pertinent negatives include no anorexia, congestion, eye pain, facial edema, fatigue, joint pain, nail changes, shortness of breath or vomiting. Past treatments include antibiotic cream and topical steroids (Topical antifungal). The treatment provided no relief. Her past medical history is significant for allergies, asthma and varicella. There is no history of eczema.     Review of Systems   Constitutional: Positive for fever. Negative for fatigue.   HENT: Positive for rhinorrhea and sore throat. Negative for congestion.    Eyes: Negative for pain.   Respiratory: Positive for cough. Negative for shortness of breath.    Gastrointestinal: Positive for diarrhea. Negative for anorexia and vomiting.   Musculoskeletal: Negative for joint pain.   Skin: Positive for rash. Negative for nail changes.       Objective:      Vitals:    07/11/19 1143   BP: 101/69   BP Location: Left arm   Patient Position: Sitting   BP Method: Large (Automatic)   Pulse: 76   Resp: 18   Temp: 99.3 °F (37.4 °C)   TempSrc: Oral   SpO2: 98%   Weight: 126.6 kg (279 lb)   Height: 5' 4" (1.626 m)     Physical Exam   Constitutional: She is oriented to person, place, and time. She appears well-developed and well-nourished.   HENT:   Head: Normocephalic and atraumatic.   Cardiovascular: Normal rate, regular rhythm and normal heart sounds.   Pulmonary/Chest: Effort normal and breath sounds normal.   Musculoskeletal: She exhibits no edema.   Neurological: She is alert and oriented to person, place, and " time.   Skin: Skin is warm and dry. Rash (Tinea corporis to lower abdominal wall and left posterior thigh) noted.   Nursing note and vitals reviewed.      Assessment:       1. Tinea corporis        Plan:       Tinea corporis  -     fluconazole (DIFLUCAN) 100 MG tablet; Take 1 tablet (100 mg total) by mouth once daily. for 10 days  Dispense: 10 tablet; Refill: 0  To Derm if no improvement    Medication List with Changes/Refills   New Medications    FLUCONAZOLE (DIFLUCAN) 100 MG TABLET    Take 1 tablet (100 mg total) by mouth once daily. for 10 days   Current Medications    ALBUTEROL (PROVENTIL) 2.5 MG /3 ML (0.083 %) NEBULIZER SOLUTION    INHALE ONE VIAL VIA NEBULIZER EVERY SIX HOURS    ALBUTEROL 90 MCG/ACTUATION INHALER    Inhale 2 puffs into the lungs every 4 (four) hours as needed for Wheezing or Shortness of Breath. Rescue    CLONAZEPAM (KLONOPIN) 1 MG TABLET    Take 2 mg by mouth once daily.     CLOTRIMAZOLE-BETAMETHASONE 1-0.05% (LOTRISONE) CREAM    Apply topically 2 (two) times daily.    DICLOFENAC SODIUM (VOLTAREN) 1 % GEL    APPLY TWO GRAMS FOUR TIMES A DAY    ERGOCALCIFEROL (ERGOCALCIFEROL) 50,000 UNIT CAP    TAKE 1 CAPSULE (50,000 UNITS TOTAL) BY MOUTH EVERY 7 DAYS.    ESTRADIOL (ESTRACE) 0.01 % (0.1 MG/GRAM) VAGINAL CREAM    Use 0.5 g inside vagina and dime-sized amount around opening/inner lips nightly x 2 weeks, then 2x/week thereafter.    FERROUS GLUCONATE (FERGON) 324 MG TABLET    Take 1 tablet (324 mg total) by mouth daily with breakfast.    FLUTICASONE (FLONASE) 50 MCG/ACTUATION NASAL SPRAY    INSTILL TWO SPRAYS IN EACH NOSTRIL IN EACH NOSTRIL EVERY EVENING    GABAPENTIN (NEURONTIN) 300 MG CAPSULE    Take 300 mg by mouth once daily.    LORATADINE (CLARITIN) 10 MG TABLET    TAKE ONE TABLET BY MOUTH ONCE DAILY    MIRABEGRON (MYRBETRIQ) 50 MG TB24    Take 1 tablet (50 mg total) by mouth once daily.    MONTELUKAST (SINGULAIR) 10 MG TABLET    TAKE ONE TABLET BY MOUTH EVERY EVENING    MUPIROCIN  (BACTROBAN) 2 % OINTMENT    Apply topically 3 (three) times daily.    NYSTATIN (MYCOSTATIN) POWDER    Apply topically 2 (two) times daily.    ONDANSETRON (ZOFRAN) 8 MG TABLET    Take 1 tablet (8 mg total) by mouth every 8 (eight) hours as needed for Nausea.    PANTOPRAZOLE (PROTONIX) 40 MG TABLET    TAKE ONE TABLET BY MOUTH ONCE DAILY    SYMBICORT 160-4.5 MCG/ACTUATION HFAA    INHALE 2 PUFFS INTO THE LUNGS EVERY 12 (TWELVE) HOURS. CONTROLLER    TRAZODONE (DESYREL) 50 MG TABLET    Take 50 mg by mouth every evening.    VARENICLINE (CHANTIX) 1 MG TAB    Take 1 tablet (1 mg total) by mouth 2 (two) times daily.    VENLAFAXINE (EFFEXOR) 75 MG TABLET    Take 1 tablet by mouth once daily.    Discontinued Medications    GUAIFENESIN-CODEINE 100-10 MG/5 ML (TUSSI-ORGANIDIN NR)  MG/5 ML SYRUP    Take 5 mLs by mouth every 6 (six) hours as needed for Cough.

## 2019-07-15 ENCOUNTER — PATIENT MESSAGE (OUTPATIENT)
Dept: UROGYNECOLOGY | Facility: CLINIC | Age: 54
End: 2019-07-15

## 2019-07-17 ENCOUNTER — PATIENT MESSAGE (OUTPATIENT)
Dept: PRIMARY CARE CLINIC | Facility: CLINIC | Age: 54
End: 2019-07-17

## 2019-07-17 DIAGNOSIS — B37.9 CANDIDIASIS: ICD-10-CM

## 2019-07-17 RX ORDER — CODEINE PHOSPHATE AND GUAIFENESIN 10; 100 MG/5ML; MG/5ML
5 SOLUTION ORAL EVERY 6 HOURS PRN
Qty: 120 ML | Refills: 0 | Status: SHIPPED | OUTPATIENT
Start: 2019-07-17 | End: 2019-07-27

## 2019-07-17 RX ORDER — NYSTATIN 100000 [USP'U]/G
POWDER TOPICAL 2 TIMES DAILY
Qty: 1 BOTTLE | Refills: 6 | Status: SHIPPED | OUTPATIENT
Start: 2019-07-17 | End: 2021-10-05 | Stop reason: SDUPTHER

## 2019-07-19 ENCOUNTER — TELEPHONE (OUTPATIENT)
Dept: UROGYNECOLOGY | Facility: CLINIC | Age: 54
End: 2019-07-19

## 2019-07-19 NOTE — TELEPHONE ENCOUNTER
----- Message from Desirae Melendez MD sent at 7/19/2019  4:45 AM CDT -----  Regarding: please let patient know  Please let patient know I 'm looking at other meds she could take--she has had trouble emptying her bladder in the past; so, I need to look at what else we could try that won't affect that.  Be back in touch soon. Thanks!

## 2019-07-19 NOTE — TELEPHONE ENCOUNTER
"Spoke to pt, she was informed per Dr. Melendez "I 'm looking at other meds she could take--she has had trouble emptying her bladder in the past; so, I need to look at what else we could try that won't affect that.  Be back in touch soon." pt aware and verbalizes understanding.  "

## 2019-07-21 DIAGNOSIS — N32.89 BLADDER SPASM: Primary | ICD-10-CM

## 2019-07-21 DIAGNOSIS — N39.46 URINARY INCONTINENCE, MIXED: ICD-10-CM

## 2019-07-21 RX ORDER — FLAVOXATE HYDROCHLORIDE 100 MG/1
100 TABLET ORAL 3 TIMES DAILY PRN
Qty: 30 TABLET | Refills: 11 | Status: SHIPPED | OUTPATIENT
Start: 2019-07-21 | End: 2019-10-28

## 2019-07-22 ENCOUNTER — TELEPHONE (OUTPATIENT)
Dept: UROGYNECOLOGY | Facility: CLINIC | Age: 54
End: 2019-07-22

## 2019-07-22 NOTE — TELEPHONE ENCOUNTER
----- Message from Desirae Melendez MD sent at 7/21/2019  4:46 PM CDT -----  Regarding: please help patient make follow up appt  Please help patient make follow up appt end of Aug or Sept with Sharona or myself.  Thanks!

## 2019-08-06 ENCOUNTER — PATIENT MESSAGE (OUTPATIENT)
Dept: WOUND CARE | Facility: CLINIC | Age: 54
End: 2019-08-06

## 2019-08-12 ENCOUNTER — PATIENT MESSAGE (OUTPATIENT)
Dept: PLASTIC SURGERY | Facility: CLINIC | Age: 54
End: 2019-08-12

## 2019-08-12 ENCOUNTER — OFFICE VISIT (OUTPATIENT)
Dept: WOUND CARE | Facility: CLINIC | Age: 54
End: 2019-08-12
Payer: MEDICARE

## 2019-08-12 ENCOUNTER — OFFICE VISIT (OUTPATIENT)
Dept: PLASTIC SURGERY | Facility: CLINIC | Age: 54
End: 2019-08-12
Payer: MEDICARE

## 2019-08-12 VITALS
SYSTOLIC BLOOD PRESSURE: 109 MMHG | HEART RATE: 60 BPM | HEIGHT: 64 IN | RESPIRATION RATE: 16 BRPM | DIASTOLIC BLOOD PRESSURE: 69 MMHG | WEIGHT: 284.19 LBS | BODY MASS INDEX: 48.52 KG/M2

## 2019-08-12 DIAGNOSIS — E55.9 VITAMIN D DEFICIENCY: ICD-10-CM

## 2019-08-12 DIAGNOSIS — B37.2 SKIN YEAST INFECTION: ICD-10-CM

## 2019-08-12 DIAGNOSIS — N39.0 RECURRENT UTI: ICD-10-CM

## 2019-08-12 DIAGNOSIS — E65 ABDOMINAL PANNUS: Primary | ICD-10-CM

## 2019-08-12 DIAGNOSIS — M79.3 PANNICULITIS: ICD-10-CM

## 2019-08-12 DIAGNOSIS — Z98.84 S/P GASTRIC BYPASS: ICD-10-CM

## 2019-08-12 DIAGNOSIS — Z93.9 HISTORY OF CREATION OF OSTOMY: ICD-10-CM

## 2019-08-12 DIAGNOSIS — B37.9 CANDIDIASIS: ICD-10-CM

## 2019-08-12 DIAGNOSIS — Z43.2 ATTENTION TO ILEOSTOMY: Primary | ICD-10-CM

## 2019-08-12 PROCEDURE — 99205 PR OFFICE/OUTPT VISIT, NEW, LEVL V, 60-74 MIN: ICD-10-PCS | Mod: S$GLB,,, | Performed by: SURGERY

## 2019-08-12 PROCEDURE — 99999 PR PBB SHADOW E&M-EST. PATIENT-LVL III: CPT | Mod: PBBFAC,,, | Performed by: SURGERY

## 2019-08-12 PROCEDURE — 99213 OFFICE O/P EST LOW 20 MIN: CPT | Mod: S$GLB,,, | Performed by: CLINICAL NURSE SPECIALIST

## 2019-08-12 PROCEDURE — 3008F BODY MASS INDEX DOCD: CPT | Mod: CPTII,S$GLB,, | Performed by: SURGERY

## 2019-08-12 PROCEDURE — 99999 PR PBB SHADOW E&M-EST. PATIENT-LVL III: ICD-10-PCS | Mod: PBBFAC,,, | Performed by: SURGERY

## 2019-08-12 PROCEDURE — 99205 OFFICE O/P NEW HI 60 MIN: CPT | Mod: S$GLB,,, | Performed by: SURGERY

## 2019-08-12 PROCEDURE — 99213 PR OFFICE/OUTPT VISIT, EST, LEVL III, 20-29 MIN: ICD-10-PCS | Mod: S$GLB,,, | Performed by: CLINICAL NURSE SPECIALIST

## 2019-08-12 PROCEDURE — 3008F PR BODY MASS INDEX (BMI) DOCUMENTED: ICD-10-PCS | Mod: CPTII,S$GLB,, | Performed by: SURGERY

## 2019-08-12 PROCEDURE — 99999 PR PBB SHADOW E&M-EST. PATIENT-LVL II: ICD-10-PCS | Mod: PBBFAC,,, | Performed by: CLINICAL NURSE SPECIALIST

## 2019-08-12 PROCEDURE — 99999 PR PBB SHADOW E&M-EST. PATIENT-LVL II: CPT | Mod: PBBFAC,,, | Performed by: CLINICAL NURSE SPECIALIST

## 2019-08-12 RX ORDER — ERGOCALCIFEROL 1.25 MG/1
50000 CAPSULE ORAL
Qty: 12 CAPSULE | Refills: 3 | Status: SHIPPED | OUTPATIENT
Start: 2019-08-12 | End: 2020-07-13

## 2019-08-12 NOTE — PROGRESS NOTES
CC: symptomatic pannus     HPI:   Ida Orozco is a 54 y.o. female with a symptomatic abdominal pannus. Due to the pannus, the patient continues to have problems with rashes, yeast infections, and skin breakdown beneath the pannus, groin area,which have been treated with Diflucan, Nystatin powder and cream. She has to constantly use wipes to clean the area through out the day to keep to odor under control. She places paper towels and dry wicking material along her pannus to help keep the area dry. She also complains of lower back pain due to the weight of the pannus. Patient states this problem began in  following her gastric sleeve surgery. History of chronic ulcerative colitis status post completion proctectomy. Ostomy in place at umbilicus.       Med/Surg/Accidents:                See ROS                                                   CV: no congenital abn                                                     Pulm: no asthma, no chronic diseases      FH:   History of cancer:  Negative  Bleeding disorders:                         none  MH/anesthetic problems:                 none           Sickle Cell:                                      none  Allergy/Asthma:                              None      SocHx:   - Work: disability   - Tobacco Use/Exposure: Negative                                                     Past Medical History:   Diagnosis Date    Anxiety     Asthma     Crohn's colitis     Fatty liver disease, nonalcoholic     GERD (gastroesophageal reflux disease)     History of sleeve gastrectomy 2016       Past Surgical History:   Procedure Laterality Date    APPENDECTOMY      ARTHROSCOPY-KNEE W/ CHONDROPLASTY Right 2016    [facility]     SECTION, LOW TRANSVERSE      CHOLECYSTECTOMY      open    COLECTOMY      total- 2013    CYSTOSCOPY, WITH RETROGRADE PYELOGRAM N/A 2018    [facility]     ENTEROSCOPY N/A 8/4/2014    Performed by Tavo Latham MD at [facility] ENDO (2ND FLR)    ESOPHAGOGASTRODUODENOSCOPY (EGD) N/A 4/11/2018    Performed by Chris Castorena MD at [facility] ENDO (4TH FLR)    ESOPHAGOGASTRODUODENOSCOPY (EGD) N/A 5/29/2015    Performed by Domingo Hunter MD at [facility] ENDO    ESOPHAGOGASTRODUODENOSCOPY (EGD) N/A 12/16/2014    Performed by Domingo Hunter MD at [facility] ENDO    EXCISION-PLICA Right 8/25/2016    Performed by Jonathan Milligan MD at [facility] OR    GASTRIC SLEEVE       HYSTERECTOMY      ILEOSCOPY N/A 4/11/2018    Performed by Chris Castorena MD at [facility] ENDO (4TH FLR)    ILEOSTOMY REVISION      april 2014; august 2014    LYSIS, ADHESIONS/ more than 2hours  5/24/2018    Performed by JENNY Virgen MD at [facility] OR 2ND FLR    LYSIS-ADHESION N/A 8/15/2014    Performed by JENNY Virgen MD at [facility] OR 2ND FLR    PROCTECTOMY-LAPAROSCOPIC/CONVERTED TO OPEN N/A 5/24/2018    Performed by JENNY Virgen MD at [facility] OR 2ND FLR    REVISION-ILEOSTOMY N/A 8/15/2014    Performed by JENNY Virgen MD at [facility] OR 2ND FLR    SIGMOIDOSCOPY, FLEXIBLE  5/24/2018    Performed by JENNY Virgen MD at [facility] OR 2ND FLR    SIGMOIDOSCOPY-FLEXIBLE N/A 4/11/2018    Performed by Chris Castorena MD at [facility] ENDO (4TH FLR)    SIGMOIDOSCOPY-FLEXIBLE N/A 8/12/2014    Performed by JENNY Virgen MD at [facility] ENDO (4TH FLR)    thumb surgery      TONSILLECTOMY, ADENOIDECTOMY      WISDOM TOOTH EXTRACTION           Current Outpatient Medications:     albuterol (PROVENTIL) 2.5 mg /3 mL (0.083 %) nebulizer solution, INHALE ONE VIAL VIA NEBULIZER EVERY SIX HOURS, Disp: 150 mL, Rfl: 3    albuterol 90 mcg/actuation inhaler, Inhale 2 puffs into the lungs every 4 (four) hours as needed for Wheezing or Shortness of Breath. Rescue, Disp: 1 Inhaler, Rfl: 5    clonazePAM (KLONOPIN) 1 MG tablet, Take 2 mg by mouth once daily. , Disp: , Rfl:     clotrimazole-betamethasone 1-0.05% (LOTRISONE) cream, Apply  topically 2 (two) times daily., Disp: 15 g, Rfl: 1    diclofenac sodium (VOLTAREN) 1 % Gel, APPLY TWO GRAMS FOUR TIMES A DAY, Disp: 100 g, Rfl: 0    ergocalciferol (ERGOCALCIFEROL) 50,000 unit Cap, TAKE 1 CAPSULE (50,000 UNITS TOTAL) BY MOUTH EVERY 7 DAYS., Disp: 12 capsule, Rfl: 3    estradiol (ESTRACE) 0.01 % (0.1 mg/gram) vaginal cream, Use 0.5 g inside vagina and dime-sized amount around opening/inner lips nightly x 2 weeks, then 2x/week thereafter., Disp: 42.5 g, Rfl: 11    ferrous gluconate (FERGON) 324 MG tablet, Take 1 tablet (324 mg total) by mouth daily with breakfast., Disp: 90 tablet, Rfl: 1    flavoxATE (URISPAS) 100 mg Tab, Take 1 tablet (100 mg total) by mouth 3 (three) times daily as needed (bladder discomfort)., Disp: 30 tablet, Rfl: 11    fluticasone (FLONASE) 50 mcg/actuation nasal spray, INSTILL TWO SPRAYS IN EACH NOSTRIL IN EACH NOSTRIL EVERY EVENING, Disp: 16 g, Rfl: 5    gabapentin (NEURONTIN) 300 MG capsule, Take 300 mg by mouth once daily., Disp: , Rfl:     loratadine (CLARITIN) 10 mg tablet, TAKE ONE TABLET BY MOUTH ONCE DAILY, Disp: 90 tablet, Rfl: 3    methen-m.blue-s.phos-phsal-hyo (URIBEL) 118-10-40.8-36 mg Cap, Take 1 capsule by mouth 4 (four) times daily as needed., Disp: 20 capsule, Rfl: 11    mirabegron (MYRBETRIQ) 50 mg Tb24, Take 1 tablet (50 mg total) by mouth once daily., Disp: 30 tablet, Rfl: 11    montelukast (SINGULAIR) 10 mg tablet, TAKE ONE TABLET BY MOUTH EVERY EVENING, Disp: 90 tablet, Rfl: 1    mupirocin (BACTROBAN) 2 % ointment, Apply topically 3 (three) times daily., Disp: 15 g, Rfl: 1    nystatin (MYCOSTATIN) powder, Apply topically 2 (two) times daily., Disp: 1 Bottle, Rfl: 6    ondansetron (ZOFRAN) 8 MG tablet, Take 1 tablet (8 mg total) by mouth every 8 (eight) hours as needed for Nausea., Disp: 6 tablet, Rfl: 0    pantoprazole (PROTONIX) 40 MG tablet, TAKE ONE TABLET BY MOUTH ONCE DAILY, Disp: 90 tablet, Rfl: 3    SYMBICORT 160-4.5 mcg/actuation  HFAA, INHALE 2 PUFFS INTO THE LUNGS EVERY 12 (TWELVE) HOURS. CONTROLLER (Patient taking differently: Inhale 2 puffs into the lungs every 12 (twelve) hours as needed. Controller), Disp: 11 g, Rfl: 1    trazodone (DESYREL) 50 MG tablet, Take 50 mg by mouth every evening., Disp: , Rfl:     varenicline (CHANTIX) 1 mg Tab, Take 1 tablet (1 mg total) by mouth 2 (two) times daily., Disp: 60 tablet, Rfl: 4    venlafaxine (EFFEXOR) 75 MG tablet, Take 1 tablet by mouth once daily. , Disp: , Rfl:     Review of patient's allergies indicates:   Allergen Reactions    Adhesive      Cause blisters    Dilaudid [hydromorphone] Nausea And Vomiting    Sulfa (sulfonamide antibiotics) Hives       Family History   Problem Relation Age of Onset    Cancer Mother         skin    Heart disease Father 67    Cancer Paternal Grandfather         ?    Cancer Maternal Grandfather         colon            ROS  Review of Systems   Constitutional: Negative.  Negative for activity change, appetite change and unexpected weight change.   HENT: Negative.  Negative for congestion and facial swelling.    Eyes: Negative.  Negative for discharge, redness and itching.   Respiratory: Negative.  Negative for apnea, shortness of breath and wheezing.         No asthma or chronic disease    Cardiovascular: Negative.  Negative for chest pain.        Denies congenital abnormalities    Musculoskeletal: Negative.  Negative for arthralgias, gait problem, joint swelling and myalgias.   Skin: Positive for wound. Negative for color change and pallor.        Skin break down along pannus: began in 2016 following gastric bypass   Allergic/Immunologic: Negative.    Neurological: Negative for dizziness and facial asymmetry.   Psychiatric/Behavioral: Negative.          PE    Physical Exam   Constitutional: She is oriented to person, place, and time. She appears well-developed and well-nourished.   HENT:   Head: Normocephalic and atraumatic. Head is without abrasion,  without contusion and without laceration.   Right Ear: External ear normal.   Left Ear: External ear normal.   Nose: Nose normal.   Mouth/Throat: Oropharynx is clear and moist.   Eyes: Pupils are equal, round, and reactive to light. Conjunctivae, EOM and lids are normal.   Neck: Trachea normal, full passive range of motion without pain and phonation normal. Neck supple.   Cardiovascular: Normal rate, regular rhythm and normal heart sounds.   Pulmonary/Chest: Effort normal and breath sounds normal. No stridor. No respiratory distress.   Abdominal:       Musculoskeletal: Normal range of motion.   Neurological: She is alert and oriented to person, place, and time.   Skin: Skin is warm, dry and intact. Capillary refill takes less than 2 seconds. No abrasion, no bruising, no burn, no ecchymosis, no laceration, no lesion and no rash noted. No erythema.        Psychiatric: She has a normal mood and affect. Her speech is normal and behavior is normal. Judgment and thought content normal. Cognition and memory are normal.   Nursing note and vitals reviewed.       Imaging Studies      Assessment:  Assessment   1. Abdominal pannus     2. Panniculitis     3. Recurrent UTI     4. Skin yeast infection     5. S/P gastric bypass     6. History of creation of ostomy               Plan  Plan   - Panniculectomy discussed in detail, will submit to insurance for approval  - Medical photography  - Continue medication management for yeast infection  - Continue local skin care regimen   - Continue ostomy care  - She insists that she does not use tobacco      Reviewed her situation with Dr. Virgen in colorectal surgery.  Her ileostomy can be taken down and repositioned in order to undermine the upper skin fold.  The stoma can then be transposed through the remaining skin.  I would otherwise favor not undermining her.  I explained to her the possibility of umbilical necrosis, or umbilical malposition (transecting the umbilicus and letting it  float rather than transposing it) and she said that is fine.      Risks benefits and alternatives otherwise discussed.  Bleeding infection hematoma seroma, delayed healing wound dehiscence, persistent contour, fat necrosis along the incision line, aesthetic deformity.  I explained that liposuction will not be performed and that she will not have abdominal wall plication.  All of her questions were answered.     Will proceed with insurance verification and coordinate with Dr. Virgen staff.      60 minutes was spend with patient, more than 50% was spent explaining the nature of the procedure, the location of incisions, the post operative care and recovery, or answering questions, coordinating care related to insurance approval and scheduling.        Plastic & Reconstructive Surgery  [facility]  c/o Andrea Alvarado M.D.  [facility]  [address]  [address] [place] [address]    Work [phone]  Toll free [phone]  If no answer [phone]

## 2019-08-12 NOTE — LETTER
[facility]  [address]  [facility] [address]  Phone: [phone]  Fax: [phone] August 16, 2019      Kvng Cornejo MD  [address] [place] [id number]    Patient: Ida Orozco   MR Number: [id number]   YOB: 1965   Date of Visit: 8/12/2019     Dear Dr. Kvng Cornejo:    Thank you for referring Ida Orozco to me for evaluation. Below you will find relevant portions of my assessment and plan of care.    Ida Orozco is a 54 y.o. female with a symptomatic abdominal pannus. Due to the pannus, the patient continues to have problems with rashes, yeast infections, and skin breakdown beneath the pannus, groin area,which have been treated with Diflucan, Nystatin powder and cream. She has to constantly use wipes to clean the area through out the day to keep to odor under control. She places paper towels and dry wicking material along her pannus to help keep the area dry. She also complains of lower back pain due to the weight of the pannus. Patient states this problem began in 2016 following her gastric sleeve surgery. History of chronic ulcerative colitis status post completion proctectomy. Ostomy in place at umbilicus.     Assessment:  1. Abdominal pannus      2. Panniculitis      3. Recurrent UTI      4. Skin yeast infection      5. S/P gastric bypass      6. History of creation of ostomy         Plan:  - Panniculectomy discussed in detail, will submit to insurance for approval  - Medical photography  - Continue medication management for yeast infection  - Continue local skin care regimen   - Continue ostomy care  - She insists that she does not use tobacco       Reviewed her situation with Dr. Virgen in colorectal surgery.  Her ileostomy can be taken down and repositioned in order to undermine the upper skin fold.  The stoma can then be transposed through the remaining skin.  I would otherwise favor not undermining her.  I explained to her the  possibility of umbilical necrosis, or umbilical malposition (transecting the umbilicus and letting it float rather than transposing it) and she said that is fine.      Risks benefits and alternatives otherwise discussed.  Bleeding infection hematoma seroma, delayed healing wound dehiscence, persistent contour, fat necrosis along the incision line, aesthetic deformity.  I explained that liposuction will not be performed and that she will not have abdominal wall plication.  All of her questions were answered.      Will proceed with insurance verification and coordinate with Dr. Virgen staff.       60 minutes was spend with patient, more than 50% was spent explaining the nature of the procedure, the location of incisions, the post operative care and recovery, or answering questions, coordinating care related to insurance approval and scheduling.      If you have questions, please do not hesitate to call me. I look forward to following Ida Orozco along with you.    Sincerely,    Andrea Alvarado MD  Section of Plastic & Reconstructive Surgery  Department of Surgery  [facility]    KMK/hcr    CC:  Eliecer Jones MD

## 2019-08-13 PROBLEM — M62.89 PELVIC FLOOR DYSFUNCTION: Status: RESOLVED | Noted: 2019-02-25 | Resolved: 2019-08-13

## 2019-08-13 PROBLEM — R53.1 WEAKNESS: Status: RESOLVED | Noted: 2019-02-25 | Resolved: 2019-08-13

## 2019-08-13 PROBLEM — N39.42 URINARY INCONTINENCE WITHOUT SENSORY AWARENESS: Status: RESOLVED | Noted: 2019-02-25 | Resolved: 2019-08-13

## 2019-08-13 NOTE — PROGRESS NOTES
Subjective:       Patient ID: Ida Orozco is a 54 y.o. female.    Chief Complaint: Ileostomy    This pt is known to me and has had ileo for many years, she had completion proctectomy last year, she has lost many lbs and this has changed her abdominal contours, and causing some pouching adhesion issues ventura of late, she is seeking plastics to help with removal of her excess skin, she comes today for help with ileo     Review of Systems   Constitutional: Negative for activity change, appetite change and fever.   HENT: Negative for congestion and rhinorrhea.    Eyes: Negative for visual disturbance.   Respiratory: Negative for cough and shortness of breath.    Cardiovascular: Negative for chest pain and leg swelling.   Gastrointestinal: Negative for abdominal distention, nausea and vomiting.   Genitourinary: Negative for difficulty urinating, dysuria and hematuria.   Musculoskeletal: Negative.    Skin: Positive for rash. Negative for wound.   Neurological: Negative.  Negative for weakness.   Psychiatric/Behavioral: Negative.        Objective:      Physical Exam   Constitutional: She appears well-developed and well-nourished.   Pulmonary/Chest: Effort normal and breath sounds normal. No respiratory distress.   Abdominal: Soft. Bowel sounds are normal. She exhibits no distension.   Excess skin that hangs.  Panus with intertrigo    Musculoskeletal: Normal range of motion. She exhibits no edema.   Skin: Rash noted.   Psychiatric: She has a normal mood and affect.       8/13  Lying flat and skin is stretched .     Assessment:       1. Attention to ileostomy    2. Candidiasis        Plan:       Slight revisions to pouching, suggested alternate pouch ( flip) to see if this works ok   Awaiting approval for surgery   Fu as needed   I have reviewed the plan of care with the patient and she express understanding. I spent over 50% of this 15 minute visit in face to face counseling.

## 2019-08-20 ENCOUNTER — OFFICE VISIT (OUTPATIENT)
Dept: PRIMARY CARE CLINIC | Facility: CLINIC | Age: 54
End: 2019-08-20
Payer: MEDICARE

## 2019-08-20 ENCOUNTER — PATIENT MESSAGE (OUTPATIENT)
Dept: PRIMARY CARE CLINIC | Facility: CLINIC | Age: 54
End: 2019-08-20

## 2019-08-20 VITALS
HEIGHT: 64 IN | BODY MASS INDEX: 48.14 KG/M2 | SYSTOLIC BLOOD PRESSURE: 102 MMHG | HEART RATE: 67 BPM | DIASTOLIC BLOOD PRESSURE: 69 MMHG | RESPIRATION RATE: 18 BRPM | OXYGEN SATURATION: 94 % | WEIGHT: 282 LBS | TEMPERATURE: 99 F

## 2019-08-20 DIAGNOSIS — J20.9 ACUTE BRONCHITIS DUE TO INFECTION: Primary | ICD-10-CM

## 2019-08-20 PROCEDURE — 3008F PR BODY MASS INDEX (BMI) DOCUMENTED: ICD-10-PCS | Mod: CPTII,S$GLB,, | Performed by: FAMILY MEDICINE

## 2019-08-20 PROCEDURE — 99999 PR PBB SHADOW E&M-EST. PATIENT-LVL III: CPT | Mod: PBBFAC,,, | Performed by: FAMILY MEDICINE

## 2019-08-20 PROCEDURE — 3008F BODY MASS INDEX DOCD: CPT | Mod: CPTII,S$GLB,, | Performed by: FAMILY MEDICINE

## 2019-08-20 PROCEDURE — 99214 PR OFFICE/OUTPT VISIT, EST, LEVL IV, 30-39 MIN: ICD-10-PCS | Mod: 25,S$GLB,, | Performed by: FAMILY MEDICINE

## 2019-08-20 PROCEDURE — 96372 THER/PROPH/DIAG INJ SC/IM: CPT | Mod: S$GLB,,, | Performed by: FAMILY MEDICINE

## 2019-08-20 PROCEDURE — 96372 PR INJECTION,THERAP/PROPH/DIAG2ST, IM OR SUBCUT: ICD-10-PCS | Mod: S$GLB,,, | Performed by: FAMILY MEDICINE

## 2019-08-20 PROCEDURE — 99214 OFFICE O/P EST MOD 30 MIN: CPT | Mod: 25,S$GLB,, | Performed by: FAMILY MEDICINE

## 2019-08-20 PROCEDURE — 99999 PR PBB SHADOW E&M-EST. PATIENT-LVL III: ICD-10-PCS | Mod: PBBFAC,,, | Performed by: FAMILY MEDICINE

## 2019-08-20 RX ORDER — DOXYCYCLINE 100 MG/1
100 CAPSULE ORAL EVERY 12 HOURS
Qty: 20 CAPSULE | Refills: 0 | Status: SHIPPED | OUTPATIENT
Start: 2019-08-20 | End: 2019-08-30

## 2019-08-20 RX ORDER — METHYLPREDNISOLONE 4 MG/1
TABLET ORAL
Qty: 1 PACKAGE | Refills: 0 | Status: SHIPPED | OUTPATIENT
Start: 2019-08-20 | End: 2019-09-06 | Stop reason: ALTCHOICE

## 2019-08-20 RX ORDER — TRIAMCINOLONE ACETONIDE 40 MG/ML
80 INJECTION, SUSPENSION INTRA-ARTICULAR; INTRAMUSCULAR
Status: COMPLETED | OUTPATIENT
Start: 2019-08-20 | End: 2019-08-20

## 2019-08-20 RX ADMIN — TRIAMCINOLONE ACETONIDE 80 MG: 40 INJECTION, SUSPENSION INTRA-ARTICULAR; INTRAMUSCULAR at 04:08

## 2019-08-20 NOTE — PROGRESS NOTES
Verified pt ID using name and . Verified allergies. Administered 80 mg kenalog in right VG per physician order using aseptic technique. Aspirated and no blood return noted. Pt tolerated well with no adverse reactions noted.

## 2019-08-20 NOTE — PROGRESS NOTES
"Subjective:       Patient ID: Ida Orozco is a 54 y.o. female.    Chief Complaint: Otalgia; Cough; and Nasal Congestion    Cough   This is a new problem. The current episode started in the past 7 days. The problem has been unchanged. The problem occurs every few minutes. The cough is productive of purulent sputum. Associated symptoms include chills, ear congestion, ear pain, a fever (to 103 x 3 days), headaches, nasal congestion, postnasal drip, a sore throat, shortness of breath and wheezing. Pertinent negatives include no chest pain or hemoptysis. She has tried a beta-agonist inhaler and prescription cough suppressant for the symptoms. The treatment provided mild relief. Her past medical history is significant for asthma, bronchitis and pneumonia.     Review of Systems   Constitutional: Positive for chills and fever (to 103 x 3 days).   HENT: Positive for ear pain, postnasal drip and sore throat.    Respiratory: Positive for cough, shortness of breath and wheezing. Negative for hemoptysis.    Cardiovascular: Negative for chest pain.   Neurological: Positive for headaches.       Objective:      Vitals:    08/20/19 1420   BP: 102/69   BP Location: Right arm   Patient Position: Sitting   BP Method: Large (Automatic)   Pulse: 67   Resp: 18   Temp: 99.3 °F (37.4 °C)   TempSrc: Oral   SpO2: (!) 94%   Weight: 127.9 kg (282 lb)   Height: 5' 4" (1.626 m)     Physical Exam   Constitutional: She is oriented to person, place, and time. She appears well-developed and well-nourished.   HENT:   Head: Normocephalic and atraumatic.   Right Ear: Ear canal normal. A middle ear effusion is present.   Left Ear: Ear canal normal. A middle ear effusion is present.   Mouth/Throat: Oropharynx is clear and moist.   Neck: Neck supple.   Cardiovascular: Normal rate, regular rhythm and normal heart sounds.   Pulmonary/Chest: Effort normal. No respiratory distress. She has wheezes (diffuse). She has rhonchi in the right lower field and the " left lower field. She has no rales.   Musculoskeletal: She exhibits no edema.   Neurological: She is alert and oriented to person, place, and time.   Skin: Skin is warm and dry.   Psychiatric: She has a normal mood and affect. Her behavior is normal.   Nursing note and vitals reviewed.      Assessment:       1. Acute bronchitis due to infection        Plan:       Acute bronchitis due to infection  -     X-Ray Chest PA And Lateral; Future; Expected date: 08/20/2019  -     triamcinolone acetonide injection 80 mg  -     methylPREDNISolone (MEDROL DOSEPACK) 4 mg tablet; use as directed  Dispense: 1 Package; Refill: 0  -     doxycycline (VIBRAMYCIN) 100 MG Cap; Take 1 capsule (100 mg total) by mouth every 12 (twelve) hours. for 10 days  Dispense: 20 capsule; Refill: 0  Chest x-ray reviewed, no infiltrate noted, has peribronchial cuffing consistent with bronchitis    Medication List with Changes/Refills   New Medications    DOXYCYCLINE (VIBRAMYCIN) 100 MG CAP    Take 1 capsule (100 mg total) by mouth every 12 (twelve) hours. for 10 days    METHYLPREDNISOLONE (MEDROL DOSEPACK) 4 MG TABLET    use as directed   Current Medications    ALBUTEROL (PROVENTIL) 2.5 MG /3 ML (0.083 %) NEBULIZER SOLUTION    INHALE ONE VIAL VIA NEBULIZER EVERY SIX HOURS    ALBUTEROL 90 MCG/ACTUATION INHALER    Inhale 2 puffs into the lungs every 4 (four) hours as needed for Wheezing or Shortness of Breath. Rescue    CLONAZEPAM (KLONOPIN) 1 MG TABLET    Take 2 mg by mouth once daily.     CLOTRIMAZOLE-BETAMETHASONE 1-0.05% (LOTRISONE) CREAM    Apply topically 2 (two) times daily.    DICLOFENAC SODIUM (VOLTAREN) 1 % GEL    APPLY TWO GRAMS FOUR TIMES A DAY    ERGOCALCIFEROL (ERGOCALCIFEROL) 50,000 UNIT CAP    TAKE 1 CAPSULE (50,000 UNITS TOTAL) BY MOUTH EVERY 7 DAYS.    ESTRADIOL (ESTRACE) 0.01 % (0.1 MG/GRAM) VAGINAL CREAM    Use 0.5 g inside vagina and dime-sized amount around opening/inner lips nightly x 2 weeks, then 2x/week thereafter.    FERROUS  GLUCONATE (FERGON) 324 MG TABLET    Take 1 tablet (324 mg total) by mouth daily with breakfast.    FLAVOXATE (URISPAS) 100 MG TAB    Take 1 tablet (100 mg total) by mouth 3 (three) times daily as needed (bladder discomfort).    FLUTICASONE (FLONASE) 50 MCG/ACTUATION NASAL SPRAY    INSTILL TWO SPRAYS IN EACH NOSTRIL IN EACH NOSTRIL EVERY EVENING    GABAPENTIN (NEURONTIN) 300 MG CAPSULE    Take 300 mg by mouth once daily.    LORATADINE (CLARITIN) 10 MG TABLET    TAKE ONE TABLET BY MOUTH ONCE DAILY    METHEN-M.BLUE-S.PHOS-PHSAL-HYO (URIBEL) 118-10-40.8-36 MG CAP    Take 1 capsule by mouth 4 (four) times daily as needed.    MIRABEGRON (MYRBETRIQ) 50 MG TB24    Take 1 tablet (50 mg total) by mouth once daily.    MONTELUKAST (SINGULAIR) 10 MG TABLET    TAKE ONE TABLET BY MOUTH EVERY EVENING    MUPIROCIN (BACTROBAN) 2 % OINTMENT    Apply topically 3 (three) times daily.    NYSTATIN (MYCOSTATIN) POWDER    Apply topically 2 (two) times daily.    ONDANSETRON (ZOFRAN) 8 MG TABLET    Take 1 tablet (8 mg total) by mouth every 8 (eight) hours as needed for Nausea.    PANTOPRAZOLE (PROTONIX) 40 MG TABLET    TAKE ONE TABLET BY MOUTH ONCE DAILY    SYMBICORT 160-4.5 MCG/ACTUATION HFAA    INHALE 2 PUFFS INTO THE LUNGS EVERY 12 (TWELVE) HOURS. CONTROLLER    TRAZODONE (DESYREL) 50 MG TABLET    Take 50 mg by mouth every evening.    VARENICLINE (CHANTIX) 1 MG TAB    Take 1 tablet (1 mg total) by mouth 2 (two) times daily.    VENLAFAXINE (EFFEXOR-XR) 150 MG CP24    Take 150 mg by mouth once daily.

## 2019-08-21 ENCOUNTER — PATIENT MESSAGE (OUTPATIENT)
Dept: PRIMARY CARE CLINIC | Facility: CLINIC | Age: 54
End: 2019-08-21

## 2019-08-22 ENCOUNTER — PATIENT MESSAGE (OUTPATIENT)
Dept: PRIMARY CARE CLINIC | Facility: CLINIC | Age: 54
End: 2019-08-22

## 2019-08-22 ENCOUNTER — TELEPHONE (OUTPATIENT)
Dept: UROGYNECOLOGY | Facility: CLINIC | Age: 54
End: 2019-08-22

## 2019-08-22 RX ORDER — BUDESONIDE AND FORMOTEROL FUMARATE DIHYDRATE 160; 4.5 UG/1; UG/1
2 AEROSOL RESPIRATORY (INHALATION) EVERY 12 HOURS
Qty: 11 G | Refills: 1 | Status: SHIPPED | OUTPATIENT
Start: 2019-08-22 | End: 2020-09-18 | Stop reason: SDUPTHER

## 2019-08-22 RX ORDER — HYDROCODONE BITARTRATE AND HOMATROPINE METHYLBROMIDE ORAL SOLUTION 5; 1.5 MG/5ML; MG/5ML
5 LIQUID ORAL EVERY 6 HOURS PRN
Qty: 120 ML | Refills: 0 | Status: SHIPPED | OUTPATIENT
Start: 2019-08-22 | End: 2019-09-06 | Stop reason: ALTCHOICE

## 2019-08-26 RX ORDER — MONTELUKAST SODIUM 10 MG/1
TABLET ORAL
Qty: 90 TABLET | Refills: 3 | Status: SHIPPED | OUTPATIENT
Start: 2019-08-26 | End: 2020-08-17

## 2019-08-31 DIAGNOSIS — E61.1 DIETARY IRON DEFICIENCY: ICD-10-CM

## 2019-09-03 RX ORDER — FERROUS GLUCONATE 324(38)MG
324 TABLET ORAL
Qty: 80 TABLET | Refills: 0 | Status: SHIPPED | OUTPATIENT
Start: 2019-09-03 | End: 2020-05-11

## 2019-09-06 ENCOUNTER — OFFICE VISIT (OUTPATIENT)
Dept: PRIMARY CARE CLINIC | Facility: CLINIC | Age: 54
End: 2019-09-06
Payer: MEDICARE

## 2019-09-06 VITALS
WEIGHT: 232 LBS | SYSTOLIC BLOOD PRESSURE: 106 MMHG | RESPIRATION RATE: 19 BRPM | BODY MASS INDEX: 39.61 KG/M2 | HEIGHT: 64 IN | DIASTOLIC BLOOD PRESSURE: 70 MMHG | HEART RATE: 79 BPM | OXYGEN SATURATION: 98 % | TEMPERATURE: 99 F

## 2019-09-06 DIAGNOSIS — K52.9 GASTROENTERITIS: Primary | ICD-10-CM

## 2019-09-06 PROCEDURE — 99999 PR PBB SHADOW E&M-EST. PATIENT-LVL III: CPT | Mod: PBBFAC,,, | Performed by: FAMILY MEDICINE

## 2019-09-06 PROCEDURE — 99999 PR PBB SHADOW E&M-EST. PATIENT-LVL III: ICD-10-PCS | Mod: PBBFAC,,, | Performed by: FAMILY MEDICINE

## 2019-09-06 PROCEDURE — 3008F BODY MASS INDEX DOCD: CPT | Mod: CPTII,S$GLB,, | Performed by: FAMILY MEDICINE

## 2019-09-06 PROCEDURE — 3008F PR BODY MASS INDEX (BMI) DOCUMENTED: ICD-10-PCS | Mod: CPTII,S$GLB,, | Performed by: FAMILY MEDICINE

## 2019-09-06 PROCEDURE — 99213 OFFICE O/P EST LOW 20 MIN: CPT | Mod: S$GLB,,, | Performed by: FAMILY MEDICINE

## 2019-09-06 PROCEDURE — 99213 PR OFFICE/OUTPT VISIT, EST, LEVL III, 20-29 MIN: ICD-10-PCS | Mod: S$GLB,,, | Performed by: FAMILY MEDICINE

## 2019-09-06 NOTE — PROGRESS NOTES
"Subjective:       Patient ID: Ida Orozco is a 54 y.o. female.    Chief Complaint: Nausea; Emesis; and Fatigue    Nausea, chills, and non-bloody diarrhea since last night, has had to empty ileostomy 4 times already today of liquid stool. Limited oral intake today. Felt a little better after taking Zofran    Review of Systems   Constitutional: Positive for chills, diaphoresis and fatigue. Negative for fever.   Respiratory: Negative for shortness of breath.    Cardiovascular: Negative for chest pain.   Gastrointestinal: Positive for abdominal pain, diarrhea and nausea. Negative for blood in stool and vomiting.   Musculoskeletal: Positive for myalgias.   Skin: Negative for rash.   Psychiatric/Behavioral: Negative for agitation and confusion.       Objective:      Vitals:    09/06/19 1338   BP: 106/70   BP Location: Left arm   Patient Position: Sitting   BP Method: Large (Manual)   Pulse: 79   Resp: 19   Temp: 99.1 °F (37.3 °C)   TempSrc: Oral   SpO2: 98%   Weight: 105.2 kg (232 lb)   Height: 5' 4" (1.626 m)     Physical Exam   Constitutional: She is oriented to person, place, and time. She appears well-developed and well-nourished.   HENT:   Head: Normocephalic and atraumatic.   Right Ear: External ear normal.   Left Ear: External ear normal.   Mouth/Throat: Oropharynx is clear and moist.   Neck: Neck supple. No JVD present.   Cardiovascular: Normal rate, regular rhythm and normal heart sounds.   Pulmonary/Chest: Effort normal and breath sounds normal.   Abdominal: Soft. She exhibits no distension. There is tenderness (mild diffuse tenderness).   Right sided jejunostomy with liquid green-colored stool   Musculoskeletal: She exhibits no edema.   Neurological: She is alert and oriented to person, place, and time.   Skin: Skin is warm and dry.   Psychiatric: She has a normal mood and affect. Her behavior is normal.   Nursing note and vitals reviewed.      Assessment:       1. Gastroenteritis        Plan:     "   Gastroenteritis  Likely viral. Push fluids and electrolytes. Use Zofran prn, avoid Imodium unless absolutely necessary    Medication List with Changes/Refills   Current Medications    ALBUTEROL (PROVENTIL) 2.5 MG /3 ML (0.083 %) NEBULIZER SOLUTION    INHALE ONE VIAL VIA NEBULIZER EVERY SIX HOURS    ALBUTEROL 90 MCG/ACTUATION INHALER    Inhale 2 puffs into the lungs every 4 (four) hours as needed for Wheezing or Shortness of Breath. Rescue    BUDESONIDE-FORMOTEROL 160-4.5 MCG (SYMBICORT) 160-4.5 MCG/ACTUATION HFAA    Inhale 2 puffs into the lungs every 12 (twelve) hours. Controller    CLONAZEPAM (KLONOPIN) 1 MG TABLET    Take 2 mg by mouth once daily.     CLOTRIMAZOLE-BETAMETHASONE 1-0.05% (LOTRISONE) CREAM    Apply topically 2 (two) times daily.    DICLOFENAC SODIUM (VOLTAREN) 1 % GEL    APPLY TWO GRAMS FOUR TIMES A DAY    ERGOCALCIFEROL (ERGOCALCIFEROL) 50,000 UNIT CAP    TAKE 1 CAPSULE (50,000 UNITS TOTAL) BY MOUTH EVERY 7 DAYS.    ESTRADIOL (ESTRACE) 0.01 % (0.1 MG/GRAM) VAGINAL CREAM    Use 0.5 g inside vagina and dime-sized amount around opening/inner lips nightly x 2 weeks, then 2x/week thereafter.    FERROUS GLUCONATE (FERGON) 324 MG TABLET    TAKE 1 TABLET (324 MG TOTAL) BY MOUTH DAILY WITH BREAKFAST.    FLAVOXATE (URISPAS) 100 MG TAB    Take 1 tablet (100 mg total) by mouth 3 (three) times daily as needed (bladder discomfort).    FLUTICASONE (FLONASE) 50 MCG/ACTUATION NASAL SPRAY    INSTILL TWO SPRAYS IN EACH NOSTRIL IN EACH NOSTRIL EVERY EVENING    LORATADINE (CLARITIN) 10 MG TABLET    TAKE ONE TABLET BY MOUTH ONCE DAILY    METHEN-M.BLUE-S.PHOS-PHSAL-HYO (URIBEL) 118-10-40.8-36 MG CAP    Take 1 capsule by mouth 4 (four) times daily as needed.    MONTELUKAST (SINGULAIR) 10 MG TABLET    TAKE ONE TABLET BY MOUTH EVERY EVENING    MUPIROCIN (BACTROBAN) 2 % OINTMENT    Apply topically 3 (three) times daily.    NYSTATIN (MYCOSTATIN) POWDER    Apply topically 2 (two) times daily.    ONDANSETRON (ZOFRAN) 8 MG  TABLET    Take 1 tablet (8 mg total) by mouth every 8 (eight) hours as needed for Nausea.    PANTOPRAZOLE (PROTONIX) 40 MG TABLET    TAKE ONE TABLET BY MOUTH ONCE DAILY    TRAZODONE (DESYREL) 50 MG TABLET    Take 50 mg by mouth every evening.    VARENICLINE (CHANTIX) 1 MG TAB    Take 1 tablet (1 mg total) by mouth 2 (two) times daily.    VENLAFAXINE (EFFEXOR-XR) 150 MG CP24    Take 150 mg by mouth once daily.    Discontinued Medications    GABAPENTIN (NEURONTIN) 300 MG CAPSULE    Take 300 mg by mouth once daily.    HYDROCODONE-HOMATROPINE 5-1.5 MG/5 ML (HYCODAN) 5-1.5 MG/5 ML SYRP    Take 5 mLs by mouth every 6 (six) hours as needed (cough).    METHYLPREDNISOLONE (MEDROL DOSEPACK) 4 MG TABLET    use as directed    MIRABEGRON (MYRBETRIQ) 50 MG TB24    Take 1 tablet (50 mg total) by mouth once daily.

## 2019-09-17 ENCOUNTER — PATIENT OUTREACH (OUTPATIENT)
Dept: ADMINISTRATIVE | Facility: OTHER | Age: 54
End: 2019-09-17

## 2019-09-20 ENCOUNTER — PATIENT MESSAGE (OUTPATIENT)
Dept: PRIMARY CARE CLINIC | Facility: CLINIC | Age: 54
End: 2019-09-20

## 2019-09-20 ENCOUNTER — TELEPHONE (OUTPATIENT)
Dept: PLASTIC SURGERY | Facility: CLINIC | Age: 54
End: 2019-09-20

## 2019-09-20 DIAGNOSIS — M79.3 PANNICULITIS: ICD-10-CM

## 2019-09-20 DIAGNOSIS — E65 SYMPTOMATIC ABDOMINAL PANNICULUS: Primary | ICD-10-CM

## 2019-09-20 NOTE — TELEPHONE ENCOUNTER
Spoke with pt and let her know that we will tenatively be booking her case for 10/18 .I told pt that she would need a clearance from her PCP and also she would have to book an appt w/ Dr Virgen. Pt verbalized understanding.

## 2019-09-23 ENCOUNTER — CLINICAL SUPPORT (OUTPATIENT)
Dept: PRIMARY CARE CLINIC | Facility: CLINIC | Age: 54
End: 2019-09-23
Payer: MEDICARE

## 2019-09-23 ENCOUNTER — OFFICE VISIT (OUTPATIENT)
Dept: PRIMARY CARE CLINIC | Facility: CLINIC | Age: 54
End: 2019-09-23
Payer: MEDICARE

## 2019-09-23 VITALS
TEMPERATURE: 99 F | HEIGHT: 64 IN | OXYGEN SATURATION: 94 % | HEART RATE: 69 BPM | RESPIRATION RATE: 18 BRPM | BODY MASS INDEX: 47.8 KG/M2 | WEIGHT: 280 LBS | DIASTOLIC BLOOD PRESSURE: 84 MMHG | SYSTOLIC BLOOD PRESSURE: 100 MMHG

## 2019-09-23 DIAGNOSIS — Z01.818 PREOPERATIVE EXAMINATION: ICD-10-CM

## 2019-09-23 DIAGNOSIS — S40.012A CONTUSION OF LEFT SHOULDER, INITIAL ENCOUNTER: Primary | ICD-10-CM

## 2019-09-23 DIAGNOSIS — Z91.89 AT RISK FOR CORONARY ARTERY DISEASE: ICD-10-CM

## 2019-09-23 DIAGNOSIS — R94.31 ABNORMAL EKG: ICD-10-CM

## 2019-09-23 PROBLEM — E78.5 HYPERLIPIDEMIA: Status: ACTIVE | Noted: 2017-10-13

## 2019-09-23 LAB
ANION GAP SERPL CALC-SCNC: 9 MMOL/L (ref 8–16)
BASOPHILS # BLD AUTO: 0.1 K/UL (ref 0–0.2)
BASOPHILS NFR BLD: 1.2 % (ref 0–1.9)
BUN SERPL-MCNC: 17 MG/DL (ref 6–20)
CALCIUM SERPL-MCNC: 8.6 MG/DL (ref 8.6–10)
CHLORIDE SERPL-SCNC: 106 MMOL/L (ref 101–111)
CO2 SERPL-SCNC: 26 MMOL/L (ref 23–29)
CREAT SERPL-MCNC: 0.9 MG/DL (ref 0.5–1.4)
DIFFERENTIAL METHOD: ABNORMAL
EOSINOPHIL # BLD AUTO: 1.3 K/UL (ref 0–0.5)
EOSINOPHIL NFR BLD: 18.6 % (ref 0–8)
ERYTHROCYTE [DISTWIDTH] IN BLOOD BY AUTOMATED COUNT: 15.6 % (ref 11.5–14.5)
EST. GFR  (AFRICAN AMERICAN): >60 ML/MIN/1.73 M^2
EST. GFR  (NON AFRICAN AMERICAN): >60 ML/MIN/1.73 M^2
GLUCOSE SERPL-MCNC: 113 MG/DL (ref 74–118)
HCT VFR BLD AUTO: 42.4 % (ref 37–48.5)
HGB BLD-MCNC: 13.7 G/DL (ref 12–16)
LYMPHOCYTES # BLD AUTO: 1.7 K/UL (ref 1–4.8)
LYMPHOCYTES NFR BLD: 25.7 % (ref 18–48)
MCH RBC QN AUTO: 29 PG (ref 27–31)
MCHC RBC AUTO-ENTMCNC: 32.2 G/DL (ref 32–36)
MCV RBC AUTO: 90 FL (ref 82–98)
MONOCYTES # BLD AUTO: 0.5 K/UL (ref 0.3–1)
MONOCYTES NFR BLD: 8 % (ref 4–15)
NEUTROPHILS # BLD AUTO: 3.1 K/UL (ref 1.8–7.7)
NEUTROPHILS NFR BLD: 46.5 % (ref 38–73)
PLATELET # BLD AUTO: 298 K/UL (ref 150–350)
PMV BLD AUTO: 9.2 FL (ref 9.2–12.9)
POTASSIUM SERPL-SCNC: 3.7 MMOL/L (ref 3.5–5.1)
RBC # BLD AUTO: 4.71 M/UL (ref 4–5.4)
SODIUM SERPL-SCNC: 141 MMOL/L (ref 136–145)
WBC # BLD AUTO: 6.7 K/UL (ref 3.9–12.7)

## 2019-09-23 PROCEDURE — 99499 RISK ADDL DX/OHS AUDIT: ICD-10-PCS | Mod: S$GLB,,, | Performed by: FAMILY MEDICINE

## 2019-09-23 PROCEDURE — 99499 UNLISTED E&M SERVICE: CPT | Mod: S$GLB,,, | Performed by: FAMILY MEDICINE

## 2019-09-23 PROCEDURE — 93005 ELECTROCARDIOGRAM TRACING: CPT | Mod: S$GLB,,, | Performed by: FAMILY MEDICINE

## 2019-09-23 PROCEDURE — 93010 ELECTROCARDIOGRAM REPORT: CPT | Mod: S$GLB,,, | Performed by: INTERNAL MEDICINE

## 2019-09-23 PROCEDURE — 85025 COMPLETE CBC W/AUTO DIFF WBC: CPT

## 2019-09-23 PROCEDURE — 99214 OFFICE O/P EST MOD 30 MIN: CPT | Mod: S$GLB,,, | Performed by: FAMILY MEDICINE

## 2019-09-23 PROCEDURE — 3008F BODY MASS INDEX DOCD: CPT | Mod: CPTII,S$GLB,, | Performed by: FAMILY MEDICINE

## 2019-09-23 PROCEDURE — 99214 PR OFFICE/OUTPT VISIT, EST, LEVL IV, 30-39 MIN: ICD-10-PCS | Mod: S$GLB,,, | Performed by: FAMILY MEDICINE

## 2019-09-23 PROCEDURE — 99999 PR PBB SHADOW E&M-EST. PATIENT-LVL IV: ICD-10-PCS | Mod: PBBFAC,,, | Performed by: FAMILY MEDICINE

## 2019-09-23 PROCEDURE — 80048 BASIC METABOLIC PNL TOTAL CA: CPT

## 2019-09-23 PROCEDURE — 93010 EKG 12-LEAD: ICD-10-PCS | Mod: S$GLB,,, | Performed by: INTERNAL MEDICINE

## 2019-09-23 PROCEDURE — 3008F PR BODY MASS INDEX (BMI) DOCUMENTED: ICD-10-PCS | Mod: CPTII,S$GLB,, | Performed by: FAMILY MEDICINE

## 2019-09-23 PROCEDURE — 36415 COLL VENOUS BLD VENIPUNCTURE: CPT | Mod: S$GLB,,, | Performed by: FAMILY MEDICINE

## 2019-09-23 PROCEDURE — 99999 PR PBB SHADOW E&M-EST. PATIENT-LVL IV: CPT | Mod: PBBFAC,,, | Performed by: FAMILY MEDICINE

## 2019-09-23 PROCEDURE — 93005 EKG 12-LEAD: ICD-10-PCS | Mod: S$GLB,,, | Performed by: FAMILY MEDICINE

## 2019-09-23 PROCEDURE — 36415 PR COLLECTION VENOUS BLOOD,VENIPUNCTURE: ICD-10-PCS | Mod: S$GLB,,, | Performed by: FAMILY MEDICINE

## 2019-09-23 NOTE — PROGRESS NOTES
"Subjective:       Patient ID: Ida Orozco is a 54 y.o. female.    Chief Complaint: Shoulder Pain (says she fell off a bed on Saturday and is complainging of left shoulder pain since then ) and Pre-op Exam    Fell out of bed at hotel Saturday evening, landed on left shoulder, still having pain with movement. Tried topical NSAID w/o relief  Scheduled for panniculectomy next month. No prior surgical complications.     Review of Systems   Constitutional: Negative for chills and fever.   HENT: Negative for sore throat.    Respiratory: Negative for shortness of breath.    Cardiovascular: Negative for chest pain.   Gastrointestinal: Negative for abdominal pain, blood in stool and vomiting.   Genitourinary: Negative for dysuria, hematuria and pelvic pain.   Musculoskeletal: Positive for arthralgias and back pain.   Skin: Negative for wound.   Neurological: Negative for weakness, numbness and headaches.   Hematological: Does not bruise/bleed easily.       Objective:      Vitals:    09/23/19 1438   BP: 100/84   BP Location: Right arm   Patient Position: Sitting   BP Method: Large (Manual)   Pulse: 69   Resp: 18   Temp: 98.5 °F (36.9 °C)   TempSrc: Oral   SpO2: (!) 94%   Weight: 127 kg (280 lb)   Height: 5' 4" (1.626 m)     Physical Exam   Constitutional: She is oriented to person, place, and time. She appears well-developed and well-nourished.   HENT:   Head: Normocephalic and atraumatic.   Cardiovascular: Normal rate, regular rhythm and normal heart sounds.   Pulmonary/Chest: Effort normal and breath sounds normal.   Abdominal: Soft. Bowel sounds are normal.   Right sided ileostomy   Musculoskeletal: She exhibits no edema.        Left shoulder: She exhibits tenderness (over AC joint). She exhibits no swelling, no effusion, no crepitus, no deformity, normal pulse and normal strength.   Neurological: She is alert and oriented to person, place, and time.   Skin: Skin is warm and dry.   Psychiatric: She has a normal mood and " affect. Her behavior is normal.   Nursing note and vitals reviewed.      Assessment:       1. Contusion of left shoulder, initial encounter    2. Preoperative examination    3. Abnormal EKG    4. At risk for coronary artery disease        Plan:       Contusion of left shoulder, initial encounter  -     X-Ray Shoulder Trauma 3 view Left; Future; Expected date: 09/23/2019    Preoperative examination  -     EKG 12-lead  -     CBC auto differential; Future; Expected date: 09/23/2019  -     Basic metabolic panel; Future; Expected date: 09/23/2019  Q-waves in septal leads.  Does have a family history of heart disease, so would benefit from further risk stratification.  Will get stress test prior to surgical clearance  Abnormal EKG  -     NM Myocardial Perfusion Spect Multi Pharmacologic; Future; Expected date: 09/24/2019  -     Stress test; Future; Expected date: 09/24/2019    At risk for coronary artery disease  -     NM Myocardial Perfusion Spect Multi Pharmacologic; Future; Expected date: 09/24/2019  -     Stress test; Future; Expected date: 09/24/2019      Medication List with Changes/Refills   Current Medications    ALBUTEROL (PROVENTIL) 2.5 MG /3 ML (0.083 %) NEBULIZER SOLUTION    INHALE ONE VIAL VIA NEBULIZER EVERY SIX HOURS    ALBUTEROL 90 MCG/ACTUATION INHALER    Inhale 2 puffs into the lungs every 4 (four) hours as needed for Wheezing or Shortness of Breath. Rescue    BUDESONIDE-FORMOTEROL 160-4.5 MCG (SYMBICORT) 160-4.5 MCG/ACTUATION HFAA    Inhale 2 puffs into the lungs every 12 (twelve) hours. Controller    CLONAZEPAM (KLONOPIN) 1 MG TABLET    Take 2 mg by mouth once daily.     CLOTRIMAZOLE-BETAMETHASONE 1-0.05% (LOTRISONE) CREAM    Apply topically 2 (two) times daily.    DICLOFENAC SODIUM (VOLTAREN) 1 % GEL    APPLY TWO GRAMS FOUR TIMES A DAY    ERGOCALCIFEROL (ERGOCALCIFEROL) 50,000 UNIT CAP    TAKE 1 CAPSULE (50,000 UNITS TOTAL) BY MOUTH EVERY 7 DAYS.    ESTRADIOL (ESTRACE) 0.01 % (0.1 MG/GRAM) VAGINAL  CREAM    Use 0.5 g inside vagina and dime-sized amount around opening/inner lips nightly x 2 weeks, then 2x/week thereafter.    FERROUS GLUCONATE (FERGON) 324 MG TABLET    TAKE 1 TABLET (324 MG TOTAL) BY MOUTH DAILY WITH BREAKFAST.    FLAVOXATE (URISPAS) 100 MG TAB    Take 1 tablet (100 mg total) by mouth 3 (three) times daily as needed (bladder discomfort).    FLUTICASONE (FLONASE) 50 MCG/ACTUATION NASAL SPRAY    INSTILL TWO SPRAYS IN EACH NOSTRIL IN EACH NOSTRIL EVERY EVENING    LORATADINE (CLARITIN) 10 MG TABLET    TAKE ONE TABLET BY MOUTH ONCE DAILY    METHEN-M.BLUE-S.PHOS-PHSAL-HYO (URIBEL) 118-10-40.8-36 MG CAP    Take 1 capsule by mouth 4 (four) times daily as needed.    MONTELUKAST (SINGULAIR) 10 MG TABLET    TAKE ONE TABLET BY MOUTH EVERY EVENING    MUPIROCIN (BACTROBAN) 2 % OINTMENT    Apply topically 3 (three) times daily.    NYSTATIN (MYCOSTATIN) POWDER    Apply topically 2 (two) times daily.    ONDANSETRON (ZOFRAN) 8 MG TABLET    Take 1 tablet (8 mg total) by mouth every 8 (eight) hours as needed for Nausea.    PANTOPRAZOLE (PROTONIX) 40 MG TABLET    TAKE ONE TABLET BY MOUTH ONCE DAILY    TRAZODONE (DESYREL) 50 MG TABLET    Take 50 mg by mouth every evening.    VARENICLINE (CHANTIX) 1 MG TAB    Take 1 tablet (1 mg total) by mouth 2 (two) times daily.    VENLAFAXINE (EFFEXOR-XR) 150 MG CP24    Take 150 mg by mouth once daily.

## 2019-09-25 ENCOUNTER — PATIENT MESSAGE (OUTPATIENT)
Dept: PRIMARY CARE CLINIC | Facility: CLINIC | Age: 54
End: 2019-09-25

## 2019-09-25 RX ORDER — FLUCONAZOLE 150 MG/1
150 TABLET ORAL
Qty: 6 TABLET | Refills: 0 | Status: SHIPPED | OUTPATIENT
Start: 2019-09-25 | End: 2020-02-17

## 2019-09-27 ENCOUNTER — TELEPHONE (OUTPATIENT)
Dept: PLASTIC SURGERY | Facility: CLINIC | Age: 54
End: 2019-09-27

## 2019-09-27 NOTE — TELEPHONE ENCOUNTER
Pt called to schedule her pre op . Pt also wanted to know when anesthesia was going to call her I told pt i would message them and see when they had planned on scheduling her to come in. Pt verbalized understanding.

## 2019-10-03 ENCOUNTER — PATIENT MESSAGE (OUTPATIENT)
Dept: PRIMARY CARE CLINIC | Facility: CLINIC | Age: 54
End: 2019-10-03

## 2019-10-03 DIAGNOSIS — M79.641 RIGHT HAND PAIN: Primary | ICD-10-CM

## 2019-10-03 DIAGNOSIS — E11.9 TYPE 2 DIABETES MELLITUS WITHOUT COMPLICATION: ICD-10-CM

## 2019-10-08 ENCOUNTER — PATIENT OUTREACH (OUTPATIENT)
Dept: ADMINISTRATIVE | Facility: OTHER | Age: 54
End: 2019-10-08

## 2019-10-09 ENCOUNTER — OFFICE VISIT (OUTPATIENT)
Dept: SURGERY | Facility: CLINIC | Age: 54
End: 2019-10-09
Payer: MEDICARE

## 2019-10-09 VITALS
DIASTOLIC BLOOD PRESSURE: 59 MMHG | HEIGHT: 64 IN | BODY MASS INDEX: 47.95 KG/M2 | WEIGHT: 280.88 LBS | SYSTOLIC BLOOD PRESSURE: 106 MMHG

## 2019-10-09 DIAGNOSIS — K51.80 OTHER ULCERATIVE COLITIS WITHOUT COMPLICATION: Primary | ICD-10-CM

## 2019-10-09 DIAGNOSIS — Z93.2 ILEOSTOMY STATUS: ICD-10-CM

## 2019-10-09 PROCEDURE — 99999 PR PBB SHADOW E&M-EST. PATIENT-LVL IV: CPT | Mod: PBBFAC,,, | Performed by: COLON & RECTAL SURGERY

## 2019-10-09 PROCEDURE — 99214 PR OFFICE/OUTPT VISIT, EST, LEVL IV, 30-39 MIN: ICD-10-PCS | Mod: S$GLB,,, | Performed by: COLON & RECTAL SURGERY

## 2019-10-09 PROCEDURE — 3008F PR BODY MASS INDEX (BMI) DOCUMENTED: ICD-10-PCS | Mod: CPTII,S$GLB,, | Performed by: COLON & RECTAL SURGERY

## 2019-10-09 PROCEDURE — 99999 PR PBB SHADOW E&M-EST. PATIENT-LVL IV: ICD-10-PCS | Mod: PBBFAC,,, | Performed by: COLON & RECTAL SURGERY

## 2019-10-09 PROCEDURE — 3008F BODY MASS INDEX DOCD: CPT | Mod: CPTII,S$GLB,, | Performed by: COLON & RECTAL SURGERY

## 2019-10-09 PROCEDURE — 99214 OFFICE O/P EST MOD 30 MIN: CPT | Mod: S$GLB,,, | Performed by: COLON & RECTAL SURGERY

## 2019-10-09 RX ORDER — SERTRALINE HYDROCHLORIDE 50 MG/1
100 TABLET, FILM COATED ORAL DAILY
COMMUNITY
End: 2019-10-28

## 2019-10-09 NOTE — PROGRESS NOTES
HPI:  Ida Orozco is a 54 y.o. female with PMH ulcerative colitis, s/p total colectomy at OSH, total proctectomy on 18, presents to clinic for preoperative exam for panniculectomy by Dr. Alvarado next Friday. Patient reports recurrence of crampy, diffuse abdominal pain and joint pain for the past 3 months. Patient went to the emergency room yesterday, CT abdomen/pelvis revealed no findings of inflammation or obstruction, no leukocytosis on CBC. Patient reports loose ostomy output for the past few months. She reports Bentayl alleviates abdominal pain. She reports no other symptoms at present.       Past Medical History:   Diagnosis Date    Anxiety     Asthma     Crohn's colitis     Fatty liver disease, nonalcoholic     GERD (gastroesophageal reflux disease)     History of sleeve gastrectomy 2016        Past Surgical History:   Procedure Laterality Date    APPENDECTOMY       SECTION, LOW TRANSVERSE      CHOLECYSTECTOMY      open    COLECTOMY      total- 2013    CYSTOSCOPY W/ RETROGRADES N/A 2018    Procedure: CYSTOSCOPY, WITH RETROGRADE PYELOGRAM;  Surgeon: Butch Banks MD;  Location: [facility] OR;  Service: Urology;  Laterality: N/A;  HANSEN SURGICAL CONFIRMED     FLEXIBLE SIGMOIDOSCOPY  2018    Procedure: SIGMOIDOSCOPY, FLEXIBLE;  Surgeon: JENNY Virgen MD;  Location: [facility] OR [address] [place] [address];  Service: Colon and Rectal;;    GASTRIC SLEEVE       HYSTERECTOMY      ILEOSTOMY REVISION      2014; 2014    LAPAROSCOPIC PROCTECTOMY N/A 2018    Procedure: PROCTECTOMY-LAPAROSCOPIC/CONVERTED TO OPEN;  Surgeon: JENNY Virgen MD;  [facility];  Service: Colon and Rectal;  Laterality: N/A;    LYSIS OF ADHESIONS  2018    Procedure: LYSIS, ADHESIONS/ more than 2hours;  Surgeon: JENNY Virgen MD;  [facility];  Service: Colon and Rectal;;    thumb surgery      TONSILLECTOMY, ADENOIDECTOMY      WISDOM TOOTH EXTRACTION          Review of patient's allergies indicates:   Allergen Reactions    Adhesive      Cause blisters    Dilaudid [hydromorphone] Nausea And Vomiting    Sulfa (sulfonamide antibiotics) Hives       Family History   Problem Relation Age of Onset    Cancer Mother         skin    Heart disease Father 67    Cancer Paternal Grandfather         ?    Cancer Maternal Grandfather         colon        Social History     Socioeconomic History    Marital status: Legally      Spouse name: Not on file    Number of children: Not on file    Years of education: Not on file    Highest education level: Not on file   Occupational History    Not on file   Social Needs    Financial resource strain: Not on file    Food insecurity:     Worry: Not on file     Inability: Not on file    Transportation needs:     Medical: Not on file     Non-medical: Not on file   Tobacco Use    Smoking status: Former Smoker     Types: Cigarettes     Last attempt to quit: 2009     Years since quitting: 10.7    Smokeless tobacco: Never Used   Substance and Sexual Activity    Alcohol use: No    Drug use: No    Sexual activity: Never     Birth control/protection: See Surgical Hx   Lifestyle    Physical activity:     Days per week: Not on file     Minutes per session: Not on file    Stress: Not on file   Relationships    Social connections:     Talks on phone: Not on file     Gets together: Not on file     Attends [employer] service: Not on file     Active member of club or organization: Not on file     Attends meetings of clubs or organizations: Not on file     Relationship status: Not on file   Other Topics Concern    Not on file   Social History Narrative    Not on file       ROS:  GENERAL: No fever, chills, fatigability or weight loss.  Integument: No rashes, redness, icterus  CHEST: Denies BURRELL, cyanosis, wheezing, cough and sputum production.  CARDIOVASCULAR: Denies chest pain, PND, orthopnea or reduced exercise tolerance.  GI:  "Denies abd pain, dysphagia, nausea, vomiting, no hematemesis   : Denies burning on urination, no hematuria, no bacteriuria  MSK: No deformities, swelling, joint pain swelling  Neurologic: No HAs, seizures, weakness, paresthesias, gait problems    PE:  General appearance - obese female sitting in chair, in NAD  BP (!) 106/59 (BP Location: Right arm, Patient Position: Sitting, BP Method: Large (Automatic))   Ht 5' 4" (1.626 m)   Wt 127.4 kg (280 lb 13.9 oz)   BMI 48.21 kg/m²   Sclera/ Skin anicteric  LN not-palpable  AT NC EOMI  Neck supple trachea midline   Chest symmetric, nl excursion, no retractions, breathing comfortably  Abdomen  ND soft NT.  no masses, no organomegaly. Ostomy in place with thin dark stool/air visualized in bag  EXT - no CCE  Neuro:  Mood/ affect nl, alert and oriented x 3, moves all ext's, gait nl    CT abdomen/pelvis - 10/7  EXAMINATION:  CT ABDOMEN PELVIS WITH CONTRAST    CLINICAL HISTORY:  Nausea, vomiting, diarrhea;    TECHNIQUE:  Low dose axial images, sagittal and coronal reformations were obtained from the lung bases to the pubic synthesis with IV contrast, 100 cc of Omnipaque 350 mg.    COMPARISON:  CT of the abdomen and pelvis February 21, 2019 .    FINDINGS:  The lungs are clear.  The heart is normal in size.  There are no pericardial effusions.    The adrenal glands,  spleen and pancreas are normal.  The liver is normal.  The stomach demonstrates gastric bypass surgery.  The gallbladder has been surgically removed.    The kidneys are negative for hydronephrosis.  There are no stones.  No hydroureter obstruction.    There is evidence of a right colectomy with a colostomy.  The appendix was not seen.    The anterior abdominal wall is with a lower hernia which does not contain bowel.    The abdominal aorta is unremarkable.  There is no lymphadenopathy.    PELVIS:    No pelvic mass, adenopathy, or free fluid.  The uterus was not seen.  The urinary bladder was within normal " limits.    BOWEL/MESENTERY:    No evidence of bowel obstruction or inflammation.    BONES:  No acute osseous abnormality and no suspicious lytic or blastic lesion.      Impression       Status post gastric bypass surgery.    Status post right hemicolectomy with colostomy stump noted.    No evidence of small or large bowel distension.  No free air.    Small left lower anterior abdominal wall hernia which does not contain bowel.    No significant change since prior study.           Assessment:  54 YOF with PMH UC presents to clinic for preoperative evaluation in preparation for panniculectomy by Dr. Alvarado next Friday. Patient reports recurrence of abdominal and joint pain, however no evidence of acute inflammation at imaging from yesterday. Patient ok to proceed with surgery next Friday, Dr. Virgen will be in attendance for relocation of ostomy.     Plan:  - No evidence of inflammation or obstruction on CT abdomen/pelvis 10/7  - Ok to proceed with panniculectomy  - Dr. Virgen will discuss/be in attendance with Dr. Alvarado for panniculectomy    -Manju Fontenot M.D  General Surgery PGY1      I have personally obtained a history and performed a physical exam with and independent to my resident and discussed the findings and plan with the patient.  I agree with the above findings and plan with the following exceptions:  None    I discussed with the patient that relocation of the stoma may be necessary following abdominoplasty.  She understands that this can be tricky given the change in contour of her abdominal wall. I will be available at the time of her surgery should this be necessary    Gucci DAVIS MD, FACS, FASCRS  Staff Surgeon  Dept of Colon and Rectal Surgery  [facility] [place], [place]

## 2019-10-10 ENCOUNTER — PATIENT MESSAGE (OUTPATIENT)
Dept: PLASTIC SURGERY | Facility: CLINIC | Age: 54
End: 2019-10-10

## 2019-10-10 ENCOUNTER — TELEPHONE (OUTPATIENT)
Dept: PRIMARY CARE CLINIC | Facility: CLINIC | Age: 54
End: 2019-10-10

## 2019-10-10 DIAGNOSIS — R94.39 ABNORMAL NUCLEAR STRESS TEST: Primary | ICD-10-CM

## 2019-10-11 ENCOUNTER — TELEPHONE (OUTPATIENT)
Dept: PLASTIC SURGERY | Facility: CLINIC | Age: 54
End: 2019-10-11

## 2019-10-11 NOTE — TELEPHONE ENCOUNTER
Spoke with pt and they stated that she wasn't cleared for surgery and she would like to push back her case until she gets a clearance for surgery. I told pt that I would let Dr know and to keep me updated on her cardiology appt.             ----- Message from Shy Lira sent at 10/11/2019  8:10 AM CDT -----  Contact: patient  Please call pt at [phone] (requesting to speak to staff)    Patient would like to cancel all related surgery appts because she has not been cleared for future surgery    Patient stated that she has to see a cardiologist first    Thank you

## 2019-10-14 ENCOUNTER — PATIENT OUTREACH (OUTPATIENT)
Dept: ADMINISTRATIVE | Facility: OTHER | Age: 54
End: 2019-10-14

## 2019-10-18 ENCOUNTER — OFFICE VISIT (OUTPATIENT)
Dept: CARDIOLOGY | Facility: CLINIC | Age: 54
End: 2019-10-18
Payer: MEDICARE

## 2019-10-18 VITALS
DIASTOLIC BLOOD PRESSURE: 60 MMHG | WEIGHT: 275.56 LBS | OXYGEN SATURATION: 95 % | BODY MASS INDEX: 47.3 KG/M2 | SYSTOLIC BLOOD PRESSURE: 104 MMHG | HEART RATE: 58 BPM

## 2019-10-18 DIAGNOSIS — R94.39 ABNORMAL STRESS TEST: ICD-10-CM

## 2019-10-18 DIAGNOSIS — I25.110 ATHEROSCLEROSIS OF NATIVE CORONARY ARTERY OF NATIVE HEART WITH UNSTABLE ANGINA PECTORIS: Primary | ICD-10-CM

## 2019-10-18 PROCEDURE — 99215 OFFICE O/P EST HI 40 MIN: CPT | Mod: S$GLB,,, | Performed by: INTERNAL MEDICINE

## 2019-10-18 PROCEDURE — 99999 PR PBB SHADOW E&M-EST. PATIENT-LVL V: ICD-10-PCS | Mod: PBBFAC,,, | Performed by: INTERNAL MEDICINE

## 2019-10-18 PROCEDURE — 99215 PR OFFICE/OUTPT VISIT, EST, LEVL V, 40-54 MIN: ICD-10-PCS | Mod: S$GLB,,, | Performed by: INTERNAL MEDICINE

## 2019-10-18 PROCEDURE — 3008F BODY MASS INDEX DOCD: CPT | Mod: CPTII,S$GLB,, | Performed by: INTERNAL MEDICINE

## 2019-10-18 PROCEDURE — 3008F PR BODY MASS INDEX (BMI) DOCUMENTED: ICD-10-PCS | Mod: CPTII,S$GLB,, | Performed by: INTERNAL MEDICINE

## 2019-10-18 PROCEDURE — 99999 PR PBB SHADOW E&M-EST. PATIENT-LVL V: CPT | Mod: PBBFAC,,, | Performed by: INTERNAL MEDICINE

## 2019-10-18 RX ORDER — TOPIRAMATE 100 MG/1
100 TABLET, FILM COATED ORAL DAILY
COMMUNITY
Start: 2019-12-19 | End: 2019-12-29

## 2019-10-18 NOTE — LETTER
October 18, 2019      Eliecer Jones MD  [address]  [address]  [place] [address]           [facility]  [address], [facility] [address]  [place] [address]  Phone: [phone]  Fax: [phone]          Patient: Ida Orozco   MR Number: [id number]   YOB: 1965   Date of Visit: 10/18/2019       Dear Dr. Eliecer Jones:    Thank you for referring Ida Orozco to me for evaluation. Attached you will find relevant portions of my assessment and plan of care.    If you have questions, please do not hesitate to call me. I look forward to following Ida Orozco along with you.    Sincerely,    Beto Malin MD    Enclosure  CC:  No Recipients    If you would like to receive this communication electronically, please contact [email or web address] or [phone] to request more information on [facility] access.    For providers and/or their staff who would like to refer a patient to [facility], please contact us through our one-stop-shop provider referral line, [facility], at [phone].    If you feel you have received this communication in error or would no longer like to receive these types of communications, please e-mail [email or web address]

## 2019-10-18 NOTE — PROGRESS NOTES
Subjective:    Patient ID:  Ida Orozco is a 54 y.o. y.o. female who presents for initial visit for Consult      First Cardiology visit for this patient.  Patient is being cleared for general surgery.  Her SPECT myocardial perfusion scan has already been done and is revealed some abnormalities in the basilar and anterior wall of the left ventricle.      Past Medical History:   Diagnosis Date    Anxiety     Asthma     Crohn's colitis     Fatty liver disease, nonalcoholic     GERD (gastroesophageal reflux disease)     History of sleeve gastrectomy 06/24/2016        Social History     Socioeconomic History    Marital status: Legally      Spouse name: Not on file    Number of children: Not on file    Years of education: Not on file    Highest education level: Not on file   Occupational History    Not on file   Social Needs    Financial resource strain: Not on file    Food insecurity:     Worry: Not on file     Inability: Not on file    Transportation needs:     Medical: Not on file     Non-medical: Not on file   Tobacco Use    Smoking status: Former Smoker     Types: Cigarettes     Last attempt to quit: 2009     Years since quitting: 10.8    Smokeless tobacco: Never Used   Substance and Sexual Activity    Alcohol use: No    Drug use: No    Sexual activity: Never     Birth control/protection: See Surgical Hx   Lifestyle    Physical activity:     Days per week: Not on file     Minutes per session: Not on file    Stress: Not on file   Relationships    Social connections:     Talks on phone: Not on file     Gets together: Not on file     Attends [employer] service: Not on file     Active member of club or organization: Not on file     Attends meetings of clubs or organizations: Not on file     Relationship status: Not on file   Other Topics Concern    Not on file   Social History Narrative    Not on file        Family History   Problem Relation Age of Onset    Cancer Mother         skin     Heart disease Father 67    Cancer Paternal Grandfather         ?    Cancer Maternal Grandfather         colon         Review of Systems   Constitutional: Negative.    HENT: Negative.    Eyes: Negative.    Cardiovascular:        Abnormal SPECT myocardial perfusion scan   Gastrointestinal: Negative.         Crohn's disease with ileostomy.   Genitourinary: Negative.    Musculoskeletal: Negative.    Skin: Negative.    Neurological: Negative.    Endo/Heme/Allergies: Negative.    Psychiatric/Behavioral: Negative.         Objective:     /60 (Patient Position: Sitting, BP Method: Large (Automatic))   Pulse (!) 58   Wt 125 kg (275 lb 9.2 oz)   SpO2 95%   BMI 47.30 kg/m²     Physical Exam   Constitutional: She is oriented to person, place, and time.       HENT:   Head: Normocephalic.   Eyes: Pupils are equal, round, and reactive to light. EOM are normal.   Neck: Normal carotid pulses, no hepatojugular reflux and no JVD present. Carotid bruit is not present. No thyroid mass and no thyromegaly present.   Cardiovascular: Regular rhythm, S1 normal and S2 normal.   Murmur heard.   Systolic murmur is present with a grade of 2/6.  Pulses:       Carotid pulses are 3+ on the right side, and on the left side with bruit.  Pulmonary/Chest: Effort normal and breath sounds normal.   Abdominal:       Musculoskeletal: Normal range of motion.   Neurological: She is alert and oriented to person, place, and time. She has normal motor skills. Gait normal.       Labs:     Lab Results   Component Value Date     10/07/2019    K 3.9 10/07/2019     10/07/2019    CO2 25 10/07/2019    BUN 16 10/07/2019    CREATININE 1.0 10/07/2019    ANIONGAP 5 (L) 10/07/2019     Lab Results   Component Value Date    HGBA1C 5.6 05/30/2019     No results found for: BNP, BNPTRIAGEBLO    Lab Results   Component Value Date    WBC 7.30 10/07/2019    HGB 14.1 10/07/2019    HCT 42.7 10/07/2019     10/07/2019    GRAN 4.0 10/07/2019    GRAN  55.0 10/07/2019     Lab Results   Component Value Date    CHOL 165 10/02/2018    HDL 80 (H) 10/02/2018    LDLCALC 68 10/02/2018    TRIG 85 10/02/2018         Results for orders placed or performed in visit on 09/23/19   EKG 12-lead    Collection Time: 09/23/19  3:38 PM    Narrative    Test Reason : Z01.818,    Vent. Rate : 058 BPM     Atrial Rate : 058 BPM     P-R Int : 188 ms          QRS Dur : 096 ms      QT Int : 374 ms       P-R-T Axes : 039 -02 039 degrees     QTc Int : 367 ms    Sinus bradycardia  Minimal voltage criteria for LVH, may be normal variant  Septal infarct (cited on or before 15-AUG-2016)  Abnormal ECG  When compared with ECG of 15-AUG-2016 12:13,  Questionable change in initial forces of Septal leads  Confirmed by Kimo MCLEOD, Beto MARKS ([id number]) on 9/24/2019 7:52:59 AM    Referred By:             Confirmed By:Beto Malin MD        .  Meds:     Current Outpatient Medications:     albuterol (PROVENTIL) 2.5 mg /3 mL (0.083 %) nebulizer solution, INHALE ONE VIAL VIA NEBULIZER EVERY SIX HOURS, Disp: 150 mL, Rfl: 3    albuterol 90 mcg/actuation inhaler, Inhale 2 puffs into the lungs every 4 (four) hours as needed for Wheezing or Shortness of Breath. Rescue, Disp: 1 Inhaler, Rfl: 5    budesonide-formoterol 160-4.5 mcg (SYMBICORT) 160-4.5 mcg/actuation HFAA, Inhale 2 puffs into the lungs every 12 (twelve) hours. Controller, Disp: 11 g, Rfl: 1    clonazePAM (KLONOPIN) 1 MG tablet, Take 2 mg by mouth once daily. , Disp: , Rfl:     clotrimazole-betamethasone 1-0.05% (LOTRISONE) cream, Apply topically 2 (two) times daily., Disp: 15 g, Rfl: 1    diclofenac sodium (VOLTAREN) 1 % Gel, APPLY TWO GRAMS FOUR TIMES A DAY, Disp: 100 g, Rfl: 0    dicyclomine (BENTYL) 20 mg tablet, Take 1 tablet (20 mg total) by mouth 2 (two) times daily., Disp: 20 tablet, Rfl: 0    ergocalciferol (ERGOCALCIFEROL) 50,000 unit Cap, TAKE 1 CAPSULE (50,000 UNITS TOTAL) BY MOUTH EVERY 7 DAYS., Disp: 12 capsule, Rfl: 3     estradiol (ESTRACE) 0.01 % (0.1 mg/gram) vaginal cream, Use 0.5 g inside vagina and dime-sized amount around opening/inner lips nightly x 2 weeks, then 2x/week thereafter., Disp: 42.5 g, Rfl: 11    ferrous gluconate (FERGON) 324 MG tablet, TAKE 1 TABLET (324 MG TOTAL) BY MOUTH DAILY WITH BREAKFAST., Disp: 80 tablet, Rfl: 0    flavoxATE (URISPAS) 100 mg Tab, Take 1 tablet (100 mg total) by mouth 3 (three) times daily as needed (bladder discomfort)., Disp: 30 tablet, Rfl: 11    fluconazole (DIFLUCAN) 150 MG Tab, Take 1 tablet (150 mg total) by mouth every 7 days., Disp: 6 tablet, Rfl: 0    fluticasone (FLONASE) 50 mcg/actuation nasal spray, INSTILL TWO SPRAYS IN EACH NOSTRIL IN EACH NOSTRIL EVERY EVENING, Disp: 16 g, Rfl: 5    loratadine (CLARITIN) 10 mg tablet, TAKE ONE TABLET BY MOUTH ONCE DAILY, Disp: 90 tablet, Rfl: 3    montelukast (SINGULAIR) 10 mg tablet, TAKE ONE TABLET BY MOUTH EVERY EVENING, Disp: 90 tablet, Rfl: 3    mupirocin (BACTROBAN) 2 % ointment, Apply topically 3 (three) times daily., Disp: 15 g, Rfl: 1    nystatin (MYCOSTATIN) powder, Apply topically 2 (two) times daily., Disp: 1 Bottle, Rfl: 6    ondansetron (ZOFRAN) 8 MG tablet, Take 1 tablet (8 mg total) by mouth every 8 (eight) hours as needed for Nausea., Disp: 6 tablet, Rfl: 0    pantoprazole (PROTONIX) 40 MG tablet, TAKE ONE TABLET BY MOUTH ONCE DAILY, Disp: 90 tablet, Rfl: 3    sertraline (ZOLOFT) 50 MG tablet, Take 100 mg by mouth once daily. , Disp: , Rfl:     topiramate (TOPAMAX) 50 MG tablet, Take 50 mg by mouth once daily., Disp: , Rfl:     trazodone (DESYREL) 50 MG tablet, Take 50 mg by mouth every evening., Disp: , Rfl:     ibuprofen (ADVIL,MOTRIN) 800 MG tablet, Take 1 tablet (800 mg total) by mouth every 6 (six) hours as needed for Pain. (Patient not taking: Reported on 10/18/2019), Disp: 20 tablet, Rfl: 0    methen-m.blue-s.phos-sahara-hyo (URIBEL) 118-10-40.8-36 mg Cap, Take 1 capsule by mouth 4 (four) times daily as  needed., Disp: 20 capsule, Rfl: 11    ondansetron (ZOFRAN-ODT) 4 MG TbDL, Take 1 tablet (4 mg total) by mouth every 6 (six) hours as needed. (Patient not taking: Reported on 10/18/2019), Disp: 15 tablet, Rfl: 0      Assessment & Plan:     No problem-specific Assessment & Plan notes found for this encounter.

## 2019-10-18 NOTE — ASSESSMENT & PLAN NOTE
Patient had an abnormal SPECT myocardial perfusion scan and she is getting med undergo general surgery and think would be appropriate to perform a cardiac catheterization on her which I have arranged for her.  The date of the procedure will be November 12, 2019.

## 2019-10-28 ENCOUNTER — OFFICE VISIT (OUTPATIENT)
Dept: PRIMARY CARE CLINIC | Facility: CLINIC | Age: 54
End: 2019-10-28
Payer: MEDICARE

## 2019-10-28 VITALS
HEIGHT: 64 IN | SYSTOLIC BLOOD PRESSURE: 122 MMHG | TEMPERATURE: 99 F | BODY MASS INDEX: 47.32 KG/M2 | DIASTOLIC BLOOD PRESSURE: 78 MMHG | HEART RATE: 67 BPM | RESPIRATION RATE: 18 BRPM | WEIGHT: 277.19 LBS | OXYGEN SATURATION: 98 %

## 2019-10-28 DIAGNOSIS — R19.7 DIARRHEA, UNSPECIFIED TYPE: ICD-10-CM

## 2019-10-28 DIAGNOSIS — R10.9 ABDOMINAL PAIN, UNSPECIFIED ABDOMINAL LOCATION: ICD-10-CM

## 2019-10-28 DIAGNOSIS — K51.919 ULCERATIVE COLITIS WITH COMPLICATION, UNSPECIFIED LOCATION: Primary | ICD-10-CM

## 2019-10-28 PROCEDURE — 3008F PR BODY MASS INDEX (BMI) DOCUMENTED: ICD-10-PCS | Mod: CPTII,S$GLB,, | Performed by: NURSE PRACTITIONER

## 2019-10-28 PROCEDURE — 99999 PR PBB SHADOW E&M-EST. PATIENT-LVL V: ICD-10-PCS | Mod: PBBFAC,,, | Performed by: NURSE PRACTITIONER

## 2019-10-28 PROCEDURE — 3008F BODY MASS INDEX DOCD: CPT | Mod: CPTII,S$GLB,, | Performed by: NURSE PRACTITIONER

## 2019-10-28 PROCEDURE — 99999 PR PBB SHADOW E&M-EST. PATIENT-LVL V: CPT | Mod: PBBFAC,,, | Performed by: NURSE PRACTITIONER

## 2019-10-28 PROCEDURE — 99213 PR OFFICE/OUTPT VISIT, EST, LEVL III, 20-29 MIN: ICD-10-PCS | Mod: S$GLB,,, | Performed by: NURSE PRACTITIONER

## 2019-10-28 PROCEDURE — 99213 OFFICE O/P EST LOW 20 MIN: CPT | Mod: S$GLB,,, | Performed by: NURSE PRACTITIONER

## 2019-10-28 RX ORDER — DIPHENOXYLATE HYDROCHLORIDE AND ATROPINE SULFATE 2.5; .025 MG/1; MG/1
1 TABLET ORAL 4 TIMES DAILY PRN
Qty: 15 TABLET | Refills: 0 | Status: SHIPPED | OUTPATIENT
Start: 2019-10-28 | End: 2019-11-07

## 2019-10-28 RX ORDER — SERTRALINE HYDROCHLORIDE 100 MG/1
100 TABLET, FILM COATED ORAL NIGHTLY
COMMUNITY
Start: 2019-10-15 | End: 2020-03-25 | Stop reason: SDUPTHER

## 2019-10-28 RX ORDER — DICYCLOMINE HYDROCHLORIDE 20 MG/1
20 TABLET ORAL EVERY 6 HOURS
Qty: 120 TABLET | Refills: 0 | Status: SHIPPED | OUTPATIENT
Start: 2019-10-28 | End: 2019-11-23 | Stop reason: SDUPTHER

## 2019-10-28 RX ORDER — ONDANSETRON 8 MG/1
8 TABLET, ORALLY DISINTEGRATING ORAL EVERY 12 HOURS PRN
Qty: 15 TABLET | Refills: 0 | Status: SHIPPED | OUTPATIENT
Start: 2019-10-28 | End: 2020-02-17 | Stop reason: SDUPTHER

## 2019-10-28 NOTE — PROGRESS NOTES
"Chief Complaint  Chief Complaint   Patient presents with    Nausea    Diarrhea    Abdominal Pain       HPI    Ida Orozco is a 54 y.o. female that presents for abdominal pain, diarrhea, nausea.    Patient with a h/o Crohn's and ulcerative colitis currently under the care of Dr. Virgen.  S/p total colectomy in . Presents to clinic complaining of abdominal pain and diarrhea. Continues with diarrhea but reports that it has "changed" and become looser. Intermittent nausea, occasional vomiting. Treated with zofran with moderate improvement. Previously under the care of dr. Castorena, not currently with GI. Symptoms have been going on for two years. Also reports associated joint pain, generalized fatigue.  Currently taking Lomotil, Bentyl, Zofran as needed for symptoms.  Reports moderate improvement.         PAST MEDICAL HISTORY:  Past Medical History:   Diagnosis Date    Anxiety     Asthma     Crohn's colitis     Fatty liver disease, nonalcoholic     GERD (gastroesophageal reflux disease)     History of sleeve gastrectomy 2016       PAST SURGICAL HISTORY:  Past Surgical History:   Procedure Laterality Date    APPENDECTOMY       SECTION, LOW TRANSVERSE      CHOLECYSTECTOMY      open    COLECTOMY      total- 2013    CYSTOSCOPY W/ RETROGRADES N/A 2018    Procedure: CYSTOSCOPY, WITH RETROGRADE PYELOGRAM;  Surgeon: Butch Banks MD;  Location: [facility];  Service: Urology;  Laterality: N/A;  HANSEN SURGICAL CONFIRMED     FLEXIBLE SIGMOIDOSCOPY  2018    Procedure: SIGMOIDOSCOPY, FLEXIBLE;  Surgeon: JENNY Virgen MD;  [facility];  Service: Colon and Rectal;;    GASTRIC SLEEVE       HYSTERECTOMY      ILEOSTOMY REVISION      2014; 2014    LAPAROSCOPIC PROCTECTOMY N/A 2018    Procedure: PROCTECTOMY-LAPAROSCOPIC/CONVERTED TO OPEN;  Surgeon: JENNY Virgen MD;  Location: [facility] OR 2ND FLR;  Service: Colon and Rectal;  Laterality: N/A;    " LYSIS OF ADHESIONS  5/24/2018    Procedure: LYSIS, ADHESIONS/ more than 2hours;  Surgeon: JENNY Virgen MD;  Location: [facility] OR [address] [place] [address];  Service: Colon and Rectal;;    thumb surgery      TONSILLECTOMY, ADENOIDECTOMY      WISDOM TOOTH EXTRACTION         SOCIAL HISTORY:  Social History     Socioeconomic History    Marital status: Legally      Spouse name: Not on file    Number of children: Not on file    Years of education: Not on file    Highest education level: Not on file   Occupational History    Not on file   Social Needs    Financial resource strain: Not on file    Food insecurity:     Worry: Not on file     Inability: Not on file    Transportation needs:     Medical: Not on file     Non-medical: Not on file   Tobacco Use    Smoking status: Former Smoker     Types: Cigarettes     Last attempt to quit: 2009     Years since quitting: 10.8    Smokeless tobacco: Never Used   Substance and Sexual Activity    Alcohol use: No    Drug use: No    Sexual activity: Never     Birth control/protection: See Surgical Hx   Lifestyle    Physical activity:     Days per week: Not on file     Minutes per session: Not on file    Stress: Not on file   Relationships    Social connections:     Talks on phone: Not on file     Gets together: Not on file     Attends [employer] service: Not on file     Active member of club or organization: Not on file     Attends meetings of clubs or organizations: Not on file     Relationship status: Not on file   Other Topics Concern    Not on file   Social History Narrative    Not on file       FAMILY HISTORY:  Family History   Problem Relation Age of Onset    Cancer Mother         skin    Heart disease Father 67    Cancer Paternal Grandfather         ?    Cancer Maternal Grandfather         colon        ALLERGIES AND MEDICATIONS: updated and reviewed.  Review of patient's allergies indicates:   Allergen Reactions    Adhesive      Cause blisters    Dilaudid  [hydromorphone] Nausea And Vomiting    Sulfa (sulfonamide antibiotics) Hives     Current Outpatient Medications   Medication Sig Dispense Refill    albuterol (PROVENTIL) 2.5 mg /3 mL (0.083 %) nebulizer solution INHALE ONE VIAL VIA NEBULIZER EVERY SIX HOURS 150 mL 3    albuterol 90 mcg/actuation inhaler Inhale 2 puffs into the lungs every 4 (four) hours as needed for Wheezing or Shortness of Breath. Rescue 1 Inhaler 5    budesonide-formoterol 160-4.5 mcg (SYMBICORT) 160-4.5 mcg/actuation HFAA Inhale 2 puffs into the lungs every 12 (twelve) hours. Controller 11 g 1    clonazePAM (KLONOPIN) 1 MG tablet Take 2 mg by mouth once daily.       clotrimazole-betamethasone 1-0.05% (LOTRISONE) cream Apply topically 2 (two) times daily. 15 g 1    diclofenac sodium (VOLTAREN) 1 % Gel APPLY TWO GRAMS FOUR TIMES A  g 0    ergocalciferol (ERGOCALCIFEROL) 50,000 unit Cap TAKE 1 CAPSULE (50,000 UNITS TOTAL) BY MOUTH EVERY 7 DAYS. 12 capsule 3    estradiol (ESTRACE) 0.01 % (0.1 mg/gram) vaginal cream Use 0.5 g inside vagina and dime-sized amount around opening/inner lips nightly x 2 weeks, then 2x/week thereafter. 42.5 g 11    ferrous gluconate (FERGON) 324 MG tablet TAKE 1 TABLET (324 MG TOTAL) BY MOUTH DAILY WITH BREAKFAST. 80 tablet 0    fluconazole (DIFLUCAN) 150 MG Tab Take 1 tablet (150 mg total) by mouth every 7 days. 6 tablet 0    fluticasone (FLONASE) 50 mcg/actuation nasal spray INSTILL TWO SPRAYS IN EACH NOSTRIL IN EACH NOSTRIL EVERY EVENING 16 g 5    ibuprofen (ADVIL,MOTRIN) 800 MG tablet Take 1 tablet (800 mg total) by mouth every 6 (six) hours as needed for Pain. 20 tablet 0    loratadine (CLARITIN) 10 mg tablet TAKE ONE TABLET BY MOUTH ONCE DAILY 90 tablet 3    montelukast (SINGULAIR) 10 mg tablet TAKE ONE TABLET BY MOUTH EVERY EVENING 90 tablet 3    mupirocin (BACTROBAN) 2 % ointment Apply topically 3 (three) times daily. 15 g 1    nystatin (MYCOSTATIN) powder Apply topically 2 (two) times  "daily. 1 Bottle 6    pantoprazole (PROTONIX) 40 MG tablet TAKE ONE TABLET BY MOUTH ONCE DAILY 90 tablet 3    sertraline (ZOLOFT) 100 MG tablet Take 1 tablet by mouth once daily.      topiramate (TOPAMAX) 50 MG tablet Take 50 mg by mouth once daily.      trazodone (DESYREL) 50 MG tablet Take 50 mg by mouth every evening.      dicyclomine (BENTYL) 20 mg tablet Take 1 tablet (20 mg total) by mouth every 6 (six) hours. 120 tablet 0    diphenoxylate-atropine 2.5-0.025 mg (LOMOTIL) 2.5-0.025 mg per tablet Take 1 tablet by mouth 4 (four) times daily as needed for Diarrhea. 15 tablet 0    ondansetron (ZOFRAN-ODT) 8 MG TbDL Take 1 tablet (8 mg total) by mouth every 12 (twelve) hours as needed. 15 tablet 0     No current facility-administered medications for this visit.          ROS  Review of Systems   Constitutional: Negative for fever.   Gastrointestinal: Positive for diarrhea, nausea and vomiting. Negative for constipation.   Genitourinary: Negative for dysuria, frequency and hematuria.   Musculoskeletal: Positive for arthralgias. Negative for myalgias.   Neurological: Negative for headaches.           PHYSICAL EXAM  Vitals:    10/28/19 1121   BP: 122/78   BP Location: Right arm   Patient Position: Sitting   BP Method: Large (Manual)   Pulse: 67   Resp: 18   Temp: 98.7 °F (37.1 °C)   TempSrc: Oral   SpO2: 98%   Weight: 125.7 kg (277 lb 3.2 oz)   Height: 5' 4" (1.626 m)    Body mass index is 47.58 kg/m².  Weight: 125.7 kg (277 lb 3.2 oz)   Height: 5' 4" (162.6 cm)     Physical Exam   Constitutional: She is oriented to person, place, and time. She appears well-developed and well-nourished.   HENT:   Head: Normocephalic.   Right Ear: Tympanic membrane normal.   Left Ear: Tympanic membrane normal.   Mouth/Throat: Uvula is midline, oropharynx is clear and moist and mucous membranes are normal.   Eyes: Conjunctivae are normal.   Cardiovascular: Normal rate, regular rhythm, normal heart sounds and normal pulses.   No " murmur heard.  Pulses:       Radial pulses are 2+ on the right side, and 2+ on the left side.   No LE swelling noted   Pulmonary/Chest: Effort normal and breath sounds normal. She has no wheezes.   Abdominal: Soft. Bowel sounds are normal. There is no tenderness.   The stoma beefy red, stool brown/green liquid.     Musculoskeletal: She exhibits no edema.   Lymphadenopathy:     She has no cervical adenopathy.   Neurological: She is alert and oriented to person, place, and time.   Skin: Skin is warm and dry. No rash noted.   Psychiatric: She has a normal mood and affect.         Health Maintenance       Date Due Completion Date    Aspirin/Antiplatelet Therapy 07/27/1985 ---    High Dose Statin 07/27/1988 ---    Shingles Vaccine (1 of 2) 07/27/2017 ---    Influenza Vaccine (1) 09/01/2019 10/12/2018    Lipid Panel 10/02/2019 10/2/2018    Eye Exam 10/23/2019 10/23/2018    Foot Exam 10/24/2019 10/24/2018    Urine Microalbumin 10/24/2019 10/24/2018    Hemoglobin A1c 11/30/2019 5/30/2019    Mammogram 10/24/2020 10/24/2018    TETANUS VACCINE 10/24/2028 10/24/2018            Assessment & Plan    Problem List Items Addressed This Visit        Unprioritized    UC (ulcerative colitis) - Primary    Relevant Orders    Ambulatory Referral to Gastroenterology      Other Visit Diagnoses     Diarrhea, unspecified type        Abdominal pain, unspecified abdominal location              Follow-up: No follow-ups on file.    Janet Guardado    Medication List with Changes/Refills   New Medications    DICYCLOMINE (BENTYL) 20 MG TABLET    Take 1 tablet (20 mg total) by mouth every 6 (six) hours.    DIPHENOXYLATE-ATROPINE 2.5-0.025 MG (LOMOTIL) 2.5-0.025 MG PER TABLET    Take 1 tablet by mouth 4 (four) times daily as needed for Diarrhea.    ONDANSETRON (ZOFRAN-ODT) 8 MG TBDL    Take 1 tablet (8 mg total) by mouth every 12 (twelve) hours as needed.   Current Medications    ALBUTEROL (PROVENTIL) 2.5 MG /3 ML (0.083 %) NEBULIZER SOLUTION     INHALE ONE VIAL VIA NEBULIZER EVERY SIX HOURS    ALBUTEROL 90 MCG/ACTUATION INHALER    Inhale 2 puffs into the lungs every 4 (four) hours as needed for Wheezing or Shortness of Breath. Rescue    BUDESONIDE-FORMOTEROL 160-4.5 MCG (SYMBICORT) 160-4.5 MCG/ACTUATION HFAA    Inhale 2 puffs into the lungs every 12 (twelve) hours. Controller    CLONAZEPAM (KLONOPIN) 1 MG TABLET    Take 2 mg by mouth once daily.     CLOTRIMAZOLE-BETAMETHASONE 1-0.05% (LOTRISONE) CREAM    Apply topically 2 (two) times daily.    DICLOFENAC SODIUM (VOLTAREN) 1 % GEL    APPLY TWO GRAMS FOUR TIMES A DAY    ERGOCALCIFEROL (ERGOCALCIFEROL) 50,000 UNIT CAP    TAKE 1 CAPSULE (50,000 UNITS TOTAL) BY MOUTH EVERY 7 DAYS.    ESTRADIOL (ESTRACE) 0.01 % (0.1 MG/GRAM) VAGINAL CREAM    Use 0.5 g inside vagina and dime-sized amount around opening/inner lips nightly x 2 weeks, then 2x/week thereafter.    FERROUS GLUCONATE (FERGON) 324 MG TABLET    TAKE 1 TABLET (324 MG TOTAL) BY MOUTH DAILY WITH BREAKFAST.    FLUCONAZOLE (DIFLUCAN) 150 MG TAB    Take 1 tablet (150 mg total) by mouth every 7 days.    FLUTICASONE (FLONASE) 50 MCG/ACTUATION NASAL SPRAY    INSTILL TWO SPRAYS IN EACH NOSTRIL IN EACH NOSTRIL EVERY EVENING    IBUPROFEN (ADVIL,MOTRIN) 800 MG TABLET    Take 1 tablet (800 mg total) by mouth every 6 (six) hours as needed for Pain.    LORATADINE (CLARITIN) 10 MG TABLET    TAKE ONE TABLET BY MOUTH ONCE DAILY    MONTELUKAST (SINGULAIR) 10 MG TABLET    TAKE ONE TABLET BY MOUTH EVERY EVENING    MUPIROCIN (BACTROBAN) 2 % OINTMENT    Apply topically 3 (three) times daily.    NYSTATIN (MYCOSTATIN) POWDER    Apply topically 2 (two) times daily.    PANTOPRAZOLE (PROTONIX) 40 MG TABLET    TAKE ONE TABLET BY MOUTH ONCE DAILY    SERTRALINE (ZOLOFT) 100 MG TABLET    Take 1 tablet by mouth once daily.    TOPIRAMATE (TOPAMAX) 50 MG TABLET    Take 50 mg by mouth once daily.    TRAZODONE (DESYREL) 50 MG TABLET    Take 50 mg by mouth every evening.   Discontinued  Medications    DICYCLOMINE (BENTYL) 20 MG TABLET    Take 1 tablet (20 mg total) by mouth 2 (two) times daily.    FLAVOXATE (URISPAS) 100 MG TAB    Take 1 tablet (100 mg total) by mouth 3 (three) times daily as needed (bladder discomfort).    METHEN-M.BLUE-S.PHOS-PHSAL-HYO (URIBEL) 118-10-40.8-36 MG CAP    Take 1 capsule by mouth 4 (four) times daily as needed.    ONDANSETRON (ZOFRAN) 8 MG TABLET    Take 1 tablet (8 mg total) by mouth every 8 (eight) hours as needed for Nausea.    ONDANSETRON (ZOFRAN-ODT) 4 MG TBDL    Take 1 tablet (4 mg total) by mouth every 6 (six) hours as needed.    SERTRALINE (ZOLOFT) 50 MG TABLET    Take 100 mg by mouth once daily.

## 2019-10-30 ENCOUNTER — TELEPHONE (OUTPATIENT)
Dept: PRIMARY CARE CLINIC | Facility: CLINIC | Age: 54
End: 2019-10-30

## 2019-10-30 NOTE — TELEPHONE ENCOUNTER
----- Message from Alexandra Disla sent at 10/30/2019  9:19 AM CDT -----  Contact: Patient  Type: Needs Medical Advice    Who Called:  Ida, patient  Symptoms (please be specific):  Nausea, vomiting, diarrhea  How long has patient had these symptoms:  Since this weekend  Pharmacy name and phone #:    [id number] [address] [address]  Phone: [phone] Fax: [phone]  Best Call Back Number: [phone]  Additional Information: Calling because she is still not feeling better. Please call her. Thanks.

## 2019-10-30 NOTE — TELEPHONE ENCOUNTER
"LUIS CARLOS. Spoke to patient, states she "passed out Monday night for about 30 minutes" states she did not go to the ED, just drank water and got back in bed. States she has diarrhea, she is getting "charlies horses in toes and fingers" Patient states she has not been able to eat or drink anything either. I advised the patient to go to the ED based on these symptoms. I let her know she could be dehydrated or even have an electrolyte imbalance and to go to the nearest ED for evaluation and treatment.     "

## 2019-11-01 NOTE — TELEPHONE ENCOUNTER
I see that the patient went to the emergency room as instructed please inquire how she is feeling.

## 2019-11-01 NOTE — TELEPHONE ENCOUNTER
Spoke with patient states she is feeling much better since the hospital. She has not be nauseated today at all.

## 2019-11-12 ENCOUNTER — TELEPHONE (OUTPATIENT)
Dept: CARDIOLOGY | Facility: CLINIC | Age: 54
End: 2019-11-12

## 2019-11-12 NOTE — TELEPHONE ENCOUNTER
----- Message from Lula Bowers MA sent at 11/12/2019  9:23 AM CST -----  Contact: Self/[phone]  Pt states she has a procedure on tomorrow and she would like a call in regards to her appt on tomorrow, Pt states she is trying to set up her transportation  and well has her transportation back home.

## 2019-11-12 NOTE — H&P
This patient is admitted for cardiac catheterization with coronary left ventricular angiograms.  She has an abnormal SPECT myocardial perfusion scan and her coronary anatomy needs to be delineated.  The patient has been explained the procedure in detail she appears to understand and I have answered all of her questions.

## 2019-11-13 ENCOUNTER — PATIENT OUTREACH (OUTPATIENT)
Dept: ADMINISTRATIVE | Facility: OTHER | Age: 54
End: 2019-11-13

## 2019-11-13 DIAGNOSIS — E11.9 TYPE 2 DIABETES MELLITUS WITHOUT COMPLICATION, UNSPECIFIED WHETHER LONG TERM INSULIN USE: Primary | ICD-10-CM

## 2019-11-13 PROBLEM — R94.39 ABNORMAL STRESS TEST: Status: ACTIVE | Noted: 2019-11-13

## 2019-11-13 NOTE — H&P
[facility] - Cardiology  History & Physical    Subjective:      Chief Complaint/Reason for Admission: Cardiac Cath    Ida Orozco is a 54 y.o. female.    Past Medical History:   Diagnosis Date    Anxiety     Asthma     Bronchitis     Crohn's colitis     Depression     Fatty liver disease, nonalcoholic     Gallstones     GERD (gastroesophageal reflux disease)     History of sleeve gastrectomy 2016    PTSD (post-traumatic stress disorder)     Seasonal allergies     Ulcerative colitis      Past Surgical History:   Procedure Laterality Date    APPENDECTOMY       SECTION, LOW TRANSVERSE      CHOLECYSTECTOMY      open    COLECTOMY      total- 2013    CYSTOSCOPY W/ RETROGRADES N/A 2018    Procedure: CYSTOSCOPY, WITH RETROGRADE PYELOGRAM;  Surgeon: Butch Banks MD;  Location: [facility] OR;  Service: Urology;  Laterality: N/A;  HANSEN SURGICAL CONFIRMED     FLEXIBLE SIGMOIDOSCOPY  2018    Procedure: SIGMOIDOSCOPY, FLEXIBLE;  Surgeon: JENNY Virgen MD;  [facility];  Service: Colon and Rectal;;    GASTRIC SLEEVE       HYSTERECTOMY      ILEOSTOMY REVISION      2014; 2014    LAPAROSCOPIC PROCTECTOMY N/A 2018    Procedure: PROCTECTOMY-LAPAROSCOPIC/CONVERTED TO OPEN;  Surgeon: JENNY Virgen MD;  Location: [facility] OR 2ND FLR;  Service: Colon and Rectal;  Laterality: N/A;    LYSIS OF ADHESIONS  2018    Procedure: LYSIS, ADHESIONS/ more than 2hours;  Surgeon: JENNY Virgen MD;  Location: [facility] OR 2ND FLR;  Service: Colon and Rectal;;    thumb surgery      TONSILLECTOMY, ADENOIDECTOMY      WISDOM TOOTH EXTRACTION       Family History   Problem Relation Age of Onset    Cancer Mother         skin    Heart disease Father 67    Cancer Paternal Grandfather         ?    Cancer Maternal Grandfather         colon      Social History     Tobacco Use    Smoking status: Former Smoker     Types: Cigarettes     Last attempt to  quit: 2009     Years since quitting: 10.8    Smokeless tobacco: Never Used   Substance Use Topics    Alcohol use: Yes     Comment: occasionally    Drug use: No         (Not in a hospital admission)  Review of patient's allergies indicates:   Allergen Reactions    Adhesive      Cause blisters    Dilaudid [hydromorphone] Nausea And Vomiting    Sulfa (sulfonamide antibiotics) Hives        Review of Systems   Constitutional: Negative.    HENT: Negative.    Eyes: Negative.    Respiratory: Negative.    Cardiovascular: Positive for chest pain.   Gastrointestinal: Negative.    Genitourinary: Negative.    Musculoskeletal: Negative.    Skin: Negative.    Neurological: Negative.    Endo/Heme/Allergies: Negative.    Psychiatric/Behavioral: Negative.        Objective:      Vital Signs (Most Recent)  Pulse: (!) 58 (10/18/19 1116)  BP: 104/60 (10/18/19 1116)  SpO2: 95 % (10/18/19 1116)    Vital Signs Range (Last 24H):  [unfilled]    Physical Exam   Constitutional: She appears well-developed.   HENT:   Head: Normocephalic.   Eyes: Pupils are equal, round, and reactive to light.   Neck: Normal range of motion.   Cardiovascular: Normal rate.   Pulmonary/Chest: Effort normal.   Abdominal: Soft.   Musculoskeletal: Normal range of motion.   Neurological: She is alert.   Skin: Skin is warm.       Data Review:    CBC:   Lab Results   Component Value Date    WBC 5.20 11/05/2019    RBC 4.97 11/05/2019    HGB 14.4 11/05/2019    HCT 44.9 11/05/2019     11/05/2019      ECG: normal sinus rhythm, no blocks or conduction defects, no ischemic changes.     Assessment:      There are no hospital problems to display for this patient.      Plan:    Cardiac Cath.

## 2019-11-14 ENCOUNTER — TELEPHONE (OUTPATIENT)
Dept: PLASTIC SURGERY | Facility: CLINIC | Age: 54
End: 2019-11-14

## 2019-11-14 ENCOUNTER — PATIENT MESSAGE (OUTPATIENT)
Dept: PLASTIC SURGERY | Facility: CLINIC | Age: 54
End: 2019-11-14

## 2019-11-15 ENCOUNTER — TELEPHONE (OUTPATIENT)
Dept: PLASTIC SURGERY | Facility: CLINIC | Age: 54
End: 2019-11-15

## 2019-11-15 NOTE — TELEPHONE ENCOUNTER
left message for pt and let her know that Dr Virgen would be available for her surgery on 12/19. and that if this date worked for her to give me a call back to confirm. I left my direct office number.

## 2019-11-18 ENCOUNTER — CLINICAL SUPPORT (OUTPATIENT)
Dept: PRIMARY CARE CLINIC | Facility: CLINIC | Age: 54
End: 2019-11-18
Payer: MEDICARE

## 2019-11-18 ENCOUNTER — OFFICE VISIT (OUTPATIENT)
Dept: CARDIOLOGY | Facility: CLINIC | Age: 54
End: 2019-11-18
Payer: MEDICARE

## 2019-11-18 VITALS
HEART RATE: 92 BPM | OXYGEN SATURATION: 96 % | DIASTOLIC BLOOD PRESSURE: 71 MMHG | HEIGHT: 64 IN | SYSTOLIC BLOOD PRESSURE: 107 MMHG | BODY MASS INDEX: 45.75 KG/M2 | WEIGHT: 268 LBS

## 2019-11-18 DIAGNOSIS — Z23 NEED FOR VACCINATION: Primary | ICD-10-CM

## 2019-11-18 DIAGNOSIS — R94.39 ABNORMAL STRESS TEST: ICD-10-CM

## 2019-11-18 PROCEDURE — 3008F PR BODY MASS INDEX (BMI) DOCUMENTED: ICD-10-PCS | Mod: CPTII,S$GLB,, | Performed by: INTERNAL MEDICINE

## 2019-11-18 PROCEDURE — 99999 PR PBB SHADOW E&M-EST. PATIENT-LVL IV: CPT | Mod: PBBFAC,,, | Performed by: INTERNAL MEDICINE

## 2019-11-18 PROCEDURE — G0008 FLU VACCINE (QUAD) GREATER THAN OR EQUAL TO 3YO PRESERVATIVE FREE IM: ICD-10-PCS | Mod: S$GLB,,, | Performed by: FAMILY MEDICINE

## 2019-11-18 PROCEDURE — 99999 PR PBB SHADOW E&M-EST. PATIENT-LVL IV: ICD-10-PCS | Mod: PBBFAC,,, | Performed by: INTERNAL MEDICINE

## 2019-11-18 PROCEDURE — 99213 PR OFFICE/OUTPT VISIT, EST, LEVL III, 20-29 MIN: ICD-10-PCS | Mod: S$GLB,,, | Performed by: INTERNAL MEDICINE

## 2019-11-18 PROCEDURE — 90686 FLU VACCINE (QUAD) GREATER THAN OR EQUAL TO 3YO PRESERVATIVE FREE IM: ICD-10-PCS | Mod: S$GLB,,, | Performed by: FAMILY MEDICINE

## 2019-11-18 PROCEDURE — 99999 PR PBB SHADOW E&M-EST. PATIENT-LVL I: CPT | Mod: PBBFAC,,,

## 2019-11-18 PROCEDURE — 99999 PR PBB SHADOW E&M-EST. PATIENT-LVL I: ICD-10-PCS | Mod: PBBFAC,,,

## 2019-11-18 PROCEDURE — 3008F BODY MASS INDEX DOCD: CPT | Mod: CPTII,S$GLB,, | Performed by: INTERNAL MEDICINE

## 2019-11-18 PROCEDURE — G0008 ADMIN INFLUENZA VIRUS VAC: HCPCS | Mod: S$GLB,,, | Performed by: FAMILY MEDICINE

## 2019-11-18 PROCEDURE — 90686 IIV4 VACC NO PRSV 0.5 ML IM: CPT | Mod: S$GLB,,, | Performed by: FAMILY MEDICINE

## 2019-11-18 PROCEDURE — 99213 OFFICE O/P EST LOW 20 MIN: CPT | Mod: S$GLB,,, | Performed by: INTERNAL MEDICINE

## 2019-11-18 RX ORDER — TOPIRAMATE 50 MG/1
TABLET, FILM COATED ORAL
COMMUNITY
Start: 2019-10-15 | End: 2019-11-22 | Stop reason: SDUPTHER

## 2019-11-18 NOTE — PROGRESS NOTES
Verified pt ID using name and . Verified allergies with patient, VIS given. Administered flu vaccine in right deltoid per physician order using aseptic technique. Aspirated and no blood return noted. Pt tolerated well with no adverse reactions noted.

## 2019-11-19 NOTE — PROGRESS NOTES
"Subjective:    Patient ID:  Ida Orozco is a 54 y.o. y.o. female who presents for initial visit for No chief complaint on file.      Patient underwent cardiac catheterization which revealed no evidence of coronary artery disease and no evidence of pulmonary hypertension.  She is cleared for her panniculectomy.      Past Medical History:   Diagnosis Date    Anxiety     Asthma     Bronchitis     Crohn's colitis     Depression     Fatty liver disease, nonalcoholic     Gallstones     GERD (gastroesophageal reflux disease)     History of sleeve gastrectomy 06/24/2016    PTSD (post-traumatic stress disorder)     Seasonal allergies     Ulcerative colitis         Social History     Tobacco Use    Smoking status: Former Smoker     Types: Cigarettes     Last attempt to quit: 2009     Years since quitting: 10.8    Smokeless tobacco: Never Used   Substance Use Topics    Alcohol use: Yes     Comment: occasionally    Drug use: No        family history includes Cancer in her maternal grandfather, mother, and paternal grandfather; Heart disease (age of onset: 67) in her father.      Review of Systems   Constitutional: Negative.    HENT: Negative.    Eyes: Negative.    Respiratory: Negative.    Cardiovascular: Negative.    Gastrointestinal: Negative.    Genitourinary: Negative.    Musculoskeletal: Negative.    Skin: Negative.    Neurological: Negative.    Endo/Heme/Allergies: Negative.    Psychiatric/Behavioral: Negative.         Objective:     /71 (BP Location: Right arm, Patient Position: Sitting, BP Method: Large (Automatic))   Pulse 92   Ht 5' 4" (1.626 m)   Wt 121.6 kg (268 lb)   SpO2 96%   BMI 46.00 kg/m²     Physical Exam   Constitutional: She is oriented to person, place, and time and well-developed, well-nourished, and in no distress.       HENT:   Head: Normocephalic and atraumatic.   Eyes: Pupils are equal, round, and reactive to light. No scleral icterus.   Neck: Normal range of motion. Neck " supple. No thyromegaly present.   Abdominal: Soft. Bowel sounds are normal. She exhibits no distension and no mass. There is no tenderness.   Anterior colostomy bag.   Musculoskeletal: Normal range of motion. She exhibits no edema.   Lymphadenopathy:     She has no cervical adenopathy.   Neurological: She is alert and oriented to person, place, and time. No cranial nerve deficit.   Skin: Skin is warm and dry. No erythema.   Psychiatric: Memory, affect and judgment normal.       Labs:     Lab Results   Component Value Date     11/05/2019    K 3.7 11/05/2019     11/05/2019    CO2 24 11/05/2019    BUN 12 11/05/2019    CREATININE 1.0 11/05/2019    ANIONGAP 8 11/05/2019     Lab Results   Component Value Date    HGBA1C 5.6 05/30/2019     No results found for: BNP, BNPTRIAGEBLO    Lab Results   Component Value Date    WBC 5.20 11/05/2019    HGB 14.4 11/05/2019    HCT 44.9 11/05/2019     11/05/2019    GRAN 2.1 11/05/2019    GRAN 40.8 11/05/2019     Lab Results   Component Value Date    CHOL 165 10/02/2018    HDL 80 (H) 10/02/2018    LDLCALC 68 10/02/2018    TRIG 85 10/02/2018         Results for orders placed or performed during the hospital encounter of 11/13/19   EKG 12-lead    Collection Time: 11/13/19  7:19 AM    Narrative    Test Reason : I25.110,Z01.818,    Vent. Rate : 055 BPM     Atrial Rate : 055 BPM     P-R Int : 168 ms          QRS Dur : 096 ms      QT Int : 396 ms       P-R-T Axes : 109 -10 045 degrees     QTc Int : 378 ms    Sinus bradycardia  Moderate voltage criteria for LVH, may be normal variant  Cannot rule out Septal infarct (cited on or before 15-AUG-2016)  Abnormal ECG  When compared with ECG of 23-SEP-2019 15:38,  Questionable change in initial forces of Septal leads  Confirmed by Kimo MCLEOD, Barak MARKS ([id number]) on 11/13/2019 11:10:41 AM    Referred By: BARAK MALIN           Confirmed By:Barak Malin MD        .  Meds:     Current Outpatient Medications:     albuterol (PROVENTIL)  2.5 mg /3 mL (0.083 %) nebulizer solution, INHALE ONE VIAL VIA NEBULIZER EVERY SIX HOURS (Patient taking differently: every 6 (six) hours as needed. ), Disp: 150 mL, Rfl: 3    albuterol 90 mcg/actuation inhaler, Inhale 2 puffs into the lungs every 4 (four) hours as needed for Wheezing or Shortness of Breath. Rescue, Disp: 1 Inhaler, Rfl: 5    budesonide-formoterol 160-4.5 mcg (SYMBICORT) 160-4.5 mcg/actuation HFAA, Inhale 2 puffs into the lungs every 12 (twelve) hours. Controller (Patient taking differently: Inhale 2 puffs into the lungs every 12 (twelve) hours as needed. Controller), Disp: 11 g, Rfl: 1    clonazePAM (KLONOPIN) 1 MG tablet, Take 1 mg by mouth once daily. , Disp: , Rfl:     clotrimazole-betamethasone 1-0.05% (LOTRISONE) cream, Apply topically 2 (two) times daily., Disp: 15 g, Rfl: 1    diclofenac sodium (VOLTAREN) 1 % Gel, APPLY TWO GRAMS FOUR TIMES A DAY (Patient taking differently: APPLY TWO GRAMS FOUR TIMES A DAY PRN), Disp: 100 g, Rfl: 0    dicyclomine (BENTYL) 20 mg tablet, Take 1 tablet (20 mg total) by mouth every 6 (six) hours. (Patient taking differently: Take 20 mg by mouth once daily. ), Disp: 120 tablet, Rfl: 0    ergocalciferol (ERGOCALCIFEROL) 50,000 unit Cap, TAKE 1 CAPSULE (50,000 UNITS TOTAL) BY MOUTH EVERY 7 DAYS., Disp: 12 capsule, Rfl: 3    ferrous gluconate (FERGON) 324 MG tablet, TAKE 1 TABLET (324 MG TOTAL) BY MOUTH DAILY WITH BREAKFAST., Disp: 80 tablet, Rfl: 0    fluconazole (DIFLUCAN) 150 MG Tab, Take 1 tablet (150 mg total) by mouth every 7 days., Disp: 6 tablet, Rfl: 0    fluticasone (FLONASE) 50 mcg/actuation nasal spray, INSTILL TWO SPRAYS IN EACH NOSTRIL IN EACH NOSTRIL EVERY EVENING, Disp: 16 g, Rfl: 5    loratadine (CLARITIN) 10 mg tablet, TAKE ONE TABLET BY MOUTH ONCE DAILY, Disp: 90 tablet, Rfl: 3    montelukast (SINGULAIR) 10 mg tablet, TAKE ONE TABLET BY MOUTH EVERY EVENING, Disp: 90 tablet, Rfl: 3    mupirocin (BACTROBAN) 2 % ointment, Apply  topically 3 (three) times daily., Disp: 15 g, Rfl: 1    nystatin (MYCOSTATIN) powder, Apply topically 2 (two) times daily. (Patient taking differently: Apply topically daily as needed (when changes colostomy bag). ), Disp: 1 Bottle, Rfl: 6    ondansetron (ZOFRAN-ODT) 8 MG TbDL, Take 1 tablet (8 mg total) by mouth every 12 (twelve) hours as needed., Disp: 15 tablet, Rfl: 0    pantoprazole (PROTONIX) 40 MG tablet, TAKE ONE TABLET BY MOUTH ONCE DAILY, Disp: 90 tablet, Rfl: 3    promethazine (PHENERGAN) 25 MG tablet, Take 1 tablet (25 mg total) by mouth every 6 (six) hours as needed for Nausea., Disp: 15 tablet, Rfl: 0    sertraline (ZOLOFT) 100 MG tablet, Take 1 tablet by mouth once daily., Disp: , Rfl:     topiramate (TOPAMAX) 100 MG tablet, Take 100 mg by mouth once daily. , Disp: , Rfl:     topiramate (TOPAMAX) 50 MG tablet, , Disp: , Rfl:     trazodone (DESYREL) 50 MG tablet, Take 50 mg by mouth every evening., Disp: , Rfl:       Assessment & Plan:     Abnormal stress test  Patient is cleared for panniculectomy surgery.

## 2019-11-21 ENCOUNTER — TELEPHONE (OUTPATIENT)
Dept: SURGERY | Facility: CLINIC | Age: 54
End: 2019-11-21

## 2019-11-21 RX ORDER — FERROUS GLUCONATE 324(37.5)
TABLET ORAL
Qty: 80 TABLET | Refills: 0 | Status: SHIPPED | OUTPATIENT
Start: 2019-11-21 | End: 2019-11-22 | Stop reason: SDUPTHER

## 2019-11-21 NOTE — TELEPHONE ENCOUNTER
----- Message from Lee Ann Sharma sent at 11/21/2019  1:29 PM CST -----  Contact: [phone]  Param oneal-Gila just missed your call.  Please try again.

## 2019-11-21 NOTE — TELEPHONE ENCOUNTER
Left message I was calling to see if she has her bowel prep and antibiotics for her upcoming surgery.

## 2019-11-22 ENCOUNTER — OFFICE VISIT (OUTPATIENT)
Dept: PRIMARY CARE CLINIC | Facility: CLINIC | Age: 54
End: 2019-11-22
Payer: MEDICARE

## 2019-11-22 ENCOUNTER — CLINICAL SUPPORT (OUTPATIENT)
Dept: PRIMARY CARE CLINIC | Facility: CLINIC | Age: 54
End: 2019-11-22
Payer: MEDICARE

## 2019-11-22 ENCOUNTER — TELEPHONE (OUTPATIENT)
Dept: SURGERY | Facility: CLINIC | Age: 54
End: 2019-11-22

## 2019-11-22 VITALS
RESPIRATION RATE: 18 BRPM | OXYGEN SATURATION: 97 % | BODY MASS INDEX: 45.75 KG/M2 | HEIGHT: 64 IN | DIASTOLIC BLOOD PRESSURE: 70 MMHG | HEART RATE: 61 BPM | WEIGHT: 268 LBS | SYSTOLIC BLOOD PRESSURE: 110 MMHG | TEMPERATURE: 99 F

## 2019-11-22 DIAGNOSIS — R53.83 FATIGUE, UNSPECIFIED TYPE: ICD-10-CM

## 2019-11-22 DIAGNOSIS — G47.10 HYPERSOMNOLENCE: ICD-10-CM

## 2019-11-22 DIAGNOSIS — R00.1 BRADYCARDIA: Primary | ICD-10-CM

## 2019-11-22 DIAGNOSIS — R00.1 BRADYCARDIA: ICD-10-CM

## 2019-11-22 DIAGNOSIS — E11.9 TYPE 2 DIABETES MELLITUS WITHOUT COMPLICATION, UNSPECIFIED WHETHER LONG TERM INSULIN USE: ICD-10-CM

## 2019-11-22 LAB — TSH SERPL DL<=0.005 MIU/L-ACNC: 3.04 UIU/ML (ref 0.45–5.33)

## 2019-11-22 PROCEDURE — 3008F PR BODY MASS INDEX (BMI) DOCUMENTED: ICD-10-PCS | Mod: CPTII,S$GLB,, | Performed by: FAMILY MEDICINE

## 2019-11-22 PROCEDURE — 99214 PR OFFICE/OUTPT VISIT, EST, LEVL IV, 30-39 MIN: ICD-10-PCS | Mod: S$GLB,,, | Performed by: FAMILY MEDICINE

## 2019-11-22 PROCEDURE — 36415 PR COLLECTION VENOUS BLOOD,VENIPUNCTURE: ICD-10-PCS | Mod: S$GLB,,, | Performed by: FAMILY MEDICINE

## 2019-11-22 PROCEDURE — 84443 ASSAY THYROID STIM HORMONE: CPT

## 2019-11-22 PROCEDURE — 36415 COLL VENOUS BLD VENIPUNCTURE: CPT | Mod: S$GLB,,, | Performed by: FAMILY MEDICINE

## 2019-11-22 PROCEDURE — 3008F BODY MASS INDEX DOCD: CPT | Mod: CPTII,S$GLB,, | Performed by: FAMILY MEDICINE

## 2019-11-22 PROCEDURE — 99999 PR PBB SHADOW E&M-EST. PATIENT-LVL III: ICD-10-PCS | Mod: PBBFAC,,, | Performed by: FAMILY MEDICINE

## 2019-11-22 PROCEDURE — 99999 PR PBB SHADOW E&M-EST. PATIENT-LVL III: CPT | Mod: PBBFAC,,, | Performed by: FAMILY MEDICINE

## 2019-11-22 PROCEDURE — 99214 OFFICE O/P EST MOD 30 MIN: CPT | Mod: S$GLB,,, | Performed by: FAMILY MEDICINE

## 2019-11-22 NOTE — PROGRESS NOTES
"Subjective:       Patient ID: Ida Orozco is a 54 y.o. female.    Chief Complaint: Numbness (fingertips and toes are going numb ); Bradycardia (says her HR is dropping at night ); Fatigue (very fatigue during the day ); and Snoring    Complains of significant fatigue.  Feels tired all the time, falls asleep easily throughout the day.   has told that she snores extremely loud night, worried about the possibility of sleep apnea.  Also reports that for the past 6 months or so, her heart rate has been fairly low.  Has a fit bit, monitors her heart rate, drops into the 40s both at night while sleeping and during the day.  Occasional dizziness.  Recent echocardiogram and angiogram unremarkable.    Review of Systems   Constitutional: Positive for activity change and fatigue. Negative for unexpected weight change.   HENT: Negative for hearing loss, rhinorrhea and trouble swallowing.    Eyes: Negative for discharge and visual disturbance.   Respiratory: Negative for chest tightness and wheezing.    Cardiovascular: Negative for chest pain and palpitations.   Gastrointestinal: Negative for blood in stool, constipation, diarrhea and vomiting.   Endocrine: Negative for polydipsia and polyuria.   Genitourinary: Negative for difficulty urinating, dysuria, hematuria and menstrual problem.   Musculoskeletal: Negative for arthralgias, joint swelling and neck pain.   Skin: Negative for wound.   Neurological: Positive for dizziness. Negative for weakness and headaches.   Psychiatric/Behavioral: Negative for confusion and dysphoric mood.       Objective:      Vitals:    11/22/19 0954   BP: 110/70   BP Location: Left arm   Patient Position: Sitting   BP Method: Large (Manual)   Pulse: 61   Resp: 18   Temp: 98.8 °F (37.1 °C)   TempSrc: Oral   SpO2: 97%   Weight: 121.6 kg (268 lb)   Height: 5' 4" (1.626 m)     Physical Exam   Constitutional: She is oriented to person, place, and time. She appears well-developed and " well-nourished.   HENT:   Head: Normocephalic and atraumatic.   Cardiovascular: Normal rate, regular rhythm and normal heart sounds.   Pulmonary/Chest: Effort normal and breath sounds normal.   Musculoskeletal: She exhibits no edema.   Neurological: She is alert and oriented to person, place, and time.   Skin: Skin is warm and dry.   Psychiatric: She has a normal mood and affect. Her behavior is normal.   Nursing note and vitals reviewed.      Lab Results   Component Value Date    WBC 5.20 11/05/2019    HGB 14.4 11/05/2019    HCT 44.9 11/05/2019     11/05/2019    CHOL 165 10/02/2018    TRIG 85 10/02/2018    HDL 80 (H) 10/02/2018    ALT 55 (H) 11/05/2019    AST 51 (H) 11/05/2019     11/05/2019    K 3.7 11/05/2019     11/05/2019    CREATININE 1.0 11/05/2019    BUN 12 11/05/2019    CO2 24 11/05/2019    TSH 1.051 01/17/2018    INR 1.0 08/01/2014    HGBA1C 5.6 05/30/2019      Assessment:       1. Bradycardia    2. Hypersomnolence    3. Fatigue, unspecified type        Plan:       Bradycardia  -     TSH; Future; Expected date: 11/22/2019  -     Holter monitor - 24 hour; Future  -     Polysomnography 4 or more parameters; Future    Hypersomnolence  -     TSH; Future; Expected date: 11/22/2019  -     Polysomnography 4 or more parameters; Future    Fatigue, unspecified type  -     TSH; Future; Expected date: 11/22/2019  -     Polysomnography 4 or more parameters; Future      Medication List with Changes/Refills   Current Medications    ALBUTEROL (PROVENTIL) 2.5 MG /3 ML (0.083 %) NEBULIZER SOLUTION    INHALE ONE VIAL VIA NEBULIZER EVERY SIX HOURS    ALBUTEROL 90 MCG/ACTUATION INHALER    Inhale 2 puffs into the lungs every 4 (four) hours as needed for Wheezing or Shortness of Breath. Rescue    BUDESONIDE-FORMOTEROL 160-4.5 MCG (SYMBICORT) 160-4.5 MCG/ACTUATION HFAA    Inhale 2 puffs into the lungs every 12 (twelve) hours. Controller    CLONAZEPAM (KLONOPIN) 1 MG TABLET    Take 1 mg by mouth once daily.      CLOTRIMAZOLE-BETAMETHASONE 1-0.05% (LOTRISONE) CREAM    Apply topically 2 (two) times daily.    DICLOFENAC SODIUM (VOLTAREN) 1 % GEL    APPLY TWO GRAMS FOUR TIMES A DAY    DICYCLOMINE (BENTYL) 20 MG TABLET    Take 1 tablet (20 mg total) by mouth every 6 (six) hours.    ERGOCALCIFEROL (ERGOCALCIFEROL) 50,000 UNIT CAP    TAKE 1 CAPSULE (50,000 UNITS TOTAL) BY MOUTH EVERY 7 DAYS.    FERROUS GLUCONATE (FERGON) 324 MG TABLET    TAKE 1 TABLET (324 MG TOTAL) BY MOUTH DAILY WITH BREAKFAST.    FLUCONAZOLE (DIFLUCAN) 150 MG TAB    Take 1 tablet (150 mg total) by mouth every 7 days.    FLUTICASONE (FLONASE) 50 MCG/ACTUATION NASAL SPRAY    INSTILL TWO SPRAYS IN EACH NOSTRIL IN EACH NOSTRIL EVERY EVENING    LORATADINE (CLARITIN) 10 MG TABLET    TAKE ONE TABLET BY MOUTH ONCE DAILY    MONTELUKAST (SINGULAIR) 10 MG TABLET    TAKE ONE TABLET BY MOUTH EVERY EVENING    MUPIROCIN (BACTROBAN) 2 % OINTMENT    Apply topically 3 (three) times daily.    NYSTATIN (MYCOSTATIN) POWDER    Apply topically 2 (two) times daily.    ONDANSETRON (ZOFRAN-ODT) 8 MG TBDL    Take 1 tablet (8 mg total) by mouth every 12 (twelve) hours as needed.    PANTOPRAZOLE (PROTONIX) 40 MG TABLET    TAKE ONE TABLET BY MOUTH ONCE DAILY    PROMETHAZINE (PHENERGAN) 25 MG TABLET    Take 1 tablet (25 mg total) by mouth every 6 (six) hours as needed for Nausea.    SERTRALINE (ZOLOFT) 100 MG TABLET    Take 1 tablet by mouth once daily.    TOPIRAMATE (TROKENDI XR) 200 MG CP24    Take 200 mg by mouth once daily.     TRAZODONE (DESYREL) 50 MG TABLET    Take 50 mg by mouth every evening.   Discontinued Medications    FERROUS GLUCONATE 324 MG (37.5 MG IRON) TAB TABLET    TAKE ONE TABLET BY MOUTH ONCE DAILY WITH BREAKFAST    TOPIRAMATE (TOPAMAX) 50 MG TABLET

## 2019-11-25 RX ORDER — DICYCLOMINE HYDROCHLORIDE 20 MG/1
20 TABLET ORAL EVERY 6 HOURS PRN
Qty: 120 TABLET | Refills: 0 | Status: SHIPPED | OUTPATIENT
Start: 2019-11-25 | End: 2019-12-25

## 2019-11-27 ENCOUNTER — TELEPHONE (OUTPATIENT)
Dept: PLASTIC SURGERY | Facility: CLINIC | Age: 54
End: 2019-11-27

## 2019-11-29 ENCOUNTER — TELEPHONE (OUTPATIENT)
Dept: PLASTIC SURGERY | Facility: CLINIC | Age: 54
End: 2019-11-29

## 2019-11-29 NOTE — TELEPHONE ENCOUNTER
"  spoke with pt and let her know that her surgery would still take place on 12/19 and that if there were anymore changes I would be sure to let her kmow ahead of time. Pt verbalized understanding.            ----- Message from Evie Crane RN sent at 11/27/2019  5:13 PM CST -----  Contact: Ida       ----- Message -----  From: Lillian Rojas  Sent: 11/27/2019   9:08 AM CST  To: Jenny Mendez Staff    Consult/Advisory:    Name Of Caller: Ida   Provider Name: Jenny Mendez MD  What is the nature of the call?:  - - had to reschedule a surgery, was told to call in. Please return her call   Does patient feel the need to be seen today? No   Relationship to the Pt?: self   Contact Preference?: [phone]    Additional Notes:  "Thank you for all that you do for our patients'"        "

## 2019-12-03 ENCOUNTER — TELEPHONE (OUTPATIENT)
Dept: SLEEP MEDICINE | Facility: OTHER | Age: 54
End: 2019-12-03

## 2019-12-03 ENCOUNTER — PATIENT MESSAGE (OUTPATIENT)
Dept: SURGERY | Facility: CLINIC | Age: 54
End: 2019-12-03

## 2019-12-03 RX ORDER — NEOMYCIN SULFATE 500 MG/1
TABLET ORAL
Qty: 6 TABLET | Refills: 0 | Status: SHIPPED | OUTPATIENT
Start: 2019-12-03 | End: 2020-01-10

## 2019-12-03 RX ORDER — METRONIDAZOLE 500 MG/1
TABLET ORAL
Qty: 3 TABLET | Refills: 0 | Status: ON HOLD | OUTPATIENT
Start: 2019-12-03 | End: 2019-12-20 | Stop reason: HOSPADM

## 2019-12-04 ENCOUNTER — HOSPITAL ENCOUNTER (OUTPATIENT)
Dept: SLEEP MEDICINE | Facility: OTHER | Age: 54
Discharge: HOME | End: 2019-12-04
Attending: FAMILY MEDICINE
Payer: MEDICARE

## 2019-12-04 ENCOUNTER — PATIENT MESSAGE (OUTPATIENT)
Dept: PRIMARY CARE CLINIC | Facility: CLINIC | Age: 54
End: 2019-12-04

## 2019-12-04 DIAGNOSIS — G47.30 SLEEP APNEA, UNSPECIFIED TYPE: ICD-10-CM

## 2019-12-04 DIAGNOSIS — R53.83 FATIGUE, UNSPECIFIED TYPE: ICD-10-CM

## 2019-12-04 DIAGNOSIS — R00.1 BRADYCARDIA: ICD-10-CM

## 2019-12-04 DIAGNOSIS — G47.8 SLEEP AROUSAL DISORDER: ICD-10-CM

## 2019-12-04 DIAGNOSIS — G47.10 HYPERSOMNOLENCE: ICD-10-CM

## 2019-12-04 PROCEDURE — 95810 POLYSOM 6/> YRS 4/> PARAM: CPT

## 2019-12-04 PROCEDURE — 95810 POLYSOM 6/> YRS 4/> PARAM: CPT | Mod: 26,,, | Performed by: PSYCHIATRY & NEUROLOGY

## 2019-12-04 PROCEDURE — 95810 PR POLYSOMNOGRAPHY, 4 OR MORE: ICD-10-PCS | Mod: 26,,, | Performed by: PSYCHIATRY & NEUROLOGY

## 2019-12-05 NOTE — PROGRESS NOTES
"Baseline PSG study was performed on Ida Orozco. The following was explained to the pt either prior to or during the set-up procedure: the set-up procedure (what each wire is for), time to bed, time of waking in morning, reasons tech might be needing to enter room during the night, and the possibility of meeting criteria for a split -night study with CPAP mask. It was also explained to the pt that the physician will analyze the study and the results will be sent to their PCP. A "Thank You" letter was also given to the pt upon leaving the lab that thoroughly explains the next steps in communication regarding this study and of treatment, if needed. Pt was asked to fill out a patient survey form to give their feedback and submit it via electronically by email or regular mail.    EKG:  apparent Frequent PACs, Bradycardia?      Difficulties:    Averaged EEG for EKG artifact;       Pt did not qualify for a split-night study.   "

## 2019-12-10 ENCOUNTER — OFFICE VISIT (OUTPATIENT)
Dept: PRIMARY CARE CLINIC | Facility: CLINIC | Age: 54
End: 2019-12-10
Payer: MEDICARE

## 2019-12-10 VITALS
SYSTOLIC BLOOD PRESSURE: 92 MMHG | RESPIRATION RATE: 18 BRPM | WEIGHT: 266.81 LBS | DIASTOLIC BLOOD PRESSURE: 68 MMHG | OXYGEN SATURATION: 97 % | HEIGHT: 64 IN | BODY MASS INDEX: 45.55 KG/M2 | TEMPERATURE: 99 F | HEART RATE: 68 BPM

## 2019-12-10 DIAGNOSIS — Z01.818 PREOPERATIVE EXAMINATION: Primary | ICD-10-CM

## 2019-12-10 PROBLEM — R39.15 URINARY URGENCY: Status: RESOLVED | Noted: 2018-12-28 | Resolved: 2019-12-10

## 2019-12-10 PROBLEM — I25.110 ATHEROSCLEROTIC HEART DISEASE OF NATIVE CORONARY ARTERY WITH UNSTABLE ANGINA PECTORIS: Status: RESOLVED | Noted: 2019-10-18 | Resolved: 2019-12-10

## 2019-12-10 PROBLEM — R94.39 ABNORMAL STRESS TEST: Status: RESOLVED | Noted: 2019-11-13 | Resolved: 2019-12-10

## 2019-12-10 PROBLEM — R39.89 BLADDER PAIN: Status: RESOLVED | Noted: 2018-12-28 | Resolved: 2019-12-10

## 2019-12-10 PROCEDURE — 3008F BODY MASS INDEX DOCD: CPT | Mod: CPTII,S$GLB,, | Performed by: FAMILY MEDICINE

## 2019-12-10 PROCEDURE — 99213 PR OFFICE/OUTPT VISIT, EST, LEVL III, 20-29 MIN: ICD-10-PCS | Mod: S$GLB,,, | Performed by: FAMILY MEDICINE

## 2019-12-10 PROCEDURE — 99999 PR PBB SHADOW E&M-EST. PATIENT-LVL III: CPT | Mod: PBBFAC,,, | Performed by: FAMILY MEDICINE

## 2019-12-10 PROCEDURE — 99213 OFFICE O/P EST LOW 20 MIN: CPT | Mod: S$GLB,,, | Performed by: FAMILY MEDICINE

## 2019-12-10 PROCEDURE — 99999 PR PBB SHADOW E&M-EST. PATIENT-LVL III: ICD-10-PCS | Mod: PBBFAC,,, | Performed by: FAMILY MEDICINE

## 2019-12-10 PROCEDURE — 3008F PR BODY MASS INDEX (BMI) DOCUMENTED: ICD-10-PCS | Mod: CPTII,S$GLB,, | Performed by: FAMILY MEDICINE

## 2019-12-10 NOTE — PROGRESS NOTES
"Subjective:       Patient ID: Ida Orozco is a 54 y.o. female.    Chief Complaint: Pre-op Exam    Scheduled for abdominal panniculectomy on 12/19. Recent LHC normal, Holter monitor unremarkable other than mild bradycardia. No recent illness or injury. No prior surgical complications.    Review of Systems   Constitutional: Negative for activity change and unexpected weight change.   HENT: Negative for hearing loss, rhinorrhea and trouble swallowing.    Eyes: Negative for discharge and visual disturbance.   Respiratory: Negative for chest tightness and wheezing.    Cardiovascular: Negative for chest pain and palpitations.   Gastrointestinal: Negative for blood in stool, constipation, diarrhea and vomiting.   Endocrine: Negative for polydipsia and polyuria.   Genitourinary: Negative for difficulty urinating, dysuria, hematuria and menstrual problem.   Musculoskeletal: Negative for arthralgias, joint swelling and neck pain.   Skin: Negative for wound.   Neurological: Negative for weakness and headaches.   Hematological: Does not bruise/bleed easily.   Psychiatric/Behavioral: Negative for confusion and dysphoric mood.       Objective:      Vitals:    12/10/19 0950   BP: 92/68   BP Location: Left arm   Patient Position: Sitting   BP Method: Large (Manual)   Pulse: 68   Resp: 18   Temp: 98.8 °F (37.1 °C)   TempSrc: Oral   SpO2: 97%   Weight: 121 kg (266 lb 12.8 oz)   Height: 5' 4" (1.626 m)     Physical Exam   Constitutional: She is oriented to person, place, and time. She appears well-developed and well-nourished.   HENT:   Head: Normocephalic and atraumatic.   Neck: Neck supple. No JVD present.   Cardiovascular: Normal rate, regular rhythm and normal heart sounds.   Pulmonary/Chest: Effort normal and breath sounds normal.   Abdominal: Soft. Bowel sounds are normal. She exhibits no distension. There is no tenderness.   Right sided colostomy   Musculoskeletal: She exhibits no edema.   Lymphadenopathy:     She has no " cervical adenopathy.   Neurological: She is alert and oriented to person, place, and time.   Skin: Skin is warm and dry.   Psychiatric: She has a normal mood and affect. Her behavior is normal.   Nursing note and vitals reviewed.      Lab Results   Component Value Date    WBC 5.20 11/05/2019    HGB 14.4 11/05/2019    HCT 44.9 11/05/2019     11/05/2019    CHOL 165 10/02/2018    TRIG 85 10/02/2018    HDL 80 (H) 10/02/2018    ALT 55 (H) 11/05/2019    AST 51 (H) 11/05/2019     11/05/2019    K 3.7 11/05/2019     11/05/2019    CREATININE 1.0 11/05/2019    BUN 12 11/05/2019    CO2 24 11/05/2019    TSH 3.04 11/22/2019    INR 1.0 08/01/2014    HGBA1C 5.6 05/30/2019      CATHETERIZATION, HEART, BOTH LEFT AND RIGHT   Conclusion     · The left ventricular systolic function is normal.  · LV end diastolic pressure is normal.  · LVEDP (Pre): 12  · LVEDP (Post): 14  · The ejection fraction is calculated to be 65%.  · The left ventricular ejection fraction is grossly normal.  · Estimated blood loss: none  · Normal coronary arteries.  · Filling pressures on the right and left are normal.  · Normal PA pressure     HOLTER MONITOR - 24 HOUR   Conclusion     Holter monitor lasted for 23 hr and 59 min with the average heart rate of 61.  There were occasional PVCs and more PACs but only 50.  There was periods of sinus bradycardia total of 15 hr and 51 min of sinus bradycardia the slow was rate being 39 but that only lasted for 59 sec.  There are no malignant arrhythmias recorded during the study.       Assessment:       1. Preoperative examination        Plan:       Preoperative examination  Cleared for upcoming panniculectomy under general anesthesia    Medication List with Changes/Refills   Current Medications    ALBUTEROL (PROVENTIL) 2.5 MG /3 ML (0.083 %) NEBULIZER SOLUTION    INHALE ONE VIAL VIA NEBULIZER EVERY SIX HOURS    ALBUTEROL 90 MCG/ACTUATION INHALER    Inhale 2 puffs into the lungs every 4 (four) hours as  needed for Wheezing or Shortness of Breath. Rescue    BUDESONIDE-FORMOTEROL 160-4.5 MCG (SYMBICORT) 160-4.5 MCG/ACTUATION HFAA    Inhale 2 puffs into the lungs every 12 (twelve) hours. Controller    CLONAZEPAM (KLONOPIN) 1 MG TABLET    Take 1 mg by mouth once daily.     CLOTRIMAZOLE-BETAMETHASONE 1-0.05% (LOTRISONE) CREAM    Apply topically 2 (two) times daily.    DICLOFENAC SODIUM (VOLTAREN) 1 % GEL    APPLY TWO GRAMS FOUR TIMES A DAY    DICYCLOMINE (BENTYL) 20 MG TABLET    Take 1 tablet (20 mg total) by mouth every 6 (six) hours as needed.    ERGOCALCIFEROL (ERGOCALCIFEROL) 50,000 UNIT CAP    TAKE 1 CAPSULE (50,000 UNITS TOTAL) BY MOUTH EVERY 7 DAYS.    FERROUS GLUCONATE (FERGON) 324 MG TABLET    TAKE 1 TABLET (324 MG TOTAL) BY MOUTH DAILY WITH BREAKFAST.    FLUCONAZOLE (DIFLUCAN) 150 MG TAB    Take 1 tablet (150 mg total) by mouth every 7 days.    FLUTICASONE (FLONASE) 50 MCG/ACTUATION NASAL SPRAY    INSTILL TWO SPRAYS IN EACH NOSTRIL IN EACH NOSTRIL EVERY EVENING    LORATADINE (CLARITIN) 10 MG TABLET    TAKE ONE TABLET BY MOUTH ONCE DAILY    METRONIDAZOLE (FLAGYL) 500 MG TABLET    On day before surgery, take 1 tablet (500mg) at 1 pm, 2 pm, and 11 pm    MONTELUKAST (SINGULAIR) 10 MG TABLET    TAKE ONE TABLET BY MOUTH EVERY EVENING    MUPIROCIN (BACTROBAN) 2 % OINTMENT    Apply topically 3 (three) times daily.    NEOMYCIN (MYCIFRADIN) 500 MG TAB    On day before surgery, take two tablets (1,000 mg) by mouth at 1 pm, 2 pm, and 11 pm    NYSTATIN (MYCOSTATIN) POWDER    Apply topically 2 (two) times daily.    ONDANSETRON (ZOFRAN-ODT) 8 MG TBDL    Take 1 tablet (8 mg total) by mouth every 12 (twelve) hours as needed.    PANTOPRAZOLE (PROTONIX) 40 MG TABLET    TAKE ONE TABLET BY MOUTH ONCE DAILY    PROMETHAZINE (PHENERGAN) 25 MG TABLET    Take 1 tablet (25 mg total) by mouth every 6 (six) hours as needed for Nausea.    SERTRALINE (ZOLOFT) 100 MG TABLET    Take 1 tablet by mouth once daily.    TOPIRAMATE (TROKENDI XR)  200 MG CP24    Take 200 mg by mouth once daily.     TRAZODONE (DESYREL) 50 MG TABLET    Take 50 mg by mouth every evening.

## 2019-12-10 NOTE — PROCEDURES
"Dear Referring Provider,    The sleep study that you ordered is complete. You have ordered sleep LAB services to perform the sleep study for Ida Orozco.     Please find Sleep Study result in "Chart Review" under the "Media tab."     As the ordering provider, you are responsible for reviewing the results and implementing a treatment plan with your patient. If you need a Sleep Medicine provider to explain the sleep study findings and arrange treatment for the patient, please refer patient for consultation to our Sleep Clinic via [facility] with Ambulatory Consult Sleep.    To do that please place an order for an "Ambulatory Consult Sleep" - it will go to our clinic work queue for our Medical Assistant to contact the patient for an appointment.     For any questions, please contact our clinic staff at [phone] to talk to clinical staff.      "

## 2019-12-12 ENCOUNTER — TELEPHONE (OUTPATIENT)
Dept: PLASTIC SURGERY | Facility: CLINIC | Age: 54
End: 2019-12-12

## 2019-12-12 NOTE — TELEPHONE ENCOUNTER
Called pt and explained to her that we ask that she wears a large button down shirt and loose pants.               ----- Message from Sivan Bae sent at 12/12/2019  3:36 PM CST -----  Contact: pt@ [phone]  calling regarding questions she has pertaining to her surgery. also wants to know what she needs to bring with her. Please call.

## 2019-12-16 ENCOUNTER — TELEPHONE (OUTPATIENT)
Dept: PLASTIC SURGERY | Facility: CLINIC | Age: 54
End: 2019-12-16

## 2019-12-16 NOTE — TELEPHONE ENCOUNTER
spoke with pt and gave her all pre operative instructions , and also drain care instructions. Pt verbalized understanding.

## 2019-12-18 ENCOUNTER — TELEPHONE (OUTPATIENT)
Dept: PLASTIC SURGERY | Facility: CLINIC | Age: 54
End: 2019-12-18

## 2019-12-18 NOTE — PRE-PROCEDURE INSTRUCTIONS
Preop instructions:     NPO instructions: GIVEN PER [facility] DEPT   Acceptable Clear Liquids are listed below:       You may have sips of water with your AM medications one (1) hour prior to your arrival to the hospital.    If you have received specific instructions from your Surgeon/Surgeon's staff, please follow their instructions.     Showering instructions, directions, leave all valuables at home, medication instructions for PM prior & am of procedure explained. Patient stated an understanding.      Patient denies any side effects or issues with anesthesia or sedation.                                                                                                                                    Patient does not know arrival time. Explained that this information comes from the surgeons office and if they have not heard from them by 2 pm, to call office. Patient stated an understanding.    PT'S  WILL BE PROVIDING TRANSPORTATION FOR PT.

## 2019-12-18 NOTE — TELEPHONE ENCOUNTER
called pt and gave her arrival time of DOSC of 5 am. Pre op education reinforced. Pt verbalized understanding.             ----- Message from Priscilla Major sent at 12/18/2019  1:42 PM CST -----  Contact: Pt  Reason: Calling to get time for surgery tomorrow.      Communication: [phone]

## 2019-12-19 ENCOUNTER — ANESTHESIA (OUTPATIENT)
Dept: SURGERY | Facility: HOSPITAL | Age: 54
DRG: 623 | End: 2019-12-19
Payer: MEDICARE

## 2019-12-19 ENCOUNTER — HOSPITAL ENCOUNTER (INPATIENT)
Facility: HOSPITAL | Age: 54
LOS: 2 days | Discharge: HOME | DRG: 623 | End: 2019-12-21
Attending: SURGERY | Admitting: COLON & RECTAL SURGERY
Payer: MEDICARE

## 2019-12-19 ENCOUNTER — ANESTHESIA EVENT (OUTPATIENT)
Dept: SURGERY | Facility: HOSPITAL | Age: 54
DRG: 623 | End: 2019-12-19
Payer: MEDICARE

## 2019-12-19 DIAGNOSIS — K50.919 CROHN'S DISEASE WITH COMPLICATION, UNSPECIFIED GASTROINTESTINAL TRACT LOCATION: ICD-10-CM

## 2019-12-19 DIAGNOSIS — Z93.3 COLOSTOMY IN PLACE: ICD-10-CM

## 2019-12-19 DIAGNOSIS — E65 SYMPTOMATIC ABDOMINAL PANNICULUS: Primary | ICD-10-CM

## 2019-12-19 DIAGNOSIS — L98.7 REDUNDANT SKIN OF ABDOMEN: ICD-10-CM

## 2019-12-19 LAB
ABO + RH BLD: NORMAL
BLD GP AB SCN CELLS X3 SERPL QL: NORMAL

## 2019-12-19 PROCEDURE — 15830 PR EXCISE EXCESS SKIN TISSUE,ABDOMEN: ICD-10-PCS | Mod: ,,, | Performed by: SURGERY

## 2019-12-19 PROCEDURE — S0030 INJECTION, METRONIDAZOLE: HCPCS | Performed by: NURSE PRACTITIONER

## 2019-12-19 PROCEDURE — 25000003 PHARM REV CODE 250: Performed by: SURGERY

## 2019-12-19 PROCEDURE — 63600175 PHARM REV CODE 636 W HCPCS: Performed by: NURSE ANESTHETIST, CERTIFIED REGISTERED

## 2019-12-19 PROCEDURE — 94761 N-INVAS EAR/PLS OXIMETRY MLT: CPT

## 2019-12-19 PROCEDURE — 63600175 PHARM REV CODE 636 W HCPCS: Performed by: NURSE PRACTITIONER

## 2019-12-19 PROCEDURE — D9220A PRA ANESTHESIA: Mod: CRNA,,, | Performed by: NURSE ANESTHETIST, CERTIFIED REGISTERED

## 2019-12-19 PROCEDURE — 25000242 PHARM REV CODE 250 ALT 637 W/ HCPCS: Performed by: SURGERY

## 2019-12-19 PROCEDURE — 71000039 HC RECOVERY, EACH ADD'L HOUR: Performed by: SURGERY

## 2019-12-19 PROCEDURE — 27201423 OPTIME MED/SURG SUP & DEVICES STERILE SUPPLY: Performed by: SURGERY

## 2019-12-19 PROCEDURE — 44312 PR REVISION OF ILEOSTOMY,SIMPLE: ICD-10-PCS | Mod: ,,, | Performed by: COLON & RECTAL SURGERY

## 2019-12-19 PROCEDURE — 36000708 HC OR TIME LEV III 1ST 15 MIN: Performed by: SURGERY

## 2019-12-19 PROCEDURE — 88305 TISSUE EXAM BY PATHOLOGIST: ICD-10-PCS | Mod: 26,,, | Performed by: PATHOLOGY

## 2019-12-19 PROCEDURE — C1729 CATH, DRAINAGE: HCPCS | Performed by: SURGERY

## 2019-12-19 PROCEDURE — 25000003 PHARM REV CODE 250: Performed by: NURSE PRACTITIONER

## 2019-12-19 PROCEDURE — 63600175 PHARM REV CODE 636 W HCPCS: Performed by: ANESTHESIOLOGY

## 2019-12-19 PROCEDURE — 63600175 PHARM REV CODE 636 W HCPCS: Performed by: SURGERY

## 2019-12-19 PROCEDURE — 99900035 HC TECH TIME PER 15 MIN (STAT)

## 2019-12-19 PROCEDURE — 25000003 PHARM REV CODE 250: Performed by: NURSE ANESTHETIST, CERTIFIED REGISTERED

## 2019-12-19 PROCEDURE — 37000009 HC ANESTHESIA EA ADD 15 MINS: Performed by: SURGERY

## 2019-12-19 PROCEDURE — 88305 TISSUE EXAM BY PATHOLOGIST: CPT | Mod: 26,,, | Performed by: PATHOLOGY

## 2019-12-19 PROCEDURE — 97605 NEG PRS WND THER DME<=50SQCM: CPT | Mod: ,,, | Performed by: SURGERY

## 2019-12-19 PROCEDURE — 37000008 HC ANESTHESIA 1ST 15 MINUTES: Performed by: SURGERY

## 2019-12-19 PROCEDURE — 94640 AIRWAY INHALATION TREATMENT: CPT

## 2019-12-19 PROCEDURE — 71000033 HC RECOVERY, INTIAL HOUR: Performed by: SURGERY

## 2019-12-19 PROCEDURE — 97605 PR NEG PRESS WOUND THERAPY (NPWT) W/NON-DISPOSABLE WOUND VAC DEVICE (DME), <=50 CM: ICD-10-PCS | Mod: ,,, | Performed by: SURGERY

## 2019-12-19 PROCEDURE — 86850 RBC ANTIBODY SCREEN: CPT

## 2019-12-19 PROCEDURE — D9220A PRA ANESTHESIA: ICD-10-PCS | Mod: ANES,,, | Performed by: ANESTHESIOLOGY

## 2019-12-19 PROCEDURE — 88305 TISSUE EXAM BY PATHOLOGIST: CPT | Performed by: PATHOLOGY

## 2019-12-19 PROCEDURE — D9220A PRA ANESTHESIA: ICD-10-PCS | Mod: CRNA,,, | Performed by: NURSE ANESTHETIST, CERTIFIED REGISTERED

## 2019-12-19 PROCEDURE — 44312 REVISION OF ILEOSTOMY: CPT | Mod: ,,, | Performed by: COLON & RECTAL SURGERY

## 2019-12-19 PROCEDURE — 36000709 HC OR TIME LEV III EA ADD 15 MIN: Performed by: SURGERY

## 2019-12-19 PROCEDURE — 15830 EXC EXCESSIVE SKIN ABDOMEN: CPT | Mod: ,,, | Performed by: SURGERY

## 2019-12-19 PROCEDURE — 20600001 HC STEP DOWN PRIVATE ROOM

## 2019-12-19 PROCEDURE — D9220A PRA ANESTHESIA: Mod: ANES,,, | Performed by: ANESTHESIOLOGY

## 2019-12-19 PROCEDURE — 94799 UNLISTED PULMONARY SVC/PX: CPT

## 2019-12-19 RX ORDER — SODIUM CHLORIDE 9 MG/ML
INJECTION, SOLUTION INTRAVENOUS
Status: COMPLETED | OUTPATIENT
Start: 2019-12-19 | End: 2019-12-19

## 2019-12-19 RX ORDER — TOPIRAMATE 50 MG/1
50 TABLET, FILM COATED ORAL 2 TIMES DAILY
COMMUNITY
Start: 2019-12-30 | End: 2020-02-17

## 2019-12-19 RX ORDER — HEPARIN SODIUM 5000 [USP'U]/ML
5000 INJECTION, SOLUTION INTRAVENOUS; SUBCUTANEOUS EVERY 8 HOURS
Status: COMPLETED | OUTPATIENT
Start: 2019-12-19 | End: 2019-12-19

## 2019-12-19 RX ORDER — OXYCODONE AND ACETAMINOPHEN 5; 325 MG/1; MG/1
1 TABLET ORAL EVERY 4 HOURS PRN
Status: DISCONTINUED | OUTPATIENT
Start: 2019-12-19 | End: 2019-12-21 | Stop reason: HOSPADM

## 2019-12-19 RX ORDER — FENTANYL CITRATE 50 UG/ML
25 INJECTION, SOLUTION INTRAMUSCULAR; INTRAVENOUS EVERY 5 MIN PRN
Status: COMPLETED | OUTPATIENT
Start: 2019-12-19 | End: 2019-12-19

## 2019-12-19 RX ORDER — SUCCINYLCHOLINE CHLORIDE 20 MG/ML
INJECTION INTRAMUSCULAR; INTRAVENOUS
Status: DISCONTINUED | OUTPATIENT
Start: 2019-12-19 | End: 2019-12-19

## 2019-12-19 RX ORDER — DIPHENHYDRAMINE HYDROCHLORIDE 50 MG/ML
12.5 INJECTION INTRAMUSCULAR; INTRAVENOUS NIGHTLY PRN
Status: DISCONTINUED | OUTPATIENT
Start: 2019-12-19 | End: 2019-12-21 | Stop reason: HOSPADM

## 2019-12-19 RX ORDER — LIDOCAINE HYDROCHLORIDE 10 MG/ML
1 INJECTION, SOLUTION EPIDURAL; INFILTRATION; INTRACAUDAL; PERINEURAL
Status: COMPLETED | OUTPATIENT
Start: 2019-12-19 | End: 2019-12-19

## 2019-12-19 RX ORDER — SODIUM CHLORIDE 9 MG/ML
INJECTION, SOLUTION INTRAVENOUS CONTINUOUS PRN
Status: DISCONTINUED | OUTPATIENT
Start: 2019-12-19 | End: 2019-12-19

## 2019-12-19 RX ORDER — TOPIRAMATE 200 MG/1
100 CAPSULE, EXTENDED RELEASE ORAL DAILY
Status: DISCONTINUED | OUTPATIENT
Start: 2019-12-19 | End: 2019-12-19

## 2019-12-19 RX ORDER — PROPOFOL 10 MG/ML
VIAL (ML) INTRAVENOUS
Status: DISCONTINUED | OUTPATIENT
Start: 2019-12-19 | End: 2019-12-19

## 2019-12-19 RX ORDER — GABAPENTIN 300 MG/1
300 CAPSULE ORAL 3 TIMES DAILY
Status: DISCONTINUED | OUTPATIENT
Start: 2019-12-19 | End: 2020-12-14

## 2019-12-19 RX ORDER — SODIUM CHLORIDE 9 MG/ML
INJECTION, SOLUTION INTRAVENOUS CONTINUOUS
Status: DISCONTINUED | OUTPATIENT
Start: 2019-12-19 | End: 2019-12-19

## 2019-12-19 RX ORDER — ONDANSETRON 2 MG/ML
4 INJECTION INTRAMUSCULAR; INTRAVENOUS ONCE AS NEEDED
Status: DISCONTINUED | OUTPATIENT
Start: 2019-12-19 | End: 2019-12-19 | Stop reason: HOSPADM

## 2019-12-19 RX ORDER — FLUTICASONE FUROATE AND VILANTEROL 100; 25 UG/1; UG/1
1 POWDER RESPIRATORY (INHALATION) DAILY
Status: DISCONTINUED | OUTPATIENT
Start: 2019-12-19 | End: 2019-12-21 | Stop reason: HOSPADM

## 2019-12-19 RX ORDER — MORPHINE SULFATE 2 MG/ML
4 INJECTION, SOLUTION INTRAMUSCULAR; INTRAVENOUS EVERY 6 HOURS PRN
Status: DISCONTINUED | OUTPATIENT
Start: 2019-12-19 | End: 2019-12-21 | Stop reason: HOSPADM

## 2019-12-19 RX ORDER — METRONIDAZOLE 500 MG/1
500 TABLET ORAL EVERY 8 HOURS
Status: DISCONTINUED | OUTPATIENT
Start: 2019-12-19 | End: 2019-12-21 | Stop reason: HOSPADM

## 2019-12-19 RX ORDER — CETIRIZINE HYDROCHLORIDE 10 MG/1
10 TABLET ORAL DAILY
Status: DISCONTINUED | OUTPATIENT
Start: 2019-12-20 | End: 2019-12-21 | Stop reason: HOSPADM

## 2019-12-19 RX ORDER — ALBUTEROL SULFATE 2.5 MG/.5ML
2.5 SOLUTION RESPIRATORY (INHALATION) EVERY 4 HOURS
Status: DISCONTINUED | OUTPATIENT
Start: 2019-12-19 | End: 2019-12-21 | Stop reason: HOSPADM

## 2019-12-19 RX ORDER — MUPIROCIN 20 MG/G
1 OINTMENT TOPICAL
Status: COMPLETED | OUTPATIENT
Start: 2019-12-19 | End: 2019-12-19

## 2019-12-19 RX ORDER — LIDOCAINE HCL/PF 100 MG/5ML
SYRINGE (ML) INTRAVENOUS
Status: DISCONTINUED | OUTPATIENT
Start: 2019-12-19 | End: 2019-12-19

## 2019-12-19 RX ORDER — ONDANSETRON 2 MG/ML
INJECTION INTRAMUSCULAR; INTRAVENOUS
Status: DISCONTINUED | OUTPATIENT
Start: 2019-12-19 | End: 2019-12-19

## 2019-12-19 RX ORDER — NEOSTIGMINE METHYLSULFATE 1 MG/ML
INJECTION, SOLUTION INTRAVENOUS
Status: DISCONTINUED | OUTPATIENT
Start: 2019-12-19 | End: 2019-12-19

## 2019-12-19 RX ORDER — OXYCODONE AND ACETAMINOPHEN 10; 325 MG/1; MG/1
1 TABLET ORAL EVERY 4 HOURS PRN
Status: DISCONTINUED | OUTPATIENT
Start: 2019-12-19 | End: 2019-12-21 | Stop reason: HOSPADM

## 2019-12-19 RX ORDER — KETAMINE HCL IN 0.9 % NACL 50 MG/5 ML
SYRINGE (ML) INTRAVENOUS
Status: DISCONTINUED | OUTPATIENT
Start: 2019-12-19 | End: 2019-12-19

## 2019-12-19 RX ORDER — SERTRALINE HYDROCHLORIDE 100 MG/1
100 TABLET, FILM COATED ORAL NIGHTLY
Status: DISCONTINUED | OUTPATIENT
Start: 2019-12-19 | End: 2019-12-21 | Stop reason: HOSPADM

## 2019-12-19 RX ORDER — FENTANYL CITRATE 50 UG/ML
INJECTION, SOLUTION INTRAMUSCULAR; INTRAVENOUS
Status: DISCONTINUED | OUTPATIENT
Start: 2019-12-19 | End: 2019-12-19

## 2019-12-19 RX ORDER — BUPIVACAINE HYDROCHLORIDE AND EPINEPHRINE 2.5; 5 MG/ML; UG/ML
INJECTION, SOLUTION EPIDURAL; INFILTRATION; INTRACAUDAL; PERINEURAL
Status: DISCONTINUED | OUTPATIENT
Start: 2019-12-19 | End: 2019-12-19 | Stop reason: HOSPADM

## 2019-12-19 RX ORDER — SODIUM CHLORIDE, SODIUM LACTATE, POTASSIUM CHLORIDE, CALCIUM CHLORIDE 600; 310; 30; 20 MG/100ML; MG/100ML; MG/100ML; MG/100ML
INJECTION, SOLUTION INTRAVENOUS CONTINUOUS
Status: DISCONTINUED | OUTPATIENT
Start: 2019-12-19 | End: 2019-12-19

## 2019-12-19 RX ORDER — DEXAMETHASONE SODIUM PHOSPHATE 4 MG/ML
INJECTION, SOLUTION INTRA-ARTICULAR; INTRALESIONAL; INTRAMUSCULAR; INTRAVENOUS; SOFT TISSUE
Status: DISCONTINUED | OUTPATIENT
Start: 2019-12-19 | End: 2019-12-19

## 2019-12-19 RX ORDER — ROCURONIUM BROMIDE 10 MG/ML
INJECTION, SOLUTION INTRAVENOUS
Status: DISCONTINUED | OUTPATIENT
Start: 2019-12-19 | End: 2019-12-19

## 2019-12-19 RX ORDER — TOPIRAMATE 25 MG/1
100 TABLET ORAL DAILY
Status: DISCONTINUED | OUTPATIENT
Start: 2019-12-20 | End: 2019-12-21 | Stop reason: HOSPADM

## 2019-12-19 RX ORDER — PANTOPRAZOLE SODIUM 40 MG/1
40 TABLET, DELAYED RELEASE ORAL DAILY
Status: DISCONTINUED | OUTPATIENT
Start: 2019-12-20 | End: 2019-12-21 | Stop reason: HOSPADM

## 2019-12-19 RX ORDER — TRAZODONE HYDROCHLORIDE 50 MG/1
50 TABLET ORAL NIGHTLY
Status: DISCONTINUED | OUTPATIENT
Start: 2019-12-19 | End: 2019-12-21 | Stop reason: HOSPADM

## 2019-12-19 RX ORDER — DICYCLOMINE HYDROCHLORIDE 20 MG/1
20 TABLET ORAL 4 TIMES DAILY
Status: DISCONTINUED | OUTPATIENT
Start: 2019-12-19 | End: 2019-12-21 | Stop reason: HOSPADM

## 2019-12-19 RX ORDER — MONTELUKAST SODIUM 10 MG/1
10 TABLET ORAL NIGHTLY
Status: DISCONTINUED | OUTPATIENT
Start: 2019-12-19 | End: 2019-12-21 | Stop reason: HOSPADM

## 2019-12-19 RX ORDER — GABAPENTIN 300 MG/1
300 CAPSULE ORAL
Status: COMPLETED | OUTPATIENT
Start: 2019-12-19 | End: 2019-12-19

## 2019-12-19 RX ORDER — METRONIDAZOLE 500 MG/100ML
500 INJECTION, SOLUTION INTRAVENOUS
Status: COMPLETED | OUTPATIENT
Start: 2019-12-19 | End: 2019-12-19

## 2019-12-19 RX ORDER — ACETAMINOPHEN 650 MG/20.3ML
975 LIQUID ORAL
Status: COMPLETED | OUTPATIENT
Start: 2019-12-19 | End: 2019-12-19

## 2019-12-19 RX ORDER — CIPROFLOXACIN 500 MG/1
500 TABLET ORAL EVERY 12 HOURS
Status: DISCONTINUED | OUTPATIENT
Start: 2019-12-19 | End: 2019-12-21 | Stop reason: HOSPADM

## 2019-12-19 RX ORDER — TOPIRAMATE 25 MG/1
50 TABLET ORAL 2 TIMES DAILY
Status: CANCELLED | OUTPATIENT
Start: 2019-12-19

## 2019-12-19 RX ORDER — MIDAZOLAM HYDROCHLORIDE 1 MG/ML
INJECTION, SOLUTION INTRAMUSCULAR; INTRAVENOUS
Status: DISCONTINUED | OUTPATIENT
Start: 2019-12-19 | End: 2019-12-19

## 2019-12-19 RX ORDER — GLYCOPYRROLATE 0.2 MG/ML
INJECTION INTRAMUSCULAR; INTRAVENOUS
Status: DISCONTINUED | OUTPATIENT
Start: 2019-12-19 | End: 2019-12-19

## 2019-12-19 RX ADMIN — FENTANYL CITRATE 25 MCG: 50 INJECTION INTRAMUSCULAR; INTRAVENOUS at 12:12

## 2019-12-19 RX ADMIN — GABAPENTIN 300 MG: 300 CAPSULE ORAL at 03:12

## 2019-12-19 RX ADMIN — ROCURONIUM BROMIDE 10 MG: 10 INJECTION, SOLUTION INTRAVENOUS at 09:12

## 2019-12-19 RX ADMIN — CEFTRIAXONE SODIUM 2 G: 2 INJECTION, SOLUTION INTRAVENOUS at 07:12

## 2019-12-19 RX ADMIN — ALBUTEROL SULFATE 2.5 MG: 2.5 SOLUTION RESPIRATORY (INHALATION) at 04:12

## 2019-12-19 RX ADMIN — LIDOCAINE HYDROCHLORIDE 50 MG: 20 INJECTION, SOLUTION INTRAVENOUS at 07:12

## 2019-12-19 RX ADMIN — FENTANYL CITRATE 25 MCG: 50 INJECTION INTRAMUSCULAR; INTRAVENOUS at 01:12

## 2019-12-19 RX ADMIN — CIPROFLOXACIN HYDROCHLORIDE 500 MG: 500 TABLET, FILM COATED ORAL at 03:12

## 2019-12-19 RX ADMIN — ACETAMINOPHEN ORAL SOLUTION 976.6 MG: 650 SOLUTION ORAL at 06:12

## 2019-12-19 RX ADMIN — METRONIDAZOLE 500 MG: 500 SOLUTION INTRAVENOUS at 07:12

## 2019-12-19 RX ADMIN — METRONIDAZOLE 500 MG: 500 TABLET ORAL at 09:12

## 2019-12-19 RX ADMIN — OXYCODONE HYDROCHLORIDE AND ACETAMINOPHEN 1 TABLET: 10; 325 TABLET ORAL at 05:12

## 2019-12-19 RX ADMIN — ONDANSETRON 4 MG: 2 INJECTION INTRAMUSCULAR; INTRAVENOUS at 11:12

## 2019-12-19 RX ADMIN — ROCURONIUM BROMIDE 10 MG: 10 INJECTION, SOLUTION INTRAVENOUS at 08:12

## 2019-12-19 RX ADMIN — ALBUTEROL SULFATE 2.5 MG: 2.5 SOLUTION RESPIRATORY (INHALATION) at 08:12

## 2019-12-19 RX ADMIN — SODIUM CHLORIDE: 0.9 INJECTION, SOLUTION INTRAVENOUS at 06:12

## 2019-12-19 RX ADMIN — LIDOCAINE HYDROCHLORIDE 0.2 MG: 10 INJECTION, SOLUTION EPIDURAL; INFILTRATION; INTRACAUDAL; PERINEURAL at 06:12

## 2019-12-19 RX ADMIN — HEPARIN SODIUM 5000 UNITS: 5000 INJECTION, SOLUTION INTRAVENOUS; SUBCUTANEOUS at 06:12

## 2019-12-19 RX ADMIN — DEXAMETHASONE SODIUM PHOSPHATE 4 MG: 4 INJECTION, SOLUTION INTRAMUSCULAR; INTRAVENOUS at 07:12

## 2019-12-19 RX ADMIN — SODIUM CHLORIDE, SODIUM GLUCONATE, SODIUM ACETATE, POTASSIUM CHLORIDE, MAGNESIUM CHLORIDE, SODIUM PHOSPHATE, DIBASIC, AND POTASSIUM PHOSPHATE: .53; .5; .37; .037; .03; .012; .00082 INJECTION, SOLUTION INTRAVENOUS at 10:12

## 2019-12-19 RX ADMIN — MUPIROCIN 1 G: 20 OINTMENT TOPICAL at 06:12

## 2019-12-19 RX ADMIN — CIPROFLOXACIN HYDROCHLORIDE 500 MG: 500 TABLET, FILM COATED ORAL at 09:12

## 2019-12-19 RX ADMIN — GLYCOPYRROLATE 0.2 MG: 0.2 INJECTION, SOLUTION INTRAMUSCULAR; INTRAVENOUS at 07:12

## 2019-12-19 RX ADMIN — FENTANYL CITRATE 50 MCG: 50 INJECTION, SOLUTION INTRAMUSCULAR; INTRAVENOUS at 07:12

## 2019-12-19 RX ADMIN — FENTANYL CITRATE 100 MCG: 50 INJECTION, SOLUTION INTRAMUSCULAR; INTRAVENOUS at 07:12

## 2019-12-19 RX ADMIN — GLYCOPYRROLATE 0.6 MG: 0.2 INJECTION, SOLUTION INTRAMUSCULAR; INTRAVENOUS at 11:12

## 2019-12-19 RX ADMIN — NEOSTIGMINE METHYLSULFATE 5 MG: 1 INJECTION INTRAVENOUS at 11:12

## 2019-12-19 RX ADMIN — OXYCODONE HYDROCHLORIDE AND ACETAMINOPHEN 1 TABLET: 10; 325 TABLET ORAL at 09:12

## 2019-12-19 RX ADMIN — SODIUM CHLORIDE: 0.9 INJECTION, SOLUTION INTRAVENOUS at 07:12

## 2019-12-19 RX ADMIN — TRAZODONE HYDROCHLORIDE 50 MG: 50 TABLET ORAL at 09:12

## 2019-12-19 RX ADMIN — ROCURONIUM BROMIDE 5 MG: 10 INJECTION, SOLUTION INTRAVENOUS at 07:12

## 2019-12-19 RX ADMIN — MIDAZOLAM HYDROCHLORIDE 2 MG: 1 INJECTION, SOLUTION INTRAMUSCULAR; INTRAVENOUS at 07:12

## 2019-12-19 RX ADMIN — MONTELUKAST 10 MG: 10 TABLET, FILM COATED ORAL at 09:12

## 2019-12-19 RX ADMIN — Medication 10 MG: at 09:12

## 2019-12-19 RX ADMIN — SODIUM CHLORIDE, SODIUM GLUCONATE, SODIUM ACETATE, POTASSIUM CHLORIDE, MAGNESIUM CHLORIDE, SODIUM PHOSPHATE, DIBASIC, AND POTASSIUM PHOSPHATE: .53; .5; .37; .037; .03; .012; .00082 INJECTION, SOLUTION INTRAVENOUS at 08:12

## 2019-12-19 RX ADMIN — OXYCODONE HYDROCHLORIDE AND ACETAMINOPHEN 1 TABLET: 10; 325 TABLET ORAL at 01:12

## 2019-12-19 RX ADMIN — ONDANSETRON 4 MG: 2 INJECTION INTRAMUSCULAR; INTRAVENOUS at 07:12

## 2019-12-19 RX ADMIN — DICYCLOMINE HYDROCHLORIDE 20 MG: 20 TABLET ORAL at 09:12

## 2019-12-19 RX ADMIN — ROCURONIUM BROMIDE 20 MG: 10 INJECTION, SOLUTION INTRAVENOUS at 10:12

## 2019-12-19 RX ADMIN — Medication 30 MG: at 07:12

## 2019-12-19 RX ADMIN — FENTANYL CITRATE 25 MCG: 50 INJECTION, SOLUTION INTRAMUSCULAR; INTRAVENOUS at 11:12

## 2019-12-19 RX ADMIN — Medication 10 MG: at 08:12

## 2019-12-19 RX ADMIN — SUCCINYLCHOLINE CHLORIDE 140 MG: 20 INJECTION, SOLUTION INTRAMUSCULAR; INTRAVENOUS at 07:12

## 2019-12-19 RX ADMIN — DICYCLOMINE HYDROCHLORIDE 20 MG: 20 TABLET ORAL at 05:12

## 2019-12-19 RX ADMIN — GABAPENTIN 300 MG: 300 CAPSULE ORAL at 09:12

## 2019-12-19 RX ADMIN — PROPOFOL 200 MG: 10 INJECTION, EMULSION INTRAVENOUS at 07:12

## 2019-12-19 RX ADMIN — SODIUM CHLORIDE, SODIUM LACTATE, POTASSIUM CHLORIDE, AND CALCIUM CHLORIDE: 600; 310; 30; 20 INJECTION, SOLUTION INTRAVENOUS at 01:12

## 2019-12-19 RX ADMIN — ROCURONIUM BROMIDE 45 MG: 10 INJECTION, SOLUTION INTRAVENOUS at 07:12

## 2019-12-19 RX ADMIN — METRONIDAZOLE 500 MG: 500 TABLET ORAL at 01:12

## 2019-12-19 RX ADMIN — GABAPENTIN 300 MG: 300 CAPSULE ORAL at 06:12

## 2019-12-19 RX ADMIN — SERTRALINE HYDROCHLORIDE 100 MG: 100 TABLET ORAL at 09:12

## 2019-12-19 RX ADMIN — MORPHINE SULFATE 4 MG: 2 INJECTION, SOLUTION INTRAMUSCULAR; INTRAVENOUS at 07:12

## 2019-12-19 RX ADMIN — DICYCLOMINE HYDROCHLORIDE 20 MG: 20 TABLET ORAL at 01:12

## 2019-12-19 NOTE — H&P
[facility]  Colorectal Surgery  History & Physical    Patient Name: Ida Orozco  MRN: [id number]  Admission Date: 12/19/2019  Attending Physician: JENNY Virgen MD  Primary Care Provider: Eliecer Jones MD    Subjective:     Pre-op panniculectomy    History of Present Illness: 55 yo female with a history of total proctectomy colectomy with end ileostomy for ulcerative colitis.  She initially had a total abdominal colectomy at an outside hospital with end ileostomy.  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  She then underwent a sleeve gastrectomy in 2016.  The patient was having symptoms of rectal bleeding and completion proctectomy with end ileostomy or J pouch was discussed and she elected to undergo completion proctectomy with maintenance of her end ileostomy in May of 2018.    Since that time, she has had symptoms related to her pannus after losing weight from the sleeve gastrectomy and she now presents for panniculectomy per plastic surgery.  Colorectal surgery will assist with ileostomy revision with the panniculectomy.    PTA Medications   Medication Sig    albuterol (PROVENTIL) 2.5 mg /3 mL (0.083 %) nebulizer solution INHALE ONE VIAL VIA NEBULIZER EVERY SIX HOURS (Patient taking differently: every 6 (six) hours as needed. )    albuterol 90 mcg/actuation inhaler Inhale 2 puffs into the lungs every 4 (four) hours as needed for Wheezing or Shortness of Breath. Rescue    clonazePAM (KLONOPIN) 1 MG tablet Take 1 mg by mouth once daily.     dicyclomine (BENTYL) 20 mg tablet Take 1 tablet (20 mg total) by mouth every 6 (six) hours as needed.    ergocalciferol (ERGOCALCIFEROL) 50,000 unit Cap TAKE 1 CAPSULE (50,000 UNITS TOTAL) BY MOUTH EVERY 7 DAYS.    ferrous gluconate (FERGON) 324 MG tablet TAKE 1 TABLET (324 MG TOTAL) BY MOUTH DAILY WITH BREAKFAST.    fluconazole (DIFLUCAN) 150 MG Tab Take 1 tablet (150 mg total) by mouth  every 7 days.    fluticasone (FLONASE) 50 mcg/actuation nasal spray INSTILL TWO SPRAYS IN EACH NOSTRIL IN EACH NOSTRIL EVERY EVENING    loratadine (CLARITIN) 10 mg tablet TAKE ONE TABLET BY MOUTH ONCE DAILY    metroNIDAZOLE (FLAGYL) 500 MG tablet On day before surgery, take 1 tablet (500mg) at 1 pm, 2 pm, and 11 pm    montelukast (SINGULAIR) 10 mg tablet TAKE ONE TABLET BY MOUTH EVERY EVENING (Patient taking differently: Take 10 mg by mouth once daily. )    mupirocin (BACTROBAN) 2 % ointment Apply topically 3 (three) times daily.    neomycin (MYCIFRADIN) 500 mg Tab On day before surgery, take two tablets (1,000 mg) by mouth at 1 pm, 2 pm, and 11 pm    nystatin (MYCOSTATIN) powder Apply topically 2 (two) times daily. (Patient taking differently: Apply topically daily as needed (when changes colostomy bag). )    ondansetron (ZOFRAN-ODT) 8 MG TbDL Take 1 tablet (8 mg total) by mouth every 12 (twelve) hours as needed.    pantoprazole (PROTONIX) 40 MG tablet TAKE ONE TABLET BY MOUTH ONCE DAILY    promethazine (PHENERGAN) 25 MG tablet Take 1 tablet (25 mg total) by mouth every 6 (six) hours as needed for Nausea.    sertraline (ZOLOFT) 100 MG tablet Take 100 mg by mouth every evening.     topiramate (TROKENDI XR) 200 mg Cp24 Take 100 mg by mouth once daily.     trazodone (DESYREL) 50 MG tablet Take 50 mg by mouth every evening.    budesonide-formoterol 160-4.5 mcg (SYMBICORT) 160-4.5 mcg/actuation HFAA Inhale 2 puffs into the lungs every 12 (twelve) hours. Controller (Patient taking differently: Inhale 2 puffs into the lungs every 12 (twelve) hours as needed. Controller)    clotrimazole-betamethasone 1-0.05% (LOTRISONE) cream Apply topically 2 (two) times daily. (Patient not taking: Reported on 12/10/2019)    diclofenac sodium (VOLTAREN) 1 % Gel APPLY TWO GRAMS FOUR TIMES A DAY       Review of patient's allergies indicates:   Allergen Reactions    Adhesive      Cause blisters    Dilaudid [hydromorphone]  Nausea And Vomiting    Sulfa (sulfonamide antibiotics) Hives       Past Medical History:   Diagnosis Date    Anxiety     Asthma     Bronchitis     Crohn's colitis     Depression     Fatty liver disease, nonalcoholic     Gallstones     GERD (gastroesophageal reflux disease)     History of sleeve gastrectomy 2016    PTSD (post-traumatic stress disorder)     Seasonal allergies     Ulcerative colitis      Past Surgical History:   Procedure Laterality Date    APPENDECTOMY      CATHETERIZATION OF BOTH LEFT AND RIGHT HEART Right 2019    Procedure: CATHETERIZATION, HEART, BOTH LEFT AND RIGHT;  Surgeon: Beto Malin MD;  Location: [facility] CATH LAB;  Service: Cardiology;  Laterality: Right;     SECTION, LOW TRANSVERSE      CHOLECYSTECTOMY      open    COLECTOMY      total- 2013    CYSTOSCOPY W/ RETROGRADES N/A 2018    Procedure: CYSTOSCOPY, WITH RETROGRADE PYELOGRAM;  Surgeon: Butch Banks MD;  Location: [facility] OR;  Service: Urology;  Laterality: N/A;  HANSEN SURGICAL CONFIRMED     FLEXIBLE SIGMOIDOSCOPY  2018    Procedure: SIGMOIDOSCOPY, FLEXIBLE;  Surgeon: JENNY Virgen MD;  [facility];  Service: Colon and Rectal;;    GASTRIC SLEEVE       HYSTERECTOMY      ILEOSTOMY REVISION      2014; 2014    LAPAROSCOPIC PROCTECTOMY N/A 2018    Procedure: PROCTECTOMY-LAPAROSCOPIC/CONVERTED TO OPEN;  Surgeon: JENNY Virgen MD;  Location: [facility] OR 2ND FLR;  Service: Colon and Rectal;  Laterality: N/A;    LYSIS OF ADHESIONS  2018    Procedure: LYSIS, ADHESIONS/ more than 2hours;  Surgeon: JENNY Virgen MD;  Location: [facility] OR 2ND FLR;  Service: Colon and Rectal;;    thumb surgery      TONSILLECTOMY, ADENOIDECTOMY      WISDOM TOOTH EXTRACTION       Family History     Problem Relation (Age of Onset)    Cancer Mother, Paternal Grandfather, Maternal Grandfather    Heart disease Father (67)        Tobacco Use    Smoking status:  Former Smoker     Types: Cigarettes     Last attempt to quit: 2009     Years since quitting: 10.9    Smokeless tobacco: Never Used   Substance and Sexual Activity    Alcohol use: Yes     Comment: occasionally    Drug use: No    Sexual activity: Never     Birth control/protection: See Surgical Hx     Review of Systems  Objective:     Vital Signs (Most Recent):  Temp: 98.1 °F (36.7 °C) (12/19/19 0558)  Pulse: 62 (12/19/19 0558)  Resp: 15 (12/19/19 0558)  BP: (!) 119/58 (12/19/19 0558)  SpO2: 99 % (12/19/19 0558) Vital Signs (24h Range):  Temp:  [98.1 °F (36.7 °C)] 98.1 °F (36.7 °C)  Pulse:  [62] 62  Resp:  [15] 15  SpO2:  [99 %] 99 %  BP: (119)/(58) 119/58     Weight: 118.8 kg (262 lb)  Body mass index is 44.97 kg/m².    Physical Exam   Constitutional: She is oriented to person, place, and time. She appears well-developed and well-nourished.   HENT:   Head: Normocephalic.   Neck: No tracheal deviation present.   Cardiovascular: Normal rate.   Pulmonary/Chest: Effort normal.   Abdominal: Soft. She exhibits no distension. There is no tenderness.   Ileostomy intact, pink and healthy   Neurological: She is alert and oriented to person, place, and time.   Skin: Skin is warm and dry.         Significant Labs:  BMP (Last 3 Results): No results for input(s): GLU, NA, K, CL, CO2, BUN, CREATININE, CALCIUM, MG in the last 168 hours.  CBC (Last 3 Results): No results for input(s): WBC, RBC, HGB, HCT, PLT, MCV, MCH, MCHC in the last 168 hours.  CRP (Last 3 Results): No results for input(s): CRP in the last 168 hours.    Significant Diagnostics:  None    Assessment/Plan:     Active Diagnoses:    Diagnosis Date Noted POA    Colostomy in place [Z93.3] 12/19/2019 Not Applicable      Problems Resolved During this Admission:       1. End ileostomy following proctocolectomy for ulcerative colitis  2. Plan for elective panniculectomy today    Colorectal surgery will assist with end ileostomy re-siting.    Gucci Lyons,  MD  Colorectal Surgery  [facility]

## 2019-12-19 NOTE — NURSING TRANSFER
Nursing Transfer Note      12/19/2019     Transfer To: 1044    Transfer via bed    Transfer with rangel, x4 drain    Transported by pct    Medicines sent: no    Chart send with patient: Yes    Notified: spouse

## 2019-12-19 NOTE — ANESTHESIA POSTPROCEDURE EVALUATION
Anesthesia Post Evaluation    Patient: Ida Orozco    Procedure(s) Performed: Procedure(s) (LRB):  PANNICULECTOMY (Bilateral)  REVISION, COLOSTOMY (N/A)    Final Anesthesia Type: general    Patient location during evaluation: PACU  Patient participation: Yes- Able to Participate  Level of consciousness: awake and alert and oriented  Post-procedure vital signs: reviewed and stable  Pain management: adequate  Airway patency: patent  CHELO mitigation strategies: Multimodal analgesia and Extubation while patient is awake  PONV status at discharge: No PONV  Anesthetic complications: yes (c/o numbness of left thumb, index, middle fingers.  She had watch on left wrist- I moved to other arm.  Reassure that this will resolve..  This was not preesent on awakening per patient.)  Perioperative Events: other periop events (see comments)    Cardiovascular status: hemodynamically stable  Respiratory status: unassisted  Hydration status: euvolemic  Follow-up not needed.          Vitals Value Taken Time   BP 95/52 12/19/2019  1:32 PM   Temp 36.6 °C (97.8 °F) 12/19/2019  1:15 PM   Pulse 78 12/19/2019  1:49 PM   Resp 20 12/19/2019  1:49 PM   SpO2 95 % 12/19/2019  1:49 PM   Vitals shown include unvalidated device data.      Event Time     Out of Recovery 13:52:18          Pain/Tito Score: Pain Rating Prior to Med Admin: 5 (12/19/2019  1:37 PM)  Pain Rating Post Med Admin: 4 (12/19/2019  1:15 PM)  Tito Score: 10 (12/19/2019  1:15 PM)

## 2019-12-19 NOTE — OP NOTE
[facility]  Plastic Surgery  Operative Note    SUMMARY     Date of Procedure: 12/19/2019     Procedure: Procedure(s) (LRB):  PANNICULECTOMY (Bilateral)  REVISION, COLOSTOMY (N/A)   Revision ileostomy by Dr. Virgen  Panniculectomy with excision of umbilicus  Incisional wound vac  Monsplasty    Surgeon(s) and Role:  Panel 1:     * Andrea Alvarado MD - Primary     * Joe Clemons MD - Resident - Assisting  Panel 2:     * JENNY Virgen MD - Primary     * Gucci Lyons MD - Resident - Assisting    Pre-Operative Diagnosis:   Symptomatic abdominal panniculus [E65]  Panniculitis [M79.3]  Chronic rashes along two abdominal skin folds    Post-Operative Diagnosis:   Same    Anesthesia:   General    Indications:   54 year old female with symptomatic abdominal panniculus.  We discussed that her surgery was high risk, in particular with regard to the risk of wound healing difficulties, hematoma, seroma, delayed wound healing.  I consulted with her about the possibility of wound infection from manipulation of the stoma or functional issues from manipulation of the stoma at the time of panniculectomy.  She still wished to proceed.  Preoperative markings were placed, and I demonstrated the location of incisions.  I again noted the presence of inferior and middle abdominal skin fold with rash.  I emphasized to her that she would have lateral abdominal fullness, that I would not be performing an abdominoplasty today (no rectus plication, flank liposuction or vertical skin removal).  I discussed admission with her to make sure she had a working stoma.  All of her questions were answered.  SQ heparin was given in the preoperative holding area      Procedure:   The patient was transported to the Or and placed in a supine position where general anesthesia was induced.  An appropriate time out was performed.  She was prepped and draped in standard fashion.  Dr. Virgen proceeded with ileostomy take down.  Refer  to his note for details.  The ileostomy was protected with betadine soaked lap sponge.      I then proceeded with making a lower abdominal incision through healthy skin.  I dissected down to superficial veins and the SIEA ligating them with hemoclips.  Dissection through subcutaneous tissue was completed, dissecting above fascia.  I divided her perforators with double hemoclips.  I divided the perforators.  In order to efface her upper skin fold, I dissected under the fold.  I left the soft tissues in tact around her ileostomy, which remained protected with a betadine lap in the subcutaneous tissue.  We made an upper abdominal incision, making sure to transpose the lower abdominal incision onto it to ensure a tension free closure.  The umbilicus was excised above the level of the fascia.  The abdominal panniculus was removed en bloc and sent to pathology.  Hemostasis was verified.  Four drains were placed.  Her skin was approximated with interrupted 2-0 vicryl jovan sutures and interrupted 3-0 monocryl suture.  I approximated the skin with staples and ran 3-0 intra dermal strattafix for skin closure.  In order to avoid a depression at the midline, I performed a monsplasty, suspending the mons with 2-0 vicryl suture to the underlying fascia, excising redundant skin on the mons and resecuring the abdominal skin flap to the mons.  I applied aquacel ag surgical dressing and a prevena wound vac with a good seal. The skin around the stoma did not get transposed more than 1 inch, as verified by skeletal landmarks- costal margin and skin markings.      The colorectal team then reinset the ileostomy.  They applied a colostomy bag.      An abdominal binder was placed on the incision    Post operatively she will be admitted for observation, her diet advanced to a regular diet, and she should have the prevena maintained.  She will remain on antibiotics, cipro and flagyl for a total of 1 week.       Complications:  No    Estimated Blood Loss (EBL): * No values recorded between 12/19/2019  7:46 AM and 12/19/2019 11:55 AM *           Implants: * No implants in log *    Specimens:   Specimen (12h ago, onward)    None                  Condition: Good    Disposition: PACU - hemodynamically stable.    Attestation: I was present and scrubbed for the entire procedure.

## 2019-12-19 NOTE — PLAN OF CARE
Plan of care reviewed with pt, who verbalized understanding. Pt received from PACU. WV in place, 4 abdominal ANNA drains putting out serosanguinous, RLQ ileostomy w/ thin bloodied drainage. Khan intact. Pt w/ c/o L hand/wrist numbness, R hand tingling. Call bell in reach, bed locked in lowest position. Frequent rounds made for pt safety. AAOx4, tachycardic otherwise VSS, will continue to monitor.

## 2019-12-19 NOTE — ANESTHESIA PROCEDURE NOTES
Intubation  Performed by: Joe Carias Jr., CRNA  Authorized by: Emory Bass MD     Intubation:     Induction:  Intravenous    Intubated:  Postinduction    Mask Ventilation:  Easy with oral airway    Attempts:  1    Attempted By:  Other (see comments) (Flight care RN)    Method of Intubation:  Direct    Blade:  Jez 3    Laryngeal View Grade: Grade I - full view of chords      Difficult Airway Encountered?: No      Complications:  None    Airway Device:  Oral endotracheal tube    Airway Device Size:  7.0    Style/Cuff Inflation:  Cuffed (inflated to minimal occlusive pressure)    Tube secured:  20    Secured at:  The teeth    Placement Verified By:  Capnometry    Complicating Factors:  None    Findings Post-Intubation:  BS equal bilateral and atraumatic/condition of teeth unchanged  Notes:      Ramp used

## 2019-12-19 NOTE — H&P
[facility]  History & Physical  Plastic Surgery    SUBJECTIVE:     Chief Complaint/Reason for Admission: panniculectomy    History of Present Illness:  Patient is a 54 y.o. female w/ hx of large abd pannus, hx of crohns, here for panniculectomy and re-siting of ostomy    PTA Medications   Medication Sig    albuterol (PROVENTIL) 2.5 mg /3 mL (0.083 %) nebulizer solution INHALE ONE VIAL VIA NEBULIZER EVERY SIX HOURS (Patient taking differently: every 6 (six) hours as needed. )    albuterol 90 mcg/actuation inhaler Inhale 2 puffs into the lungs every 4 (four) hours as needed for Wheezing or Shortness of Breath. Rescue    clonazePAM (KLONOPIN) 1 MG tablet Take 1 mg by mouth once daily.     dicyclomine (BENTYL) 20 mg tablet Take 1 tablet (20 mg total) by mouth every 6 (six) hours as needed.    ergocalciferol (ERGOCALCIFEROL) 50,000 unit Cap TAKE 1 CAPSULE (50,000 UNITS TOTAL) BY MOUTH EVERY 7 DAYS.    ferrous gluconate (FERGON) 324 MG tablet TAKE 1 TABLET (324 MG TOTAL) BY MOUTH DAILY WITH BREAKFAST.    fluconazole (DIFLUCAN) 150 MG Tab Take 1 tablet (150 mg total) by mouth every 7 days.    fluticasone (FLONASE) 50 mcg/actuation nasal spray INSTILL TWO SPRAYS IN EACH NOSTRIL IN EACH NOSTRIL EVERY EVENING    loratadine (CLARITIN) 10 mg tablet TAKE ONE TABLET BY MOUTH ONCE DAILY    metroNIDAZOLE (FLAGYL) 500 MG tablet On day before surgery, take 1 tablet (500mg) at 1 pm, 2 pm, and 11 pm    montelukast (SINGULAIR) 10 mg tablet TAKE ONE TABLET BY MOUTH EVERY EVENING (Patient taking differently: Take 10 mg by mouth once daily. )    mupirocin (BACTROBAN) 2 % ointment Apply topically 3 (three) times daily.    neomycin (MYCIFRADIN) 500 mg Tab On day before surgery, take two tablets (1,000 mg) by mouth at 1 pm, 2 pm, and 11 pm    nystatin (MYCOSTATIN) powder Apply topically 2 (two) times daily. (Patient taking differently: Apply topically daily as needed (when changes colostomy bag). )     ondansetron (ZOFRAN-ODT) 8 MG TbDL Take 1 tablet (8 mg total) by mouth every 12 (twelve) hours as needed.    pantoprazole (PROTONIX) 40 MG tablet TAKE ONE TABLET BY MOUTH ONCE DAILY    promethazine (PHENERGAN) 25 MG tablet Take 1 tablet (25 mg total) by mouth every 6 (six) hours as needed for Nausea.    sertraline (ZOLOFT) 100 MG tablet Take 100 mg by mouth every evening.     topiramate (TROKENDI XR) 200 mg Cp24 Take 100 mg by mouth once daily.     trazodone (DESYREL) 50 MG tablet Take 50 mg by mouth every evening.    budesonide-formoterol 160-4.5 mcg (SYMBICORT) 160-4.5 mcg/actuation HFAA Inhale 2 puffs into the lungs every 12 (twelve) hours. Controller (Patient taking differently: Inhale 2 puffs into the lungs every 12 (twelve) hours as needed. Controller)    clotrimazole-betamethasone 1-0.05% (LOTRISONE) cream Apply topically 2 (two) times daily. (Patient not taking: Reported on 12/10/2019)    diclofenac sodium (VOLTAREN) 1 % Gel APPLY TWO GRAMS FOUR TIMES A DAY       Review of patient's allergies indicates:   Allergen Reactions    Adhesive      Cause blisters    Dilaudid [hydromorphone] Nausea And Vomiting    Sulfa (sulfonamide antibiotics) Hives       Past Medical History:   Diagnosis Date    Anxiety     Asthma     Bronchitis     Crohn's colitis     Depression     Fatty liver disease, nonalcoholic     Gallstones     GERD (gastroesophageal reflux disease)     History of sleeve gastrectomy 2016    PTSD (post-traumatic stress disorder)     Seasonal allergies     Ulcerative colitis      Past Surgical History:   Procedure Laterality Date    APPENDECTOMY      CATHETERIZATION OF BOTH LEFT AND RIGHT HEART Right 2019    Procedure: CATHETERIZATION, HEART, BOTH LEFT AND RIGHT;  Surgeon: Beto Malin MD;  Location: [facility] CATH LAB;  Service: Cardiology;  Laterality: Right;     SECTION, LOW TRANSVERSE      CHOLECYSTECTOMY      open    COLECTOMY      total- 2013     CYSTOSCOPY W/ RETROGRADES N/A 8/20/2018    Procedure: CYSTOSCOPY, WITH RETROGRADE PYELOGRAM;  Surgeon: Butch Banks MD;  Location: [facility] OR;  Service: Urology;  Laterality: N/A;  HANSEN SURGICAL CONFIRMED 8/17/DME    FLEXIBLE SIGMOIDOSCOPY  5/24/2018    Procedure: SIGMOIDOSCOPY, FLEXIBLE;  Surgeon: JENNY Virgen MD;  Location: [facility] OR 2ND FLR;  Service: Colon and Rectal;;    GASTRIC SLEEVE       HYSTERECTOMY      ILEOSTOMY REVISION      april 2014; august 2014    LAPAROSCOPIC PROCTECTOMY N/A 5/24/2018    Procedure: PROCTECTOMY-LAPAROSCOPIC/CONVERTED TO OPEN;  Surgeon: JENNY Virgen MD;  Location: [facility] OR 2ND FLR;  Service: Colon and Rectal;  Laterality: N/A;    LYSIS OF ADHESIONS  5/24/2018    Procedure: LYSIS, ADHESIONS/ more than 2hours;  Surgeon: JENNY Virgen MD;  Location: [facility] OR 2ND FLR;  Service: Colon and Rectal;;    thumb surgery      TONSILLECTOMY, ADENOIDECTOMY      WISDOM TOOTH EXTRACTION       Family History   Problem Relation Age of Onset    Cancer Mother         skin    Heart disease Father 67    Cancer Paternal Grandfather         ?    Cancer Maternal Grandfather         colon      Social History     Tobacco Use    Smoking status: Former Smoker     Types: Cigarettes     Last attempt to quit: 2009     Years since quitting: 10.9    Smokeless tobacco: Never Used   Substance Use Topics    Alcohol use: Yes     Comment: occasionally    Drug use: No        Review of Systems:  reviewed, noncontributory    OBJECTIVE:     Vital Signs (Most Recent):  Temp: 98.1 °F (36.7 °C) (12/19/19 0558)  Pulse: 62 (12/19/19 0558)  Resp: 15 (12/19/19 0558)  BP: (!) 119/58 (12/19/19 0558)  SpO2: 99 % (12/19/19 0558)    Physical Exam:  NAD, pleasant  Ostomy in place  Large abd pannus with scarring, maceration     ASSESSMENT/PLAN:     54F w/ crohns, end ileostomy, large abd pannus  -proceed w/ panniculectomy today, re-siting of ostomy  -blood consent obtained  -discussed that she will not have umbilicus  after surgery, as well as increased risks of wound infection

## 2019-12-19 NOTE — OP NOTE
Date of procedure:   12/19/2019    Indications for procedure:  Ida Orozco is a 54 year old female with an end ileostomy after a total proctocolectomy for ulcerative colitis who is undergoing a panniculectomy per plastic surgery.  Colorectal surgery was asked to assist with revision of the ileostomy due to possible translation of the skin at the ostomy site with panniculectomy    Preoperative diagnosis:   Need for ileostomy revision with panniculectomy    Postoperative diagnosis:  Need for ileostomy revision with panniculectomy    Name of procedure:  Revision of ileostomy (superficial to the fascia).    Surgeon:   JENNY Virgen MD    Assistant surgeon:  Gucci Lyons MD    Type of anesthesia:  General Endotracheal    EBL:  50  Cc's    Specimen:  None    Findings:  1. Minor (less than 1 inch) translation of the subcutaneous tissue with panniculectomy.  Ileostomy re-matured through the existing subcutaneous trephine.    Technique in detail:  The patient was brought to the operating room and her identity confirmed.  General endotracheal anesthesia was induced.  A rangel catheter was inserted.  She was positioned supine with her arms abducted in coordination with plastic surgery (Dr. Alvarado).  Her stoma appliance was removed and the abdomen was prepped with betadine and draped.    A critical multidisciplinary time out was performed.    A 15 blade was used to incise the mucocutaneous junction circumferentially.  Metzenbaum scissors were then used to separate the ileum from the subcutaneous tissue down to the level of the fascia circumferentially.  A 2-0 Maxon suture was then used to close the ileum with inversion of the mucosa.  Betadine was then copiously applied to the site.    Dr. Alvarado then proceeded with his main procedure (Panniculectomy).  See separate operative note for details.  Of note, the drapes were completely removed and the abdomen re-prepped for their portion of the procedure.    After the  panniculectomy was completed, colorectal surgery returned to the operating room.  The site of the stoma was assessed and it was found that minimal translation (less than 1 inch) of the skin had occurred in relation to the costal margin.  The ileum easily returned to it prior position with a straight tract and without tension.  We proceeded with maturing the ileostomy in Eleni everted fashion using interrupted 3-0 Vicryl sutures.    There were no intra-operative complications.  Dr. Virgen was present and directed all colorectal portions of the procedure.  Sponge and instrument counts were reported to be correct.  The patient was extubated and taken to the post-anesthesia recovery room.    Gucci Lyons MD  Colon and Rectal Surgery Fellow

## 2019-12-19 NOTE — BRIEF OP NOTE
[facility]  Brief Operative Note    Surgery Date: 12/19/2019     Surgeon(s) and Role:  Panel 1:     * Andrea Alvarado MD - Primary     * Joe Clemons MD - Resident - Assisting  Panel 2:     * JENNY Virgen MD - Primary     * Gucci Lyons MD - Resident - Assisting        Pre-op Diagnosis:  Symptomatic abdominal panniculus [E65]  Panniculitis [M79.3]    Post-op Diagnosis:  Post-Op Diagnosis Codes:     * Symptomatic abdominal panniculus [E65]     * Panniculitis [M79.3]    Procedure(s) (LRB):  PANNICULECTOMY (Bilateral)  REVISION, COLOSTOMY (N/A)    Anesthesia: General    Description of the findings of the procedure(s): large abdominal pannus    Estimated Blood Loss: * No values recorded between 12/19/2019  7:46 AM and 12/19/2019 11:56 AM *         Specimens:   Specimen (12h ago, onward)    None        Drains x4, bilateral: lateral and medial

## 2019-12-19 NOTE — PLAN OF CARE
Patient prepped for procedure. Questions answered. Incentive spirometry teaching done by respiratory.

## 2019-12-19 NOTE — NURSING
Pt received GISSU. Pt AAOx4, WV intact, 4 ANNA drains intact. Pt w/ c/o 'unusual bloody stool' in ostomy, further c/o progressing numbness in R hand, tingling in left.  VSS, will continue to monitor.

## 2019-12-19 NOTE — ANESTHESIA PREPROCEDURE EVALUATION
2019  Ida Orozco is a 54 y.o., female.  Pre-operative evaluation for PANNICULECTOMY (Bilateral), REVISION, COLOSTOMY (N/A)    Chief Complaint:pannus    PMH:  Morbid obesity s/p bariatric surgery  Patient underwent cardiac catheterization which revealed no evidence of coronary artery disease and no evidence of pulmonary hypertension.  She is cleared for her panniculectomy  asthma  GERD  CHELO  Nl EF  Crohn's disease    Past Surgical History:   Procedure Laterality Date    APPENDECTOMY      CATHETERIZATION OF BOTH LEFT AND RIGHT HEART Right 2019    Procedure: CATHETERIZATION, HEART, BOTH LEFT AND RIGHT;  Surgeon: Beto Malin MD;  Location: [facility] CATH LAB;  Service: Cardiology;  Laterality: Right;     SECTION, LOW TRANSVERSE      CHOLECYSTECTOMY      open    COLECTOMY      total- 2013    CYSTOSCOPY W/ RETROGRADES N/A 2018    Procedure: CYSTOSCOPY, WITH RETROGRADE PYELOGRAM;  Surgeon: Butch Banks MD;  Location: [facility] OR;  Service: Urology;  Laterality: N/A;  HANSEN SURGICAL CONFIRMED     FLEXIBLE SIGMOIDOSCOPY  2018    Procedure: SIGMOIDOSCOPY, FLEXIBLE;  Surgeon: JENNY Virgen MD;  [facility];  Service: Colon and Rectal;;    GASTRIC SLEEVE       HYSTERECTOMY      ILEOSTOMY REVISION      2014; 2014    LAPAROSCOPIC PROCTECTOMY N/A 2018    Procedure: PROCTECTOMY-LAPAROSCOPIC/CONVERTED TO OPEN;  Surgeon: JENNY Virgen MD;  Location: [facility] OR 2ND FLR;  Service: Colon and Rectal;  Laterality: N/A;    LYSIS OF ADHESIONS  2018    Procedure: LYSIS, ADHESIONS/ more than 2hours;  Surgeon: JENNY Virgen MD;  Location: [facility] OR 2ND FLR;  Service: Colon and Rectal;;    thumb surgery      TONSILLECTOMY, ADENOIDECTOMY      WISDOM TOOTH EXTRACTION           Vital Signs Range (Last 24H):  Temp:  [36.7 °C (98.1 °F)]    Pulse:  [62]   Resp:  [15]   BP: (119)/(58)   SpO2:  [99 %]       CBC:   No results for input(s): WBC, RBC, HGB, HCT, PLT, MCV, MCH, MCHC in the last 720 hours.    CMP: No results for input(s): NA, K, CL, CO2, BUN, CREATININE, GLU, MG, PHOS, CALCIUM, ALBUMIN, PROT, ALKPHOS, ALT, AST, BILITOT in the last 720 hours.    INR:  No results for input(s): PT, INR, PROTIME, APTT in the last 720 hours.      Diagnostic Studies:      EKD Echo:  Anesthesia Evaluation    I have reviewed the Patient Summary Reports.     I have reviewed the Nursing Notes.   I have reviewed the Medications.     Review of Systems  Anesthesia Hx:  No problems with previous Anesthesia    Social:  Non-Smoker    Cardiovascular:  Cardiovascular Normal     Pulmonary:  Pulmonary Normal        Physical Exam  General:  Morbid Obesity    Airway/Jaw/Neck:  Airway Findings: Mouth Opening: Normal Tongue: Normal  General Airway Assessment: Good  Mallampati: I  TM Distance: Normal, at least 6 cm  Jaw/Neck Findings:  Neck ROM: Normal ROM      Dental:  Dental Findings: In tact   Chest/Lungs:  Chest/Lungs Findings: Clear to auscultation, Normal Respiratory Rate     Heart/Vascular:  Heart Findings:       Mental Status:  Mental Status Findings:  Cooperative, Alert and Oriented         Anesthesia Plan  Type of Anesthesia, risks & benefits discussed:  Anesthesia Type:  general  Patient's Preference:   Intra-op Monitoring Plan: standard ASA monitors  Intra-op Monitoring Plan Comments:   Post Op Pain Control Plan: multimodal analgesia  Post Op Pain Control Plan Comments:   Induction:   IV  Beta Blocker:  Patient is not currently on a Beta-Blocker (No further documentation required).       Informed Consent: Patient understands risks and agrees with Anesthesia plan.  Questions answered. Anesthesia consent signed with patient.  ASA Score: 3     Day of Surgery Review of History & Physical:    H&P update referred to the surgeon.         Ready For Surgery From  Anesthesia Perspective.

## 2019-12-20 PROBLEM — E65 SYMPTOMATIC ABDOMINAL PANNICULUS: Status: ACTIVE | Noted: 2019-12-20

## 2019-12-20 PROCEDURE — 94761 N-INVAS EAR/PLS OXIMETRY MLT: CPT

## 2019-12-20 PROCEDURE — 25000003 PHARM REV CODE 250: Performed by: NURSE PRACTITIONER

## 2019-12-20 PROCEDURE — 63600175 PHARM REV CODE 636 W HCPCS: Performed by: SURGERY

## 2019-12-20 PROCEDURE — 20600001 HC STEP DOWN PRIVATE ROOM

## 2019-12-20 PROCEDURE — 63600175 PHARM REV CODE 636 W HCPCS: Performed by: STUDENT IN AN ORGANIZED HEALTH CARE EDUCATION/TRAINING PROGRAM

## 2019-12-20 PROCEDURE — 94640 AIRWAY INHALATION TREATMENT: CPT

## 2019-12-20 PROCEDURE — 25000003 PHARM REV CODE 250: Performed by: SURGERY

## 2019-12-20 PROCEDURE — 94799 UNLISTED PULMONARY SVC/PX: CPT

## 2019-12-20 PROCEDURE — 25000242 PHARM REV CODE 250 ALT 637 W/ HCPCS: Performed by: SURGERY

## 2019-12-20 PROCEDURE — 99900035 HC TECH TIME PER 15 MIN (STAT)

## 2019-12-20 RX ORDER — ENOXAPARIN SODIUM 100 MG/ML
40 INJECTION SUBCUTANEOUS EVERY 24 HOURS
Status: DISCONTINUED | OUTPATIENT
Start: 2019-12-20 | End: 2019-12-21 | Stop reason: HOSPADM

## 2019-12-20 RX ORDER — CLONAZEPAM 0.5 MG/1
1 TABLET ORAL DAILY
Status: DISCONTINUED | OUTPATIENT
Start: 2019-12-21 | End: 2019-12-20

## 2019-12-20 RX ORDER — METRONIDAZOLE 500 MG/1
500 TABLET ORAL EVERY 8 HOURS
Qty: 30 TABLET | Refills: 0 | Status: SHIPPED | OUTPATIENT
Start: 2019-12-20 | End: 2019-12-30

## 2019-12-20 RX ORDER — ONDANSETRON HYDROCHLORIDE 4 MG/5ML
4 SOLUTION ORAL EVERY 6 HOURS PRN
Status: DISCONTINUED | OUTPATIENT
Start: 2019-12-20 | End: 2019-12-20

## 2019-12-20 RX ORDER — ONDANSETRON 2 MG/ML
4 INJECTION INTRAMUSCULAR; INTRAVENOUS EVERY 6 HOURS PRN
Status: DISCONTINUED | OUTPATIENT
Start: 2019-12-20 | End: 2019-12-21 | Stop reason: HOSPADM

## 2019-12-20 RX ORDER — MORPHINE SULFATE 2 MG/ML
2 INJECTION, SOLUTION INTRAMUSCULAR; INTRAVENOUS ONCE
Status: COMPLETED | OUTPATIENT
Start: 2019-12-20 | End: 2019-12-20

## 2019-12-20 RX ORDER — CLONAZEPAM 0.5 MG/1
1 TABLET ORAL DAILY
Status: DISCONTINUED | OUTPATIENT
Start: 2019-12-20 | End: 2019-12-21 | Stop reason: HOSPADM

## 2019-12-20 RX ORDER — TOPIRAMATE 25 MG/1
50 TABLET ORAL 2 TIMES DAILY
Status: DISCONTINUED | OUTPATIENT
Start: 2019-12-20 | End: 2019-12-20

## 2019-12-20 RX ORDER — LIDOCAINE HYDROCHLORIDE AND EPINEPHRINE 10; 10 MG/ML; UG/ML
1 INJECTION, SOLUTION INFILTRATION; PERINEURAL ONCE
Status: COMPLETED | OUTPATIENT
Start: 2019-12-20 | End: 2019-12-20

## 2019-12-20 RX ORDER — OXYCODONE AND ACETAMINOPHEN 5; 325 MG/1; MG/1
1 TABLET ORAL EVERY 6 HOURS PRN
Qty: 24 TABLET | Refills: 0 | Status: SHIPPED | OUTPATIENT
Start: 2019-12-20 | End: 2019-12-27

## 2019-12-20 RX ORDER — CIPROFLOXACIN 500 MG/1
500 TABLET ORAL EVERY 12 HOURS
Qty: 20 TABLET | Refills: 0 | Status: SHIPPED | OUTPATIENT
Start: 2019-12-20 | End: 2019-12-30

## 2019-12-20 RX ADMIN — ALBUTEROL SULFATE 2.5 MG: 2.5 SOLUTION RESPIRATORY (INHALATION) at 11:12

## 2019-12-20 RX ADMIN — ONDANSETRON 4 MG: 2 INJECTION INTRAMUSCULAR; INTRAVENOUS at 09:12

## 2019-12-20 RX ADMIN — GABAPENTIN 300 MG: 300 CAPSULE ORAL at 09:12

## 2019-12-20 RX ADMIN — CIPROFLOXACIN HYDROCHLORIDE 500 MG: 500 TABLET, FILM COATED ORAL at 09:12

## 2019-12-20 RX ADMIN — TRAZODONE HYDROCHLORIDE 50 MG: 50 TABLET ORAL at 10:12

## 2019-12-20 RX ADMIN — MORPHINE SULFATE 4 MG: 2 INJECTION, SOLUTION INTRAMUSCULAR; INTRAVENOUS at 09:12

## 2019-12-20 RX ADMIN — LIDOCAINE HYDROCHLORIDE AND EPINEPHRINE 1 ML: 10; 10 INJECTION, SOLUTION INFILTRATION; PERINEURAL at 12:12

## 2019-12-20 RX ADMIN — CETIRIZINE HYDROCHLORIDE 10 MG: 10 TABLET, FILM COATED ORAL at 09:12

## 2019-12-20 RX ADMIN — ALBUTEROL SULFATE 2.5 MG: 2.5 SOLUTION RESPIRATORY (INHALATION) at 03:12

## 2019-12-20 RX ADMIN — OXYCODONE HYDROCHLORIDE AND ACETAMINOPHEN 1 TABLET: 10; 325 TABLET ORAL at 06:12

## 2019-12-20 RX ADMIN — DICYCLOMINE HYDROCHLORIDE 20 MG: 20 TABLET ORAL at 09:12

## 2019-12-20 RX ADMIN — OXYCODONE HYDROCHLORIDE AND ACETAMINOPHEN 1 TABLET: 10; 325 TABLET ORAL at 07:12

## 2019-12-20 RX ADMIN — ENOXAPARIN SODIUM 40 MG: 100 INJECTION SUBCUTANEOUS at 07:12

## 2019-12-20 RX ADMIN — MORPHINE SULFATE 4 MG: 2 INJECTION, SOLUTION INTRAMUSCULAR; INTRAVENOUS at 05:12

## 2019-12-20 RX ADMIN — OXYCODONE HYDROCHLORIDE AND ACETAMINOPHEN 1 TABLET: 10; 325 TABLET ORAL at 02:12

## 2019-12-20 RX ADMIN — METRONIDAZOLE 500 MG: 500 TABLET ORAL at 03:12

## 2019-12-20 RX ADMIN — TOPIRAMATE 100 MG: 25 TABLET, FILM COATED ORAL at 09:12

## 2019-12-20 RX ADMIN — METRONIDAZOLE 500 MG: 500 TABLET ORAL at 10:12

## 2019-12-20 RX ADMIN — MONTELUKAST 10 MG: 10 TABLET, FILM COATED ORAL at 10:12

## 2019-12-20 RX ADMIN — GABAPENTIN 300 MG: 300 CAPSULE ORAL at 03:12

## 2019-12-20 RX ADMIN — DICYCLOMINE HYDROCHLORIDE 20 MG: 20 TABLET ORAL at 12:12

## 2019-12-20 RX ADMIN — SERTRALINE HYDROCHLORIDE 100 MG: 100 TABLET ORAL at 10:12

## 2019-12-20 RX ADMIN — GABAPENTIN 300 MG: 300 CAPSULE ORAL at 10:12

## 2019-12-20 RX ADMIN — DIPHENHYDRAMINE HYDROCHLORIDE 12.5 MG: 50 INJECTION, SOLUTION INTRAMUSCULAR; INTRAVENOUS at 12:12

## 2019-12-20 RX ADMIN — ALBUTEROL SULFATE 2.5 MG: 2.5 SOLUTION RESPIRATORY (INHALATION) at 08:12

## 2019-12-20 RX ADMIN — MORPHINE SULFATE 4 MG: 2 INJECTION, SOLUTION INTRAMUSCULAR; INTRAVENOUS at 11:12

## 2019-12-20 RX ADMIN — CLONAZEPAM 1 MG: 0.5 TABLET ORAL at 09:12

## 2019-12-20 RX ADMIN — DICYCLOMINE HYDROCHLORIDE 20 MG: 20 TABLET ORAL at 05:12

## 2019-12-20 RX ADMIN — PANTOPRAZOLE SODIUM 40 MG: 40 TABLET, DELAYED RELEASE ORAL at 09:12

## 2019-12-20 RX ADMIN — ONDANSETRON 4 MG: 2 INJECTION INTRAMUSCULAR; INTRAVENOUS at 02:12

## 2019-12-20 RX ADMIN — MORPHINE SULFATE 2 MG: 2 INJECTION, SOLUTION INTRAMUSCULAR; INTRAVENOUS at 12:12

## 2019-12-20 RX ADMIN — METRONIDAZOLE 500 MG: 500 TABLET ORAL at 05:12

## 2019-12-20 NOTE — PLAN OF CARE
Patient to be discharged home.  The patient will not have any home needs.  Family to provide transportation home.     Future Appointments   Date Time Provider Department Center   12/27/2019  1:30 PM Andrea Alvarado MD [facility] PLSUR [facility]   1/31/2020 10:00 AM Tavo Latham MD [facility] GASTRO [address]   3/4/2020 11:30 AM Desirae Melendez MD [facility] UROGYN [facility]   5/18/2020 10:00 AM Beto Malin MD [facility] CARDIO [facility]        12/20/19 1310   Final Note   Assessment Type Final Discharge Note   Anticipated Discharge Disposition Home   Hospital Follow Up  Appt(s) scheduled? No   Discharge plans and expectations educations in teach back method with documentation complete? Yes

## 2019-12-20 NOTE — PLAN OF CARE
CM met with patient to obtain discharge planning assessment.  Patient is POD 1 for panniculectomy and ostomy revision. Planned discharge is home with family - Plan (A) or home with family with home health - Plan (B).    PCP:  Eliecer Jones MD     Payor:  Payor: [employer]ARE / Plan: [employer] [facility] HEALTH / Product Type: Medicare Advantage /      Pharmacy:    [id number] [address] [address]  Phone: [phone] Fax: [phone]       12/20/19 1309   Discharge Assessment   Assessment Type Discharge Planning Assessment   Confirmed/corrected address and phone number on facesheet? Yes   Assessment information obtained from? Patient   Expected Length of Stay (days) 1   Communicated expected length of stay with patient/caregiver yes   Prior to hospitilization cognitive status: Alert/Oriented   Prior to hospitalization functional status: Independent   Current cognitive status: Alert/Oriented   Current Functional Status: Independent   Lives With spouse   Able to Return to Prior Arrangements yes   Is patient able to care for self after discharge? Yes   Who are your caregiver(s) and their phone number(s)? Dell - spouse [phone]   Patient's perception of discharge disposition home or selfcare   Readmission Within the Last 30 Days no previous admission in last 30 days   Patient currently being followed by outpatient case management? No   Patient currently receives any other outside agency services? No   Equipment Currently Used at Home colostomy/ostomy supplies   Do you have any problems affording any of your prescribed medications? No   Is the patient taking medications as prescribed? yes   Does the patient have transportation home? Yes   Transportation Anticipated family or friend will provide   Does the patient receive services at the [facility]? No   Discharge Plan A Home with family   Discharge Plan B Home with family;Home Health    DME Needed Upon Discharge  none   Patient/Family in Agreement with Plan yes

## 2019-12-20 NOTE — ASSESSMENT & PLAN NOTE
Having ileostomy output.  Vomiting yesterday may be due to anesthesia related factors.    - Regular diet as tolerated.  - Further cares per primary plastic surgery service.

## 2019-12-20 NOTE — NURSING
RN notified in shift report that someone from the team would be up to place more staples in open area of pt incision. D/C orders were placed, Pt stated not feeling ready for discharge and no one had been in to place further staples. Team was notified, came up to place staples and discontinue the discharge orders due to pt desires to stay. RN also removed rangel and admin. x1 dose of pain meds at this time. WCTM.

## 2019-12-20 NOTE — PLAN OF CARE
Plan of care reviewed with pt, who verbalized understanding. Pt w/ pain controlled w/ PRN morphine - states that Oxy is not effective. Pt tolerating regular diet, one instance of nausea controlled w/ PRN med. Khan removed today, pt voided 400ml clear yellow urine. OOB to bathroom, ambulating in room. Call bell in reach, bed locked in lowest position. Frequent rounds made for pt safety. AAOx4, VSS, will continue to monitor.

## 2019-12-20 NOTE — SUBJECTIVE & OBJECTIVE
Subjective:     Interval History: Pain controlled  Vomited yesterday once gastric contents  Denies nausea this morning  Having ileostomy output    Post-Op Info:  Procedure(s) (LRB):  PANNICULECTOMY (Bilateral)  REVISION, COLOSTOMY (N/A)   1 Day Post-Op      Medications:  Continuous Infusions:  Scheduled Meds:   albuterol sulfate  2.5 mg Nebulization Q4H    cetirizine  10 mg Oral Daily    ciprofloxacin HCl  500 mg Oral Q12H    dicyclomine  20 mg Oral QID    fluticasone furoate-vilanterol  1 puff Inhalation Daily    gabapentin  300 mg Oral TID    lidocaine-EPINEPHrine 1%-1:100,000  1 mL Intradermal Once    metroNIDAZOLE  500 mg Oral Q8H    montelukast  10 mg Oral QHS    pantoprazole  40 mg Oral Daily    sertraline  100 mg Oral QHS    topiramate  100 mg Oral Daily    traZODone  50 mg Oral QHS     PRN Meds:   diphenhydrAMINE    morphine    ondansetron    oxyCODONE-acetaminophen    oxyCODONE-acetaminophen    promethazine (PHENERGAN) IVPB        Objective:     Vital Signs (Most Recent):  Temp: 98.6 °F (37 °C) (12/20/19 0746)  Pulse: 75 (12/20/19 0759)  Resp: 18 (12/20/19 0746)  BP: (!) 98/54 (12/20/19 0746)  SpO2: 97 % (12/20/19 0746) Vital Signs (24h Range):  Temp:  [97.2 °F (36.2 °C)-99.4 °F (37.4 °C)] 98.6 °F (37 °C)  Pulse:  [55-97] 75  Resp:  [16-20] 18  SpO2:  [94 %-97 %] 97 %  BP: ()/(52-67) 98/54     Intake/Output - Last 3 Shifts       12/18 0700 - 12/19 0659 12/19 0700 - 12/20 0659 12/20 0700 - 12/21 0659    P.O.  2810     I.V. (mL/kg)  4331.3 (36.5)     Total Intake(mL/kg)  7141.3 (60.1)     Urine (mL/kg/hr)  2185 (0.8)     Drains  255     Other  250     Stool  0     Total Output  2690     Net  +4451.3            Stool Occurrence  0 x           Physical Exam   Constitutional: She is oriented to person, place, and time. She appears well-developed and well-nourished.   HENT:   Head: Normocephalic.   Neck: No tracheal deviation present.   Cardiovascular: Normal rate.   Pulmonary/Chest:  Effort normal.   Abdominal:   Soft, appropriately tender near incisions, plastic surgery dressings and Provena intact  Ileostomy with appliance intact and effluent in the appliance.   Neurological: She is alert and oriented to person, place, and time.   Skin: Skin is warm and dry.

## 2019-12-20 NOTE — PROGRESS NOTES
Plastic Surgery Progress Note    Subjective:  Had emesis overnight but doing well now. Brigida PO  Brigida ambulation  Khan in place with adequate urine ouput  Denies CP/SOB    Objective:    VITAL SIGNS:   Vitals:    19 1209 19 1547 19 1556 19 1600   BP: (!) 97/46      BP Location:       Patient Position: Lying      Pulse: 95 86 82 82   Resp: 16  17 17   Temp: 98.6 °F (37 °C)      TempSrc: Axillary      SpO2: 95%  (!) 94% (!) 94%   Weight:       Height:         TMAX: Temp (24hrs), Av.9 °F (37.2 °C), Min:98.6 °F (37 °C), Max:99.4 °F (37.4 °C)      Intake/Output - Last 3 Shifts        0700 -  0659  07 -  0659  0700 -  0659    P.O.  2810 2000    I.V. (mL/kg)  4331.3 (36.5)     Total Intake(mL/kg)  7141.3 (60.1) 2000 (16.8)    Urine (mL/kg/hr)  2185 (0.8) 1400 (1.1)    Drains  255 155    Other  250     Stool  0 350    Total Output  2690 1905    Net  +4451.3 +95           Stool Occurrence  0 x 1 x          No results for input(s): WBC, HGB, HCT, PLT in the last 168 hours.  No results for input(s): NA, K, CL, CO2, BUN, CREATININE, LABGLOM, GLUCOSE, CALCIUM in the last 168 hours.  No results for input(s): MG in the last 168 hours. No results for input(s): PHOS in the last 168 hours.  No results for input(s): ALBUMIN in the last 168 hours.  No results for input(s): CRP in the last 168 hours.  Lab Results   Component Value Date    ALBUMIN 3.7 2019     Lab Results   Component Value Date    CRP 54.1 (H) 2018     Lab Results   Component Value Date    INR 1.0 2014     No results found for: PTT    Current Facility-Administered Medications   Medication    albuterol sulfate nebulizer solution 2.5 mg    cetirizine tablet 10 mg    ciprofloxacin HCl tablet 500 mg    clonazePAM tablet 1 mg    dicyclomine tablet 20 mg    diphenhydrAMINE injection 12.5 mg    fluticasone furoate-vilanterol 100-25 mcg/dose diskus inhaler 1 puff    gabapentin capsule 300 mg     metroNIDAZOLE tablet 500 mg    montelukast tablet 10 mg    morphine injection 4 mg    ondansetron injection 4 mg    oxyCODONE-acetaminophen  mg per tablet 1 tablet    oxyCODONE-acetaminophen 5-325 mg per tablet 1 tablet    pantoprazole EC tablet 40 mg    promethazine (PHENERGAN) 6.25 mg in dextrose 5 % 50 mL IVPB    sertraline tablet 100 mg    topiramate tablet 100 mg    traZODone tablet 50 mg        PE  General: Alert; No acute distress  Cardiovascular: Regular rate   Respiratory: Normal respiratory effort. Equal excursion.   Abdomen: Soft, ostomy with stool in bag, Prevena in place at center of wound. JPs intact with serosanginous output 45, 5, 140, 65. Lateral most incision on right was closed with staples at bedside   Extremity: Moves all extremities equally.      Diagnostics:      Assessment:  53 yo female s/p panniculectomy and ileostomy revision    Plan:  Pain control with percocet and morphine for breakthrough  Restart klonopin  Cont abx cipro flagyl  Encourage PO  Cont Prevena Wound vac  Drain care with strict I/Os  Plan DC tomorrow        William Nguyen MD  Plastic Surgery Fellow

## 2019-12-20 NOTE — PROGRESS NOTES
[facility]  Colorectal Surgery  Progress Note    Patient Name: Ida Orozco  MRN: [id number]  Admission Date: 12/19/2019  Hospital Length of Stay: 1 days  Attending Physician: Andrea Alvarado MD    Subjective:     Interval History: Pain controlled  Vomited yesterday once gastric contents  Denies nausea this morning  Having ileostomy output    Post-Op Info:  Procedure(s) (LRB):  PANNICULECTOMY (Bilateral)  REVISION, COLOSTOMY (N/A)   1 Day Post-Op      Medications:  Continuous Infusions:  Scheduled Meds:   albuterol sulfate  2.5 mg Nebulization Q4H    cetirizine  10 mg Oral Daily    ciprofloxacin HCl  500 mg Oral Q12H    dicyclomine  20 mg Oral QID    fluticasone furoate-vilanterol  1 puff Inhalation Daily    gabapentin  300 mg Oral TID    lidocaine-EPINEPHrine 1%-1:100,000  1 mL Intradermal Once    metroNIDAZOLE  500 mg Oral Q8H    montelukast  10 mg Oral QHS    pantoprazole  40 mg Oral Daily    sertraline  100 mg Oral QHS    topiramate  100 mg Oral Daily    traZODone  50 mg Oral QHS     PRN Meds:   diphenhydrAMINE    morphine    ondansetron    oxyCODONE-acetaminophen    oxyCODONE-acetaminophen    promethazine (PHENERGAN) IVPB        Objective:     Vital Signs (Most Recent):  Temp: 98.6 °F (37 °C) (12/20/19 0746)  Pulse: 75 (12/20/19 0759)  Resp: 18 (12/20/19 0746)  BP: (!) 98/54 (12/20/19 0746)  SpO2: 97 % (12/20/19 0746) Vital Signs (24h Range):  Temp:  [97.2 °F (36.2 °C)-99.4 °F (37.4 °C)] 98.6 °F (37 °C)  Pulse:  [55-97] 75  Resp:  [16-20] 18  SpO2:  [94 %-97 %] 97 %  BP: ()/(52-67) 98/54     Intake/Output - Last 3 Shifts       12/18 0700 - 12/19 0659 12/19 0700 - 12/20 0659 12/20 0700 - 12/21 0659    P.O.  2810     I.V. (mL/kg)  4331.3 (36.5)     Total Intake(mL/kg)  7141.3 (60.1)     Urine (mL/kg/hr)  2185 (0.8)     Drains  255     Other  250     Stool  0     Total Output  2690     Net  +4451.3            Stool Occurrence  0 x           Physical Exam    Constitutional: She is oriented to person, place, and time. She appears well-developed and well-nourished.   HENT:   Head: Normocephalic.   Neck: No tracheal deviation present.   Cardiovascular: Normal rate.   Pulmonary/Chest: Effort normal.   Abdominal:   Soft, appropriately tender near incisions, plastic surgery dressings and Provena intact  Ileostomy with appliance intact and effluent in the appliance.   Neurological: She is alert and oriented to person, place, and time.   Skin: Skin is warm and dry.         Assessment/Plan:     S/p ileostomy revision within the subcutaneous tissue.  Having ileostomy output.  Vomiting yesterday may be due to anesthesia related factors.    - Regular diet as tolerated.  - Further cares per primary plastic surgery service.        Gucci Lyons MD  Colorectal Surgery  [facility]

## 2019-12-21 VITALS
HEIGHT: 64 IN | BODY MASS INDEX: 44.73 KG/M2 | DIASTOLIC BLOOD PRESSURE: 57 MMHG | WEIGHT: 262 LBS | TEMPERATURE: 99 F | OXYGEN SATURATION: 95 % | RESPIRATION RATE: 16 BRPM | HEART RATE: 77 BPM | SYSTOLIC BLOOD PRESSURE: 105 MMHG

## 2019-12-21 PROCEDURE — 25000003 PHARM REV CODE 250: Performed by: SURGERY

## 2019-12-21 PROCEDURE — 94761 N-INVAS EAR/PLS OXIMETRY MLT: CPT

## 2019-12-21 PROCEDURE — 99900035 HC TECH TIME PER 15 MIN (STAT)

## 2019-12-21 PROCEDURE — 25000003 PHARM REV CODE 250: Performed by: NURSE PRACTITIONER

## 2019-12-21 PROCEDURE — 63600175 PHARM REV CODE 636 W HCPCS: Performed by: SURGERY

## 2019-12-21 PROCEDURE — 94640 AIRWAY INHALATION TREATMENT: CPT

## 2019-12-21 PROCEDURE — 25000242 PHARM REV CODE 250 ALT 637 W/ HCPCS: Performed by: SURGERY

## 2019-12-21 RX ADMIN — DICYCLOMINE HYDROCHLORIDE 20 MG: 20 TABLET ORAL at 03:12

## 2019-12-21 RX ADMIN — PANTOPRAZOLE SODIUM 40 MG: 40 TABLET, DELAYED RELEASE ORAL at 09:12

## 2019-12-21 RX ADMIN — OXYCODONE HYDROCHLORIDE AND ACETAMINOPHEN 1 TABLET: 10; 325 TABLET ORAL at 12:12

## 2019-12-21 RX ADMIN — FLUTICASONE FUROATE AND VILANTEROL TRIFENATATE 1 PUFF: 100; 25 POWDER RESPIRATORY (INHALATION) at 08:12

## 2019-12-21 RX ADMIN — ALBUTEROL SULFATE 2.5 MG: 2.5 SOLUTION RESPIRATORY (INHALATION) at 11:12

## 2019-12-21 RX ADMIN — CLONAZEPAM 1 MG: 0.5 TABLET ORAL at 08:12

## 2019-12-21 RX ADMIN — GABAPENTIN 300 MG: 300 CAPSULE ORAL at 03:12

## 2019-12-21 RX ADMIN — OXYCODONE HYDROCHLORIDE AND ACETAMINOPHEN 1 TABLET: 10; 325 TABLET ORAL at 03:12

## 2019-12-21 RX ADMIN — CETIRIZINE HYDROCHLORIDE 10 MG: 10 TABLET, FILM COATED ORAL at 08:12

## 2019-12-21 RX ADMIN — TOPIRAMATE 100 MG: 25 TABLET, FILM COATED ORAL at 08:12

## 2019-12-21 RX ADMIN — OXYCODONE HYDROCHLORIDE AND ACETAMINOPHEN 1 TABLET: 10; 325 TABLET ORAL at 04:12

## 2019-12-21 RX ADMIN — METRONIDAZOLE 500 MG: 500 TABLET ORAL at 06:12

## 2019-12-21 RX ADMIN — CIPROFLOXACIN HYDROCHLORIDE 500 MG: 500 TABLET, FILM COATED ORAL at 08:12

## 2019-12-21 RX ADMIN — OXYCODONE HYDROCHLORIDE AND ACETAMINOPHEN 1 TABLET: 10; 325 TABLET ORAL at 07:12

## 2019-12-21 RX ADMIN — DICYCLOMINE HYDROCHLORIDE 20 MG: 20 TABLET ORAL at 08:12

## 2019-12-21 RX ADMIN — ALBUTEROL SULFATE 2.5 MG: 2.5 SOLUTION RESPIRATORY (INHALATION) at 07:12

## 2019-12-21 RX ADMIN — GABAPENTIN 300 MG: 300 CAPSULE ORAL at 08:12

## 2019-12-21 RX ADMIN — MORPHINE SULFATE 4 MG: 2 INJECTION, SOLUTION INTRAMUSCULAR; INTRAVENOUS at 09:12

## 2019-12-21 RX ADMIN — METRONIDAZOLE 500 MG: 500 TABLET ORAL at 03:12

## 2019-12-21 NOTE — PROGRESS NOTES
Plastic Surgery Progress Note    Ida Orozco is a 54 y.o. female POD2 s/p panniculectomy    Tolerated clears this am, pain better controlled.     AF/VSS  Incision covered with dressings and incisional vac.  No drainage around the dressing.  Drains in place with 205/10/70/60cc s/s output from left to right.      A/P  54 yo female as above.  Surgical incision dressed appropriately.  Pain better controlled today.    --continue to advance diet  --will d/c home when tolerating regular diet and pain controlled with po meds    JESÚS Holley MD, FACS  Plastic Surgery Fellow

## 2019-12-21 NOTE — ASSESSMENT & PLAN NOTE
Having ileostomy output.  Vomiting yesterday may be due to anesthesia related factors.    - Regular diet as tolerated.  - Further cares per primary plastic surgery service.  - Okay to discharge home today from CRS standpoint.

## 2019-12-21 NOTE — SUBJECTIVE & OBJECTIVE
Subjective:     Interval History: Pain controlled  Tolerated liquid diet yesterday, ready to try regular food today  Denies nausea this morning, no additional episodes of emesis  Having ileostomy output    Post-Op Info:  Procedure(s) (LRB):  PANNICULECTOMY (Bilateral)  REVISION, COLOSTOMY (N/A)   2 Days Post-Op      Medications:  Continuous Infusions:  Scheduled Meds:   albuterol sulfate  2.5 mg Nebulization Q4H    cetirizine  10 mg Oral Daily    ciprofloxacin HCl  500 mg Oral Q12H    clonazePAM  1 mg Oral Daily    dicyclomine  20 mg Oral QID    enoxaparin  40 mg Subcutaneous Daily    fluticasone furoate-vilanterol  1 puff Inhalation Daily    gabapentin  300 mg Oral TID    metroNIDAZOLE  500 mg Oral Q8H    montelukast  10 mg Oral QHS    pantoprazole  40 mg Oral Daily    sertraline  100 mg Oral QHS    topiramate  100 mg Oral Daily    traZODone  50 mg Oral QHS     PRN Meds:   diphenhydrAMINE    morphine    ondansetron    oxyCODONE-acetaminophen    oxyCODONE-acetaminophen    promethazine (PHENERGAN) IVPB        Objective:     Vital Signs (Most Recent):  Temp: 99.2 °F (37.3 °C) (12/21/19 0439)  Pulse: 67 (12/21/19 0730)  Resp: 18 (12/21/19 0730)  BP: (!) 93/55 (12/21/19 0439)  SpO2: 98 % (12/21/19 0730) Vital Signs (24h Range):  Temp:  [98.6 °F (37 °C)-99.6 °F (37.6 °C)] 99.2 °F (37.3 °C)  Pulse:  [67-95] 67  Resp:  [16-18] 18  SpO2:  [92 %-98 %] 98 %  BP: ()/(46-55) 93/55     Intake/Output - Last 3 Shifts       12/19 0700 - 12/20 0659 12/20 0700 - 12/21 0659 12/21 0700 - 12/22 0659    P.O. 2810 3265     I.V. (mL/kg) 4331.3 (36.5)      Total Intake(mL/kg) 7141.3 (60.1) 3265 (27.5)     Urine (mL/kg/hr) 2185 (0.8) 3450 (1.2)     Drains 255 345     Other 250 0     Stool 0 775     Total Output 2690 4570     Net +4451.3 -1305            Stool Occurrence 0 x 1 x           Physical Exam   Constitutional: She is oriented to person, place, and time. She appears well-developed and well-nourished.    HENT:   Head: Normocephalic.   Neck: No tracheal deviation present.   Cardiovascular: Normal rate.   Pulmonary/Chest: Effort normal.   Abdominal:   Soft, appropriately tender near incisions, plastic surgery dressings and Provena intact  Ileostomy with appliance intact and output in bag.   Neurological: She is alert and oriented to person, place, and time.   Skin: Skin is warm and dry.

## 2019-12-21 NOTE — PROGRESS NOTES
[facility]  Colorectal Surgery  Progress Note    Patient Name: Ida Orozco  MRN: [id number]  Admission Date: 12/19/2019  Hospital Length of Stay: 2 days  Attending Physician: Andrea Alvarado MD    Subjective:     Interval History: Pain controlled  Tolerated liquid diet yesterday, ready to try regular food today  Denies nausea this morning, no additional episodes of emesis  Having ileostomy output    Post-Op Info:  Procedure(s) (LRB):  PANNICULECTOMY (Bilateral)  REVISION, COLOSTOMY (N/A)   2 Days Post-Op      Medications:  Continuous Infusions:  Scheduled Meds:   albuterol sulfate  2.5 mg Nebulization Q4H    cetirizine  10 mg Oral Daily    ciprofloxacin HCl  500 mg Oral Q12H    clonazePAM  1 mg Oral Daily    dicyclomine  20 mg Oral QID    enoxaparin  40 mg Subcutaneous Daily    fluticasone furoate-vilanterol  1 puff Inhalation Daily    gabapentin  300 mg Oral TID    metroNIDAZOLE  500 mg Oral Q8H    montelukast  10 mg Oral QHS    pantoprazole  40 mg Oral Daily    sertraline  100 mg Oral QHS    topiramate  100 mg Oral Daily    traZODone  50 mg Oral QHS     PRN Meds:   diphenhydrAMINE    morphine    ondansetron    oxyCODONE-acetaminophen    oxyCODONE-acetaminophen    promethazine (PHENERGAN) IVPB        Objective:     Vital Signs (Most Recent):  Temp: 99.2 °F (37.3 °C) (12/21/19 0439)  Pulse: 67 (12/21/19 0730)  Resp: 18 (12/21/19 0730)  BP: (!) 93/55 (12/21/19 0439)  SpO2: 98 % (12/21/19 0730) Vital Signs (24h Range):  Temp:  [98.6 °F (37 °C)-99.6 °F (37.6 °C)] 99.2 °F (37.3 °C)  Pulse:  [67-95] 67  Resp:  [16-18] 18  SpO2:  [92 %-98 %] 98 %  BP: ()/(46-55) 93/55     Intake/Output - Last 3 Shifts       12/19 0700 - 12/20 0659 12/20 0700 - 12/21 0659 12/21 0700 - 12/22 0659    P.O. 2810 3265     I.V. (mL/kg) 4331.3 (36.5)      Total Intake(mL/kg) 7141.3 (60.1) 3265 (27.5)     Urine (mL/kg/hr) 2185 (0.8) 3450 (1.2)     Drains 255 345     Other 250 0     Stool 0 775      Total Output 2690 4570     Net +4451.3 -1305            Stool Occurrence 0 x 1 x           Physical Exam   Constitutional: She is oriented to person, place, and time. She appears well-developed and well-nourished.   HENT:   Head: Normocephalic.   Neck: No tracheal deviation present.   Cardiovascular: Normal rate.   Pulmonary/Chest: Effort normal.   Abdominal:   Soft, appropriately tender near incisions, plastic surgery dressings and Provena intact  Ileostomy with appliance intact and output in bag.   Neurological: She is alert and oriented to person, place, and time.   Skin: Skin is warm and dry.         Assessment/Plan:     S/p ileostomy revision within the subcutaneous tissue.  Having ileostomy output.  Vomiting yesterday may be due to anesthesia related factors.    - Regular diet as tolerated.  - Further cares per primary plastic surgery service.  - Okay to discharge home today from CRS standpoint.          Janet Inman MD  Colorectal Surgery  [facility]

## 2019-12-21 NOTE — PLAN OF CARE
Plan of care reviewed with patient, who verbalized understanding. Pt describes pain as well controlled with frequent administration of both IV and PO narcotic pain medications. Pt is able to ambulate in the room independently and without incident. Pt is tolerating her regular diet without nausea or discomfort, and her ostomy is draining regularly. Increased use of the incentive spirometer should be encouraged after a restful sleep.

## 2019-12-22 ENCOUNTER — PATIENT MESSAGE (OUTPATIENT)
Dept: PLASTIC SURGERY | Facility: CLINIC | Age: 54
End: 2019-12-22

## 2019-12-22 RX ORDER — ENOXAPARIN SODIUM 100 MG/ML
40 INJECTION SUBCUTANEOUS DAILY
Qty: 5.6 ML | Refills: 0 | Status: SHIPPED | OUTPATIENT
Start: 2019-12-22 | End: 2020-01-05

## 2019-12-22 RX ORDER — OXYCODONE HYDROCHLORIDE 5 MG/1
5 TABLET ORAL EVERY 4 HOURS PRN
Qty: 30 TABLET | Refills: 0 | Status: SHIPPED | OUTPATIENT
Start: 2019-12-22 | End: 2020-01-07 | Stop reason: SDUPTHER

## 2019-12-22 NOTE — NURSING
Patient AAOX4. Pain controlled with current regimen. Tolerating diet. Ambulating without difficulty. Cares for her colostomy. Adequate UOP. ANNA drains intact x 4. Patient instructed on how to empty drains and strip them daily and document output. Wound vac switched to portable wound vac. IV removed. Patient transferred safely off of unit via wheelchair with  and son.

## 2019-12-23 NOTE — DISCHARGE SUMMARY
[facility]  Discharge Summary  Plastic Surgery      Admit Date: 12/19/2019    Discharge Date and Time:  12/23/2019 4:50 PM    Attending Physician: No att. providers found     Discharge Provider: Andrea Alvarado    Reason for Admission: panniculectomy    Procedures Performed: Procedure(s) (LRB):  PANNICULECTOMY (Bilateral)  REVISION, COLOSTOMY (N/A)    Hospital Course (synopsis of major diagnoses, care, treatment, and services provided during the course of the hospital stay): Admitted after panniculectomy, ileostomy revision.  Tolerated procedure well.  Had drainage from abdomen and drain teaching, tolerated po intake at time of discharge.  Will follow up in clinic for wound vac takedown and drain management on 12/27     Consults: colon/rectal surgery    Significant Diagnostic Studies: n/a    Final Diagnoses:    Principal Problem: Symptomatic abdominal panniculus   Secondary Diagnoses: n/a    Discharged Condition: stable    Disposition: Home or Self Care    Follow Up/Patient Instructions:     Medications:  Reconciled Home Medications:      Medication List      START taking these medications    ciprofloxacin HCl 500 MG tablet  Commonly known as:  CIPRO  Take 1 tablet (500 mg total) by mouth every 12 (twelve) hours. for 10 days     oxyCODONE-acetaminophen 5-325 mg per tablet  Commonly known as:  PERCOCET  Take 1 tablet by mouth every 6 (six) hours as needed.        CHANGE how you take these medications    * albuterol 90 mcg/actuation inhaler  Commonly known as:  PROVENTIL/VENTOLIN HFA  Inhale 2 puffs into the lungs every 4 (four) hours as needed for Wheezing or Shortness of Breath. Rescue  What changed:  Another medication with the same name was changed. Make sure you understand how and when to take each.     * albuterol 2.5 mg /3 mL (0.083 %) nebulizer solution  Commonly known as:  PROVENTIL  INHALE ONE VIAL VIA NEBULIZER EVERY SIX HOURS  What changed:  See the new instructions.      budesonide-formoterol 160-4.5 mcg 160-4.5 mcg/actuation Hfaa  Commonly known as:  Symbicort  Inhale 2 puffs into the lungs every 12 (twelve) hours. Controller  What changed:    · when to take this  · reasons to take this     metroNIDAZOLE 500 MG tablet  Commonly known as:  FLAGYL  Take 1 tablet (500 mg total) by mouth every 8 (eight) hours. for 10 days  What changed:    · how much to take  · how to take this  · when to take this  · additional instructions     montelukast 10 mg tablet  Commonly known as:  SINGULAIR  TAKE ONE TABLET BY MOUTH EVERY EVENING  What changed:  when to take this     nystatin powder  Commonly known as:  MYCOSTATIN  Apply topically 2 (two) times daily.  What changed:    · when to take this  · reasons to take this         * This list has 2 medication(s) that are the same as other medications prescribed for you. Read the directions carefully, and ask your doctor or other care provider to review them with you.            CONTINUE taking these medications    clonazePAM 1 MG tablet  Commonly known as:  KLONOPIN  Take 1 mg by mouth once daily.     clotrimazole-betamethasone 1-0.05% cream  Commonly known as:  LOTRISONE  Apply topically 2 (two) times daily.     diclofenac sodium 1 % Gel  Commonly known as:  Voltaren  APPLY TWO GRAMS FOUR TIMES A DAY     dicyclomine 20 mg tablet  Commonly known as:  BENTYL  Take 1 tablet (20 mg total) by mouth every 6 (six) hours as needed.     ergocalciferol 50,000 unit Cap  Commonly known as:  ERGOCALCIFEROL  TAKE 1 CAPSULE (50,000 UNITS TOTAL) BY MOUTH EVERY 7 DAYS.     ferrous gluconate 324 MG tablet  Commonly known as:  FERGON  TAKE 1 TABLET (324 MG TOTAL) BY MOUTH DAILY WITH BREAKFAST.     fluconazole 150 MG Tab  Commonly known as:  DIFLUCAN  Take 1 tablet (150 mg total) by mouth every 7 days.     fluticasone propionate 50 mcg/actuation nasal spray  Commonly known as:  FLONASE  INSTILL TWO SPRAYS IN EACH NOSTRIL IN EACH NOSTRIL EVERY EVENING     loratadine 10  mg tablet  Commonly known as:  CLARITIN  TAKE ONE TABLET BY MOUTH ONCE DAILY     mupirocin 2 % ointment  Commonly known as:  BACTROBAN  Apply topically 3 (three) times daily.     neomycin 500 mg Tab  Commonly known as:  MYCIFRADIN  On day before surgery, take two tablets (1,000 mg) by mouth at 1 pm, 2 pm, and 11 pm     ondansetron 8 MG Tbdl  Commonly known as:  ZOFRAN-ODT  Take 1 tablet (8 mg total) by mouth every 12 (twelve) hours as needed.     pantoprazole 40 MG tablet  Commonly known as:  PROTONIX  TAKE ONE TABLET BY MOUTH ONCE DAILY     promethazine 25 MG tablet  Commonly known as:  PHENERGAN  Take 1 tablet (25 mg total) by mouth every 6 (six) hours as needed for Nausea.     sertraline 100 MG tablet  Commonly known as:  ZOLOFT  Take 100 mg by mouth every evening.     * topiramate 100 MG tablet  Commonly known as:  TOPAMAX  Take 100 mg by mouth once daily. Will start 50 mg BID (am and noon) on Mon 12/30/19     * topiramate 50 MG tablet  Commonly known as:  TOPAMAX  Take 50 mg by mouth 2 (two) times daily. Take in am and noon  Start taking on:  December 30, 2019     traZODone 50 MG tablet  Commonly known as:  DESYREL  Take 50 mg by mouth every evening.         * This list has 2 medication(s) that are the same as other medications prescribed for you. Read the directions carefully, and ask your doctor or other care provider to review them with you.              Discharge Procedure Orders   Diet general     Lifting restrictions   Order Comments: Nothing greater than 15lbs     No driving until:   Order Comments: Off narcotics     Notify your health care provider if you experience any of the following:  temperature >100.4     Notify your health care provider if you experience any of the following:  persistent nausea and vomiting or diarrhea     Notify your health care provider if you experience any of the following:  severe uncontrolled pain     Notify your health care provider if you experience any of the following:   redness, tenderness, or signs of infection (pain, swelling, redness, odor or green/yellow discharge around incision site)     Notify your health care provider if you experience any of the following:  difficulty breathing or increased cough     Leave dressing on - Keep it clean, dry, and intact until clinic visit   Order Comments: Sponge bath until seen in clinic    Drain care:   Record outputs daily  Strip drains multiple times per day     Lifting restrictions   Order Comments: Please do not lift great than 10 pounds for 6 weeks.     Teach ANNA drain care and provide sheet to record output     Other restrictions (specify):   Order Comments: Do not drive while taking narcotics.     Call MD for:  extreme fatigue     Call MD for:  persistent dizziness or light-headedness     Call MD for:  redness, tenderness, or signs of infection (pain, swelling, redness, odor or green/yellow discharge around incision site)     Call MD for:  difficulty breathing, headache or visual disturbances     Call MD for:  severe uncontrolled pain     Call MD for:  persistent nausea and vomiting     Call MD for:  temperature >100.4     Activity as tolerated   Order Comments: Walk hunched over until seen in clinic     Activity as tolerated     Follow-up Information     Andrea Alvarado MD In 1 week.    Specialty:  Plastic Surgery  Contact information:  [address]  [facility] [id number]  [phone]

## 2019-12-23 NOTE — PROGRESS NOTES
Plastic Update    Lovenox injections ordered  Spoke with patient about serosanguinous drainage.  Also advised that she wear compression stockings, she can buy replacements at the drug store to help minimize edema and any clot in her legs.      Wrote oxycodone 5 mg for breakthrough pain every 4 hours.  Patient complains of needing breakthrough pain medication on top of her prescribed percocet.     Advised her that I could see her before rosemary to remove eligio that is not holding suction.  She did mention that she is draining from the corner of her incisions, and I reassured her that this should slow with time.     She states that she is having output from her stoma.  There is air in the pouch.      I recommended for comfort and drainage control that she can wear spanx type garment with an abdominal binder over it to help minimize compression.      All of her questions were answered.     She will keep her previously scheduled follow up of 12/27/19

## 2019-12-26 ENCOUNTER — TELEPHONE (OUTPATIENT)
Dept: PLASTIC SURGERY | Facility: CLINIC | Age: 54
End: 2019-12-26

## 2019-12-26 NOTE — TELEPHONE ENCOUNTER
Called and left message for pt regarding her follow up appt on tomorrow and also to check on her since surgery , she didnt answer so I left a detailed vm for pt along with my office number to call me back.

## 2019-12-27 ENCOUNTER — OFFICE VISIT (OUTPATIENT)
Dept: PLASTIC SURGERY | Facility: CLINIC | Age: 54
End: 2019-12-27
Payer: MEDICARE

## 2019-12-27 ENCOUNTER — TELEPHONE (OUTPATIENT)
Dept: PRIMARY CARE CLINIC | Facility: CLINIC | Age: 54
End: 2019-12-27

## 2019-12-27 ENCOUNTER — PATIENT MESSAGE (OUTPATIENT)
Dept: SURGERY | Facility: CLINIC | Age: 54
End: 2019-12-27

## 2019-12-27 VITALS
HEART RATE: 76 BPM | SYSTOLIC BLOOD PRESSURE: 94 MMHG | WEIGHT: 254.06 LBS | DIASTOLIC BLOOD PRESSURE: 59 MMHG | BODY MASS INDEX: 43.61 KG/M2

## 2019-12-27 DIAGNOSIS — Z93.3 COLOSTOMY IN PLACE: ICD-10-CM

## 2019-12-27 DIAGNOSIS — E65 SYMPTOMATIC ABDOMINAL PANNICULUS: Primary | ICD-10-CM

## 2019-12-27 PROCEDURE — 99999 PR PBB SHADOW E&M-EST. PATIENT-LVL IV: ICD-10-PCS | Mod: PBBFAC,,, | Performed by: SURGERY

## 2019-12-27 PROCEDURE — 99024 POSTOP FOLLOW-UP VISIT: CPT | Mod: S$GLB,,, | Performed by: SURGERY

## 2019-12-27 PROCEDURE — 99999 PR PBB SHADOW E&M-EST. PATIENT-LVL IV: CPT | Mod: PBBFAC,,, | Performed by: SURGERY

## 2019-12-27 PROCEDURE — 99024 PR POST-OP FOLLOW-UP VISIT: ICD-10-PCS | Mod: S$GLB,,, | Performed by: SURGERY

## 2019-12-27 NOTE — TELEPHONE ENCOUNTER
----- Message from Jose March sent at 12/27/2019 11:06 AM CST -----  Contact: self [phone]   Patient is returning a phone call.  Who left a message for the patient: Nora  Does patient know what this is regarding:    Comments:

## 2019-12-27 NOTE — TELEPHONE ENCOUNTER
----- Message from Amanda Tyson sent at 12/27/2019 10:32 AM CST -----  Contact: Samantha from [email or web address]   Called to inform that the pt stated she is not taking the injection for her blood thinner.  She has not taken at all.  Pt states she would like to take oral Blood thinner. Pt also needs a  follow up appointment as soon as possible.  Please call patient [phone]

## 2019-12-27 NOTE — TELEPHONE ENCOUNTER
Spoke with patient and informed her that [facility] reached out to the office to let us know that shet is not taking Levonox. Patient stated that she isn't getting the medication filled because she is not going to give herself injections. Patient stated that surgeon prescribed this medication and when she sees him today she will let him know that she is not going to take that medication

## 2019-12-30 ENCOUNTER — PATIENT MESSAGE (OUTPATIENT)
Dept: PLASTIC SURGERY | Facility: CLINIC | Age: 54
End: 2019-12-30

## 2019-12-30 LAB
FINAL PATHOLOGIC DIAGNOSIS: NORMAL
GROSS: NORMAL

## 2020-01-01 ENCOUNTER — PATIENT MESSAGE (OUTPATIENT)
Dept: PLASTIC SURGERY | Facility: CLINIC | Age: 55
End: 2020-01-01

## 2020-01-06 RX ORDER — MUPIROCIN 20 MG/G
OINTMENT TOPICAL 3 TIMES DAILY
Qty: 22 G | Refills: 3 | Status: SHIPPED | OUTPATIENT
Start: 2020-01-06 | End: 2021-05-25 | Stop reason: SDUPTHER

## 2020-01-07 ENCOUNTER — OFFICE VISIT (OUTPATIENT)
Dept: SURGERY | Facility: CLINIC | Age: 55
End: 2020-01-07
Payer: MEDICARE

## 2020-01-07 VITALS
WEIGHT: 248.44 LBS | BODY MASS INDEX: 42.41 KG/M2 | HEIGHT: 64 IN | DIASTOLIC BLOOD PRESSURE: 77 MMHG | SYSTOLIC BLOOD PRESSURE: 131 MMHG | HEART RATE: 64 BPM

## 2020-01-07 DIAGNOSIS — Z48.89 ENCOUNTER FOR POST SURGICAL WOUND CHECK: Primary | ICD-10-CM

## 2020-01-07 DIAGNOSIS — Z93.2 ILEOSTOMY STATUS: ICD-10-CM

## 2020-01-07 PROCEDURE — 99499 RISK ADDL DX/OHS AUDIT: ICD-10-PCS | Mod: S$GLB,,, | Performed by: COLON & RECTAL SURGERY

## 2020-01-07 PROCEDURE — 99999 PR PBB SHADOW E&M-EST. PATIENT-LVL IV: ICD-10-PCS | Mod: PBBFAC,,, | Performed by: COLON & RECTAL SURGERY

## 2020-01-07 PROCEDURE — 99024 POSTOP FOLLOW-UP VISIT: CPT | Mod: S$GLB,,, | Performed by: COLON & RECTAL SURGERY

## 2020-01-07 PROCEDURE — 99024 PR POST-OP FOLLOW-UP VISIT: ICD-10-PCS | Mod: S$GLB,,, | Performed by: COLON & RECTAL SURGERY

## 2020-01-07 PROCEDURE — 99999 PR PBB SHADOW E&M-EST. PATIENT-LVL IV: CPT | Mod: PBBFAC,,, | Performed by: COLON & RECTAL SURGERY

## 2020-01-07 PROCEDURE — 99499 UNLISTED E&M SERVICE: CPT | Mod: S$GLB,,, | Performed by: COLON & RECTAL SURGERY

## 2020-01-07 RX ORDER — OXYCODONE HYDROCHLORIDE 5 MG/1
5 TABLET ORAL EVERY 4 HOURS PRN
Qty: 40 TABLET | Refills: 0 | Status: SHIPPED | OUTPATIENT
Start: 2020-01-07 | End: 2020-01-10

## 2020-01-07 NOTE — PROGRESS NOTES
CRS Post-operative visit    Visit Info:     Procedure: Revision of ileostomy and Panniculectomy     Date of Procedure: December 19, 2019    Indication: Symptomatic Panniculus requiring relocation of ileostomy after Panniculectomy    Date of Discharge: December 23, 2019    Current Status:   Feels well.  Losing weight.  No N/V since leaving hospital.    Stoma with some swelling.  Pouching well    First day post op visit.     Physical Exam:    General: White female in NAD sitting in chair in clinic  Neuro: aaox4 maex4 perrl  Respiratory: resps even unlabored  Cardiac: cap refill <2 sec  Abdomen: Normal, benign. Incisions:clean, dry, intact   Anorectal: deferred, negative, stool guaiac negative    Assessment and Plan:    S/p Relocation of ileostomy doing well    RTC prn  Renew pain med x1

## 2020-01-10 ENCOUNTER — PATIENT MESSAGE (OUTPATIENT)
Dept: PLASTIC SURGERY | Facility: CLINIC | Age: 55
End: 2020-01-10

## 2020-01-10 ENCOUNTER — OFFICE VISIT (OUTPATIENT)
Dept: PRIMARY CARE CLINIC | Facility: CLINIC | Age: 55
End: 2020-01-10
Payer: MEDICARE

## 2020-01-10 VITALS
DIASTOLIC BLOOD PRESSURE: 64 MMHG | WEIGHT: 251.31 LBS | OXYGEN SATURATION: 98 % | HEIGHT: 64 IN | RESPIRATION RATE: 18 BRPM | HEART RATE: 66 BPM | TEMPERATURE: 99 F | SYSTOLIC BLOOD PRESSURE: 96 MMHG | BODY MASS INDEX: 42.9 KG/M2

## 2020-01-10 DIAGNOSIS — Z48.02: ICD-10-CM

## 2020-01-10 DIAGNOSIS — Z98.890 STATUS POST PANNICULECTOMY: Primary | ICD-10-CM

## 2020-01-10 PROCEDURE — 99213 PR OFFICE/OUTPT VISIT, EST, LEVL III, 20-29 MIN: ICD-10-PCS | Mod: S$GLB,,, | Performed by: FAMILY MEDICINE

## 2020-01-10 PROCEDURE — 99213 OFFICE O/P EST LOW 20 MIN: CPT | Mod: S$GLB,,, | Performed by: FAMILY MEDICINE

## 2020-01-10 PROCEDURE — 99999 PR PBB SHADOW E&M-EST. PATIENT-LVL IV: CPT | Mod: PBBFAC,,, | Performed by: FAMILY MEDICINE

## 2020-01-10 PROCEDURE — 3008F PR BODY MASS INDEX (BMI) DOCUMENTED: ICD-10-PCS | Mod: CPTII,S$GLB,, | Performed by: FAMILY MEDICINE

## 2020-01-10 PROCEDURE — 99999 PR PBB SHADOW E&M-EST. PATIENT-LVL IV: ICD-10-PCS | Mod: PBBFAC,,, | Performed by: FAMILY MEDICINE

## 2020-01-10 PROCEDURE — 3008F BODY MASS INDEX DOCD: CPT | Mod: CPTII,S$GLB,, | Performed by: FAMILY MEDICINE

## 2020-01-10 NOTE — PROGRESS NOTES
"Subjective:       Patient ID: Ida Orozco is a 54 y.o. female.    Chief Complaint: Post-op (here to follow up from skin removal )    Underwent panniculectomy and colostomy revision on 12/19.  Relatively uncomplicated postoperative course.  Tolerating diet without significant difficulty, pain controlled.  No nausea or vomiting.  No chest pain or shortness of breath.  Has a follow-up appointment scheduled with Plastic surgery next week.  However, she is complaining of pain and irritation at her skin staple sites on the lateral aspects of both sides of her incision, says she is allergic to surgical steel, and has had similar reactions to skin staples in the past.  No purulent drainage.    Review of Systems   Constitutional: Positive for fever. Negative for activity change and unexpected weight change. Fatigue: Subjective.   HENT: Negative for hearing loss, rhinorrhea and trouble swallowing.    Eyes: Negative for discharge and visual disturbance.   Respiratory: Negative for chest tightness and wheezing.    Cardiovascular: Negative for chest pain and palpitations.   Gastrointestinal: Negative for blood in stool, constipation, diarrhea and vomiting.   Endocrine: Negative for polydipsia and polyuria.   Genitourinary: Negative for difficulty urinating, dysuria, hematuria and menstrual problem.   Musculoskeletal: Negative for arthralgias, joint swelling and neck pain.   Skin: Positive for color change and wound.   Neurological: Negative for weakness, light-headedness and headaches.   Psychiatric/Behavioral: Negative for confusion and dysphoric mood.       Objective:      Vitals:    01/10/20 1100   BP: 96/64   BP Location: Left arm   Patient Position: Sitting   BP Method: Large (Manual)   Pulse: 66   Resp: 18   Temp: 98.6 °F (37 °C)   TempSrc: Oral   SpO2: 98%   Weight: 114 kg (251 lb 5.2 oz)   Height: 5' 4" (1.626 m)             Physical Exam   Constitutional: She is oriented to person, place, and time. She appears " well-developed and well-nourished.   HENT:   Head: Normocephalic and atraumatic.   Cardiovascular: Normal rate, regular rhythm and normal heart sounds.   Pulmonary/Chest: Effort normal and breath sounds normal.   Abdominal: Bowel sounds are normal. She exhibits no distension.   Midline colostomy, horizontal panniculectomy surgical incision with skin staples in place.  Mild erythema at staple insertion sites on the lateral aspects of her surgical incision bilaterally   Musculoskeletal: She exhibits no edema.   Neurological: She is alert and oriented to person, place, and time.   Skin: Skin is warm and dry.   Psychiatric: She has a normal mood and affect. Her behavior is normal.   Nursing note and vitals reviewed.      Lab Results   Component Value Date    WBC 5.20 11/05/2019    HGB 14.4 11/05/2019    HCT 44.9 11/05/2019     11/05/2019    CHOL 165 10/02/2018    TRIG 85 10/02/2018    HDL 80 (H) 10/02/2018    ALT 55 (H) 11/05/2019    AST 51 (H) 11/05/2019     11/05/2019    K 3.7 11/05/2019     11/05/2019    CREATININE 1.0 11/05/2019    BUN 12 11/05/2019    CO2 24 11/05/2019    TSH 3.04 11/22/2019    INR 1.0 08/01/2014    HGBA1C 5.6 05/30/2019      Assessment:       1. Status post panniculectomy    2. Encounter for removal of staples        Plan:       Status post panniculectomy    Encounter for removal of staples    Skin staples removed from lateral 3rd of incision bilaterally. Patient tolerated well.  Follow up with Plastic surgery as scheduled next week  Medication List with Changes/Refills   Current Medications    ALBUTEROL (PROVENTIL) 2.5 MG /3 ML (0.083 %) NEBULIZER SOLUTION    INHALE ONE VIAL VIA NEBULIZER EVERY SIX HOURS    ALBUTEROL 90 MCG/ACTUATION INHALER    Inhale 2 puffs into the lungs every 4 (four) hours as needed for Wheezing or Shortness of Breath. Rescue    BUDESONIDE-FORMOTEROL 160-4.5 MCG (SYMBICORT) 160-4.5 MCG/ACTUATION HFAA    Inhale 2 puffs into the lungs every 12 (twelve)  hours. Controller    CLONAZEPAM (KLONOPIN) 1 MG TABLET    Take 1 mg by mouth once daily.     CLOTRIMAZOLE-BETAMETHASONE 1-0.05% (LOTRISONE) CREAM    Apply topically 2 (two) times daily.    ERGOCALCIFEROL (ERGOCALCIFEROL) 50,000 UNIT CAP    TAKE 1 CAPSULE (50,000 UNITS TOTAL) BY MOUTH EVERY 7 DAYS.    FERROUS GLUCONATE (FERGON) 324 MG TABLET    TAKE 1 TABLET (324 MG TOTAL) BY MOUTH DAILY WITH BREAKFAST.    FLUCONAZOLE (DIFLUCAN) 150 MG TAB    Take 1 tablet (150 mg total) by mouth every 7 days.    FLUTICASONE (FLONASE) 50 MCG/ACTUATION NASAL SPRAY    INSTILL TWO SPRAYS IN EACH NOSTRIL IN EACH NOSTRIL EVERY EVENING    LORATADINE (CLARITIN) 10 MG TABLET    TAKE ONE TABLET BY MOUTH ONCE DAILY    MONTELUKAST (SINGULAIR) 10 MG TABLET    TAKE ONE TABLET BY MOUTH EVERY EVENING    MUPIROCIN (BACTROBAN) 2 % OINTMENT    APPLY TOPICALLY 3 (THREE) TIMES DAILY.    NYSTATIN (MYCOSTATIN) POWDER    Apply topically 2 (two) times daily.    ONDANSETRON (ZOFRAN-ODT) 8 MG TBDL    Take 1 tablet (8 mg total) by mouth every 12 (twelve) hours as needed.    PANTOPRAZOLE (PROTONIX) 40 MG TABLET    TAKE ONE TABLET BY MOUTH ONCE DAILY    PROMETHAZINE (PHENERGAN) 25 MG TABLET    Take 1 tablet (25 mg total) by mouth every 6 (six) hours as needed for Nausea.    SERTRALINE (ZOLOFT) 100 MG TABLET    Take 100 mg by mouth every evening.     TOPIRAMATE (TOPAMAX) 50 MG TABLET    Take 50 mg by mouth 2 (two) times daily. Take in am and noon    TRAZODONE (DESYREL) 50 MG TABLET    Take 50 mg by mouth every evening.   Discontinued Medications    DICLOFENAC SODIUM (VOLTAREN) 1 % GEL    APPLY TWO GRAMS FOUR TIMES A DAY    NEOMYCIN (MYCIFRADIN) 500 MG TAB    On day before surgery, take two tablets (1,000 mg) by mouth at 1 pm, 2 pm, and 11 pm    OXYCODONE (ROXICODONE) 5 MG IMMEDIATE RELEASE TABLET    Take 1 tablet (5 mg total) by mouth every 4 (four) hours as needed for Pain (breakthrough pain).

## 2020-01-13 ENCOUNTER — TELEPHONE (OUTPATIENT)
Dept: PLASTIC SURGERY | Facility: CLINIC | Age: 55
End: 2020-01-13

## 2020-01-13 NOTE — TELEPHONE ENCOUNTER
Called and spoke with pt and she stated that she was doing much better since being seen in the ED,I told the pt that she should keep the wound clean and if possible do a wet to dry dressing. Pt stated that she didnt have acess to any funds to get any gauze sponges or saline , I told pt that she could come to the office to retreive any supplies , she sated that was fine and she would just follow up with us on Wednesday .

## 2020-01-15 ENCOUNTER — PATIENT MESSAGE (OUTPATIENT)
Dept: PLASTIC SURGERY | Facility: CLINIC | Age: 55
End: 2020-01-15

## 2020-01-15 ENCOUNTER — OFFICE VISIT (OUTPATIENT)
Dept: PLASTIC SURGERY | Facility: CLINIC | Age: 55
End: 2020-01-15
Payer: MEDICARE

## 2020-01-15 VITALS
SYSTOLIC BLOOD PRESSURE: 118 MMHG | WEIGHT: 246.94 LBS | BODY MASS INDEX: 42.38 KG/M2 | HEART RATE: 66 BPM | DIASTOLIC BLOOD PRESSURE: 55 MMHG

## 2020-01-15 DIAGNOSIS — E65 SYMPTOMATIC ABDOMINAL PANNICULUS: Primary | ICD-10-CM

## 2020-01-15 PROCEDURE — 99024 POSTOP FOLLOW-UP VISIT: CPT | Mod: S$GLB,,, | Performed by: SURGERY

## 2020-01-15 PROCEDURE — 99999 PR PBB SHADOW E&M-EST. PATIENT-LVL III: ICD-10-PCS | Mod: PBBFAC,,, | Performed by: SURGERY

## 2020-01-15 PROCEDURE — 99024 PR POST-OP FOLLOW-UP VISIT: ICD-10-PCS | Mod: S$GLB,,, | Performed by: SURGERY

## 2020-01-15 PROCEDURE — 99999 PR PBB SHADOW E&M-EST. PATIENT-LVL III: CPT | Mod: PBBFAC,,, | Performed by: SURGERY

## 2020-01-20 ENCOUNTER — PATIENT MESSAGE (OUTPATIENT)
Dept: PLASTIC SURGERY | Facility: CLINIC | Age: 55
End: 2020-01-20

## 2020-01-24 ENCOUNTER — OFFICE VISIT (OUTPATIENT)
Dept: PLASTIC SURGERY | Facility: CLINIC | Age: 55
End: 2020-01-24
Payer: MEDICARE

## 2020-01-24 VITALS
DIASTOLIC BLOOD PRESSURE: 71 MMHG | BODY MASS INDEX: 42.38 KG/M2 | HEART RATE: 60 BPM | WEIGHT: 246.94 LBS | SYSTOLIC BLOOD PRESSURE: 117 MMHG

## 2020-01-24 DIAGNOSIS — E65 SYMPTOMATIC ABDOMINAL PANNICULUS: Primary | ICD-10-CM

## 2020-01-24 PROCEDURE — 99024 POSTOP FOLLOW-UP VISIT: CPT | Mod: S$GLB,,, | Performed by: SURGERY

## 2020-01-24 PROCEDURE — 99024 PR POST-OP FOLLOW-UP VISIT: ICD-10-PCS | Mod: S$GLB,,, | Performed by: SURGERY

## 2020-01-24 PROCEDURE — 99999 PR PBB SHADOW E&M-EST. PATIENT-LVL III: CPT | Mod: PBBFAC,,, | Performed by: SURGERY

## 2020-01-24 PROCEDURE — 99999 PR PBB SHADOW E&M-EST. PATIENT-LVL III: ICD-10-PCS | Mod: PBBFAC,,, | Performed by: SURGERY

## 2020-01-26 NOTE — PROGRESS NOTES
Patient, Ida Orozco (MRN #[id number]), presented with a recorded BMI of 42.38 kg/m^2 consistent with the definition of morbid obesity (ICD-10 E66.01). The patient's morbid obesity was monitored, evaluated, addressed and/or treated. This addendum to the medical record is made on 01/26/2020.

## 2020-01-26 NOTE — PROGRESS NOTES
Patient presents for follow up.  Pain well controlled.  Denies drainage from abdomen.  Her right lateral wound is edison.  There is a pencil eraser size are of granulation tissue over the central midline.  There is good function of her stoma.  She complains that since the skin opening has been moved caudally 2 inches she has some interference with her clothing with the ostomy bag.  I removed all staples, the wound is clean and healing.  I recommend she clean her right lateral abdominal incision with soapy and water, apply bacitracin and a bandaid.  Continue protective position with beach chair position.  She agreed to wear spanx to compress her abdomen.  Follow up in 6 weeks or call sooner with any questions.    Plastic & Reconstructive Surgery  [facility]  c/o Andrea Alvarado M.D.  [facility]  [address]  [address] [place] [address]    Work [phone]  Toll free [phone]  If no answer [phone]

## 2020-01-26 NOTE — PROGRESS NOTES
Plastic Update    Past Surgical History:   Procedure Laterality Date    APPENDECTOMY      CATHETERIZATION OF BOTH LEFT AND RIGHT HEART Right 2019    Procedure: CATHETERIZATION, HEART, BOTH LEFT AND RIGHT;  Surgeon: Beto Malin MD;  Location: [facility] CATH LAB;  Service: Cardiology;  Laterality: Right;     SECTION, LOW TRANSVERSE      CHOLECYSTECTOMY      open    COLECTOMY      total- 2013    CYSTOSCOPY W/ RETROGRADES N/A 2018    Procedure: CYSTOSCOPY, WITH RETROGRADE PYELOGRAM;  Surgeon: Butch Banks MD;  Location: [facility] OR;  Service: Urology;  Laterality: N/A;  [facility]ANSEN SURGICAL CONFIRMED     FLEXIBLE SIGMOIDOSCOPY  2018    Procedure: SIGMOIDOSCOPY, FLEXIBLE;  Surgeon: JENNY Virgen MD;  Location: [facility] OR [facility] FLR;  Service: Colon and Rectal;;    GASTRIC SLEEVE       HYSTERECTOMY      ILEOSTOMY REVISION      2014; 2014    LAPAROSCOPIC PROCTECTOMY N/A 2018    Procedure: PROCTECTOMY-LAPAROSCOPIC/CONVERTED TO OPEN;  Surgeon: JENNY Virgen MD;  Location: [facility] OR 2ND FLR;  Service: Colon and Rectal;  Laterality: N/A;    LYSIS OF ADHESIONS  2018    Procedure: LYSIS, ADHESIONS/ more than 2hours;  Surgeon: JENNY Virgen MD;  Location: [facility] OR 2ND FLR;  Service: Colon and Rectal;;    PANNICULECTOMY Bilateral 2019    Procedure: PANNICULECTOMY;  Surgeon: Andrea Alvarado MD;  Location: [facility] OR 2ND FLR;  Service: Plastics;  Laterality: Bilateral;    REVISION COLOSTOMY N/A 2019    Procedure: REVISION, COLOSTOMY;  Surgeon: JENNY Virgen MD;  Location: [facility] OR 2ND FLR;  Service: Colon and Rectal;  Laterality: N/A;    thumb surgery      TONSILLECTOMY, ADENOIDECTOMY      WISDOM TOOTH EXTRACTION           Subjective:  Denies drainage from incision in last few days  Pain is well controlled with OTC medications.  Patient is not taking oxycodone.   Drain output less than 30 cc per day bilaterally  Patient is ambulating without  difficulty  Denies fever  Denies calf pain or shortness of breath  Says her stoma is having normal output  One of the drains was leaking and not holding suction     Objective:  WD WN NAD  VSS  Normal resp effort  Abdominal incision in tact, no obvious drainage   Incision clean, dry intact    Specimens (From admission, onward)    None            Assessment:  1.  1 days after panniculectomy, expected healing.    2.  No evidence of fluid collection or abnormal bleeding     Plan:  - Continue OTC pain control  - Prevena removed  - One drain removed  - Continue compressive garment  - OK to shower.   - Ambulate often  - Continue non narcotic pain control  - Advised her to continue no heavy lifting restriction   - Encourage iron rich foods (red meat, beets) and/or multivitamin with iron  - Follow up in 2 weeks    Plastic & Reconstructive Surgery  [facility]  c/o Andrea Alvarado M.D.  [facility]  [address]  [address] [place] [address]    Work [phone]  Toll free [phone]  If no answer [phone]

## 2020-01-26 NOTE — PROGRESS NOTES
Patient, Ida Orozco (MRN #[id number]), presented with a recorded BMI of 43.61 kg/m^2 consistent with the definition of morbid obesity (ICD-10 E66.01). The patient's morbid obesity was monitored, evaluated, addressed and/or treated. This addendum to the medical record is made on 01/26/2020.

## 2020-01-26 NOTE — PROGRESS NOTES
Came in to have area of dehiscence along right lateral abdominal incision.  Denies fevers, calf pain, difficulty breathing.  States she has had a reaction to surgical steel in her staples.  States that she feels that she still has some overhanging skin along her abdomen.  Reports that her stoma is putting out normal output    Incision in tact.  1 x 1 cm area of granulation tissue seen over central portion of abdominal incision  Granulation tissue seen at wound base over lateral portion of wound dehiscence.    No evidence of seroma or infection    No need for further antibiotics from my standpoint  Would place bacitracin and band aid over lateral dehiscence.    Recommend compression to abdominal incision to prevent post operative edema and seroma  Follow up in 1-2 weeks for repeat wound check    Plastic & Reconstructive Surgery  [facility]  c/o Andrea Alvarado M.D.  [facility]  [address]  [address] [facility] [place] [address]    Work [phone]  Toll free [phone]  If no answer [phone]

## 2020-02-04 RX ORDER — LORATADINE 10 MG/1
TABLET ORAL
Qty: 90 TABLET | Refills: 3 | Status: SHIPPED | OUTPATIENT
Start: 2020-02-04 | End: 2021-03-08

## 2020-02-17 ENCOUNTER — CLINICAL SUPPORT (OUTPATIENT)
Dept: PRIMARY CARE CLINIC | Facility: CLINIC | Age: 55
End: 2020-02-17
Payer: MEDICARE

## 2020-02-17 ENCOUNTER — OFFICE VISIT (OUTPATIENT)
Dept: PRIMARY CARE CLINIC | Facility: CLINIC | Age: 55
End: 2020-02-17
Payer: MEDICARE

## 2020-02-17 VITALS
BODY MASS INDEX: 41.74 KG/M2 | HEART RATE: 70 BPM | DIASTOLIC BLOOD PRESSURE: 62 MMHG | HEIGHT: 64 IN | RESPIRATION RATE: 18 BRPM | SYSTOLIC BLOOD PRESSURE: 102 MMHG | WEIGHT: 244.5 LBS | OXYGEN SATURATION: 96 % | TEMPERATURE: 99 F

## 2020-02-17 DIAGNOSIS — E61.1 DIETARY IRON DEFICIENCY: ICD-10-CM

## 2020-02-17 DIAGNOSIS — Z12.31 ENCOUNTER FOR SCREENING MAMMOGRAM FOR BREAST CANCER: Primary | ICD-10-CM

## 2020-02-17 DIAGNOSIS — Z98.84 BARIATRIC SURGERY STATUS: ICD-10-CM

## 2020-02-17 DIAGNOSIS — K52.9 GASTROENTERITIS: ICD-10-CM

## 2020-02-17 DIAGNOSIS — K50.919 CROHN'S DISEASE WITH COMPLICATION, UNSPECIFIED GASTROINTESTINAL TRACT LOCATION: ICD-10-CM

## 2020-02-17 DIAGNOSIS — A08.4 VIRAL GASTROENTERITIS: ICD-10-CM

## 2020-02-17 DIAGNOSIS — E53.8 VITAMIN B12 DEFICIENCY: ICD-10-CM

## 2020-02-17 DIAGNOSIS — E78.5 HYPERLIPIDEMIA, UNSPECIFIED HYPERLIPIDEMIA TYPE: ICD-10-CM

## 2020-02-17 DIAGNOSIS — E55.9 VITAMIN D DEFICIENCY: ICD-10-CM

## 2020-02-17 DIAGNOSIS — D52.0 DIETARY FOLATE DEFICIENCY ANEMIA: ICD-10-CM

## 2020-02-17 PROCEDURE — 99213 OFFICE O/P EST LOW 20 MIN: CPT | Mod: S$GLB,,, | Performed by: NURSE PRACTITIONER

## 2020-02-17 PROCEDURE — 87449 NOS EACH ORGANISM AG IA: CPT

## 2020-02-17 PROCEDURE — 99499 RISK ADDL DX/OHS AUDIT: ICD-10-PCS | Mod: S$GLB,,, | Performed by: NURSE PRACTITIONER

## 2020-02-17 PROCEDURE — 99999 PR PBB SHADOW E&M-EST. PATIENT-LVL V: CPT | Mod: PBBFAC,,, | Performed by: NURSE PRACTITIONER

## 2020-02-17 PROCEDURE — 87324 CLOSTRIDIUM AG IA: CPT

## 2020-02-17 PROCEDURE — 99213 PR OFFICE/OUTPT VISIT, EST, LEVL III, 20-29 MIN: ICD-10-PCS | Mod: S$GLB,,, | Performed by: NURSE PRACTITIONER

## 2020-02-17 PROCEDURE — 99499 UNLISTED E&M SERVICE: CPT | Mod: S$GLB,,, | Performed by: NURSE PRACTITIONER

## 2020-02-17 PROCEDURE — 99999 PR PBB SHADOW E&M-EST. PATIENT-LVL V: ICD-10-PCS | Mod: PBBFAC,,, | Performed by: NURSE PRACTITIONER

## 2020-02-17 PROCEDURE — 3008F BODY MASS INDEX DOCD: CPT | Mod: CPTII,S$GLB,, | Performed by: NURSE PRACTITIONER

## 2020-02-17 PROCEDURE — 3008F PR BODY MASS INDEX (BMI) DOCUMENTED: ICD-10-PCS | Mod: CPTII,S$GLB,, | Performed by: NURSE PRACTITIONER

## 2020-02-17 RX ORDER — TOPIRAMATE 100 MG/1
1 TABLET, FILM COATED ORAL DAILY
COMMUNITY
Start: 2020-02-12 | End: 2020-03-25 | Stop reason: SDUPTHER

## 2020-02-17 RX ORDER — PROMETHAZINE HYDROCHLORIDE 25 MG/1
25 TABLET ORAL EVERY 6 HOURS PRN
Qty: 15 TABLET | Refills: 0 | Status: SHIPPED | OUTPATIENT
Start: 2020-02-17 | End: 2021-06-11 | Stop reason: SDUPTHER

## 2020-02-17 RX ORDER — ONDANSETRON 8 MG/1
8 TABLET, ORALLY DISINTEGRATING ORAL EVERY 12 HOURS PRN
Qty: 15 TABLET | Refills: 0 | Status: SHIPPED | OUTPATIENT
Start: 2020-02-17 | End: 2020-02-24

## 2020-02-17 NOTE — PROGRESS NOTES
"Chief Complaint  Chief Complaint   Patient presents with    Vomiting    Diarrhea    Migraine       HPI    Ida Orozco is a 54 y.o. female that presents for nausea, vomiting.    Patient reports the onset of symptoms approximately 3 days ago. Vomiting persistently. Bilious vomit. No hematemesis. Last episode 10 am. Diarrhea persistent with abdominal pain primarily nausea. Patient with an ileostomy with recurrent diarrhea but reports more liquid, green, yellow stool over the past few days. No sore throat. Cough. Intermittent chills. No fever. Not able to keep any foods down. Minimal intake of liquids. Not urinating "well" though she is urinating 3-4 time daily. Patient without a h/o HTN, not currently on any medications. Daughter in law with the same symptoms. took tylenol this am at 10 and took promethazine at 10 am with no improvement in nausea. No recent antibiotics. No recent travel out of the country.  Patient is worried about dehydrating due to ileostomy. Headache described as 10/10, throbbing. Intermittent dizziness with ambulation.      PAST MEDICAL HISTORY:  Past Medical History:   Diagnosis Date    Anxiety     Asthma     Bronchitis     Crohn's colitis     Depression     Fatty liver disease, nonalcoholic     Gallstones     GERD (gastroesophageal reflux disease)     History of sleeve gastrectomy 2016    PTSD (post-traumatic stress disorder)     Seasonal allergies     Symptomatic abdominal panniculus     Ulcerative colitis        PAST SURGICAL HISTORY:  Past Surgical History:   Procedure Laterality Date    APPENDECTOMY      CATHETERIZATION OF BOTH LEFT AND RIGHT HEART Right 2019    Procedure: CATHETERIZATION, HEART, BOTH LEFT AND RIGHT;  Surgeon: Beto Malin MD;  Location: [facility] CATH LAB;  Service: Cardiology;  Laterality: Right;     SECTION, LOW TRANSVERSE      CHOLECYSTECTOMY      open    COLECTOMY      total- 2013    CYSTOSCOPY W/ RETROGRADES N/A " 2018    Procedure: CYSTOSCOPY, WITH RETROGRADE PYELOGRAM;  Surgeon: Butch Banks MD;  Location: [facility] OR;  Service: Urology;  Laterality: N/A;  HANSEN SURGICAL CONFIRMED     FLEXIBLE SIGMOIDOSCOPY  2018    Procedure: SIGMOIDOSCOPY, FLEXIBLE;  Surgeon: JENNY Virgen MD;  Location: [facility] OR 2ND FLR;  Service: Colon and Rectal;;    GASTRIC SLEEVE       HYSTERECTOMY      ILEOSTOMY REVISION      2014; 2014    LAPAROSCOPIC PROCTECTOMY N/A 2018    Procedure: PROCTECTOMY-LAPAROSCOPIC/CONVERTED TO OPEN;  Surgeon: JENNY Virgen MD;  Location: [facility] OR 2ND FLR;  Service: Colon and Rectal;  Laterality: N/A;    LYSIS OF ADHESIONS  2018    Procedure: LYSIS, ADHESIONS/ more than 2hours;  Surgeon: JENNY Virgen MD;  Location: [facility] OR 2ND FLR;  Service: Colon and Rectal;;    PANNICULECTOMY Bilateral 2019    Procedure: PANNICULECTOMY;  Surgeon: Andrea Alvarado MD;  Location: [facility] OR 2ND FLR;  Service: Plastics;  Laterality: Bilateral;    REVISION COLOSTOMY N/A 2019    Procedure: REVISION, COLOSTOMY;  Surgeon: JENNY Virgen MD;  Location: [facility] OR 2ND FLR;  Service: Colon and Rectal;  Laterality: N/A;    thumb surgery      TONSILLECTOMY, ADENOIDECTOMY      WISDOM TOOTH EXTRACTION         SOCIAL HISTORY:  Social History     Socioeconomic History    Marital status: Legally      Spouse name: Not on file    Number of children: Not on file    Years of education: Not on file    Highest education level: Not on file   Occupational History    Not on file   Social Needs    Financial resource strain: Not on file    Food insecurity:     Worry: Not on file     Inability: Not on file    Transportation needs:     Medical: Not on file     Non-medical: Not on file   Tobacco Use    Smoking status: Former Smoker     Types: Cigarettes     Last attempt to quit: 2009     Years since quittin.1    Smokeless tobacco: Never Used   Substance and Sexual Activity     Alcohol use: Yes     Comment: occasionally    Drug use: No    Sexual activity: Never     Birth control/protection: See Surgical Hx   Lifestyle    Physical activity:     Days per week: Not on file     Minutes per session: Not on file    Stress: Not on file   Relationships    Social connections:     Talks on phone: Not on file     Gets together: Not on file     Attends Jain service: Not on file     Active member of club or organization: Not on file     Attends meetings of clubs or organizations: Not on file     Relationship status: Not on file   Other Topics Concern    Not on file   Social History Narrative    Not on file       FAMILY HISTORY:  Family History   Problem Relation Age of Onset    Cancer Mother         skin    Heart disease Father 67    Cancer Paternal Grandfather         ?    Cancer Maternal Grandfather         colon        ALLERGIES AND MEDICATIONS: updated and reviewed.  Review of patient's allergies indicates:   Allergen Reactions    Adhesive      Cause blisters    Dilaudid [hydromorphone] Nausea And Vomiting    Sulfa (sulfonamide antibiotics) Hives    Surgical stainless steel      Current Outpatient Medications   Medication Sig Dispense Refill    albuterol (PROVENTIL) 2.5 mg /3 mL (0.083 %) nebulizer solution INHALE ONE VIAL VIA NEBULIZER EVERY SIX HOURS (Patient taking differently: every 6 (six) hours as needed. ) 150 mL 3    albuterol 90 mcg/actuation inhaler Inhale 2 puffs into the lungs every 4 (four) hours as needed for Wheezing or Shortness of Breath. Rescue 1 Inhaler 5    ALLERGY RELIEF, LORATADINE, 10 mg tablet TAKE ONE TABLET BY MOUTH ONCE DAILY 90 tablet 3    budesonide-formoterol 160-4.5 mcg (SYMBICORT) 160-4.5 mcg/actuation HFAA Inhale 2 puffs into the lungs every 12 (twelve) hours. Controller (Patient taking differently: Inhale 2 puffs into the lungs every 12 (twelve) hours as needed. Controller) 11 g 1    clonazePAM (KLONOPIN) 1 MG tablet Take 1 mg by mouth  once daily.       ergocalciferol (ERGOCALCIFEROL) 50,000 unit Cap TAKE 1 CAPSULE (50,000 UNITS TOTAL) BY MOUTH EVERY 7 DAYS. 12 capsule 3    ferrous gluconate (FERGON) 324 MG tablet TAKE 1 TABLET (324 MG TOTAL) BY MOUTH DAILY WITH BREAKFAST. 80 tablet 0    fluticasone (FLONASE) 50 mcg/actuation nasal spray INSTILL TWO SPRAYS IN EACH NOSTRIL IN EACH NOSTRIL EVERY EVENING 16 g 5    montelukast (SINGULAIR) 10 mg tablet TAKE ONE TABLET BY MOUTH EVERY EVENING (Patient taking differently: Take 10 mg by mouth once daily. ) 90 tablet 3    mupirocin (BACTROBAN) 2 % ointment APPLY TOPICALLY 3 (THREE) TIMES DAILY. 22 g 3    nystatin (MYCOSTATIN) powder Apply topically 2 (two) times daily. (Patient taking differently: Apply topically daily as needed (when changes colostomy bag). ) 1 Bottle 6    ondansetron (ZOFRAN-ODT) 8 MG TbDL Take 1 tablet (8 mg total) by mouth every 12 (twelve) hours as needed. 15 tablet 0    pantoprazole (PROTONIX) 40 MG tablet TAKE ONE TABLET BY MOUTH ONCE DAILY 90 tablet 3    promethazine (PHENERGAN) 25 MG tablet Take 1 tablet (25 mg total) by mouth every 6 (six) hours as needed for Nausea. 15 tablet 0    sertraline (ZOLOFT) 100 MG tablet Take 100 mg by mouth every evening.       topiramate (TOPAMAX) 100 MG tablet Take 1 tablet by mouth once daily.      trazodone (DESYREL) 50 MG tablet Take 50 mg by mouth every evening.       No current facility-administered medications for this visit.      Facility-Administered Medications Ordered in Other Visits   Medication Dose Route Frequency Provider Last Rate Last Dose    gabapentin capsule 300 mg  300 mg Oral TID Alee Wylie NP   300 mg at 12/21/19 1503         ROS  Review of Systems   Constitutional: Positive for appetite change, chills, fatigue and fever. Negative for unexpected weight change.   Gastrointestinal: Positive for abdominal pain, diarrhea, nausea and vomiting. Negative for abdominal distention and constipation.   Genitourinary:  "Negative for dysuria, frequency and hematuria.   Musculoskeletal: Negative for arthralgias and myalgias.   Neurological: Positive for headaches.           PHYSICAL EXAM  Vitals:    02/17/20 1136   BP: 102/62   BP Location: Right arm   Patient Position: Sitting   BP Method: Large (Manual)   Pulse: 70   Resp: 18   Temp: 98.8 °F (37.1 °C)   TempSrc: Oral   SpO2: 96%   Weight: 110.9 kg (244 lb 7.8 oz)   Height: 5' 4" (1.626 m)    Body mass index is 41.97 kg/m².  Weight: 110.9 kg (244 lb 7.8 oz)   Height: 5' 4" (162.6 cm)     Physical Exam   Constitutional: She is oriented to person, place, and time. She appears well-developed and well-nourished.   HENT:   Head: Normocephalic.   Right Ear: Tympanic membrane normal.   Left Ear: Tympanic membrane normal.   Mouth/Throat: Uvula is midline, oropharynx is clear and moist and mucous membranes are normal.   Eyes: Conjunctivae are normal.   Cardiovascular: Normal rate, regular rhythm, normal heart sounds and normal pulses.   No murmur heard.  Pulses:       Radial pulses are 2+ on the right side, and 2+ on the left side.   No LE swelling noted   Pulmonary/Chest: Effort normal and breath sounds normal. She has no wheezes.   Abdominal: Soft. Bowel sounds are normal. There is no tenderness.   Ileostomy with green liquid drainage. Stoma red.    Musculoskeletal: She exhibits no edema.   Lymphadenopathy:     She has no cervical adenopathy.   Neurological: She is alert and oriented to person, place, and time.   Skin: Skin is warm and dry. No rash noted.   Psychiatric: She has a normal mood and affect.         Health Maintenance       Date Due Completion Date    HIV Screening 07/27/1982 ---    Shingles Vaccine (1 of 2) 07/27/2017 ---    Lipid Panel 10/02/2019 10/2/2018    Mammogram 10/24/2020 10/24/2018    TETANUS VACCINE 10/24/2028 10/24/2018            Assessment & Plan    Problem List Items Addressed This Visit        Unprioritized    Bariatric surgery status-Gastric sleeve     " Overview     Gastric sleeve          Relevant Orders    CBC auto differential    Comprehensive metabolic panel    Crohn's disease with complication    Overview     pt advised to get reestablished with gastroenterologist         Hyperlipidemia    Relevant Orders    Lipid panel    Dietary iron deficiency    Relevant Orders    CBC auto differential    Iron and TIBC    Ferritin    Vitamin D deficiency    Relevant Orders    Vitamin D    Vitamin B12 deficiency    Relevant Orders    CBC auto differential    Dietary folate deficiency anemia    Relevant Orders    CBC auto differential      Other Visit Diagnoses     Encounter for screening mammogram for breast cancer    -  Primary    Relevant Orders    Mammo Digital Screening Bilat without CA    Viral gastroenteritis        Relevant Medications    promethazine (PHENERGAN) 25 MG tablet    ondansetron (ZOFRAN-ODT) 8 MG TbDL    Gastroenteritis        Relevant Medications    promethazine (PHENERGAN) 25 MG tablet    ondansetron (ZOFRAN-ODT) 8 MG TbDL          Follow-up: Follow up if symptoms worsen or fail to improve.    Janet Guardado    Medication List with Changes/Refills   Current Medications    ALBUTEROL (PROVENTIL) 2.5 MG /3 ML (0.083 %) NEBULIZER SOLUTION    INHALE ONE VIAL VIA NEBULIZER EVERY SIX HOURS    ALBUTEROL 90 MCG/ACTUATION INHALER    Inhale 2 puffs into the lungs every 4 (four) hours as needed for Wheezing or Shortness of Breath. Rescue    ALLERGY RELIEF, LORATADINE, 10 MG TABLET    TAKE ONE TABLET BY MOUTH ONCE DAILY    BUDESONIDE-FORMOTEROL 160-4.5 MCG (SYMBICORT) 160-4.5 MCG/ACTUATION HFAA    Inhale 2 puffs into the lungs every 12 (twelve) hours. Controller    CLONAZEPAM (KLONOPIN) 1 MG TABLET    Take 1 mg by mouth once daily.     ERGOCALCIFEROL (ERGOCALCIFEROL) 50,000 UNIT CAP    TAKE 1 CAPSULE (50,000 UNITS TOTAL) BY MOUTH EVERY 7 DAYS.    FERROUS GLUCONATE (FERGON) 324 MG TABLET    TAKE 1 TABLET (324 MG TOTAL) BY MOUTH DAILY WITH BREAKFAST.    FLUTICASONE  (FLONASE) 50 MCG/ACTUATION NASAL SPRAY    INSTILL TWO SPRAYS IN EACH NOSTRIL IN EACH NOSTRIL EVERY EVENING    MONTELUKAST (SINGULAIR) 10 MG TABLET    TAKE ONE TABLET BY MOUTH EVERY EVENING    MUPIROCIN (BACTROBAN) 2 % OINTMENT    APPLY TOPICALLY 3 (THREE) TIMES DAILY.    NYSTATIN (MYCOSTATIN) POWDER    Apply topically 2 (two) times daily.    PANTOPRAZOLE (PROTONIX) 40 MG TABLET    TAKE ONE TABLET BY MOUTH ONCE DAILY    SERTRALINE (ZOLOFT) 100 MG TABLET    Take 100 mg by mouth every evening.     TOPIRAMATE (TOPAMAX) 100 MG TABLET    Take 1 tablet by mouth once daily.    TRAZODONE (DESYREL) 50 MG TABLET    Take 50 mg by mouth every evening.   Changed and/or Refilled Medications    Modified Medication Previous Medication    ONDANSETRON (ZOFRAN-ODT) 8 MG TBDL ondansetron (ZOFRAN-ODT) 8 MG TbDL       Take 1 tablet (8 mg total) by mouth every 12 (twelve) hours as needed.    Take 1 tablet (8 mg total) by mouth every 12 (twelve) hours as needed.    PROMETHAZINE (PHENERGAN) 25 MG TABLET promethazine (PHENERGAN) 25 MG tablet       Take 1 tablet (25 mg total) by mouth every 6 (six) hours as needed for Nausea.    Take 1 tablet (25 mg total) by mouth every 6 (six) hours as needed for Nausea.   Discontinued Medications    CLOTRIMAZOLE-BETAMETHASONE 1-0.05% (LOTRISONE) CREAM    Apply topically 2 (two) times daily.    FLUCONAZOLE (DIFLUCAN) 150 MG TAB    Take 1 tablet (150 mg total) by mouth every 7 days.    TOPIRAMATE (TOPAMAX) 50 MG TABLET    Take 50 mg by mouth 2 (two) times daily. Take in am and noon     Answers for HPI/ROS submitted by the patient on 2/17/2020   Abdominal pain  Onset: yesterday  Onset quality: sudden  Frequency: 2 to 4 times per day  anorexia: Yes  belching: Yes  flatus: No  hematochezia: No  melena: No  weight loss: No  Aggravated by: nothing  Relieved by: nothing

## 2020-02-18 LAB
C DIFF GDH STL QL: NEGATIVE
C DIFF TOX A+B STL QL IA: NEGATIVE

## 2020-02-23 DIAGNOSIS — K52.9 GASTROENTERITIS: ICD-10-CM

## 2020-02-23 DIAGNOSIS — A08.4 VIRAL GASTROENTERITIS: ICD-10-CM

## 2020-02-24 RX ORDER — ONDANSETRON 8 MG/1
TABLET, ORALLY DISINTEGRATING ORAL
Qty: 15 TABLET | Refills: 0 | Status: SHIPPED | OUTPATIENT
Start: 2020-02-24 | End: 2021-08-09

## 2020-02-28 ENCOUNTER — PATIENT MESSAGE (OUTPATIENT)
Dept: PRIMARY CARE CLINIC | Facility: CLINIC | Age: 55
End: 2020-02-28

## 2020-02-28 RX ORDER — DICLOFENAC SODIUM 10 MG/G
2 GEL TOPICAL 4 TIMES DAILY
COMMUNITY
End: 2020-02-28 | Stop reason: SDUPTHER

## 2020-03-02 ENCOUNTER — OFFICE VISIT (OUTPATIENT)
Dept: CARDIOLOGY | Facility: CLINIC | Age: 55
End: 2020-03-02
Payer: MEDICARE

## 2020-03-02 VITALS
OXYGEN SATURATION: 98 % | BODY MASS INDEX: 41.63 KG/M2 | DIASTOLIC BLOOD PRESSURE: 56 MMHG | HEART RATE: 62 BPM | SYSTOLIC BLOOD PRESSURE: 103 MMHG | WEIGHT: 242.5 LBS

## 2020-03-02 DIAGNOSIS — R00.1 BRADYCARDIA: ICD-10-CM

## 2020-03-02 PROCEDURE — 99213 PR OFFICE/OUTPT VISIT, EST, LEVL III, 20-29 MIN: ICD-10-PCS | Mod: S$GLB,,, | Performed by: INTERNAL MEDICINE

## 2020-03-02 PROCEDURE — 99999 PR PBB SHADOW E&M-EST. PATIENT-LVL III: ICD-10-PCS | Mod: PBBFAC,,, | Performed by: INTERNAL MEDICINE

## 2020-03-02 PROCEDURE — 99999 PR PBB SHADOW E&M-EST. PATIENT-LVL III: CPT | Mod: PBBFAC,,, | Performed by: INTERNAL MEDICINE

## 2020-03-02 PROCEDURE — 99213 OFFICE O/P EST LOW 20 MIN: CPT | Mod: S$GLB,,, | Performed by: INTERNAL MEDICINE

## 2020-03-02 PROCEDURE — 3008F PR BODY MASS INDEX (BMI) DOCUMENTED: ICD-10-PCS | Mod: CPTII,S$GLB,, | Performed by: INTERNAL MEDICINE

## 2020-03-02 PROCEDURE — 3008F BODY MASS INDEX DOCD: CPT | Mod: CPTII,S$GLB,, | Performed by: INTERNAL MEDICINE

## 2020-03-02 NOTE — PROGRESS NOTES
Subjective:    Patient ID:  Ida Orozco is a 54 y.o. y.o. female who presents for initial visit for No chief complaint on file.      Patient presents for follow-up status post panniculectomy.  She states that she sometimes experiences slow heart rate but is totally asymptomatic.      Past Medical History:   Diagnosis Date    Anxiety     Asthma     Bronchitis     Crohn's colitis     Depression     Fatty liver disease, nonalcoholic     Gallstones     GERD (gastroesophageal reflux disease)     History of sleeve gastrectomy 2016    PTSD (post-traumatic stress disorder)     Seasonal allergies     Symptomatic abdominal panniculus     Ulcerative colitis         Social History     Tobacco Use    Smoking status: Former Smoker     Types: Cigarettes     Last attempt to quit:      Years since quittin.1    Smokeless tobacco: Never Used   Substance Use Topics    Alcohol use: Yes     Comment: occasionally    Drug use: No        family history includes Cancer in her maternal grandfather, mother, and paternal grandfather; Heart disease (age of onset: 67) in her father.      Review of Systems   Constitutional: Negative.    HENT: Negative.    Eyes: Negative.    Respiratory: Negative.    Cardiovascular: Negative.    Gastrointestinal: Negative.    Genitourinary: Negative.    Musculoskeletal: Negative.    Skin: Negative.    Neurological: Negative.    Endo/Heme/Allergies: Negative.    Psychiatric/Behavioral: Negative.         Objective:     BP (!) 103/56 (BP Location: Left arm, Patient Position: Sitting, BP Method: Large (Automatic))   Pulse 62   Wt 110 kg (242 lb 8.1 oz)   SpO2 98%   BMI 41.63 kg/m²     Physical Exam   Constitutional: She is oriented to person, place, and time and well-developed, well-nourished, and in no distress.   HENT:   Head: Normocephalic and atraumatic.   Eyes: Pupils are equal, round, and reactive to light. No scleral icterus.   Neck: Normal range of motion. Neck supple. No  thyromegaly present.   Cardiovascular: Normal rate and regular rhythm.   Pulmonary/Chest: Effort normal and breath sounds normal.   Abdominal: Soft. Bowel sounds are normal. She exhibits no distension and no mass. There is no tenderness.   Musculoskeletal: Normal range of motion. She exhibits no edema.   Lymphadenopathy:     She has no cervical adenopathy.   Neurological: She is alert and oriented to person, place, and time. No cranial nerve deficit.   Skin: Skin is warm and dry. No erythema.   Psychiatric: Memory, affect and judgment normal.       Labs:     Lab Results   Component Value Date     11/05/2019    K 3.7 11/05/2019     11/05/2019    CO2 24 11/05/2019    BUN 12 11/05/2019    CREATININE 1.0 11/05/2019    ANIONGAP 8 11/05/2019     Lab Results   Component Value Date    HGBA1C 5.6 05/30/2019     No results found for: BNP, BNPTRIAGEBLO    Lab Results   Component Value Date    WBC 5.20 11/05/2019    HGB 14.4 11/05/2019    HCT 44.9 11/05/2019     11/05/2019    GRAN 2.1 11/05/2019    GRAN 40.8 11/05/2019     Lab Results   Component Value Date    CHOL 165 10/02/2018    HDL 80 (H) 10/02/2018    LDLCALC 68 10/02/2018    TRIG 85 10/02/2018         Results for orders placed or performed during the hospital encounter of 11/13/19   EKG 12-lead    Collection Time: 11/13/19  7:19 AM    Narrative    Test Reason : I25.110,Z01.818,    Vent. Rate : 055 BPM     Atrial Rate : 055 BPM     P-R Int : 168 ms          QRS Dur : 096 ms      QT Int : 396 ms       P-R-T Axes : 109 -10 045 degrees     QTc Int : 378 ms    Sinus bradycardia  Moderate voltage criteria for LVH, may be normal variant  Cannot rule out Septal infarct (cited on or before 15-AUG-2016)  Abnormal ECG  When compared with ECG of 23-SEP-2019 15:38,  Questionable change in initial forces of Septal leads  Confirmed by Kimo MCLEOD, Barak MARKS ([id number]) on 11/13/2019 11:10:41 AM    Referred By: BARAK MALIN           Confirmed By:Barak Malin MD         .  Meds:     Current Outpatient Medications:     albuterol (PROVENTIL) 2.5 mg /3 mL (0.083 %) nebulizer solution, INHALE ONE VIAL VIA NEBULIZER EVERY SIX HOURS (Patient taking differently: every 6 (six) hours as needed. ), Disp: 150 mL, Rfl: 3    albuterol 90 mcg/actuation inhaler, Inhale 2 puffs into the lungs every 4 (four) hours as needed for Wheezing or Shortness of Breath. Rescue, Disp: 1 Inhaler, Rfl: 5    ALLERGY RELIEF, LORATADINE, 10 mg tablet, TAKE ONE TABLET BY MOUTH ONCE DAILY, Disp: 90 tablet, Rfl: 3    budesonide-formoterol 160-4.5 mcg (SYMBICORT) 160-4.5 mcg/actuation HFAA, Inhale 2 puffs into the lungs every 12 (twelve) hours. Controller (Patient taking differently: Inhale 2 puffs into the lungs every 12 (twelve) hours as needed. Controller), Disp: 11 g, Rfl: 1    clonazePAM (KLONOPIN) 1 MG tablet, Take 1 mg by mouth once daily. , Disp: , Rfl:     diclofenac sodium (VOLTAREN) 1 % Gel, Apply 2 g topically 4 (four) times daily., Disp: , Rfl:     ergocalciferol (ERGOCALCIFEROL) 50,000 unit Cap, TAKE 1 CAPSULE (50,000 UNITS TOTAL) BY MOUTH EVERY 7 DAYS., Disp: 12 capsule, Rfl: 3    ferrous gluconate (FERGON) 324 MG tablet, TAKE 1 TABLET (324 MG TOTAL) BY MOUTH DAILY WITH BREAKFAST., Disp: 80 tablet, Rfl: 0    fluticasone (FLONASE) 50 mcg/actuation nasal spray, INSTILL TWO SPRAYS IN EACH NOSTRIL IN EACH NOSTRIL EVERY EVENING, Disp: 16 g, Rfl: 5    montelukast (SINGULAIR) 10 mg tablet, TAKE ONE TABLET BY MOUTH EVERY EVENING (Patient taking differently: Take 10 mg by mouth once daily. ), Disp: 90 tablet, Rfl: 3    mupirocin (BACTROBAN) 2 % ointment, APPLY TOPICALLY 3 (THREE) TIMES DAILY., Disp: 22 g, Rfl: 3    nystatin (MYCOSTATIN) powder, Apply topically 2 (two) times daily. (Patient taking differently: Apply topically daily as needed (when changes colostomy bag). ), Disp: 1 Bottle, Rfl: 6    ondansetron (ZOFRAN-ODT) 8 MG TbDL, DISSOLVE ONE TABLET IN MOUTH EVERY TWELVE HOURS AS NEEDED,  Disp: 15 tablet, Rfl: 0    pantoprazole (PROTONIX) 40 MG tablet, TAKE ONE TABLET BY MOUTH ONCE DAILY, Disp: 90 tablet, Rfl: 3    promethazine (PHENERGAN) 25 MG tablet, Take 1 tablet (25 mg total) by mouth every 6 (six) hours as needed for Nausea., Disp: 15 tablet, Rfl: 0    sertraline (ZOLOFT) 100 MG tablet, Take 100 mg by mouth every evening. , Disp: , Rfl:     topiramate (TOPAMAX) 100 MG tablet, Take 1 tablet by mouth once daily., Disp: , Rfl:     trazodone (DESYREL) 50 MG tablet, Take 50 mg by mouth every evening., Disp: , Rfl:   No current facility-administered medications for this visit.     Facility-Administered Medications Ordered in Other Visits:     gabapentin capsule 300 mg, 300 mg, Oral, TID, Alee Wylie NP, 300 mg at 12/21/19 1503      Assessment & Plan:     Bradycardia  Patient refused an EKG today but she is not having any bradycardia symptoms she does notices it on her watch.  She will follow-up in September for cardiovascular re-evaluation.

## 2020-03-02 NOTE — ASSESSMENT & PLAN NOTE
Patient refused an EKG today but she is not having any bradycardia symptoms she does notices it on her watch.  She will follow-up in September for cardiovascular re-evaluation.

## 2020-03-03 ENCOUNTER — OFFICE VISIT (OUTPATIENT)
Dept: PRIMARY CARE CLINIC | Facility: CLINIC | Age: 55
End: 2020-03-03
Payer: MEDICARE

## 2020-03-03 ENCOUNTER — PATIENT MESSAGE (OUTPATIENT)
Dept: PRIMARY CARE CLINIC | Facility: CLINIC | Age: 55
End: 2020-03-03

## 2020-03-03 VITALS
OXYGEN SATURATION: 96 % | WEIGHT: 241.19 LBS | DIASTOLIC BLOOD PRESSURE: 62 MMHG | HEIGHT: 64 IN | SYSTOLIC BLOOD PRESSURE: 108 MMHG | HEART RATE: 61 BPM | TEMPERATURE: 99 F | BODY MASS INDEX: 41.18 KG/M2 | RESPIRATION RATE: 18 BRPM

## 2020-03-03 DIAGNOSIS — F33.0 MILD EPISODE OF RECURRENT MAJOR DEPRESSIVE DISORDER: ICD-10-CM

## 2020-03-03 DIAGNOSIS — G44.219 EPISODIC TENSION-TYPE HEADACHE, NOT INTRACTABLE: Primary | ICD-10-CM

## 2020-03-03 DIAGNOSIS — R21 RASH: Primary | ICD-10-CM

## 2020-03-03 PROCEDURE — 99213 OFFICE O/P EST LOW 20 MIN: CPT | Mod: S$GLB,,, | Performed by: FAMILY MEDICINE

## 2020-03-03 PROCEDURE — 99499 RISK ADDL DX/OHS AUDIT: ICD-10-PCS | Mod: S$GLB,,, | Performed by: FAMILY MEDICINE

## 2020-03-03 PROCEDURE — 3008F PR BODY MASS INDEX (BMI) DOCUMENTED: ICD-10-PCS | Mod: CPTII,S$GLB,, | Performed by: FAMILY MEDICINE

## 2020-03-03 PROCEDURE — 99999 PR PBB SHADOW E&M-EST. PATIENT-LVL III: ICD-10-PCS | Mod: PBBFAC,,, | Performed by: FAMILY MEDICINE

## 2020-03-03 PROCEDURE — 3008F BODY MASS INDEX DOCD: CPT | Mod: CPTII,S$GLB,, | Performed by: FAMILY MEDICINE

## 2020-03-03 PROCEDURE — 99213 PR OFFICE/OUTPT VISIT, EST, LEVL III, 20-29 MIN: ICD-10-PCS | Mod: S$GLB,,, | Performed by: FAMILY MEDICINE

## 2020-03-03 PROCEDURE — 99499 UNLISTED E&M SERVICE: CPT | Mod: S$GLB,,, | Performed by: FAMILY MEDICINE

## 2020-03-03 PROCEDURE — 99999 PR PBB SHADOW E&M-EST. PATIENT-LVL III: CPT | Mod: PBBFAC,,, | Performed by: FAMILY MEDICINE

## 2020-03-03 RX ORDER — DICLOFENAC SODIUM 10 MG/G
2 GEL TOPICAL 4 TIMES DAILY
Qty: 100 G | Refills: 5 | Status: SHIPPED | OUTPATIENT
Start: 2020-03-03 | End: 2023-08-10

## 2020-03-03 RX ORDER — DICLOFENAC SODIUM 10 MG/G
2 GEL TOPICAL 4 TIMES DAILY
Qty: 100 G | Refills: 5 | Status: SHIPPED | OUTPATIENT
Start: 2020-03-03 | End: 2020-03-03 | Stop reason: SDUPTHER

## 2020-03-03 NOTE — PROGRESS NOTES
"Subjective:       Patient ID: Ida Orozco is a 54 y.o. female.    Chief Complaint: Headache (says she had a really sharp pain on the top of her head about 4 times in one day )    "Stabbing" coronal headache 3-4 times a few days ago, came on suddenly, lasted 10-15 minutes. Gradually went away on its own. No associated photophobia or phonophobia, mild blurry vision, no slurred speech, no nausea or vomiting.    Review of Systems   Constitutional: Negative for activity change and unexpected weight change.   HENT: Positive for rhinorrhea. Negative for hearing loss, tinnitus and trouble swallowing.    Eyes: Positive for visual disturbance. Negative for photophobia and discharge.   Respiratory: Negative for chest tightness and wheezing.    Cardiovascular: Negative for chest pain and palpitations.   Gastrointestinal: Negative for blood in stool, constipation, diarrhea and vomiting.   Endocrine: Negative for polydipsia and polyuria.   Genitourinary: Negative for difficulty urinating, dysuria, hematuria and menstrual problem.   Musculoskeletal: Negative for arthralgias, joint swelling and neck pain.   Neurological: Positive for headaches. Negative for weakness.   Psychiatric/Behavioral: Negative for confusion and dysphoric mood.       Objective:      Vitals:    03/03/20 1227   BP: 108/62   BP Location: Right arm   Patient Position: Sitting   BP Method: Large (Manual)   Pulse: 61   Resp: 18   Temp: 98.8 °F (37.1 °C)   TempSrc: Oral   SpO2: 96%   Weight: 109.4 kg (241 lb 2.9 oz)   Height: 5' 4" (1.626 m)     Physical Exam   Constitutional: She is oriented to person, place, and time. She appears well-developed and well-nourished.   HENT:   Head: Normocephalic and atraumatic.   Mouth/Throat: Oropharynx is clear and moist.   Eyes: Pupils are equal, round, and reactive to light. EOM are normal.   Neck: Neck supple.   Cardiovascular: Normal rate, regular rhythm and normal heart sounds.   Pulmonary/Chest: Effort normal and breath " sounds normal.   Musculoskeletal: She exhibits no edema.   Lymphadenopathy:     She has no cervical adenopathy.   Neurological: She is alert and oriented to person, place, and time. She has normal strength. No cranial nerve deficit or sensory deficit.   Reflex Scores:       Patellar reflexes are 2+ on the right side and 2+ on the left side.  Skin: Skin is warm and dry.   Psychiatric: She has a normal mood and affect. Her behavior is normal.   Nursing note and vitals reviewed.      Lab Results   Component Value Date    WBC 5.20 11/05/2019    HGB 14.4 11/05/2019    HCT 44.9 11/05/2019     11/05/2019    CHOL 165 10/02/2018    TRIG 85 10/02/2018    HDL 80 (H) 10/02/2018    ALT 55 (H) 11/05/2019    AST 51 (H) 11/05/2019     11/05/2019    K 3.7 11/05/2019     11/05/2019    CREATININE 1.0 11/05/2019    BUN 12 11/05/2019    CO2 24 11/05/2019    TSH 3.04 11/22/2019    INR 1.0 08/01/2014    HGBA1C 5.6 05/30/2019      Assessment:       1. Episodic tension-type headache, not intractable    2. Mild episode of recurrent major depressive disorder        Plan:       Episodic tension-type headache, not intractable  No particularly worrisome features. Continue current regimen  Mild episode of recurrent major depressive disorder  Stable on current meds  Other orders  -     diclofenac sodium (VOLTAREN) 1 % Gel; Apply 2 g topically 4 (four) times daily.  Dispense: 100 g; Refill: 5      Medication List with Changes/Refills   Current Medications    ALBUTEROL (PROVENTIL) 2.5 MG /3 ML (0.083 %) NEBULIZER SOLUTION    INHALE ONE VIAL VIA NEBULIZER EVERY SIX HOURS    ALBUTEROL 90 MCG/ACTUATION INHALER    Inhale 2 puffs into the lungs every 4 (four) hours as needed for Wheezing or Shortness of Breath. Rescue    ALLERGY RELIEF, LORATADINE, 10 MG TABLET    TAKE ONE TABLET BY MOUTH ONCE DAILY    BUDESONIDE-FORMOTEROL 160-4.5 MCG (SYMBICORT) 160-4.5 MCG/ACTUATION HFAA    Inhale 2 puffs into the lungs every 12 (twelve) hours.  Controller    CLONAZEPAM (KLONOPIN) 1 MG TABLET    Take 1 mg by mouth 2 (two) times daily.     ERGOCALCIFEROL (ERGOCALCIFEROL) 50,000 UNIT CAP    TAKE 1 CAPSULE (50,000 UNITS TOTAL) BY MOUTH EVERY 7 DAYS.    FERROUS GLUCONATE (FERGON) 324 MG TABLET    TAKE 1 TABLET (324 MG TOTAL) BY MOUTH DAILY WITH BREAKFAST.    FLUTICASONE (FLONASE) 50 MCG/ACTUATION NASAL SPRAY    INSTILL TWO SPRAYS IN EACH NOSTRIL IN EACH NOSTRIL EVERY EVENING    MONTELUKAST (SINGULAIR) 10 MG TABLET    TAKE ONE TABLET BY MOUTH EVERY EVENING    MUPIROCIN (BACTROBAN) 2 % OINTMENT    APPLY TOPICALLY 3 (THREE) TIMES DAILY.    NYSTATIN (MYCOSTATIN) POWDER    Apply topically 2 (two) times daily.    ONDANSETRON (ZOFRAN-ODT) 8 MG TBDL    DISSOLVE ONE TABLET IN MOUTH EVERY TWELVE HOURS AS NEEDED    PANTOPRAZOLE (PROTONIX) 40 MG TABLET    TAKE ONE TABLET BY MOUTH ONCE DAILY    PROMETHAZINE (PHENERGAN) 25 MG TABLET    Take 1 tablet (25 mg total) by mouth every 6 (six) hours as needed for Nausea.    SERTRALINE (ZOLOFT) 100 MG TABLET    Take 100 mg by mouth every evening.     TOPIRAMATE (TOPAMAX) 100 MG TABLET    Take 1 tablet by mouth once daily.    TRAZODONE (DESYREL) 50 MG TABLET    Take 50 mg by mouth every evening.   Changed and/or Refilled Medications    Modified Medication Previous Medication    DICLOFENAC SODIUM (VOLTAREN) 1 % GEL diclofenac sodium (VOLTAREN) 1 % Gel       Apply 2 g topically 4 (four) times daily.    Apply 2 g topically 4 (four) times daily.

## 2020-03-10 ENCOUNTER — OFFICE VISIT (OUTPATIENT)
Dept: PRIMARY CARE CLINIC | Facility: CLINIC | Age: 55
End: 2020-03-10
Payer: MEDICARE

## 2020-03-10 VITALS
HEART RATE: 66 BPM | WEIGHT: 241.5 LBS | SYSTOLIC BLOOD PRESSURE: 104 MMHG | TEMPERATURE: 99 F | OXYGEN SATURATION: 98 % | HEIGHT: 64 IN | BODY MASS INDEX: 41.23 KG/M2 | DIASTOLIC BLOOD PRESSURE: 66 MMHG | RESPIRATION RATE: 16 BRPM

## 2020-03-10 DIAGNOSIS — J01.90 ACUTE NON-RECURRENT SINUSITIS, UNSPECIFIED LOCATION: ICD-10-CM

## 2020-03-10 DIAGNOSIS — J01.00 ACUTE NON-RECURRENT MAXILLARY SINUSITIS: Primary | ICD-10-CM

## 2020-03-10 PROCEDURE — 3008F PR BODY MASS INDEX (BMI) DOCUMENTED: ICD-10-PCS | Mod: CPTII,S$GLB,, | Performed by: FAMILY MEDICINE

## 2020-03-10 PROCEDURE — 3008F BODY MASS INDEX DOCD: CPT | Mod: CPTII,S$GLB,, | Performed by: FAMILY MEDICINE

## 2020-03-10 PROCEDURE — 99999 PR PBB SHADOW E&M-EST. PATIENT-LVL III: ICD-10-PCS | Mod: PBBFAC,,, | Performed by: FAMILY MEDICINE

## 2020-03-10 PROCEDURE — 99214 PR OFFICE/OUTPT VISIT, EST, LEVL IV, 30-39 MIN: ICD-10-PCS | Mod: S$GLB,,, | Performed by: FAMILY MEDICINE

## 2020-03-10 PROCEDURE — 99214 OFFICE O/P EST MOD 30 MIN: CPT | Mod: S$GLB,,, | Performed by: FAMILY MEDICINE

## 2020-03-10 PROCEDURE — 99999 PR PBB SHADOW E&M-EST. PATIENT-LVL III: CPT | Mod: PBBFAC,,, | Performed by: FAMILY MEDICINE

## 2020-03-10 RX ORDER — AMOXICILLIN 500 MG/1
1000 TABLET, FILM COATED ORAL EVERY 8 HOURS
Qty: 42 TABLET | Refills: 0 | Status: SHIPPED | OUTPATIENT
Start: 2020-03-10 | End: 2020-03-17

## 2020-03-10 NOTE — PROGRESS NOTES
"Subjective:       Patient ID: Ida Orozco is a 54 y.o. female.    Chief Complaint: Sinusitis (sinus headache and pressure)    Complains of facial/frontal heat pain for the past week with worsening fevers, chills, and congestion.       Review of Systems   Constitutional: Positive for chills and fever. Negative for activity change.   HENT: Positive for congestion, sinus pressure and sinus pain.    Respiratory: Positive for cough. Negative for chest tightness and wheezing.    Cardiovascular: Negative for chest pain, palpitations and leg swelling.   Gastrointestinal: Negative for abdominal distention, abdominal pain, constipation, diarrhea, nausea and vomiting.   Genitourinary: Negative for difficulty urinating.   Skin: Negative for rash.   Neurological: Positive for weakness.       Objective:      Vitals:    03/10/20 1320   BP: 104/66   BP Location: Right arm   Patient Position: Sitting   BP Method: Large (Manual)   Pulse: 66   Resp: 16   Temp: 99.2 °F (37.3 °C)   TempSrc: Oral   SpO2: 98%   Weight: 109.5 kg (241 lb 8.2 oz)   Height: 5' 4" (1.626 m)     Physical Exam   Constitutional: She appears well-developed and well-nourished.   HENT:   Head: Normocephalic and atraumatic.   Nose: Nose normal.   Mouth/Throat: Oropharynx is clear and moist.   Maxillary sinus pain palpation    Eyes: Conjunctivae and EOM are normal.   Cardiovascular: Normal rate, regular rhythm and normal heart sounds.   Pulmonary/Chest: Effort normal and breath sounds normal. No respiratory distress. She has no wheezes. She has no rales.   Abdominal: Soft. She exhibits no distension. There is no tenderness.   Lymphadenopathy:     She has no cervical adenopathy.   Neurological: She is alert. No cranial nerve deficit.   Psychiatric: She has a normal mood and affect.   Nursing note and vitals reviewed.          Lab Results   Component Value Date     11/05/2019    K 3.7 11/05/2019     11/05/2019    CO2 24 11/05/2019    BUN 12 11/05/2019    " CREATININE 1.0 11/05/2019    ANIONGAP 8 11/05/2019     Lab Results   Component Value Date    HGBA1C 5.6 05/30/2019     No results found for: BNP, BNPTRIAGEBLO    Lab Results   Component Value Date    WBC 5.20 11/05/2019    HGB 14.4 11/05/2019    HCT 44.9 11/05/2019     11/05/2019    GRAN 2.1 11/05/2019    GRAN 40.8 11/05/2019     Lab Results   Component Value Date    CHOL 165 10/02/2018    HDL 80 (H) 10/02/2018    LDLCALC 68 10/02/2018    TRIG 85 10/02/2018          Current Outpatient Medications:     albuterol (PROVENTIL) 2.5 mg /3 mL (0.083 %) nebulizer solution, INHALE ONE VIAL VIA NEBULIZER EVERY SIX HOURS (Patient taking differently: every 6 (six) hours as needed. ), Disp: 150 mL, Rfl: 3    albuterol 90 mcg/actuation inhaler, Inhale 2 puffs into the lungs every 4 (four) hours as needed for Wheezing or Shortness of Breath. Rescue, Disp: 1 Inhaler, Rfl: 5    clonazePAM (KLONOPIN) 1 MG tablet, Take 1 mg by mouth once daily., Disp: , Rfl:     diclofenac sodium (VOLTAREN) 1 % Gel, Apply 2 g topically 4 (four) times daily., Disp: 100 g, Rfl: 5    ergocalciferol (ERGOCALCIFEROL) 50,000 unit Cap, TAKE 1 CAPSULE (50,000 UNITS TOTAL) BY MOUTH EVERY 7 DAYS., Disp: 12 capsule, Rfl: 3    ferrous gluconate (FERGON) 324 MG tablet, TAKE 1 TABLET (324 MG TOTAL) BY MOUTH DAILY WITH BREAKFAST., Disp: 80 tablet, Rfl: 0    fluticasone (FLONASE) 50 mcg/actuation nasal spray, INSTILL TWO SPRAYS IN EACH NOSTRIL IN EACH NOSTRIL EVERY EVENING, Disp: 16 g, Rfl: 5    montelukast (SINGULAIR) 10 mg tablet, TAKE ONE TABLET BY MOUTH EVERY EVENING (Patient taking differently: Take 10 mg by mouth once daily. ), Disp: 90 tablet, Rfl: 3    mupirocin (BACTROBAN) 2 % ointment, APPLY TOPICALLY 3 (THREE) TIMES DAILY., Disp: 22 g, Rfl: 3    nystatin (MYCOSTATIN) powder, Apply topically 2 (two) times daily. (Patient taking differently: Apply topically daily as needed (when changes colostomy bag). ), Disp: 1 Bottle, Rfl: 6     ondansetron (ZOFRAN-ODT) 8 MG TbDL, DISSOLVE ONE TABLET IN MOUTH EVERY TWELVE HOURS AS NEEDED, Disp: 15 tablet, Rfl: 0    pantoprazole (PROTONIX) 40 MG tablet, TAKE ONE TABLET BY MOUTH ONCE DAILY, Disp: 90 tablet, Rfl: 3    promethazine (PHENERGAN) 25 MG tablet, Take 1 tablet (25 mg total) by mouth every 6 (six) hours as needed for Nausea., Disp: 15 tablet, Rfl: 0    sertraline (ZOLOFT) 100 MG tablet, Take 100 mg by mouth every evening. , Disp: , Rfl:     topiramate (TOPAMAX) 100 MG tablet, Take 1 tablet by mouth once daily., Disp: , Rfl:     trazodone (DESYREL) 50 MG tablet, Take 50 mg by mouth every evening., Disp: , Rfl:     ALLERGY RELIEF, LORATADINE, 10 mg tablet, TAKE ONE TABLET BY MOUTH ONCE DAILY, Disp: 90 tablet, Rfl: 3    amoxicillin (AMOXIL) 500 MG Tab, Take 2 tablets (1,000 mg total) by mouth every 8 (eight) hours. for 7 days, Disp: 42 tablet, Rfl: 0    budesonide-formoterol 160-4.5 mcg (SYMBICORT) 160-4.5 mcg/actuation HFAA, Inhale 2 puffs into the lungs every 12 (twelve) hours. Controller (Patient taking differently: Inhale 2 puffs into the lungs every 12 (twelve) hours as needed. Controller), Disp: 11 g, Rfl: 1  No current facility-administered medications for this visit.     Facility-Administered Medications Ordered in Other Visits:     gabapentin capsule 300 mg, 300 mg, Oral, TID, Alee Wylie NP, 300 mg at 12/21/19 1503        Assessment:       1. Acute non-recurrent maxillary sinusitis    2. Acute non-recurrent sinusitis, unspecified location           Plan:       Acute non-recurrent maxillary sinusitis  -     amoxicillin (AMOXIL) 500 MG Tab; Take 2 tablets (1,000 mg total) by mouth every 8 (eight) hours. for 7 days  Dispense: 42 tablet; Refill: 0  Signs symptoms of acute sinusitis with chills, fevers, maxillary tenderness. Amoxicillin prescribed. She is currently looking for an ENT which will accept her insurance.     Acute non-recurrent sinusitis, unspecified location  -      amoxicillin (AMOXIL) 500 MG Tab; Take 2 tablets (1,000 mg total) by mouth every 8 (eight) hours. for 7 days  Dispense: 42 tablet; Refill: 0

## 2020-03-12 ENCOUNTER — PATIENT MESSAGE (OUTPATIENT)
Dept: PRIMARY CARE CLINIC | Facility: CLINIC | Age: 55
End: 2020-03-12

## 2020-03-13 RX ORDER — CIPROFLOXACIN 250 MG/1
250 TABLET, FILM COATED ORAL 2 TIMES DAILY
Qty: 10 TABLET | Refills: 0 | Status: SHIPPED | OUTPATIENT
Start: 2020-03-13 | End: 2020-03-18

## 2020-03-14 ENCOUNTER — PATIENT MESSAGE (OUTPATIENT)
Dept: PRIMARY CARE CLINIC | Facility: CLINIC | Age: 55
End: 2020-03-14

## 2020-03-24 ENCOUNTER — PATIENT MESSAGE (OUTPATIENT)
Dept: PRIMARY CARE CLINIC | Facility: CLINIC | Age: 55
End: 2020-03-24

## 2020-03-25 ENCOUNTER — PATIENT MESSAGE (OUTPATIENT)
Dept: PRIMARY CARE CLINIC | Facility: CLINIC | Age: 55
End: 2020-03-25

## 2020-03-25 RX ORDER — TRAZODONE HYDROCHLORIDE 50 MG/1
50 TABLET ORAL NIGHTLY
Qty: 90 TABLET | Refills: 1 | Status: SHIPPED | OUTPATIENT
Start: 2020-03-25 | End: 2020-09-17

## 2020-03-25 RX ORDER — AZITHROMYCIN 250 MG/1
TABLET, FILM COATED ORAL
Qty: 6 TABLET | Refills: 0 | Status: SHIPPED | OUTPATIENT
Start: 2020-03-25 | End: 2020-04-04 | Stop reason: HOSPADM

## 2020-03-25 RX ORDER — TOPIRAMATE 100 MG/1
100 TABLET, FILM COATED ORAL DAILY
Qty: 90 TABLET | Refills: 1 | Status: SHIPPED | OUTPATIENT
Start: 2020-03-25 | End: 2020-09-17

## 2020-03-25 RX ORDER — CLONAZEPAM 1 MG/1
1 TABLET ORAL DAILY
Qty: 90 TABLET | Refills: 1 | Status: SHIPPED | OUTPATIENT
Start: 2020-03-25 | End: 2020-07-28

## 2020-03-25 RX ORDER — SERTRALINE HYDROCHLORIDE 100 MG/1
100 TABLET, FILM COATED ORAL NIGHTLY
Qty: 90 TABLET | Refills: 1 | Status: SHIPPED | OUTPATIENT
Start: 2020-03-25 | End: 2020-09-22

## 2020-03-27 ENCOUNTER — PATIENT MESSAGE (OUTPATIENT)
Dept: PRIMARY CARE CLINIC | Facility: CLINIC | Age: 55
End: 2020-03-27

## 2020-03-29 ENCOUNTER — NURSE TRIAGE (OUTPATIENT)
Dept: ADMINISTRATIVE | Facility: CLINIC | Age: 55
End: 2020-03-29

## 2020-03-30 NOTE — TELEPHONE ENCOUNTER
"Has HA since beginning of March.  Was given cipro for sinus -had reaction-seen In ED for that reaction and treated "like shit".  Afraid to go to ED and be exposed.  Has Crohns and ileostomy.with chronic diarrhea   Having 101.7 temp today-finished zpak today.  Repeatedly  states she does not want to leave house at this time and does not want to go to ED.  ** Main concern for her now is dizzy when she gets up.  Encouraged pushing fluids of power aid to increase electrolyte intake Overall feels much better than 2 weeks ago    Reason for Disposition   [1] Fever > 101 F (38.3 C) AND [2] age > 60    Additional Information   Negative: Severe difficulty breathing (e.g., struggling for each breath, speak in single words, pulse > 120)   Negative: Bluish (or gray) lips or face now   Negative: Difficult to awaken or acting confused (e.g., disoriented, slurred speech)   Negative: Passed out (i.e., lost consciousness, collapsed and was not responding)   Negative: Wheezing started suddenly after medicine, an allergic food, or bee sting   Negative: Sounds like a life-threatening emergency to the triager   Negative: [1] SEVERE asthma attack (e.g., very SOB at rest, speaks in single words, loud wheezes) AND [2] not resolved after 2 nebulizer or inhaler treatments   Negative: Peak flow rate less than 50% of baseline level (RED Zone)   Negative: [1] Severe wheezing or coughing AND [2] doesn't have neb or inhaler available   Negative: Chest pain   Negative: [1] Hospitalized before with asthma AND [2] feels the same now   Negative: [1] MODERATE asthma attack (e.g., SOB at rest, speaks in phrases, audible wheezes) AND [2] not resolved after 2 nebulizer or inhaler treatments given 20 minutes apart   Negative: [1] Peak flow rate 50-80% of baseline level (YELLOW zone) AND [2] not resolved after 2 nebulizer or inhaler treatments given 20 minutes apart   Negative: Asthma medicine (nebulizer or inhaler) is needed more frequently " than q 4 hours to keep you comfortable   Negative: Fever > 103 F (39.4 C)   Negative: Patient sounds very sick or weak to the triager    Protocols used: ASTHMA ATTACK-A-AH  If not feeling better to call MD in am and inform of the dizziness with standing and starting with fever again.  Understands, agrees with plan

## 2020-04-01 PROBLEM — U07.1 COVID-19 VIRUS INFECTION: Status: ACTIVE | Noted: 2020-04-01

## 2020-04-01 PROBLEM — E87.1 HYPONATREMIA: Status: ACTIVE | Noted: 2020-04-01

## 2020-04-01 PROBLEM — Z20.822 SUSPECTED COVID-19 VIRUS INFECTION: Status: ACTIVE | Noted: 2020-04-01

## 2020-04-01 PROBLEM — I95.9 HYPOTENSION: Status: ACTIVE | Noted: 2020-04-01

## 2020-04-02 PROBLEM — A41.9 SEPSIS: Status: ACTIVE | Noted: 2020-04-01

## 2020-04-02 PROBLEM — I95.9 HYPOTENSION: Status: ACTIVE | Noted: 2020-04-02

## 2020-04-03 PROBLEM — R19.7 DIARRHEA: Status: ACTIVE | Noted: 2020-04-03

## 2020-04-05 RX ORDER — PANTOPRAZOLE SODIUM 40 MG/1
TABLET, DELAYED RELEASE ORAL
Qty: 90 TABLET | Refills: 2 | Status: SHIPPED | OUTPATIENT
Start: 2020-04-05 | End: 2021-01-11

## 2020-04-06 ENCOUNTER — TELEPHONE (OUTPATIENT)
Dept: PRIMARY CARE CLINIC | Facility: CLINIC | Age: 55
End: 2020-04-06

## 2020-04-06 NOTE — TELEPHONE ENCOUNTER
----- Message from Fariha Mata sent at 4/6/2020  1:09 PM CDT -----  Contact: [phone]  Patient is requesting a hosp f/u with the doctor .  Patient was told the doctor does not have any availability.  Patient wants to know if she schedule with Janet Guardado.    Please advise, thank you.

## 2020-04-07 ENCOUNTER — TELEPHONE (OUTPATIENT)
Dept: PRIMARY CARE CLINIC | Facility: CLINIC | Age: 55
End: 2020-04-07

## 2020-04-07 NOTE — TELEPHONE ENCOUNTER
----- Message from Scout Dewey sent at 4/7/2020  4:43 PM CDT -----  Contact: Pt [phone]  Patient is returning a phone call.  Who left a message for the patient: Brooke  Does patient know what this is regarding:  Yes. She only wants to see you for a hospital follow up  Comments: I called her phone number and it works.

## 2020-04-08 ENCOUNTER — OUTPATIENT CASE MANAGEMENT (OUTPATIENT)
Dept: ADMINISTRATIVE | Facility: OTHER | Age: 55
End: 2020-04-08

## 2020-04-08 ENCOUNTER — TELEPHONE (OUTPATIENT)
Dept: PRIMARY CARE CLINIC | Facility: CLINIC | Age: 55
End: 2020-04-08

## 2020-04-08 NOTE — PROGRESS NOTES
Outpatient Care Management  COVID-19 Patient Assessment    Patient: Ida Orozco  MRN: [id number]  Date of Service: 04/08/2020  Completed by: Yamilet Lopez RN  Program: COVID-19    Reason for Visit   Patient presents with    COVID-19 Concerns    OPCM Chart Review       Brief Summary:   Pt states she is much better.  Her blood pressure is better.  She has completed antibiotics.  She has 2 adult children in home and no one with symptoms of COVID.  She maintained her isolation from them since discharge.  Reviewed guidelines from covid 19.  Pt verbalized understanding.  Pt has hospital follow up with dr. duvall.    Assessment Documentation     COVID-19 Assessment    Nurse Assessment via Chart Review:  Nurse Assessment with Patient:  For Fever:  For Coughing:  For Difficulty Breathing:  Psycho/Social Assessment:         Problem List and History     Patient Active Problem List   Diagnosis    GERD (gastroesophageal reflux disease)    Esophageal ulcer    S/p ileostomy revision within the subcutaneous tissue.    Plica of knee    Dietary folate deficiency anemia    Vitamin B12 deficiency    Hyperlipidemia    Vitamin D deficiency    Moderate persistent asthma without complication    Anxiety    Depression    DDD (degenerative disc disease), lumbar    Crohn's disease with complication    Former smoker    Bariatric surgery status-Gastric sleeve     Redundant skin of abdomen    Inflammatory bowel disease    Ulcerative colitis    Vaginal atrophy    Urinary incontinence, mixed    Generalized abdominal pain    Dietary iron deficiency    Candidal intertrigo    Lumbar spine strain, sequela    Bradycardia    Sleep arousal disorder    Sleep apnea    Colostomy in place    Symptomatic abdominal panniculus    Mild episode of recurrent major depressive disorder    COVID-19 virus infection    Sepsis    Hyponatremia    Hypotension    Diarrhea       Reviewed Active Problem List with patient and/or Caregiver.  The following were identified as areas of need:    Medical History:  Reviewed medical history with patient and/or caregiver    Social History:  Reviewed social history with patient and/or caregiver    Complex Care Plan    Care plan was discussed and completed today with input from patient and/or caregiver.    Patient Instructions     Instructions were provided via the [employer] patient resources and are available for the patient to view on the patient portal, if active.    Next steps:    No follow-ups on file.    Todays OPCM Self-Management Care Plan was developed with the patients/caregivers input and was based on identified barriers from todays assessment.  Goals were written today with the patient/caregiver and the patient has agreed to work towards these goals to improve his/her overall well-being. Patient verbalized understanding of the care plan, goals, and all of today's instructions. Encouraged patient/caregiver to communicate with his/her physician and health care team about health conditions and the treatment plan.  Provided my contact information today and encouraged patient/caregiver to call me with any questions as needed.

## 2020-04-08 NOTE — TELEPHONE ENCOUNTER
----- Message from Fariha Mata sent at 4/8/2020 12:41 PM CDT -----  Contact: [phone]  Patient is schedule for 04-13-20 for a hosp f/u with Dr. Herrera, after a positive covid19 result.  Patient was trying to  schedule with Janet Guardado , but there was a problem when the office called her phone.    Patient was upset because she wants to come in and see a doctor.  Please advise, thank you.

## 2020-04-08 NOTE — TELEPHONE ENCOUNTER
Patient states she wants to keep appointment as established patient, she states the virtual visits are not personal and she wants to be seen in clinic

## 2020-04-13 ENCOUNTER — TELEPHONE (OUTPATIENT)
Dept: PRIMARY CARE CLINIC | Facility: CLINIC | Age: 55
End: 2020-04-13

## 2020-04-13 DIAGNOSIS — U07.1 COVID-19 VIRUS DETECTED: ICD-10-CM

## 2020-04-13 NOTE — TELEPHONE ENCOUNTER
"I did not realize patient had a "caretaker" tried calling patient to see what is was about, it rang and then said the call cannot be completed at this time   "

## 2020-04-13 NOTE — TELEPHONE ENCOUNTER
----- Message from Jannie Coon sent at 4/13/2020  1:02 PM CDT -----  Contact: Patient [phone]  Good Afternoon  Patient need a letter that Rafael Orozco is her primary caregiver. Patient is asking to E-mail: [email or web address]    Thank you

## 2020-04-15 ENCOUNTER — PATIENT MESSAGE (OUTPATIENT)
Dept: PLASTIC SURGERY | Facility: CLINIC | Age: 55
End: 2020-04-15

## 2020-04-15 ENCOUNTER — TELEPHONE (OUTPATIENT)
Dept: PRIMARY CARE CLINIC | Facility: CLINIC | Age: 55
End: 2020-04-15

## 2020-04-15 NOTE — TELEPHONE ENCOUNTER
----- Message from Alexandra Disla sent at 4/15/2020  2:47 PM CDT -----  Contact: Patient  Type: Needs Medical Advice    Who Called:  Ida, patient  Symptoms (please be specific):  N/A  How long has patient had these symptoms:  N/A  Pharmacy name and phone #:  N/A  Best Call Back Number: [phone]  Additional Information: Calling because she needs a letter stating her son is her primary care giver. She needs this letter for the food stamp office. Please advise. Thanks.

## 2020-04-15 NOTE — TELEPHONE ENCOUNTER
Spoken with pt and instructed we cannot fulfill this request, pt states she will ask her plastic surgeon for the letter

## 2020-05-04 ENCOUNTER — OFFICE VISIT (OUTPATIENT)
Dept: CARDIOLOGY | Facility: CLINIC | Age: 55
End: 2020-05-04
Payer: MEDICARE

## 2020-05-04 DIAGNOSIS — U07.1 COVID-19 VIRUS INFECTION: ICD-10-CM

## 2020-05-04 DIAGNOSIS — Z98.84 BARIATRIC SURGERY STATUS: ICD-10-CM

## 2020-05-04 DIAGNOSIS — K50.919 CROHN'S DISEASE WITH COMPLICATION, UNSPECIFIED GASTROINTESTINAL TRACT LOCATION: ICD-10-CM

## 2020-05-04 DIAGNOSIS — K51.211 ULCERATIVE PROCTITIS WITH RECTAL BLEEDING: ICD-10-CM

## 2020-05-04 DIAGNOSIS — K52.9 INFLAMMATORY BOWEL DISEASE: ICD-10-CM

## 2020-05-04 DIAGNOSIS — U07.1 REAL TIME REVERSE TRANSCRIPTASE PCR POSITIVE FOR COVID-19 VIRUS: Primary | ICD-10-CM

## 2020-05-04 PROCEDURE — 99213 OFFICE O/P EST LOW 20 MIN: CPT | Mod: 95,,, | Performed by: INTERNAL MEDICINE

## 2020-05-04 PROCEDURE — 99499 RISK ADDL DX/OHS AUDIT: ICD-10-PCS | Mod: 95,,, | Performed by: INTERNAL MEDICINE

## 2020-05-04 PROCEDURE — 99499 UNLISTED E&M SERVICE: CPT | Mod: 95,,, | Performed by: INTERNAL MEDICINE

## 2020-05-04 PROCEDURE — 99213 PR OFFICE/OUTPT VISIT, EST, LEVL III, 20-29 MIN: ICD-10-PCS | Mod: 95,,, | Performed by: INTERNAL MEDICINE

## 2020-05-04 NOTE — PROGRESS NOTES
Subjective:    Patient ID:  Ida Orozco is a 54 y.o. female who presents for follow-up of COVID 19 hospitalization    HPI The patient location is: home  The chief complaint leading to consultation is:   Visit type: video  Total time spent with patient:  13 minutes  Each patient to whom he or she provides medical services by telemedicine is:  (1) informed of the relationship between the physician and patient and the respective role of any other health care provider with respect to management of the patient; and (2) notified that he or she may decline to receive medical services by telemedicine and may withdraw from such care at any time.    Notes: The patient is a 54 year old female with ulcerative colitis,  Chrohns, PEDROZA, asthma and obesity. She has been followed by Dr Malin. A nuclear stress was done that was reported abnormal and a right has left heart cath was done 11/13/19 which was normal. She was admitted 3/31/20 to 4/5/20 with COVID 19 with the standard care. She has done well since discharge and has largely recovered.      Conclusion 11/13/19    · The left ventricular systolic function is normal.  · LV end diastolic pressure is normal.  · LVEDP (Pre): 12  · LVEDP (Post): 14  · The ejection fraction is calculated to be 65%.  · The left ventricular ejection fraction is grossly normal.  · Estimated blood loss: none  · Normal coronary arteries.  · Filling pressures on the right and left are normal.  · Normal PA pressure          Lab Results   Component Value Date     04/04/2020    K 3.5 04/04/2020     04/04/2020    CO2 23 04/04/2020    BUN 6 04/04/2020    CREATININE 1.0 04/04/2020    GLU 94 04/04/2020    HGBA1C 5.6 05/30/2019    MG 2.0 04/04/2020    AST 82 (H) 03/31/2020    ALT 52 03/31/2020    ALBUMIN 3.5 03/31/2020    PROT 8.1 03/31/2020    BILITOT 0.5 03/31/2020    WBC 3.80 (L) 04/04/2020    HGB 12.1 04/04/2020    HCT 37.8 04/04/2020    MCV 83 04/04/2020     04/04/2020    INR 1.0  08/01/2014    TSH 3.04 11/22/2019         Lab Results   Component Value Date    CHOL 165 10/02/2018    HDL 80 (H) 10/02/2018    TRIG 85 10/02/2018       Lab Results   Component Value Date    LDLCALC 68 10/02/2018       Past Medical History:   Diagnosis Date    Anxiety     Asthma     Bronchitis     Crohn's colitis     Depression     Fatty liver disease, nonalcoholic     Gallstones     GERD (gastroesophageal reflux disease)     History of sleeve gastrectomy 06/24/2016    PTSD (post-traumatic stress disorder)     Seasonal allergies     Symptomatic abdominal panniculus     Ulcerative colitis        Current Outpatient Medications:     albuterol (PROVENTIL) 2.5 mg /3 mL (0.083 %) nebulizer solution, INHALE ONE VIAL VIA NEBULIZER EVERY SIX HOURS (Patient taking differently: every 6 (six) hours as needed. ), Disp: 150 mL, Rfl: 3    albuterol 90 mcg/actuation inhaler, Inhale 2 puffs into the lungs every 4 (four) hours as needed for Wheezing or Shortness of Breath. Rescue, Disp: 1 Inhaler, Rfl: 5    ALLERGY RELIEF, LORATADINE, 10 mg tablet, TAKE ONE TABLET BY MOUTH ONCE DAILY, Disp: 90 tablet, Rfl: 3    budesonide-formoterol 160-4.5 mcg (SYMBICORT) 160-4.5 mcg/actuation HFAA, Inhale 2 puffs into the lungs every 12 (twelve) hours. Controller (Patient taking differently: Inhale 2 puffs into the lungs every 12 (twelve) hours as needed. Controller), Disp: 11 g, Rfl: 1    clonazePAM (KLONOPIN) 1 MG tablet, Take 1 tablet (1 mg total) by mouth once daily., Disp: 90 tablet, Rfl: 1    diclofenac sodium (VOLTAREN) 1 % Gel, Apply 2 g topically 4 (four) times daily., Disp: 100 g, Rfl: 5    ergocalciferol (ERGOCALCIFEROL) 50,000 unit Cap, TAKE 1 CAPSULE (50,000 UNITS TOTAL) BY MOUTH EVERY 7 DAYS., Disp: 12 capsule, Rfl: 3    ferrous gluconate (FERGON) 324 MG tablet, TAKE 1 TABLET (324 MG TOTAL) BY MOUTH DAILY WITH BREAKFAST., Disp: 80 tablet, Rfl: 0    fluticasone (FLONASE) 50 mcg/actuation nasal spray, INSTILL TWO  SPRAYS IN EACH NOSTRIL IN EACH NOSTRIL EVERY EVENING, Disp: 16 g, Rfl: 5    montelukast (SINGULAIR) 10 mg tablet, TAKE ONE TABLET BY MOUTH EVERY EVENING (Patient taking differently: Take 10 mg by mouth once daily. ), Disp: 90 tablet, Rfl: 3    mupirocin (BACTROBAN) 2 % ointment, APPLY TOPICALLY 3 (THREE) TIMES DAILY., Disp: 22 g, Rfl: 3    nystatin (MYCOSTATIN) powder, Apply topically 2 (two) times daily. (Patient taking differently: Apply topically daily as needed (when changes colostomy bag). ), Disp: 1 Bottle, Rfl: 6    ondansetron (ZOFRAN-ODT) 8 MG TbDL, DISSOLVE ONE TABLET IN MOUTH EVERY TWELVE HOURS AS NEEDED, Disp: 15 tablet, Rfl: 0    pantoprazole (PROTONIX) 40 MG tablet, TAKE ONE TABLET BY MOUTH ONCE DAILY, Disp: 90 tablet, Rfl: 2    promethazine (PHENERGAN) 25 MG tablet, Take 1 tablet (25 mg total) by mouth every 6 (six) hours as needed for Nausea., Disp: 15 tablet, Rfl: 0    sertraline (ZOLOFT) 100 MG tablet, Take 1 tablet (100 mg total) by mouth every evening., Disp: 90 tablet, Rfl: 1    topiramate (TOPAMAX) 100 MG tablet, Take 1 tablet (100 mg total) by mouth once daily., Disp: 90 tablet, Rfl: 1    traZODone (DESYREL) 50 MG tablet, Take 1 tablet (50 mg total) by mouth every evening., Disp: 90 tablet, Rfl: 1  No current facility-administered medications for this visit.     Facility-Administered Medications Ordered in Other Visits:     gabapentin capsule 300 mg, 300 mg, Oral, TID, Alee Wylie NP, 300 mg at 12/21/19 1503          Review of Systems   Constitution: Negative for decreased appetite, diaphoresis, fever, malaise/fatigue, weight gain and weight loss.   HENT: Negative for congestion, ear discharge, ear pain and nosebleeds.    Eyes: Negative for blurred vision, double vision and visual disturbance.   Cardiovascular: Negative for chest pain, claudication, cyanosis, dyspnea on exertion, irregular heartbeat, leg swelling, near-syncope, orthopnea, palpitations, paroxysmal nocturnal  dyspnea and syncope.   Respiratory: Negative for cough, hemoptysis, shortness of breath, sleep disturbances due to breathing, snoring, sputum production and wheezing.    Endocrine: Negative for polydipsia, polyphagia and polyuria.   Hematologic/Lymphatic: Negative for adenopathy and bleeding problem. Does not bruise/bleed easily.   Skin: Negative for color change, nail changes, poor wound healing and rash.   Musculoskeletal: Negative for muscle cramps and muscle weakness.   Gastrointestinal: Negative for abdominal pain, anorexia, change in bowel habit, hematochezia, nausea and vomiting.   Genitourinary: Negative for dysuria, frequency and hematuria.   Neurological: Negative for brief paralysis, difficulty with concentration, excessive daytime sleepiness, dizziness, focal weakness, headaches, light-headedness, seizures, vertigo and weakness.   Psychiatric/Behavioral: Negative for altered mental status and depression.   Allergic/Immunologic: Negative for persistent infections.        Objective:There were no vitals taken for this visit.            Physical Exam      Assessment:       1. Real time reverse transcriptase PCR positive for COVID-19 virus    2. COVID-19 virus infection    3. Crohn's disease with complication, unspecified gastrointestinal tract location    4. Bariatric surgery status-Gastric sleeve     5. Ulcerative proctitis with rectal bleeding    6. Inflammatory bowel disease         Plan:       Diagnoses and all orders for this visit:    Real time reverse transcriptase PCR positive for COVID-19 virus  -     Airborne and Contact and Droplet Isolation Status; Standing  -     Airborne and Contact and Droplet Isolation Status    COVID-19 virus infection    Crohn's disease with complication, unspecified gastrointestinal tract location    Bariatric surgery status-Gastric sleeve     Ulcerative proctitis with rectal bleeding    Inflammatory bowel disease

## 2020-05-11 ENCOUNTER — OFFICE VISIT (OUTPATIENT)
Dept: PRIMARY CARE CLINIC | Facility: CLINIC | Age: 55
End: 2020-05-11
Payer: MEDICARE

## 2020-05-11 VITALS
SYSTOLIC BLOOD PRESSURE: 110 MMHG | WEIGHT: 231.56 LBS | OXYGEN SATURATION: 97 % | HEART RATE: 71 BPM | HEIGHT: 64 IN | BODY MASS INDEX: 39.53 KG/M2 | DIASTOLIC BLOOD PRESSURE: 64 MMHG | TEMPERATURE: 98 F | RESPIRATION RATE: 18 BRPM

## 2020-05-11 DIAGNOSIS — H60.502 ACUTE OTITIS EXTERNA OF LEFT EAR, UNSPECIFIED TYPE: Primary | ICD-10-CM

## 2020-05-11 PROBLEM — I95.9 HYPOTENSION: Status: RESOLVED | Noted: 2020-04-02 | Resolved: 2020-05-11

## 2020-05-11 PROBLEM — A41.9 SEPSIS: Status: RESOLVED | Noted: 2020-04-01 | Resolved: 2020-05-11

## 2020-05-11 PROBLEM — U07.1 COVID-19 VIRUS INFECTION: Status: RESOLVED | Noted: 2020-04-01 | Resolved: 2020-05-11

## 2020-05-11 PROBLEM — E87.1 HYPONATREMIA: Status: RESOLVED | Noted: 2020-04-01 | Resolved: 2020-05-11

## 2020-05-11 PROBLEM — R10.84 GENERALIZED ABDOMINAL PAIN: Status: RESOLVED | Noted: 2019-02-24 | Resolved: 2020-05-11

## 2020-05-11 PROCEDURE — 99214 PR OFFICE/OUTPT VISIT, EST, LEVL IV, 30-39 MIN: ICD-10-PCS | Mod: S$GLB,,, | Performed by: FAMILY MEDICINE

## 2020-05-11 PROCEDURE — 99214 OFFICE O/P EST MOD 30 MIN: CPT | Mod: S$GLB,,, | Performed by: FAMILY MEDICINE

## 2020-05-11 PROCEDURE — 99999 PR PBB SHADOW E&M-EST. PATIENT-LVL III: ICD-10-PCS | Mod: PBBFAC,,, | Performed by: FAMILY MEDICINE

## 2020-05-11 PROCEDURE — 3008F BODY MASS INDEX DOCD: CPT | Mod: CPTII,S$GLB,, | Performed by: FAMILY MEDICINE

## 2020-05-11 PROCEDURE — 3008F PR BODY MASS INDEX (BMI) DOCUMENTED: ICD-10-PCS | Mod: CPTII,S$GLB,, | Performed by: FAMILY MEDICINE

## 2020-05-11 PROCEDURE — 99999 PR PBB SHADOW E&M-EST. PATIENT-LVL III: CPT | Mod: PBBFAC,,, | Performed by: FAMILY MEDICINE

## 2020-05-11 RX ORDER — NEOMYCIN SULFATE, POLYMYXIN B SULFATE AND HYDROCORTISONE 10; 3.5; 1 MG/ML; MG/ML; [USP'U]/ML
4 SUSPENSION/ DROPS AURICULAR (OTIC) 3 TIMES DAILY
Qty: 10 ML | Refills: 0 | Status: SHIPPED | OUTPATIENT
Start: 2020-05-11 | End: 2020-05-18

## 2020-05-11 NOTE — PROGRESS NOTES
"Subjective:       Patient ID: Ida Orozco is a 54 y.o. female.    Chief Complaint: Otalgia (left ear pain and swelling x4 days )    Otalgia    There is pain in the left ear. This is a new problem. The current episode started in the past 7 days. The problem occurs every few minutes. The problem has been gradually worsening. There has been no fever. The fever has been present for less than 1 day. The pain is at a severity of 4/10. The pain is moderate. Associated symptoms include headaches. Pertinent negatives include no abdominal pain, coughing, diarrhea, drainage, ear discharge, hearing loss, neck pain, rash, rhinorrhea, sore throat or vomiting. She has tried acetaminophen for the symptoms. The treatment provided no relief. There is no history of a chronic ear infection, hearing loss or a tympanostomy tube.     Review of Systems   Constitutional: Negative for fever.   HENT: Positive for ear pain. Negative for ear discharge, hearing loss, rhinorrhea and sore throat.    Respiratory: Negative for cough.    Gastrointestinal: Negative for abdominal pain, diarrhea and vomiting.   Musculoskeletal: Negative for neck pain.   Skin: Negative for rash and wound.   Neurological: Positive for headaches.   Hematological: Does not bruise/bleed easily.       Objective:      Vitals:    05/11/20 1416   BP: 110/64   BP Location: Left arm   Patient Position: Sitting   BP Method: Large (Manual)   Pulse: 71   Resp: 18   Temp: 98.2 °F (36.8 °C)   TempSrc: Temporal   SpO2: 97%   Weight: 105 kg (231 lb 9.5 oz)   Height: 5' 4" (1.626 m)     Physical Exam   Constitutional: She is oriented to person, place, and time. She appears well-developed and well-nourished.   HENT:   Head: Normocephalic and atraumatic.   Right Ear: Tympanic membrane normal.   Left Ear: There is tenderness. No foreign bodies. A middle ear effusion is present.   Cardiovascular: Normal rate, regular rhythm and normal heart sounds.   Pulmonary/Chest: Effort normal and " breath sounds normal.   Musculoskeletal: She exhibits no edema.   Lymphadenopathy:        Head (left side): Posterior auricular adenopathy present. No submandibular and no preauricular adenopathy present.   Neurological: She is alert and oriented to person, place, and time.   Skin: Skin is warm and dry.   Psychiatric: She has a normal mood and affect. Her behavior is normal.   Nursing note and vitals reviewed.      Lab Results   Component Value Date    WBC 3.80 (L) 04/04/2020    HGB 12.1 04/04/2020    HCT 37.8 04/04/2020     04/04/2020    CHOL 165 10/02/2018    TRIG 85 10/02/2018    HDL 80 (H) 10/02/2018    ALT 52 03/31/2020    AST 82 (H) 03/31/2020     04/04/2020    K 3.5 04/04/2020     04/04/2020    CREATININE 1.0 04/04/2020    BUN 6 04/04/2020    CO2 23 04/04/2020    TSH 3.04 11/22/2019    INR 1.0 08/01/2014    HGBA1C 5.6 05/30/2019      Assessment:       1. Acute otitis externa of left ear, unspecified type        Plan:       Acute otitis externa of left ear, unspecified type  -     neomycin-polymyxin-hydrocortisone (CORTISPORIN) 3.5-10,000-1 mg/mL-unit/mL-% otic suspension; Place 4 drops into the left ear 3 (three) times daily. for 7 days  Dispense: 10 mL; Refill: 0      Medication List with Changes/Refills   New Medications    NEOMYCIN-POLYMYXIN-HYDROCORTISONE (CORTISPORIN) 3.5-10,000-1 MG/ML-UNIT/ML-% OTIC SUSPENSION    Place 4 drops into the left ear 3 (three) times daily. for 7 days   Current Medications    ALBUTEROL (PROVENTIL) 2.5 MG /3 ML (0.083 %) NEBULIZER SOLUTION    INHALE ONE VIAL VIA NEBULIZER EVERY SIX HOURS    ALBUTEROL 90 MCG/ACTUATION INHALER    Inhale 2 puffs into the lungs every 4 (four) hours as needed for Wheezing or Shortness of Breath. Rescue    ALLERGY RELIEF, LORATADINE, 10 MG TABLET    TAKE ONE TABLET BY MOUTH ONCE DAILY    BUDESONIDE-FORMOTEROL 160-4.5 MCG (SYMBICORT) 160-4.5 MCG/ACTUATION HFAA    Inhale 2 puffs into the lungs every 12 (twelve) hours. Controller     CLONAZEPAM (KLONOPIN) 1 MG TABLET    Take 1 tablet (1 mg total) by mouth once daily.    DICLOFENAC SODIUM (VOLTAREN) 1 % GEL    Apply 2 g topically 4 (four) times daily.    ERGOCALCIFEROL (ERGOCALCIFEROL) 50,000 UNIT CAP    TAKE 1 CAPSULE (50,000 UNITS TOTAL) BY MOUTH EVERY 7 DAYS.    FLUTICASONE (FLONASE) 50 MCG/ACTUATION NASAL SPRAY    INSTILL TWO SPRAYS IN EACH NOSTRIL IN EACH NOSTRIL EVERY EVENING    MONTELUKAST (SINGULAIR) 10 MG TABLET    TAKE ONE TABLET BY MOUTH EVERY EVENING    MUPIROCIN (BACTROBAN) 2 % OINTMENT    APPLY TOPICALLY 3 (THREE) TIMES DAILY.    NYSTATIN (MYCOSTATIN) POWDER    Apply topically 2 (two) times daily.    ONDANSETRON (ZOFRAN-ODT) 8 MG TBDL    DISSOLVE ONE TABLET IN MOUTH EVERY TWELVE HOURS AS NEEDED    PANTOPRAZOLE (PROTONIX) 40 MG TABLET    TAKE ONE TABLET BY MOUTH ONCE DAILY    PROMETHAZINE (PHENERGAN) 25 MG TABLET    Take 1 tablet (25 mg total) by mouth every 6 (six) hours as needed for Nausea.    SERTRALINE (ZOLOFT) 100 MG TABLET    Take 1 tablet (100 mg total) by mouth every evening.    TOPIRAMATE (TOPAMAX) 100 MG TABLET    Take 1 tablet (100 mg total) by mouth once daily.    TRAZODONE (DESYREL) 50 MG TABLET    Take 1 tablet (50 mg total) by mouth every evening.   Discontinued Medications    FERROUS GLUCONATE (FERGON) 324 MG TABLET    TAKE 1 TABLET (324 MG TOTAL) BY MOUTH DAILY WITH BREAKFAST.

## 2020-07-02 ENCOUNTER — PATIENT OUTREACH (OUTPATIENT)
Dept: ADMINISTRATIVE | Facility: HOSPITAL | Age: 55
End: 2020-07-02

## 2020-07-02 DIAGNOSIS — Z12.31 ENCOUNTER FOR SCREENING MAMMOGRAM FOR BREAST CANCER: Primary | ICD-10-CM

## 2020-07-02 DIAGNOSIS — Z12.39 BREAST CANCER SCREENING: ICD-10-CM

## 2020-07-02 DIAGNOSIS — Z12.39 SCREENING FOR BREAST CANCER: ICD-10-CM

## 2020-07-15 ENCOUNTER — OFFICE VISIT (OUTPATIENT)
Dept: PRIMARY CARE CLINIC | Facility: CLINIC | Age: 55
End: 2020-07-15
Payer: MEDICARE

## 2020-07-15 VITALS
DIASTOLIC BLOOD PRESSURE: 62 MMHG | HEIGHT: 64 IN | WEIGHT: 231.5 LBS | TEMPERATURE: 99 F | BODY MASS INDEX: 39.52 KG/M2 | HEART RATE: 67 BPM | RESPIRATION RATE: 18 BRPM | SYSTOLIC BLOOD PRESSURE: 96 MMHG | OXYGEN SATURATION: 97 %

## 2020-07-15 DIAGNOSIS — R07.81 RIB PAIN ON RIGHT SIDE: Primary | ICD-10-CM
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
## 2020-07-15 PROCEDURE — 99213 PR OFFICE/OUTPT VISIT, EST, LEVL III, 20-29 MIN: ICD-10-PCS | Mod: S$GLB,,, | Performed by: NURSE PRACTITIONER

## 2020-07-15 PROCEDURE — 99999 PR PBB SHADOW E&M-EST. PATIENT-LVL V: ICD-10-PCS | Mod: PBBFAC,,, | Performed by: NURSE PRACTITIONER

## 2020-07-15 PROCEDURE — 3008F PR BODY MASS INDEX (BMI) DOCUMENTED: ICD-10-PCS | Mod: CPTII,S$GLB,, | Performed by: NURSE PRACTITIONER

## 2020-07-15 PROCEDURE — 3008F BODY MASS INDEX DOCD: CPT | Mod: CPTII,S$GLB,, | Performed by: NURSE PRACTITIONER

## 2020-07-15 PROCEDURE — 99999 PR PBB SHADOW E&M-EST. PATIENT-LVL V: CPT | Mod: PBBFAC,,, | Performed by: NURSE PRACTITIONER

## 2020-07-15 PROCEDURE — 99213 OFFICE O/P EST LOW 20 MIN: CPT | Mod: S$GLB,,, | Performed by: NURSE PRACTITIONER

## 2020-07-15 NOTE — PROGRESS NOTES
Chief Complaint  Chief Complaint   Patient presents with    Otalgia     left ear    Chest Pain       HPI    Ida Orozco is a 54 y.o. female that presents for right rib pain.    Patient has fractured her right rib in the past. Presents to clinic complaining of right rib pain. States that she hit her right rib on the corner of the countertop when her arm slipped and she fell forward. No associated bruising or swelling. Severe right rib pain worse with touch, taking a deep breath, lifting her right arm. Does report a sinus cough, no fever or chills.     PAST MEDICAL HISTORY:  Past Medical History:   Diagnosis Date    Anxiety     Asthma     Bronchitis     Crohn's colitis     Depression     Fatty liver disease, nonalcoholic     Gallstones     GERD (gastroesophageal reflux disease)     History of sleeve gastrectomy 2016    PTSD (post-traumatic stress disorder)     Seasonal allergies     Symptomatic abdominal panniculus     Ulcerative colitis        PAST SURGICAL HISTORY:  Past Surgical History:   Procedure Laterality Date    APPENDECTOMY      CATHETERIZATION OF BOTH LEFT AND RIGHT HEART Right 2019    Procedure: CATHETERIZATION, HEART, BOTH LEFT AND RIGHT;  Surgeon: Beto Malin MD;  Location: [facility]note 3[facility] CATH LAB;  Service: Cardiology;  Laterality: Right;     SECTION, LOW TRANSVERSE      CHOLECYSTECTOMY      open    COLECTOMY      total- 2013    CYSTOSCOPY W/ RETROGRADES N/A 2018    Procedure: CYSTOSCOPY, WITH RETROGRADE PYELOGRAM;  Surgeon: Butch Banks MD;  Location: [facility]note 3[facility] OR;  Service: Urology;  Laterality: N/A;  HANSEN SURGICAL CONFIRMED     FLEXIBLE SIGMOIDOSCOPY  2018    Procedure: SIGMOIDOSCOPY, FLEXIBLE;  Surgeon: JENNY Virgen MD;  Location: [address];  Service: Colon and Rectal;;    GASTRIC SLEEVE       HYSTERECTOMY      ILEOSTOMY REVISION      2014; 2014    LAPAROSCOPIC PROCTECTOMY N/A 2018    Procedure:  PROCTECTOMY-LAPAROSCOPIC/CONVERTED TO OPEN;  Surgeon: JENNY Virgen MD;  Location: [facility] OR 2ND FLR;  Service: Colon and Rectal;  Laterality: N/A;    LYSIS OF ADHESIONS  2018    Procedure: LYSIS, ADHESIONS/ more than 2hours;  Surgeon: JENNY Virgen MD;  Location: [facility] OR 2ND FLR;  Service: Colon and Rectal;;    OOPHORECTOMY      PANNICULECTOMY Bilateral 2019    Procedure: PANNICULECTOMY;  Surgeon: Andrea Alvarado MD;  Location: [facility] OR 2ND FLR;  Service: Plastics;  Laterality: Bilateral;    REVISION COLOSTOMY N/A 2019    Procedure: REVISION, COLOSTOMY;  Surgeon: JENNY Virgen MD;  Location: [facility] OR 2ND FLR;  Service: Colon and Rectal;  Laterality: N/A;    thumb surgery      TONSILLECTOMY, ADENOIDECTOMY      WISDOM TOOTH EXTRACTION         SOCIAL HISTORY:  Social History     Socioeconomic History    Marital status: Legally      Spouse name: Not on file    Number of children: Not on file    Years of education: Not on file    Highest education level: Not on file   Occupational History    Not on file   Social Needs    Financial resource strain: Not on file    Food insecurity     Worry: Not on file     Inability: Not on file    Transportation needs     Medical: Not on file     Non-medical: Not on file   Tobacco Use    Smoking status: Former Smoker     Types: Cigarettes     Quit date:      Years since quittin.5    Smokeless tobacco: Never Used   Substance and Sexual Activity    Alcohol use: Yes     Comment: occasionally    Drug use: No    Sexual activity: Never     Birth control/protection: See Surgical Hx   Lifestyle    Physical activity     Days per week: Not on file     Minutes per session: Not on file    Stress: Not at all   Relationships    Social connections     Talks on phone: Not on file     Gets together: Not on file     Attends Rastafarian service: Not on file     Active member of club or organization: Not on file     Attends meetings of clubs or  organizations: Not on file     Relationship status: Not on file   Other Topics Concern    Not on file   Social History Narrative    Not on file       FAMILY HISTORY:  Family History   Problem Relation Age of Onset    Cancer Mother         skin    Heart disease Father 67    Cancer Paternal Grandfather         ?    Cancer Maternal Grandfather         colon        ALLERGIES AND MEDICATIONS: updated and reviewed.  Review of patient's allergies indicates:   Allergen Reactions    Adhesive      Cause blisters    Dilaudid [hydromorphone] Nausea And Vomiting    Sulfa (sulfonamide antibiotics) Hives    Surgical stainless steel      Current Outpatient Medications   Medication Sig Dispense Refill    albuterol (PROVENTIL) 2.5 mg /3 mL (0.083 %) nebulizer solution INHALE ONE VIAL VIA NEBULIZER EVERY SIX HOURS (Patient taking differently: every 6 (six) hours as needed. ) 150 mL 3    albuterol 90 mcg/actuation inhaler Inhale 2 puffs into the lungs every 4 (four) hours as needed for Wheezing or Shortness of Breath. Rescue 1 Inhaler 5    ALLERGY RELIEF, LORATADINE, 10 mg tablet TAKE ONE TABLET BY MOUTH ONCE DAILY 90 tablet 3    budesonide-formoterol 160-4.5 mcg (SYMBICORT) 160-4.5 mcg/actuation HFAA Inhale 2 puffs into the lungs every 12 (twelve) hours. Controller (Patient taking differently: Inhale 2 puffs into the lungs every 12 (twelve) hours as needed. Controller) 11 g 1    clonazePAM (KLONOPIN) 1 MG tablet Take 1 tablet (1 mg total) by mouth once daily. 90 tablet 1    diclofenac sodium (VOLTAREN) 1 % Gel Apply 2 g topically 4 (four) times daily. 100 g 5    fluticasone (FLONASE) 50 mcg/actuation nasal spray INSTILL TWO SPRAYS IN EACH NOSTRIL IN EACH NOSTRIL EVERY EVENING 16 g 5    montelukast (SINGULAIR) 10 mg tablet TAKE ONE TABLET BY MOUTH EVERY EVENING (Patient taking differently: Take 10 mg by mouth every evening. ) 90 tablet 3    mupirocin (BACTROBAN) 2 % ointment APPLY TOPICALLY 3 (THREE) TIMES DAILY. 22  "g 3    nystatin (MYCOSTATIN) powder Apply topically 2 (two) times daily. (Patient taking differently: Apply topically daily as needed (when changes colostomy bag). ) 1 Bottle 6    ondansetron (ZOFRAN-ODT) 8 MG TbDL DISSOLVE ONE TABLET IN MOUTH EVERY TWELVE HOURS AS NEEDED 15 tablet 0    pantoprazole (PROTONIX) 40 MG tablet TAKE ONE TABLET BY MOUTH ONCE DAILY 90 tablet 2    promethazine (PHENERGAN) 25 MG tablet Take 1 tablet (25 mg total) by mouth every 6 (six) hours as needed for Nausea. 15 tablet 0    sertraline (ZOLOFT) 100 MG tablet Take 1 tablet (100 mg total) by mouth every evening. 90 tablet 1    topiramate (TOPAMAX) 100 MG tablet Take 1 tablet (100 mg total) by mouth once daily. 90 tablet 1    traZODone (DESYREL) 50 MG tablet Take 1 tablet (50 mg total) by mouth every evening. 90 tablet 1    VITAMIN D2 1,250 mcg (50,000 unit) capsule TAKE ONE CAPSULE BY MOUTH EVERY 7 DAYS  12 capsule 3     No current facility-administered medications for this visit.      Facility-Administered Medications Ordered in Other Visits   Medication Dose Route Frequency Provider Last Rate Last Dose    gabapentin capsule 300 mg  300 mg Oral TID Alee Wylie NP   300 mg at 12/21/19 1503         ROS  Review of Systems   HENT: Positive for rhinorrhea. Negative for ear discharge, hearing loss and sore throat.    Respiratory: Positive for cough.    Gastrointestinal: Negative for abdominal pain, diarrhea and vomiting.   Musculoskeletal: Positive for arthralgias (right rib). Negative for neck pain.   Skin: Negative for rash.   Neurological: Positive for headaches.           PHYSICAL EXAM  Vitals:    07/15/20 1258   BP: 96/62   BP Location: Right arm   Patient Position: Sitting   BP Method: Large (Manual)   Pulse: 67   Resp: 18   Temp: 98.8 °F (37.1 °C)   TempSrc: Oral   SpO2: 97%   Weight: 105 kg (231 lb 7.7 oz)   Height: 5' 4" (1.626 m)    Body mass index is 39.73 kg/m².  Weight: 105 kg (231 lb 7.7 oz)   Height: 5' 4" (162.6 " cm)     Physical Exam  Constitutional:       Appearance: She is well-developed.   HENT:      Head: Normocephalic.      Right Ear: Tympanic membrane normal.      Left Ear: Tympanic membrane normal.      Mouth/Throat:      Pharynx: Uvula midline.   Eyes:      Conjunctiva/sclera: Conjunctivae normal.   Cardiovascular:      Rate and Rhythm: Normal rate and regular rhythm.      Pulses: Normal pulses.           Radial pulses are 2+ on the right side and 2+ on the left side.      Heart sounds: Normal heart sounds. No murmur.      Comments: No LE swelling noted  Pulmonary:      Effort: Pulmonary effort is normal.      Breath sounds: Normal breath sounds. No wheezing.   Abdominal:      General: Bowel sounds are normal.      Palpations: Abdomen is soft.      Tenderness: There is no abdominal tenderness.   Lymphadenopathy:      Cervical: No cervical adenopathy.   Skin:     General: Skin is warm and dry.      Findings: No rash.   Neurological:      Mental Status: She is alert and oriented to person, place, and time.           Health Maintenance       Date Due Completion Date    HIV Screening 07/27/1982 ---    Shingles Vaccine (1 of 2) 07/27/2017 ---    Influenza Vaccine (1) 09/01/2020 11/18/2019    Mammogram 03/03/2022 3/3/2020    Lipid Panel 10/02/2023 10/2/2018    TETANUS VACCINE 10/24/2028 10/24/2018            Assessment & Plan    Problem List Items Addressed This Visit     None      Visit Diagnoses     Rib pain on right side    -  Primary    Relevant Orders    X-Ray Ribs 2 View Right    Fall, initial encounter        Relevant Orders    X-Ray Ribs 2 View Right          Follow-up: Follow up if symptoms worsen or fail to improve.    Janet Guardado    Medication List with Changes/Refills   Current Medications    ALBUTEROL (PROVENTIL) 2.5 MG /3 ML (0.083 %) NEBULIZER SOLUTION    INHALE ONE VIAL VIA NEBULIZER EVERY SIX HOURS    ALBUTEROL 90 MCG/ACTUATION INHALER    Inhale 2 puffs into the lungs every 4 (four) hours as needed  for Wheezing or Shortness of Breath. Rescue    ALLERGY RELIEF, LORATADINE, 10 MG TABLET    TAKE ONE TABLET BY MOUTH ONCE DAILY    BUDESONIDE-FORMOTEROL 160-4.5 MCG (SYMBICORT) 160-4.5 MCG/ACTUATION HFAA    Inhale 2 puffs into the lungs every 12 (twelve) hours. Controller    CLONAZEPAM (KLONOPIN) 1 MG TABLET    Take 1 tablet (1 mg total) by mouth once daily.    DICLOFENAC SODIUM (VOLTAREN) 1 % GEL    Apply 2 g topically 4 (four) times daily.    FLUTICASONE (FLONASE) 50 MCG/ACTUATION NASAL SPRAY    INSTILL TWO SPRAYS IN EACH NOSTRIL IN EACH NOSTRIL EVERY EVENING    MONTELUKAST (SINGULAIR) 10 MG TABLET    TAKE ONE TABLET BY MOUTH EVERY EVENING    MUPIROCIN (BACTROBAN) 2 % OINTMENT    APPLY TOPICALLY 3 (THREE) TIMES DAILY.    NYSTATIN (MYCOSTATIN) POWDER    Apply topically 2 (two) times daily.    ONDANSETRON (ZOFRAN-ODT) 8 MG TBDL    DISSOLVE ONE TABLET IN MOUTH EVERY TWELVE HOURS AS NEEDED    PANTOPRAZOLE (PROTONIX) 40 MG TABLET    TAKE ONE TABLET BY MOUTH ONCE DAILY    PROMETHAZINE (PHENERGAN) 25 MG TABLET    Take 1 tablet (25 mg total) by mouth every 6 (six) hours as needed for Nausea.    SERTRALINE (ZOLOFT) 100 MG TABLET    Take 1 tablet (100 mg total) by mouth every evening.    TOPIRAMATE (TOPAMAX) 100 MG TABLET    Take 1 tablet (100 mg total) by mouth once daily.    TRAZODONE (DESYREL) 50 MG TABLET    Take 1 tablet (50 mg total) by mouth every evening.    VITAMIN D2 1,250 MCG (50,000 UNIT) CAPSULE    TAKE ONE CAPSULE BY MOUTH EVERY 7 DAYS      Answers for HPI/ROS submitted by the patient on 7/13/2020   Ear pain  Affected ear: left  Chronicity: recurrent  Onset: in the past 7 days  Progression since onset: waxing and waning  Frequency: every few hours  Fever: no fever  Fever duration: less than 1 day  Pain - numeric: 6/10  drainage: No  Treatments tried: acetaminophen, ear drops  Improvement on treatment: mild  Pain severity: moderate  chronic ear infection: Yes  hearing loss: No  tympanostomy tube: No

## 2020-07-27 DIAGNOSIS — E55.9 VITAMIN D DEFICIENCY: ICD-10-CM

## 2020-07-28 RX ORDER — CLONAZEPAM 1 MG/1
1 TABLET ORAL DAILY
Qty: 90 TABLET | Refills: 1 | Status: SHIPPED | OUTPATIENT
Start: 2020-07-28 | End: 2021-02-08 | Stop reason: SDUPTHER

## 2020-07-28 RX ORDER — ERGOCALCIFEROL 1.25 MG/1
50000 CAPSULE ORAL
Qty: 12 CAPSULE | Refills: 3 | Status: SHIPPED | OUTPATIENT
Start: 2020-07-28 | End: 2021-08-08

## 2020-07-30 NOTE — PROGRESS NOTES
Past Medical History:   Diagnosis Date    Anxiety     Asthma     Crohn's colitis     Fatty liver disease, nonalcoholic     GERD (gastroesophageal reflux disease)     History of sleeve gastrectomy 2016       Past Surgical History:   Procedure Laterality Date    APPENDECTOMY      ARTHROSCOPY-KNEE W/ CHONDROPLASTY Right 2016    [facility]formed [facility] OR     SECTION, LOW TRANSVERSE      CHOLECYSTECTOMY      open    COLECTOMY      total- 2013    CYSTOSCOPY, WITH RETROGRADE PYELOGRAM N/A 2018    [facility]    ENTEROSCOPY N/A 2014    [facility] at [facility] ENDO (2ND FLR)    ESOPHAGOGASTRODUODENOSCOPY (EGD) N/A 2018    Performed by Chris Castorena [facility] at [facility] ENDO (4TH FLR)    ESOPHAGOGASTRODUODENOSCOPY (EGD) N/A 2015    Performed by Domingo Hunter [facility] at [facility] ENDO    ESOPHAGOGASTRODUODENOSCOPY (EGD) N/A 2014    [facility]ingo Hunter [facility] at [facility] ENDO    EXCISION-PLICA Right 2016    [facility] at [facility] OR    GASTRIC SLEEVE       HYSTERECTOMY      ILEOSCOPY N/A 2018    [facility] at [facility] ENDO (4TH FLR)    ILEOSTOMY REVISION      2014; 2014    LYSIS, ADHESIONS/ more than 2hours  2018    Per[facility] at [facility] OR 2ND FLR    LYSIS-ADHESION N/A 8/15/2014    [facility] at [facility] OR 2ND FLR    PROCTECTOMY-LAPAROSCOPIC/CONVERTED TO OPEN N/A 2018    [facility] at [facility] OR 2ND FLR    REVISION-ILEOSTOMY N/A 8/15/2014    Per[facility] at [facility] OR 2ND FLR    SIGMOIDOSCOPY, FLEXIBLE  2018    Per[facility] at [facility] OR 2ND FLR    SIGMOIDOSCOPY-FLEXIBLE N/A 2018    Performed by Chris [facility]na [facility] at [facility] ENDO (4TH FLR)    SIGMOIDOSCOPY-FLEXIBLE N/A 2014    Performed by [facility] at [facility]  "ENDO (4TH FLR)    thumb surgery      TONSILLECTOMY, ADENOIDECTOMY      WISDOM TOOTH EXTRACTION         Current Outpatient Medications   Medication Sig    albuterol (PROVENTIL) 2.5 mg /3 mL (0.083 %) nebulizer solution INHALE ONE VIAL VIA NEBULIZER EVERY SIX HOURS    albuterol 90 mcg/actuation inhaler Inhale 2 puffs into the lungs every 4 (four) hours as needed for Wheezing or Shortness of Breath. Rescue    buPROPion (WELLBUTRIN) 75 MG tablet Take 75 mg by mouth every other day.     clonazePAM (KLONOPIN) 1 MG tablet Take 1 mg by mouth 2 (two) times daily.     cyanocobalamin 1,000 mcg/mL injection Inject 1 mL (1,000 mcg total) into the skin once daily for 7 days, THEN 1 mL (1,000 mcg total) every 7 days for 30 days, THEN 1 mL (1,000 mcg total) every 30 days.    ergocalciferol (ERGOCALCIFEROL) 50,000 unit Cap TAKE 1 CAPSULE (50,000 UNITS TOTAL) BY MOUTH EVERY 7 DAYS.    escitalopram oxalate (LEXAPRO) 10 MG tablet Take 10 mg by mouth once daily.     escitalopram oxalate (LEXAPRO) 20 MG tablet Take 1 tablet by mouth once daily.     estradiol (ESTRACE) 0.01 % (0.1 mg/gram) vaginal cream Use 0.5 g inside vagina and dime-sized amount around opening/inner lips nightly x 2 weeks, then 2x/week thereafter.    ferrous sulfate 325 (65 FE) MG EC tablet Take 1 tablet (325 mg total) by mouth 3 (three) times daily with meals.    fluticasone (FLONASE) 50 mcg/actuation nasal spray 2 sprays (100 mcg total) by Each Nare route every evening.    loratadine (CLARITIN) 10 mg tablet TAKE ONE TABLET BY MOUTH ONCE DAILY    mirabegron (MYRBETRIQ) 50 mg Tb24 Take 1 tablet (50 mg total) by mouth once daily.    montelukast (SINGULAIR) 10 mg tablet TAKE ONE TABLET BY MOUTH EVERY EVENING    mupirocin (BACTROBAN) 2 % ointment Apply topically 3 (three) times daily.    needle, disp, 25 gauge 25 gauge x 7/8" Ndle 1 each by Misc.(Non-Drug; Combo Route) route once daily.    nystatin (MYCOSTATIN) powder Apply topically 2 (two) times " daily.    ondansetron (ZOFRAN) 4 MG tablet Take 1 tablet (4 mg total) by mouth every 8 (eight) hours as needed for Nausea.    pantoprazole (PROTONIX) 40 MG tablet Take 1 tablet (40 mg total) by mouth once daily. (Patient taking differently: Take 40 mg by mouth every other day. )    SYMBICORT 160-4.5 mcg/actuation HFAA INHALE 2 PUFFS INTO THE LUNGS EVERY 12 (TWELVE) HOURS. CONTROLLER (Patient taking differently: Inhale 2 puffs into the lungs every 12 (twelve) hours as needed. Controller)    syringe, disposable, 1 mL Syrg 1 Syringe by Misc.(Non-Drug; Combo Route) route once daily.    trazodone (DESYREL) 50 MG tablet Take 50 mg by mouth every evening.    diclofenac sodium (VOLTAREN) 1 % Gel Apply 2 g topically 4 (four) times daily.     No current facility-administered medications for this visit.        Review of patient's allergies indicates:   Allergen Reactions    Adhesive      Cause blisters    Dilaudid [hydromorphone] Nausea And Vomiting    Sulfa (sulfonamide antibiotics) Hives       Family History   Problem Relation Age of Onset    Cancer Mother         skin    Heart disease Father 67    Cancer Paternal Grandfather         ?    Cancer Maternal Grandfather         colon        Social History     Socioeconomic History    Marital status: Legally      Spouse name: Not on file    Number of children: Not on file    Years of education: Not on file    Highest education level: Not on file   Social Needs    Financial resource strain: Not on file    Food insecurity - worry: Not on file    Food insecurity - inability: Not on file    Transportation needs - medical: Not on file    Transportation needs - non-medical: Not on file   Occupational History    Not on file   Tobacco Use    Smoking status: Former Smoker     Types: Cigarettes     Last attempt to quit: 2009     Years since quitting: 10.0    Smokeless tobacco: Never Used   Substance and Sexual Activity    Alcohol use: No    Drug use: No  "   Sexual activity: No     Birth control/protection: See Surgical Hx   Other Topics Concern    Not on file   Social History Narrative    Not on file       Chief Complaint:   Chief Complaint   Patient presents with    Right Hand - Injury, Pain       Consulting Physician: No ref. provider found    History of present illness:    This is a 53 y.o. female who complains of right thumb pain after dog injury 11-24-18. Pain 0/10.    Review of Systems:    Constitution: Denies chills, fever, and sweats.  HENT: Denies headaches or blurry vision.  Cardiovascular: Denies chest pain or irregular heart beat.  Respiratory: Denies cough or shortness of breath.  Gastrointestinal: Denies abdominal pain, nausea, or vomiting.  Musculoskeletal:  Denies muscle cramps.  Neurological: Denies dizziness or focal weakness.  Psychiatric/Behavioral: Normal mental status.  Hematologic/Lymphatic: Denies bleeding problem or easy bruising/bleeding.  Skin: Denies rash or suspicious lesions.    Examination:    Vital Signs:    Vitals:    01/29/19 0945   BP: (!) 108/54   Pulse: (!) 58   Weight: 126.2 kg (278 lb 3.5 oz)   Height: 5' 4" (1.626 m)   PainSc: 0-No pain       Body mass index is 47.76 kg/m².    This a well-developed, well nourished patient in no acute distress.    Alert and oriented x 3 and cooperative to examination.       Physical Exam: Right Hand Exam    Skin  Scars:   None  Rash:   None    Inspection  Erythema:  None  Bruising:  none  Swelling:  none  Masses:  None  Lymphadenopathy: None    Coordination:  Normal  Instability:  None    Range of Motion Near normal    Strength:  Normal    Tenderness:  none    Pulse:   2+ radial  Capillary Refill: Normal    Sensation:  Intact            Imaging: X-rays ordered and images interpreted today personally by me of right hand shows thumb displaced distal phalanx intraarticular fracture with healing       Assessment: Closed displaced fracture of distal phalanx of right thumb with routine healing, " subsequent encounter        Plan:  Doing well. Continue HEP. PRN.    DISCLAIMER: This note may have been dictated using voice recognition software and may contain grammatical errors.     NOTE: Consult report sent to referring provider via [employer] EMR.   Patient

## 2020-09-18 ENCOUNTER — OFFICE VISIT (OUTPATIENT)
Dept: PRIMARY CARE CLINIC | Facility: CLINIC | Age: 55
End: 2020-09-18
Payer: MEDICARE

## 2020-09-18 VITALS
HEART RATE: 67 BPM | OXYGEN SATURATION: 96 % | DIASTOLIC BLOOD PRESSURE: 80 MMHG | RESPIRATION RATE: 16 BRPM | TEMPERATURE: 99 F | WEIGHT: 234 LBS | SYSTOLIC BLOOD PRESSURE: 134 MMHG | HEIGHT: 64 IN | BODY MASS INDEX: 39.95 KG/M2

## 2020-09-18 DIAGNOSIS — J45.41 MODERATE PERSISTENT ASTHMA WITH (ACUTE) EXACERBATION: Primary | ICD-10-CM

## 2020-09-18 DIAGNOSIS — J45.909 ASTHMA, UNSPECIFIED ASTHMA SEVERITY, UNSPECIFIED WHETHER COMPLICATED, UNSPECIFIED WHETHER PERSISTENT: ICD-10-CM

## 2020-09-18 PROCEDURE — 99214 OFFICE O/P EST MOD 30 MIN: CPT | Mod: S$GLB,,, | Performed by: FAMILY MEDICINE

## 2020-09-18 PROCEDURE — 3008F PR BODY MASS INDEX (BMI) DOCUMENTED: ICD-10-PCS | Mod: CPTII,S$GLB,, | Performed by: FAMILY MEDICINE

## 2020-09-18 PROCEDURE — 3008F BODY MASS INDEX DOCD: CPT | Mod: CPTII,S$GLB,, | Performed by: FAMILY MEDICINE

## 2020-09-18 PROCEDURE — 99214 PR OFFICE/OUTPT VISIT, EST, LEVL IV, 30-39 MIN: ICD-10-PCS | Mod: S$GLB,,, | Performed by: FAMILY MEDICINE

## 2020-09-18 PROCEDURE — 99999 PR PBB SHADOW E&M-EST. PATIENT-LVL IV: CPT | Mod: PBBFAC,,, | Performed by: FAMILY MEDICINE

## 2020-09-18 PROCEDURE — 99999 PR PBB SHADOW E&M-EST. PATIENT-LVL IV: ICD-10-PCS | Mod: PBBFAC,,, | Performed by: FAMILY MEDICINE

## 2020-09-18 RX ORDER — ALBUTEROL SULFATE 0.83 MG/ML
SOLUTION RESPIRATORY (INHALATION)
Qty: 150 ML | Refills: 5 | Status: SHIPPED | OUTPATIENT
Start: 2020-09-18 | End: 2021-12-02

## 2020-09-18 RX ORDER — ALBUTEROL SULFATE 90 UG/1
2 AEROSOL, METERED RESPIRATORY (INHALATION) EVERY 4 HOURS PRN
Qty: 8 G | Refills: 5 | Status: SHIPPED | OUTPATIENT
Start: 2020-09-18 | End: 2022-01-31 | Stop reason: SDUPTHER

## 2020-09-18 RX ORDER — BUDESONIDE AND FORMOTEROL FUMARATE DIHYDRATE 160; 4.5 UG/1; UG/1
2 AEROSOL RESPIRATORY (INHALATION) EVERY 12 HOURS
Qty: 11 G | Refills: 5 | Status: SHIPPED | OUTPATIENT
Start: 2020-09-18 | End: 2022-01-31 | Stop reason: SDUPTHER

## 2020-09-18 RX ORDER — PREDNISONE 20 MG/1
TABLET ORAL
Qty: 15 TABLET | Refills: 0 | Status: SHIPPED | OUTPATIENT
Start: 2020-09-18 | End: 2020-09-28

## 2020-09-18 NOTE — PROGRESS NOTES
"Subjective:       Patient ID: Ida Orozco is a 55 y.o. female.    Chief Complaint: Cough (cough for two weeks )    Cough  This is a recurrent problem. The current episode started in the past 7 days. The problem has been gradually worsening. The problem occurs every few minutes. The cough is productive of sputum. Associated symptoms include ear congestion, ear pain, headaches, myalgias, nasal congestion, postnasal drip, rhinorrhea and wheezing. Pertinent negatives include no chest pain, chills, fever, heartburn, hemoptysis, rash, sore throat, shortness of breath, sweats or weight loss. The symptoms are aggravated by animals, dust and pollens. Risk factors for lung disease include animal exposure. She has tried a beta-agonist inhaler, leukotriene antagonists and steroid inhaler (oral antihistamine) for the symptoms. The treatment provided mild relief. Her past medical history is significant for asthma, bronchitis and pneumonia. There is no history of bronchiectasis, COPD, emphysema or environmental allergies.     Review of Systems   Constitutional: Negative for chills, fever and weight loss.   HENT: Positive for ear pain, postnasal drip and rhinorrhea. Negative for sore throat.    Respiratory: Positive for cough and wheezing. Negative for hemoptysis and shortness of breath.    Cardiovascular: Negative for chest pain.   Gastrointestinal: Negative for heartburn.   Musculoskeletal: Positive for myalgias.   Skin: Negative for rash.   Allergic/Immunologic: Negative for environmental allergies.   Neurological: Positive for headaches.       Objective:      Vitals:    09/18/20 1129   BP: 134/80   BP Location: Left arm   Patient Position: Sitting   BP Method: Large (Manual)   Pulse: 67   Resp: 16   Temp: 98.6 °F (37 °C)   SpO2: 96%   Weight: 106.1 kg (234 lb 0.3 oz)   Height: 5' 4" (1.626 m)     Physical Exam  Vitals signs and nursing note reviewed.   Constitutional:       Appearance: She is well-developed.   HENT:      " Head: Normocephalic and atraumatic.      Mouth/Throat:      Mouth: Mucous membranes are moist.      Pharynx: Oropharynx is clear.   Cardiovascular:      Rate and Rhythm: Normal rate and regular rhythm.      Heart sounds: Normal heart sounds.   Pulmonary:      Effort: Pulmonary effort is normal.      Breath sounds: Normal breath sounds.   Skin:     General: Skin is warm and dry.   Neurological:      Mental Status: She is alert and oriented to person, place, and time.   Psychiatric:         Mood and Affect: Mood normal.         Behavior: Behavior normal.         Lab Results   Component Value Date    WBC 3.80 (L) 04/04/2020    HGB 12.1 04/04/2020    HCT 37.8 04/04/2020     04/04/2020    CHOL 165 10/02/2018    TRIG 85 10/02/2018    HDL 80 (H) 10/02/2018    ALT 52 03/31/2020    AST 82 (H) 03/31/2020     04/04/2020    K 3.5 04/04/2020     04/04/2020    CREATININE 1.0 04/04/2020    BUN 6 04/04/2020    CO2 23 04/04/2020    TSH 3.04 11/22/2019    INR 1.0 08/01/2014    HGBA1C 5.6 05/30/2019      Assessment:       1. Moderate persistent asthma with (acute) exacerbation    2. Asthma, unspecified asthma severity, unspecified whether complicated, unspecified whether persistent        Plan:       Moderate persistent asthma with (acute) exacerbation  -     predniSONE (DELTASONE) 20 MG tablet; Take 2 tablets (40 mg total) by mouth once daily for 5 days, THEN 1 tablet (20 mg total) once daily for 5 days.  Dispense: 15 tablet; Refill: 0  RTC for any new or worsening symptoms  Asthma, unspecified asthma severity, unspecified whether complicated, unspecified whether persistent  -     albuterol (PROVENTIL) 2.5 mg /3 mL (0.083 %) nebulizer solution; INHALE ONE VIAL VIA NEBULIZER EVERY SIX HOURS  Dispense: 150 mL; Refill: 5  -     albuterol (PROVENTIL/VENTOLIN HFA) 90 mcg/actuation inhaler; Inhale 2 puffs into the lungs every 4 (four) hours as needed for Wheezing or Shortness of Breath. Rescue  Dispense: 8 g; Refill:  5  -     budesonide-formoterol 160-4.5 mcg (SYMBICORT) 160-4.5 mcg/actuation HFAA; Inhale 2 puffs into the lungs every 12 (twelve) hours. Controller  Dispense: 11 g; Refill: 5      Medication List with Changes/Refills   New Medications    PREDNISONE (DELTASONE) 20 MG TABLET    Take 2 tablets (40 mg total) by mouth once daily for 5 days, THEN 1 tablet (20 mg total) once daily for 5 days.   Current Medications    ALLERGY RELIEF, LORATADINE, 10 MG TABLET    TAKE ONE TABLET BY MOUTH ONCE DAILY    CLONAZEPAM (KLONOPIN) 1 MG TABLET    TAKE 1 TABLET (1 MG TOTAL) BY MOUTH ONCE DAILY.     DICLOFENAC SODIUM (VOLTAREN) 1 % GEL    Apply 2 g topically 4 (four) times daily.    ERGOCALCIFEROL (VITAMIN D2) 50,000 UNIT CAP    Take 1 capsule (50,000 Units total) by mouth every 7 days.    FLUTICASONE (FLONASE) 50 MCG/ACTUATION NASAL SPRAY    INSTILL TWO SPRAYS IN EACH NOSTRIL IN EACH NOSTRIL EVERY EVENING    MONTELUKAST (SINGULAIR) 10 MG TABLET    TAKE ONE TABLET BY MOUTH EVERY EVENING     MUPIROCIN (BACTROBAN) 2 % OINTMENT    APPLY TOPICALLY 3 (THREE) TIMES DAILY.    NYSTATIN (MYCOSTATIN) POWDER    Apply topically 2 (two) times daily.    ONDANSETRON (ZOFRAN-ODT) 8 MG TBDL    DISSOLVE ONE TABLET IN MOUTH EVERY TWELVE HOURS AS NEEDED    PANTOPRAZOLE (PROTONIX) 40 MG TABLET    TAKE ONE TABLET BY MOUTH ONCE DAILY    PROMETHAZINE (PHENERGAN) 25 MG TABLET    Take 1 tablet (25 mg total) by mouth every 6 (six) hours as needed for Nausea.    SERTRALINE (ZOLOFT) 100 MG TABLET    Take 1 tablet (100 mg total) by mouth every evening.    TOPIRAMATE (TOPAMAX) 100 MG TABLET    TAKE 1 TABLET (100 MG TOTAL) BY MOUTH ONCE DAILY.     TRAZODONE (DESYREL) 50 MG TABLET    TAKE 1 TABLET (50 MG TOTAL) BY MOUTH EVERY EVENING.    Changed and/or Refilled Medications    Modified Medication Previous Medication    ALBUTEROL (PROVENTIL) 2.5 MG /3 ML (0.083 %) NEBULIZER SOLUTION albuterol (PROVENTIL) 2.5 mg /3 mL (0.083 %) nebulizer solution       INHALE ONE VIAL  VIA NEBULIZER EVERY SIX HOURS    INHALE ONE VIAL VIA NEBULIZER EVERY SIX HOURS    ALBUTEROL (PROVENTIL/VENTOLIN HFA) 90 MCG/ACTUATION INHALER albuterol 90 mcg/actuation inhaler       Inhale 2 puffs into the lungs every 4 (four) hours as needed for Wheezing or Shortness of Breath. Rescue    Inhale 2 puffs into the lungs every 4 (four) hours as needed for Wheezing or Shortness of Breath. Rescue    BUDESONIDE-FORMOTEROL 160-4.5 MCG (SYMBICORT) 160-4.5 MCG/ACTUATION HFAA budesonide-formoterol 160-4.5 mcg (SYMBICORT) 160-4.5 mcg/actuation HFAA       Inhale 2 puffs into the lungs every 12 (twelve) hours. Controller    Inhale 2 puffs into the lungs every 12 (twelve) hours. Controller

## 2020-09-22 RX ORDER — SERTRALINE HYDROCHLORIDE 100 MG/1
100 TABLET, FILM COATED ORAL NIGHTLY
Qty: 90 TABLET | Refills: 3 | Status: SHIPPED | OUTPATIENT
Start: 2020-09-22 | End: 2021-09-13

## 2020-10-14 ENCOUNTER — PATIENT MESSAGE (OUTPATIENT)
Dept: PRIMARY CARE CLINIC | Facility: CLINIC | Age: 55
End: 2020-10-14

## 2020-10-15 RX ORDER — AMOXICILLIN AND CLAVULANATE POTASSIUM 875; 125 MG/1; MG/1
1 TABLET, FILM COATED ORAL 2 TIMES DAILY
Qty: 14 TABLET | Refills: 0 | Status: SHIPPED | OUTPATIENT
Start: 2020-10-15 | End: 2020-12-14 | Stop reason: ALTCHOICE

## 2020-11-13 ENCOUNTER — PATIENT MESSAGE (OUTPATIENT)
Dept: PRIMARY CARE CLINIC | Facility: CLINIC | Age: 55
End: 2020-11-13

## 2020-11-13 NOTE — TELEPHONE ENCOUNTER
Called spoke with patient advised her that Dr. Jones is out of town and Janet would like her to go to the ER to be evaluated. States she is getting dressed now to go.

## 2020-12-14 ENCOUNTER — OFFICE VISIT (OUTPATIENT)
Dept: PRIMARY CARE CLINIC | Facility: CLINIC | Age: 55
End: 2020-12-14
Payer: MEDICARE

## 2020-12-14 VITALS — SYSTOLIC BLOOD PRESSURE: 100 MMHG | DIASTOLIC BLOOD PRESSURE: 60 MMHG

## 2020-12-14 DIAGNOSIS — R00.1 BRADYCARDIA: ICD-10-CM

## 2020-12-14 DIAGNOSIS — J01.90 ACUTE SINUSITIS, RECURRENCE NOT SPECIFIED, UNSPECIFIED LOCATION: Primary | ICD-10-CM

## 2020-12-14 PROCEDURE — 99214 OFFICE O/P EST MOD 30 MIN: CPT | Mod: 95,,, | Performed by: FAMILY MEDICINE

## 2020-12-14 PROCEDURE — 99214 PR OFFICE/OUTPT VISIT, EST, LEVL IV, 30-39 MIN: ICD-10-PCS | Mod: 95,,, | Performed by: FAMILY MEDICINE

## 2020-12-14 RX ORDER — AMOXICILLIN AND CLAVULANATE POTASSIUM 875; 125 MG/1; MG/1
1 TABLET, FILM COATED ORAL 2 TIMES DAILY
Qty: 20 TABLET | Refills: 0 | Status: SHIPPED | OUTPATIENT
Start: 2020-12-14 | End: 2020-12-24

## 2020-12-14 NOTE — PROGRESS NOTES
Subjective:       Patient ID: Ida Orozco is a 55 y.o. female.    Chief Complaint: No chief complaint on file.      HPI  The patient location is:  [place]  The chief complaint leading to consultation is:  Sinus problem    Visit type: audiovisual    Face to Face time with patient:  8 min  Fourteen minutes of total time spent on the encounter, which includes face to face time and non-face to face time preparing to see the patient (eg, review of tests), Obtaining and/or reviewing separately obtained history, Documenting clinical information in the electronic or other health record, Independently interpreting results (not separately reported) and communicating results to the patient/family/caregiver, or Care coordination (not separately reported).         Each patient to whom he or she provides medical services by telemedicine is:  (1) informed of the relationship between the physician and patient and the respective role of any other health care provider with respect to management of the patient; and (2) notified that he or she may decline to receive medical services by telemedicine and may withdraw from such care at any time.    Notes:  Complains of sinus pressure and congestion for the past 3 weeks, with headaches and newly developed chest congestion.  Also complains of sore throat, nonproductive cough and wheezing, but denies shortness of breath.  No fevers or chills.  No change in sense of taste or smell.  Also reports that her heart rate has been running low again, and is getting dizzy spells at times, and also says her blood pressure has been running lower than usual recently.  Requesting to get established with a new cardiologist  Review of Systems   Constitutional: Negative for chills and fever.   HENT: Positive for congestion, postnasal drip, sinus pressure and sore throat. Negative for trouble swallowing.    Respiratory: Positive for cough and wheezing. Negative for shortness of breath.    Cardiovascular:  Negative for chest pain.   Gastrointestinal: Positive for diarrhea. Negative for blood in stool.   Skin: Negative for wound.   Neurological: Positive for dizziness and headaches.   Psychiatric/Behavioral: Negative for agitation and confusion.       Objective:      Vitals:    12/14/20 1249   BP: 100/60     Physical Exam  Constitutional:       Appearance: She is well-developed.   Pulmonary:      Effort: No respiratory distress.   Neurological:      Mental Status: She is alert and oriented to person, place, and time.   Psychiatric:         Behavior: Behavior normal.         Lab Results   Component Value Date    WBC 3.80 (L) 04/04/2020    HGB 12.1 04/04/2020    HCT 37.8 04/04/2020     04/04/2020    CHOL 165 10/02/2018    TRIG 85 10/02/2018    HDL 80 (H) 10/02/2018    ALT 52 03/31/2020    AST 82 (H) 03/31/2020     04/04/2020    K 3.5 04/04/2020     04/04/2020    CREATININE 1.0 04/04/2020    BUN 6 04/04/2020    CO2 23 04/04/2020    TSH 3.04 11/22/2019    INR 1.0 08/01/2014    HGBA1C 5.6 05/30/2019      Assessment:       1. Acute sinusitis, recurrence not specified, unspecified location    2. Bradycardia        Plan:       Acute sinusitis, recurrence not specified, unspecified location  -     amoxicillin-clavulanate 875-125mg (AUGMENTIN) 875-125 mg per tablet; Take 1 tablet by mouth 2 (two) times daily. for 10 days  Dispense: 20 tablet; Refill: 0  Increase fluid intake, use probiotics  Bradycardia  -     Ambulatory referral/consult to Cardiology; Future; Expected date: 12/21/2020      Medication List with Changes/Refills   New Medications    AMOXICILLIN-CLAVULANATE 875-125MG (AUGMENTIN) 875-125 MG PER TABLET    Take 1 tablet by mouth 2 (two) times daily. for 10 days   Current Medications    ALBUTEROL (PROVENTIL) 2.5 MG /3 ML (0.083 %) NEBULIZER SOLUTION    INHALE ONE VIAL VIA NEBULIZER EVERY SIX HOURS    ALBUTEROL (PROVENTIL/VENTOLIN HFA) 90 MCG/ACTUATION INHALER    Inhale 2 puffs into the lungs every 4  (four) hours as needed for Wheezing or Shortness of Breath. Rescue    ALLERGY RELIEF, LORATADINE, 10 MG TABLET    TAKE ONE TABLET BY MOUTH ONCE DAILY    BUDESONIDE-FORMOTEROL 160-4.5 MCG (SYMBICORT) 160-4.5 MCG/ACTUATION HFAA    Inhale 2 puffs into the lungs every 12 (twelve) hours. Controller    CLONAZEPAM (KLONOPIN) 1 MG TABLET    TAKE 1 TABLET (1 MG TOTAL) BY MOUTH ONCE DAILY.     DICLOFENAC SODIUM (VOLTAREN) 1 % GEL    Apply 2 g topically 4 (four) times daily.    ERGOCALCIFEROL (VITAMIN D2) 50,000 UNIT CAP    Take 1 capsule (50,000 Units total) by mouth every 7 days.    FLUTICASONE (FLONASE) 50 MCG/ACTUATION NASAL SPRAY    INSTILL TWO SPRAYS IN EACH NOSTRIL IN EACH NOSTRIL EVERY EVENING    MONTELUKAST (SINGULAIR) 10 MG TABLET    TAKE ONE TABLET BY MOUTH EVERY EVENING     MUPIROCIN (BACTROBAN) 2 % OINTMENT    APPLY TOPICALLY 3 (THREE) TIMES DAILY.    NYSTATIN (MYCOSTATIN) POWDER    Apply topically 2 (two) times daily.    ONDANSETRON (ZOFRAN-ODT) 8 MG TBDL    DISSOLVE ONE TABLET IN MOUTH EVERY TWELVE HOURS AS NEEDED    PANTOPRAZOLE (PROTONIX) 40 MG TABLET    TAKE ONE TABLET BY MOUTH ONCE DAILY    PROMETHAZINE (PHENERGAN) 25 MG TABLET    Take 1 tablet (25 mg total) by mouth every 6 (six) hours as needed for Nausea.    SERTRALINE (ZOLOFT) 100 MG TABLET    TAKE 1 TABLET (100 MG TOTAL) BY MOUTH EVERY EVENING.     TOPIRAMATE (TOPAMAX) 100 MG TABLET    TAKE 1 TABLET (100 MG TOTAL) BY MOUTH ONCE DAILY.     TRAZODONE (DESYREL) 50 MG TABLET    TAKE 1 TABLET (50 MG TOTAL) BY MOUTH EVERY EVENING.    Discontinued Medications    AMOXICILLIN-CLAVULANATE 875-125MG (AUGMENTIN) 875-125 MG PER TABLET    Take 1 tablet by mouth 2 (two) times daily.

## 2021-01-04 ENCOUNTER — OFFICE VISIT (OUTPATIENT)
Dept: CARDIOLOGY | Facility: CLINIC | Age: 56
End: 2021-01-04
Payer: MEDICARE

## 2021-01-04 VITALS
HEIGHT: 64 IN | SYSTOLIC BLOOD PRESSURE: 123 MMHG | BODY MASS INDEX: 41.01 KG/M2 | WEIGHT: 240.19 LBS | OXYGEN SATURATION: 97 % | HEART RATE: 50 BPM | DIASTOLIC BLOOD PRESSURE: 59 MMHG

## 2021-01-04 DIAGNOSIS — J45.40 MODERATE PERSISTENT ASTHMA WITHOUT COMPLICATION: ICD-10-CM

## 2021-01-04 DIAGNOSIS — Z93.3 COLOSTOMY IN PLACE: ICD-10-CM

## 2021-01-04 DIAGNOSIS — R00.1 BRADYCARDIA: ICD-10-CM

## 2021-01-04 DIAGNOSIS — R55 SYNCOPE AND COLLAPSE: ICD-10-CM

## 2021-01-04 DIAGNOSIS — K50.119 CROHN'S DISEASE OF LARGE INTESTINE WITH COMPLICATION: ICD-10-CM

## 2021-01-04 DIAGNOSIS — R00.1 SINUS BRADYCARDIA: Primary | ICD-10-CM

## 2021-01-04 PROCEDURE — 99999 PR PBB SHADOW E&M-EST. PATIENT-LVL IV: CPT | Mod: PBBFAC,,, | Performed by: INTERNAL MEDICINE

## 2021-01-04 PROCEDURE — 99499 RISK ADDL DX/OHS AUDIT: ICD-10-PCS | Mod: S$GLB,,, | Performed by: INTERNAL MEDICINE

## 2021-01-04 PROCEDURE — 93000 ELECTROCARDIOGRAM COMPLETE: CPT | Mod: S$GLB,,, | Performed by: INTERNAL MEDICINE

## 2021-01-04 PROCEDURE — 99499 UNLISTED E&M SERVICE: CPT | Mod: S$GLB,,, | Performed by: INTERNAL MEDICINE

## 2021-01-04 PROCEDURE — 99214 OFFICE O/P EST MOD 30 MIN: CPT | Mod: 25,S$GLB,, | Performed by: INTERNAL MEDICINE

## 2021-01-04 PROCEDURE — 3008F PR BODY MASS INDEX (BMI) DOCUMENTED: ICD-10-PCS | Mod: CPTII,S$GLB,, | Performed by: INTERNAL MEDICINE

## 2021-01-04 PROCEDURE — 99999 PR PBB SHADOW E&M-EST. PATIENT-LVL IV: ICD-10-PCS | Mod: PBBFAC,,, | Performed by: INTERNAL MEDICINE

## 2021-01-04 PROCEDURE — 99214 PR OFFICE/OUTPT VISIT, EST, LEVL IV, 30-39 MIN: ICD-10-PCS | Mod: 25,S$GLB,, | Performed by: INTERNAL MEDICINE

## 2021-01-04 PROCEDURE — 3008F BODY MASS INDEX DOCD: CPT | Mod: CPTII,S$GLB,, | Performed by: INTERNAL MEDICINE

## 2021-01-04 PROCEDURE — 93000 EKG 12-LEAD: ICD-10-PCS | Mod: S$GLB,,, | Performed by: INTERNAL MEDICINE

## 2021-01-07 ENCOUNTER — TELEPHONE (OUTPATIENT)
Dept: CARDIOLOGY | Facility: CLINIC | Age: 56
End: 2021-01-07

## 2021-01-07 ENCOUNTER — OFFICE VISIT (OUTPATIENT)
Dept: PRIMARY CARE CLINIC | Facility: CLINIC | Age: 56
End: 2021-01-07
Payer: MEDICARE

## 2021-01-07 VITALS
TEMPERATURE: 99 F | WEIGHT: 238.31 LBS | SYSTOLIC BLOOD PRESSURE: 106 MMHG | BODY MASS INDEX: 40.69 KG/M2 | OXYGEN SATURATION: 98 % | DIASTOLIC BLOOD PRESSURE: 58 MMHG | HEART RATE: 50 BPM | HEIGHT: 64 IN | RESPIRATION RATE: 18 BRPM

## 2021-01-07 DIAGNOSIS — D52.0 DIETARY FOLATE DEFICIENCY ANEMIA: ICD-10-CM

## 2021-01-07 DIAGNOSIS — Z11.59 NEED FOR HEPATITIS C SCREENING TEST: ICD-10-CM

## 2021-01-07 DIAGNOSIS — E61.1 DIETARY IRON DEFICIENCY: ICD-10-CM

## 2021-01-07 DIAGNOSIS — E55.9 VITAMIN D DEFICIENCY: ICD-10-CM

## 2021-01-07 DIAGNOSIS — E78.5 HYPERLIPIDEMIA, UNSPECIFIED HYPERLIPIDEMIA TYPE: ICD-10-CM

## 2021-01-07 DIAGNOSIS — Z11.4 SCREENING FOR HIV (HUMAN IMMUNODEFICIENCY VIRUS): ICD-10-CM

## 2021-01-07 DIAGNOSIS — E53.8 VITAMIN B12 DEFICIENCY: ICD-10-CM

## 2021-01-07 DIAGNOSIS — F33.0 MILD EPISODE OF RECURRENT MAJOR DEPRESSIVE DISORDER: ICD-10-CM

## 2021-01-07 DIAGNOSIS — R00.1 BRADYCARDIA: ICD-10-CM

## 2021-01-07 DIAGNOSIS — J32.9 CHRONIC SINUSITIS, UNSPECIFIED LOCATION: Primary | ICD-10-CM

## 2021-01-07 PROCEDURE — 3008F BODY MASS INDEX DOCD: CPT | Mod: CPTII,S$GLB,, | Performed by: FAMILY MEDICINE

## 2021-01-07 PROCEDURE — 1125F PR PAIN SEVERITY QUANTIFIED, PAIN PRESENT: ICD-10-PCS | Mod: S$GLB,,, | Performed by: FAMILY MEDICINE

## 2021-01-07 PROCEDURE — 99999 PR PBB SHADOW E&M-EST. PATIENT-LVL V: CPT | Mod: PBBFAC,,, | Performed by: FAMILY MEDICINE

## 2021-01-07 PROCEDURE — 99499 RISK ADDL DX/OHS AUDIT: ICD-10-PCS | Mod: S$GLB,,, | Performed by: FAMILY MEDICINE

## 2021-01-07 PROCEDURE — 99999 PR PBB SHADOW E&M-EST. PATIENT-LVL V: ICD-10-PCS | Mod: PBBFAC,,, | Performed by: FAMILY MEDICINE

## 2021-01-07 PROCEDURE — 99214 OFFICE O/P EST MOD 30 MIN: CPT | Mod: S$GLB,,, | Performed by: FAMILY MEDICINE

## 2021-01-07 PROCEDURE — 3008F PR BODY MASS INDEX (BMI) DOCUMENTED: ICD-10-PCS | Mod: CPTII,S$GLB,, | Performed by: FAMILY MEDICINE

## 2021-01-07 PROCEDURE — 1125F AMNT PAIN NOTED PAIN PRSNT: CPT | Mod: S$GLB,,, | Performed by: FAMILY MEDICINE

## 2021-01-07 PROCEDURE — 99214 PR OFFICE/OUTPT VISIT, EST, LEVL IV, 30-39 MIN: ICD-10-PCS | Mod: S$GLB,,, | Performed by: FAMILY MEDICINE

## 2021-01-07 PROCEDURE — 99499 UNLISTED E&M SERVICE: CPT | Mod: S$GLB,,, | Performed by: FAMILY MEDICINE

## 2021-01-11 RX ORDER — PANTOPRAZOLE SODIUM 40 MG/1
TABLET, DELAYED RELEASE ORAL
Qty: 90 TABLET | Refills: 1 | Status: SHIPPED | OUTPATIENT
Start: 2021-01-11 | End: 2021-07-15

## 2021-01-13 ENCOUNTER — TELEPHONE (OUTPATIENT)
Dept: OTOLARYNGOLOGY | Facility: CLINIC | Age: 56
End: 2021-01-13

## 2021-01-13 DIAGNOSIS — J32.4 CHRONIC PANSINUSITIS: Primary | ICD-10-CM

## 2021-01-19 ENCOUNTER — OFFICE VISIT (OUTPATIENT)
Dept: OTOLARYNGOLOGY | Facility: CLINIC | Age: 56
End: 2021-01-19
Payer: MEDICARE

## 2021-01-19 VITALS — SYSTOLIC BLOOD PRESSURE: 140 MMHG | HEART RATE: 70 BPM | DIASTOLIC BLOOD PRESSURE: 74 MMHG

## 2021-01-19 DIAGNOSIS — J01.01 ACUTE RECURRENT MAXILLARY SINUSITIS: Primary | ICD-10-CM

## 2021-01-19 DIAGNOSIS — M26.609 TMJ DYSFUNCTION: ICD-10-CM

## 2021-01-19 DIAGNOSIS — H92.02 LEFT EAR PAIN: ICD-10-CM

## 2021-01-19 DIAGNOSIS — H69.92 ETD (EUSTACHIAN TUBE DYSFUNCTION), LEFT: ICD-10-CM

## 2021-01-19 DIAGNOSIS — J32.0 CHRONIC MAXILLARY SINUSITIS: ICD-10-CM

## 2021-01-19 DIAGNOSIS — J32.2 CHRONIC ETHMOIDAL SINUSITIS: ICD-10-CM

## 2021-01-19 PROCEDURE — 99999 PR PBB SHADOW E&M-EST. PATIENT-LVL IV: ICD-10-PCS | Mod: PBBFAC,,, | Performed by: OTOLARYNGOLOGY

## 2021-01-19 PROCEDURE — 1125F PR PAIN SEVERITY QUANTIFIED, PAIN PRESENT: ICD-10-PCS | Mod: S$GLB,,, | Performed by: OTOLARYNGOLOGY

## 2021-01-19 PROCEDURE — 99999 PR PBB SHADOW E&M-EST. PATIENT-LVL IV: CPT | Mod: PBBFAC,,, | Performed by: OTOLARYNGOLOGY

## 2021-01-19 PROCEDURE — 99214 OFFICE O/P EST MOD 30 MIN: CPT | Mod: S$GLB,,, | Performed by: OTOLARYNGOLOGY

## 2021-01-19 PROCEDURE — 1125F AMNT PAIN NOTED PAIN PRSNT: CPT | Mod: S$GLB,,, | Performed by: OTOLARYNGOLOGY

## 2021-01-19 PROCEDURE — 99214 PR OFFICE/OUTPT VISIT, EST, LEVL IV, 30-39 MIN: ICD-10-PCS | Mod: S$GLB,,, | Performed by: OTOLARYNGOLOGY

## 2021-01-19 RX ORDER — DEXAMETHASONE 1 MG/1
2 TABLET ORAL 2 TIMES DAILY
Qty: 28 TABLET | Refills: 0 | Status: SHIPPED | OUTPATIENT
Start: 2021-01-19 | End: 2021-01-26

## 2021-01-19 RX ORDER — CEFDINIR 300 MG/1
300 CAPSULE ORAL 2 TIMES DAILY
Qty: 28 CAPSULE | Refills: 0 | Status: SHIPPED | OUTPATIENT
Start: 2021-01-19 | End: 2021-02-02

## 2021-01-28 ENCOUNTER — PATIENT MESSAGE (OUTPATIENT)
Dept: OTOLARYNGOLOGY | Facility: CLINIC | Age: 56
End: 2021-01-28

## 2021-03-05 ENCOUNTER — HOSPITAL ENCOUNTER (OUTPATIENT)
Dept: RADIOLOGY | Facility: OTHER | Age: 56
Discharge: HOME | End: 2021-03-05
Attending: FAMILY MEDICINE
Payer: MEDICARE

## 2021-03-05 DIAGNOSIS — Z12.31 ENCOUNTER FOR SCREENING MAMMOGRAM FOR BREAST CANCER: ICD-10-CM

## 2021-03-05 PROCEDURE — 77067 SCR MAMMO BI INCL CAD: CPT | Mod: TC

## 2021-03-05 PROCEDURE — 77063 BREAST TOMOSYNTHESIS BI: CPT | Mod: 26,,, | Performed by: RADIOLOGY

## 2021-03-05 PROCEDURE — 77067 SCR MAMMO BI INCL CAD: CPT | Mod: 26,,, | Performed by: RADIOLOGY

## 2021-03-05 PROCEDURE — 77067 MAMMO DIGITAL SCREENING BILAT WITH TOMO: ICD-10-PCS | Mod: 26,,, | Performed by: RADIOLOGY

## 2021-03-05 PROCEDURE — 77063 MAMMO DIGITAL SCREENING BILAT WITH TOMO: ICD-10-PCS | Mod: 26,,, | Performed by: RADIOLOGY

## 2021-03-31 ENCOUNTER — TELEPHONE (OUTPATIENT)
Dept: PRIMARY CARE CLINIC | Facility: CLINIC | Age: 56
End: 2021-03-31

## 2021-04-01 ENCOUNTER — OFFICE VISIT (OUTPATIENT)
Dept: PRIMARY CARE CLINIC | Facility: CLINIC | Age: 56
End: 2021-04-01
Payer: MEDICARE

## 2021-04-01 VITALS
WEIGHT: 245.5 LBS | TEMPERATURE: 98 F | HEIGHT: 64 IN | HEART RATE: 64 BPM | RESPIRATION RATE: 18 BRPM | OXYGEN SATURATION: 99 % | DIASTOLIC BLOOD PRESSURE: 68 MMHG | SYSTOLIC BLOOD PRESSURE: 106 MMHG | BODY MASS INDEX: 41.91 KG/M2

## 2021-04-01 DIAGNOSIS — M25.571 ACUTE RIGHT ANKLE PAIN: Primary | ICD-10-CM

## 2021-04-01 DIAGNOSIS — S93.601D SPRAIN OF RIGHT FOOT, SUBSEQUENT ENCOUNTER: ICD-10-CM

## 2021-04-01 PROCEDURE — 1125F AMNT PAIN NOTED PAIN PRSNT: CPT | Mod: S$GLB,,, | Performed by: STUDENT IN AN ORGANIZED HEALTH CARE EDUCATION/TRAINING PROGRAM

## 2021-04-01 PROCEDURE — 99999 PR PBB SHADOW E&M-EST. PATIENT-LVL V: ICD-10-PCS | Mod: PBBFAC,,, | Performed by: STUDENT IN AN ORGANIZED HEALTH CARE EDUCATION/TRAINING PROGRAM

## 2021-04-01 PROCEDURE — 1125F PR PAIN SEVERITY QUANTIFIED, PAIN PRESENT: ICD-10-PCS | Mod: S$GLB,,, | Performed by: STUDENT IN AN ORGANIZED HEALTH CARE EDUCATION/TRAINING PROGRAM

## 2021-04-01 PROCEDURE — 99214 PR OFFICE/OUTPT VISIT, EST, LEVL IV, 30-39 MIN: ICD-10-PCS | Mod: S$GLB,,, | Performed by: STUDENT IN AN ORGANIZED HEALTH CARE EDUCATION/TRAINING PROGRAM

## 2021-04-01 PROCEDURE — 99999 PR PBB SHADOW E&M-EST. PATIENT-LVL V: CPT | Mod: PBBFAC,,, | Performed by: STUDENT IN AN ORGANIZED HEALTH CARE EDUCATION/TRAINING PROGRAM

## 2021-04-01 PROCEDURE — 3008F PR BODY MASS INDEX (BMI) DOCUMENTED: ICD-10-PCS | Mod: CPTII,S$GLB,, | Performed by: STUDENT IN AN ORGANIZED HEALTH CARE EDUCATION/TRAINING PROGRAM

## 2021-04-01 PROCEDURE — 3008F BODY MASS INDEX DOCD: CPT | Mod: CPTII,S$GLB,, | Performed by: STUDENT IN AN ORGANIZED HEALTH CARE EDUCATION/TRAINING PROGRAM

## 2021-04-01 PROCEDURE — 99214 OFFICE O/P EST MOD 30 MIN: CPT | Mod: S$GLB,,, | Performed by: STUDENT IN AN ORGANIZED HEALTH CARE EDUCATION/TRAINING PROGRAM

## 2021-04-06 ENCOUNTER — PATIENT MESSAGE (OUTPATIENT)
Dept: PRIMARY CARE CLINIC | Facility: CLINIC | Age: 56
End: 2021-04-06

## 2021-04-08 ENCOUNTER — TELEPHONE (OUTPATIENT)
Dept: PRIMARY CARE CLINIC | Facility: CLINIC | Age: 56
End: 2021-04-08

## 2021-04-12 ENCOUNTER — OFFICE VISIT (OUTPATIENT)
Dept: ORTHOPEDICS | Facility: CLINIC | Age: 56
End: 2021-04-12
Payer: MEDICARE

## 2021-04-12 VITALS
HEART RATE: 64 BPM | HEIGHT: 65 IN | DIASTOLIC BLOOD PRESSURE: 68 MMHG | RESPIRATION RATE: 16 BRPM | BODY MASS INDEX: 41.05 KG/M2 | SYSTOLIC BLOOD PRESSURE: 100 MMHG | WEIGHT: 246.38 LBS

## 2021-04-12 DIAGNOSIS — M25.571 SINUS TARSI SYNDROME, RIGHT: Primary | ICD-10-CM

## 2021-04-12 PROCEDURE — 99999 PR PBB SHADOW E&M-EST. PATIENT-LVL V: CPT | Mod: PBBFAC,,, | Performed by: ORTHOPAEDIC SURGERY

## 2021-04-12 PROCEDURE — 1125F AMNT PAIN NOTED PAIN PRSNT: CPT | Mod: S$GLB,,, | Performed by: ORTHOPAEDIC SURGERY

## 2021-04-12 PROCEDURE — 3008F BODY MASS INDEX DOCD: CPT | Mod: CPTII,S$GLB,, | Performed by: ORTHOPAEDIC SURGERY

## 2021-04-12 PROCEDURE — 3008F PR BODY MASS INDEX (BMI) DOCUMENTED: ICD-10-PCS | Mod: CPTII,S$GLB,, | Performed by: ORTHOPAEDIC SURGERY

## 2021-04-12 PROCEDURE — 99203 PR OFFICE/OUTPT VISIT, NEW, LEVL III, 30-44 MIN: ICD-10-PCS | Mod: S$GLB,,, | Performed by: ORTHOPAEDIC SURGERY

## 2021-04-12 PROCEDURE — 99999 PR PBB SHADOW E&M-EST. PATIENT-LVL V: ICD-10-PCS | Mod: PBBFAC,,, | Performed by: ORTHOPAEDIC SURGERY

## 2021-04-12 PROCEDURE — 1125F PR PAIN SEVERITY QUANTIFIED, PAIN PRESENT: ICD-10-PCS | Mod: S$GLB,,, | Performed by: ORTHOPAEDIC SURGERY

## 2021-04-12 PROCEDURE — 99203 OFFICE O/P NEW LOW 30 MIN: CPT | Mod: S$GLB,,, | Performed by: ORTHOPAEDIC SURGERY

## 2021-05-12 ENCOUNTER — OFFICE VISIT (OUTPATIENT)
Dept: ORTHOPEDICS | Facility: CLINIC | Age: 56
End: 2021-05-12
Payer: MEDICARE

## 2021-05-12 VITALS
HEIGHT: 65 IN | RESPIRATION RATE: 16 BRPM | SYSTOLIC BLOOD PRESSURE: 114 MMHG | DIASTOLIC BLOOD PRESSURE: 68 MMHG | HEART RATE: 55 BPM | BODY MASS INDEX: 41.41 KG/M2 | WEIGHT: 248.56 LBS

## 2021-05-12 DIAGNOSIS — M25.571 SINUS TARSI SYNDROME, RIGHT: Primary | ICD-10-CM

## 2021-05-12 PROCEDURE — 99212 OFFICE O/P EST SF 10 MIN: CPT | Mod: S$GLB,,, | Performed by: ORTHOPAEDIC SURGERY

## 2021-05-12 PROCEDURE — 99999 PR PBB SHADOW E&M-EST. PATIENT-LVL IV: CPT | Mod: PBBFAC,,, | Performed by: ORTHOPAEDIC SURGERY

## 2021-05-12 PROCEDURE — 1125F AMNT PAIN NOTED PAIN PRSNT: CPT | Mod: S$GLB,,, | Performed by: ORTHOPAEDIC SURGERY

## 2021-05-12 PROCEDURE — 3008F PR BODY MASS INDEX (BMI) DOCUMENTED: ICD-10-PCS | Mod: CPTII,S$GLB,, | Performed by: ORTHOPAEDIC SURGERY

## 2021-05-12 PROCEDURE — 99212 PR OFFICE/OUTPT VISIT, EST, LEVL II, 10-19 MIN: ICD-10-PCS | Mod: S$GLB,,, | Performed by: ORTHOPAEDIC SURGERY

## 2021-05-12 PROCEDURE — 1125F PR PAIN SEVERITY QUANTIFIED, PAIN PRESENT: ICD-10-PCS | Mod: S$GLB,,, | Performed by: ORTHOPAEDIC SURGERY

## 2021-05-12 PROCEDURE — 99999 PR PBB SHADOW E&M-EST. PATIENT-LVL IV: ICD-10-PCS | Mod: PBBFAC,,, | Performed by: ORTHOPAEDIC SURGERY

## 2021-05-12 PROCEDURE — 3008F BODY MASS INDEX DOCD: CPT | Mod: CPTII,S$GLB,, | Performed by: ORTHOPAEDIC SURGERY

## 2021-05-25 ENCOUNTER — OFFICE VISIT (OUTPATIENT)
Dept: PRIMARY CARE CLINIC | Facility: CLINIC | Age: 56
End: 2021-05-25
Payer: MEDICARE

## 2021-05-25 VITALS
SYSTOLIC BLOOD PRESSURE: 124 MMHG | WEIGHT: 248.44 LBS | HEART RATE: 105 BPM | HEIGHT: 65 IN | DIASTOLIC BLOOD PRESSURE: 70 MMHG | OXYGEN SATURATION: 96 % | RESPIRATION RATE: 16 BRPM | BODY MASS INDEX: 41.39 KG/M2 | TEMPERATURE: 99 F

## 2021-05-25 DIAGNOSIS — L01.02 PUSTULAR FOLLICULITIS: ICD-10-CM

## 2021-05-25 DIAGNOSIS — J06.9 UPPER RESPIRATORY TRACT INFECTION, UNSPECIFIED TYPE: Primary | ICD-10-CM

## 2021-05-25 LAB
CTP QC/QA: YES
S PYO RRNA THROAT QL PROBE: NEGATIVE

## 2021-05-25 PROCEDURE — 3008F BODY MASS INDEX DOCD: CPT | Mod: CPTII,S$GLB,, | Performed by: FAMILY MEDICINE

## 2021-05-25 PROCEDURE — 99214 PR OFFICE/OUTPT VISIT, EST, LEVL IV, 30-39 MIN: ICD-10-PCS | Mod: 25,S$GLB,, | Performed by: FAMILY MEDICINE

## 2021-05-25 PROCEDURE — 99214 OFFICE O/P EST MOD 30 MIN: CPT | Mod: 25,S$GLB,, | Performed by: FAMILY MEDICINE

## 2021-05-25 PROCEDURE — 87880 STREP A ASSAY W/OPTIC: CPT | Mod: QW,S$GLB,, | Performed by: FAMILY MEDICINE

## 2021-05-25 PROCEDURE — 1125F AMNT PAIN NOTED PAIN PRSNT: CPT | Mod: S$GLB,,, | Performed by: FAMILY MEDICINE

## 2021-05-25 PROCEDURE — 87880 POCT RAPID STREP A: ICD-10-PCS | Mod: QW,S$GLB,, | Performed by: FAMILY MEDICINE

## 2021-05-25 PROCEDURE — 3008F PR BODY MASS INDEX (BMI) DOCUMENTED: ICD-10-PCS | Mod: CPTII,S$GLB,, | Performed by: FAMILY MEDICINE

## 2021-05-25 PROCEDURE — 99999 PR PBB SHADOW E&M-EST. PATIENT-LVL IV: ICD-10-PCS | Mod: PBBFAC,,, | Performed by: FAMILY MEDICINE

## 2021-05-25 PROCEDURE — 1125F PR PAIN SEVERITY QUANTIFIED, PAIN PRESENT: ICD-10-PCS | Mod: S$GLB,,, | Performed by: FAMILY MEDICINE

## 2021-05-25 PROCEDURE — 99999 PR PBB SHADOW E&M-EST. PATIENT-LVL IV: CPT | Mod: PBBFAC,,, | Performed by: FAMILY MEDICINE

## 2021-05-25 RX ORDER — MUPIROCIN 20 MG/G
OINTMENT TOPICAL 3 TIMES DAILY
Qty: 22 G | Refills: 3 | Status: SHIPPED | OUTPATIENT
Start: 2021-05-25 | End: 2021-12-15

## 2021-05-25 RX ORDER — DOXYCYCLINE 100 MG/1
100 CAPSULE ORAL EVERY 12 HOURS
Qty: 14 CAPSULE | Refills: 0 | Status: SHIPPED | OUTPATIENT
Start: 2021-05-25 | End: 2021-06-01

## 2021-05-26 ENCOUNTER — PATIENT MESSAGE (OUTPATIENT)
Dept: PRIMARY CARE CLINIC | Facility: CLINIC | Age: 56
End: 2021-05-26

## 2021-05-26 DIAGNOSIS — E53.8 VITAMIN B12 DEFICIENCY: Primary | ICD-10-CM

## 2021-05-26 RX ORDER — CYANOCOBALAMIN 1000 UG/ML
INJECTION, SOLUTION INTRAMUSCULAR; SUBCUTANEOUS
Qty: 10 ML | Refills: 5 | Status: SHIPPED | OUTPATIENT
Start: 2021-05-26 | End: 2021-08-19 | Stop reason: SDUPTHER

## 2021-06-24 ENCOUNTER — PATIENT MESSAGE (OUTPATIENT)
Dept: PRIMARY CARE CLINIC | Facility: CLINIC | Age: 56
End: 2021-06-24

## 2021-06-25 ENCOUNTER — OFFICE VISIT (OUTPATIENT)
Dept: PRIMARY CARE CLINIC | Facility: CLINIC | Age: 56
End: 2021-06-25
Payer: MEDICARE

## 2021-06-25 DIAGNOSIS — E61.1 DIETARY IRON DEFICIENCY: ICD-10-CM

## 2021-06-25 DIAGNOSIS — Z98.84 BARIATRIC SURGERY STATUS: ICD-10-CM

## 2021-06-25 DIAGNOSIS — E53.8 VITAMIN B12 DEFICIENCY: ICD-10-CM

## 2021-06-25 DIAGNOSIS — E55.9 VITAMIN D DEFICIENCY: ICD-10-CM

## 2021-06-25 DIAGNOSIS — R10.84 GENERALIZED ABDOMINAL PAIN: ICD-10-CM

## 2021-06-25 DIAGNOSIS — K50.119 CROHN'S DISEASE OF LARGE INTESTINE WITH COMPLICATION: Primary | ICD-10-CM

## 2021-06-25 DIAGNOSIS — K21.9 GASTROESOPHAGEAL REFLUX DISEASE, UNSPECIFIED WHETHER ESOPHAGITIS PRESENT: ICD-10-CM

## 2021-06-25 DIAGNOSIS — K51.211 ULCERATIVE PROCTITIS WITH RECTAL BLEEDING: ICD-10-CM

## 2021-06-25 PROCEDURE — 99214 PR OFFICE/OUTPT VISIT, EST, LEVL IV, 30-39 MIN: ICD-10-PCS | Mod: 95,,, | Performed by: FAMILY MEDICINE

## 2021-06-25 PROCEDURE — 99214 OFFICE O/P EST MOD 30 MIN: CPT | Mod: 95,,, | Performed by: FAMILY MEDICINE

## 2021-06-28 ENCOUNTER — TELEPHONE (OUTPATIENT)
Dept: PRIMARY CARE CLINIC | Facility: CLINIC | Age: 56
End: 2021-06-28

## 2021-06-28 DIAGNOSIS — K50.119 CROHN'S DISEASE OF LARGE INTESTINE WITH COMPLICATION: Primary | ICD-10-CM

## 2021-06-30 ENCOUNTER — OFFICE VISIT (OUTPATIENT)
Dept: GASTROENTEROLOGY | Facility: CLINIC | Age: 56
End: 2021-06-30
Payer: MEDICARE

## 2021-06-30 ENCOUNTER — LAB VISIT (OUTPATIENT)
Dept: LAB | Facility: HOSPITAL | Age: 56
End: 2021-06-30
Attending: NURSE PRACTITIONER
Payer: MEDICARE

## 2021-06-30 VITALS — BODY MASS INDEX: 41.39 KG/M2 | HEIGHT: 65 IN | WEIGHT: 248.44 LBS

## 2021-06-30 DIAGNOSIS — R10.33 PERIUMBILICAL PAIN: Primary | ICD-10-CM

## 2021-06-30 DIAGNOSIS — R19.7 DIARRHEA, UNSPECIFIED TYPE: ICD-10-CM

## 2021-06-30 DIAGNOSIS — R11.0 NAUSEA: ICD-10-CM

## 2021-06-30 DIAGNOSIS — Z90.49 S/P CHOLECYSTECTOMY: ICD-10-CM

## 2021-06-30 DIAGNOSIS — Z87.19 HISTORY OF GASTROESOPHAGEAL REFLUX (GERD): ICD-10-CM

## 2021-06-30 DIAGNOSIS — Z87.19 HISTORY OF INFLAMMATORY BOWEL DISEASE: ICD-10-CM

## 2021-06-30 DIAGNOSIS — Z90.3 HISTORY OF SLEEVE GASTRECTOMY: ICD-10-CM

## 2021-06-30 DIAGNOSIS — R74.8 ELEVATED ALKALINE PHOSPHATASE LEVEL: ICD-10-CM

## 2021-06-30 DIAGNOSIS — K30 DELAYED GASTRIC EMPTYING: ICD-10-CM

## 2021-06-30 DIAGNOSIS — Z90.49 HISTORY OF BOWEL RESECTION: ICD-10-CM

## 2021-06-30 LAB
C DIFF GDH STL QL: NEGATIVE
C DIFF TOX A+B STL QL IA: NEGATIVE
CRP SERPL-MCNC: 2.9 MG/L (ref 0–8.2)
GGT SERPL-CCNC: 168 U/L (ref 8–55)
OB PNL STL: POSITIVE
RV AG STL QL IA.RAPID: NEGATIVE
WBC #/AREA STL HPF: NORMAL /[HPF]

## 2021-06-30 PROCEDURE — 82272 OCCULT BLD FECES 1-3 TESTS: CPT | Performed by: NURSE PRACTITIONER

## 2021-06-30 PROCEDURE — 1125F AMNT PAIN NOTED PAIN PRSNT: CPT | Mod: S$GLB,,, | Performed by: NURSE PRACTITIONER

## 2021-06-30 PROCEDURE — 87045 FECES CULTURE AEROBIC BACT: CPT | Performed by: NURSE PRACTITIONER

## 2021-06-30 PROCEDURE — 84080 ASSAY ALKALINE PHOSPHATASES: CPT | Performed by: NURSE PRACTITIONER

## 2021-06-30 PROCEDURE — 87798 DETECT AGENT NOS DNA AMP: CPT | Performed by: NURSE PRACTITIONER

## 2021-06-30 PROCEDURE — 3008F BODY MASS INDEX DOCD: CPT | Mod: CPTII,S$GLB,, | Performed by: NURSE PRACTITIONER

## 2021-06-30 PROCEDURE — 87449 NOS EACH ORGANISM AG IA: CPT | Mod: 59 | Performed by: NURSE PRACTITIONER

## 2021-06-30 PROCEDURE — 86235 NUCLEAR ANTIGEN ANTIBODY: CPT | Performed by: NURSE PRACTITIONER

## 2021-06-30 PROCEDURE — 99999 PR PBB SHADOW E&M-EST. PATIENT-LVL V: ICD-10-PCS | Mod: PBBFAC,,, | Performed by: NURSE PRACTITIONER

## 2021-06-30 PROCEDURE — 87427 SHIGA-LIKE TOXIN AG IA: CPT | Mod: 59 | Performed by: NURSE PRACTITIONER

## 2021-06-30 PROCEDURE — 87329 GIARDIA AG IA: CPT | Performed by: NURSE PRACTITIONER

## 2021-06-30 PROCEDURE — 87425 ROTAVIRUS AG IA: CPT | Performed by: NURSE PRACTITIONER

## 2021-06-30 PROCEDURE — 82656 EL-1 FECAL QUAL/SEMIQ: CPT | Performed by: NURSE PRACTITIONER

## 2021-06-30 PROCEDURE — 87324 CLOSTRIDIUM AG IA: CPT | Performed by: NURSE PRACTITIONER

## 2021-06-30 PROCEDURE — 89055 LEUKOCYTE ASSESSMENT FECAL: CPT | Performed by: NURSE PRACTITIONER

## 2021-06-30 PROCEDURE — 36415 COLL VENOUS BLD VENIPUNCTURE: CPT | Performed by: NURSE PRACTITIONER

## 2021-06-30 PROCEDURE — 99204 OFFICE O/P NEW MOD 45 MIN: CPT | Mod: S$GLB,,, | Performed by: NURSE PRACTITIONER

## 2021-06-30 PROCEDURE — 82977 ASSAY OF GGT: CPT | Performed by: NURSE PRACTITIONER

## 2021-06-30 PROCEDURE — 83986 ASSAY PH BODY FLUID NOS: CPT | Performed by: NURSE PRACTITIONER

## 2021-06-30 PROCEDURE — 87046 STOOL CULTR AEROBIC BACT EA: CPT | Mod: 59 | Performed by: NURSE PRACTITIONER

## 2021-06-30 PROCEDURE — 86140 C-REACTIVE PROTEIN: CPT | Performed by: NURSE PRACTITIONER

## 2021-06-30 PROCEDURE — 83993 ASSAY FOR CALPROTECTIN FECAL: CPT | Performed by: NURSE PRACTITIONER

## 2021-06-30 PROCEDURE — 99204 PR OFFICE/OUTPT VISIT, NEW, LEVL IV, 45-59 MIN: ICD-10-PCS | Mod: S$GLB,,, | Performed by: NURSE PRACTITIONER

## 2021-06-30 PROCEDURE — 99999 PR PBB SHADOW E&M-EST. PATIENT-LVL V: CPT | Mod: PBBFAC,,, | Performed by: NURSE PRACTITIONER

## 2021-06-30 PROCEDURE — 1125F PR PAIN SEVERITY QUANTIFIED, PAIN PRESENT: ICD-10-PCS | Mod: S$GLB,,, | Performed by: NURSE PRACTITIONER

## 2021-06-30 PROCEDURE — 87209 SMEAR COMPLEX STAIN: CPT | Performed by: NURSE PRACTITIONER

## 2021-06-30 PROCEDURE — 3008F PR BODY MASS INDEX (BMI) DOCUMENTED: ICD-10-PCS | Mod: CPTII,S$GLB,, | Performed by: NURSE PRACTITIONER

## 2021-06-30 RX ORDER — SIMETHICONE 125 MG
125 TABLET,CHEWABLE ORAL EVERY 6 HOURS PRN
COMMUNITY
End: 2022-05-09

## 2021-07-01 ENCOUNTER — PATIENT MESSAGE (OUTPATIENT)
Dept: GASTROENTEROLOGY | Facility: CLINIC | Age: 56
End: 2021-07-01

## 2021-07-01 LAB
CRYPTOSP AG STL QL IA: NEGATIVE
E COLI SXT1 STL QL IA: NEGATIVE
E COLI SXT2 STL QL IA: NEGATIVE
ELASTASE 1, FECAL: 417 MCG/G
G LAMBLIA AG STL QL IA: NEGATIVE
MITOCHONDRIA AB TITR SER IF: NORMAL {TITER}
O+P STL MICRO: NORMAL

## 2021-07-02 LAB
HADV DNA SERPL QL NAA+PROBE: NEGATIVE
PH STL: NORMAL [PH]
SPECIMEN SOURCE: NORMAL

## 2021-07-03 ENCOUNTER — PATIENT MESSAGE (OUTPATIENT)
Dept: GASTROENTEROLOGY | Facility: CLINIC | Age: 56
End: 2021-07-03

## 2021-07-03 LAB
BACTERIA STL CULT: NORMAL
BACTERIA STL CULT: NORMAL

## 2021-07-07 ENCOUNTER — TELEPHONE (OUTPATIENT)
Dept: GASTROENTEROLOGY | Facility: CLINIC | Age: 56
End: 2021-07-07

## 2021-07-07 ENCOUNTER — PATIENT MESSAGE (OUTPATIENT)
Dept: GASTROENTEROLOGY | Facility: CLINIC | Age: 56
End: 2021-07-07

## 2021-07-07 DIAGNOSIS — R74.8 ELEVATED SERUM GGT LEVEL: ICD-10-CM

## 2021-07-07 DIAGNOSIS — R10.33 PERIUMBILICAL PAIN: Primary | ICD-10-CM

## 2021-07-07 DIAGNOSIS — R74.8 ELEVATED ALKALINE PHOSPHATASE LEVEL: ICD-10-CM

## 2021-07-07 LAB
ALP BONE CFR SERPL: 49 % (ref 28–66)
ALP INTEST CFR SERPL: 0 % (ref 1–24)
ALP ISOS SERPL-IMP: ABNORMAL
ALP LIVER CFR SERPL: 51 % (ref 25–69)
ALP MACROHEPATIC CFR SERPL: ABNORMAL %
ALP PLAC CFR SERPL: 0 %
ALP SERPL-CCNC: 166 U/L (ref 37–153)
CALPROTECTIN STL-MCNT: 80.1 MCG/G

## 2021-07-07 RX ORDER — SUCRALFATE 1 G/1
1 TABLET ORAL
Qty: 120 TABLET | Refills: 0 | Status: SHIPPED | OUTPATIENT
Start: 2021-07-07 | End: 2021-12-14

## 2021-07-08 ENCOUNTER — DOCUMENTATION ONLY (OUTPATIENT)
Dept: TRANSPLANT | Facility: CLINIC | Age: 56
End: 2021-07-08

## 2021-07-08 ENCOUNTER — TELEPHONE (OUTPATIENT)
Dept: GASTROENTEROLOGY | Facility: CLINIC | Age: 56
End: 2021-07-08

## 2021-07-08 DIAGNOSIS — Z87.19 HISTORY OF INFLAMMATORY BOWEL DISEASE: Primary | ICD-10-CM

## 2021-07-12 ENCOUNTER — PATIENT OUTREACH (OUTPATIENT)
Dept: ADMINISTRATIVE | Facility: HOSPITAL | Age: 56
End: 2021-07-12

## 2021-07-13 ENCOUNTER — OFFICE VISIT (OUTPATIENT)
Dept: HEPATOLOGY | Facility: CLINIC | Age: 56
End: 2021-07-13
Payer: MEDICARE

## 2021-07-13 VITALS
RESPIRATION RATE: 18 BRPM | SYSTOLIC BLOOD PRESSURE: 130 MMHG | DIASTOLIC BLOOD PRESSURE: 62 MMHG | TEMPERATURE: 98 F | WEIGHT: 256.38 LBS | HEIGHT: 65 IN | HEART RATE: 73 BPM | BODY MASS INDEX: 42.71 KG/M2 | OXYGEN SATURATION: 98 %

## 2021-07-13 DIAGNOSIS — K76.9 LIVER DISEASE, UNSPECIFIED: ICD-10-CM

## 2021-07-13 DIAGNOSIS — Z98.84 BARIATRIC SURGERY STATUS: ICD-10-CM

## 2021-07-13 DIAGNOSIS — R74.8 ELEVATED SERUM GGT LEVEL: ICD-10-CM

## 2021-07-13 DIAGNOSIS — K76.0 FATTY LIVER: ICD-10-CM

## 2021-07-13 DIAGNOSIS — K52.9 INFLAMMATORY BOWEL DISEASE: ICD-10-CM

## 2021-07-13 DIAGNOSIS — R74.8 ELEVATED ALKALINE PHOSPHATASE LEVEL: Primary | ICD-10-CM

## 2021-07-13 PROCEDURE — 1126F AMNT PAIN NOTED NONE PRSNT: CPT | Mod: S$GLB,,, | Performed by: PHYSICIAN ASSISTANT

## 2021-07-13 PROCEDURE — 99999 PR PBB SHADOW E&M-EST. PATIENT-LVL V: CPT | Mod: PBBFAC,,, | Performed by: PHYSICIAN ASSISTANT

## 2021-07-13 PROCEDURE — 99999 PR PBB SHADOW E&M-EST. PATIENT-LVL V: ICD-10-PCS | Mod: PBBFAC,,, | Performed by: PHYSICIAN ASSISTANT

## 2021-07-13 PROCEDURE — 1126F PR PAIN SEVERITY QUANTIFIED, NO PAIN PRESENT: ICD-10-PCS | Mod: S$GLB,,, | Performed by: PHYSICIAN ASSISTANT

## 2021-07-13 PROCEDURE — 99204 OFFICE O/P NEW MOD 45 MIN: CPT | Mod: S$GLB,,, | Performed by: PHYSICIAN ASSISTANT

## 2021-07-13 PROCEDURE — 3008F PR BODY MASS INDEX (BMI) DOCUMENTED: ICD-10-PCS | Mod: CPTII,S$GLB,, | Performed by: PHYSICIAN ASSISTANT

## 2021-07-13 PROCEDURE — 3008F BODY MASS INDEX DOCD: CPT | Mod: CPTII,S$GLB,, | Performed by: PHYSICIAN ASSISTANT

## 2021-07-13 PROCEDURE — 99204 PR OFFICE/OUTPT VISIT, NEW, LEVL IV, 45-59 MIN: ICD-10-PCS | Mod: S$GLB,,, | Performed by: PHYSICIAN ASSISTANT

## 2021-07-14 ENCOUNTER — OFFICE VISIT (OUTPATIENT)
Dept: PRIMARY CARE CLINIC | Facility: CLINIC | Age: 56
End: 2021-07-14
Payer: MEDICARE

## 2021-07-14 VITALS
OXYGEN SATURATION: 97 % | TEMPERATURE: 98 F | BODY MASS INDEX: 42.64 KG/M2 | HEART RATE: 60 BPM | WEIGHT: 255.94 LBS | RESPIRATION RATE: 19 BRPM | DIASTOLIC BLOOD PRESSURE: 78 MMHG | SYSTOLIC BLOOD PRESSURE: 106 MMHG | HEIGHT: 65 IN

## 2021-07-14 DIAGNOSIS — R91.1 SOLITARY PULMONARY NODULE: ICD-10-CM

## 2021-07-14 DIAGNOSIS — T30.0 FIRST DEGREE BURN INJURY: Primary | ICD-10-CM

## 2021-07-14 PROCEDURE — 99999 PR PBB SHADOW E&M-EST. PATIENT-LVL V: ICD-10-PCS | Mod: PBBFAC,,, | Performed by: NURSE PRACTITIONER

## 2021-07-14 PROCEDURE — 99999 PR PBB SHADOW E&M-EST. PATIENT-LVL V: CPT | Mod: PBBFAC,,, | Performed by: NURSE PRACTITIONER

## 2021-07-14 PROCEDURE — 99214 OFFICE O/P EST MOD 30 MIN: CPT | Mod: S$GLB,,, | Performed by: NURSE PRACTITIONER

## 2021-07-14 PROCEDURE — 3008F PR BODY MASS INDEX (BMI) DOCUMENTED: ICD-10-PCS | Mod: CPTII,S$GLB,, | Performed by: NURSE PRACTITIONER

## 2021-07-14 PROCEDURE — 99214 PR OFFICE/OUTPT VISIT, EST, LEVL IV, 30-39 MIN: ICD-10-PCS | Mod: S$GLB,,, | Performed by: NURSE PRACTITIONER

## 2021-07-14 PROCEDURE — 3008F BODY MASS INDEX DOCD: CPT | Mod: CPTII,S$GLB,, | Performed by: NURSE PRACTITIONER

## 2021-07-14 PROCEDURE — 1125F PR PAIN SEVERITY QUANTIFIED, PAIN PRESENT: ICD-10-PCS | Mod: S$GLB,,, | Performed by: NURSE PRACTITIONER

## 2021-07-14 PROCEDURE — 1125F AMNT PAIN NOTED PAIN PRSNT: CPT | Mod: S$GLB,,, | Performed by: NURSE PRACTITIONER

## 2021-07-14 RX ORDER — SILVER SULFADIAZINE 10 G/1000G
CREAM TOPICAL 2 TIMES DAILY
Qty: 50 G | Refills: 0 | Status: SHIPPED | OUTPATIENT
Start: 2021-07-14 | End: 2021-08-09

## 2021-07-14 RX ORDER — OXYCODONE AND ACETAMINOPHEN 5; 325 MG/1; MG/1
1 TABLET ORAL EVERY 6 HOURS PRN
Qty: 28 TABLET | Refills: 0 | Status: SHIPPED | OUTPATIENT
Start: 2021-07-14 | End: 2021-08-09

## 2021-07-16 ENCOUNTER — PATIENT MESSAGE (OUTPATIENT)
Dept: PRIMARY CARE CLINIC | Facility: CLINIC | Age: 56
End: 2021-07-16

## 2021-07-19 ENCOUNTER — TELEPHONE (OUTPATIENT)
Dept: HEPATOLOGY | Facility: CLINIC | Age: 56
End: 2021-07-19

## 2021-07-19 DIAGNOSIS — R93.5 ABNORMAL FINDINGS ON DIAGNOSTIC IMAGING OF OTHER ABDOMINAL REGIONS, INCLUDING RETROPERITONEUM: ICD-10-CM

## 2021-07-19 DIAGNOSIS — K52.9 INFLAMMATORY BOWEL DISEASE: ICD-10-CM

## 2021-07-19 DIAGNOSIS — R74.8 ELEVATED ALKALINE PHOSPHATASE LEVEL: Primary | ICD-10-CM

## 2021-07-21 ENCOUNTER — PATIENT MESSAGE (OUTPATIENT)
Dept: GASTROENTEROLOGY | Facility: CLINIC | Age: 56
End: 2021-07-21

## 2021-07-21 ENCOUNTER — TELEPHONE (OUTPATIENT)
Dept: GASTROENTEROLOGY | Facility: CLINIC | Age: 56
End: 2021-07-21

## 2021-07-30 ENCOUNTER — TELEPHONE (OUTPATIENT)
Dept: GASTROENTEROLOGY | Facility: CLINIC | Age: 56
End: 2021-07-30

## 2021-07-30 DIAGNOSIS — Z01.818 PRE-OP TESTING: ICD-10-CM

## 2021-08-05 ENCOUNTER — HOSPITAL ENCOUNTER (OUTPATIENT)
Dept: RADIOLOGY | Facility: HOSPITAL | Age: 56
Discharge: HOME | End: 2021-08-05
Attending: PHYSICIAN ASSISTANT
Payer: MEDICARE

## 2021-08-05 DIAGNOSIS — R74.8 ELEVATED ALKALINE PHOSPHATASE LEVEL: ICD-10-CM

## 2021-08-05 DIAGNOSIS — R93.5 ABNORMAL FINDINGS ON DIAGNOSTIC IMAGING OF OTHER ABDOMINAL REGIONS, INCLUDING RETROPERITONEUM: ICD-10-CM

## 2021-08-05 DIAGNOSIS — K76.9 LIVER DISEASE, UNSPECIFIED: ICD-10-CM

## 2021-08-05 DIAGNOSIS — K52.9 INFLAMMATORY BOWEL DISEASE: ICD-10-CM

## 2021-08-05 PROCEDURE — 76391 MR ELASTOGRAPHY: CPT | Mod: TC

## 2021-08-05 PROCEDURE — 76391 MR ELASTOGRAPHY: ICD-10-PCS | Mod: 26,,, | Performed by: RADIOLOGY

## 2021-08-05 PROCEDURE — 76376 3D RENDER W/INTRP POSTPROCES: CPT | Mod: 26,,, | Performed by: RADIOLOGY

## 2021-08-05 PROCEDURE — 74181 MRI ABDOMEN WITHOUT CONTRAST MRCP: ICD-10-PCS | Mod: 26,,, | Performed by: RADIOLOGY

## 2021-08-05 PROCEDURE — 76391 MR ELASTOGRAPHY: CPT | Mod: 26,,, | Performed by: RADIOLOGY

## 2021-08-05 PROCEDURE — 76376 MRI ABDOMEN WITHOUT CONTRAST MRCP: ICD-10-PCS | Mod: 26,,, | Performed by: RADIOLOGY

## 2021-08-05 PROCEDURE — 76376 3D RENDER W/INTRP POSTPROCES: CPT | Mod: TC

## 2021-08-05 PROCEDURE — 74181 MRI ABDOMEN W/O CONTRAST: CPT | Mod: 26,,, | Performed by: RADIOLOGY

## 2021-08-09 ENCOUNTER — OFFICE VISIT (OUTPATIENT)
Dept: HEPATOLOGY | Facility: CLINIC | Age: 56
End: 2021-08-09
Payer: MEDICARE

## 2021-08-09 ENCOUNTER — PATIENT OUTREACH (OUTPATIENT)
Dept: ADMINISTRATIVE | Facility: OTHER | Age: 56
End: 2021-08-09

## 2021-08-09 ENCOUNTER — TELEPHONE (OUTPATIENT)
Dept: HEPATOLOGY | Facility: CLINIC | Age: 56
End: 2021-08-09

## 2021-08-09 DIAGNOSIS — Z98.84 BARIATRIC SURGERY STATUS: ICD-10-CM

## 2021-08-09 DIAGNOSIS — K74.00 LIVER FIBROSIS: ICD-10-CM

## 2021-08-09 DIAGNOSIS — K52.9 INFLAMMATORY BOWEL DISEASE: ICD-10-CM

## 2021-08-09 DIAGNOSIS — E78.5 HYPERLIPIDEMIA, UNSPECIFIED HYPERLIPIDEMIA TYPE: Primary | ICD-10-CM

## 2021-08-09 DIAGNOSIS — K76.0 FATTY LIVER: ICD-10-CM

## 2021-08-09 PROCEDURE — 1160F PR REVIEW ALL MEDS BY PRESCRIBER/CLIN PHARMACIST DOCUMENTED: ICD-10-PCS | Mod: CPTII,95,, | Performed by: PHYSICIAN ASSISTANT

## 2021-08-09 PROCEDURE — 99214 OFFICE O/P EST MOD 30 MIN: CPT | Mod: 95,,, | Performed by: PHYSICIAN ASSISTANT

## 2021-08-09 PROCEDURE — 99214 PR OFFICE/OUTPT VISIT, EST, LEVL IV, 30-39 MIN: ICD-10-PCS | Mod: 95,,, | Performed by: PHYSICIAN ASSISTANT

## 2021-08-09 PROCEDURE — 1159F MED LIST DOCD IN RCRD: CPT | Mod: CPTII,95,, | Performed by: PHYSICIAN ASSISTANT

## 2021-08-09 PROCEDURE — 1159F PR MEDICATION LIST DOCUMENTED IN MEDICAL RECORD: ICD-10-PCS | Mod: CPTII,95,, | Performed by: PHYSICIAN ASSISTANT

## 2021-08-09 PROCEDURE — 1160F RVW MEDS BY RX/DR IN RCRD: CPT | Mod: CPTII,95,, | Performed by: PHYSICIAN ASSISTANT

## 2021-08-09 RX ORDER — CYANOCOBALAMIN (VITAMIN B-12) 500 MCG
TABLET ORAL
COMMUNITY
End: 2022-10-13

## 2021-08-09 RX ORDER — AZELASTINE 1 MG/ML
SPRAY, METERED NASAL
COMMUNITY
Start: 2021-06-17 | End: 2022-05-13

## 2021-08-12 ENCOUNTER — TELEPHONE (OUTPATIENT)
Dept: ENDOSCOPY | Facility: HOSPITAL | Age: 56
End: 2021-08-12

## 2021-08-12 DIAGNOSIS — R79.89 ABNORMAL LFTS: Primary | ICD-10-CM

## 2021-08-27 RX ORDER — TRAZODONE HYDROCHLORIDE 50 MG/1
TABLET ORAL
Qty: 90 TABLET | Refills: 1 | Status: SHIPPED | OUTPATIENT
Start: 2021-08-27 | End: 2022-03-03

## 2021-08-27 RX ORDER — CLONAZEPAM 1 MG/1
TABLET ORAL
Qty: 90 TABLET | Refills: 1 | Status: SHIPPED | OUTPATIENT
Start: 2021-08-27 | End: 2022-03-16 | Stop reason: SDUPTHER

## 2021-10-01 ENCOUNTER — TELEPHONE (OUTPATIENT)
Dept: ENDOSCOPY | Facility: HOSPITAL | Age: 56
End: 2021-10-01

## 2021-10-05 DIAGNOSIS — B37.9 CANDIDIASIS: ICD-10-CM

## 2021-10-05 RX ORDER — NYSTATIN 100000 [USP'U]/G
POWDER TOPICAL 2 TIMES DAILY
Qty: 1 BOTTLE | Refills: 6 | Status: SHIPPED | OUTPATIENT
Start: 2021-10-05 | End: 2022-08-29

## 2021-10-26 ENCOUNTER — PATIENT MESSAGE (OUTPATIENT)
Dept: ENDOSCOPY | Facility: HOSPITAL | Age: 56
End: 2021-10-26
Payer: MEDICAID

## 2021-10-26 ENCOUNTER — TELEPHONE (OUTPATIENT)
Dept: ENDOSCOPY | Facility: HOSPITAL | Age: 56
End: 2021-10-26
Payer: MEDICAID

## 2021-11-10 ENCOUNTER — ANESTHESIA EVENT (OUTPATIENT)
Dept: ENDOSCOPY | Facility: HOSPITAL | Age: 56
End: 2021-11-10
Payer: MEDICARE

## 2021-11-10 ENCOUNTER — ANESTHESIA (OUTPATIENT)
Dept: ENDOSCOPY | Facility: HOSPITAL | Age: 56
End: 2021-11-10
Payer: MEDICARE

## 2021-11-10 ENCOUNTER — HOSPITAL ENCOUNTER (OUTPATIENT)
Facility: HOSPITAL | Age: 56
Discharge: HOME | End: 2021-11-10
Attending: INTERNAL MEDICINE | Admitting: INTERNAL MEDICINE
Payer: MEDICARE

## 2021-11-10 VITALS
BODY MASS INDEX: 41.83 KG/M2 | RESPIRATION RATE: 20 BRPM | HEART RATE: 51 BPM | SYSTOLIC BLOOD PRESSURE: 107 MMHG | HEIGHT: 64 IN | OXYGEN SATURATION: 97 % | DIASTOLIC BLOOD PRESSURE: 52 MMHG | TEMPERATURE: 98 F | WEIGHT: 245 LBS

## 2021-11-10 DIAGNOSIS — K76.0 FATTY LIVER: Primary | ICD-10-CM

## 2021-11-10 DIAGNOSIS — R79.89 ELEVATED LIVER FUNCTION TESTS: ICD-10-CM

## 2021-11-10 PROCEDURE — 43239 EGD BIOPSY SINGLE/MULTIPLE: CPT | Performed by: INTERNAL MEDICINE

## 2021-11-10 PROCEDURE — 88313 SPECIAL STAINS GROUP 2: CPT | Mod: 26,,, | Performed by: PATHOLOGY

## 2021-11-10 PROCEDURE — 88307 TISSUE EXAM BY PATHOLOGIST: CPT | Performed by: PATHOLOGY

## 2021-11-10 PROCEDURE — 27202131 HC NEEDLE, FNB SINGLE (ANY): Performed by: INTERNAL MEDICINE

## 2021-11-10 PROCEDURE — D9220A PRA ANESTHESIA: Mod: CRNA,,, | Performed by: NURSE ANESTHETIST, CERTIFIED REGISTERED

## 2021-11-10 PROCEDURE — 25000003 PHARM REV CODE 250: Performed by: NURSE ANESTHETIST, CERTIFIED REGISTERED

## 2021-11-10 PROCEDURE — 63600175 PHARM REV CODE 636 W HCPCS: Performed by: NURSE ANESTHETIST, CERTIFIED REGISTERED

## 2021-11-10 PROCEDURE — 25000003 PHARM REV CODE 250: Performed by: INTERNAL MEDICINE

## 2021-11-10 PROCEDURE — D9220A PRA ANESTHESIA: Mod: ANES,,, | Performed by: ANESTHESIOLOGY

## 2021-11-10 PROCEDURE — 88307 PR  SURG PATH,LEVEL V: ICD-10-PCS | Mod: 26,,, | Performed by: PATHOLOGY

## 2021-11-10 PROCEDURE — 88307 TISSUE EXAM BY PATHOLOGIST: CPT | Mod: 26,,, | Performed by: PATHOLOGY

## 2021-11-10 PROCEDURE — 43242 PR UPGI ENDOSCOPY,FN NEEDLE BX,GUIDED: ICD-10-PCS | Mod: ,,, | Performed by: INTERNAL MEDICINE

## 2021-11-10 PROCEDURE — 43239 EGD BIOPSY SINGLE/MULTIPLE: CPT | Mod: 59,,, | Performed by: INTERNAL MEDICINE

## 2021-11-10 PROCEDURE — 37000008 HC ANESTHESIA 1ST 15 MINUTES: Performed by: INTERNAL MEDICINE

## 2021-11-10 PROCEDURE — 88342 IMHCHEM/IMCYTCHM 1ST ANTB: CPT | Performed by: PATHOLOGY

## 2021-11-10 PROCEDURE — 43239 PR EGD, FLEX, W/BIOPSY, SGL/MULTI: ICD-10-PCS | Mod: 59,,, | Performed by: INTERNAL MEDICINE

## 2021-11-10 PROCEDURE — 88305 TISSUE EXAM BY PATHOLOGIST: CPT | Mod: 26,,, | Performed by: PATHOLOGY

## 2021-11-10 PROCEDURE — 88313 SPECIAL STAINS GROUP 2: CPT | Mod: 59 | Performed by: PATHOLOGY

## 2021-11-10 PROCEDURE — 27201012 HC FORCEPS, HOT/COLD, DISP: Performed by: INTERNAL MEDICINE

## 2021-11-10 PROCEDURE — 43242 EGD US FINE NEEDLE BX/ASPIR: CPT | Mod: ,,, | Performed by: INTERNAL MEDICINE

## 2021-11-10 PROCEDURE — D9220A PRA ANESTHESIA: ICD-10-PCS | Mod: ANES,,, | Performed by: ANESTHESIOLOGY

## 2021-11-10 PROCEDURE — 88342 CHG IMMUNOCYTOCHEMISTRY: ICD-10-PCS | Mod: 26,,, | Performed by: PATHOLOGY

## 2021-11-10 PROCEDURE — 37000009 HC ANESTHESIA EA ADD 15 MINS: Performed by: INTERNAL MEDICINE

## 2021-11-10 PROCEDURE — 43242 EGD US FINE NEEDLE BX/ASPIR: CPT | Performed by: INTERNAL MEDICINE

## 2021-11-10 PROCEDURE — 88305 TISSUE EXAM BY PATHOLOGIST: ICD-10-PCS | Mod: 26,,, | Performed by: PATHOLOGY

## 2021-11-10 PROCEDURE — D9220A PRA ANESTHESIA: ICD-10-PCS | Mod: CRNA,,, | Performed by: NURSE ANESTHETIST, CERTIFIED REGISTERED

## 2021-11-10 PROCEDURE — 88305 TISSUE EXAM BY PATHOLOGIST: CPT | Performed by: PATHOLOGY

## 2021-11-10 PROCEDURE — 88342 IMHCHEM/IMCYTCHM 1ST ANTB: CPT | Mod: 26,,, | Performed by: PATHOLOGY

## 2021-11-10 PROCEDURE — 88313 PR  SPECIAL STAINS,GROUP II: ICD-10-PCS | Mod: 26,,, | Performed by: PATHOLOGY

## 2021-11-10 RX ORDER — SODIUM CHLORIDE 9 MG/ML
INJECTION, SOLUTION INTRAVENOUS CONTINUOUS
Status: DISCONTINUED | OUTPATIENT
Start: 2021-11-10 | End: 2021-11-10 | Stop reason: HOSPADM

## 2021-11-10 RX ORDER — PROPOFOL 10 MG/ML
VIAL (ML) INTRAVENOUS CONTINUOUS PRN
Status: DISCONTINUED | OUTPATIENT
Start: 2021-11-10 | End: 2021-11-10

## 2021-11-10 RX ORDER — PROPOFOL 10 MG/ML
VIAL (ML) INTRAVENOUS
Status: DISCONTINUED | OUTPATIENT
Start: 2021-11-10 | End: 2021-11-10

## 2021-11-10 RX ORDER — SODIUM CHLORIDE 9 MG/ML
INJECTION, SOLUTION INTRAVENOUS CONTINUOUS
Status: CANCELLED | OUTPATIENT
Start: 2021-11-10

## 2021-11-10 RX ORDER — LIDOCAINE HCL/PF 100 MG/5ML
SYRINGE (ML) INTRAVENOUS
Status: DISCONTINUED | OUTPATIENT
Start: 2021-11-10 | End: 2021-11-10

## 2021-11-10 RX ORDER — SODIUM CHLORIDE 0.9 % (FLUSH) 0.9 %
10 SYRINGE (ML) INJECTION
Status: CANCELLED | OUTPATIENT
Start: 2021-11-10

## 2021-11-10 RX ADMIN — Medication 200 MCG/KG/MIN: at 11:11

## 2021-11-10 RX ADMIN — Medication 100 MG: at 11:11

## 2021-11-10 RX ADMIN — GLYCOPYRROLATE 0.1 MG: 0.2 INJECTION, SOLUTION INTRAMUSCULAR; INTRAVITREAL at 11:11

## 2021-11-10 RX ADMIN — SODIUM CHLORIDE: 0.9 INJECTION, SOLUTION INTRAVENOUS at 11:11

## 2021-11-10 RX ADMIN — PROPOFOL 80 MG: 10 INJECTION, EMULSION INTRAVENOUS at 11:11

## 2021-11-12 ENCOUNTER — TELEPHONE (OUTPATIENT)
Dept: HEPATOLOGY | Facility: CLINIC | Age: 56
End: 2021-11-12
Payer: MEDICARE

## 2021-11-12 DIAGNOSIS — R74.8 ELEVATED ALKALINE PHOSPHATASE LEVEL: Primary | ICD-10-CM

## 2021-11-15 LAB
COMMENT: NORMAL
FINAL PATHOLOGIC DIAGNOSIS: NORMAL
GROSS: NORMAL
Lab: NORMAL
MICROSCOPIC EXAM: NORMAL

## 2021-11-16 LAB
FINAL PATHOLOGIC DIAGNOSIS: NORMAL
Lab: NORMAL

## 2021-12-01 ENCOUNTER — PATIENT OUTREACH (OUTPATIENT)
Dept: ADMINISTRATIVE | Facility: OTHER | Age: 56
End: 2021-12-01
Payer: MEDICARE

## 2021-12-02 ENCOUNTER — TELEPHONE (OUTPATIENT)
Dept: HEPATOLOGY | Facility: CLINIC | Age: 56
End: 2021-12-02
Payer: MEDICARE

## 2021-12-02 ENCOUNTER — PATIENT MESSAGE (OUTPATIENT)
Dept: HEPATOLOGY | Facility: CLINIC | Age: 56
End: 2021-12-02

## 2021-12-02 ENCOUNTER — OFFICE VISIT (OUTPATIENT)
Dept: HEPATOLOGY | Facility: CLINIC | Age: 56
End: 2021-12-02
Payer: MEDICARE

## 2021-12-02 DIAGNOSIS — R74.8 ELEVATED ALKALINE PHOSPHATASE LEVEL: ICD-10-CM

## 2021-12-02 DIAGNOSIS — K74.02 HEPATIC FIBROSIS, ADVANCED FIBROSIS: ICD-10-CM

## 2021-12-02 DIAGNOSIS — K74.00 LIVER FIBROSIS: ICD-10-CM

## 2021-12-02 DIAGNOSIS — K52.9 INFLAMMATORY BOWEL DISEASE: ICD-10-CM

## 2021-12-02 DIAGNOSIS — K74.3 PRIMARY BILIARY CHOLANGITIS: ICD-10-CM

## 2021-12-02 DIAGNOSIS — K83.09 CHOLANGITIS: Primary | ICD-10-CM

## 2021-12-02 DIAGNOSIS — Z79.52 LONG TERM (CURRENT) USE OF SYSTEMIC STEROIDS: ICD-10-CM

## 2021-12-02 PROCEDURE — 99215 PR OFFICE/OUTPT VISIT, EST, LEVL V, 40-54 MIN: ICD-10-PCS | Mod: 95,,, | Performed by: PHYSICIAN ASSISTANT

## 2021-12-02 PROCEDURE — 99215 OFFICE O/P EST HI 40 MIN: CPT | Mod: 95,,, | Performed by: PHYSICIAN ASSISTANT

## 2021-12-02 RX ORDER — URSODIOL 500 MG/1
500 TABLET, FILM COATED ORAL 3 TIMES DAILY
Qty: 270 TABLET | Refills: 3 | Status: SHIPPED | OUTPATIENT
Start: 2021-12-02 | End: 2022-11-03

## 2021-12-06 ENCOUNTER — PATIENT MESSAGE (OUTPATIENT)
Dept: HEPATOLOGY | Facility: CLINIC | Age: 56
End: 2021-12-06
Payer: MEDICARE

## 2021-12-06 ENCOUNTER — TELEPHONE (OUTPATIENT)
Dept: HEPATOLOGY | Facility: CLINIC | Age: 56
End: 2021-12-06
Payer: MEDICARE

## 2021-12-06 DIAGNOSIS — Q78.9: Primary | ICD-10-CM

## 2021-12-06 DIAGNOSIS — M81.0 OSTEOPOROSIS, UNSPECIFIED OSTEOPOROSIS TYPE, UNSPECIFIED PATHOLOGICAL FRACTURE PRESENCE: ICD-10-CM

## 2021-12-08 ENCOUNTER — TELEPHONE (OUTPATIENT)
Dept: HEPATOLOGY | Facility: CLINIC | Age: 56
End: 2021-12-08
Payer: MEDICARE

## 2021-12-09 ENCOUNTER — CONFERENCE (OUTPATIENT)
Dept: TRANSPLANT | Facility: CLINIC | Age: 56
End: 2021-12-09
Payer: MEDICARE

## 2021-12-10 ENCOUNTER — PATIENT MESSAGE (OUTPATIENT)
Dept: PRIMARY CARE CLINIC | Facility: CLINIC | Age: 56
End: 2021-12-10
Payer: MEDICARE

## 2021-12-14 ENCOUNTER — OFFICE VISIT (OUTPATIENT)
Dept: PRIMARY CARE CLINIC | Facility: CLINIC | Age: 56
End: 2021-12-14
Payer: MEDICARE

## 2021-12-14 VITALS
RESPIRATION RATE: 18 BRPM | WEIGHT: 249.13 LBS | HEIGHT: 64 IN | BODY MASS INDEX: 42.53 KG/M2 | SYSTOLIC BLOOD PRESSURE: 110 MMHG | DIASTOLIC BLOOD PRESSURE: 68 MMHG | HEART RATE: 52 BPM | OXYGEN SATURATION: 98 %

## 2021-12-14 DIAGNOSIS — R30.0 DYSURIA: Primary | ICD-10-CM

## 2021-12-14 PROCEDURE — 99214 OFFICE O/P EST MOD 30 MIN: CPT | Mod: 25,S$GLB,, | Performed by: FAMILY MEDICINE

## 2021-12-14 PROCEDURE — 99358 PR PROLONGED SERV,NO CONTACT,1ST HR: ICD-10-PCS | Mod: S$GLB,,, | Performed by: INTERNAL MEDICINE

## 2021-12-14 PROCEDURE — 99214 PR OFFICE/OUTPT VISIT, EST, LEVL IV, 30-39 MIN: ICD-10-PCS | Mod: 25,S$GLB,, | Performed by: FAMILY MEDICINE

## 2021-12-14 PROCEDURE — 81001 URINALYSIS AUTO W/SCOPE: CPT | Mod: S$GLB,,, | Performed by: FAMILY MEDICINE

## 2021-12-14 PROCEDURE — 81001 POCT URINALYSIS, DIPSTICK OR TABLET REAGENT, AUTOMATED, WITH MICROSCOP: ICD-10-PCS | Mod: S$GLB,,, | Performed by: FAMILY MEDICINE

## 2021-12-14 PROCEDURE — 99999 PR PBB SHADOW E&M-EST. PATIENT-LVL IV: CPT | Mod: PBBFAC,,, | Performed by: FAMILY MEDICINE

## 2021-12-14 PROCEDURE — 99999 PR PBB SHADOW E&M-EST. PATIENT-LVL IV: ICD-10-PCS | Mod: PBBFAC,,, | Performed by: FAMILY MEDICINE

## 2021-12-14 PROCEDURE — 99358 PROLONG SERVICE W/O CONTACT: CPT | Mod: S$GLB,,, | Performed by: INTERNAL MEDICINE

## 2021-12-14 PROCEDURE — 87086 URINE CULTURE/COLONY COUNT: CPT | Performed by: FAMILY MEDICINE

## 2021-12-14 RX ORDER — PHENAZOPYRIDINE HYDROCHLORIDE 200 MG/1
200 TABLET, FILM COATED ORAL 3 TIMES DAILY
Qty: 6 TABLET | Refills: 0 | Status: SHIPPED | OUTPATIENT
Start: 2021-12-14 | End: 2021-12-16

## 2021-12-15 ENCOUNTER — OFFICE VISIT (OUTPATIENT)
Dept: GASTROENTEROLOGY | Facility: CLINIC | Age: 56
End: 2021-12-15
Payer: MEDICARE

## 2021-12-15 VITALS
HEART RATE: 59 BPM | TEMPERATURE: 98 F | WEIGHT: 259.5 LBS | BODY MASS INDEX: 44.54 KG/M2 | SYSTOLIC BLOOD PRESSURE: 120 MMHG | DIASTOLIC BLOOD PRESSURE: 66 MMHG | OXYGEN SATURATION: 98 %

## 2021-12-15 DIAGNOSIS — K52.9 INFLAMMATORY BOWEL DISEASE: Primary | ICD-10-CM

## 2021-12-15 PROCEDURE — 99215 OFFICE O/P EST HI 40 MIN: CPT | Mod: S$GLB,,, | Performed by: INTERNAL MEDICINE

## 2021-12-15 PROCEDURE — 99215 PR OFFICE/OUTPT VISIT, EST, LEVL V, 40-54 MIN: ICD-10-PCS | Mod: S$GLB,,, | Performed by: INTERNAL MEDICINE

## 2021-12-15 RX ORDER — GABAPENTIN 300 MG/1
300 CAPSULE ORAL 3 TIMES DAILY
Qty: 90 CAPSULE | Refills: 11 | Status: SHIPPED | OUTPATIENT
Start: 2021-12-15 | End: 2022-05-09

## 2021-12-16 LAB — BACTERIA UR CULT: NO GROWTH

## 2021-12-21 ENCOUNTER — PATIENT MESSAGE (OUTPATIENT)
Dept: GASTROENTEROLOGY | Facility: CLINIC | Age: 56
End: 2021-12-21
Payer: MEDICARE

## 2021-12-21 ENCOUNTER — TELEPHONE (OUTPATIENT)
Dept: GASTROENTEROLOGY | Facility: CLINIC | Age: 56
End: 2021-12-21
Payer: MEDICARE

## 2021-12-26 RX ORDER — PANTOPRAZOLE SODIUM 40 MG/1
TABLET, DELAYED RELEASE ORAL
Qty: 90 TABLET | Refills: 3 | Status: SHIPPED | OUTPATIENT
Start: 2021-12-26 | End: 2022-08-29

## 2021-12-26 RX ORDER — LORATADINE 10 MG/1
TABLET ORAL
Qty: 90 TABLET | Refills: 3 | Status: SHIPPED | OUTPATIENT
Start: 2021-12-26 | End: 2022-11-11 | Stop reason: SDUPTHER

## 2022-01-27 DIAGNOSIS — J45.909 ASTHMA, UNSPECIFIED ASTHMA SEVERITY, UNSPECIFIED WHETHER COMPLICATED, UNSPECIFIED WHETHER PERSISTENT: ICD-10-CM

## 2022-01-27 NOTE — TELEPHONE ENCOUNTER
No new care gaps identified.  Powered by [employer]. Reference number: [id number].   1/27/2022 12:00:58 PM CST

## 2022-01-27 NOTE — TELEPHONE ENCOUNTER
No new care gaps identified.  Powered by [employer] by [employer]. Reference number: [id number].   1/27/2022 12:00:08 PM CST

## 2022-01-31 ENCOUNTER — PATIENT MESSAGE (OUTPATIENT)
Dept: PRIMARY CARE CLINIC | Facility: CLINIC | Age: 57
End: 2022-01-31
Payer: MEDICARE

## 2022-02-02 ENCOUNTER — TELEPHONE (OUTPATIENT)
Dept: PRIMARY CARE CLINIC | Facility: CLINIC | Age: 57
End: 2022-02-02
Payer: MEDICARE

## 2022-02-02 ENCOUNTER — TELEPHONE (OUTPATIENT)
Dept: HEPATOLOGY | Facility: CLINIC | Age: 57
End: 2022-02-02
Payer: MEDICARE

## 2022-02-02 DIAGNOSIS — K74.3 PRIMARY BILIARY CHOLANGITIS: Primary | ICD-10-CM

## 2022-02-02 NOTE — TELEPHONE ENCOUNTER
Pt notified that we should get a response today as I sent the refill request to Dr. Jones. Stated understanding.

## 2022-02-02 NOTE — TELEPHONE ENCOUNTER
MA contacted pt and rescheduled follow up w labs prior- cancel her 2/14/2022 visit and reschedule in May 2022 with labs a few days prior ([facility]). Thanks   /lactation/respiratory therapy

## 2022-02-02 NOTE — TELEPHONE ENCOUNTER
Call placed to patient   Labs looking better in regards to alk phos  She is taking protein shake every day, albumin still a bit low   Encouraged her to continue  Can cancel upcoming video visit and follow-up in 3 months with repeat liver labs.   Patient agreeable and no complaints today.

## 2022-02-02 NOTE — TELEPHONE ENCOUNTER
----- Message from Rupal Fracnes sent at 2/2/2022  9:36 AM CST -----  Contact: [phone] Ida  Patient is following up on refill request sent from portal and left a message through the call center. Please call and advise. Patient states she need her inhalers.

## 2022-02-08 ENCOUNTER — TELEPHONE (OUTPATIENT)
Dept: HEPATOLOGY | Facility: CLINIC | Age: 57
End: 2022-02-08
Payer: MEDICARE

## 2022-02-08 DIAGNOSIS — Z23 NEED FOR PROPHYLACTIC VACCINATION AGAINST HEPATITIS A AND HEPATITIS B: Primary | ICD-10-CM

## 2022-02-10 RX ORDER — ALBUTEROL SULFATE 90 UG/1
2 AEROSOL, METERED RESPIRATORY (INHALATION) EVERY 4 HOURS PRN
Qty: 8 G | Refills: 5 | OUTPATIENT
Start: 2022-02-10

## 2022-02-10 RX ORDER — BUDESONIDE AND FORMOTEROL FUMARATE DIHYDRATE 160; 4.5 UG/1; UG/1
2 AEROSOL RESPIRATORY (INHALATION) EVERY 12 HOURS
Qty: 11 G | Refills: 5 | OUTPATIENT
Start: 2022-02-10

## 2022-02-15 ENCOUNTER — OFFICE VISIT (OUTPATIENT)
Dept: PRIMARY CARE CLINIC | Facility: CLINIC | Age: 57
End: 2022-02-15
Payer: MEDICARE

## 2022-02-15 VITALS
WEIGHT: 260.13 LBS | RESPIRATION RATE: 16 BRPM | HEIGHT: 64 IN | OXYGEN SATURATION: 96 % | SYSTOLIC BLOOD PRESSURE: 118 MMHG | HEART RATE: 63 BPM | BODY MASS INDEX: 44.41 KG/M2 | DIASTOLIC BLOOD PRESSURE: 66 MMHG

## 2022-02-15 DIAGNOSIS — S60.132A CONTUSION OF LEFT MIDDLE FINGER WITH DAMAGE TO NAIL, INITIAL ENCOUNTER: Primary | ICD-10-CM

## 2022-02-15 DIAGNOSIS — K50.119 CROHN'S DISEASE OF LARGE INTESTINE WITH COMPLICATION: ICD-10-CM

## 2022-02-15 DIAGNOSIS — M25.532 LEFT WRIST PAIN: ICD-10-CM

## 2022-02-15 DIAGNOSIS — G56.02 CARPAL TUNNEL SYNDROME OF LEFT WRIST: ICD-10-CM

## 2022-02-15 PROCEDURE — 3078F DIAST BP <80 MM HG: CPT | Mod: CPTII,S$GLB,, | Performed by: INTERNAL MEDICINE

## 2022-02-15 PROCEDURE — 3074F PR MOST RECENT SYSTOLIC BLOOD PRESSURE < 130 MM HG: ICD-10-PCS | Mod: CPTII,S$GLB,, | Performed by: INTERNAL MEDICINE

## 2022-02-15 PROCEDURE — 1159F PR MEDICATION LIST DOCUMENTED IN MEDICAL RECORD: ICD-10-PCS | Mod: CPTII,S$GLB,, | Performed by: INTERNAL MEDICINE

## 2022-02-15 PROCEDURE — 99999 PR PBB SHADOW E&M-EST. PATIENT-LVL V: ICD-10-PCS | Mod: PBBFAC,,, | Performed by: INTERNAL MEDICINE

## 2022-02-15 PROCEDURE — 1160F RVW MEDS BY RX/DR IN RCRD: CPT | Mod: CPTII,S$GLB,, | Performed by: INTERNAL MEDICINE

## 2022-02-15 PROCEDURE — 1160F PR REVIEW ALL MEDS BY PRESCRIBER/CLIN PHARMACIST DOCUMENTED: ICD-10-PCS | Mod: CPTII,S$GLB,, | Performed by: INTERNAL MEDICINE

## 2022-02-15 PROCEDURE — 99214 OFFICE O/P EST MOD 30 MIN: CPT | Mod: S$GLB,,, | Performed by: INTERNAL MEDICINE

## 2022-02-15 PROCEDURE — 99999 PR PBB SHADOW E&M-EST. PATIENT-LVL V: CPT | Mod: PBBFAC,,, | Performed by: INTERNAL MEDICINE

## 2022-02-15 PROCEDURE — 99214 PR OFFICE/OUTPT VISIT, EST, LEVL IV, 30-39 MIN: ICD-10-PCS | Mod: S$GLB,,, | Performed by: INTERNAL MEDICINE

## 2022-02-15 PROCEDURE — 3008F PR BODY MASS INDEX (BMI) DOCUMENTED: ICD-10-PCS | Mod: CPTII,S$GLB,, | Performed by: INTERNAL MEDICINE

## 2022-02-15 PROCEDURE — 3078F PR MOST RECENT DIASTOLIC BLOOD PRESSURE < 80 MM HG: ICD-10-PCS | Mod: CPTII,S$GLB,, | Performed by: INTERNAL MEDICINE

## 2022-02-15 PROCEDURE — 3008F BODY MASS INDEX DOCD: CPT | Mod: CPTII,S$GLB,, | Performed by: INTERNAL MEDICINE

## 2022-02-15 PROCEDURE — 1159F MED LIST DOCD IN RCRD: CPT | Mod: CPTII,S$GLB,, | Performed by: INTERNAL MEDICINE

## 2022-02-15 PROCEDURE — 3074F SYST BP LT 130 MM HG: CPT | Mod: CPTII,S$GLB,, | Performed by: INTERNAL MEDICINE

## 2022-02-15 RX ORDER — DEXAMETHASONE 4 MG/1
4 TABLET ORAL EVERY 12 HOURS
Qty: 14 TABLET | Refills: 0 | Status: SHIPPED | OUTPATIENT
Start: 2022-02-15 | End: 2022-05-09

## 2022-02-15 NOTE — PROGRESS NOTES
Subjective:       Patient ID: Ida Orozco is a 56 y.o. female.    Chief Complaint: Wrist Injury (Left washer machine lid fell on it) and Hand Pain (Left middle finger)    HPI  Pt with h/o 2 weeks ago helping mom out and her washer machine felt on pt left ahnd finfers and wrist she ha sbeen having pain and swelling mostly at 3 rd midlle finger swelling better but pt still ha svery tendre spot with touch sl distal to PIP joint in the back of finger her hand hurt when she try make a fist and hand crisp also very weak has been dropping things She ha snot seen  By ortho yet. She ha sh/o Crohn ds s/p colonectomy with eliostomy working well she is not on any maintanence therapy only get tx for Crohn when there Is flare up. Pt also ha sh/o osteoporoses but not tolerating po med fosamax await approval for IM injection or IV . Her joint sometime hurt when Crohn flare up  Review of Systems    Objective:      Physical Exam  Vitals and nursing note reviewed.   Constitutional:       General: She is not in acute distress.     Appearance: Normal appearance. She is well-developed and well-nourished. She is obese.   HENT:      Head: Normocephalic and atraumatic.      Right Ear: External ear normal.      Left Ear: External ear normal.      Nose: Nose normal.      Mouth/Throat:      Mouth: Oropharynx is clear and moist.      Pharynx: No oropharyngeal exudate.   Eyes:      Extraocular Movements: Extraocular movements intact and EOM normal.      Conjunctiva/sclera: Conjunctivae normal.      Pupils: Pupils are equal, round, and reactive to light.   Neck:      Thyroid: No thyromegaly.   Cardiovascular:      Rate and Rhythm: Normal rate and regular rhythm.      Pulses: Intact distal pulses.      Heart sounds: Normal heart sounds. No murmur heard.  No friction rub. No gallop.    Pulmonary:      Effort: Pulmonary effort is normal. No respiratory distress.      Breath sounds: Normal breath sounds. No wheezing or rales.   Chest:      Chest  wall: No tenderness.   Abdominal:      General: Bowel sounds are normal. There is no distension.      Palpations: Abdomen is soft.      Tenderness: There is no abdominal tenderness.      Comments: ileostomy working well    Musculoskeletal:         General: No tenderness, deformity or edema. Normal range of motion.      Cervical back: Normal range of motion and neck supple.   Lymphadenopathy:      Cervical: No cervical adenopathy.   Skin:     General: Skin is warm and dry.      Findings: No erythema or rash.   Neurological:      Mental Status: She is alert and oriented to person, place, and time.   Psychiatric:         Mood and Affect: Mood and affect normal.         Thought Content: Thought content normal.         Judgment: Judgment normal.         Assessment:       1. Contusion of left middle finger with damage to nail, initial encounter    2. Left wrist pain    3. Carpal tunnel syndrome of left wrist    4. Crohn's disease of large intestine with complication        Plan:       Contusion of left middle finger with damage to nail, initial encounter  Comments:  r/o hair line fx since still painful after 2 weeks  Orders:  -     X-Ray Finger 2 or More Views Left; Future; Expected date: 02/15/2022  -     dexAMETHasone (DECADRON) 4 MG Tab; Take 1 tablet (4 mg total) by mouth every 12 (twelve) hours.  Dispense: 14 tablet; Refill: 0    Left wrist pain  -     X-Ray Wrist 2 View Left; Future; Expected date: 02/15/2022  -     dexAMETHasone (DECADRON) 4 MG Tab; Take 1 tablet (4 mg total) by mouth every 12 (twelve) hours.  Dispense: 14 tablet; Refill: 0    Carpal tunnel syndrome of left wrist  -     X-Ray Hand 2 View Left; Future; Expected date: 02/15/2022    Crohn's disease of large intestine with complication  Comments:  continue with tx when flare up         Medication List with Changes/Refills   New Medications    DEXAMETHASONE (DECADRON) 4 MG TAB    Take 1 tablet (4 mg total) by mouth every 12 (twelve) hours.   Current  Medications    ALBUTEROL (PROVENTIL/VENTOLIN HFA) 90 MCG/ACTUATION INHALER    Inhale 2 puffs into the lungs every 4 (four) hours as needed for Wheezing or Shortness of Breath. Rescue    AZELASTINE (ASTELIN) 137 MCG (0.1 %) NASAL SPRAY        BUDESONIDE-FORMOTEROL 160-4.5 MCG (SYMBICORT) 160-4.5 MCG/ACTUATION HFAA    Inhale 2 puffs into the lungs every 12 (twelve) hours. Controller    CLONAZEPAM (KLONOPIN) 1 MG TABLET    TAKE ONE TABLET BY MOUTH ONCE DAILY    CYANOCOBALAMIN 1,000 MCG/ML INJECTION    1 cc SQ daily x 1 week, weekly x 4 weeks, then once a month    DICLOFENAC SODIUM (VOLTAREN) 1 % GEL    Apply 2 g topically 4 (four) times daily.    GABAPENTIN (NEURONTIN) 300 MG CAPSULE    Take 1 capsule (300 mg total) by mouth 3 (three) times daily.    LORATADINE (CLARITIN) 10 MG TABLET    TAKE ONE TABLET BY MOUTH ONCE DAILY    MELATONIN 1 MG TAB    Take by mouth.    MONTELUKAST (SINGULAIR) 10 MG TABLET    TAKE ONE TABLET BY MOUTH EVERY EVENING    MULTIVITAMIN/FOLIC ACID/BIOTIN (HAIR-SKIN-NAILS, MV-FA-BIOTIN, ORAL)    Take by mouth.    NYSTATIN (MYCOSTATIN) POWDER    Apply topically 2 (two) times daily.    PANTOPRAZOLE (PROTONIX) 40 MG TABLET    TAKE ONE TABLET BY MOUTH ONCE DAILY    PROMETHAZINE (PHENERGAN) 25 MG TABLET    Take 1 tablet (25 mg total) by mouth every 6 (six) hours as needed for Nausea.    SERTRALINE (ZOLOFT) 100 MG TABLET    TAKE ONE TABLET BY MOUTH EVERY EVENING    SIMETHICONE (MYLICON) 125 MG CHEWABLE TABLET    Take 125 mg by mouth every 6 (six) hours as needed for Flatulence.    TOPIRAMATE (TOPAMAX) 100 MG TABLET    Take 1 tablet (100 mg total) by mouth once daily.    TRAZODONE (DESYREL) 50 MG TABLET    TAKE ONE TABLET BY MOUTH EVERY EVENING    URSODIOL (ACTIGALL) 500 MG TABLET    Take 1 tablet (500 mg total) by mouth 3 (three) times daily.    VITAMIN D2 1,250 MCG (50,000 UNIT) CAPSULE    TAKE ONE CAPSULE BY MOUTH EVERY 7 DAYS

## 2022-02-16 NOTE — PROGRESS NOTES
Patient, Ida Orozco (MRN #[id number]), presented with a recorded BMI of 44.65 kg/m^2 consistent with the definition of morbid obesity (ICD-10 E66.01). The patient's morbid obesity was monitored, evaluated, addressed and/or treated. This addendum to the medical record is made on 02/16/2022.

## 2022-02-25 NOTE — TELEPHONE ENCOUNTER
No new care gaps identified.  Powered by [employer] by "[employer]. Reference number: [id number].   2/25/2022 8:28:51 AM CST

## 2022-02-28 ENCOUNTER — PATIENT MESSAGE (OUTPATIENT)
Dept: PRIMARY CARE CLINIC | Facility: CLINIC | Age: 57
End: 2022-02-28
Payer: MEDICARE

## 2022-02-28 DIAGNOSIS — G47.30 SLEEP APNEA, UNSPECIFIED TYPE: ICD-10-CM

## 2022-02-28 DIAGNOSIS — F41.9 ANXIETY: Primary | ICD-10-CM

## 2022-02-28 RX ORDER — CLONAZEPAM 1 MG/1
1 TABLET ORAL DAILY
Qty: 30 TABLET | Refills: 0 | OUTPATIENT
Start: 2022-02-28

## 2022-02-28 RX ORDER — TRAZODONE HYDROCHLORIDE 50 MG/1
50 TABLET ORAL NIGHTLY
Qty: 30 TABLET | Refills: 0 | OUTPATIENT
Start: 2022-02-28

## 2022-02-28 NOTE — TELEPHONE ENCOUNTER
No new care gaps identified.  Powered by [employer]. Reference number: [id number].   2/28/2022 11:55:03 AM CST

## 2022-03-01 NOTE — TELEPHONE ENCOUNTER
Refill Routing Note   Medication(s) are not appropriate for processing by [facility] for the following reason(s):      - Outside of protocol    OR action(s):  Defer  Route       Medication Therapy Plan: DEFER: trazodone (dx outside [facility] protocol scope) ROUTE: clonazepam (non-delegated)  --->Care Gap information included in message below if applicable.   Medication reconciliation completed: No   Automatic Epic Generated Protocol Data:        Requested Prescriptions   Pending Prescriptions Disp Refills    clonazePAM (KLONOPIN) 1 MG tablet [Pharmacy Med Name: CLONAZEPAM 1 MG          TAB] 90 tablet      Sig: TAKE ONE TABLET BY MOUTH ONCE DAILY       Psychiatry: Anxiolytics/Hypnotics Failed - 2/25/2022  8:28 AM        Failed - This refill cannot be delegated        Passed - Valid encounter within last 6 months     Recent Visits  Date Type Provider Dept   12/14/21 Office Visit Eliecer Jones MD [facility]   05/25/21 Office Visit Eliecer Jones MD [facility]   01/07/21 Office Visit Eliecer Jones MD [facility]   12/14/20 Office Visit Eliecer Jones MD [facility]   09/18/20 Office Visit Eliecer Jones MD [facility]   05/11/20 Office Visit Eliecer Jones MD [facility]   Showing recent visits within past 720 days and meeting all other requirements  Future Appointments  No visits were found meeting these conditions.  Showing future appointments within next 150 days and meeting all other requirements      Future Appointments              In 2 days MD [facility] - Endo [facility] 6th Fl, [facility]    In 2 weeks Eliecer Jones MD [facility] - [facility] [address]    In 2 months LAB, [facility] - Lab (Hospital[facility]    In 2 months Milena Catherine PA-C [facility] - 1st Fl, [facility]                Passed - Patient is less than 65 years old        Passed - Manual  Review: Verify controlled substance agreement completed in the last 12 months          traZODone (DESYREL) 50 MG tablet [Pharmacy Med Name: TRAZODONE 50 MG     TAB] 90 tablet 1     Sig: TAKE ONE TABLET BY MOUTH EVERY EVENING       Psychiatry: Antidepressants - Serotonin Modulator Passed - 2/25/2022  8:28 AM        Passed - Patient is at least 18 years old        Passed - Valid encounter within last 15 months     Recent Visits  Date Type Provider Dept   12/14/21 Office Visit Eliecer Jones MD [facility]   05/25/21 Office Visit Eliecer Jones MD [facility]   01/07/21 Office Visit [facility]ie[facility] MD [facility]   12/14/20 Office Visit Eliecer Jones MD [facility]   09/18/20 Office Visit Eliecer Jones MD [facility]   05/11/20 Office Visit Eliecer Jones MD [facility]   Showing recent visits within past 720 days and meeting all other requirements  Future Appointments  No visits were found meeting these conditions.  Showing future appointments within next 150 days and meeting all other requirements      Future Appointments              In 2 days MD [facility] - Endo [facility] 6th Fl, [facility]    In 2 weeks Eliecer Jones MD [facility] [address], [facility]    [facility] 2 months [facility]    In 2 months [facility] - 1st Fl, [facility]                      Appointments  past 12m or future 3m with PCP    Date Provider   Last Visit   12/14/2021 Eliecer Jones MD   Next Visit   3/16/2022 Eliecer Jones MD   ED visits in past 90 days: 0        Note composed:3:19 PM 03/01/2022

## 2022-03-03 ENCOUNTER — PATIENT MESSAGE (OUTPATIENT)
Dept: ADMINISTRATIVE | Facility: OTHER | Age: 57
End: 2022-03-03
Payer: MEDICARE

## 2022-03-03 ENCOUNTER — PATIENT OUTREACH (OUTPATIENT)
Dept: ADMINISTRATIVE | Facility: OTHER | Age: 57
End: 2022-03-03
Payer: MEDICARE

## 2022-03-03 DIAGNOSIS — Z12.31 ENCOUNTER FOR SCREENING MAMMOGRAM FOR MALIGNANT NEOPLASM OF BREAST: Primary | ICD-10-CM

## 2022-03-03 RX ORDER — TRAZODONE HYDROCHLORIDE 50 MG/1
TABLET ORAL
Qty: 90 TABLET | Refills: 1 | Status: SHIPPED | OUTPATIENT
Start: 2022-03-03 | End: 2022-03-16

## 2022-03-03 RX ORDER — CLONAZEPAM 1 MG/1
TABLET ORAL
Qty: 90 TABLET | OUTPATIENT
Start: 2022-03-03

## 2022-03-03 NOTE — PROGRESS NOTES
Health Maintenance Due   Topic Date Due    Shingles Vaccine (1 of 2) Never done    COVID-19 Vaccine (3 - Booster for Pfizer series) 09/16/2021    Mammogram  03/05/2022     Updates were requested from care everywhere.  Chart was reviewed for overdue Proactive [facility] Encounters ([facility]) topics (CRS, Breast Cancer Screening, Eye exam)  Health Maintenance has been updated.  LINKS immunization registry triggered.  Immunizations were reconciled.  Mammogram ordered/task ticket sent.

## 2022-03-16 ENCOUNTER — OFFICE VISIT (OUTPATIENT)
Dept: PRIMARY CARE CLINIC | Facility: CLINIC | Age: 57
End: 2022-03-16
Payer: MEDICARE

## 2022-03-16 DIAGNOSIS — F33.0 MILD EPISODE OF RECURRENT MAJOR DEPRESSIVE DISORDER: Primary | ICD-10-CM

## 2022-03-16 DIAGNOSIS — G47.00 INSOMNIA, UNSPECIFIED TYPE: ICD-10-CM

## 2022-03-16 DIAGNOSIS — F41.9 ANXIETY: ICD-10-CM

## 2022-03-16 PROCEDURE — 99214 OFFICE O/P EST MOD 30 MIN: CPT | Mod: 95,,, | Performed by: FAMILY MEDICINE

## 2022-03-16 PROCEDURE — 1160F PR REVIEW ALL MEDS BY PRESCRIBER/CLIN PHARMACIST DOCUMENTED: ICD-10-PCS | Mod: CPTII,95,, | Performed by: FAMILY MEDICINE

## 2022-03-16 PROCEDURE — 1159F MED LIST DOCD IN RCRD: CPT | Mod: CPTII,95,, | Performed by: FAMILY MEDICINE

## 2022-03-16 PROCEDURE — 1160F RVW MEDS BY RX/DR IN RCRD: CPT | Mod: CPTII,95,, | Performed by: FAMILY MEDICINE

## 2022-03-16 PROCEDURE — 1159F PR MEDICATION LIST DOCUMENTED IN MEDICAL RECORD: ICD-10-PCS | Mod: CPTII,95,, | Performed by: FAMILY MEDICINE

## 2022-03-16 PROCEDURE — 99214 PR OFFICE/OUTPT VISIT, EST, LEVL IV, 30-39 MIN: ICD-10-PCS | Mod: 95,,, | Performed by: FAMILY MEDICINE

## 2022-03-16 RX ORDER — TRAZODONE HYDROCHLORIDE 100 MG/1
100 TABLET ORAL NIGHTLY PRN
Qty: 90 TABLET | Refills: 1 | Status: SHIPPED | OUTPATIENT
Start: 2022-03-16 | End: 2022-09-09

## 2022-03-16 RX ORDER — CLONAZEPAM 1 MG/1
1 TABLET ORAL DAILY PRN
Qty: 90 TABLET | Refills: 1 | Status: SHIPPED | OUTPATIENT
Start: 2022-03-16 | End: 2022-06-02

## 2022-03-16 RX ORDER — SERTRALINE HYDROCHLORIDE 100 MG/1
200 TABLET, FILM COATED ORAL DAILY
Qty: 180 TABLET | Refills: 1 | Status: SHIPPED | OUTPATIENT
Start: 2022-03-16 | End: 2022-06-02
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
## 2022-03-17 ENCOUNTER — TELEPHONE (OUTPATIENT)
Dept: ENDOCRINOLOGY | Facility: CLINIC | Age: 57
End: 2022-03-17

## 2022-03-17 ENCOUNTER — OFFICE VISIT (OUTPATIENT)
Dept: ENDOCRINOLOGY | Facility: CLINIC | Age: 57
End: 2022-03-17
Payer: MEDICARE

## 2022-03-17 VITALS
WEIGHT: 262.69 LBS | DIASTOLIC BLOOD PRESSURE: 78 MMHG | BODY MASS INDEX: 44.85 KG/M2 | HEIGHT: 64 IN | HEART RATE: 76 BPM | SYSTOLIC BLOOD PRESSURE: 118 MMHG | OXYGEN SATURATION: 97 %

## 2022-03-17 DIAGNOSIS — M81.0 OSTEOPOROSIS, UNSPECIFIED OSTEOPOROSIS TYPE, UNSPECIFIED PATHOLOGICAL FRACTURE PRESENCE: ICD-10-CM

## 2022-03-17 DIAGNOSIS — M25.50 PAIN IN JOINT, MULTIPLE SITES: ICD-10-CM

## 2022-03-17 DIAGNOSIS — Q78.9: ICD-10-CM

## 2022-03-17 DIAGNOSIS — M81.0 AGE-RELATED OSTEOPOROSIS WITHOUT CURRENT PATHOLOGICAL FRACTURE: Primary | ICD-10-CM

## 2022-03-17 DIAGNOSIS — E55.9 VITAMIN D DEFICIENCY: ICD-10-CM

## 2022-03-17 DIAGNOSIS — Z78.0 MENOPAUSE: ICD-10-CM

## 2022-03-17 DIAGNOSIS — E21.3 HYPERPARATHYROIDISM: ICD-10-CM

## 2022-03-17 PROCEDURE — 1159F PR MEDICATION LIST DOCUMENTED IN MEDICAL RECORD: ICD-10-PCS | Mod: CPTII,S$GLB,, | Performed by: GENERAL ACUTE CARE HOSPITAL

## 2022-03-17 PROCEDURE — 3074F SYST BP LT 130 MM HG: CPT | Mod: CPTII,S$GLB,, | Performed by: GENERAL ACUTE CARE HOSPITAL

## 2022-03-17 PROCEDURE — 3078F PR MOST RECENT DIASTOLIC BLOOD PRESSURE < 80 MM HG: ICD-10-PCS | Mod: CPTII,S$GLB,, | Performed by: GENERAL ACUTE CARE HOSPITAL

## 2022-03-17 PROCEDURE — 99999 PR PBB SHADOW E&M-EST. PATIENT-LVL V: ICD-10-PCS | Mod: PBBFAC,,, | Performed by: GENERAL ACUTE CARE HOSPITAL

## 2022-03-17 PROCEDURE — 3074F PR MOST RECENT SYSTOLIC BLOOD PRESSURE < 130 MM HG: ICD-10-PCS | Mod: CPTII,S$GLB,, | Performed by: GENERAL ACUTE CARE HOSPITAL

## 2022-03-17 PROCEDURE — 3008F PR BODY MASS INDEX (BMI) DOCUMENTED: ICD-10-PCS | Mod: CPTII,S$GLB,, | Performed by: GENERAL ACUTE CARE HOSPITAL

## 2022-03-17 PROCEDURE — 99999 PR PBB SHADOW E&M-EST. PATIENT-LVL V: CPT | Mod: PBBFAC,,, | Performed by: GENERAL ACUTE CARE HOSPITAL

## 2022-03-17 PROCEDURE — 1160F RVW MEDS BY RX/DR IN RCRD: CPT | Mod: CPTII,S$GLB,, | Performed by: GENERAL ACUTE CARE HOSPITAL

## 2022-03-17 PROCEDURE — 1159F MED LIST DOCD IN RCRD: CPT | Mod: CPTII,S$GLB,, | Performed by: GENERAL ACUTE CARE HOSPITAL

## 2022-03-17 PROCEDURE — 99204 OFFICE O/P NEW MOD 45 MIN: CPT | Mod: S$GLB,,, | Performed by: GENERAL ACUTE CARE HOSPITAL

## 2022-03-17 PROCEDURE — 3008F BODY MASS INDEX DOCD: CPT | Mod: CPTII,S$GLB,, | Performed by: GENERAL ACUTE CARE HOSPITAL

## 2022-03-17 PROCEDURE — 1160F PR REVIEW ALL MEDS BY PRESCRIBER/CLIN PHARMACIST DOCUMENTED: ICD-10-PCS | Mod: CPTII,S$GLB,, | Performed by: GENERAL ACUTE CARE HOSPITAL

## 2022-03-17 PROCEDURE — 99204 PR OFFICE/OUTPT VISIT, NEW, LEVL IV, 45-59 MIN: ICD-10-PCS | Mod: S$GLB,,, | Performed by: GENERAL ACUTE CARE HOSPITAL

## 2022-03-17 PROCEDURE — 3078F DIAST BP <80 MM HG: CPT | Mod: CPTII,S$GLB,, | Performed by: GENERAL ACUTE CARE HOSPITAL

## 2022-03-17 RX ORDER — CHOLECALCIFEROL (VITAMIN D3) 125 MCG
5000 CAPSULE ORAL DAILY
Qty: 30 CAPSULE | Refills: 2 | Status: SHIPPED | OUTPATIENT
Start: 2022-03-17 | End: 2022-05-09

## 2022-03-17 RX ORDER — CALCIUM CARBONATE 600 MG
600 TABLET ORAL DAILY
Qty: 30 TABLET | Refills: 11 | COMMUNITY
Start: 2022-03-17 | End: 2022-08-29
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
## 2022-03-17 NOTE — PATIENT INSTRUCTIONS
Due to your history of osteoporosis you are at increased risk of fractures and we need to start you on medications to prevent fractures and help increase your bone density.     Before we decide on treatment we will do blood and urine work to look for secondary and reversible causes of accelerated bone loss.      We will do a 24hr urine calcium collection,  You will need to collect urine for 24hr and keep it refrigerated.  Once the collection is completed you should go to any [facility] lab to return the urine and have blood work done the same day at the lab.      Start taking Calcium 600mg and Vitamin D 5,000 units daily.     Fall precautions will be important to avoid fractures.     Weight bearing exercises will help increase your bone strength.     Once I have results I will contact you to go over next steps and possibly starting treatment then.      If any questions feel free to contact me.     Have a nice day.     Sincerely,     Stuart Almonte MD   Endocrinology   3/17/2022 9:50 AM

## 2022-03-17 NOTE — PROGRESS NOTES
"The patient also endorses significant symptoms of menopause. Notably she had Ovary removal when she was age 40 secondary to cysts and endometriosis.    She endorses hot flashes "all the time." She has significant sleep disturbance. She endorses a complete loss of sex drive.     She has not had a history of blood clots.    She has had symptoms of headaches in the distant past prior to Ching    "
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
## 2022-03-18 ENCOUNTER — TELEPHONE (OUTPATIENT)
Dept: RHEUMATOLOGY | Facility: CLINIC | Age: 57
End: 2022-03-18
Payer: MEDICARE

## 2022-03-31 ENCOUNTER — PATIENT MESSAGE (OUTPATIENT)
Dept: ENDOCRINOLOGY | Facility: CLINIC | Age: 57
End: 2022-03-31
Payer: MEDICARE

## 2022-04-01 ENCOUNTER — PATIENT MESSAGE (OUTPATIENT)
Dept: ENDOCRINOLOGY | Facility: CLINIC | Age: 57
End: 2022-04-01
Payer: MEDICARE

## 2022-04-13 ENCOUNTER — TELEPHONE (OUTPATIENT)
Dept: PRIMARY CARE CLINIC | Facility: CLINIC | Age: 57
End: 2022-04-13
Payer: MEDICARE

## 2022-05-06 ENCOUNTER — PATIENT MESSAGE (OUTPATIENT)
Dept: PRIMARY CARE CLINIC | Facility: CLINIC | Age: 57
End: 2022-05-06
Payer: MEDICARE

## 2022-05-09 ENCOUNTER — TELEPHONE (OUTPATIENT)
Dept: HEPATOLOGY | Facility: CLINIC | Age: 57
End: 2022-05-09

## 2022-05-09 ENCOUNTER — OFFICE VISIT (OUTPATIENT)
Dept: HEPATOLOGY | Facility: CLINIC | Age: 57
End: 2022-05-09
Payer: MEDICARE

## 2022-05-09 DIAGNOSIS — Z98.84 BARIATRIC SURGERY STATUS: ICD-10-CM

## 2022-05-09 DIAGNOSIS — K76.0 FATTY LIVER: ICD-10-CM

## 2022-05-09 DIAGNOSIS — K52.9 INFLAMMATORY BOWEL DISEASE: ICD-10-CM

## 2022-05-09 DIAGNOSIS — K74.3 PRIMARY BILIARY CHOLANGITIS: Primary | ICD-10-CM

## 2022-05-09 DIAGNOSIS — E78.5 HYPERLIPIDEMIA, UNSPECIFIED HYPERLIPIDEMIA TYPE: ICD-10-CM

## 2022-05-09 DIAGNOSIS — Z23 NEED FOR PROPHYLACTIC VACCINATION AGAINST HEPATITIS A AND HEPATITIS B: ICD-10-CM

## 2022-05-09 DIAGNOSIS — Z79.52 LONG TERM (CURRENT) USE OF SYSTEMIC STEROIDS: ICD-10-CM

## 2022-05-09 DIAGNOSIS — K74.00 LIVER FIBROSIS: ICD-10-CM

## 2022-05-09 DIAGNOSIS — R74.8 ELEVATED ALKALINE PHOSPHATASE LEVEL: ICD-10-CM

## 2022-05-09 DIAGNOSIS — K74.02 HEPATIC FIBROSIS, ADVANCED FIBROSIS: ICD-10-CM

## 2022-05-09 PROCEDURE — 99214 PR OFFICE/OUTPT VISIT, EST, LEVL IV, 30-39 MIN: ICD-10-PCS | Mod: 95,,, | Performed by: PHYSICIAN ASSISTANT

## 2022-05-09 PROCEDURE — 99499 RISK ADDL DX/OHS AUDIT: ICD-10-PCS | Mod: 95,,, | Performed by: PHYSICIAN ASSISTANT

## 2022-05-09 PROCEDURE — 1159F MED LIST DOCD IN RCRD: CPT | Mod: CPTII,95,, | Performed by: PHYSICIAN ASSISTANT

## 2022-05-09 PROCEDURE — 1160F PR REVIEW ALL MEDS BY PRESCRIBER/CLIN PHARMACIST DOCUMENTED: ICD-10-PCS | Mod: CPTII,95,, | Performed by: PHYSICIAN ASSISTANT

## 2022-05-09 PROCEDURE — 99499 UNLISTED E&M SERVICE: CPT | Mod: 95,,, | Performed by: PHYSICIAN ASSISTANT

## 2022-05-09 PROCEDURE — 99214 OFFICE O/P EST MOD 30 MIN: CPT | Mod: 95,,, | Performed by: PHYSICIAN ASSISTANT

## 2022-05-09 PROCEDURE — 1159F PR MEDICATION LIST DOCUMENTED IN MEDICAL RECORD: ICD-10-PCS | Mod: CPTII,95,, | Performed by: PHYSICIAN ASSISTANT

## 2022-05-09 PROCEDURE — 1160F RVW MEDS BY RX/DR IN RCRD: CPT | Mod: CPTII,95,, | Performed by: PHYSICIAN ASSISTANT

## 2022-05-09 RX ORDER — DIPHENOXYLATE HYDROCHLORIDE AND ATROPINE SULFATE 2.5; .025 MG/1; MG/1
1 TABLET ORAL 4 TIMES DAILY PRN
Qty: 30 TABLET | Refills: 2 | Status: SHIPPED | OUTPATIENT
Start: 2022-05-09 | End: 2023-05-03 | Stop reason: SDUPTHER

## 2022-05-09 NOTE — PROGRESS NOTES
The patient location is: [place], [place]   The chief complaint leading to consultation is: PBC     Visit type: audiovisual    Face to Face time with patient: 20  35 minutes of total time spent on the encounter, which includes face to face time and non-face to face time preparing to see the patient (eg, review of tests), Obtaining and/or reviewing separately obtained history, Documenting clinical information in the electronic or other health record, Independently interpreting results (not separately reported) and communicating results to the patient/family/caregiver, or Care coordination (not separately reported).     Each patient to whom he or she provides medical services by telemedicine is:  (1) informed of the relationship between the physician and patient and the respective role of any other health care provider with respect to management of the patient; and (2) notified that he or she may decline to receive medical services by telemedicine and may withdraw from such care at any time.    Notes:       Hepatology Consultation: [facility]    EST. PATIENT   PCP: Eliecer Jones MD    Subjective      Ms. Orozco is a 56 y.o. female w/ complex PMH including IBD and multiple abdominal surgeries, initially presenting with chronically elevated alk phos since at least 2014,with liver biopsy 2021 suggesting AMA negative PBC/cholangitis, started on ursodiol 12/2/2021 with improvement of liver tests since.     MRCP 8/2021 suggested no retained stones, strictures, or dilatation of bile ducts  MRE Elastography 8/2021 suggested F1-2 fibrosis without fatty liver.    11/2021 liver biopsy suggested florid duct lesions with bile duct stasis and F3 fibrosis, minimal fatty liver, reviewed in pathology conference 12/9/2021 and suggested consistent with AMA negative PBC .     No history of hepatic decompensation previously or sequelae portal hypertension, although mild splenomegaly  "(~13cm).          Relevant history  Cholecystectomy performed emergent 1991 at [facility] with emergent pain and syncope. Her IBD was thought to be UC, diagnosed in 2010, now s/p total colectomy w/ ileostomy in 2012 rectum preserved; complicated by abscess and stoma fistulization. She started having obstructive symptoms in 2014, workup revealed stenotic stoma and no active small bowel inflammation. In 2014 she saw Dr. Virgen for fistula repair and stoma relocation. Underwent gastric sleeve June 2016 with [facility], she lost over 100 lbs per patient. Only takes b12 and vitamin D.  Colostomy revision with Dr. Dunham in 2019    Was told she had fatty liver in 2011, at that time was 380 lbs.       Interval history 05/09/2022:  Ida Orozco arrives today alone.     Since our last visit, she started ursodiol TID after liver biopsy 2021 and has subsequently normalized her liver numbers and albumin.     Denies symptoms of hepatic decompensation including jaundice, ascites, cognitive problems to suggest hepatic encephalopathy, or GI bleeding.     She did consult with IBD specialist as well as endocrinologist for bone loss. On Vit D & calcium, parathyroid being watched.    She is tapering off clonazepam & zoloft, and instead has tried THC/CBD drops for the past week which are "working well"    Taking care of her parents.     No weight loss.       In relation to metabolic risk factors:   Lab Results   Component Value Date    HGBA1C 5.6 05/30/2019    CHOL 183 05/25/2021    TSH 0.883 05/25/2021     There is no height or weight on file to calculate BMI.      ETOH: denies   Smoking: prior vaping and smoking cigarettes  Illicit substances: denies   Social: lives with , who is undergoing cardiac procedure that she is nervous about at the moment   Prior liver biopsy:  Prior liver disease: fatty liver  Hepatitis vaccination: unknown   Liver-related family medical history: mother with history of ETOH " cirrhosis.       PMH Ida has a past medical history of Anxiety, Asthma, Bronchitis, Crohn's colitis, Depression, Fatty liver disease, nonalcoholic, Gallstones, GERD (gastroesophageal reflux disease), History of sleeve gastrectomy (2016), PTSD (post-traumatic stress disorder), Seasonal allergies, Symptomatic abdominal panniculus, and Ulcerative colitis.   PSXH Ida has a past surgical history that includes Hysterectomy; Ileostomy revision;  section, low transverse; Cholecystectomy; TONSILLECTOMY, ADENOIDECTOMY; thumb surgery; [place] tooth extraction; Appendectomy; Colectomy; Laparoscopic proctectomy (N/A, 2018); Lysis of adhesions (2018); Flexible sigmoidoscopy (2018); GASTRIC SLEEVE ; Cystoscopy w/ retrogrades (N/A, 2018); Catheterization of both left and right heart (Right, 2019); Panniculectomy (Bilateral, 2019); Revision Colostomy (N/A, 2019); Oophorectomy; Ileoscopy (2018); Flexible sigmoidoscopy (2018); Esophagogastroduodenoscopy (2018); Endoscopic ultrasound of upper gastrointestinal tract (N/A, 11/10/2021); and Esophagogastroduodenoscopy (N/A, 11/10/2021).    Ida's family history includes Cancer in her maternal grandfather, mother, and paternal grandfather; Cirrhosis in her mother; Colon cancer in her maternal grandfather, maternal uncle, and maternal uncle; Heart disease (age of onset: 67) in her father.    Ida reports that she quit smoking about 13 years ago. Her smoking use included cigarettes. She has never used smokeless tobacco. She reports current alcohol use. She reports that she does not use drugs.   KARLO Prieto is allergic to adhesive, dilaudid [hydromorphone], sulfa (sulfonamide antibiotics), and surgical stainless steel.   JACKIE Prieto has a current medication list which includes the following prescription(s): albuterol, azelastine, budesonide-formoterol 160-4.5 mcg, calcium carbonate, cholecalciferol (vitamin d3),  clonazepam, cyanocobalamin, dexamethasone, diclofenac sodium, gabapentin, loratadine, melatonin, montelukast, multivitamin/folic acid/biotin, nystatin, pantoprazole, promethazine, sertraline, simethicone, topiramate, trazodone, ursodiol, and vitamin d2. + melatonin, hair skin & nails            ROS:   Per hpi      Objective     There were no vitals taken for this visit.    Physical exam is limited by video visit:   Friendly woman , in no acute distress; alert and oriented to person, place and time  VITALS: There were no vitals taken for this visit.   SKIN/EYES: No obvious jaundice or yellowing of the eyes.   HENT: Normocephalic, without obvious abnormality.   NECK: Supple.   CARDIOVASCULAR: DORIE on video visit  RESPIRATORY: Normal respiratory effort.   GI: DORIE hepatosplenomegaly  PSYCH: Thought and speech pattern appropriate.       DIAGNOSTICS  Lab Results   Component Value Date    ALT 13 05/02/2022    AST 17 05/02/2022    ALKPHOS 119 05/02/2022    BILITOT 0.4 05/02/2022    ALBUMIN 3.4 (L) 05/02/2022    ALBUMIN 3.5 05/02/2022    INR 1.0 03/29/2022     03/29/2022     Lab Results   Component Value Date    AFP 3.1 03/29/2022       Prior serologic workup:   Lab Results   Component Value Date    SMOOTHMUSCAB Positive 1:40 (A) 07/14/2021    AMAIFA Negative 1:40 06/30/2021    IGGSERUM 949 07/14/2021    ANASCREEN Negative <1:80 07/14/2021    FERRITIN 29 05/25/2021    FESATURATED 13 (L) 05/25/2021    HEPBSAG Negative 02/02/2022    HEPCAB Negative 05/25/2021       Imaging:  Final Pathologic Diagnosis LIVER, NATIVE, ENDOSCOPIC ULTRASOUND-GUIDED BIOPSY:   - Lymphocytic cholangitis, portal lymphoplasmacytic inflammation with   interface activity, and focal granulomatous inflammation associated with bile   duct injury (florid duct lesion)   - Bile ductular reaction   - Minimal macrovesicular steatosis (< 5%) without evidence of steatohepatitis   - Periportal fibrosis with bridging and nodule formation, stage 3 of 4   - See  comments    Comment: Interp By Yoni Singh M.D., Signed on 11/15/2021 at 15:33   Comment In conjunction with the clinical history and available laboratory   information, the findings in this case would be consistent with chronic   biliary disease, particularly 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    Microscopic Exam Microscopic evaluation of routine H&E stained sections and multiple properly   controlled cytochemical/immunohistochemical stains is performed on block 1A.   There is no evidence of siderosis noted on a Prussian blue stain. Staging   fibrosis is confirmed with a trichrome stain. CK7 highlights bile   duct/ductular profiles and intermediate hepatocytes formation. A rhodamine   stain exhibits focal periportal copper deposition.          Upper EUS 11/10/2021  Impression:                - [place]-colored mucosa suggestive of                          short-segment Hernandez's esophagus. Biopsied.                          - A sleeve gastrectomy was found.                          - Normal examined duodenum.                          - The celiac trunk was endosonographically normal.                          - Pancreatic parenchymal abnormalities consisting                          of diffuse echogenicity were noted in the entire                          pancreas. This is consistent with fatty                          infiltration of the pancreas.                          - There was no sign of significant pathology in                          the common bile duct.                          - There was no evidence of significant pathology                          in the left lobe of the liver. Fine needle biopsy                          performed.     Assessment/Plan     56 y.o. female with:    1. 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, with F3 fibrosis, biopsy proven 11/2021 with Elevated alkaline phosphatase level  -- current treatment: Ursodiol 500 mg TID (13-15/kg/day)   -- starting alk phos before treatment:  212, 442, with normalization 2/22  -- transaminases wnl  -- Immunity to Hep A and B: ordered and instructed to obtain  -- DEXA scan q2 years,, Recommend adequate Ca and Vitamin D supplementation (Ca 1200 mg and Vitamin D 1000 IU daily), following with endo for osteodystrophy  -- MRCP 8/5/2021 negative for stricutring or dilation of bile ducts     Etiology: thought to be secondary to AMA negative PBC/cholangitis    Advanced Fibrosis Health Maintenance  -- Liver lesions/HCC screening: Ultrasound abdomen without lesions, AFP due with next labs   -- Hepatitis A & B vaccination status: needs to complete  -- Variceal screening: Last EGD early - no thromboctyopenia  -- Ascites/Edema: Not an active issue.  -- Encephalopathy: Not an active issue.  -- Transplant candidacy: Transplant evaluation not warranted given low MELD.    Cirrhosis counseling as per After Visit Summary. No alcohol whatsoever, no raw oysters. Tylenol is safe, less than 2g per day total.   Discussed with patient that do not recommend elective abdominal surgery for risk of decompensation.            2. IBD w/ significant surgical hx  - RF for biliary stricturing/PSC  - no significant stricturing or biliary dilatation per MRCP 2021   - I referred to IBD specialist 2021      3. Fatty liver  - MRE does not suggest significant   - with prior BMI >50 s/p gastric sleeve     4. BMI 40.0-44.9, adult    5. Osteoporosis   - f/w endo, continue         Orders Placed This Encounter   Procedures    US Abdomen Limited    Hepatic Function Panel       · Congratulated on normalization of alk phos and diligent adherence to ursodiol. Continue current dosage.  · Continue f/u with endocrine for osteo, with Vit D & calcium supplementation.   · I encouraged minor weight loss for fatty liver.  · I recommend Hepatitis A & B vaccination.  · Follow up in about 6 months (around 11/9/2022).      I spent 35 minutes on the day of this encounter preparing for, evaluating, treating, and  managing this patient.     Thank you for allowing me to participate in the care of Ida Catherine PA-C  Hepatology Advanced Practice Provider  [facility]

## 2022-05-10 ENCOUNTER — PATIENT MESSAGE (OUTPATIENT)
Dept: ENDOCRINOLOGY | Facility: CLINIC | Age: 57
End: 2022-05-10
Payer: MEDICARE

## 2022-05-12 ENCOUNTER — PATIENT OUTREACH (OUTPATIENT)
Dept: ADMINISTRATIVE | Facility: OTHER | Age: 57
End: 2022-05-12
Payer: MEDICARE

## 2022-05-12 ENCOUNTER — OFFICE VISIT (OUTPATIENT)
Dept: ENDOCRINOLOGY | Facility: CLINIC | Age: 57
End: 2022-05-12
Payer: MEDICARE

## 2022-05-12 DIAGNOSIS — E21.3 HYPERPARATHYROIDISM: ICD-10-CM

## 2022-05-12 DIAGNOSIS — E55.9 VITAMIN D DEFICIENCY: ICD-10-CM

## 2022-05-12 DIAGNOSIS — M81.0 AGE-RELATED OSTEOPOROSIS WITHOUT CURRENT PATHOLOGICAL FRACTURE: Primary | ICD-10-CM

## 2022-05-12 PROCEDURE — 1160F PR REVIEW ALL MEDS BY PRESCRIBER/CLIN PHARMACIST DOCUMENTED: ICD-10-PCS | Mod: CPTII,95,, | Performed by: GENERAL ACUTE CARE HOSPITAL

## 2022-05-12 PROCEDURE — 99214 OFFICE O/P EST MOD 30 MIN: CPT | Mod: 95,,, | Performed by: GENERAL ACUTE CARE HOSPITAL

## 2022-05-12 PROCEDURE — 99214 PR OFFICE/OUTPT VISIT, EST, LEVL IV, 30-39 MIN: ICD-10-PCS | Mod: 95,,, | Performed by: GENERAL ACUTE CARE HOSPITAL

## 2022-05-12 PROCEDURE — 1159F PR MEDICATION LIST DOCUMENTED IN MEDICAL RECORD: ICD-10-PCS | Mod: CPTII,95,, | Performed by: GENERAL ACUTE CARE HOSPITAL

## 2022-05-12 PROCEDURE — 1159F MED LIST DOCD IN RCRD: CPT | Mod: CPTII,95,, | Performed by: GENERAL ACUTE CARE HOSPITAL

## 2022-05-12 PROCEDURE — 1160F RVW MEDS BY RX/DR IN RCRD: CPT | Mod: CPTII,95,, | Performed by: GENERAL ACUTE CARE HOSPITAL

## 2022-05-12 RX ORDER — ACETAMINOPHEN 500 MG
500 TABLET ORAL
Status: CANCELLED | OUTPATIENT
Start: 2022-05-12

## 2022-05-12 RX ORDER — ZOLEDRONIC ACID 5 MG/100ML
5 INJECTION, SOLUTION INTRAVENOUS
Status: CANCELLED | OUTPATIENT
Start: 2022-05-12

## 2022-05-12 RX ORDER — SODIUM CHLORIDE 0.9 % (FLUSH) 0.9 %
10 SYRINGE (ML) INJECTION
Status: CANCELLED | OUTPATIENT
Start: 2022-05-12

## 2022-05-12 RX ORDER — HEPARIN 100 UNIT/ML
500 SYRINGE INTRAVENOUS
Status: CANCELLED | OUTPATIENT
Start: 2022-05-12

## 2022-05-12 NOTE — PROGRESS NOTES
Health Maintenance Due   Topic Date Due    Shingles Vaccine (1 of 2) Never done    COVID-19 Vaccine (3 - Booster for Pfizer series) 09/16/2021    Mammogram  03/05/2022     Updates were requested from care everywhere.  Chart was reviewed for overdue Proactive [facility] Encounters ([facility]) topics (CRS, Breast Cancer Screening, Eye exam)  Health Maintenance has been updated.  LINKS immunization registry triggered.  Immunizations were reconciled.

## 2022-05-12 NOTE — PROGRESS NOTES
Subjective:      Patient ID: Ida Orozco is a 56 y.o. female.    Chief Complaint:  Osteoporosis; Follow up.      Patient Active Problem List   Diagnosis    GERD (gastroesophageal reflux disease)    Esophageal ulcer    S/p ileostomy revision within the subcutaneous tissue.    Plica of knee    Dietary folate deficiency anemia    Vitamin B12 deficiency    Hyperlipidemia    Vitamin D deficiency    Moderate persistent asthma without complication    Anxiety    Depression    DDD (degenerative disc disease), lumbar    Crohn's disease with complication    Former smoker    Bariatric surgery status-Gastric sleeve     Redundant skin of abdomen    Inflammatory bowel disease    Ulcerative colitis    Vaginal atrophy    Urinary incontinence, mixed    Dietary iron deficiency    Candidal intertrigo    Lumbar spine strain, sequela    Bradycardia    Sleep arousal disorder    Sleep apnea    Colostomy in place    Symptomatic abdominal panniculus    Mild episode of recurrent major depressive disorder    Diarrhea    Sinus bradycardia    Syncope and collapse    Chronic pansinusitis    Generalized abdominal pain    BMI 40.0-44.9, adult    Fatty liver     History of Present Illness  55 YO Female w/ multiple comorbid conditions as stated above including Chron's disease s/p Colectomy, Gastric sleeve surgery, Esophageal ulcers and PBC that presents to the endocrine clinic for follow up of osteoporosis.  Pt reports feels well and denies any complaints.           With regards to Osteoporosis:   -DXA  12/2021:   FINDINGS:  The L1 to L4 vertebral bone mineral density is equal to 0.916 g/cm squared with a T score of -2.2.     The left femoral neck bone mineral density is equal to 0.671 g/cm squared with a T score of -2.6.     The total hip bone mineral density is equal to 0.717 g/cm squared with a T score of -2.3.     There is a 13.8% risk of a major osteoporotic fracture and a 3.1% risk of hip fracture in the  "next 10 years (FRAX).          Corrected Calcium is WNL,  NO evidence of Hypercalcemia     Vitamin D is low on 5/2022  With Elevated PTH and low Calcium consistent with  secondary hyperparathyroidism     Fractures?   Ankle fracture many years ago (No surgery required) Non osteoporotic fracture,  Fracture 3 years ago in wrist when breaking up fight between her dogs which would have "never happened before." Pin is in place.        The patient takes frequent rounds of steroids. The patient last took Steroids for her Right hand, was given dexamethasone which she takes PRN for her joint pain. Has previously taken PPIs.     She is status post gastric sleeve surgery.      Vitamin D 50k once weekly and calcium 600mg daily      Denies kidney stones or hypercalcemia in the past      The patient quit 14 years ago 23 pack year history. Does not drink alcohol     Surgical menopause of ovarian cysts and endometriosis at Age 41 YO      No CAD     The patient has lost a half inch of height.    SECONDARY OSTEOPOROSIS WORK UP IS WNL     ROS:   As above    Objective:     There were no vitals taken for this visit.  BP Readings from Last 3 Encounters:   03/17/22 118/78   02/15/22 118/66   12/15/21 120/66     Wt Readings from Last 1 Encounters:   03/17/22 0924 119.2 kg (262 lb 10.9 oz)     There is no height or weight on file to calculate BMI.      Physical Exam (NOT PERFORMED, VIRTUAL VISIT)            Lab Review:   Lab Results   Component Value Date    HGBA1C 5.6 05/30/2019     Lab Results   Component Value Date    CHOL 183 05/25/2021    HDL 81 (H) 05/25/2021    LDLCALC 84.0 05/25/2021    TRIG 90 05/25/2021    CHOLHDL 44.3 05/25/2021     Lab Results   Component Value Date     05/02/2022    K 3.9 05/02/2022     (H) 05/02/2022    CO2 19 (L) 05/02/2022    GLU 88 05/02/2022    BUN 15 05/02/2022    CREATININE 1.0 05/02/2022    CALCIUM 8.7 05/02/2022    PROT 7.0 05/02/2022    ALBUMIN 3.4 (L) 05/02/2022    ALBUMIN 3.5 05/02/2022 "    BILITOT 0.4 05/02/2022    ALKPHOS 119 05/02/2022    AST 17 05/02/2022    ALT 13 05/02/2022    ANIONGAP 10 05/02/2022    ESTGFRAFRICA >60.0 05/02/2022    EGFRNONAA >60.0 05/02/2022    TSH 0.883 05/25/2021     Vit D, 25-Hydroxy   Date Value Ref Range Status   05/02/2022 25 (L) 30 - 96 ng/mL Final     Comment:     Vitamin D deficiency.........<10 ng/mL                              Vitamin D insufficiency......10-29 ng/mL       Vitamin D sufficiency........> or equal to 30 ng/mL  Vitamin D toxicity............>100 ng/mL         Assessment and Plan     Age-related osteoporosis without current pathological fracture  Due to patient having Osteoporosis which is likely multifactorial from early surgical menopause, Vitamin D deficiency with hypocalcemia from poor GI absorptions of GI surgery and former smoker.  I will recommend the following.       START RECLAST 5mg IV Yearly x 3 yrs (Benefits and risks discussed)     C/w Vitamin D 50,000 units weekly and Calcium 600mg daily      -Fall precautions   -Weight bearing exercises   -DXA 12/2023     Hyperparathyroidism  Likely secondary to vitamin D deficiency   C/w Ca + D     Vitamin D deficiency    C/w Ergo 50,000 units weekly         The patient location is: Home   The chief complaint leading to consultation is:  Osteoporosis     Visit type: audiovisual    Face to Face time with patient: 30 minutes   45 minutes of total time spent on the encounter, which includes face to face time and non-face to face time preparing to see the patient (eg, review of tests), Obtaining and/or reviewing separately obtained history, Documenting clinical information in the electronic or other health record, Independently interpreting results (not separately reported) and communicating results to the patient/family/caregiver, or Care coordination (not separately reported).         Each patient to whom he or she provides medical services by telemedicine is:  (1) informed of the relationship between  the physician and patient and the respective role of any other health care provider with respect to management of the patient; and (2) notified that he or she may decline to receive medical services by telemedicine and may withdraw from such care at any time.

## 2022-05-13 ENCOUNTER — OFFICE VISIT (OUTPATIENT)
Dept: OBSTETRICS AND GYNECOLOGY | Facility: CLINIC | Age: 57
End: 2022-05-13
Payer: MEDICARE

## 2022-05-13 ENCOUNTER — TELEPHONE (OUTPATIENT)
Dept: ENDOCRINOLOGY | Facility: CLINIC | Age: 57
End: 2022-05-13
Payer: MEDICARE

## 2022-05-13 VITALS
BODY MASS INDEX: 45.12 KG/M2 | HEIGHT: 64 IN | SYSTOLIC BLOOD PRESSURE: 126 MMHG | DIASTOLIC BLOOD PRESSURE: 72 MMHG | WEIGHT: 264.31 LBS

## 2022-05-13 DIAGNOSIS — Z00.00 ANNUAL PHYSICAL EXAM: Primary | ICD-10-CM

## 2022-05-13 DIAGNOSIS — Z78.0 MENOPAUSE: ICD-10-CM

## 2022-05-13 DIAGNOSIS — N90.4 LICHEN SCLEROSUS ET ATROPHICUS OF THE VULVA: ICD-10-CM

## 2022-05-13 PROCEDURE — 3078F DIAST BP <80 MM HG: CPT | Mod: CPTII,S$GLB,, | Performed by: OBSTETRICS & GYNECOLOGY

## 2022-05-13 PROCEDURE — 3074F PR MOST RECENT SYSTOLIC BLOOD PRESSURE < 130 MM HG: ICD-10-PCS | Mod: CPTII,S$GLB,, | Performed by: OBSTETRICS & GYNECOLOGY

## 2022-05-13 PROCEDURE — G0101 PR CA SCREEN;PELVIC/BREAST EXAM: ICD-10-PCS | Mod: S$GLB,,, | Performed by: OBSTETRICS & GYNECOLOGY

## 2022-05-13 PROCEDURE — 1159F MED LIST DOCD IN RCRD: CPT | Mod: CPTII,S$GLB,, | Performed by: OBSTETRICS & GYNECOLOGY

## 2022-05-13 PROCEDURE — 3078F PR MOST RECENT DIASTOLIC BLOOD PRESSURE < 80 MM HG: ICD-10-PCS | Mod: CPTII,S$GLB,, | Performed by: OBSTETRICS & GYNECOLOGY

## 2022-05-13 PROCEDURE — 3074F SYST BP LT 130 MM HG: CPT | Mod: CPTII,S$GLB,, | Performed by: OBSTETRICS & GYNECOLOGY

## 2022-05-13 PROCEDURE — 1160F PR REVIEW ALL MEDS BY PRESCRIBER/CLIN PHARMACIST DOCUMENTED: ICD-10-PCS | Mod: CPTII,S$GLB,, | Performed by: OBSTETRICS & GYNECOLOGY

## 2022-05-13 PROCEDURE — 3008F BODY MASS INDEX DOCD: CPT | Mod: CPTII,S$GLB,, | Performed by: OBSTETRICS & GYNECOLOGY

## 2022-05-13 PROCEDURE — 3008F PR BODY MASS INDEX (BMI) DOCUMENTED: ICD-10-PCS | Mod: CPTII,S$GLB,, | Performed by: OBSTETRICS & GYNECOLOGY

## 2022-05-13 PROCEDURE — G0101 CA SCREEN;PELVIC/BREAST EXAM: HCPCS | Mod: S$GLB,,, | Performed by: OBSTETRICS & GYNECOLOGY

## 2022-05-13 PROCEDURE — 99999 PR PBB SHADOW E&M-EST. PATIENT-LVL IV: ICD-10-PCS | Mod: PBBFAC,,, | Performed by: OBSTETRICS & GYNECOLOGY

## 2022-05-13 PROCEDURE — 1160F RVW MEDS BY RX/DR IN RCRD: CPT | Mod: CPTII,S$GLB,, | Performed by: OBSTETRICS & GYNECOLOGY

## 2022-05-13 PROCEDURE — 1159F PR MEDICATION LIST DOCUMENTED IN MEDICAL RECORD: ICD-10-PCS | Mod: CPTII,S$GLB,, | Performed by: OBSTETRICS & GYNECOLOGY

## 2022-05-13 PROCEDURE — 99999 PR PBB SHADOW E&M-EST. PATIENT-LVL IV: CPT | Mod: PBBFAC,,, | Performed by: OBSTETRICS & GYNECOLOGY

## 2022-05-13 RX ORDER — CLOBETASOL PROPIONATE 0.5 MG/G
OINTMENT TOPICAL
Qty: 60 G | Refills: 1 | Status: SHIPPED | OUTPATIENT
Start: 2022-05-13 | End: 2022-08-29

## 2022-05-13 NOTE — TELEPHONE ENCOUNTER
----- Message from Stuart Florez MD sent at 5/12/2022  5:35 PM CDT -----  Please schedule Reclast.     Follow up in 1 yr.     Thank you

## 2022-05-13 NOTE — TELEPHONE ENCOUNTER
Spoke with patient, phone number given to schedule   Reclast [phone], patient verbalized understanding.
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
## 2022-05-13 NOTE — TELEPHONE ENCOUNTER
----- Message from Aline Gurrola LPN sent at 5/13/2022  2:12 PM CDT -----    ----- Message -----  From: Stuart Florez MD  Sent: 5/12/2022   5:35 PM CDT  To: Sam[facility] Staff    Please schedule Reclast.     Follow up in 1 yr.     Thank you

## 2022-05-13 NOTE — TELEPHONE ENCOUNTER
Called to schedule patient for Reclast no answer left message to return call to clinic, call back number provided

## 2022-06-02 ENCOUNTER — OFFICE VISIT (OUTPATIENT)
Dept: RHEUMATOLOGY | Facility: CLINIC | Age: 57
End: 2022-06-02
Payer: MEDICARE

## 2022-06-02 ENCOUNTER — LAB VISIT (OUTPATIENT)
Dept: LAB | Facility: HOSPITAL | Age: 57
End: 2022-06-02
Payer: MEDICARE

## 2022-06-02 VITALS
BODY MASS INDEX: 46.9 KG/M2 | SYSTOLIC BLOOD PRESSURE: 120 MMHG | TEMPERATURE: 99 F | DIASTOLIC BLOOD PRESSURE: 73 MMHG | HEIGHT: 64 IN | HEART RATE: 65 BPM | WEIGHT: 274.69 LBS

## 2022-06-02 DIAGNOSIS — M25.50 PAIN IN JOINT, MULTIPLE SITES: Primary | ICD-10-CM

## 2022-06-02 DIAGNOSIS — Z93.2 ILEOSTOMY STATUS: ICD-10-CM

## 2022-06-02 DIAGNOSIS — M19.90 INFLAMMATORY ARTHRITIS: ICD-10-CM

## 2022-06-02 DIAGNOSIS — K50.919 CROHN'S DISEASE WITH COMPLICATION, UNSPECIFIED GASTROINTESTINAL TRACT LOCATION: ICD-10-CM

## 2022-06-02 DIAGNOSIS — M25.50 PAIN IN JOINT, MULTIPLE SITES: ICD-10-CM

## 2022-06-02 LAB
ALBUMIN SERPL BCP-MCNC: 3.2 G/DL (ref 3.5–5.2)
ALP SERPL-CCNC: 119 U/L (ref 55–135)
ALT SERPL W/O P-5'-P-CCNC: 13 U/L (ref 10–44)
ANION GAP SERPL CALC-SCNC: 8 MMOL/L (ref 8–16)
AST SERPL-CCNC: 18 U/L (ref 10–40)
BASOPHILS # BLD AUTO: 0.06 K/UL (ref 0–0.2)
BASOPHILS NFR BLD: 0.9 % (ref 0–1.9)
BILIRUB SERPL-MCNC: 0.4 MG/DL (ref 0.1–1)
BUN SERPL-MCNC: 14 MG/DL (ref 6–20)
CALCIUM SERPL-MCNC: 8.6 MG/DL (ref 8.7–10.5)
CCP AB SER IA-ACNC: 0.5 U/ML
CHLORIDE SERPL-SCNC: 106 MMOL/L (ref 95–110)
CO2 SERPL-SCNC: 23 MMOL/L (ref 23–29)
CREAT SERPL-MCNC: 0.8 MG/DL (ref 0.5–1.4)
CRP SERPL-MCNC: 4.5 MG/L (ref 0–8.2)
DIFFERENTIAL METHOD: ABNORMAL
EOSINOPHIL # BLD AUTO: 0.9 K/UL (ref 0–0.5)
EOSINOPHIL NFR BLD: 12.8 % (ref 0–8)
ERYTHROCYTE [DISTWIDTH] IN BLOOD BY AUTOMATED COUNT: 14.3 % (ref 11.5–14.5)
ERYTHROCYTE [SEDIMENTATION RATE] IN BLOOD BY WESTERGREN METHOD: 15 MM/HR (ref 0–36)
EST. GFR  (AFRICAN AMERICAN): >60 ML/MIN/1.73 M^2
EST. GFR  (NON AFRICAN AMERICAN): >60 ML/MIN/1.73 M^2
GLUCOSE SERPL-MCNC: 87 MG/DL (ref 70–110)
HCT VFR BLD AUTO: 41 % (ref 37–48.5)
HGB BLD-MCNC: 12.5 G/DL (ref 12–16)
IMM GRANULOCYTES # BLD AUTO: 0.01 K/UL (ref 0–0.04)
IMM GRANULOCYTES NFR BLD AUTO: 0.2 % (ref 0–0.5)
LYMPHOCYTES # BLD AUTO: 1.6 K/UL (ref 1–4.8)
LYMPHOCYTES NFR BLD: 24.4 % (ref 18–48)
MCH RBC QN AUTO: 28 PG (ref 27–31)
MCHC RBC AUTO-ENTMCNC: 30.5 G/DL (ref 32–36)
MCV RBC AUTO: 92 FL (ref 82–98)
MONOCYTES # BLD AUTO: 0.6 K/UL (ref 0.3–1)
MONOCYTES NFR BLD: 9.2 % (ref 4–15)
NEUTROPHILS # BLD AUTO: 3.5 K/UL (ref 1.8–7.7)
NEUTROPHILS NFR BLD: 52.5 % (ref 38–73)
NRBC BLD-RTO: 0 /100 WBC
PLATELET # BLD AUTO: 279 K/UL (ref 150–450)
PMV BLD AUTO: 10.9 FL (ref 9.2–12.9)
POTASSIUM SERPL-SCNC: 4.1 MMOL/L (ref 3.5–5.1)
PROT SERPL-MCNC: 6.8 G/DL (ref 6–8.4)
RBC # BLD AUTO: 4.46 M/UL (ref 4–5.4)
RHEUMATOID FACT SERPL-ACNC: <13 IU/ML (ref 0–15)
SODIUM SERPL-SCNC: 137 MMOL/L (ref 136–145)
WBC # BLD AUTO: 6.63 K/UL (ref 3.9–12.7)

## 2022-06-02 PROCEDURE — 99204 PR OFFICE/OUTPT VISIT, NEW, LEVL IV, 45-59 MIN: ICD-10-PCS | Mod: GC,S$GLB,, | Performed by: STUDENT IN AN ORGANIZED HEALTH CARE EDUCATION/TRAINING PROGRAM

## 2022-06-02 PROCEDURE — 87340 HEPATITIS B SURFACE AG IA: CPT | Performed by: STUDENT IN AN ORGANIZED HEALTH CARE EDUCATION/TRAINING PROGRAM

## 2022-06-02 PROCEDURE — 86706 HEP B SURFACE ANTIBODY: CPT | Performed by: STUDENT IN AN ORGANIZED HEALTH CARE EDUCATION/TRAINING PROGRAM

## 2022-06-02 PROCEDURE — 86682 HELMINTH ANTIBODY: CPT | Performed by: STUDENT IN AN ORGANIZED HEALTH CARE EDUCATION/TRAINING PROGRAM

## 2022-06-02 PROCEDURE — 99999 PR PBB SHADOW E&M-EST. PATIENT-LVL V: ICD-10-PCS | Mod: PBBFAC,GC,, | Performed by: STUDENT IN AN ORGANIZED HEALTH CARE EDUCATION/TRAINING PROGRAM

## 2022-06-02 PROCEDURE — 86704 HEP B CORE ANTIBODY TOTAL: CPT | Performed by: STUDENT IN AN ORGANIZED HEALTH CARE EDUCATION/TRAINING PROGRAM

## 2022-06-02 PROCEDURE — 85652 RBC SED RATE AUTOMATED: CPT | Performed by: STUDENT IN AN ORGANIZED HEALTH CARE EDUCATION/TRAINING PROGRAM

## 2022-06-02 PROCEDURE — 99204 OFFICE O/P NEW MOD 45 MIN: CPT | Mod: GC,S$GLB,, | Performed by: STUDENT IN AN ORGANIZED HEALTH CARE EDUCATION/TRAINING PROGRAM

## 2022-06-02 PROCEDURE — 86790 VIRUS ANTIBODY NOS: CPT | Performed by: STUDENT IN AN ORGANIZED HEALTH CARE EDUCATION/TRAINING PROGRAM

## 2022-06-02 PROCEDURE — 99499 RISK ADDL DX/OHS AUDIT: ICD-10-PCS | Mod: S$GLB,,, | Performed by: INTERNAL MEDICINE

## 2022-06-02 PROCEDURE — 86431 RHEUMATOID FACTOR QUANT: CPT | Performed by: STUDENT IN AN ORGANIZED HEALTH CARE EDUCATION/TRAINING PROGRAM

## 2022-06-02 PROCEDURE — 99499 UNLISTED E&M SERVICE: CPT | Mod: S$GLB,,, | Performed by: INTERNAL MEDICINE

## 2022-06-02 PROCEDURE — 85025 COMPLETE CBC W/AUTO DIFF WBC: CPT | Performed by: STUDENT IN AN ORGANIZED HEALTH CARE EDUCATION/TRAINING PROGRAM

## 2022-06-02 PROCEDURE — 86592 SYPHILIS TEST NON-TREP QUAL: CPT | Performed by: STUDENT IN AN ORGANIZED HEALTH CARE EDUCATION/TRAINING PROGRAM

## 2022-06-02 PROCEDURE — 99999 PR PBB SHADOW E&M-EST. PATIENT-LVL V: CPT | Mod: PBBFAC,GC,, | Performed by: STUDENT IN AN ORGANIZED HEALTH CARE EDUCATION/TRAINING PROGRAM

## 2022-06-02 PROCEDURE — 86803 HEPATITIS C AB TEST: CPT | Performed by: STUDENT IN AN ORGANIZED HEALTH CARE EDUCATION/TRAINING PROGRAM

## 2022-06-02 PROCEDURE — 86200 CCP ANTIBODY: CPT | Performed by: STUDENT IN AN ORGANIZED HEALTH CARE EDUCATION/TRAINING PROGRAM

## 2022-06-02 PROCEDURE — 86787 VARICELLA-ZOSTER ANTIBODY: CPT | Performed by: STUDENT IN AN ORGANIZED HEALTH CARE EDUCATION/TRAINING PROGRAM

## 2022-06-02 PROCEDURE — 87389 HIV-1 AG W/HIV-1&-2 AB AG IA: CPT | Performed by: STUDENT IN AN ORGANIZED HEALTH CARE EDUCATION/TRAINING PROGRAM

## 2022-06-02 PROCEDURE — 81374 HLA I TYPING 1 ANTIGEN LR: CPT | Mod: PO | Performed by: STUDENT IN AN ORGANIZED HEALTH CARE EDUCATION/TRAINING PROGRAM

## 2022-06-02 PROCEDURE — 86140 C-REACTIVE PROTEIN: CPT | Performed by: STUDENT IN AN ORGANIZED HEALTH CARE EDUCATION/TRAINING PROGRAM

## 2022-06-02 PROCEDURE — 80053 COMPREHEN METABOLIC PANEL: CPT | Performed by: STUDENT IN AN ORGANIZED HEALTH CARE EDUCATION/TRAINING PROGRAM

## 2022-06-02 RX ORDER — LEVOCETIRIZINE DIHYDROCHLORIDE 5 MG/1
TABLET, FILM COATED ORAL
COMMUNITY
Start: 2022-04-20 | End: 2022-08-29

## 2022-06-02 RX ORDER — FLUTICASONE PROPIONATE 50 MCG
SPRAY, SUSPENSION (ML) NASAL
COMMUNITY
Start: 2022-01-28 | End: 2022-11-03

## 2022-06-02 RX ORDER — ADALIMUMAB 40MG/0.4ML
40 KIT SUBCUTANEOUS
Qty: 2 PEN | Refills: 2 | Status: SHIPPED | OUTPATIENT
Start: 2022-06-02 | End: 2022-06-16

## 2022-06-02 ASSESSMENT — ROUTINE ASSESSMENT OF PATIENT INDEX DATA (RAPID3)
PSYCHOLOGICAL DISTRESS SCORE: 3.3
MDHAQ FUNCTION SCORE: 1
PATIENT GLOBAL ASSESSMENT SCORE: 4.5
WHEN YOU AWAKENED IN THE MORNING OVER THE LAST WEEK, PLEASE INDICATE THE AMOUNT OF TIME IT TAKES UNTIL YOU ARE AS LIMBER AS YOU WILL BE FOR THE DAY: 3.5 HOURS
TOTAL RAPID3 SCORE: 4.61
PAIN SCORE: 6
AM STIFFNESS SCORE: 1, YES
FATIGUE SCORE: 3
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
## 2022-06-02 NOTE — PROGRESS NOTES
Rapid3 Question Responses and Scores 5/28/2022   MDHAQ Score 1   Psychologic Score 3.3   Pain Score 6   When you awakened in the morning OVER THE LAST WEEK, did you feel stiff? Yes   If Yes, please indicate the number of hours until you are as limber as you will be for the day 3.5   Fatigue Score 3   Global Health Score 4.5   RAPID3 Score 4.61     Answers for HPI/ROS submitted by the patient on 5/28/2022  fever: No  eye redness: No  mouth sores: Yes  headaches: Yes  shortness of breath: No  chest pain: No  trouble swallowing: No  diarrhea: No  constipation: No  unexpected weight change: No  genital sore: No  dysuria: No  During the last 3 days, have you had a skin rash?: No  Bruises or bleeds easily: No  cough: No       Detail Level: Zone Detail Level: Detailed

## 2022-06-02 NOTE — PROGRESS NOTES
56 year old female-ulc colitis-2010  Failed medications  Total colectomy -2012- ulcers in distal and proximal part- hence they rename it as crohn's and also has oral ulcers  Abscesses and fistula around the site +    EUS Liver biopsy- primary biliary cholangitis  Seronegative  Smooth muscle +  MAR neg  Mitochondrial neg    Joint pains + since 2005  Quit smoking in 2007    Hips,knees,ankles,wrists,elbows,hands hurt  Toes seize  Paresthesias in the finger tips    H/o multiple fractures,ankles many many times  Boot and PT helped  Fracture of right thumb    PIPs,MCPs  Right wrist  Left wrist  CMCs all hurt    Morning stiffness- hips,knees,shoulders,back  Worse with standing,better with standing    Sleep study- 2019- no sleep apnea  Has gained weight since then  Gastric bariatric surgery since then    WSPI 13  SSS 7/12    ROS- Oral ulcers  Dry eyes-opthal confirmed  Dry mouth-on lozenges  Nausea   Diarrhea in the stoma bag- now thought to have crohn's  No appetite  Acid reflux    Family h/o RA and juvenile RA    Nml ESR,CRP  CBC nml  CRP was very high at the time of infections  Mild anemia in the past    Osteoporosis,endo will start reclast soon    Vit d 25,on supplements    Joint exam on homunculus- 15 TJ  B/L ankles left> right swollen and tender  Right 2nd MTP tender and swollen     Suspect an inflammatory arthritis  This might be enteropathic arthritis     Assessment and plans    It is unclear yet if she has active ileitis based on last gastro notes  A fecal calprotectin was nml  We will send her to - ask if she can tell us - is there active ileiits or not?    Meanwhile  RF,CCP,HLAB27  CBC,CMP,ESR,CRP  premdard panel    We will start treating the inflammatory arthritis with humira-  160 mg (given over 1 or 2 days), then 80 mg 2 weeks later (day 15).   humira 40 mg every other week maintanence       Wait for feedback from            Answers for HPI/ROS submitted by the patient on 5/28/2022  fever:  No  eye redness: No  mouth sores: Yes  headaches: Yes  shortness of breath: No  chest pain: No  trouble swallowing: No  diarrhea: No  constipation: No  unexpected weight change: No  genital sore: No  dysuria: No  During the last 3 days, have you had a skin rash?: No  Bruises or bleeds easily: No  cough: No

## 2022-06-03 ENCOUNTER — SPECIALTY PHARMACY (OUTPATIENT)
Dept: PHARMACY | Facility: CLINIC | Age: 57
End: 2022-06-03
Payer: MEDICARE

## 2022-06-03 LAB
HAV IGG SER QL IA: NEGATIVE
HBV CORE AB SERPL QL IA: NEGATIVE
HBV SURFACE AB SER-ACNC: NEGATIVE M[IU]/ML
HBV SURFACE AG SERPL QL IA: NEGATIVE
HCV AB SERPL QL IA: NEGATIVE
HIV 1+2 AB+HIV1 P24 AG SERPL QL IA: NEGATIVE
RPR SER QL: NORMAL
STRONGYLOIDES ANTIBODY IGG: NEGATIVE

## 2022-06-04 LAB
VARICELLA INTERPRETATION: POSITIVE
VARICELLA ZOSTER IGG: 2.01 ISR (ref 0–0.9)

## 2022-06-06 RX ORDER — ADALIMUMAB 40MG/0.4ML
KIT SUBCUTANEOUS
Qty: 6 PEN | Refills: 0 | OUTPATIENT
Start: 2022-06-06 | End: 2022-06-14 | Stop reason: SDUPTHER

## 2022-06-13 ENCOUNTER — INFUSION (OUTPATIENT)
Dept: INFECTIOUS DISEASES | Facility: HOSPITAL | Age: 57
End: 2022-06-13
Attending: INTERNAL MEDICINE
Payer: MEDICARE

## 2022-06-13 VITALS
DIASTOLIC BLOOD PRESSURE: 59 MMHG | TEMPERATURE: 97 F | WEIGHT: 262.81 LBS | RESPIRATION RATE: 16 BRPM | BODY MASS INDEX: 45.11 KG/M2 | HEART RATE: 72 BPM | SYSTOLIC BLOOD PRESSURE: 127 MMHG | OXYGEN SATURATION: 98 %

## 2022-06-13 DIAGNOSIS — M81.0 AGE-RELATED OSTEOPOROSIS WITHOUT CURRENT PATHOLOGICAL FRACTURE: Primary | ICD-10-CM

## 2022-06-13 PROCEDURE — 96365 THER/PROPH/DIAG IV INF INIT: CPT

## 2022-06-13 PROCEDURE — 25000003 PHARM REV CODE 250: Performed by: GENERAL ACUTE CARE HOSPITAL

## 2022-06-13 PROCEDURE — 63600175 PHARM REV CODE 636 W HCPCS: Performed by: GENERAL ACUTE CARE HOSPITAL

## 2022-06-13 RX ORDER — ZOLEDRONIC ACID 5 MG/100ML
5 INJECTION, SOLUTION INTRAVENOUS
Status: CANCELLED | OUTPATIENT
Start: 2022-06-13

## 2022-06-13 RX ORDER — ZOLEDRONIC ACID 5 MG/100ML
5 INJECTION, SOLUTION INTRAVENOUS
Status: COMPLETED | OUTPATIENT
Start: 2022-06-13 | End: 2022-06-13

## 2022-06-13 RX ORDER — SODIUM CHLORIDE 0.9 % (FLUSH) 0.9 %
10 SYRINGE (ML) INJECTION
Status: DISCONTINUED | OUTPATIENT
Start: 2022-06-13 | End: 2022-06-13 | Stop reason: HOSPADM

## 2022-06-13 RX ORDER — ACETAMINOPHEN 500 MG
500 TABLET ORAL
Status: CANCELLED | OUTPATIENT
Start: 2022-06-13

## 2022-06-13 RX ORDER — SODIUM CHLORIDE 0.9 % (FLUSH) 0.9 %
10 SYRINGE (ML) INJECTION
Status: CANCELLED | OUTPATIENT
Start: 2022-06-13

## 2022-06-13 RX ORDER — HEPARIN 100 UNIT/ML
500 SYRINGE INTRAVENOUS
Status: CANCELLED | OUTPATIENT
Start: 2022-06-13

## 2022-06-13 RX ORDER — ACETAMINOPHEN 500 MG
500 TABLET ORAL
Status: DISCONTINUED | OUTPATIENT
Start: 2022-06-13 | End: 2022-06-13 | Stop reason: HOSPADM

## 2022-06-13 RX ADMIN — ACETAMINOPHEN 500 MG: 500 TABLET ORAL at 10:06

## 2022-06-13 RX ADMIN — ZOLEDRONIC ACID 5 MG: 5 INJECTION, SOLUTION INTRAVENOUS at 10:06

## 2022-06-13 RX ADMIN — SODIUM CHLORIDE: 0.9 INJECTION, SOLUTION INTRAVENOUS at 10:06

## 2022-06-13 NOTE — TELEPHONE ENCOUNTER
Incoming call from patient regarding status of Humira. Informed patient that [facility] is working on the prescription, and it needed some clarification from MD. Patient voiced understanding.   Asked if [facility] could forward to [facility] once approved- reports that she has gotten it filled there before and it is easier for her since she is not always home to receive the package. Will alert assigned rph.

## 2022-06-14 ENCOUNTER — SPECIALTY PHARMACY (OUTPATIENT)
Dept: PHARMACY | Facility: CLINIC | Age: 57
End: 2022-06-14
Payer: MEDICARE

## 2022-06-14 NOTE — TELEPHONE ENCOUNTER
Yon WINTERS transferred to [id number]. For the MD, pharmacy only able to get the 40 mg/ 0.8mL pens. Sent msg to [facility]. Closing out referral

## 2022-06-14 NOTE — TELEPHONE ENCOUNTER
Yon WINTERS and MD CASTAÑEDA approved through 12/31/22  Ref #: [id number]  Test claim going through for $0

## 2022-06-15 NOTE — TELEPHONE ENCOUNTER
Incoming call from patient stating that [facility] is unable to fill Humira CF pen for maintenance dose and inquired if she can fill with [facility] after receiving loading dose from [facility]. Patient will receive loading dose tomorrow on 6/16. Advised to reach out to [facility] in about 3 weeks to schedule refill for maintenance dose. Will forward to previously assigned [facility] for follow up.

## 2022-06-16 DIAGNOSIS — K50.919 CROHN'S DISEASE WITH COMPLICATION, UNSPECIFIED GASTROINTESTINAL TRACT LOCATION: Primary | ICD-10-CM

## 2022-06-16 DIAGNOSIS — M19.90 INFLAMMATORY ARTHRITIS: ICD-10-CM

## 2022-06-16 RX ORDER — ADALIMUMAB 40MG/0.4ML
40 KIT SUBCUTANEOUS
Qty: 6 PEN | Refills: 2 | Status: SHIPPED | OUTPATIENT
Start: 2022-06-16 | End: 2022-10-24

## 2022-06-16 NOTE — TELEPHONE ENCOUNTER
Incoming call from patient- states that [facility] cannot fill loading dose either. Called [facility]- [facility] never put in transfer due to issues with computer. Cancelled out transfer, and confirmed with [facility] that they will not enter in transfer. Proceeding with initial consult.

## 2022-06-16 NOTE — TELEPHONE ENCOUNTER
Specialty Pharmacy - Initial Clinical Assessment    Specialty Medication Orders Linked to Encounter    Flowsheet Row Most Recent Value   Medication #1 adalimumab (HUMIRA,CF, PEN CROHNS-UC-HS) 80 mg/0.8 mL PnKt (Order#[id number], Rx#[id number])   Medication #2 adalimumab (HUMIRA PEN CROHNS-UC-HS START) PnKt injection (Order#[id number], Rx#[id number])        Patient Diagnosis   K50.919 - Crohn's disease with complication  K52.9 - Inflammatory bowel disease    Subjective    Ida Orozco is a 56 y.o. female, who is followed by the specialty pharmacy service for management and education.    Recent Encounters     Date Type Provider Description    06/14/2022 Specialty Pharmacy Tay Shipley PharmD Initial Clinical Assessment    06/03/2022 Specialty Pharmacy Tay Shipley PharmD Referral Authorization        Clinical call attempts since last clinical assessment   No call attempts found.     Current Outpatient Medications   Medication Sig    adalimumab (HUMIRA,CF, PEN CROHNS-UC-HS) 80 mg/0.8 mL PnKt Inject 160 mg (2 pens) into the skin given over 1 or 2 days, then 80 mg 2 weeks later (day 15), then 40 mg every 14 days.    adalimumab (HUMIRA PEN) PnKt injection Inject 1 pen (40 mg total) into the skin every 14 (fourteen) days.    albuterol (PROVENTIL/VENTOLIN HFA) 90 mcg/actuation inhaler Inhale 2 puffs into the lungs every 4 (four) hours as needed for Wheezing or Shortness of Breath. Rescue    budesonide-formoterol 160-4.5 mcg (SYMBICORT) 160-4.5 mcg/actuation HFAA Inhale 2 puffs into the lungs every 12 (twelve) hours. Controller (Patient not taking: Reported on 6/2/2022)    calcium carbonate (OS-NEIL) 600 mg calcium (1,500 mg) Tab Take 1 tablet (600 mg total) by mouth once daily.    clobetasol 0.05% (TEMOVATE) 0.05 % Oint Please use nightly to vulvar area for 7 nights then twice per week.    cyanocobalamin 1,000 mcg/mL injection 1 cc SQ daily x 1 week, weekly x 4 weeks, then once a month    diclofenac sodium  (VOLTAREN) 1 % Gel Apply 2 g topically 4 (four) times daily.    diphenoxylate-atropine 2.5-0.025 mg (LOMOTIL) 2.5-0.025 mg per tablet Take 1 tablet by mouth 4 (four) times daily as needed for Diarrhea. (Patient not taking: Reported on 6/2/2022)    fluticasone propionate (FLONASE) 50 mcg/actuation nasal spray     levocetirizine (XYZAL) 5 MG tablet     loratadine (CLARITIN) 10 mg tablet TAKE ONE TABLET BY MOUTH ONCE DAILY    melatonin 1 mg Tab Take by mouth.    multivitamin/folic acid/biotin (HAIR-SKIN-NAILS, MV-FA-BIOTIN, ORAL) Take by mouth.    nystatin (MYCOSTATIN) powder Apply topically 2 (two) times daily.    pantoprazole (PROTONIX) 40 MG tablet TAKE ONE TABLET BY MOUTH ONCE DAILY    promethazine (PHENERGAN) 25 MG tablet TAKE 1 TABLET (25 MG TOTAL) BY MOUTH EVERY 6 (SIX) HOURS AS NEEDED FOR NAUSEA. (Patient not taking: Reported on 6/2/2022)    traZODone (DESYREL) 100 MG tablet Take 1 tablet (100 mg total) by mouth nightly as needed for Insomnia.    ursodioL (ACTIGALL) 500 MG tablet Take 1 tablet (500 mg total) by mouth 3 (three) times daily.    VITAMIN D2 1,250 mcg (50,000 unit) capsule TAKE ONE CAPSULE BY MOUTH EVERY 7 DAYS   Last reviewed on 6/13/2022 10:24 AM by Becki Orellana RN    Review of patient's allergies indicates:   Allergen Reactions    Adhesive      Cause blisters    Dilaudid [hydromorphone] Nausea And Vomiting    Sulfa (sulfonamide antibiotics) Hives    Surgical stainless steel      Had surgical staples, caused irritation and infection   Last reviewed on  6/16/2022 3:13 PM by Luzma Bowen    Drug Interactions    Drug interactions evaluated: yes  Clinically relevant drug interactions identified: no  Provided the patient with educational material regarding drug interactions: not applicable           Assessment Questions - Documented Responses    Flowsheet Row Most Recent Value   Assessment    Medication Reconciliation completed for patient No   During the past 4 weeks, has  patient missed any activities due to condition or medication? No   During the past 4 weeks, did patient have any of the following urgent care visits? None   Goals of Therapy Status Discussed (new start)   Status of the patients ability to self-administer: Is Able   All education points have been covered with patient? Yes, supplemental printed education provided   Welcome packet contents reviewed and discussed with patient? Yes   Assesment completed? Yes   Plan Therapy being initiated   Do you need to open a clinical intervention (i-vent)? No   Do you want to schedule first shipment? Yes   Medication #2 Assessment Info    Patient status New medication, New to [facility]   Is this medication appropriate for the patient? Yes   Is this medication effective? Not yet started        Refill Questions - Documented Responses    Flowsheet Row Most Recent Value   Patient Availability and HIPAA Verification    Does patient want to proceed with activity? Yes   HIPAA/medical authority confirmed? Yes   Relationship to patient of person spoken to? Self   Refill Screening Questions    When does the patient need to receive the medication? 06/20/22   Refill Delivery Questions    How will the patient receive the medication? Delivery Astrid   When does the patient need to receive the medication? 06/20/22   Shipping Address Temporary   Address in [facility] confirmed and updated if neccessary? Yes   Expected Copay ($) 0   Is the patient able to afford the medication copay? Yes   Payment Method zero copay   Days supply of Refill 28   Supplies needed? No supplies needed   Refill activity completed? Yes   Refill activity plan Refill scheduled   Shipment/Pickup Date: 06/20/22          Objective    She has a past medical history of Anxiety, Asthma, Bronchitis, Crohn's colitis, Depression, Fatty liver disease, nonalcoholic, Gallstones, GERD (gastroesophageal reflux disease), History of sleeve gastrectomy (06/24/2016), PTSD (post-traumatic stress  "disorder), Seasonal allergies, Symptomatic abdominal panniculus, and Ulcerative colitis.    Tried/failed medications: MTX- Primary Biliary Cholangitis and liver disease, Remicade, Humira, Enbrel    BP Readings from Last 4 Encounters:   06/13/22 (!) 127/59   06/02/22 120/73   05/13/22 126/72   03/17/22 118/78     Ht Readings from Last 4 Encounters:   06/02/22 5' 4" (1.626 m)   05/13/22 5' 4" (1.626 m)   03/17/22 5' 4" (1.626 m)   02/15/22 5' 4" (1.626 m)     Wt Readings from Last 4 Encounters:   06/13/22 119.2 kg (262 lb 12.6 oz)   06/02/22 124.6 kg (274 lb 11.1 oz)   05/13/22 119.9 kg (264 lb 5.3 oz)   03/17/22 119.2 kg (262 lb 10.9 oz)     Recent Labs   Lab Result Units 06/02/22  1238 05/02/22  0822 03/29/22  1136 03/29/22  1059   Creatinine mg/dL 0.8 1.0 1.0 0.9   ALT U/L 13 13 13  --    AST U/L 18 17 18  --    Hepatitis B Surface Ag  Negative  --   --   --    Hep B S Ab  Negative  --   --   --    Hep B Core Total Ab  Negative  --   --   --      The goals of prescribed drug therapy management include:  · Supporting patient to meet the prescriber's medical treatment objectives  · Improving or maintaining quality of life  · Maintaining optimal therapy adherence  · Minimizing and managing side effects      Goals of Therapy Status: Discussed (new start)    Assessment/Plan  Patient plans to start therapy on 06/20/22      Indication, dosage, appropriateness, effectiveness, safety and convenience of her specialty medication(s) were reviewed today.     Patient Education   Patient received education on the following:    Expectations and possible outcomes of therapy   Proper use, timely administration, and missed dose management   Duration of therapy   Side effects, including prevention, minimization, and management   Contraindications and safety precautions   New or changed medications, including prescribe and over the counter medications and supplements   Reviews recommended vaccinations, as appropriate   Storage, " safe handling, and disposal        Tasks added this encounter   No tasks added.   Tasks due within next 3 months   6/14/2022 - Clinical - Initial Assessment     Cathy Heard, PharmD  [facility]  [address]  [facility] [address]  Phone: [phone]  Fax: [phone]

## 2022-06-21 ENCOUNTER — CLINICAL SUPPORT (OUTPATIENT)
Dept: INFECTIOUS DISEASES | Facility: CLINIC | Age: 57
End: 2022-06-21
Payer: MEDICARE

## 2022-06-21 DIAGNOSIS — Z23 NEED FOR PROPHYLACTIC VACCINATION AGAINST HEPATITIS A AND HEPATITIS B: ICD-10-CM

## 2022-06-21 LAB
HLA B27 INTERPRETATION: NORMAL
HLA-B27 RELATED AG QL: NEGATIVE
HLA-B27 RELATED AG QL: NORMAL

## 2022-06-21 PROCEDURE — 90636 HEPATITIS A HEPATITIS B COMBINED VACCINE IM: ICD-10-PCS | Mod: S$GLB,,, | Performed by: INTERNAL MEDICINE

## 2022-06-21 PROCEDURE — 90471 HEPATITIS A HEPATITIS B COMBINED VACCINE IM: ICD-10-PCS | Mod: S$GLB,,, | Performed by: INTERNAL MEDICINE

## 2022-06-21 PROCEDURE — 90471 IMMUNIZATION ADMIN: CPT | Mod: S$GLB,,, | Performed by: INTERNAL MEDICINE

## 2022-06-21 PROCEDURE — 99999 PR PBB SHADOW E&M-EST. PATIENT-LVL I: CPT | Mod: PBBFAC,,,

## 2022-06-21 PROCEDURE — 99999 PR PBB SHADOW E&M-EST. PATIENT-LVL I: ICD-10-PCS | Mod: PBBFAC,,,

## 2022-06-21 PROCEDURE — 90636 HEP A/HEP B VACC ADULT IM: CPT | Mod: S$GLB,,, | Performed by: INTERNAL MEDICINE

## 2022-06-22 ENCOUNTER — TELEPHONE (OUTPATIENT)
Dept: HEPATOLOGY | Facility: CLINIC | Age: 57
End: 2022-06-22
Payer: MEDICARE

## 2022-06-22 DIAGNOSIS — K74.00 LIVER FIBROSIS: Primary | ICD-10-CM

## 2022-06-22 NOTE — TELEPHONE ENCOUNTER
Call placed to pt to schd for labs and follow up in 3m (September). Pt schd and verbalized understanding

## 2022-06-24 ENCOUNTER — TELEPHONE (OUTPATIENT)
Dept: PRIMARY CARE CLINIC | Facility: CLINIC | Age: 57
End: 2022-06-24
Payer: MEDICARE

## 2022-06-24 NOTE — TELEPHONE ENCOUNTER
----- Message from Bailey Calhoun sent at 6/24/2022  1:59 PM CDT -----  Type:  Patient Returning Call    Who Called     Does the patient know what this is regarding?:mammo cancellation   Would the patient rather a call back or a response via [facility]at[facility]? My [facility]   Best Call Back Number:[phone]  Additional Information: pt would like to know why mammo was canceled and if she can get it rescheduled

## 2022-06-30 ENCOUNTER — PATIENT MESSAGE (OUTPATIENT)
Dept: RHEUMATOLOGY | Facility: CLINIC | Age: 57
End: 2022-06-30
Payer: MEDICARE

## 2022-06-30 ENCOUNTER — NURSE TRIAGE (OUTPATIENT)
Dept: ADMINISTRATIVE | Facility: CLINIC | Age: 57
End: 2022-06-30
Payer: MEDICARE

## 2022-07-01 NOTE — TELEPHONE ENCOUNTER
States that she sent a message with photos of what she is calling about. On the 22nd she gave herself Humira injection. Now she has large welts at the injection and hives all over her body that are warm to touch. Just started her booster starter dose on the 22nd. The patient is out of town and is requesting a call from Dr. Bowen office for guidance.    Reason for Disposition   Hives or itching   Taking new prescription medicine  (Exceptions: finished taking new prescription antibiotic OR questions about flushing from niacin)    Additional Information   Negative: [1] Life-threatening reaction (anaphylaxis) in the past to the same drug AND [2] < 2 hours since exposure   Negative: Difficulty breathing or wheezing   Negative: [1] Hoarseness or cough AND [2] started soon after 1st dose of drug   Negative: [1] Swollen tongue AND [2] started soon after 1st dose of drug   Negative: [1] Purple or blood-colored rash (spots or dots) AND [2] fever   Negative: Sounds like a life-threatening emergency to the triager   Negative: Rash is only on 1 part of the body (localized)   Negative: Taking new non-prescription (OTC) antihistamine, decongestant, ear drops, eye drops, or other OTC cough/cold medicine   Negative: Taking new prescription antihistamine, allergy medicine, asthma medicine, eye drops, ear drops or nose drops   Negative: Rash started more than 3 days after stopping new prescription medicine   Negative: Swollen tongue   Negative: [1] Widespread hives AND [2] onset < 2 hours of exposure to 1st dose of drug   Negative: Fever   Negative: Patient sounds very sick or weak to the triager   Negative: [1] Purple or blood-colored rash (spots or dots) AND [2] no fever AND [3] sounds well to triager   Negative: [1] Taking new prescription medication AND [2] rash within 4 hours of 1st dose   Negative: Large or small blisters on skin (i.e., fluid filled bubbles or sacs)   Negative: Bloody crusts on lips or sores  in mouth   Negative: Face or lip swelling    Protocols used: RASH - WIDESPREAD ON DRUGS-A-AH

## 2022-07-11 ENCOUNTER — SPECIALTY PHARMACY (OUTPATIENT)
Dept: PHARMACY | Facility: CLINIC | Age: 57
End: 2022-07-11
Payer: MEDICARE

## 2022-07-11 NOTE — TELEPHONE ENCOUNTER
Patient declined to speak with [facility]. Per chart notes, patient was instructed to hold Humira until the root cause can be determined. Patient received Twinrix vaccine on 6/21 and injected Humira on 6/22. Will send msg to [facility] for next steps.

## 2022-07-18 NOTE — TELEPHONE ENCOUNTER
Specialty Pharmacy - Refill Coordination    Specialty Medication Orders Linked to Encounter    Flowsheet Row Most Recent Value   Medication #1 adalimumab (HUMIRA,CF, PEN) 40 mg/0.4 mL PnKt (Order#[id number], Rx#[id number])          Refill Questions - Documented Responses    Flowsheet Row Most Recent Value   Refill Screening Questions    Changes to allergies? No   Changes to medications? No   New conditions since last clinic visit? No   Unplanned office visit, urgent care, ED, or hospital admission in the last 4 weeks? No   How does patient/caregiver feel medication is working? Very good   Financial problems or insurance changes? No   How many doses of your specialty medications were missed in the last 4 weeks? 0   Would patient like to speak to a pharmacist? No   When does the patient need to receive the medication? 07/27/22   Refill Delivery Questions    How will the patient receive the medication? Delivery Astrid   When does the patient need to receive the medication? 07/27/22   Shipping Address Temporary   Address in [facility] confirmed and updated if neccessary? Yes   Expected Copay ($) 0   Is the patient able to afford the medication copay? Yes   Payment Method zero copay   Days supply of Refill 28   Supplies needed? Alcohol Swabs   Refill activity completed? Yes   Refill activity plan Refill scheduled   Shipment/Pickup Date: 07/19/22          Current Outpatient Medications   Medication Sig    adalimumab (HUMIRA,CF, PEN CROHNS-UC-HS) 80 mg/0.8 mL PnKt Inject 160 mg (2 pens) into the skin given over 1 or 2 days, then 80 mg 2 weeks later (day 15), then 40 mg every 14 days.    adalimumab (HUMIRA,CF, PEN) 40 mg/0.4 mL PnKt Inject 0.4 mLs (40 mg total) into the skin every 14 (fourteen) days.    albuterol (PROVENTIL/VENTOLIN HFA) 90 mcg/actuation inhaler Inhale 2 puffs into the lungs every 4 (four) hours as needed for Wheezing or Shortness of Breath. Rescue    budesonide-formoterol 160-4.5 mcg (SYMBICORT)  160-4.5 mcg/actuation HFAA Inhale 2 puffs into the lungs every 12 (twelve) hours. Controller (Patient not taking: Reported on 6/2/2022)    calcium carbonate (OS-NEIL) 600 mg calcium (1,500 mg) Tab Take 1 tablet (600 mg total) by mouth once daily.    clobetasol 0.05% (TEMOVATE) 0.05 % Oint Please use nightly to vulvar area for 7 nights then twice per week.    cyanocobalamin 1,000 mcg/mL injection 1 cc SQ daily x 1 week, weekly x 4 weeks, then once a month    diclofenac sodium (VOLTAREN) 1 % Gel Apply 2 g topically 4 (four) times daily.    diphenoxylate-atropine 2.5-0.025 mg (LOMOTIL) 2.5-0.025 mg per tablet Take 1 tablet by mouth 4 (four) times daily as needed for Diarrhea. (Patient not taking: Reported on 6/2/2022)    fluticasone propionate (FLONASE) 50 mcg/actuation nasal spray     levocetirizine (XYZAL) 5 MG tablet     loratadine (CLARITIN) 10 mg tablet TAKE ONE TABLET BY MOUTH ONCE DAILY    melatonin 1 mg Tab Take by mouth.    multivitamin/folic acid/biotin (HAIR-SKIN-NAILS, MV-FA-BIOTIN, ORAL) Take by mouth.    nystatin (MYCOSTATIN) powder Apply topically 2 (two) times daily.    pantoprazole (PROTONIX) 40 MG tablet TAKE ONE TABLET BY MOUTH ONCE DAILY    promethazine (PHENERGAN) 25 MG tablet TAKE 1 TABLET (25 MG TOTAL) BY MOUTH EVERY 6 (SIX) HOURS AS NEEDED FOR NAUSEA. (Patient not taking: Reported on 6/2/2022)    traZODone (DESYREL) 100 MG tablet Take 1 tablet (100 mg total) by mouth nightly as needed for Insomnia.    ursodioL (ACTIGALL) 500 MG tablet Take 1 tablet (500 mg total) by mouth 3 (three) times daily.    VITAMIN D2 1,250 mcg (50,000 unit) capsule TAKE ONE CAPSULE BY MOUTH EVERY 7 DAYS   Last reviewed on 6/13/2022 10:24 AM by Becki Orellana RN    Review of patient's allergies indicates:   Allergen Reactions    Adhesive      Cause blisters    Dilaudid [hydromorphone] Nausea And Vomiting    Sulfa (sulfonamide antibiotics) Hives    Surgical stainless steel      Had surgical staples,  caused irritation and infection    Last reviewed on  7/11/2022 8:15 AM by Coco Evans    Interventions added this encounter   Open: [facility] Patient Intervention: adalimumab (HUMIRA PEN) PnKt injection     Tasks added this encounter   8/17/2022 - Refill Call (Auto Added)   Tasks due within next 3 months   No tasks due.     Hernan Benjamin, PharmD  [facility]  [facility] [address]  [facility] [address]  Phone: [phone]  Fax: [phone]

## 2022-07-19 ENCOUNTER — CLINICAL SUPPORT (OUTPATIENT)
Dept: INFECTIOUS DISEASES | Facility: CLINIC | Age: 57
End: 2022-07-19
Payer: MEDICARE

## 2022-07-19 ENCOUNTER — TELEPHONE (OUTPATIENT)
Dept: GASTROENTEROLOGY | Facility: CLINIC | Age: 57
End: 2022-07-19
Payer: MEDICARE

## 2022-07-19 DIAGNOSIS — Z23 NEED FOR PROPHYLACTIC VACCINATION AGAINST HEPATITIS A AND HEPATITIS B: ICD-10-CM

## 2022-07-19 PROCEDURE — 90471 IMMUNIZATION ADMIN: CPT | Mod: S$GLB,,, | Performed by: INTERNAL MEDICINE

## 2022-07-19 PROCEDURE — 99999 PR PBB SHADOW E&M-EST. PATIENT-LVL I: ICD-10-PCS | Mod: PBBFAC,,,

## 2022-07-19 PROCEDURE — 90471 HEPATITIS A HEPATITIS B COMBINED VACCINE IM: ICD-10-PCS | Mod: S$GLB,,, | Performed by: INTERNAL MEDICINE

## 2022-07-19 PROCEDURE — 90636 HEPATITIS A HEPATITIS B COMBINED VACCINE IM: ICD-10-PCS | Mod: S$GLB,,, | Performed by: INTERNAL MEDICINE

## 2022-07-19 PROCEDURE — 99999 PR PBB SHADOW E&M-EST. PATIENT-LVL I: CPT | Mod: PBBFAC,,,

## 2022-07-19 PROCEDURE — 90636 HEP A/HEP B VACC ADULT IM: CPT | Mod: S$GLB,,, | Performed by: INTERNAL MEDICINE

## 2022-07-22 DIAGNOSIS — J45.909 ASTHMA, UNSPECIFIED ASTHMA SEVERITY, UNSPECIFIED WHETHER COMPLICATED, UNSPECIFIED WHETHER PERSISTENT: ICD-10-CM

## 2022-07-22 NOTE — TELEPHONE ENCOUNTER
No new care gaps identified.  [facility] Embedded Care Gaps. Reference number: [id number]. 7/22/2022   10:35:28 AM PENNYT

## 2022-07-23 NOTE — TELEPHONE ENCOUNTER
Refill Routing Note   Medication(s) are not appropriate for processing by [facility] for the following reason(s):      Drug-Disease: SYMBICORT and Age-related osteoporosis without current pathological fracture    [facility] action(s):  Defer Medication-related problems identified: Drug-disease interaction     Medication Therapy Plan: Drug-Disease: SYMBICORT and Age-related osteoporosis without current pathological fracture  Medication reconciliation completed: No     Appointments  past 12m or future 3m with PCP    Date Provider   Last Visit   3/16/2022 Eliecer Jones MD   Next Visit   8/29/2022 Eliecer Jones MD   ED visits in past 90 days: 0        Note composed:8:48 PM 07/22/2022

## 2022-07-25 RX ORDER — BUDESONIDE AND FORMOTEROL FUMARATE DIHYDRATE 160; 4.5 UG/1; UG/1
AEROSOL RESPIRATORY (INHALATION)
Qty: 30.6 G | Refills: 5 | Status: SHIPPED | OUTPATIENT
Start: 2022-07-25 | End: 2023-05-03

## 2022-08-09 ENCOUNTER — HOSPITAL ENCOUNTER (OUTPATIENT)
Dept: RADIOLOGY | Facility: HOSPITAL | Age: 57
Discharge: HOME | End: 2022-08-09
Attending: FAMILY MEDICINE
Payer: MEDICARE

## 2022-08-09 DIAGNOSIS — Z12.31 ENCOUNTER FOR SCREENING MAMMOGRAM FOR MALIGNANT NEOPLASM OF BREAST: ICD-10-CM

## 2022-08-09 PROCEDURE — 77067 SCR MAMMO BI INCL CAD: CPT | Mod: TC

## 2022-08-09 PROCEDURE — 77063 BREAST TOMOSYNTHESIS BI: CPT | Mod: TC

## 2022-08-09 PROCEDURE — 77063 BREAST TOMOSYNTHESIS BI: CPT | Mod: 26,,, | Performed by: RADIOLOGY

## 2022-08-09 PROCEDURE — 77067 SCR MAMMO BI INCL CAD: CPT | Mod: 26,,, | Performed by: RADIOLOGY

## 2022-08-09 PROCEDURE — 77067 MAMMO DIGITAL SCREENING BILAT WITH TOMO: ICD-10-PCS | Mod: 26,,, | Performed by: RADIOLOGY

## 2022-08-09 PROCEDURE — 77063 MAMMO DIGITAL SCREENING BILAT WITH TOMO: ICD-10-PCS | Mod: 26,,, | Performed by: RADIOLOGY

## 2022-08-17 ENCOUNTER — SPECIALTY PHARMACY (OUTPATIENT)
Dept: PHARMACY | Facility: CLINIC | Age: 57
End: 2022-08-17
Payer: MEDICARE

## 2022-08-17 NOTE — TELEPHONE ENCOUNTER
Specialty Pharmacy - Refill Coordination    Specialty Medication Orders Linked to Encounter    Flowsheet Row Most Recent Value   Medication #1 adalimumab (HUMIRA,CF, PEN) 40 mg/0.4 mL PnKt (Order#[id number], Rx#[id number])          Refill Questions - Documented Responses    Flowsheet Row Most Recent Value   Patient Availability and HIPAA Verification    Does patient want to proceed with activity? Yes   HIPAA/medical authority confirmed? Yes   Relationship to patient of person spoken to? Self   Refill Screening Questions    Changes to allergies? No   Changes to medications? No   New conditions since last clinic visit? No   Unplanned office visit, urgent care, ED, or hospital admission in the last 4 weeks? No   How does patient/caregiver feel medication is working? Good   Financial problems or insurance changes? No   How many doses of your specialty medications were missed in the last 4 weeks? 0   Would patient like to speak to a pharmacist? No   When does the patient need to receive the medication? 08/23/22   Refill Delivery Questions    How will the patient receive the medication? Delivery Astrid   When does the patient need to receive the medication? 08/23/22   Shipping Address Temporary   Address in [facility] confirmed and updated if neccessary? Yes   Expected Copay ($) 0   Is the patient able to afford the medication copay? Yes   Payment Method zero copay   Days supply of Refill 28   Supplies needed? No supplies needed   Refill activity completed? Yes   Refill activity plan Refill scheduled   Shipment/Pickup Date: 08/18/22          Current Outpatient Medications   Medication Sig    adalimumab (HUMIRA,CF, PEN CROHNS-UC-HS) 80 mg/0.8 mL PnKt Inject 160 mg (2 pens) into the skin given over 1 or 2 days, then 80 mg 2 weeks later (day 15), then 40 mg every 14 days.    adalimumab (HUMIRA,CF, PEN) 40 mg/0.4 mL PnKt Inject 0.4 mLs (40 mg total) into the skin every 14 (fourteen) days.    albuterol  (PROVENTIL/VENTOLIN HFA) 90 mcg/actuation inhaler Inhale 2 puffs into the lungs every 4 (four) hours as needed for Wheezing or Shortness of Breath. Rescue    calcium carbonate (OS-NEIL) 600 mg calcium (1,500 mg) Tab Take 1 tablet (600 mg total) by mouth once daily.    clobetasol 0.05% (TEMOVATE) 0.05 % Oint Please use nightly to vulvar area for 7 nights then twice per week.    cyanocobalamin 1,000 mcg/mL injection 1 cc SQ daily x 1 week, weekly x 4 weeks, then once a month    diclofenac sodium (VOLTAREN) 1 % Gel Apply 2 g topically 4 (four) times daily.    diphenoxylate-atropine 2.5-0.025 mg (LOMOTIL) 2.5-0.025 mg per tablet Take 1 tablet by mouth 4 (four) times daily as needed for Diarrhea. (Patient not taking: Reported on 6/2/2022)    fluticasone propionate (FLONASE) 50 mcg/actuation nasal spray     levocetirizine (XYZAL) 5 MG tablet     loratadine (CLARITIN) 10 mg tablet TAKE ONE TABLET BY MOUTH ONCE DAILY    melatonin 1 mg Tab Take by mouth.    multivitamin/folic acid/biotin (HAIR-SKIN-NAILS, MV-FA-BIOTIN, ORAL) Take by mouth.    nystatin (MYCOSTATIN) powder Apply topically 2 (two) times daily.    pantoprazole (PROTONIX) 40 MG tablet TAKE ONE TABLET BY MOUTH ONCE DAILY    promethazine (PHENERGAN) 25 MG tablet TAKE 1 TABLET (25 MG TOTAL) BY MOUTH EVERY 6 (SIX) HOURS AS NEEDED FOR NAUSEA. (Patient not taking: Reported on 6/2/2022)    SYMBICORT 160-4.5 mcg/actuation HFAA INHALE 2 PUFFS INTO THE LUNGS EVERY 12 (TWELVE) HOURS. CONTROLLER    traZODone (DESYREL) 100 MG tablet Take 1 tablet (100 mg total) by mouth nightly as needed for Insomnia.    ursodioL (ACTIGALL) 500 MG tablet Take 1 tablet (500 mg total) by mouth 3 (three) times daily.    VITAMIN D2 1,250 mcg (50,000 unit) capsule TAKE ONE CAPSULE BY MOUTH EVERY 7 DAYS   Last reviewed on 6/13/2022 10:24 AM by Becki A Esteban, RN    Review of patient's allergies indicates:   Allergen Reactions    Adhesive      Cause blisters    Dilaudid  [hydromorphone] Nausea And Vomiting    Sulfa (sulfonamide antibiotics) Hives    Surgical stainless steel      Had surgical staples, caused irritation and infection    Last reviewed on  7/19/2022 12:45 PM by Luisa Rajput      Tasks added this encounter   9/13/2022 - Refill Call (Auto Added)   Tasks due within next 3 months   No tasks due.     Maria Ines Sow, PharmD  [facility]  [address]  [facility] [address]  Phone: [phone]  Fax: [phone]

## 2022-08-26 DIAGNOSIS — G47.00 INSOMNIA, UNSPECIFIED TYPE: Primary | ICD-10-CM

## 2022-08-26 RX ORDER — TRAZODONE HYDROCHLORIDE 50 MG/1
TABLET ORAL
Qty: 90 TABLET | Refills: 1 | Status: SHIPPED | OUTPATIENT
Start: 2022-08-26 | End: 2022-08-29

## 2022-08-26 NOTE — TELEPHONE ENCOUNTER
Refill Routing Note   Medication(s) are not appropriate for processing by [facility] for the following reason(s):      - Indication is outside of scope for [facility]    [facility] action(s):  Route          Medication reconciliation completed: No     Appointments  past 12m or future 3m with PCP    Date Provider   Last Visit   3/16/2022 Eliecer Jones MD   Next Visit   8/29/2022 Eliecer Jones MD   ED visits in past 90 days: 0        Note composed:9:57 AM 08/26/2022

## 2022-08-26 NOTE — TELEPHONE ENCOUNTER
No new care gaps identified.  [facility] Embedded Care Gaps. Reference number: [id number]. 8/26/2022   9:18:42 AM PENNYT

## 2022-08-29 ENCOUNTER — OFFICE VISIT (OUTPATIENT)
Dept: PRIMARY CARE CLINIC | Facility: CLINIC | Age: 57
End: 2022-08-29
Payer: MEDICARE

## 2022-08-29 VITALS
TEMPERATURE: 97 F | RESPIRATION RATE: 18 BRPM | WEIGHT: 249.25 LBS | DIASTOLIC BLOOD PRESSURE: 62 MMHG | SYSTOLIC BLOOD PRESSURE: 124 MMHG | HEIGHT: 64 IN | OXYGEN SATURATION: 95 % | HEART RATE: 58 BPM | BODY MASS INDEX: 42.55 KG/M2

## 2022-08-29 DIAGNOSIS — Z23 NEED FOR VACCINATION: ICD-10-CM

## 2022-08-29 DIAGNOSIS — E78.5 HYPERLIPIDEMIA, UNSPECIFIED HYPERLIPIDEMIA TYPE: ICD-10-CM

## 2022-08-29 DIAGNOSIS — J45.40 MODERATE PERSISTENT ASTHMA WITHOUT COMPLICATION: Primary | ICD-10-CM

## 2022-08-29 DIAGNOSIS — K52.9 INFLAMMATORY BOWEL DISEASE: ICD-10-CM

## 2022-08-29 DIAGNOSIS — Z93.2 ILEOSTOMY IN PLACE: ICD-10-CM

## 2022-08-29 DIAGNOSIS — F33.0 MILD EPISODE OF RECURRENT MAJOR DEPRESSIVE DISORDER: ICD-10-CM

## 2022-08-29 PROBLEM — K51.90 ULCERATIVE COLITIS: Status: RESOLVED | Noted: 2018-05-24 | Resolved: 2022-08-29

## 2022-08-29 PROCEDURE — 99999 PR PBB SHADOW E&M-EST. PATIENT-LVL V: CPT | Mod: PBBFAC,,, | Performed by: FAMILY MEDICINE

## 2022-08-29 PROCEDURE — 3008F BODY MASS INDEX DOCD: CPT | Mod: CPTII,S$GLB,, | Performed by: FAMILY MEDICINE

## 2022-08-29 PROCEDURE — 1159F MED LIST DOCD IN RCRD: CPT | Mod: CPTII,S$GLB,, | Performed by: FAMILY MEDICINE

## 2022-08-29 PROCEDURE — 99214 OFFICE O/P EST MOD 30 MIN: CPT | Mod: S$GLB,,, | Performed by: FAMILY MEDICINE

## 2022-08-29 PROCEDURE — 1159F PR MEDICATION LIST DOCUMENTED IN MEDICAL RECORD: ICD-10-PCS | Mod: CPTII,S$GLB,, | Performed by: FAMILY MEDICINE

## 2022-08-29 PROCEDURE — 99999 PR PBB SHADOW E&M-EST. PATIENT-LVL V: ICD-10-PCS | Mod: PBBFAC,,, | Performed by: FAMILY MEDICINE

## 2022-08-29 PROCEDURE — G0009 PNEUMOCOCCAL CONJUGATE VACCINE 20-VALENT: ICD-10-PCS | Mod: S$GLB,,, | Performed by: FAMILY MEDICINE

## 2022-08-29 PROCEDURE — 1160F RVW MEDS BY RX/DR IN RCRD: CPT | Mod: CPTII,S$GLB,, | Performed by: FAMILY MEDICINE

## 2022-08-29 PROCEDURE — 1160F PR REVIEW ALL MEDS BY PRESCRIBER/CLIN PHARMACIST DOCUMENTED: ICD-10-PCS | Mod: CPTII,S$GLB,, | Performed by: FAMILY MEDICINE

## 2022-08-29 PROCEDURE — G0009 ADMIN PNEUMOCOCCAL VACCINE: HCPCS | Mod: S$GLB,,, | Performed by: FAMILY MEDICINE

## 2022-08-29 PROCEDURE — 90677 PNEUMOCOCCAL CONJUGATE VACCINE 20-VALENT: ICD-10-PCS | Mod: S$GLB,,, | Performed by: FAMILY MEDICINE

## 2022-08-29 PROCEDURE — 90677 PCV20 VACCINE IM: CPT | Mod: S$GLB,,, | Performed by: FAMILY MEDICINE

## 2022-08-29 PROCEDURE — 99214 PR OFFICE/OUTPT VISIT, EST, LEVL IV, 30-39 MIN: ICD-10-PCS | Mod: S$GLB,,, | Performed by: FAMILY MEDICINE

## 2022-08-29 PROCEDURE — 3008F PR BODY MASS INDEX (BMI) DOCUMENTED: ICD-10-PCS | Mod: CPTII,S$GLB,, | Performed by: FAMILY MEDICINE

## 2022-08-29 PROCEDURE — 99499 RISK ADDL DX/OHS AUDIT: ICD-10-PCS | Mod: S$GLB,,, | Performed by: FAMILY MEDICINE

## 2022-08-29 PROCEDURE — 3078F PR MOST RECENT DIASTOLIC BLOOD PRESSURE < 80 MM HG: ICD-10-PCS | Mod: CPTII,S$GLB,, | Performed by: FAMILY MEDICINE

## 2022-08-29 PROCEDURE — 3074F SYST BP LT 130 MM HG: CPT | Mod: CPTII,S$GLB,, | Performed by: FAMILY MEDICINE

## 2022-08-29 PROCEDURE — 3078F DIAST BP <80 MM HG: CPT | Mod: CPTII,S$GLB,, | Performed by: FAMILY MEDICINE

## 2022-08-29 PROCEDURE — 99499 UNLISTED E&M SERVICE: CPT | Mod: S$GLB,,, | Performed by: FAMILY MEDICINE

## 2022-08-29 PROCEDURE — 3074F PR MOST RECENT SYSTOLIC BLOOD PRESSURE < 130 MM HG: ICD-10-PCS | Mod: CPTII,S$GLB,, | Performed by: FAMILY MEDICINE

## 2022-08-29 RX ORDER — TOPIRAMATE 100 MG/1
TABLET, FILM COATED ORAL
COMMUNITY
Start: 2022-06-29 | End: 2022-09-29

## 2022-08-29 RX ORDER — CLONAZEPAM 1 MG/1
TABLET ORAL
COMMUNITY
Start: 2022-06-10 | End: 2022-09-08

## 2022-08-29 RX ORDER — SERTRALINE HYDROCHLORIDE 100 MG/1
TABLET, FILM COATED ORAL
COMMUNITY
Start: 2022-06-06 | End: 2023-02-27

## 2022-08-29 NOTE — PROGRESS NOTES
"Subjective:       Patient ID: Ida Orozco is a 57 y.o. female.    Chief Complaint: Follow-up (Pt states in for follow-up.)    Stopped taking Singulair, says her rhinorrhea has worsened, but she does not want to get back on it.  Breathing has been relatively stable, but says she is not taking Symbicort consistently twice daily, uses albuterol p.r.n..  Gets occasional crampy abdominal pain.  [facility] reached out last month to schedule appointment, but she has not yet called back, says she is very preoccupied caring for her elderly parents.  No melena or hematochezia.    Review of Systems   Constitutional:  Negative for activity change and unexpected weight change.   HENT:  Positive for rhinorrhea. Negative for hearing loss and trouble swallowing.    Eyes:  Negative for discharge and visual disturbance.   Respiratory:  Negative for chest tightness and wheezing.    Cardiovascular:  Negative for chest pain and palpitations.   Gastrointestinal:  Positive for abdominal pain (Cramping). Negative for blood in stool, constipation, diarrhea and vomiting.   Endocrine: Negative for polydipsia and polyuria.   Genitourinary:  Negative for difficulty urinating, dysuria, hematuria and menstrual problem.   Musculoskeletal:  Negative for arthralgias, joint swelling and neck pain.   Neurological:  Negative for weakness and headaches.   Psychiatric/Behavioral:  Negative for confusion and dysphoric mood.      Objective:      Vitals:    08/29/22 1009   BP: 124/62   BP Location: Right arm   Patient Position: Sitting   BP Method: Large (Manual)   Pulse: (!) 58   Resp: 18   Temp: 96.9 °F (36.1 °C)   TempSrc: Temporal   SpO2: 95%   Weight: 113 kg (249 lb 3.7 oz)   Height: 5' 4" (1.626 m)     Physical Exam  Vitals and nursing note reviewed.   Constitutional:       General: She is not in acute distress.     Appearance: Normal appearance. She is well-developed.   HENT:      Head: Normocephalic and atraumatic. "   Cardiovascular:      Rate and Rhythm: Normal rate and regular rhythm.      Heart sounds: Normal heart sounds.   Pulmonary:      Effort: Pulmonary effort is normal.      Breath sounds: Normal breath sounds.   Abdominal:      General: Bowel sounds are normal. There is no distension.      Tenderness: There is no abdominal tenderness.       Musculoskeletal:      Right lower leg: No edema.      Left lower leg: No edema.   Skin:     General: Skin is warm and dry.   Neurological:      Mental Status: She is alert and oriented to person, place, and time.   Psychiatric:         Mood and Affect: Mood normal.         Behavior: Behavior normal.       Lab Results   Component Value Date    WBC 6.63 06/02/2022    HGB 12.5 06/02/2022    HCT 41.0 06/02/2022     06/02/2022    CHOL 183 05/25/2021    TRIG 90 05/25/2021    HDL 81 (H) 05/25/2021    ALT 13 06/02/2022    AST 18 06/02/2022     06/02/2022    K 4.1 06/02/2022     06/02/2022    CREATININE 0.8 06/02/2022    BUN 14 06/02/2022    CO2 23 06/02/2022    TSH 0.883 05/25/2021    INR 1.0 03/29/2022    HGBA1C 5.6 05/30/2019      Assessment:       1. Moderate persistent asthma without complication    2. Mild episode of recurrent major depressive disorder    3. Ileostomy in place    4. Inflammatory bowel disease    5. Need for vaccination    6. Hyperlipidemia, unspecified hyperlipidemia type          Plan:       Moderate persistent asthma without complication  Encouraged to take Symbicort twice daily as prescribed  Mild episode of recurrent major depressive disorder  Stable on current regimen  Ileostomy in place  Stable  Inflammatory bowel disease  Needs to follow-up with GI  Need for vaccination  -     (In Office Administered) Pneumococcal Conjugate Vaccine (20 Valent) (IM)    Hyperlipidemia, unspecified hyperlipidemia type  -     Lipid Panel; Future; Expected date: 08/29/2022    Medication List with Changes/Refills   Current Medications    ADALIMUMAB (HUMIRA,CF, PEN)  40 MG/0.4 ML PNKT    Inject 0.4 mLs (40 mg total) into the skin every 14 (fourteen) days.    ALBUTEROL (PROVENTIL/VENTOLIN HFA) 90 MCG/ACTUATION INHALER    Inhale 2 puffs into the lungs every 4 (four) hours as needed for Wheezing or Shortness of Breath. Rescue    CLONAZEPAM (KLONOPIN) 1 MG TABLET        DICLOFENAC SODIUM (VOLTAREN) 1 % GEL    Apply 2 g topically 4 (four) times daily.    DIPHENOXYLATE-ATROPINE 2.5-0.025 MG (LOMOTIL) 2.5-0.025 MG PER TABLET    Take 1 tablet by mouth 4 (four) times daily as needed for Diarrhea.    FLUTICASONE PROPIONATE (FLONASE) 50 MCG/ACTUATION NASAL SPRAY        LORATADINE (CLARITIN) 10 MG TABLET    TAKE ONE TABLET BY MOUTH ONCE DAILY    MELATONIN 1 MG TAB    Take by mouth.    MULTIVITAMIN/FOLIC ACID/BIOTIN (HAIR-SKIN-NAILS, MV-FA-BIOTIN, ORAL)    Take by mouth.    PROMETHAZINE (PHENERGAN) 25 MG TABLET    TAKE 1 TABLET (25 MG TOTAL) BY MOUTH EVERY 6 (SIX) HOURS AS NEEDED FOR NAUSEA.    SERTRALINE (ZOLOFT) 100 MG TABLET        SYMBICORT 160-4.5 MCG/ACTUATION HFAA    INHALE 2 PUFFS INTO THE LUNGS EVERY 12 (TWELVE) HOURS. CONTROLLER    TOPIRAMATE (TOPAMAX) 100 MG TABLET        TRAZODONE (DESYREL) 100 MG TABLET    Take 1 tablet (100 mg total) by mouth nightly as needed for Insomnia.    URSODIOL (ACTIGALL) 500 MG TABLET    Take 1 tablet (500 mg total) by mouth 3 (three) times daily.    VITAMIN D2 1,250 MCG (50,000 UNIT) CAPSULE    TAKE ONE CAPSULE BY MOUTH EVERY 7 DAYS   Discontinued Medications    ADALIMUMAB (HUMIRA,CF, PEN CROHNS-UC-HS) 80 MG/0.8 ML PNKT    Inject 160 mg (2 pens) into the skin given over 1 or 2 days, then 80 mg 2 weeks later (day 15), then 40 mg every 14 days.    CALCIUM CARBONATE (OS-NEIL) 600 MG CALCIUM (1,500 MG) TAB    Take 1 tablet (600 mg total) by mouth once daily.    CLOBETASOL 0.05% (TEMOVATE) 0.05 % OINT    Please use nightly to vulvar area for 7 nights then twice per week.    CYANOCOBALAMIN 1,000 MCG/ML INJECTION    1 cc SQ daily x 1 week, weekly x 4 weeks,  then once a month    LEVOCETIRIZINE (XYZAL) 5 MG TABLET        NYSTATIN (MYCOSTATIN) POWDER    Apply topically 2 (two) times daily.    PANTOPRAZOLE (PROTONIX) 40 MG TABLET    TAKE ONE TABLET BY MOUTH ONCE DAILY    TRAZODONE (DESYREL) 50 MG TABLET    TAKE ONE TABLET BY MOUTH EVERY EVENING

## 2022-09-13 ENCOUNTER — SPECIALTY PHARMACY (OUTPATIENT)
Dept: PHARMACY | Facility: CLINIC | Age: 57
End: 2022-09-13
Payer: MEDICARE

## 2022-09-13 NOTE — TELEPHONE ENCOUNTER
Specialty Pharmacy - Refill Coordination    Specialty Medication Orders Linked to Encounter      Flowsheet Row Most Recent Value   Medication #1 adalimumab (HUMIRA,CF, PEN) 40 mg/0.4 mL PnKt (Order#[id number], Rx#[id number])            Refill Questions - Documented Responses      Flowsheet Row Most Recent Value   Patient Availability and HIPAA Verification    Does patient want to proceed with activity? Yes   HIPAA/medical authority confirmed? Yes   Relationship to patient of person spoken to? Self   Refill Screening Questions    Changes to allergies? No   Changes to medications? No   New conditions since last clinic visit? No   Unplanned office visit, urgent care, ED, or hospital admission in the last 4 weeks? No   How does patient/caregiver feel medication is working? Good   Financial problems or insurance changes? No   How many doses of your specialty medications were missed in the last 4 weeks? 0   Would patient like to speak to a pharmacist? No   When does the patient need to receive the medication? 09/20/22   Refill Delivery Questions    How will the patient receive the medication? Delivery Astrid   When does the patient need to receive the medication? 09/20/22   Shipping Address Home   Address in [facility] confirmed and updated if neccessary? Yes   Expected Copay ($) 0   Is the patient able to afford the medication copay? Yes   Payment Method zero copay   Days supply of Refill 28   Supplies needed? No supplies needed   Refill activity completed? Yes   Refill activity plan Refill scheduled   Shipment/Pickup Date: 09/14/22            Current Outpatient Medications   Medication Sig    adalimumab (HUMIRA,CF, PEN) 40 mg/0.4 mL PnKt Inject 0.4 mLs (40 mg total) into the skin every 14 (fourteen) days.    albuterol (PROVENTIL/VENTOLIN HFA) 90 mcg/actuation inhaler Inhale 2 puffs into the lungs every 4 (four) hours as needed for Wheezing or Shortness of Breath. Rescue    clonazePAM (KLONOPIN) 1 MG tablet TAKE ONE  TABLET BY MOUTH ONCE DAILY AS NEEDED FOR ANXIETY    diclofenac sodium (VOLTAREN) 1 % Gel Apply 2 g topically 4 (four) times daily.    diphenoxylate-atropine 2.5-0.025 mg (LOMOTIL) 2.5-0.025 mg per tablet Take 1 tablet by mouth 4 (four) times daily as needed for Diarrhea.    fluticasone propionate (FLONASE) 50 mcg/actuation nasal spray     loratadine (CLARITIN) 10 mg tablet TAKE ONE TABLET BY MOUTH ONCE DAILY    melatonin 1 mg Tab Take by mouth.    multivitamin/folic acid/biotin (HAIR-SKIN-NAILS, MV-FA-BIOTIN, ORAL) Take by mouth.    promethazine (PHENERGAN) 25 MG tablet TAKE 1 TABLET (25 MG TOTAL) BY MOUTH EVERY 6 (SIX) HOURS AS NEEDED FOR NAUSEA.    sertraline (ZOLOFT) 100 MG tablet     SYMBICORT 160-4.5 mcg/actuation HFAA INHALE 2 PUFFS INTO THE LUNGS EVERY 12 (TWELVE) HOURS. CONTROLLER    topiramate (TOPAMAX) 100 MG tablet     traZODone (DESYREL) 100 MG tablet TAKE 1 TABLET (100 MG TOTAL) BY MOUTH NIGHTLY AS NEEDED FOR INSOMNIA.    ursodioL (ACTIGALL) 500 MG tablet Take 1 tablet (500 mg total) by mouth 3 (three) times daily.    VITAMIN D2 1,250 mcg (50,000 unit) capsule TAKE ONE CAPSULE BY MOUTH EVERY 7 DAYS   Last reviewed on 8/29/2022 10:26 AM by Eliecer Jones MD    Review of patient's allergies indicates:   Allergen Reactions    Adhesive      Cause blisters    Dilaudid [hydromorphone] Nausea And Vomiting    Sulfa (sulfonamide antibiotics) Hives    Surgical stainless steel      Had surgical staples, caused irritation and infection    Last reviewed on  9/8/2022 10:23 AM by Elzbieta Devine      Tasks added this encounter   10/11/2022 - Refill Call (Auto Added)   Tasks due within next 3 months   No tasks due.     Claudine Giraldo [facility]  [address] [place] [id number]  Phone: [phone]  Fax: [phone]

## 2022-09-27 ENCOUNTER — PATIENT MESSAGE (OUTPATIENT)
Dept: PRIMARY CARE CLINIC | Facility: CLINIC | Age: 57
End: 2022-09-27
Payer: MEDICARE

## 2022-10-11 ENCOUNTER — SPECIALTY PHARMACY (OUTPATIENT)
Dept: PHARMACY | Facility: CLINIC | Age: 57
End: 2022-10-11
Payer: MEDICARE

## 2022-10-11 NOTE — TELEPHONE ENCOUNTER
Patient states she has a dose on hand for 10/16. Will reschedule refill call for 10/24 to coordinate refill due for 10/30

## 2022-10-12 ENCOUNTER — TELEPHONE (OUTPATIENT)
Dept: PRIMARY CARE CLINIC | Facility: CLINIC | Age: 57
End: 2022-10-12
Payer: MEDICARE

## 2022-10-12 NOTE — TELEPHONE ENCOUNTER
Called pt regarding symptoms. Informed the next available appt is tomorrow with NP, pt accepted. Pt will be scheduled tomorrow as requested.

## 2022-10-12 NOTE — TELEPHONE ENCOUNTER
----- Message from Chelsea Monroe sent at 10/12/2022 11:23 AM CDT -----  Contact: [phone]  Pt called to say she is having dizzy and light headed feeling upon standing up. Pt would like to be seen today. Please Advise

## 2022-10-13 ENCOUNTER — OFFICE VISIT (OUTPATIENT)
Dept: PRIMARY CARE CLINIC | Facility: CLINIC | Age: 57
End: 2022-10-13
Payer: MEDICARE

## 2022-10-13 VITALS
OXYGEN SATURATION: 98 % | TEMPERATURE: 98 F | WEIGHT: 243.38 LBS | SYSTOLIC BLOOD PRESSURE: 118 MMHG | BODY MASS INDEX: 41.55 KG/M2 | RESPIRATION RATE: 18 BRPM | HEART RATE: 70 BPM | HEIGHT: 64 IN | DIASTOLIC BLOOD PRESSURE: 60 MMHG

## 2022-10-13 DIAGNOSIS — R42 DIZZINESS: Primary | ICD-10-CM

## 2022-10-13 DIAGNOSIS — F33.0 MILD EPISODE OF RECURRENT MAJOR DEPRESSIVE DISORDER: ICD-10-CM

## 2022-10-13 DIAGNOSIS — J32.4 CHRONIC PANSINUSITIS: ICD-10-CM

## 2022-10-13 DIAGNOSIS — R11.10 VOMITING, UNSPECIFIED VOMITING TYPE, UNSPECIFIED WHETHER NAUSEA PRESENT: ICD-10-CM

## 2022-10-13 DIAGNOSIS — K50.919 CROHN'S DISEASE WITH COMPLICATION, UNSPECIFIED GASTROINTESTINAL TRACT LOCATION: ICD-10-CM

## 2022-10-13 DIAGNOSIS — Z93.2 ILEOSTOMY STATUS: ICD-10-CM

## 2022-10-13 DIAGNOSIS — R19.7 DIARRHEA, UNSPECIFIED TYPE: ICD-10-CM

## 2022-10-13 DIAGNOSIS — Z93.2 ILEOSTOMY IN PLACE: ICD-10-CM

## 2022-10-13 PROCEDURE — 1159F PR MEDICATION LIST DOCUMENTED IN MEDICAL RECORD: ICD-10-PCS | Mod: CPTII,S$GLB,, | Performed by: NURSE PRACTITIONER

## 2022-10-13 PROCEDURE — 3078F PR MOST RECENT DIASTOLIC BLOOD PRESSURE < 80 MM HG: ICD-10-PCS | Mod: CPTII,S$GLB,, | Performed by: NURSE PRACTITIONER

## 2022-10-13 PROCEDURE — 99999 PR PBB SHADOW E&M-EST. PATIENT-LVL V: CPT | Mod: PBBFAC,,, | Performed by: NURSE PRACTITIONER

## 2022-10-13 PROCEDURE — 99214 OFFICE O/P EST MOD 30 MIN: CPT | Mod: S$GLB,,, | Performed by: NURSE PRACTITIONER

## 2022-10-13 PROCEDURE — 1159F MED LIST DOCD IN RCRD: CPT | Mod: CPTII,S$GLB,, | Performed by: NURSE PRACTITIONER

## 2022-10-13 PROCEDURE — 99999 PR PBB SHADOW E&M-EST. PATIENT-LVL V: ICD-10-PCS | Mod: PBBFAC,,, | Performed by: NURSE PRACTITIONER

## 2022-10-13 PROCEDURE — 99214 PR OFFICE/OUTPT VISIT, EST, LEVL IV, 30-39 MIN: ICD-10-PCS | Mod: S$GLB,,, | Performed by: NURSE PRACTITIONER

## 2022-10-13 PROCEDURE — 3074F PR MOST RECENT SYSTOLIC BLOOD PRESSURE < 130 MM HG: ICD-10-PCS | Mod: CPTII,S$GLB,, | Performed by: NURSE PRACTITIONER

## 2022-10-13 PROCEDURE — 3074F SYST BP LT 130 MM HG: CPT | Mod: CPTII,S$GLB,, | Performed by: NURSE PRACTITIONER

## 2022-10-13 PROCEDURE — 3078F DIAST BP <80 MM HG: CPT | Mod: CPTII,S$GLB,, | Performed by: NURSE PRACTITIONER

## 2022-10-13 RX ORDER — CEFDINIR 300 MG/1
300 CAPSULE ORAL 2 TIMES DAILY
Qty: 20 CAPSULE | Refills: 0 | Status: SHIPPED | OUTPATIENT
Start: 2022-10-13 | End: 2022-10-23

## 2022-10-13 RX ORDER — ONDANSETRON 4 MG/1
4 TABLET, ORALLY DISINTEGRATING ORAL EVERY 8 HOURS PRN
Qty: 15 TABLET | Refills: 0 | Status: SHIPPED | OUTPATIENT
Start: 2022-10-13 | End: 2022-10-20

## 2022-10-13 RX ORDER — MECLIZINE HYDROCHLORIDE 25 MG/1
25 TABLET ORAL 3 TIMES DAILY PRN
Qty: 30 TABLET | Refills: 0 | Status: SHIPPED | OUTPATIENT
Start: 2022-10-13 | End: 2023-07-05

## 2022-10-13 NOTE — PROGRESS NOTES
Chief Complaint  Chief Complaint   Patient presents with    Dizziness     Pt in for dizziness       HPI    HPI      PAST MEDICAL HISTORY:  Past Medical History:   Diagnosis Date    Anxiety     Asthma     Bronchitis     Crohn's colitis     Depression     Esophageal ulcer 2014    Fatty liver disease, nonalcoholic     Gallstones     GERD (gastroesophageal reflux disease)     History of sleeve gastrectomy 2016    PTSD (post-traumatic stress disorder)     Seasonal allergies     Symptomatic abdominal panniculus     Ulcerative colitis        PAST SURGICAL HISTORY:  Past Surgical History:   Procedure Laterality Date    APPENDECTOMY      CATHETERIZATION OF BOTH LEFT AND RIGHT HEART Right 2019    Procedure: CATHETERIZATION, HEART, BOTH LEFT AND RIGHT;  Surgeon: Beto Malin MD;  Location: [facility] CATH LAB;  Service: Cardiology;  Laterality: Right;     SECTION, LOW TRANSVERSE      CHOLECYSTECTOMY      open    COLECTOMY      total- 2013    CYSTOSCOPY W/ RETROGRADES N/A 2018    Procedure: CYSTOSCOPY, WITH RETROGRADE PYELOGRAM;  Surgeon: Butch Banks MD;  Location: [facility] OR;  Service: Urology;  Laterality: N/A;  HANSEN SURGICAL CONFIRMED     ENDOSCOPIC ULTRASOUND OF UPPER GASTROINTESTINAL TRACT N/A 11/10/2021    Procedure: ULTRASOUND, UPPER GI TRACT, ENDOSCOPIC;  Surgeon: Nhan Dee MD;  [facility]  Service: Endoscopy;  Laterality: N/A;  MD Milena Whitehead PA-C; Quinn Whitman MD; Miladys Velasquez MA  Caller: Unspecified (3 days ago,  1:20 PM)  Fair enough. Neha, please schedule.       ----- Message -----   From: Milena Murillo PA-C   Sent: 2021  1    ESOPHAGOGASTRODUODENOSCOPY  2018    Dr. Castorena: LA Grade A reflux esophagitis, hiatal hernia; Ectopic gastric mucosa in the upper third of the esophagus; gastric sleeve intact with unremarkable findings, gastritis; biopsy: stomach- Mild chronic antral and oxyntic gastritis, no  activity, negative for h pylori    ESOPHAGOGASTRODUODENOSCOPY N/A 11/10/2021    Procedure: EGD (ESOPHAGOGASTRODUODENOSCOPY);  Surgeon: Nhan Dee MD;  Location: [facility] ENDO (2ND FLR);  Service: Endoscopy;  Laterality: N/A;    FLEXIBLE SIGMOIDOSCOPY  2018    Procedure: SIGMOIDOSCOPY, FLEXIBLE;  Surgeon: JENNY Virgen MD;  Location: [facility] OR 2ND FLR;  Service: Colon and Rectal;;    FLEXIBLE SIGMOIDOSCOPY  2018    Dr. Castorena: Non-patent surgical anastomosis, characterized by friable mucosa.    GASTRIC SLEEVE       HYSTERECTOMY      ILEOSCOPY  2018    Dr. Castorena: Patient is status-post total colectomy with end ileostomy. unremarkable findings    ILEOSTOMY REVISION      2014; 2014    LAPAROSCOPIC PROCTECTOMY N/A 2018    Procedure: PROCTECTOMY-LAPAROSCOPIC/CONVERTED TO OPEN;  Surgeon: JENNY Virgen MD;  Location: [facility] OR 2ND FLR;  Service: Colon and Rectal;  Laterality: N/A;    LYSIS OF ADHESIONS  2018    Procedure: LYSIS, ADHESIONS/ more than 2hours;  Surgeon: JENNY Virgen MD;  [facility]: [facility] OR 2ND FLR;  Service: Colon and Rectal;;    OOPHORECTOMY      PANNICULECTOMY Bilateral 2019    Procedure: PANNICULECTOMY;  Surgeon: Andrea Alvarado MD;  Location: [facility] OR 2ND FLR;  Service: Plastics;  Laterality: Bilateral;    REVISION COLOSTOMY N/A 2019    Procedure: REVISION, COLOSTOMY;  Surgeon: JENNY Virgen MD;  Location: [facility] OR 2ND FLR;  Service: Colon and Rectal;  Laterality: N/A;    thumb surgery      TONSILLECTOMY, ADENOIDECTOMY      WISDOM TOOTH EXTRACTION         SOCIAL HISTORY:  Social History     Socioeconomic History    Marital status: Legally    Tobacco Use    Smoking status: Former     Types: Cigarettes     Quit date: 2009     Years since quittin.7    Smokeless tobacco: Never   Substance and Sexual Activity    Alcohol use: Yes     Comment: rarely    Drug use: No    Sexual activity: Never     Birth control/protection: See Surgical Hx        FAMILY HISTORY:  Family History   Problem Relation Age of Onset    Cancer Mother         skin    Cirrhosis Mother     Heart disease Father 67    Cancer Paternal Grandfather         ?    Cancer Maternal Grandfather         colon     Colon cancer Maternal Grandfather     Colon cancer Maternal Uncle     Colon cancer Maternal Uncle     Crohn's disease Neg Hx     Ulcerative colitis Neg Hx     Stomach cancer Neg Hx     Esophageal cancer Neg Hx     Celiac disease Neg Hx     Breast cancer Neg Hx     Ovarian cancer Neg Hx        ALLERGIES AND MEDICATIONS: updated and reviewed.  Review of patient's allergies indicates:   Allergen Reactions    Adhesive      Cause blisters    Dilaudid [hydromorphone] Nausea And Vomiting    Sulfa (sulfonamide antibiotics) Hives    Surgical stainless steel      Had surgical staples, caused irritation and infection     Current Outpatient Medications   Medication Sig Dispense Refill    adalimumab (HUMIRA,CF, PEN) 40 mg/0.4 mL PnKt Inject 0.4 mLs (40 mg total) into the skin every 14 (fourteen) days. 6 pen 2    albuterol (PROVENTIL/VENTOLIN HFA) 90 mcg/actuation inhaler Inhale 2 puffs into the lungs every 4 (four) hours as needed for Wheezing or Shortness of Breath. Rescue 8 g 5    cephALEXin (KEFLEX) 500 MG capsule Take 1 capsule (500 mg total) by mouth every 12 (twelve) hours. for 7 days 14 capsule 0    clonazePAM (KLONOPIN) 1 MG tablet TAKE ONE TABLET BY MOUTH ONCE DAILY AS NEEDED FOR ANXIETY 90 tablet 1    diclofenac sodium (VOLTAREN) 1 % Gel Apply 2 g topically 4 (four) times daily. 100 g 5    diphenoxylate-atropine 2.5-0.025 mg (LOMOTIL) 2.5-0.025 mg per tablet Take 1 tablet by mouth 4 (four) times daily as needed for Diarrhea. 30 tablet 2    fluticasone propionate (FLONASE) 50 mcg/actuation nasal spray       loratadine (CLARITIN) 10 mg tablet TAKE ONE TABLET BY MOUTH ONCE DAILY 90 tablet 3    ondansetron (ZOFRAN-ODT) 4 MG TbDL Take 1 tablet (4 mg total) by mouth every 8 (eight) hours as  "needed (Vomiting). 15 tablet 0    promethazine (PHENERGAN) 25 MG tablet TAKE 1 TABLET (25 MG TOTAL) BY MOUTH EVERY 6 (SIX) HOURS AS NEEDED FOR NAUSEA. 15 tablet 0    sertraline (ZOLOFT) 100 MG tablet       SYMBICORT 160-4.5 mcg/actuation HFAA INHALE 2 PUFFS INTO THE LUNGS EVERY 12 (TWELVE) HOURS. CONTROLLER 30.6 g 5    topiramate (TOPAMAX) 100 MG tablet TAKE ONE TABLET BY MOUTH ONCE DAILY 90 tablet 3    traZODone (DESYREL) 100 MG tablet TAKE 1 TABLET (100 MG TOTAL) BY MOUTH NIGHTLY AS NEEDED FOR INSOMNIA. 90 tablet 1    ursodioL (ACTIGALL) 500 MG tablet Take 1 tablet (500 mg total) by mouth 3 (three) times daily. 270 tablet 3    VITAMIN D2 1,250 mcg (50,000 unit) capsule TAKE ONE CAPSULE BY MOUTH EVERY 7 DAYS 12 capsule 3     No current facility-administered medications for this visit.         ROS  Review of Systems        PHYSICAL EXAM  Vitals:    10/13/22 1101   BP: 124/64   BP Location: Right arm   Patient Position: Sitting   BP Method: Large (Manual)   Pulse: 70   Resp: 18   Temp: 97.7 °F (36.5 °C)   TempSrc: Temporal   SpO2: 98%   Weight: 110.4 kg (243 lb 6.2 oz)   Height: 5' 4" (1.626 m)    Body mass index is 41.78 kg/m².  Weight: 110.4 kg (243 lb 6.2 oz)   Height: 5' 4" (162.6 cm)     Physical Exam      Health Maintenance         Date Due Completion Date    Lipid Panel 05/25/2022 5/25/2021    Shingles Vaccine (1 of 2) 08/29/2023 (Originally 7/27/2017) ---    COVID-19 Vaccine (3 - Booster for Pfizer series) 08/29/2023 (Originally 6/11/2021) 4/16/2021    Mammogram 08/09/2023 8/9/2022    TETANUS VACCINE 10/24/2028 10/24/2018              Assessment & Plan    Problem List Items Addressed This Visit    None      Follow-up: No follow-ups on file.    Janet Guardado    Medication List with Changes/Refills   Current Medications    ADALIMUMAB (HUMIRA,CF, PEN) 40 MG/0.4 ML PNKT    Inject 0.4 mLs (40 mg total) into the skin every 14 (fourteen) days.    ALBUTEROL (PROVENTIL/VENTOLIN HFA) 90 MCG/ACTUATION INHALER    " Inhale 2 puffs into the lungs every 4 (four) hours as needed for Wheezing or Shortness of Breath. Rescue    CEPHALEXIN (KEFLEX) 500 MG CAPSULE    Take 1 capsule (500 mg total) by mouth every 12 (twelve) hours. for 7 days    CLONAZEPAM (KLONOPIN) 1 MG TABLET    TAKE ONE TABLET BY MOUTH ONCE DAILY AS NEEDED FOR ANXIETY    DICLOFENAC SODIUM (VOLTAREN) 1 % GEL    Apply 2 g topically 4 (four) times daily.    DIPHENOXYLATE-ATROPINE 2.5-0.025 MG (LOMOTIL) 2.5-0.025 MG PER TABLET    Take 1 tablet by mouth 4 (four) times daily as needed for Diarrhea.    FLUTICASONE PROPIONATE (FLONASE) 50 MCG/ACTUATION NASAL SPRAY        LORATADINE (CLARITIN) 10 MG TABLET    TAKE ONE TABLET BY MOUTH ONCE DAILY    ONDANSETRON (ZOFRAN-ODT) 4 MG TBDL    Take 1 tablet (4 mg total) by mouth every 8 (eight) hours as needed (Vomiting).    PROMETHAZINE (PHENERGAN) 25 MG TABLET    TAKE 1 TABLET (25 MG TOTAL) BY MOUTH EVERY 6 (SIX) HOURS AS NEEDED FOR NAUSEA.    SERTRALINE (ZOLOFT) 100 MG TABLET        SYMBICORT 160-4.5 MCG/ACTUATION HFAA    INHALE 2 PUFFS INTO THE LUNGS EVERY 12 (TWELVE) HOURS. CONTROLLER    TOPIRAMATE (TOPAMAX) 100 MG TABLET    TAKE ONE TABLET BY MOUTH ONCE DAILY    TRAZODONE (DESYREL) 100 MG TABLET    TAKE 1 TABLET (100 MG TOTAL) BY MOUTH NIGHTLY AS NEEDED FOR INSOMNIA.    URSODIOL (ACTIGALL) 500 MG TABLET    Take 1 tablet (500 mg total) by mouth 3 (three) times daily.    VITAMIN D2 1,250 MCG (50,000 UNIT) CAPSULE    TAKE ONE CAPSULE BY MOUTH EVERY 7 DAYS   Discontinued Medications    MELATONIN 1 MG TAB    Take by mouth.    MULTIVITAMIN/FOLIC ACID/BIOTIN (HAIR-SKIN-NAILS, MV-FA-BIOTIN, ORAL)    Take by mouth.

## 2022-10-13 NOTE — PROGRESS NOTES
Chief Complaint  Chief Complaint   Patient presents with    Dizziness     Pt in for dizziness       HPI    Patient with a h/o vertigo was leaning over yesterday and began to black out. [place] like the room was spinning and was unable to walk, kept hitting walls. Went to the ER and was diagnosed with a UTI and was treated with abx keflex and sent home. Slight improvement in dizziness since starting the abx but still woke with slight dizziness this am. Mild headache and post nasal gtt. CT with sinusitis which is chronic. Was not treated for sinusitis from the ER. Chronic pansinusitis and rhinorrhea takes claritin daily. No fever or chills.     Dizziness:    Associated symptoms: ear pain, headaches and light-headedness.no fever, no nausea, no vomiting and no chest pain.      PAST MEDICAL HISTORY:  Past Medical History:   Diagnosis Date    Anxiety     Asthma     Bronchitis     Crohn's colitis     Depression     Esophageal ulcer 2014    Fatty liver disease, nonalcoholic     Gallstones     GERD (gastroesophageal reflux disease)     History of sleeve gastrectomy 2016    PTSD (post-traumatic stress disorder)     Seasonal allergies     Symptomatic abdominal panniculus     Ulcerative colitis        PAST SURGICAL HISTORY:  Past Surgical History:   Procedure Laterality Date    APPENDECTOMY      CATHETERIZATION OF BOTH LEFT AND RIGHT HEART Right 2019    Procedure: CATHETERIZATION, HEART, BOTH LEFT AND RIGHT;  Surgeon: Beto Malin MD;  Location: [facility] CATH LAB;  Service: Cardiology;  Laterality: Right;     SECTION, LOW TRANSVERSE      CHOLECYSTECTOMY      open    COLECTOMY      total- 2013    CYSTOSCOPY W/ RETROGRADES N/A 2018    Procedure: CYSTOSCOPY, WITH RETROGRADE PYELOGRAM;  Surgeon: Butch Banks MD;  Location: [facility] OR;  Service: Urology;  Laterality: N/A;  HANSEN SURGICAL CONFIRMED     ENDOSCOPIC ULTRASOUND OF UPPER GASTROINTESTINAL TRACT N/A 11/10/2021    Procedure:  ULTRASOUND, UPPER GI TRACT, ENDOSCOPIC;  Surgeon: Nhan Dee MD;  Location: [facility] ENDO (2ND FLR);  Service: Endoscopy;  Laterality: N/A;  MD Milena Whitehead PA-C; Quinn Whitman MD; Miladys Velasquez MA  Caller: Unspecified (3 days ago,  1:20 PM)  Fair enough. Neha, please schedule.       ----- Message -----   From: Milena Murillo PA-C   Sent: 8/11/2021  1    ESOPHAGOGASTRODUODENOSCOPY  04/11/2018    Dr. Castorena: LA Grade A reflux esophagitis, hiatal hernia; Ectopic gastric mucosa in the upper third of the esophagus; gastric sleeve intact with unremarkable findings, gastritis; biopsy: stomach- Mild chronic antral and oxyntic gastritis, no activity, negative for h pylori    ESOPHAGOGASTRODUODENOSCOPY N/A 11/10/2021    Procedure: EGD (ESOPHAGOGASTRODUODENOSCOPY);  Surgeon: Nhan Dee MD;  Location: [facility] ENDO (2ND FLR);  Service: Endoscopy;  Laterality: N/A;    FLEXIBLE SIGMOIDOSCOPY  5/24/2018    Procedure: SIGMOIDOSCOPY, FLEXIBLE;  Surgeon: JENNY Virgen MD;  Location: [facility] OR 2ND FLR;  Service: Colon and Rectal;;    FLEXIBLE SIGMOIDOSCOPY  04/11/2018    Dr. Castorena: Non-patent surgical anastomosis, characterized by friable mucosa.    GASTRIC SLEEVE       HYSTERECTOMY      ILEOSCOPY  04/11/2018    Dr. Castorena: Patient is status-post total colectomy with end ileostomy. unremarkable findings    ILEOSTOMY REVISION      april 2014; august 2014    LAPAROSCOPIC PROCTECTOMY N/A 5/24/2018    Procedure: PROCTECTOMY-LAPAROSCOPIC/CONVERTED TO OPEN;  Surgeon: JENNY Virgen MD;  Location: [facility] OR 2ND FLR;  Service: Colon and Rectal;  Laterality: N/A;    LYSIS OF ADHESIONS  5/24/2018    Procedure: LYSIS, ADHESIONS/ more than 2hours;  Surgeon: JENNY Virgen MD;  Location: [facility] OR 2ND FLR;  Service: Colon and Rectal;;    OOPHORECTOMY      PANNICULECTOMY Bilateral 12/19/2019    Procedure: PANNICULECTOMY;  Surgeon: Andrea Alvarado MD;  Location: [facility] OR [address] [place] [address];  Service: Plastics;   Laterality: Bilateral;    REVISION COLOSTOMY N/A 2019    Procedure: REVISION, COLOSTOMY;  Surgeon: JENNY Virgen MD;  Location: [facility] OR 2ND FLR;  Service: Colon and Rectal;  Laterality: N/A;    thumb surgery      TONSILLECTOMY, ADENOIDECTOMY      WISDOM TOOTH EXTRACTION         SOCIAL HISTORY:  Social History     Socioeconomic History    Marital status: Legally    Tobacco Use    Smoking status: Former     Types: Cigarettes     Quit date:      Years since quittin.7    Smokeless tobacco: Never   Substance and Sexual Activity    Alcohol use: Yes     Comment: rarely    Drug use: No    Sexual activity: Never     Birth control/protection: See Surgical Hx       FAMILY HISTORY:  Family History   Problem Relation Age of Onset    Cancer Mother         skin    Cirrhosis Mother     Heart disease Father 67    Cancer Paternal Grandfather         ?    Cancer Maternal Grandfather         colon     Colon cancer Maternal Grandfather     Colon cancer Maternal Uncle     Colon cancer Maternal Uncle     Crohn's disease Neg Hx     Ulcerative colitis Neg Hx     Stomach cancer Neg Hx     Esophageal cancer Neg Hx     Celiac disease Neg Hx     Breast cancer Neg Hx     Ovarian cancer Neg Hx        ALLERGIES AND MEDICATIONS: updated and reviewed.  Review of patient's allergies indicates:   Allergen Reactions    Adhesive      Cause blisters    Dilaudid [hydromorphone] Nausea And Vomiting    Sulfa (sulfonamide antibiotics) Hives    Surgical stainless steel      Had surgical staples, caused irritation and infection     Current Outpatient Medications   Medication Sig Dispense Refill    adalimumab (HUMIRA,CF, PEN) 40 mg/0.4 mL PnKt Inject 0.4 mLs (40 mg total) into the skin every 14 (fourteen) days. 6 pen 2    albuterol (PROVENTIL/VENTOLIN HFA) 90 mcg/actuation inhaler Inhale 2 puffs into the lungs every 4 (four) hours as needed for Wheezing or Shortness of Breath. Rescue 8 g 5    cephALEXin (KEFLEX) 500 MG capsule Take 1  capsule (500 mg total) by mouth every 12 (twelve) hours. for 7 days 14 capsule 0    clonazePAM (KLONOPIN) 1 MG tablet TAKE ONE TABLET BY MOUTH ONCE DAILY AS NEEDED FOR ANXIETY 90 tablet 1    diclofenac sodium (VOLTAREN) 1 % Gel Apply 2 g topically 4 (four) times daily. 100 g 5    diphenoxylate-atropine 2.5-0.025 mg (LOMOTIL) 2.5-0.025 mg per tablet Take 1 tablet by mouth 4 (four) times daily as needed for Diarrhea. 30 tablet 2    fluticasone propionate (FLONASE) 50 mcg/actuation nasal spray       loratadine (CLARITIN) 10 mg tablet TAKE ONE TABLET BY MOUTH ONCE DAILY 90 tablet 3    promethazine (PHENERGAN) 25 MG tablet TAKE 1 TABLET (25 MG TOTAL) BY MOUTH EVERY 6 (SIX) HOURS AS NEEDED FOR NAUSEA. 15 tablet 0    sertraline (ZOLOFT) 100 MG tablet       SYMBICORT 160-4.5 mcg/actuation HFAA INHALE 2 PUFFS INTO THE LUNGS EVERY 12 (TWELVE) HOURS. CONTROLLER 30.6 g 5    topiramate (TOPAMAX) 100 MG tablet TAKE ONE TABLET BY MOUTH ONCE DAILY 90 tablet 3    traZODone (DESYREL) 100 MG tablet TAKE 1 TABLET (100 MG TOTAL) BY MOUTH NIGHTLY AS NEEDED FOR INSOMNIA. 90 tablet 1    ursodioL (ACTIGALL) 500 MG tablet Take 1 tablet (500 mg total) by mouth 3 (three) times daily. 270 tablet 3    VITAMIN D2 1,250 mcg (50,000 unit) capsule TAKE ONE CAPSULE BY MOUTH EVERY 7 DAYS 12 capsule 3    cefdinir (OMNICEF) 300 MG capsule Take 1 capsule (300 mg total) by mouth 2 (two) times daily. for 10 days 20 capsule 0    meclizine (ANTIVERT) 25 mg tablet Take 1 tablet (25 mg total) by mouth 3 (three) times daily as needed for Dizziness. 30 tablet 0    ondansetron (ZOFRAN-ODT) 4 MG TbDL Take 1 tablet (4 mg total) by mouth every 8 (eight) hours as needed (Vomiting). 15 tablet 0     No current facility-administered medications for this visit.         ROS  Review of Systems   Constitutional:  Negative for chills and fever.   HENT:  Positive for congestion, ear pain, sinus pressure and sinus pain. Negative for postnasal drip.    Respiratory:  Negative  "for cough and shortness of breath.    Cardiovascular:  Negative for chest pain.   Gastrointestinal:  Positive for diarrhea. Negative for nausea and vomiting.   Neurological:  Positive for dizziness, light-headedness and headaches.         PHYSICAL EXAM  Vitals:    10/13/22 1101 10/13/22 1154 10/13/22 1155 10/13/22 1156   BP: 124/64 128/62 124/62 118/60   BP Location: Right arm      Patient Position: Sitting      BP Method: Large (Manual)      Pulse: 70      Resp: 18      Temp: 97.7 °F (36.5 °C)      TempSrc: Temporal      SpO2: 98%      Weight: 110.4 kg (243 lb 6.2 oz)      Height: 5' 4" (1.626 m)       Body mass index is 41.78 kg/m².  Weight: 110.4 kg (243 lb 6.2 oz)   Height: 5' 4" (162.6 cm)     Physical Exam  Constitutional:       Appearance: She is well-developed.   HENT:      Head: Normocephalic.      Right Ear: A middle ear effusion is present.      Left Ear: A middle ear effusion is present.      Nose:      Right Sinus: Maxillary sinus tenderness present.      Left Sinus: Maxillary sinus tenderness present.      Mouth/Throat:      Pharynx: Uvula midline.   Eyes:      Conjunctiva/sclera: Conjunctivae normal.   Cardiovascular:      Rate and Rhythm: Normal rate and regular rhythm.      Pulses: Normal pulses.           Radial pulses are 2+ on the right side and 2+ on the left side.      Heart sounds: Normal heart sounds. No murmur heard.     Comments: No LE swelling noted  Pulmonary:      Effort: Pulmonary effort is normal.      Breath sounds: Normal breath sounds. No wheezing.   Abdominal:      General: Bowel sounds are normal.      Palpations: Abdomen is soft.      Tenderness: There is no abdominal tenderness.   Lymphadenopathy:      Cervical: No cervical adenopathy.   Skin:     General: Skin is warm and dry.      Findings: No rash.   Neurological:      Mental Status: She is alert and oriented to person, place, and time.         Health Maintenance         Date Due Completion Date    Lipid Panel 05/25/2022 " 5/25/2021    Shingles Vaccine (1 of 2) 08/29/2023 (Originally 7/27/2017) ---    COVID-19 Vaccine (3 - Booster for Pfizer series) 08/29/2023 (Originally 6/11/2021) 4/16/2021    Mammogram 08/09/2023 8/9/2022    TETANUS VACCINE 10/24/2028 10/24/2018              Assessment & Plan    Problem List Items Addressed This Visit          Unprioritized    BMI 40.0-44.9, adult    Chronic pansinusitis    Relevant Medications    cefdinir (OMNICEF) 300 MG capsule    Other Relevant Orders    Ambulatory referral/consult to ENT  Dizziness likely due to chronic pansinusitis. Referral placed to ENT.   Stop keflex and start cefdinir for sinusitis/UTI.       Crohn's disease with complication    Overview     pt advised to get reestablished with gastroenterologist         Diarrhea    Ileostomy in place    Mild episode of recurrent major depressive disorder    S/p ileostomy revision within the subcutaneous tissue.     Other Visit Diagnoses       Dizziness    -  Primary    Relevant Medications    cefdinir (OMNICEF) 300 MG capsule    meclizine (ANTIVERT) 25 mg tablet  Orthostatics WNL. Hydrate well in light of diarrhea. Meclizine and sinusitis treatment.       Other Relevant Orders    Ambulatory referral/consult to ENT    Vomiting, unspecified vomiting type, unspecified whether nausea present        Relevant Medications    ondansetron (ZOFRAN-ODT) 4 MG TbDL            Follow-up: Follow up if symptoms worsen or fail to improve.    Janet Guardado    Medication List with Changes/Refills   New Medications    CEFDINIR (OMNICEF) 300 MG CAPSULE    Take 1 capsule (300 mg total) by mouth 2 (two) times daily. for 10 days    MECLIZINE (ANTIVERT) 25 MG TABLET    Take 1 tablet (25 mg total) by mouth 3 (three) times daily as needed for Dizziness.   Current Medications    ADALIMUMAB (HUMIRA,CF, PEN) 40 MG/0.4 ML PNKT    Inject 0.4 mLs (40 mg total) into the skin every 14 (fourteen) days.    ALBUTEROL (PROVENTIL/VENTOLIN HFA) 90 MCG/ACTUATION INHALER     Inhale 2 puffs into the lungs every 4 (four) hours as needed for Wheezing or Shortness of Breath. Rescue    CEPHALEXIN (KEFLEX) 500 MG CAPSULE    Take 1 capsule (500 mg total) by mouth every 12 (twelve) hours. for 7 days    CLONAZEPAM (KLONOPIN) 1 MG TABLET    TAKE ONE TABLET BY MOUTH ONCE DAILY AS NEEDED FOR ANXIETY    DICLOFENAC SODIUM (VOLTAREN) 1 % GEL    Apply 2 g topically 4 (four) times daily.    DIPHENOXYLATE-ATROPINE 2.5-0.025 MG (LOMOTIL) 2.5-0.025 MG PER TABLET    Take 1 tablet by mouth 4 (four) times daily as needed for Diarrhea.    FLUTICASONE PROPIONATE (FLONASE) 50 MCG/ACTUATION NASAL SPRAY        LORATADINE (CLARITIN) 10 MG TABLET    TAKE ONE TABLET BY MOUTH ONCE DAILY    PROMETHAZINE (PHENERGAN) 25 MG TABLET    TAKE 1 TABLET (25 MG TOTAL) BY MOUTH EVERY 6 (SIX) HOURS AS NEEDED FOR NAUSEA.    SERTRALINE (ZOLOFT) 100 MG TABLET        SYMBICORT 160-4.5 MCG/ACTUATION HFAA    INHALE 2 PUFFS INTO THE LUNGS EVERY 12 (TWELVE) HOURS. CONTROLLER    TOPIRAMATE (TOPAMAX) 100 MG TABLET    TAKE ONE TABLET BY MOUTH ONCE DAILY    TRAZODONE (DESYREL) 100 MG TABLET    TAKE 1 TABLET (100 MG TOTAL) BY MOUTH NIGHTLY AS NEEDED FOR INSOMNIA.    URSODIOL (ACTIGALL) 500 MG TABLET    Take 1 tablet (500 mg total) by mouth 3 (three) times daily.    VITAMIN D2 1,250 MCG (50,000 UNIT) CAPSULE    TAKE ONE CAPSULE BY MOUTH EVERY 7 DAYS   Changed and/or Refilled Medications    Modified Medication Previous Medication    ONDANSETRON (ZOFRAN-ODT) 4 MG TBDL ondansetron (ZOFRAN-ODT) 4 MG TbDL       Take 1 tablet (4 mg total) by mouth every 8 (eight) hours as needed (Vomiting).    Take 1 tablet (4 mg total) by mouth every 8 (eight) hours as needed (Vomiting).   Discontinued Medications    MELATONIN 1 MG TAB    Take by mouth.    MULTIVITAMIN/FOLIC ACID/BIOTIN (HAIR-SKIN-NAILS, MV-FA-BIOTIN, ORAL)    Take by mouth.

## 2022-10-14 ENCOUNTER — TELEPHONE (OUTPATIENT)
Dept: PRIMARY CARE CLINIC | Facility: CLINIC | Age: 57
End: 2022-10-14
Payer: MEDICARE

## 2022-10-14 ENCOUNTER — TELEPHONE (OUTPATIENT)
Dept: FAMILY MEDICINE | Facility: CLINIC | Age: 57
End: 2022-10-14
Payer: MEDICARE

## 2022-10-14 NOTE — TELEPHONE ENCOUNTER
----- Message from Bette Velasquez sent at 10/14/2022 10:01 AM CDT -----  Contact: Self/[phone]  Pt said that she is calling in regards to needing to get a return call from the nurse pt stated that she is not getting better and needs to know what to do . Please advise

## 2022-10-14 NOTE — TELEPHONE ENCOUNTER
Attempted to return pts call and no answer and VM disconnect when I tried to [place] [facility]

## 2022-10-24 NOTE — TELEPHONE ENCOUNTER
Outgoing call to patient regarding refill. Identified myself and where I was calling from and attempted HIPPA verification. Patient declined to confirm date of birth and stated she is not taking Humira anymore and to discontinue the prescription. When I attempted to ask any further questions patient hung up. Will dis-enroll from [facility].

## 2022-11-03 ENCOUNTER — OFFICE VISIT (OUTPATIENT)
Dept: OTOLARYNGOLOGY | Facility: CLINIC | Age: 57
End: 2022-11-03
Payer: MEDICARE

## 2022-11-03 VITALS
TEMPERATURE: 98 F | SYSTOLIC BLOOD PRESSURE: 93 MMHG | BODY MASS INDEX: 40.8 KG/M2 | HEART RATE: 63 BPM | HEIGHT: 64 IN | DIASTOLIC BLOOD PRESSURE: 60 MMHG | WEIGHT: 239 LBS

## 2022-11-03 DIAGNOSIS — R00.1 SINUS BRADYCARDIA: ICD-10-CM

## 2022-11-03 DIAGNOSIS — R55 POSTURAL DIZZINESS WITH PRESYNCOPE: ICD-10-CM

## 2022-11-03 DIAGNOSIS — R42 POSTURAL DIZZINESS WITH PRESYNCOPE: ICD-10-CM

## 2022-11-03 DIAGNOSIS — R42 DIZZINESS: ICD-10-CM

## 2022-11-03 DIAGNOSIS — J33.9 NASAL POLYPS: Primary | ICD-10-CM

## 2022-11-03 DIAGNOSIS — Z01.818 PREOPERATIVE CLEARANCE: ICD-10-CM

## 2022-11-03 DIAGNOSIS — Z98.890 HISTORY OF ENDOSCOPIC SINUS SURGERY: ICD-10-CM

## 2022-11-03 DIAGNOSIS — J32.4 CHRONIC PANSINUSITIS: ICD-10-CM

## 2022-11-03 PROCEDURE — 99215 PR OFFICE/OUTPT VISIT, EST, LEVL V, 40-54 MIN: ICD-10-PCS | Mod: 25,S$GLB,, | Performed by: OTOLARYNGOLOGY

## 2022-11-03 PROCEDURE — 1160F RVW MEDS BY RX/DR IN RCRD: CPT | Mod: CPTII,S$GLB,, | Performed by: OTOLARYNGOLOGY

## 2022-11-03 PROCEDURE — 1159F MED LIST DOCD IN RCRD: CPT | Mod: CPTII,S$GLB,, | Performed by: OTOLARYNGOLOGY

## 2022-11-03 PROCEDURE — 31231 NASAL/SINUS ENDOSCOPY: ICD-10-PCS | Mod: S$GLB,,, | Performed by: OTOLARYNGOLOGY

## 2022-11-03 PROCEDURE — 3008F BODY MASS INDEX DOCD: CPT | Mod: CPTII,S$GLB,, | Performed by: OTOLARYNGOLOGY

## 2022-11-03 PROCEDURE — 3078F DIAST BP <80 MM HG: CPT | Mod: CPTII,S$GLB,, | Performed by: OTOLARYNGOLOGY

## 2022-11-03 PROCEDURE — 99215 OFFICE O/P EST HI 40 MIN: CPT | Mod: 25,S$GLB,, | Performed by: OTOLARYNGOLOGY

## 2022-11-03 PROCEDURE — 3078F PR MOST RECENT DIASTOLIC BLOOD PRESSURE < 80 MM HG: ICD-10-PCS | Mod: CPTII,S$GLB,, | Performed by: OTOLARYNGOLOGY

## 2022-11-03 PROCEDURE — 3074F SYST BP LT 130 MM HG: CPT | Mod: CPTII,S$GLB,, | Performed by: OTOLARYNGOLOGY

## 2022-11-03 PROCEDURE — 1160F PR REVIEW ALL MEDS BY PRESCRIBER/CLIN PHARMACIST DOCUMENTED: ICD-10-PCS | Mod: CPTII,S$GLB,, | Performed by: OTOLARYNGOLOGY

## 2022-11-03 PROCEDURE — 3008F PR BODY MASS INDEX (BMI) DOCUMENTED: ICD-10-PCS | Mod: CPTII,S$GLB,, | Performed by: OTOLARYNGOLOGY

## 2022-11-03 PROCEDURE — 3074F PR MOST RECENT SYSTOLIC BLOOD PRESSURE < 130 MM HG: ICD-10-PCS | Mod: CPTII,S$GLB,, | Performed by: OTOLARYNGOLOGY

## 2022-11-03 PROCEDURE — 99999 PR PBB SHADOW E&M-EST. PATIENT-LVL V: CPT | Mod: PBBFAC,,, | Performed by: OTOLARYNGOLOGY

## 2022-11-03 PROCEDURE — 1159F PR MEDICATION LIST DOCUMENTED IN MEDICAL RECORD: ICD-10-PCS | Mod: CPTII,S$GLB,, | Performed by: OTOLARYNGOLOGY

## 2022-11-03 PROCEDURE — 99999 PR PBB SHADOW E&M-EST. PATIENT-LVL V: ICD-10-PCS | Mod: PBBFAC,,, | Performed by: OTOLARYNGOLOGY

## 2022-11-03 PROCEDURE — 31231 NASAL ENDOSCOPY DX: CPT | Mod: S$GLB,,, | Performed by: OTOLARYNGOLOGY

## 2022-11-03 RX ORDER — METHYLPREDNISOLONE 4 MG/1
TABLET ORAL
Qty: 21 EACH | Refills: 0 | Status: SHIPPED | OUTPATIENT
Start: 2022-11-03 | End: 2022-11-11

## 2022-11-03 RX ORDER — BUDESONIDE 1 MG/2ML
INHALANT ORAL
Qty: 60 ML | Refills: 3 | Status: SHIPPED | OUTPATIENT
Start: 2022-11-03 | End: 2022-11-11

## 2022-11-03 RX ORDER — PANTOPRAZOLE SODIUM 40 MG/1
TABLET, DELAYED RELEASE ORAL
COMMUNITY
Start: 2022-09-17 | End: 2022-11-11

## 2022-11-03 RX ORDER — CEFDINIR 300 MG/1
300 CAPSULE ORAL 2 TIMES DAILY
Qty: 28 CAPSULE | Refills: 0 | Status: SHIPPED | OUTPATIENT
Start: 2022-11-03 | End: 2022-11-17

## 2022-11-03 NOTE — PATIENT INSTRUCTIONS
Or low dose of steroids given the known sensitivity to steroids of methylprednisolone as prescribed in addition to 2 weeks of cefdinir (antibiotic) to relieve acute worsening of chronic symptoms.  Nasal rinses with the addition of budesonide steroid (high dose steroid irrigation) recommended twice daily.  Rinse the mouth and spit after use.  If not covered by insurance a compound date equivalent may be prescribed.      Repeat imaging not recommended at this time but we will plan to proceed with pan sinus surgery with a combination of traditional debridement techniques and balloon dilation for frontals and sphenoids as discussed.      Given the dizziness, history of bradycardia and what sounds like presyncope rather than vertigo, cardiology evaluation is required prior to scheduling surgery.      NASAL SALINE    Still saline comes in many preparations including sprays/mists, gels, and rinses.  Different preparations served different purposes.  Saline spray helps to briefly moisturize the nose and help clear mucus.  Saline gels coat the nose for longer protective benefit of keeping the linings the nose moist.  Saline rinses clear the nose and sinuses and a more thorough way in her best used for significant postnasal drip and sinus complaints.  A combination of saline sprays/mists, gels and rinses should be used to address routine nasal clearing and dryness issues as well as flushing for better control of allergy and postnasal drip symptoms.  There is no real risk of over use of nasal saline products.  Saline sprays do not have any of the potential rebound or addiction of nasal decongestant sprays.  Nasal saline sprays and rinses should be used prior to the application of any medicated nasal sprays such as nasal steroids or nasal antihistamine sprays.

## 2022-11-03 NOTE — PROCEDURES
"Nasal/sinus endoscopy    Date/Time: 11/3/2022 3:00 PM    Time out: Immediately prior to procedure a "time out" was called to verify the correct patient, procedure, equipment, support staff and site/side marked as required.  Performed by: Jay Hernandez MD  Authorized by: Jay Hernandez MD     Consent Done?:  Yes (Verbal)  Anesthesia:     Local anesthetic:  Lidocaine 2% and Holland-Synephrine 1/2%    Location: Bilateral.    Local Atomizer?      Type of Endoscope:  Flexible (Disposable Storz semi rigid flexible scope)  Nose:     Procedure Performed:  Nasal Endoscopy  Nasopharynx:      Boggy pale mucosa throughout.  Medial compartment polyps noted on the right side grade 2-3.  Not appreciated on the left.  High septal deviation on the right general narrowing more on the right.  Mucoid drainage noted bilaterally no gross purulence.  "

## 2022-11-03 NOTE — PROGRESS NOTES
[facility]    Subjective:      Patient: Ida Orozco Patient PCP: Eliecer Jones MD         :  1965     Sex:  female      MRN:  [id number]          Date of Visit: 2022      Chief Complaint: Sinus Problem (States gets sinus pressure, drainage, headache. Gets dizzy if turns head to quickly. Went to ER 10/12/22, saw Primary Care the next day and was put on Omnicef and Meclizine. States still has the dizzy feeling with head movements. Meclizine is not helping at this time. )      Patient ID: Ida Orozco is a 57 y.o. female former smoker quitting in  with a history of migraines, HLD, crohn's s/p total colectomy w/end iliostomy (no longer on immunosuppression), obesity, CHELO, GERD, asthma and chronis sinusitisreferred to me by Janet Guardado in consultation for sinusitis seen by my colleague Skye Lee 2021 and treated with a short burst of dexamethasone p.o. and 2 weeks of Omnicef with discussion about possible eustachian tube dilation versus TMJ care for ear fullness/pain on the left side.  Prior history of balloon dilation sinuplasty x 2 with Dr. Tomas 3+ years ago.  Effective for prolonged time with first but NOT second treatment.  Improves with steroids but poorly tolerated with mood changes (angry).  Has not done sinus rinses.  Uses intranasal steroids w/o relief.  Significant active right frontal and retrorbital pressue and pain.    Also complains of dizziness.  This is most notable when she stands up or moves quickly.  She says she is had all that testing done which sounds like she is describing orthostatic blood pressure checks.  She is never seen cardiology or had carotid duplex ultrasound.  No Holter monitor.  She has been told she has bradycardia and abnormal EKG changes.    Patient had a more recent CT head completed 10/12/2022 images reviewed today show high right nasal septal deviation partial opacification of the anterior upper ethmoid frontal outflow area and of the ethmoids  bilaterally with what might be middle meatal polyps on the right side, substantial mucosal thickening of both maxillary sinuses of at least 6 mm with complete obstruction of the ostiomeatal complexes bilaterally.  Hypoplastic frontal sinuses bilaterally with some thickening of the floor/outflow bilaterally what appears to be relative sparing of the sphenoid sinuses bilaterally though there is thickening of the area of the natural os bilaterally.  No appreciable thickening or effusion of the middle ears or mastoids bilaterally.  Unable to exclude bony dehiscence of the superior semicircular canals on either side on these limited images.      CT Brain 10/12/2022        CT sinuses 2021        Review of Systems     Past Medical History  She has a past medical history of Anxiety, Asthma, Bronchitis, Crohn's colitis, Depression, Esophageal ulcer, Fatty liver disease, nonalcoholic, Gallstones, GERD (gastroesophageal reflux disease), History of sleeve gastrectomy, PTSD (post-traumatic stress disorder), Seasonal allergies, Symptomatic abdominal panniculus, and Ulcerative colitis.    Family / Surgical / Social History  Her family history includes Cancer in her maternal grandfather, mother, and paternal grandfather; Cirrhosis in her mother; Colon cancer in her maternal grandfather, maternal uncle, and maternal uncle; Heart disease (age of onset: 67) in her father.    Past Surgical History:   Procedure Laterality Date    APPENDECTOMY      CATHETERIZATION OF BOTH LEFT AND RIGHT HEART Right 2019    Procedure: CATHETERIZATION, HEART, BOTH LEFT AND RIGHT;  Surgeon: Beto Malin MD;  Location: [facility] CATH LAB;  Service: Cardiology;  Laterality: Right;     SECTION, LOW TRANSVERSE      CHOLECYSTECTOMY      open    COLECTOMY      total- 2013    CYSTOSCOPY W/ RETROGRADES N/A 2018    Procedure: CYSTOSCOPY, WITH RETROGRADE PYELOGRAM;  Surgeon: Butch Banks MD;  Location: [facility] OR;  Service: Urology;   Laterality: N/A;  [facility] CONFIRMED 8/17/DME    ENDOSCOPIC ULTRASOUND OF UPPER GASTROINTESTINAL TRACT N/A 11/10/2021    Procedure: ULTRASOUND, UPPER GI TRACT, ENDOSCOPIC;  Surgeon: Nhan Dee MD;  Location: [facility] (2ND FLR);  Service: Endoscopy;  Laterality: N/A;  MD Milena Whitehead PA-C; Quinn Whitman MD; Miladys Velasquez MA  Caller: Unspecified (3 days ago,  1:20 PM)  Fair enough. Neha, please schedule.       ----- Message -----   From: Milena Murillo PA-C   Sent: 8/11/2021  1    ESOPHAGOGASTRODUODENOSCOPY  04/11/2018    Dr. Castorena: LA Grade A reflux esophagitis, hiatal hernia; Ectopic gastric mucosa in the upper third of the esophagus; gastric sleeve intact with unremarkable findings, gastritis; biopsy: stomach- Mild chronic antral and oxyntic gastritis, no activity, negative for h pylori    ESOPHAGOGASTRODUODENOSCOPY N/A 11/10/2021    Procedure: EGD (ESOPHAGOGASTRODUODENOSCOPY);  Surgeon: Nhan Dee MD;  Location: [facility] (2ND FLR);  Service: Endoscopy;  Laterality: N/A;    FLEXIBLE SIGMOIDOSCOPY  5/24/2018    Procedure: SIGMOIDOSCOPY, FLEXIBLE;  Surgeon: JENNY Virgen MD;  Location: [facility] OR 2ND FLR;  Service: Colon and Rectal;;    FLEXIBLE SIGMOIDOSCOPY  04/11/2018    Dr. Castorena: Non-patent surgical anastomosis, characterized by friable mucosa.    GASTRIC SLEEVE       HYSTERECTOMY      ILEOSCOPY  04/11/2018    Dr. Castorena: Patient is status-post total colectomy with end ileostomy. unremarkable findings    ILEOSTOMY REVISION      april 2014; august 2014    LAPAROSCOPIC PROCTECTOMY N/A 5/24/2018    Procedure: PROCTECTOMY-LAPAROSCOPIC/CONVERTED TO OPEN;  Surgeon: JENNY Virgen MD;  Location: [facility] OR 2ND FLR;  Service: Colon and Rectal;  Laterality: N/A;    LYSIS OF ADHESIONS  5/24/2018    Procedure: LYSIS, ADHESIONS/ more than 2hours;  Surgeon: JENNY Virgen MD;  Location: [facility] OR 2ND FLR;  Service: Colon and Rectal;;    OOPHORECTOMY      PANNICULECTOMY  Bilateral 2019    Procedure: PANNICULECTOMY;  Surgeon: Andrea Alvarado MD;  Location: [facility] OR 2ND FLR;  Service: Plastics;  Laterality: Bilateral;    REVISION COLOSTOMY N/A 2019    Procedure: REVISION, COLOSTOMY;  Surgeon: JENNY Virgen MD;  Location: [facility] OR 2ND FLR;  Service: Colon and Rectal;  Laterality: N/A;    thumb surgery      TONSILLECTOMY, ADENOIDECTOMY      WISDOM TOOTH EXTRACTION         Social History     Tobacco Use    Smoking status: Former     Types: Cigarettes     Quit date:      Years since quittin.8    Smokeless tobacco: Never   Substance and Sexual Activity    Alcohol use: Yes     Comment: rarely    Drug use: No    Sexual activity: Never     Birth control/protection: See Surgical Hx       Medications  She has a current medication list which includes the following prescription(s): albuterol, clonazepam, diclofenac sodium, diphenoxylate-atropine 2.5-0.025 mg, loratadine, meclizine, pantoprazole, promethazine, sertraline, symbicort, topiramate, trazodone, vitamin d2, and [DISCONTINUED] humira(cf) pen.      Allergies  Review of patient's allergies indicates:   Allergen Reactions    Adhesive      Cause blisters    Dilaudid [hydromorphone] Nausea And Vomiting    Sulfa (sulfonamide antibiotics) Hives    Surgical stainless steel      Had surgical staples, caused irritation and infection       All medications, allergies, and past history have been reviewed.    Objective:      Vitals:  Vitals - 1 value per visit 10/13/2022 11/3/2022 11/3/2022   SYSTOLIC 118 - 93   DIASTOLIC 60 - 60   Pulse - - 63   Temp - - 98.2   Resp - - -   SPO2 - - -   Weight (lb) - - 238.98   Weight (kg) - - 108.4   Height - - 64   BMI (Calculated) - - 41   VISIT REPORT - - -   Pain Score  - 4 -   Some recent data might be hidden       Body surface area is 2.21 meters squared.    Physical Exam:    GENERAL  APPEARANCE -  alert, appears stated age, cooperative, and morbidly obese  BARRIER(S) TO COMMUNICATION -   none VOICE - mildly hyponasal    INTEGUMENTARY  no suspicious head and neck lesions    HEENT  HEAD: Normocephalic, without obvious abnormality, atraumatic  FACE: INSPECTION - Symmetric, no signs of trauma, no suspicious lesion(s)  PALPATION -  No masses SALIVARY GLANDS - non-tender with no appreciable mass  STRENGTH - facial symmetry  NECK/THYROID: normal atraumatic, no neck masses, normal thyroid, no jvd    EYES  Normal occular alignment and mobility with no visible nystagmus at rest    EARS/NOSE/MOUTH/THROAT  EARS  PINNAE AND EXTERNAL EARS - no suspicious lesion OTOSCOPIC EXAM (surgical microscopy was not used for visualization/instrumentation): EAR EXAM - Normal ear canals, tympanic membranes and mobility, and middle ear spaces bilaterally.  HEARING - grossly intact to voice/finger rub    NOSE AND SINUSES  EXTERNAL NOSE - Grossly normal for age/sex  SEPTUM - normal/no obstruction on anterior exam without decongestion TURBINATES - within normal limits MUCOSA - mildly boggy pale     MOUTH AND THROAT   ORAL CAVITY, LIPS, TEETH, GUMS & TONGUE - moist, no suspicious lesions  OROPHARYNX /TONSILS/PHARYNGEAL WALLS/HYPOPHARYNX - no erythema or exudates  NASOPHARYNX - limited mirror exam - unable to visualize due to anatomy/gag  LARYNX -  - limited mirror exam - unable to visualize due to anatomy/gag      CHEST AND LUNG   INSPECTION & AUSCULTATION - normal effort, no stridor    CARDIOVASCULAR  AUSCULTATION & PERIPHERAL VASCULAR - regular rate and rhythm.    NEUROLOGIC  MENTAL STATUS - alert, interactive CRANIAL NERVES - normal    LYMPHATIC  HEAD AND NECK - non-palpable; SUPRACLAVICULAR - deferred; AXILLARY - deferred; INGUINAL - deferred; LIVER/SPLEEN - deferred        Procedure(s):  Nasal endoscopy performed.  See procedure note.              Labs:  WBC   Date Value Ref Range Status   10/12/2022 6.78 3.90 - 12.70 K/uL Final     Hemoglobin   Date Value Ref Range Status   10/12/2022 14.1 12.0 - 16.0 g/dL Final      Platelets   Date Value Ref Range Status   10/12/2022 284 150 - 450 K/uL Final     Creatinine   Date Value Ref Range Status   10/12/2022 1.0 0.5 - 1.4 mg/dL Final     TSH   Date Value Ref Range Status   05/25/2021 0.883 0.400 - 4.000 uIU/mL Final     Glucose   Date Value Ref Range Status   10/12/2022 99 70 - 110 mg/dL Final     Hemoglobin A1C   Date Value Ref Range Status   05/30/2019 5.6 4.0 - 5.6 % Final     Comment:     ADA Screening Guidelines:  5.7-6.4%  Consistent with prediabetes  >or=6.5%  Consistent with diabetes  High levels of fetal hemoglobin interfere with the HbA1C  assay. Heterozygous hemoglobin variants (HbS, HgC, etc)do  not significantly interfere with this assay.   However, presence of multiple variants may affect accuracy.           Assessment:      Problem List Items Addressed This Visit          ENT    Chronic pansinusitis       Cardiac/Vascular    Sinus bradycardia     Other Visit Diagnoses       Nasal polyps    -  Primary    Dizziness        History of endoscopic sinus surgery        Postural dizziness with presyncope        Preoperative clearance                     Plan:      Or low dose of steroids given the known sensitivity to steroids of methylprednisolone as prescribed in addition to 2 weeks of cefdinir (antibiotic) to relieve acute worsening of chronic symptoms.  Nasal rinses with the addition of budesonide steroid (high dose steroid irrigation) recommended twice daily.  Rinse the mouth and spit after use.  If not covered by insurance a compound date equivalent may be prescribed.      Repeat imaging not recommended at this time but we will plan to proceed with pan sinus surgery with a combination of traditional debridement techniques and balloon dilation for frontals and sphenoids as discussed.      Given the dizziness, history of bradycardia and what sounds like presyncope rather than vertigo, cardiology evaluation is required prior to scheduling surgery.

## 2022-11-04 ENCOUNTER — OFFICE VISIT (OUTPATIENT)
Dept: CARDIOLOGY | Facility: CLINIC | Age: 57
End: 2022-11-04
Payer: MEDICARE

## 2022-11-04 VITALS
WEIGHT: 239 LBS | HEIGHT: 64 IN | SYSTOLIC BLOOD PRESSURE: 105 MMHG | DIASTOLIC BLOOD PRESSURE: 56 MMHG | OXYGEN SATURATION: 96 % | HEART RATE: 61 BPM | BODY MASS INDEX: 40.8 KG/M2

## 2022-11-04 DIAGNOSIS — R42 VERTIGO: ICD-10-CM

## 2022-11-04 DIAGNOSIS — R55 NEAR SYNCOPE: Primary | ICD-10-CM

## 2022-11-04 DIAGNOSIS — R42 DIZZINESS: ICD-10-CM

## 2022-11-04 DIAGNOSIS — R55 SYNCOPE AND COLLAPSE: ICD-10-CM

## 2022-11-04 DIAGNOSIS — Z01.818 PREOPERATIVE CLEARANCE: ICD-10-CM

## 2022-11-04 DIAGNOSIS — R00.1 SINUS BRADYCARDIA: ICD-10-CM

## 2022-11-04 PROCEDURE — 1159F MED LIST DOCD IN RCRD: CPT | Mod: CPTII,S$GLB,, | Performed by: NURSE PRACTITIONER

## 2022-11-04 PROCEDURE — 1160F PR REVIEW ALL MEDS BY PRESCRIBER/CLIN PHARMACIST DOCUMENTED: ICD-10-PCS | Mod: CPTII,S$GLB,, | Performed by: NURSE PRACTITIONER

## 2022-11-04 PROCEDURE — 1160F RVW MEDS BY RX/DR IN RCRD: CPT | Mod: CPTII,S$GLB,, | Performed by: NURSE PRACTITIONER

## 2022-11-04 PROCEDURE — 99999 PR PBB SHADOW E&M-EST. PATIENT-LVL V: CPT | Mod: PBBFAC,,, | Performed by: NURSE PRACTITIONER

## 2022-11-04 PROCEDURE — 99999 PR PBB SHADOW E&M-EST. PATIENT-LVL V: ICD-10-PCS | Mod: PBBFAC,,, | Performed by: NURSE PRACTITIONER

## 2022-11-04 PROCEDURE — 3008F BODY MASS INDEX DOCD: CPT | Mod: CPTII,S$GLB,, | Performed by: NURSE PRACTITIONER

## 2022-11-04 PROCEDURE — 3078F PR MOST RECENT DIASTOLIC BLOOD PRESSURE < 80 MM HG: ICD-10-PCS | Mod: CPTII,S$GLB,, | Performed by: NURSE PRACTITIONER

## 2022-11-04 PROCEDURE — 3074F PR MOST RECENT SYSTOLIC BLOOD PRESSURE < 130 MM HG: ICD-10-PCS | Mod: CPTII,S$GLB,, | Performed by: NURSE PRACTITIONER

## 2022-11-04 PROCEDURE — 99213 PR OFFICE/OUTPT VISIT, EST, LEVL III, 20-29 MIN: ICD-10-PCS | Mod: S$GLB,,, | Performed by: NURSE PRACTITIONER

## 2022-11-04 PROCEDURE — 3074F SYST BP LT 130 MM HG: CPT | Mod: CPTII,S$GLB,, | Performed by: NURSE PRACTITIONER

## 2022-11-04 PROCEDURE — 3008F PR BODY MASS INDEX (BMI) DOCUMENTED: ICD-10-PCS | Mod: CPTII,S$GLB,, | Performed by: NURSE PRACTITIONER

## 2022-11-04 PROCEDURE — 99213 OFFICE O/P EST LOW 20 MIN: CPT | Mod: S$GLB,,, | Performed by: NURSE PRACTITIONER

## 2022-11-04 PROCEDURE — 1159F PR MEDICATION LIST DOCUMENTED IN MEDICAL RECORD: ICD-10-PCS | Mod: CPTII,S$GLB,, | Performed by: NURSE PRACTITIONER

## 2022-11-04 PROCEDURE — 3078F DIAST BP <80 MM HG: CPT | Mod: CPTII,S$GLB,, | Performed by: NURSE PRACTITIONER

## 2022-11-04 NOTE — PROGRESS NOTES
Cardiology    11/4/2022  1:46 PM    Problem list  Patient Active Problem List   Diagnosis    GERD (gastroesophageal reflux disease)    S/p ileostomy revision within the subcutaneous tissue.    Plica of knee    Dietary folate deficiency anemia    Vitamin B12 deficiency    Hyperlipidemia    Vitamin D deficiency    Moderate persistent asthma without complication    Anxiety    DDD (degenerative disc disease), lumbar    Crohn's disease with complication    Former smoker    Bariatric surgery status-Gastric sleeve     Redundant skin of abdomen    Inflammatory bowel disease    Vaginal atrophy    Urinary incontinence, mixed    Dietary iron deficiency    Candidal intertrigo    Lumbar spine strain, sequela    Sleep arousal disorder    Sleep apnea    Ileostomy in place    Symptomatic abdominal panniculus    Mild episode of recurrent major depressive disorder    Diarrhea    Sinus bradycardia    Syncope and collapse    Chronic pansinusitis    Generalized abdominal pain    BMI 40.0-44.9, adult    Fatty liver    Age-related osteoporosis without current pathological fracture       CC:  Near syncope    HPI:Was cooking dinner and lifted head up and had to put her head down as things started to go black.  She had a CT of the head and was told a UTI was contributing to her symptoms.  Her head was spinning for about 10 days- went to PCP office and started meclizine that only helped for a couple of days.  Head spins with turning over in bed and moving head.  Was nauseated and vomited with the onset of symptoms.  Has vertigo but this was different,  She feels like she is weaving back in forth when she walks. The last time that this happened she was seen by Dr. Goins and previously Dr. Malin.  Seeing ENT.  Will need nasal polyps removed.  Has a poor appetite and has lost her taste for foods, unclear if this is from the sinus problems.  Father had a LAD lesion at age 67. No edema, no orthopnea but turning in bed makes her dizzy,  even slight turning of her head.  Does not wake her up.  Has bradycardia at baseline, will be sitting at her mother;'s kitchen table except for assisting with ADLs for mom and dad and her watch will alert her that her heart rate has dropped.  Sometimes her watch will awaken her to alert her that she is bradycardic        Medications  Current Outpatient Medications   Medication Sig Dispense Refill    albuterol (PROVENTIL/VENTOLIN HFA) 90 mcg/actuation inhaler Inhale 2 puffs into the lungs every 4 (four) hours as needed for Wheezing or Shortness of Breath. Rescue 8 g 5    budesonide 1 mg/2 mL NbSp As directed 60 mL 3    cefdinir (OMNICEF) 300 MG capsule Take 1 capsule (300 mg total) by mouth 2 (two) times daily. for 14 days 28 capsule 0    clonazePAM (KLONOPIN) 1 MG tablet TAKE ONE TABLET BY MOUTH ONCE DAILY AS NEEDED FOR ANXIETY 90 tablet 1    diclofenac sodium (VOLTAREN) 1 % Gel Apply 2 g topically 4 (four) times daily. 100 g 5    diphenoxylate-atropine 2.5-0.025 mg (LOMOTIL) 2.5-0.025 mg per tablet Take 1 tablet by mouth 4 (four) times daily as needed for Diarrhea. 30 tablet 2    loratadine (CLARITIN) 10 mg tablet TAKE ONE TABLET BY MOUTH ONCE DAILY 90 tablet 3    meclizine (ANTIVERT) 25 mg tablet Take 1 tablet (25 mg total) by mouth 3 (three) times daily as needed for Dizziness. 30 tablet 0    methylPREDNISolone (MEDROL DOSEPACK) 4 mg tablet use as directed 21 each 0    pantoprazole (PROTONIX) 40 MG tablet       promethazine (PHENERGAN) 25 MG tablet TAKE 1 TABLET (25 MG TOTAL) BY MOUTH EVERY 6 (SIX) HOURS AS NEEDED FOR NAUSEA. 15 tablet 0    sertraline (ZOLOFT) 100 MG tablet       SYMBICORT 160-4.5 mcg/actuation HFAA INHALE 2 PUFFS INTO THE LUNGS EVERY 12 (TWELVE) HOURS. CONTROLLER 30.6 g 5    topiramate (TOPAMAX) 100 MG tablet TAKE ONE TABLET BY MOUTH ONCE DAILY 90 tablet 3    traZODone (DESYREL) 100 MG tablet TAKE 1 TABLET (100 MG TOTAL) BY MOUTH NIGHTLY AS NEEDED FOR INSOMNIA. 90 tablet 1    VITAMIN D2 1,250  mcg (50,000 unit) capsule TAKE ONE CAPSULE BY MOUTH EVERY 7 DAYS 12 capsule 3     No current facility-administered medications for this visit.      Prior to Admission medications    Medication Sig Start Date End Date Taking? Authorizing Provider   albuterol (PROVENTIL/VENTOLIN HFA) 90 mcg/actuation inhaler Inhale 2 puffs into the lungs every 4 (four) hours as needed for Wheezing or Shortness of Breath. Rescue 2/2/22  Yes Eliecer Jones MD   budesonide 1 mg/2 mL NbSp As directed 11/3/22  Yes Jay Hernandez MD   cefdinir (OMNICEF) 300 MG capsule Take 1 capsule (300 mg total) by mouth 2 (two) times daily. for 14 days 11/3/22 11/17/22 Yes Jay Hernandez MD   clonazePAM (KLONOPIN) 1 MG tablet TAKE ONE TABLET BY MOUTH ONCE DAILY AS NEEDED FOR ANXIETY 9/8/22  Yes Eliecer Jones MD   diclofenac sodium (VOLTAREN) 1 % Gel Apply 2 g topically 4 (four) times daily. 3/3/20  Yes Eliecer Jones MD   diphenoxylate-atropine 2.5-0.025 mg (LOMOTIL) 2.5-0.025 mg per tablet Take 1 tablet by mouth 4 (four) times daily as needed for Diarrhea. 5/9/22  Yes Eliecre Jones MD   loratadine (CLARITIN) 10 mg tablet TAKE ONE TABLET BY MOUTH ONCE DAILY 12/26/21  Yes Eliecer Jones MD   meclizine (ANTIVERT) 25 mg tablet Take 1 tablet (25 mg total) by mouth 3 (three) times daily as needed for Dizziness. 10/13/22  Yes Janet Guardado NP   methylPREDNISolone (MEDROL DOSEPACK) 4 mg tablet use as directed 11/3/22 11/24/22 Yes Jay Hernandez MD   pantoprazole (PROTONIX) 40 MG tablet  9/17/22  Yes Historical Provider   promethazine (PHENERGAN) 25 MG tablet TAKE 1 TABLET (25 MG TOTAL) BY MOUTH EVERY 6 (SIX) HOURS AS NEEDED FOR NAUSEA. 5/11/22  Yes Janet Guardado NP   sertraline (ZOLOFT) 100 MG tablet  6/6/22  Yes Historical Provider   SYMBICORT 160-4.5 mcg/actuation HFAA INHALE 2 PUFFS INTO THE LUNGS EVERY 12 (TWELVE) HOURS. CONTROLLER 7/25/22  Yes Eliecer Jones MD   topiramate (TOPAMAX) 100 MG tablet TAKE ONE  TABLET BY MOUTH ONCE DAILY 22  Yes Eliecer Jones MD   traZODone (DESYREL) 100 MG tablet TAKE 1 TABLET (100 MG TOTAL) BY MOUTH NIGHTLY AS NEEDED FOR INSOMNIA. 22  Yes Eliecer Jones MD   VITAMIN D2 1,250 mcg (50,000 unit) capsule TAKE ONE CAPSULE BY MOUTH EVERY 7 DAYS 22  Yes Janet Guardado NP   adalimumab (HUMIRA,CF, PEN) 40 mg/0.4 mL PnKt Inject 0.4 mLs (40 mg total) into the skin every 14 (fourteen) days. 6/16/22 10/24/22  Luzma Bowen MD         History  Past Medical History:   Diagnosis Date    Anxiety     Asthma     Bronchitis     Crohn's colitis     Depression     Esophageal ulcer 2014    Fatty liver disease, nonalcoholic     Gallstones     GERD (gastroesophageal reflux disease)     History of sleeve gastrectomy 2016    PTSD (post-traumatic stress disorder)     Seasonal allergies     Symptomatic abdominal panniculus     Ulcerative colitis      Past Surgical History:   Procedure Laterality Date    APPENDECTOMY      CATHETERIZATION OF BOTH LEFT AND RIGHT HEART Right 2019    Procedure: CATHETERIZATION, HEART, BOTH LEFT AND RIGHT;  Surgeon: Beto Malin MD;  Location: [facility] CATH LAB;  Service: Cardiology;  Laterality: Right;     SECTION, LOW TRANSVERSE      CHOLECYSTECTOMY      open    COLECTOMY      total- 2013    CYSTOSCOPY W/ RETROGRADES N/A 2018    Procedure: CYSTOSCOPY, WITH RETROGRADE PYELOGRAM;  Surgeon: Butch Banks MD;  Location: [facility] OR;  Service: Urology;  Laterality: N/A;  HANSEN SURGICAL CONFIRMED     ENDOSCOPIC ULTRASOUND OF UPPER GASTROINTESTINAL TRACT N/A 11/10/2021    Procedure: ULTRASOUND, UPPER GI TRACT, ENDOSCOPIC;  Surgeon: Nhan Dee MD;  Location: [facility] ENDO ([address] [place] [address]);  Service: Endoscopy;  Laterality: N/A;  MD Milena Whitehead PA-C; Quinn Whitman MD; Miladys Velasquez MA  Caller: Unspecified (3 days ago,  1:20 PM)  Fair enough. Neha, please schedule.       ----- Message -----   From:  Milena Murillo PA-C   Sent: 8/11/2021  1    ESOPHAGOGASTRODUODENOSCOPY  04/11/2018    Dr. Castorena: LA Grade A reflux esophagitis, hiatal hernia; Ectopic gastric mucosa in the upper third of the esophagus; gastric sleeve intact with unremarkable findings, gastritis; biopsy: stomach- Mild chronic antral and oxyntic gastritis, no activity, negative for h pylori    ESOPHAGOGASTRODUODENOSCOPY N/A 11/10/2021    Procedure: EGD (ESOPHAGOGASTRODUODENOSCOPY);  Surgeon: Nhan Dee MD;  Location: [facility] (2ND FLR);  Service: Endoscopy;  Laterality: N/A;    FLEXIBLE SIGMOIDOSCOPY  5/24/2018    Procedure: SIGMOIDOSCOPY, FLEXIBLE;  Surgeon: JENNY Virgen MD;  [facility];  Service: Colon and Rectal;;    FLEXIBLE SIGMOIDOSCOPY  04/11/2018    Dr. Castorena: Non-patent surgical anastomosis, characterized by friable mucosa.    GASTRIC SLEEVE       HYSTERECTOMY      ILEOSCOPY  04/11/2018    Dr. Castorena: Patient is status-post total colectomy with end ileostomy. unremarkable findings    ILEOSTOMY REVISION      april 2014; august 2014    LAPAROSCOPIC PROCTECTOMY N/A 5/24/2018    Procedure: PROCTECTOMY-LAPAROSCOPIC/CONVERTED TO OPEN;  Surgeon: JENNY Virgen MD;  Location: [facility] OR 2ND FLR;  Service: Colon and Rectal;  Laterality: N/A;    LYSIS OF ADHESIONS  5/24/2018    Procedure: LYSIS, ADHESIONS/ more than 2hours;  Surgeon: JENNY Virgen MD;  [facility];  Service: Colon and Rectal;;    OOPHORECTOMY      PANNICULECTOMY Bilateral 12/19/2019    Procedure: PANNICULECTOMY;  Surgeon: Andrea Alvarado MD;  Location: [facility] OR 2ND FLR;  Service: Plastics;  Laterality: Bilateral;    REVISION COLOSTOMY N/A 12/19/2019    Procedure: REVISION, COLOSTOMY;  Surgeon: JENNY Virgen MD;  Location: [facility] OR 2ND FLR;  Service: Colon and Rectal;  Laterality: N/A;    thumb surgery      TONSILLECTOMY, ADENOIDECTOMY      WISDOM TOOTH EXTRACTION       Social History     Socioeconomic History    Marital status: Legally     Tobacco Use    Smoking status: Former     Types: Cigarettes     Quit date:      Years since quittin.8    Smokeless tobacco: Never   Substance and Sexual Activity    Alcohol use: Yes     Comment: rarely    Drug use: No    Sexual activity: Never     Birth control/protection: See Surgical Hx         Allergies  Review of patient's allergies indicates:   Allergen Reactions    Adhesive      Cause blisters    Dilaudid [hydromorphone] Nausea And Vomiting    Humira [adalimumab] Hives    Sulfa (sulfonamide antibiotics) Hives    Surgical stainless steel      Had surgical staples, caused irritation and infection         Review of Systems   Review of Systems   Constitutional: Negative for diaphoresis and malaise/fatigue.   HENT: Negative.     Cardiovascular:  Positive for near-syncope. Negative for chest pain, claudication, dyspnea on exertion, irregular heartbeat, leg swelling, orthopnea, palpitations, paroxysmal nocturnal dyspnea and syncope.   Respiratory:  Negative for shortness of breath.    Endocrine: Negative for polydipsia, polyphagia and polyuria.   Hematologic/Lymphatic: Does not bruise/bleed easily.   Gastrointestinal:  Positive for nausea. Negative for bloating and vomiting.   Genitourinary: Negative.    Neurological:  Positive for dizziness, light-headedness and loss of balance. Negative for excessive daytime sleepiness and weakness.   Psychiatric/Behavioral:  The patient is not nervous/anxious.    Allergic/Immunologic: Negative.        Physical Exam  Wt Readings from Last 1 Encounters:   22 108.4 kg (238 lb 15.7 oz)     BP Readings from Last 3 Encounters:   22 (!) 105/56   22 93/60   10/13/22 118/60     Pulse Readings from Last 1 Encounters:   22 61     Body mass index is 41.02 kg/m².    Physical Exam  Vitals and nursing note reviewed.   Constitutional:       Appearance: Normal appearance.      Comments: BMI 41   HENT:      Head: Normocephalic and atraumatic.       Mouth/Throat:      Mouth: Mucous membranes are moist.   Eyes:      Pupils: Pupils are equal, round, and reactive to light.   Cardiovascular:      Rate and Rhythm: Normal rate and regular rhythm.      Pulses:           Radial pulses are 2+ on the right side and 2+ on the left side.        Dorsalis pedis pulses are 2+ on the right side and 2+ on the left side.        Posterior tibial pulses are 2+ on the right side and 2+ on the left side.      Heart sounds: No murmur heard.  Pulmonary:      Effort: Pulmonary effort is normal. No respiratory distress.      Breath sounds: Normal breath sounds.   Abdominal:      General: There is no distension.      Tenderness: There is no abdominal tenderness.   Musculoskeletal:      Cervical back: Normal range of motion.      Right lower leg: No edema.      Left lower leg: No edema.   Skin:     General: Skin is warm and dry.      Findings: No erythema.   Neurological:      General: No focal deficit present.      Mental Status: She is alert.   Psychiatric:         Mood and Affect: Mood normal.         Behavior: Behavior normal.               Problem List Items Addressed This Visit          ENT    Vertigo    Overview     Significant symptoms when turning over in bed  Following with ENT  Appreciate their expertise         Current Assessment & Plan     As above            Cardiac/Vascular    Sinus bradycardia       Other    Syncope and collapse    Overview     Previous episodes, unclear etiology  Recommend echo, holter and carotid US as symptoms correlate with head positioning         Current Assessment & Plan     As above          Other Visit Diagnoses       Near syncope    -  Primary    Relevant Orders    Holter monitor - 48 hour    US Carotid Bilateral    Echo    Dizziness        Preoperative clearance              Await testing to discuss preprocedural cardiac evaluation    Follow Up  2 weeks      @Kelly Mata DNP

## 2022-11-07 PROBLEM — R42 VERTIGO: Status: ACTIVE | Noted: 2022-11-07

## 2022-11-08 ENCOUNTER — PATIENT MESSAGE (OUTPATIENT)
Dept: OTOLARYNGOLOGY | Facility: CLINIC | Age: 57
End: 2022-11-08
Payer: MEDICARE

## 2022-11-11 ENCOUNTER — OFFICE VISIT (OUTPATIENT)
Dept: PRIMARY CARE CLINIC | Facility: CLINIC | Age: 57
End: 2022-11-11
Payer: MEDICARE

## 2022-11-11 VITALS
RESPIRATION RATE: 16 BRPM | OXYGEN SATURATION: 96 % | WEIGHT: 239.88 LBS | DIASTOLIC BLOOD PRESSURE: 60 MMHG | BODY MASS INDEX: 40.95 KG/M2 | TEMPERATURE: 98 F | HEIGHT: 64 IN | SYSTOLIC BLOOD PRESSURE: 110 MMHG | HEART RATE: 49 BPM

## 2022-11-11 DIAGNOSIS — K50.919 CROHN'S DISEASE WITH COMPLICATION, UNSPECIFIED GASTROINTESTINAL TRACT LOCATION: ICD-10-CM

## 2022-11-11 DIAGNOSIS — K52.9 GASTROENTERITIS: ICD-10-CM

## 2022-11-11 DIAGNOSIS — E78.5 HYPERLIPIDEMIA, UNSPECIFIED HYPERLIPIDEMIA TYPE: ICD-10-CM

## 2022-11-11 DIAGNOSIS — G47.00 INSOMNIA, UNSPECIFIED TYPE: ICD-10-CM

## 2022-11-11 DIAGNOSIS — R53.82 CHRONIC FATIGUE, UNSPECIFIED: ICD-10-CM

## 2022-11-11 DIAGNOSIS — J32.4 CHRONIC PANSINUSITIS: ICD-10-CM

## 2022-11-11 DIAGNOSIS — Z01.818 PRE-OPERATIVE CLEARANCE: Primary | ICD-10-CM

## 2022-11-11 DIAGNOSIS — Z13.6 ENCOUNTER FOR SCREENING FOR CARDIOVASCULAR DISORDERS: ICD-10-CM

## 2022-11-11 PROCEDURE — 99999 PR PBB SHADOW E&M-EST. PATIENT-LVL IV: CPT | Mod: PBBFAC,,, | Performed by: NURSE PRACTITIONER

## 2022-11-11 PROCEDURE — 99999 PR PBB SHADOW E&M-EST. PATIENT-LVL IV: ICD-10-PCS | Mod: PBBFAC,,, | Performed by: NURSE PRACTITIONER

## 2022-11-11 PROCEDURE — 1159F PR MEDICATION LIST DOCUMENTED IN MEDICAL RECORD: ICD-10-PCS | Mod: CPTII,S$GLB,, | Performed by: NURSE PRACTITIONER

## 2022-11-11 PROCEDURE — 99214 PR OFFICE/OUTPT VISIT, EST, LEVL IV, 30-39 MIN: ICD-10-PCS | Mod: S$GLB,,, | Performed by: NURSE PRACTITIONER

## 2022-11-11 PROCEDURE — 3074F PR MOST RECENT SYSTOLIC BLOOD PRESSURE < 130 MM HG: ICD-10-PCS | Mod: CPTII,S$GLB,, | Performed by: NURSE PRACTITIONER

## 2022-11-11 PROCEDURE — 3074F SYST BP LT 130 MM HG: CPT | Mod: CPTII,S$GLB,, | Performed by: NURSE PRACTITIONER

## 2022-11-11 PROCEDURE — 3008F PR BODY MASS INDEX (BMI) DOCUMENTED: ICD-10-PCS | Mod: CPTII,S$GLB,, | Performed by: NURSE PRACTITIONER

## 2022-11-11 PROCEDURE — 3078F DIAST BP <80 MM HG: CPT | Mod: CPTII,S$GLB,, | Performed by: NURSE PRACTITIONER

## 2022-11-11 PROCEDURE — 3078F PR MOST RECENT DIASTOLIC BLOOD PRESSURE < 80 MM HG: ICD-10-PCS | Mod: CPTII,S$GLB,, | Performed by: NURSE PRACTITIONER

## 2022-11-11 PROCEDURE — 99214 OFFICE O/P EST MOD 30 MIN: CPT | Mod: S$GLB,,, | Performed by: NURSE PRACTITIONER

## 2022-11-11 PROCEDURE — 3008F BODY MASS INDEX DOCD: CPT | Mod: CPTII,S$GLB,, | Performed by: NURSE PRACTITIONER

## 2022-11-11 PROCEDURE — 1159F MED LIST DOCD IN RCRD: CPT | Mod: CPTII,S$GLB,, | Performed by: NURSE PRACTITIONER

## 2022-11-11 RX ORDER — LEVOCETIRIZINE DIHYDROCHLORIDE 5 MG/1
5 TABLET, FILM COATED ORAL NIGHTLY
Qty: 90 TABLET | Refills: 1 | Status: SHIPPED | OUTPATIENT
Start: 2022-11-11 | End: 2023-05-17

## 2022-11-11 RX ORDER — PROMETHAZINE HYDROCHLORIDE 25 MG/1
25 TABLET ORAL EVERY 6 HOURS PRN
Qty: 15 TABLET | Refills: 2 | Status: SHIPPED | OUTPATIENT
Start: 2022-11-11

## 2022-11-11 RX ORDER — LORATADINE 10 MG/1
10 TABLET ORAL DAILY
Qty: 90 TABLET | Refills: 1 | Status: SHIPPED | OUTPATIENT
Start: 2022-11-11 | End: 2023-11-02 | Stop reason: SDUPTHER

## 2022-11-11 RX ORDER — TRAZODONE HYDROCHLORIDE 100 MG/1
100 TABLET ORAL NIGHTLY PRN
Qty: 90 TABLET | Refills: 1 | Status: SHIPPED | OUTPATIENT
Start: 2022-11-11 | End: 2023-05-03 | Stop reason: SDUPTHER

## 2022-11-11 NOTE — PROGRESS NOTES
Chief Complaint  Chief Complaint   Patient presents with    Pre-op Exam     Nasel surgery         HPI    Patient here for pre-op exam nasal polypectomy with Dr. Hernandez. Continues to complain of dizziness with positioning with normal Echo recently completed in addition to carotid US normal, and pending holter monitor. Continues episodes of bradycardia sometimes getting down to the 40s. Dizziness occurs randomly with no exacerbating factors. Does have a heart rate monitor at home with HR in the 50s generally which is not new. Occasional episodes of bradycardia below 50 which she does recognize accompanies her symptoms of dizziness. Patient without a h/o MI, CAD. No diabetes. Patient is a non-smoker, h/o asthma well controlled on SARIAH.     Any previous reaction to anesthesia? no  Any history of prolonged bleeding or bleeding disorders?no  High risk surgery?no  History MI, abnormal stress test, anginal chest pain, stent, nitroglycerin use?no  History of CHF ?no  History of TIA or stroke?no  History of diabetes on insulin?no  Creatinine clearance >2 or known h/o CKD? no      PAST MEDICAL HISTORY:  Past Medical History:   Diagnosis Date    Anxiety     Asthma     Bronchitis     Crohn's colitis     Depression     Esophageal ulcer 2014    Fatty liver disease, nonalcoholic     Gallstones     GERD (gastroesophageal reflux disease)     History of sleeve gastrectomy 2016    PTSD (post-traumatic stress disorder)     Seasonal allergies     Symptomatic abdominal panniculus     Ulcerative colitis        PAST SURGICAL HISTORY:  Past Surgical History:   Procedure Laterality Date    APPENDECTOMY      CATHETERIZATION OF BOTH LEFT AND RIGHT HEART Right 2019    Procedure: CATHETERIZATION, HEART, BOTH LEFT AND RIGHT;  Surgeon: Beto Malin MD;  Location: [facility] CATH LAB;  Service: Cardiology;  Laterality: Right;     SECTION, LOW TRANSVERSE      CHOLECYSTECTOMY      open    COLECTOMY      total- 2013     CYSTOSCOPY W/ RETROGRADES N/A 8/20/2018    Procedure: CYSTOSCOPY, WITH RETROGRADE PYELOGRAM;  Surgeon: Butch Banks MD;  Location: [facility];  Service: Urology;  Laterality: N/A;  [facility] SURGICAL CONFIRMED 8/17/DME    ENDOSCOPIC ULTRASOUND OF UPPER GASTROINTESTINAL TRACT N/A 11/10/2021    Procedure: ULTRASOUND, UPPER GI TRACT, ENDOSCOPIC;  Surgeon: Nhan Dee MD;  Location: [facility] (2ND FLR);  Service: Endoscopy;  Laterality: N/A;  MD Milena Whitehead PA-C; Quinn Whitman MD; Miladys Velasquez MA  Caller: Unspecified (3 days ago,  1:20 PM)  Fair enough. Neha, please schedule.       ----- Message -----   From: Milena Murillo PA-C   Sent: 8/11/2021  1    ESOPHAGOGASTRODUODENOSCOPY  04/11/2018    Dr. Castorena: LA Grade A reflux esophagitis, hiatal hernia; Ectopic gastric mucosa in the upper third of the esophagus; gastric sleeve intact with unremarkable findings, gastritis; biopsy: stomach- Mild chronic antral and oxyntic gastritis, no activity, negative for h pylori    ESOPHAGOGASTRODUODENOSCOPY N/A 11/10/2021    Procedure: EGD (ESOPHAGOGASTRODUODENOSCOPY);  Surgeon: Nhan Dee MD;  Location: [facility] (2ND FLR);  Service: Endoscopy;  Laterality: N/A;    FLEXIBLE SIGMOIDOSCOPY  5/24/2018    Procedure: SIGMOIDOSCOPY, FLEXIBLE;  Surgeon: JENNY Virgen MD;  Location: [facility] OR 2ND FLR;  Service: Colon and Rectal;;    FLEXIBLE SIGMOIDOSCOPY  04/11/2018    Dr. Castorena: Non-patent surgical anastomosis, characterized by friable mucosa.    GASTRIC SLEEVE       HYSTERECTOMY      ILEOSCOPY  04/11/2018    Dr. Castorena: Patient is status-post total colectomy with end ileostomy. unremarkable findings    ILEOSTOMY REVISION      april 2014; august 2014    LAPAROSCOPIC PROCTECTOMY N/A 5/24/2018    Procedure: PROCTECTOMY-LAPAROSCOPIC/CONVERTED TO OPEN;  Surgeon: JENNY Virgen MD;  Location: [facility] 2ND FLR;  Service: Colon and Rectal;  Laterality: N/A;    LYSIS OF ADHESIONS  5/24/2018    Procedure:  LYSIS, ADHESIONS/ more than 2hours;  Surgeon: JENNY Virgen MD;  Location: [facility] OR 2ND FLR;  Service: Colon and Rectal;;    OOPHORECTOMY      PANNICULECTOMY Bilateral 2019    Procedure: PANNICULECTOMY;  Surgeon: Andrea Alvarado MD;  Location: [facility] OR 2ND FLR;  Service: Plastics;  Laterality: Bilateral;    REVISION COLOSTOMY N/A 2019    Procedure: REVISION, COLOSTOMY;  Surgeon: JENNY Virgen MD;  Location: [facility] OR 2ND FLR;  Service: Colon and Rectal;  Laterality: N/A;    thumb surgery      TONSILLECTOMY, ADENOIDECTOMY      WISDOM TOOTH EXTRACTION         SOCIAL HISTORY:  Social History     Socioeconomic History    Marital status: Legally    Tobacco Use    Smoking status: Former     Types: Cigarettes     Quit date:      Years since quittin.8    Smokeless tobacco: Never   Substance and Sexual Activity    Alcohol use: Yes     Comment: rarely    Drug use: No    Sexual activity: Never     Birth control/protection: See Surgical Hx       FAMILY HISTORY:  Family History   Problem Relation Age of Onset    Cancer Mother         skin    Cirrhosis Mother     Heart disease Father 67    Cancer Paternal Grandfather         ?    Cancer Maternal Grandfather         colon     Colon cancer Maternal Grandfather     Colon cancer Maternal Uncle     Colon cancer Maternal Uncle     Crohn's disease Neg Hx     Ulcerative colitis Neg Hx     Stomach cancer Neg Hx     Esophageal cancer Neg Hx     Celiac disease Neg Hx     Breast cancer Neg Hx     Ovarian cancer Neg Hx        ALLERGIES AND MEDICATIONS: updated and reviewed.  Review of patient's allergies indicates:   Allergen Reactions    Adhesive      Cause blisters    Dilaudid [hydromorphone] Nausea And Vomiting    Humira [adalimumab] Hives    Sulfa (sulfonamide antibiotics) Hives    Surgical stainless steel      Had surgical staples, caused irritation and infection     Current Outpatient Medications   Medication Sig Dispense Refill    albuterol  (PROVENTIL/VENTOLIN HFA) 90 mcg/actuation inhaler Inhale 2 puffs into the lungs every 4 (four) hours as needed for Wheezing or Shortness of Breath. Rescue 8 g 5    cefdinir (OMNICEF) 300 MG capsule Take 1 capsule (300 mg total) by mouth 2 (two) times daily. for 14 days 28 capsule 0    clonazePAM (KLONOPIN) 1 MG tablet TAKE ONE TABLET BY MOUTH ONCE DAILY AS NEEDED FOR ANXIETY 90 tablet 1    diclofenac sodium (VOLTAREN) 1 % Gel Apply 2 g topically 4 (four) times daily. 100 g 5    diphenoxylate-atropine 2.5-0.025 mg (LOMOTIL) 2.5-0.025 mg per tablet Take 1 tablet by mouth 4 (four) times daily as needed for Diarrhea. 30 tablet 2    meclizine (ANTIVERT) 25 mg tablet Take 1 tablet (25 mg total) by mouth 3 (three) times daily as needed for Dizziness. 30 tablet 0    sertraline (ZOLOFT) 100 MG tablet       SYMBICORT 160-4.5 mcg/actuation HFAA INHALE 2 PUFFS INTO THE LUNGS EVERY 12 (TWELVE) HOURS. CONTROLLER 30.6 g 5    topiramate (TOPAMAX) 100 MG tablet TAKE ONE TABLET BY MOUTH ONCE DAILY 90 tablet 3    VITAMIN D2 1,250 mcg (50,000 unit) capsule TAKE ONE CAPSULE BY MOUTH EVERY 7 DAYS 12 capsule 3    levocetirizine (XYZAL) 5 MG tablet Take 1 tablet (5 mg total) by mouth every evening. 90 tablet 1    loratadine (CLARITIN) 10 mg tablet Take 1 tablet (10 mg total) by mouth once daily. 90 tablet 1    promethazine (PHENERGAN) 25 MG tablet Take 1 tablet (25 mg total) by mouth every 6 (six) hours as needed for Nausea. 15 tablet 2    traZODone (DESYREL) 100 MG tablet Take 1 tablet (100 mg total) by mouth nightly as needed for Insomnia. 90 tablet 1     No current facility-administered medications for this visit.         ROS  Review of Systems   Constitutional:  Negative for chills and fever.   HENT:  Negative for ear pain, postnasal drip and sinus pain.    Respiratory:  Negative for cough and shortness of breath.    Cardiovascular:  Negative for chest pain.   Gastrointestinal:  Positive for diarrhea. Negative for nausea and vomiting.  "  Neurological:  Positive for dizziness and light-headedness.         PHYSICAL EXAM  Vitals:    11/11/22 0730   BP: 110/60   BP Location: Right arm   Patient Position: Sitting   BP Method: Medium (Manual)   Pulse: (!) 49   Resp: 16   Temp: 97.7 °F (36.5 °C)   TempSrc: Temporal   SpO2: 96%   Weight: 108.8 kg (239 lb 13.8 oz)   Height: 5' 4" (1.626 m)    Body mass index is 41.17 kg/m².  Weight: 108.8 kg (239 lb 13.8 oz)   Height: 5' 4" (162.6 cm)     Physical Exam  Constitutional:       Appearance: She is well-developed.   HENT:      Head: Normocephalic.      Right Ear: Tympanic membrane normal.      Left Ear: Tympanic membrane normal.      Mouth/Throat:      Pharynx: Uvula midline.   Eyes:      Conjunctiva/sclera: Conjunctivae normal.   Cardiovascular:      Rate and Rhythm: Regular rhythm. Bradycardia present.      Pulses: Normal pulses.           Radial pulses are 2+ on the right side and 2+ on the left side.      Heart sounds: Normal heart sounds. No murmur heard.     Comments: No LE swelling noted  Pulmonary:      Effort: Pulmonary effort is normal.      Breath sounds: Normal breath sounds. No wheezing.   Abdominal:      General: Bowel sounds are normal.      Palpations: Abdomen is soft.      Tenderness: There is no abdominal tenderness.   Lymphadenopathy:      Cervical: No cervical adenopathy.   Skin:     General: Skin is warm and dry.      Findings: No rash.   Neurological:      Mental Status: She is alert and oriented to person, place, and time.         Health Maintenance         Date Due Completion Date    Lipid Panel 05/25/2022 5/25/2021    Shingles Vaccine (1 of 2) 08/29/2023 (Originally 7/27/2017) ---    COVID-19 Vaccine (3 - Booster for Pfizer series) 08/29/2023 (Originally 6/11/2021) 4/16/2021    Mammogram 08/09/2023 8/9/2022    TETANUS VACCINE 10/24/2028 10/24/2018              Assessment & Plan    Problem List Items Addressed This Visit          Unprioritized    Chronic pansinusitis    Relevant " Medications    loratadine (CLARITIN) 10 mg tablet    levocetirizine (XYZAL) 5 MG tablet    Crohn's disease with complication    Overview     pt advised to get reestablished with gastroenterologist         Hyperlipidemia    Relevant Orders    Lipid Panel     Other Visit Diagnoses       Pre-operative clearance    -  Primary    Relevant Orders    CBC Auto Differential    Comprehensive Metabolic Panel    TSH    EKG per cardiology no acute findings.   CXR ordered.   Lab work today.       Encounter for screening for cardiovascular disorders        Chronic fatigue, unspecified        Relevant Orders    TSH    Insomnia, unspecified type        Relevant Medications    traZODone (DESYREL) 100 MG tablet    Gastroenteritis        Relevant Medications    promethazine (PHENERGAN) 25 MG tablet   Clearance pending cardiology clearance and CXR, lab work.          Follow-up: Follow up in about 1 year (around 11/11/2023).    Janet Guardado    Medication List with Changes/Refills   New Medications    LEVOCETIRIZINE (XYZAL) 5 MG TABLET    Take 1 tablet (5 mg total) by mouth every evening.   Current Medications    ALBUTEROL (PROVENTIL/VENTOLIN HFA) 90 MCG/ACTUATION INHALER    Inhale 2 puffs into the lungs every 4 (four) hours as needed for Wheezing or Shortness of Breath. Rescue    CEFDINIR (OMNICEF) 300 MG CAPSULE    Take 1 capsule (300 mg total) by mouth 2 (two) times daily. for 14 days    CLONAZEPAM (KLONOPIN) 1 MG TABLET    TAKE ONE TABLET BY MOUTH ONCE DAILY AS NEEDED FOR ANXIETY    DICLOFENAC SODIUM (VOLTAREN) 1 % GEL    Apply 2 g topically 4 (four) times daily.    DIPHENOXYLATE-ATROPINE 2.5-0.025 MG (LOMOTIL) 2.5-0.025 MG PER TABLET    Take 1 tablet by mouth 4 (four) times daily as needed for Diarrhea.    MECLIZINE (ANTIVERT) 25 MG TABLET    Take 1 tablet (25 mg total) by mouth 3 (three) times daily as needed for Dizziness.    SERTRALINE (ZOLOFT) 100 MG TABLET        SYMBICORT 160-4.5 MCG/ACTUATION HFAA    INHALE 2 PUFFS INTO THE  LUNGS EVERY 12 (TWELVE) HOURS. CONTROLLER    TOPIRAMATE (TOPAMAX) 100 MG TABLET    TAKE ONE TABLET BY MOUTH ONCE DAILY    VITAMIN D2 1,250 MCG (50,000 UNIT) CAPSULE    TAKE ONE CAPSULE BY MOUTH EVERY 7 DAYS   Changed and/or Refilled Medications    Modified Medication Previous Medication    LORATADINE (CLARITIN) 10 MG TABLET loratadine (CLARITIN) 10 mg tablet       Take 1 tablet (10 mg total) by mouth once daily.    TAKE ONE TABLET BY MOUTH ONCE DAILY    PROMETHAZINE (PHENERGAN) 25 MG TABLET promethazine (PHENERGAN) 25 MG tablet       Take 1 tablet (25 mg total) by mouth every 6 (six) hours as needed for Nausea.    TAKE 1 TABLET (25 MG TOTAL) BY MOUTH EVERY 6 (SIX) HOURS AS NEEDED FOR NAUSEA.    TRAZODONE (DESYREL) 100 MG TABLET traZODone (DESYREL) 100 MG tablet       Take 1 tablet (100 mg total) by mouth nightly as needed for Insomnia.    TAKE 1 TABLET (100 MG TOTAL) BY MOUTH NIGHTLY AS NEEDED FOR INSOMNIA.   Discontinued Medications    BUDESONIDE 1 MG/2 ML NBSP    As directed    METHYLPREDNISOLONE (MEDROL DOSEPACK) 4 MG TABLET    use as directed    PANTOPRAZOLE (PROTONIX) 40 MG TABLET

## 2022-11-14 ENCOUNTER — PATIENT MESSAGE (OUTPATIENT)
Dept: OTOLARYNGOLOGY | Facility: CLINIC | Age: 57
End: 2022-11-14
Payer: MEDICARE

## 2022-11-14 ENCOUNTER — TELEPHONE (OUTPATIENT)
Dept: HEPATOLOGY | Facility: CLINIC | Age: 57
End: 2022-11-14
Payer: MEDICARE

## 2022-11-14 NOTE — TELEPHONE ENCOUNTER
Notified Mrs Orozco that her provider want to see her sometime this month or next month so she scheduled for 11-18-22 at 2:30 pm virtual

## 2022-11-18 ENCOUNTER — OFFICE VISIT (OUTPATIENT)
Dept: HEPATOLOGY | Facility: CLINIC | Age: 57
End: 2022-11-18
Payer: MEDICARE

## 2022-11-18 DIAGNOSIS — K74.00 LIVER FIBROSIS: ICD-10-CM

## 2022-11-18 DIAGNOSIS — K76.0 FATTY LIVER: ICD-10-CM

## 2022-11-18 DIAGNOSIS — K83.09 CHOLANGITIS: Primary | ICD-10-CM

## 2022-11-18 DIAGNOSIS — Z23 NEED FOR PROPHYLACTIC VACCINATION AGAINST HEPATITIS A AND HEPATITIS B: ICD-10-CM

## 2022-11-18 DIAGNOSIS — K74.3 PRIMARY BILIARY CHOLANGITIS: ICD-10-CM

## 2022-11-18 DIAGNOSIS — K74.02 HEPATIC FIBROSIS, ADVANCED FIBROSIS: ICD-10-CM

## 2022-11-18 PROCEDURE — 1160F RVW MEDS BY RX/DR IN RCRD: CPT | Mod: CPTII,95,, | Performed by: PHYSICIAN ASSISTANT

## 2022-11-18 PROCEDURE — 1159F MED LIST DOCD IN RCRD: CPT | Mod: CPTII,95,, | Performed by: PHYSICIAN ASSISTANT

## 2022-11-18 PROCEDURE — 99213 OFFICE O/P EST LOW 20 MIN: CPT | Mod: 95,,, | Performed by: PHYSICIAN ASSISTANT

## 2022-11-18 PROCEDURE — 1159F PR MEDICATION LIST DOCUMENTED IN MEDICAL RECORD: ICD-10-PCS | Mod: CPTII,95,, | Performed by: PHYSICIAN ASSISTANT

## 2022-11-18 PROCEDURE — 99213 PR OFFICE/OUTPT VISIT, EST, LEVL III, 20-29 MIN: ICD-10-PCS | Mod: 95,,, | Performed by: PHYSICIAN ASSISTANT

## 2022-11-18 PROCEDURE — 1160F PR REVIEW ALL MEDS BY PRESCRIBER/CLIN PHARMACIST DOCUMENTED: ICD-10-PCS | Mod: CPTII,95,, | Performed by: PHYSICIAN ASSISTANT

## 2022-11-18 RX ORDER — URSODIOL 500 MG/1
500 TABLET, FILM COATED ORAL 3 TIMES DAILY
Qty: 270 TABLET | Refills: 3 | Status: SHIPPED | OUTPATIENT
Start: 2022-11-18 | End: 2023-03-31

## 2022-11-18 NOTE — PROGRESS NOTES
The patient location is: home, [place]   The chief complaint leading to consultation is: PBC     Visit type: audiovisual    Face to Face time with patient: 20  35 minutes of total time spent on the encounter, which includes face to face time and non-face to face time preparing to see the patient (eg, review of tests), Obtaining and/or reviewing separately obtained history, Documenting clinical information in the electronic or other health record, Independently interpreting results (not separately reported) and communicating results to the patient/family/caregiver, or Care coordination (not separately reported).     Each patient to whom he or she provides medical services by telemedicine is:  (1) informed of the relationship between the physician and patient and the respective role of any other health care provider with respect to management of the patient; and (2) notified that he or she may decline to receive medical services by telemedicine and may withdraw from such care at any time.    Notes:     Hepatology Consultation: [facility]    EST. PATIENT   PCP: Eliecer Jones MD    Subjective      Ms. Orozco is a 57 y.o. female w/ complex PMH including IBD and multiple abdominal surgeries, initially presenting with chronically elevated alk phos since at least 2014,with liver biopsy 2021 suggesting AMA negative PBC/cholangitis, started on ursodiol 12/2/2021 with improvement of liver tests since.     MRCP 8/2021 suggested no retained stones, strictures, or dilatation of bile ducts  MRE Elastography 8/2021 suggested F1-2 fibrosis without fatty liver.    11/2021 liver biopsy suggested florid duct lesions with bile duct stasis and F3 fibrosis, minimal fatty liver, reviewed in pathology conference 12/9/2021 and suggested consistent with AMA negative PBC .     No history of hepatic decompensation previously or sequelae portal hypertension, although mild splenomegaly  "(~13cm).            Relevant history  Cholecystectomy performed emergent 1991 at [facility], [place] with emergent pain and syncope. Her IBD was thought to be UC, diagnosed in 2010, now s/p total colectomy w/ ileostomy in 2012 rectum preserved; complicated by abscess and stoma fistulization. She started having obstructive symptoms in 2014, workup revealed stenotic stoma and no active small bowel inflammation. In 2014 she saw Dr. Virgen for fistula repair and stoma relocation. Underwent gastric sleeve June 2016 with [facility], she lost over 100 lbs per patient. Only takes b12 and vitamin D.  Colostomy revision with Dr. Dunham in 2019    Was told she had fatty liver in 2011, at that time was 380 lbs.       Interval history 05/09/2022:  Ida Orozco arrives today alone.     Since our last visit, she started ursodiol TID after liver biopsy 2021 and has subsequently normalized her liver numbers and albumin.     Denies symptoms of hepatic decompensation including jaundice, ascites, cognitive problems to suggest hepatic encephalopathy, or GI bleeding.     She did consult with IBD specialist as well as endocrinologist for bone loss. On Vit D & calcium, parathyroid being watched.    She is tapering off clonazepam & zoloft, and instead has tried THC/CBD drops for the past week which are "working well"    Taking care of her parents.     No weight loss.       In relation to metabolic risk factors:   Lab Results   Component Value Date    HGBA1C 5.6 05/30/2019    CHOL 166 11/11/2022    TSH 2.100 11/11/2022     There is no height or weight on file to calculate BMI.    Interval history 11/18/2022:  Ida Orozco arrives today alone on video.   Since our last visit, she has lost a bit of weight down to 238  No new medications. Had to d/c humira as she had an allergic rxn to it.   Denies symptoms of hepatic decompensation including jaundice, ascites, cognitive problems to suggest hepatic encephalopathy, or GI " bleeding.     ETOH: denies   Smoking: prior vaping and smoking cigarettes  Illicit substances: denies   Social: lives with    Prior liver biopsy:  Prior liver disease: fatty liver  Hepatitis vaccination: unknown   Liver-related family medical history: mother with history of ETOH cirrhosis.       PMH Ida has a past medical history of Anxiety, Asthma, Bronchitis, Crohn's colitis, Depression, Esophageal ulcer (2014), Fatty liver disease, nonalcoholic, Gallstones, GERD (gastroesophageal reflux disease), History of sleeve gastrectomy (2016), PTSD (post-traumatic stress disorder), Seasonal allergies, Symptomatic abdominal panniculus, and Ulcerative colitis.   PSXH Ida has a past surgical history that includes Hysterectomy; Ileostomy revision;  section, low transverse; Cholecystectomy; TONSILLECTOMY, ADENOIDECTOMY; thumb surgery; [place] tooth extraction; Appendectomy; Colectomy; Laparoscopic proctectomy (N/A, 2018); Lysis of adhesions (2018); Flexible sigmoidoscopy (2018); GASTRIC SLEEVE ; Cystoscopy w/ retrogrades (N/A, 2018); Catheterization of both left and right heart (Right, 2019); Panniculectomy (Bilateral, 2019); Revision Colostomy (N/A, 2019); Oophorectomy; Ileoscopy (2018); Flexible sigmoidoscopy (2018); Esophagogastroduodenoscopy (2018); Endoscopic ultrasound of upper gastrointestinal tract (N/A, 11/10/2021); and Esophagogastroduodenoscopy (N/A, 11/10/2021).    Ida's family history includes Cancer in her maternal grandfather, mother, and paternal grandfather; Cirrhosis in her mother; Colon cancer in her maternal grandfather, maternal uncle, and maternal uncle; Heart disease (age of onset: 67) in her father.    Ida reports that she quit smoking about 13 years ago. Her smoking use included cigarettes. She has never used smokeless tobacco. She reports current alcohol use. She reports that she does not use drugs.   ALG  Ida is allergic to adhesive, dilaudid [hydromorphone], humira [adalimumab], sulfa (sulfonamide antibiotics), and surgical stainless steel.   JACKIE Prieto has a current medication list which includes the following prescription(s): albuterol, clonazepam, diclofenac sodium, diphenoxylate-atropine 2.5-0.025 mg, levocetirizine, loratadine, meclizine, promethazine, sertraline, symbicort, topiramate, trazodone, vitamin d2, and [DISCONTINUED] humira(cf) pen. + melatonin, hair skin & nails      ROS:   Per hpi  Objective     There were no vitals taken for this visit.    Physical exam is limited by video visit:   Friendly woman , in no acute distress; alert and oriented to person, place and time  VITALS: There were no vitals taken for this visit.   SKIN/EYES: No obvious jaundice or yellowing of the eyes.   HENT: Normocephalic, without obvious abnormality.   NECK: Supple.   CARDIOVASCULAR: DORIE on video visit  RESPIRATORY: Normal respiratory effort.   GI: DORIE hepatosplenomegaly  PSYCH: Thought and speech pattern appropriate.       DIAGNOSTICS  Lab Results   Component Value Date    ALT 21 11/11/2022    AST 19 11/11/2022    ALKPHOS 102 11/11/2022    BILITOT 0.4 11/11/2022    ALBUMIN 3.2 (L) 11/11/2022    INR 1.0 03/29/2022     11/11/2022     Lab Results   Component Value Date    AFP 3.1 03/29/2022       Prior serologic workup:   Lab Results   Component Value Date    SMOOTHMUSCAB Positive 1:40 (A) 07/14/2021    AMAIFA Negative 1:40 06/30/2021    IGGSERUM 949 07/14/2021    ANASCREEN Negative <1:80 07/14/2021    FERRITIN 29 05/25/2021    FESATURATED 13 (L) 05/25/2021    HEPBSAG Negative 06/02/2022    HEPCAB Negative 06/02/2022       Imaging:  Final Pathologic Diagnosis LIVER, NATIVE, ENDOSCOPIC ULTRASOUND-GUIDED BIOPSY:   - Lymphocytic cholangitis, portal lymphoplasmacytic inflammation with   interface activity, and focal granulomatous inflammation associated with bile   duct injury (florid duct lesion)   - Bile ductular  reaction   - Minimal macrovesicular steatosis (< 5%) without evidence of steatohepatitis   - Periportal fibrosis with bridging and nodule formation, stage 3 of 4   - See comments    Comment: Interp By Yoni Singh M.D., Signed on 11/15/2021 at 15:33   Comment In conjunction with the clinical history and available laboratory   information, the findings in this case would be consistent with chronic   biliary disease, particularly 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    Microscopic Exam Microscopic evaluation of routine H&E stained sections and multiple properly   controlled cytochemical/immunohistochemical stains is performed on block 1A.   There is no evidence of siderosis noted on a Prussian blue stain. Staging   fibrosis is confirmed with a trichrome stain. CK7 highlights bile   duct/ductular profiles and intermediate hepatocytes formation. A rhodamine   stain exhibits focal periportal copper deposition.          Upper EUS 11/10/2021  Impression:                - [place]-colored mucosa suggestive of                          short-segment Hernandez's esophagus. Biopsied.                          - A sleeve gastrectomy was found.                          - Normal examined duodenum.                          - The celiac trunk was endosonographically normal.                          - Pancreatic parenchymal abnormalities consisting                          of diffuse echogenicity were noted in the entire                          pancreas. This is consistent with fatty                          infiltration of the pancreas.                          - There was no sign of significant pathology in                          the common bile duct.                          - There was no evidence of significant pathology                          in the left lobe of the liver. Fine needle biopsy                          performed.     Assessment/Plan     57 y.o. female with:    1. 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, with F3  fibrosis, biopsy proven 11/2021 with Elevated alkaline phosphatase level  -- current treatment: Ursodiol 500 mg TID (13-15/kg/day)   -- starting alk phos before treatment: 212, 442, with normalization 2/22  -- transaminases wnl  -- DEXA scan q2 years,, Recommend adequate Ca and Vitamin D supplementation (Ca 1200 mg and Vitamin D 1000 IU daily), following with endo for osteodystrophy  -- MRCP 8/5/2021 negative for stricutring or dilation of bile ducts     Etiology: thought to be secondary to AMA negative PBC/cholangitis    Advanced Fibrosis Health Maintenance  -- Liver lesions/HCC screening: Ultrasound abdomen without lesions, AFP due with next labs   -- Hepatitis A & B vaccination status: s/p 2 shots Twinrix, due for 3rd 12/2022  -- Variceal screening: Last EGD early - no thromboctyopenia  -- Ascites/Edema: Not an active issue.  -- Encephalopathy: Not an active issue.  -- Transplant candidacy: Transplant evaluation not warranted given low MELD.    Cirrhosis counseling as per After Visit Summary. No alcohol whatsoever, no raw oysters. Tylenol is safe, less than 2g per day total.   Discussed with patient that do not recommend elective abdominal surgery for risk of decompensation.      2. IBD w/ significant surgical hx  - no significant stricturing or biliary dilatation per MRCP 2021   - I referred to IBD specialist 2021    3. Fatty liver  - MRE does not suggest significant   - with prior BMI >50 s/p gastric sleeve     4. BMI 40.0-44.9, adult    5. Osteoporosis   - f/w endo, continue         Orders Placed This Encounter   Procedures    US Abdomen Limited    Protime-INR    Comprehensive Metabolic Panel    CBC Auto Differential    AFP Tumor Marker       Congratulated on normalization of alk phos and diligent adherence to ursodiol. Continue current dosage. Refilled today   Overdue for ultrasound & AFP screening. Will schedule for early 2023.  Continue f/u with endocrine for osteo, with Vit D & calcium supplementation.   I  encouraged minor weight loss for fatty liver.  I recommend Hepatitis A & B vaccination completion - due 12/2022  Follow up in about 7 months (around 6/18/2023).      I spent 20 minutes on the day of this encounter preparing for, evaluating, treating, and managing this patient.     Thank you for allowing me to participate in the care of Ida Catherine PA-C  Hepatology Advanced Practice Provider  [facility]

## 2022-11-18 NOTE — Clinical Note
Schedule AFP & ultrasound in January 2023.  Schedule labs and ultrasound in July 2023 with follow-up visit 1 week after.  Thanks! Fox

## 2022-11-22 ENCOUNTER — OFFICE VISIT (OUTPATIENT)
Dept: CARDIOLOGY | Facility: CLINIC | Age: 57
End: 2022-11-22
Payer: MEDICARE

## 2022-11-22 DIAGNOSIS — R00.1 BRADYCARDIA: Primary | ICD-10-CM

## 2022-11-22 DIAGNOSIS — R42 VERTIGO: ICD-10-CM

## 2022-11-22 DIAGNOSIS — R00.1 SINUS BRADYCARDIA: ICD-10-CM

## 2022-11-22 PROCEDURE — 1160F PR REVIEW ALL MEDS BY PRESCRIBER/CLIN PHARMACIST DOCUMENTED: ICD-10-PCS | Mod: CPTII,95,, | Performed by: NURSE PRACTITIONER

## 2022-11-22 PROCEDURE — 99212 OFFICE O/P EST SF 10 MIN: CPT | Mod: 95,,, | Performed by: NURSE PRACTITIONER

## 2022-11-22 PROCEDURE — 1160F RVW MEDS BY RX/DR IN RCRD: CPT | Mod: CPTII,95,, | Performed by: NURSE PRACTITIONER

## 2022-11-22 PROCEDURE — 99212 PR OFFICE/OUTPT VISIT, EST, LEVL II, 10-19 MIN: ICD-10-PCS | Mod: 95,,, | Performed by: NURSE PRACTITIONER

## 2022-11-22 PROCEDURE — 1159F MED LIST DOCD IN RCRD: CPT | Mod: CPTII,95,, | Performed by: NURSE PRACTITIONER

## 2022-11-22 PROCEDURE — 1159F PR MEDICATION LIST DOCUMENTED IN MEDICAL RECORD: ICD-10-PCS | Mod: CPTII,95,, | Performed by: NURSE PRACTITIONER

## 2022-11-22 NOTE — PROGRESS NOTES
The patient location is: home  The chief complaint leading to consultation is: Results review    Visit type: audiovisual    Face to Face time with patient: 8  15  minutes of total time spent on the encounter, which includes face to face time and non-face to face time preparing to see the patient (eg, review of tests), Obtaining and/or reviewing separately obtained history, Documenting clinical information in the electronic or other health record, Independently interpreting results (not separately reported) and communicating results to the patient/family/caregiver, or Care coordination (not separately reported).         Each patient to whom he or she provides medical services by telemedicine is:  (1) informed of the relationship between the physician and patient and the respective role of any other health care provider with respect to management of the patient; and (2) notified that he or she may decline to receive medical services by telemedicine and may withdraw from such care at any time.    Notes:   Feeling ok- no complaints.  Holter showed bradycardia while patient was awake.  She denied any chest pain, Pena, dizziness or lightheadedness    Review of Systems   Constitutional: Negative.    HENT: Negative.     Eyes: Negative.    Respiratory: Negative.     Cardiovascular: Negative.    Gastrointestinal: Negative.    Genitourinary: Negative.    Musculoskeletal: Negative.    Skin: Negative.    Neurological: Negative.    Endo/Heme/Allergies: Negative.    Psychiatric/Behavioral: Negative.       Physical exam limited due to virtual visit.  Patient AAOx4, speaking in full sentences, no distress noted.    Problem List Items Addressed This Visit          ENT    Vertigo    Overview     Significant symptoms when turning over in bed  Following with ENT  Appreciate their expertise         Current Assessment & Plan     As above            Cardiac/Vascular    Sinus bradycardia    Overview     Noted on recent  Holter  No symptoms associated that she remembers  Recommend eval by EP         Current Assessment & Plan     As above          Other Visit Diagnoses       Bradycardia    -  Primary    Relevant Orders    Ambulatory referral/consult to Cardiac Electrophysiology

## 2022-12-06 ENCOUNTER — PATIENT MESSAGE (OUTPATIENT)
Dept: CARDIOLOGY | Facility: CLINIC | Age: 57
End: 2022-12-06
Payer: MEDICARE

## 2022-12-07 DIAGNOSIS — R00.1 BRADYCARDIA: Primary | ICD-10-CM

## 2022-12-08 NOTE — PROGRESS NOTES
[facility]    Subjective:      Patient: Ida Orozco Patient PCP: Eliecer Jones MD         :  1965     Sex:  female      MRN:  [id number]          Date of Visit: 2022      Chief Complaint: Follow-up (Follow up to discuss sinus surgery. Has appt with EP Cardiology next week. Was not able to afford medication to go into the sinus rinse. Has been trying to use just the sinus rinse alone, has a hard time rinsing. )      2022 follow-up visit Ida Orozco is a 57 y.o. female former smoker quitting in  with a history of migraines, HLD, crohn's s/p total colectomy w/end iliostomy (no longer on immunosuppression), obesity, CHELO, GERD, asthma and chronis sinusitisreferred to me by Janet Guardado in consultation for sinusitis.  Endoscopy showed active inflammation infection with polyp in the left middle meatus.  Last CT scan and high-resolution was .  Treated with steroids and cefdinir in addition to nasal steroid irrigations with budesonide.  Returns today feeling ongoing congestion thickening mucus and more cough and pressure but no pain or purulence.  No fevers.  Unable to get the budesonide 1 mg per 2 mL from when [facility] as a cost over 100 dollars.  She is been rinsing.  Mucus is thick mouth is dry cough is minimally productive no discoloration per se.    2022: Ida Orozco is a 57 y.o. female former smoker quitting in  with a history of migraines, HLD, crohn's s/p total colectomy w/end iliostomy (no longer on immunosuppression), obesity, CHELO, GERD, asthma and chronis sinusitisreferred to me by Janet Guardado in consultation for sinusitis seen by my colleague Skye Lee 2021 and treated with a short burst of dexamethasone p.o. and 2 weeks of Omnicef with discussion about possible eustachian tube dilation versus TMJ care for ear fullness/pain on the left side.  Prior history of balloon dilation sinuplasty x 2 with Dr. Tomas 3+ years ago.  Effective for prolonged time with  first but NOT second treatment.  Improves with steroids but poorly tolerated with mood changes (angry).  Has not done sinus rinses.  Uses intranasal steroids w/o relief.  Significant active right frontal and retrorbital pressue and pain.    Also complains of dizziness.  This is most notable when she stands up or moves quickly.  She says she is had all that testing done which sounds like she is describing orthostatic blood pressure checks.  She is never seen cardiology or had carotid duplex ultrasound.  No Holter monitor.  She has been told she has bradycardia and abnormal EKG changes.    Patient had a more recent CT head completed 10/12/2022 images reviewed today show high right nasal septal deviation partial opacification of the anterior upper ethmoid frontal outflow area and of the ethmoids bilaterally with what might be middle meatal polyps on the right side, substantial mucosal thickening of both maxillary sinuses of at least 6 mm with complete obstruction of the ostiomeatal complexes bilaterally.  Hypoplastic frontal sinuses bilaterally with some thickening of the floor/outflow bilaterally what appears to be relative sparing of the sphenoid sinuses bilaterally though there is thickening of the area of the natural os bilaterally.  No appreciable thickening or effusion of the middle ears or mastoids bilaterally.  Unable to exclude bony dehiscence of the superior semicircular canals on either side on these limited images.      CT Brain 10/12/2022        CT sinuses 11/05/2021        Review of Systems     Past Medical History  She has a past medical history of Anxiety, Asthma, Bronchitis, Crohn's colitis, Depression, Esophageal ulcer, Fatty liver disease, nonalcoholic, Gallstones, GERD (gastroesophageal reflux disease), History of sleeve gastrectomy, PTSD (post-traumatic stress disorder), Seasonal allergies, Symptomatic abdominal panniculus, and Ulcerative colitis.    Family / Surgical / Social History  Her family  history includes Cancer in her maternal grandfather, mother, and paternal grandfather; Cirrhosis in her mother; Colon cancer in her maternal grandfather, maternal uncle, and maternal uncle; Heart disease (age of onset: 67) in her father.    Past Surgical History:   Procedure Laterality Date    APPENDECTOMY      CATHETERIZATION OF BOTH LEFT AND RIGHT HEART Right 2019    Procedure: CATHETERIZATION, HEART, BOTH LEFT AND RIGHT;  Surgeon: Beto Malin MD;  Location: [facility] C[facility] LAB;  Service: Cardiology;  Laterality: Right;     SECTION, LOW TRANSVERSE      CHOLECYSTECTOMY      open    COLECTOMY      total- 2013    CYSTOSCOPY W/ RETROGRADES N/A 2018    Procedure: CYSTOSCOPY, WITH RETROGRADE PYELOGRAM;  Surgeon: Butch Banks MD;  Location: [facility] OR;  Service: Urology;  Laterality: N/A;  [facility] CONFIRMED     ENDOSCOPIC ULTRASOUND OF UPPER GASTROINTESTINAL TRACT N/A 11/10/2021    Procedure: ULTRASOUND, UPPER GI TRACT, ENDOSCOPIC;  Surgeon: Nhan Dee MD;  Location: [facility] (2ND FLR);  Service: Endoscopy;  Laterality: N/A;  MD Milena Whitehead PA-C; Quinn Whitman MD; Miladys Velasquez MA  Caller: Unspecified (3 days ago,  1:20 PM)  Fair enough. Neha, please schedule.       ----- Message -----   From: Milena Murillo PA-C   Sent: 2021  1    ESOPHAGOGASTRODUODENOSCOPY  2018    Dr. Castorena: LA Grade A reflux esophagitis, hiatal hernia; Ectopic gastric mucosa in the upper third of the esophagus; gastric sleeve intact with unremarkable findings, gastritis; biopsy: stomach- Mild chronic antral and oxyntic gastritis, no activity, negative for h pylori    ESOPHAGOGASTRODUODENOSCOPY N/A 11/10/2021    Procedure: EGD (ESOPHAGOGASTRODUODENOSCOPY);  Surgeon: Nhan Dee MD;  Location: [facility] ENDO (2ND FLR);  Service: Endoscopy;  Laterality: N/A;    FLEXIBLE SIGMOIDOSCOPY  2018    Procedure: SIGMOIDOSCOPY, FLEXIBLE;  Surgeon: JENNY Virgen  MD;  Location: [facility] OR 2ND FLR;  Service: Colon and Rectal;;    FLEXIBLE SIGMOIDOSCOPY  2018    Dr. Castorena: Non-patent surgical anastomosis, characterized by friable mucosa.    GASTRIC SLEEVE       HYSTERECTOMY      ILEOSCOPY  2018    Dr. Castorena: Patient is status-post total colectomy with end ileostomy. unremarkable findings    ILEOSTOMY REVISION      2014; 2014    LAPAROSCOPIC PROCTECTOMY N/A 2018    Procedure: PROCTECTOMY-LAPAROSCOPIC/CONVERTED TO OPEN;  Surgeon: JENNY Virgen MD;  Location: [facility] OR 2ND FLR;  Service: Colon and Rectal;  Laterality: N/A;    LYSIS OF ADHESIONS  2018    Procedure: LYSIS, ADHESIONS/ more than 2hours;  Surgeon: JENNY Virgen MD;  Location: [facility] OR 2ND FLR;  Service: Colon and Rectal;;    OOPHORECTOMY      PANNICULECTOMY Bilateral 2019    Procedure: PANNICULECTOMY;  Surgeon: Andrea Alvarado MD;  Location: [facility] OR 2ND FLR;  Service: Plastics;  Laterality: Bilateral;    REVISION COLOSTOMY N/A 2019    Procedure: REVISION, COLOSTOMY;  Surgeon: JENNY Virgen MD;  Location: [facility] OR 2ND FLR;  Service: Colon and Rectal;  Laterality: N/A;    thumb surgery      TONSILLECTOMY, ADENOIDECTOMY      WISDOM TOOTH EXTRACTION         Social History     Tobacco Use    Smoking status: Former     Types: Cigarettes     Quit date:      Years since quittin.9    Smokeless tobacco: Never   Substance and Sexual Activity    Alcohol use: Yes     Comment: rarely    Drug use: No    Sexual activity: Never     Birth control/protection: See Surgical Hx       Medications  She has a current medication list which includes the following prescription(s): albuterol, clonazepam, diclofenac sodium, diphenoxylate-atropine 2.5-0.025 mg, levocetirizine, loratadine, meclizine, promethazine, sertraline, symbicort, topiramate, trazodone, ursodiol, vitamin d2, and [DISCONTINUED] humira(cf) pen.      Allergies  Review of patient's allergies indicates:   Allergen Reactions     Adhesive      Cause blisters    Dilaudid [hydromorphone] Nausea And Vomiting    Humira [adalimumab] Hives    Sulfa (sulfonamide antibiotics) Hives    Surgical stainless steel      Had surgical staples, caused irritation and infection       All medications, allergies, and past history have been reviewed.    Objective:      Vitals:  Vitals - 1 value per visit 11/11/2022 12/9/2022 12/9/2022   SYSTOLIC 110 - -   DIASTOLIC 60 - -   Pulse 49 - -   Temp 97.7 - 97.9   Resp 16 - -   SPO2 96 - -   Weight (lb) 239.86 - 235.12   Weight (kg) 108.8 - 106.65   Height 64 - 64   BMI (Calculated) 41.2 - 40.3   VISIT REPORT - - -   Pain Score  - 0 -   Some recent data might be hidden       Body surface area is 2.2 meters squared.    Physical Exam:    GENERAL  APPEARANCE -  alert, appears stated age, cooperative, and morbidly obese  BARRIER(S) TO COMMUNICATION -  none VOICE - mildly hyponasal    INTEGUMENTARY  no suspicious head and neck lesions    HEENT  HEAD: Normocephalic, without obvious abnormality, atraumatic  FACE: INSPECTION - Symmetric, no signs of trauma, no suspicious lesion(s)  PALPATION -  No masses SALIVARY GLANDS - non-tender with no appreciable mass  STRENGTH - facial symmetry  NECK/THYROID: normal atraumatic, no neck masses, normal thyroid, no jvd    EYES  Normal occular alignment and mobility with no visible nystagmus at rest    EARS/NOSE/MOUTH/THROAT  EARS  PINNAE AND EXTERNAL EARS - no suspicious lesion OTOSCOPIC EXAM (surgical microscopy was not used for visualization/instrumentation): EAR EXAM - Normal ear canals, tympanic membranes and mobility, and middle ear spaces bilaterally.  HEARING - grossly intact to voice/finger rub    NOSE AND SINUSES  EXTERNAL NOSE - Grossly normal for age/sex  SEPTUM - normal/no obstruction on anterior exam without decongestion TURBINATES - within normal limits MUCOSA - minimally edematous not particularly congested, no cyanosis no erythema no active purulence     MOUTH AND THROAT    ORAL CAVITY, LIPS, TEETH, GUMS & TONGUE - some thick widening of mucus no thrush no erythema.  OROPHARYNX /TONSILS/PHARYNGEAL WALLS/HYPOPHARYNX - no erythema or exudates  NASOPHARYNX - limited mirror exam - unable to visualize due to anatomy/gag  LARYNX -  - limited mirror exam - clear normal some thick mucus no pooling    CHEST AND LUNG   INSPECTION & AUSCULTATION - clear to auscultation bilaterally and normal effort, no stridor    CARDIOVASCULAR  AUSCULTATION & PERIPHERAL VASCULAR - regular rate and rhythm.    NEUROLOGIC  MENTAL STATUS - alert, interactive CRANIAL NERVES - normal    LYMPHATIC  HEAD AND NECK - non-palpable; SUPRACLAVICULAR - deferred; AXILLARY - deferred; INGUINAL - deferred; LIVER/SPLEEN - deferred        Procedure(s):  None          Labs:  WBC   Date Value Ref Range Status   11/11/2022 6.53 3.90 - 12.70 K/uL Final     Hemoglobin   Date Value Ref Range Status   11/11/2022 13.5 12.0 - 16.0 g/dL Final     Platelets   Date Value Ref Range Status   11/11/2022 259 150 - 450 K/uL Final     Creatinine   Date Value Ref Range Status   11/11/2022 1.0 0.5 - 1.4 mg/dL Final     TSH   Date Value Ref Range Status   11/11/2022 2.100 0.400 - 4.000 uIU/mL Final     Glucose   Date Value Ref Range Status   11/11/2022 87 70 - 110 mg/dL Final     Hemoglobin A1C   Date Value Ref Range Status   05/30/2019 5.6 4.0 - 5.6 % Final     Comment:     ADA Screening Guidelines:  5.7-6.4%  Consistent with prediabetes  >or=6.5%  Consistent with diabetes  High levels of fetal hemoglobin interfere with the HbA1C  assay. Heterozygous hemoglobin variants (HbS, HgC, etc)do  not significantly interfere with this assay.   However, presence of multiple variants may affect accuracy.           Assessment:      Problem List Items Addressed This Visit          ENT    Chronic pansinusitis - Primary       Pulmonary    Moderate persistent asthma without complication    Overview     Not using Symbicort as supposed to   Using as needed   Had an  upper respiratory illness in April ,causing Asthma flare  lasting for about 1 month  Lately better   CXR 4/3/2018 The lungs are clear, with normal appearance of pulmonary vasculature and no pleural effusion or pneumothorax.          Other Visit Diagnoses       Nasal polyps        History of endoscopic sinus surgery                     Plan:      Keep drinking plenty of water and doing sinus rinses and saline throughout the day.  Guaifenesin can be added and maybe of some limited benefit.  Make sure to avoid medicines that are drying such as the Xyzal and more importantly the Antivert (meclizine).      We will start 0.5 mg budesonide as a nasal drop rather than adding the 1 mg budesonide to the rinses previously prescribed which was prohibitively expensive at over 100 dollars with insurance.  Using good Rx 0.5 mg budesonide per 2 mL is 46 dollars without insurance.  Coupon sent by text.      Rinse the nose twice daily with the sinus rinse then lay on the bed with head up as per diagram instructions and per discussion and drip the ampule into both nostrils allowing the run along the roof of the sinuses and help reduce chronic inflammation.    Repeat burst of oral steroids may be prescribed if surgery and being necessary, antibiotics by mouth again may be prescribed if pressure pain and pus recur.      Follow-up in 3 weeks to 4 weeks to assess progress on this therapy.  Contact the office through the portal ([email or web address]) sooner with any questions or concerns.

## 2022-12-09 ENCOUNTER — TELEPHONE (OUTPATIENT)
Dept: ELECTROPHYSIOLOGY | Facility: CLINIC | Age: 57
End: 2022-12-09
Payer: MEDICARE

## 2022-12-09 ENCOUNTER — OFFICE VISIT (OUTPATIENT)
Dept: OTOLARYNGOLOGY | Facility: CLINIC | Age: 57
End: 2022-12-09
Payer: MEDICARE

## 2022-12-09 VITALS — BODY MASS INDEX: 40.14 KG/M2 | TEMPERATURE: 98 F | HEIGHT: 64 IN | WEIGHT: 235.13 LBS

## 2022-12-09 DIAGNOSIS — J33.9 NASAL POLYPS: ICD-10-CM

## 2022-12-09 DIAGNOSIS — Z98.890 HISTORY OF ENDOSCOPIC SINUS SURGERY: ICD-10-CM

## 2022-12-09 DIAGNOSIS — J32.4 CHRONIC PANSINUSITIS: Primary | ICD-10-CM

## 2022-12-09 DIAGNOSIS — J45.40 MODERATE PERSISTENT ASTHMA WITHOUT COMPLICATION: ICD-10-CM

## 2022-12-09 PROCEDURE — 99214 PR OFFICE/OUTPT VISIT, EST, LEVL IV, 30-39 MIN: ICD-10-PCS | Mod: S$GLB,,, | Performed by: OTOLARYNGOLOGY

## 2022-12-09 PROCEDURE — 1159F PR MEDICATION LIST DOCUMENTED IN MEDICAL RECORD: ICD-10-PCS | Mod: CPTII,S$GLB,, | Performed by: OTOLARYNGOLOGY

## 2022-12-09 PROCEDURE — 99214 OFFICE O/P EST MOD 30 MIN: CPT | Mod: S$GLB,,, | Performed by: OTOLARYNGOLOGY

## 2022-12-09 PROCEDURE — 3008F PR BODY MASS INDEX (BMI) DOCUMENTED: ICD-10-PCS | Mod: CPTII,S$GLB,, | Performed by: OTOLARYNGOLOGY

## 2022-12-09 PROCEDURE — 99999 PR PBB SHADOW E&M-EST. PATIENT-LVL III: ICD-10-PCS | Mod: PBBFAC,,, | Performed by: OTOLARYNGOLOGY

## 2022-12-09 PROCEDURE — 3008F BODY MASS INDEX DOCD: CPT | Mod: CPTII,S$GLB,, | Performed by: OTOLARYNGOLOGY

## 2022-12-09 PROCEDURE — 99999 PR PBB SHADOW E&M-EST. PATIENT-LVL III: CPT | Mod: PBBFAC,,, | Performed by: OTOLARYNGOLOGY

## 2022-12-09 PROCEDURE — 1159F MED LIST DOCD IN RCRD: CPT | Mod: CPTII,S$GLB,, | Performed by: OTOLARYNGOLOGY

## 2022-12-09 RX ORDER — BUDESONIDE 0.5 MG/2ML
INHALANT ORAL
Qty: 60 ML | Refills: 2 | Status: SHIPPED | OUTPATIENT
Start: 2022-12-09 | End: 2023-08-10

## 2022-12-09 NOTE — PATIENT INSTRUCTIONS
Keep drinking plenty of water and doing sinus rinses and saline throughout the day.  Guaifenesin can be added and maybe of some limited benefit.  Make sure to avoid medicines that are drying such as the Xyzal and more importantly the Antivert (meclizine).      We will start 0.5 mg budesonide as a nasal drop rather than adding the 1 mg budesonide to the rinses previously prescribed which was prohibitively expensive at over 100 dollars with insurance.  Using good Rx 0.5 mg budesonide per 2 mL is 46 dollars without insurance.  Coupon sent by text.      Rinse the nose twice daily with the sinus rinse then lay on the bed with head up as per diagram instructions and per discussion and drip the ampule into both nostrils allowing the run along the roof of the sinuses and help reduce chronic inflammation.    Repeat burst of oral steroids may be prescribed if surgery and being necessary, antibiotics by mouth again may be prescribed if pressure pain and pus recur.      Follow-up in 3 weeks to 4 weeks to assess progress on this therapy.  Contact the office through the portal ([facility]) sooner with any questions or concerns.    MOFFET POSITION FOR NASAL DROPS      Use the Moffet position has shown below which is most easily accomplished by laying on the bed with the head off the bed dripping the drops into the nose.  Let the drops it in the nose for several minutes as tolerated.  Then get up rinse the mouth gargle and spit to prevent the drops from running down the throat.  Follow the prescription instructions for frequency and dose.

## 2022-12-12 NOTE — TELEPHONE ENCOUNTER
----- Message from Ismael Shipley MA sent at 10/10/2019  3:47 PM CDT -----      ----- Message -----  From: Eliecer Jones MD  Sent: 10/10/2019   2:18 PM CDT  To: [facility] Staff    Stress test shows some areas of possible reversible ischemia, but findings could be false positive due to artifact from overlying breast tissue.  Please set patient up with Cardiology for further evaluation.  
Appointment made. Patient states understanding     
Needs to see cardiology first  
Patient would like to know if she is cleared for surgery  
I have right knee pain

## 2022-12-13 ENCOUNTER — TELEPHONE (OUTPATIENT)
Dept: ELECTROPHYSIOLOGY | Facility: CLINIC | Age: 57
End: 2022-12-13
Payer: MEDICARE

## 2022-12-14 ENCOUNTER — OFFICE VISIT (OUTPATIENT)
Dept: ELECTROPHYSIOLOGY | Facility: CLINIC | Age: 57
End: 2022-12-14
Payer: MEDICARE

## 2022-12-14 VITALS
HEART RATE: 56 BPM | WEIGHT: 238.56 LBS | DIASTOLIC BLOOD PRESSURE: 58 MMHG | HEIGHT: 64 IN | SYSTOLIC BLOOD PRESSURE: 108 MMHG | BODY MASS INDEX: 40.73 KG/M2

## 2022-12-14 DIAGNOSIS — F41.9 ANXIETY: Primary | ICD-10-CM

## 2022-12-14 DIAGNOSIS — R00.1 SINUS BRADYCARDIA: ICD-10-CM

## 2022-12-14 DIAGNOSIS — F33.0 MILD EPISODE OF RECURRENT MAJOR DEPRESSIVE DISORDER: ICD-10-CM

## 2022-12-14 DIAGNOSIS — E78.5 HYPERLIPIDEMIA, UNSPECIFIED HYPERLIPIDEMIA TYPE: ICD-10-CM

## 2022-12-14 DIAGNOSIS — R00.1 BRADYCARDIA: ICD-10-CM

## 2022-12-14 DIAGNOSIS — Z98.84 BARIATRIC SURGERY STATUS: ICD-10-CM

## 2022-12-14 DIAGNOSIS — K50.919 CROHN'S DISEASE WITH COMPLICATION, UNSPECIFIED GASTROINTESTINAL TRACT LOCATION: ICD-10-CM

## 2022-12-14 PROCEDURE — 1160F RVW MEDS BY RX/DR IN RCRD: CPT | Mod: CPTII,S$GLB,, | Performed by: INTERNAL MEDICINE

## 2022-12-14 PROCEDURE — 93010 RHYTHM STRIP: ICD-10-PCS | Mod: S$GLB,,, | Performed by: INTERNAL MEDICINE

## 2022-12-14 PROCEDURE — 99205 OFFICE O/P NEW HI 60 MIN: CPT | Mod: S$GLB,,, | Performed by: INTERNAL MEDICINE

## 2022-12-14 PROCEDURE — 3074F SYST BP LT 130 MM HG: CPT | Mod: CPTII,S$GLB,, | Performed by: INTERNAL MEDICINE

## 2022-12-14 PROCEDURE — 3008F BODY MASS INDEX DOCD: CPT | Mod: CPTII,S$GLB,, | Performed by: INTERNAL MEDICINE

## 2022-12-14 PROCEDURE — 3074F PR MOST RECENT SYSTOLIC BLOOD PRESSURE < 130 MM HG: ICD-10-PCS | Mod: CPTII,S$GLB,, | Performed by: INTERNAL MEDICINE

## 2022-12-14 PROCEDURE — 99999 PR PBB SHADOW E&M-EST. PATIENT-LVL IV: ICD-10-PCS | Mod: PBBFAC,,, | Performed by: INTERNAL MEDICINE

## 2022-12-14 PROCEDURE — 93005 RHYTHM STRIP: ICD-10-PCS | Mod: S$GLB,,, | Performed by: STUDENT IN AN ORGANIZED HEALTH CARE EDUCATION/TRAINING PROGRAM

## 2022-12-14 PROCEDURE — 1159F MED LIST DOCD IN RCRD: CPT | Mod: CPTII,S$GLB,, | Performed by: INTERNAL MEDICINE

## 2022-12-14 PROCEDURE — 3078F DIAST BP <80 MM HG: CPT | Mod: CPTII,S$GLB,, | Performed by: INTERNAL MEDICINE

## 2022-12-14 PROCEDURE — 1159F PR MEDICATION LIST DOCUMENTED IN MEDICAL RECORD: ICD-10-PCS | Mod: CPTII,S$GLB,, | Performed by: INTERNAL MEDICINE

## 2022-12-14 PROCEDURE — 3078F PR MOST RECENT DIASTOLIC BLOOD PRESSURE < 80 MM HG: ICD-10-PCS | Mod: CPTII,S$GLB,, | Performed by: INTERNAL MEDICINE

## 2022-12-14 PROCEDURE — 93005 ELECTROCARDIOGRAM TRACING: CPT | Mod: S$GLB,,, | Performed by: STUDENT IN AN ORGANIZED HEALTH CARE EDUCATION/TRAINING PROGRAM

## 2022-12-14 PROCEDURE — 1160F PR REVIEW ALL MEDS BY PRESCRIBER/CLIN PHARMACIST DOCUMENTED: ICD-10-PCS | Mod: CPTII,S$GLB,, | Performed by: INTERNAL MEDICINE

## 2022-12-14 PROCEDURE — 93010 ELECTROCARDIOGRAM REPORT: CPT | Mod: S$GLB,,, | Performed by: INTERNAL MEDICINE

## 2022-12-14 PROCEDURE — 99999 PR PBB SHADOW E&M-EST. PATIENT-LVL IV: CPT | Mod: PBBFAC,,, | Performed by: INTERNAL MEDICINE

## 2022-12-14 PROCEDURE — 99205 PR OFFICE/OUTPT VISIT, NEW, LEVL V, 60-74 MIN: ICD-10-PCS | Mod: S$GLB,,, | Performed by: INTERNAL MEDICINE

## 2022-12-14 PROCEDURE — 3008F PR BODY MASS INDEX (BMI) DOCUMENTED: ICD-10-PCS | Mod: CPTII,S$GLB,, | Performed by: INTERNAL MEDICINE

## 2022-12-14 NOTE — PROGRESS NOTES
"Subjective:    Patient ID:  Ida Orozco is a 57 y.o. female who presents for evaluation of presyncope    HPI  57 y.o. F  mild persistent asthma  CHELO (questionable dx per pt)  Crohn's dz  dry-mouth syndrome  HL   vertigo    Chronic hx of SB.  Vasovagal episodes over the years. Last syncope was few years ago, when receiving an injection in her wrist. Always has a long prodrome; able to sit / protect self. Has never injured self. Always feels washed-out/"drunk" afterward.    11/2022:  While cooking, had a typical prodrome of LH, visual dimming. Put her head down; avoided syncope. Felt unwell for days afterward.    Saw her primary-care NP. Echo: 65%. TSH wnl.   Holter: SR,  bpm    My interpretation of today's ECG is SB 56 bpm    Review of Systems   Constitutional: Positive for malaise/fatigue.   HENT: Negative.  Negative for ear pain and tinnitus.    Eyes:  Negative for blurred vision.   Cardiovascular: Negative.  Negative for chest pain, dyspnea on exertion, near-syncope, palpitations and syncope.   Respiratory: Negative.  Negative for shortness of breath.    Endocrine: Negative.  Negative for polyuria.   Hematologic/Lymphatic: Does not bruise/bleed easily.   Skin: Negative.  Negative for rash.   Musculoskeletal: Negative.  Negative for joint pain and muscle weakness.   Gastrointestinal: Negative.  Negative for abdominal pain and change in bowel habit.   Genitourinary:  Negative for frequency.   Neurological:  Positive for light-headedness and vertigo. Negative for dizziness and weakness.   Psychiatric/Behavioral: Negative.  Negative for depression. The patient is not nervous/anxious.    Allergic/Immunologic: Negative for environmental allergies.      Objective:    Physical Exam  Vitals and nursing note reviewed.   Constitutional:       Appearance: Normal appearance. She is well-developed.   HENT:      Head: Normocephalic and atraumatic.   Eyes:      Conjunctiva/sclera: Conjunctivae normal.   Neck:      " Thyroid: No thyroid mass or thyromegaly.      Trachea: No tracheal deviation.   Cardiovascular:      Rate and Rhythm: Normal rate and regular rhythm.   Pulmonary:      Effort: Pulmonary effort is normal. No respiratory distress.      Breath sounds: Normal breath sounds. No wheezing.   Abdominal:      General: There is no distension.   Musculoskeletal:         General: Normal range of motion.      Cervical back: Normal range of motion. No edema.   Skin:     General: Skin is warm and dry.      Findings: No rash.   Neurological:      Mental Status: She is alert and oriented to person, place, and time.      Coordination: Coordination normal.   Psychiatric:         Speech: Speech normal.         Behavior: Behavior normal. Behavior is cooperative.         Thought Content: Thought content normal.         Assessment:       1. Anxiety    2. Bradycardia    3. Mild episode of recurrent major depressive disorder    4. Hyperlipidemia, unspecified hyperlipidemia type    5. Sinus bradycardia    6. BMI 40.0-44.9, adult    7. Bariatric surgery status-Gastric sleeve     8. Crohn's disease with complication, unspecified gastrointestinal tract location         Plan:       Vasovagal episodes, longstanding.  Sinus bradycardia, chronic.    Discussed with patient orthostatic/vasovagal mediated hypotension and the resultant decreased level of consciousness that can result. Discussed counteractive measures including standing up slowly, remaining hydrated, support hose, salt intake, exercise stressing lower extremity strength.  If LH/orthostatic/vasovagal episodes become more of a problem, she'll return and we could consider midodrine. She's not interested in additional meds now.  f/u here prn.

## 2023-01-10 ENCOUNTER — OFFICE VISIT (OUTPATIENT)
Dept: OTOLARYNGOLOGY | Facility: CLINIC | Age: 58
End: 2023-01-10
Payer: MEDICARE

## 2023-01-10 VITALS — BODY MASS INDEX: 39.99 KG/M2 | WEIGHT: 234.25 LBS | TEMPERATURE: 98 F | HEIGHT: 64 IN

## 2023-01-10 DIAGNOSIS — J32.4 CHRONIC PANSINUSITIS: Primary | ICD-10-CM

## 2023-01-10 DIAGNOSIS — J33.9 NASAL POLYPS: ICD-10-CM

## 2023-01-10 DIAGNOSIS — J45.40 MODERATE PERSISTENT ASTHMA WITHOUT COMPLICATION: ICD-10-CM

## 2023-01-10 DIAGNOSIS — Z98.890 HISTORY OF ENDOSCOPIC SINUS SURGERY: ICD-10-CM

## 2023-01-10 PROCEDURE — 99999 PR PBB SHADOW E&M-EST. PATIENT-LVL V: ICD-10-PCS | Mod: PBBFAC,,, | Performed by: OTOLARYNGOLOGY

## 2023-01-10 PROCEDURE — 1160F RVW MEDS BY RX/DR IN RCRD: CPT | Mod: CPTII,S$GLB,, | Performed by: OTOLARYNGOLOGY

## 2023-01-10 PROCEDURE — 99999 PR PBB SHADOW E&M-EST. PATIENT-LVL V: CPT | Mod: PBBFAC,,, | Performed by: OTOLARYNGOLOGY

## 2023-01-10 PROCEDURE — 3008F BODY MASS INDEX DOCD: CPT | Mod: CPTII,S$GLB,, | Performed by: OTOLARYNGOLOGY

## 2023-01-10 PROCEDURE — 99499 RISK ADDL DX/OHS AUDIT: ICD-10-PCS | Mod: S$GLB,,, | Performed by: OTOLARYNGOLOGY

## 2023-01-10 PROCEDURE — 1160F PR REVIEW ALL MEDS BY PRESCRIBER/CLIN PHARMACIST DOCUMENTED: ICD-10-PCS | Mod: CPTII,S$GLB,, | Performed by: OTOLARYNGOLOGY

## 2023-01-10 PROCEDURE — 1159F PR MEDICATION LIST DOCUMENTED IN MEDICAL RECORD: ICD-10-PCS | Mod: CPTII,S$GLB,, | Performed by: OTOLARYNGOLOGY

## 2023-01-10 PROCEDURE — 3008F PR BODY MASS INDEX (BMI) DOCUMENTED: ICD-10-PCS | Mod: CPTII,S$GLB,, | Performed by: OTOLARYNGOLOGY

## 2023-01-10 PROCEDURE — 99214 OFFICE O/P EST MOD 30 MIN: CPT | Mod: S$GLB,,, | Performed by: OTOLARYNGOLOGY

## 2023-01-10 PROCEDURE — 99499 UNLISTED E&M SERVICE: CPT | Mod: S$GLB,,, | Performed by: OTOLARYNGOLOGY

## 2023-01-10 PROCEDURE — 1159F MED LIST DOCD IN RCRD: CPT | Mod: CPTII,S$GLB,, | Performed by: OTOLARYNGOLOGY

## 2023-01-10 PROCEDURE — 99214 PR OFFICE/OUTPT VISIT, EST, LEVL IV, 30-39 MIN: ICD-10-PCS | Mod: S$GLB,,, | Performed by: OTOLARYNGOLOGY

## 2023-01-10 NOTE — PATIENT INSTRUCTIONS
No high-resolution CT scan of the sinuses since fall of 2021.  Given the variation in symptoms we will proceed with a repeat high-resolution CT scan for revision sinus mapping allowing for image guidance.      Patient will try to increase the frequency and amount of saline.  She is intolerant of sinus rinses but hopefully will tolerate aerosolized saline and frequent saline sprays.  She is encouraged to use the budesonide 0.5 mg/2 mL as a drop with the head hanging off the bed as per instructions and printed diagram rather than using it as a nebulizer.      There is no evidence of thrush in the mouth/throat today but significant dryness which will hopefully improve some with better nasal breathing post surgery.  We discussed at length all risks associated with surgery including the risks of insufficient saline and irrigation post surgery and limitations of topical delivery of medications without the benefits of irrigation.      MOFFET POSITION FOR NASAL DROPS      Use the Moffet position has shown below which is most easily accomplished by laying on the bed with the head off the bed dripping the drops into the nose.  Let the drops it in the nose for several minutes as tolerated.  Then get up rinse the mouth gargle and spit to prevent the drops from running down the throat.  Follow the prescription instructions for frequency and dose.

## 2023-01-10 NOTE — PROGRESS NOTES
Patient, Ida Orozco (MRN #[id number]), presented with a recorded BMI of 40.21 kg/m^2 consistent with the definition of morbid obesity (ICD-10 E66.01). The patient's morbid obesity was monitored, evaluated, addressed and/or treated. This addendum to the medical record is made on 01/10/2023.

## 2023-01-10 NOTE — PROGRESS NOTES
[facility]    Subjective:      Patient: Ida Orozco Patient PCP: Eliecer Jones MD         :  1965     Sex:  female      MRN:  [id number]          Date of Visit: 01/10/2023      Chief Complaint: Follow-up (F/U on sinuses. States was did not drip the Budesonide into the nose, states does not like things dripping in her throat. States fashioned the nebulizer to do the Budesonide thru the nose. States thinks has thrush on the roof of mouth--had white film on roof of mouth. States has runny nose right now. )    10/20/2023 follow-up visit Ida Orozco is a 57 y.o. female former smoker quitting in  with a history of migraines, HLD, crohn's s/p total colectomy w/end iliostomy (no longer on immunosuppression), obesity, CHELO, GERD, asthma and chronis sinusitis treated initially with oral antibiotics and unable to get high dose steroid drops budesonide due to cost change 2.5 mg per 2 mL with lower cost through [facility] but still did not use the medication dripped into the nose and instead is using nebulizer breathing and into the nose with concerns of thrush developing.    2022 follow-up visit Ida Orozco is a 57 y.o. female former smoker quitting in  with a history of migraines, HLD, crohn's s/p total colectomy w/end iliostomy (no longer on immunosuppression), obesity, CHELO, GERD, asthma and chronis sinusitisreferred to me by Janet Guardado in consultation for sinusitis.  Endoscopy showed active inflammation infection with polyp in the left middle meatus.  Last CT scan and high-resolution was .  Treated with steroids and cefdinir in addition to nasal steroid irrigations with budesonide.  Returns today feeling ongoing congestion thickening mucus and more cough and pressure but no pain or purulence.  No fevers.  Unable to get the budesonide 1 mg per 2 mL from when [place] as a cost over 100 dollars.  She is been rinsing.  Mucus is thick mouth is dry cough is minimally productive no discoloration  per se.    11/03/2022: Ida Orozco is a 57 y.o. female former smoker quitting in 2009 with a history of migraines, HLD, crohn's s/p total colectomy w/end iliostomy (no longer on immunosuppression), obesity, CHELO, GERD, asthma and chronis sinusitisreferred to me by Janet Guardado in consultation for sinusitis seen by my colleague Skye Lee 01/19/2021 and treated with a short burst of dexamethasone p.o. and 2 weeks of Omnicef with discussion about possible eustachian tube dilation versus TMJ care for ear fullness/pain on the left side.  Prior history of balloon dilation sinuplasty x 2 with Dr. Tomas 3+ years ago.  Effective for prolonged time with first but NOT second treatment.  Improves with steroids but poorly tolerated with mood changes (angry).  Has not done sinus rinses.  Uses intranasal steroids w/o relief.  Significant active right frontal and retrorbital pressue and pain.    Also complains of dizziness.  This is most notable when she stands up or moves quickly.  She says she is had all that testing done which sounds like she is describing orthostatic blood pressure checks.  She is never seen cardiology or had carotid duplex ultrasound.  No Holter monitor.  She has been told she has bradycardia and abnormal EKG changes.    Patient had a more recent CT head completed 10/12/2022 images reviewed today show high right nasal septal deviation partial opacification of the anterior upper ethmoid frontal outflow area and of the ethmoids bilaterally with what might be middle meatal polyps on the right side, substantial mucosal thickening of both maxillary sinuses of at least 6 mm with complete obstruction of the ostiomeatal complexes bilaterally.  Hypoplastic frontal sinuses bilaterally with some thickening of the floor/outflow bilaterally what appears to be relative sparing of the sphenoid sinuses bilaterally though there is thickening of the area of the natural os bilaterally.  No appreciable thickening or  effusion of the middle ears or mastoids bilaterally.  Unable to exclude bony dehiscence of the superior semicircular canals on either side on these limited images.      CT Brain 10/12/2022        CT sinuses 2021        Review of Systems     Past Medical History  She has a past medical history of Anxiety, Asthma, Bronchitis, Crohn's colitis, Depression, Esophageal ulcer, Fatty liver disease, nonalcoholic, Gallstones, GERD (gastroesophageal reflux disease), History of sleeve gastrectomy, PTSD (post-traumatic stress disorder), Seasonal allergies, Symptomatic abdominal panniculus, and Ulcerative colitis.    Family / Surgical / Social History  Her family history includes Cancer in her maternal grandfather, mother, and paternal grandfather; Cirrhosis in her mother; Colon cancer in her maternal grandfather, maternal uncle, and maternal uncle; Heart disease (age of onset: 67) in her father.    Past Surgical History:   Procedure Laterality Date    APPENDECTOMY      CATHETERIZATION OF BOTH LEFT AND RIGHT HEART Right 2019    Procedure: CATHETERIZATION, HEART, BOTH LEFT AND RIGHT;  Surgeon: Beto Malin MD;  Location: [facility] CATH LAB;  Service: Cardiology;  Laterality: Right;     SECTION, LOW TRANSVERSE      CHOLECYSTECTOMY      open    COLECTOMY      total- 2013    CYSTOSCOPY W/ RETROGRADES N/A 2018    Procedure: CYSTOSCOPY, WITH RETROGRADE PYELOGRAM;  Surgeon: Butch Banks MD;  Location: [facility] OR;  Service: Urology;  Laterality: N/A;  [facility] CONFIRMED     ENDOSCOPIC ULTRASOUND OF UPPER GASTROINTESTINAL TRACT N/A 11/10/2021    Procedure: ULTRASOUND, UPPER GI TRACT, ENDOSCOPIC;  Surgeon: Nhan Dee MD;  Location: [address]);  Service: Endoscopy;  Laterality: N/A;  MD Milena Whitehead PA-C; Quinn Whitman MD; Miladys Velasquez MA  Caller: Unspecified (3 days ago,  1:20 PM)  Fair enough. Neha, please schedule.       ----- Message -----   From:  Milena Murillo PA-C   Sent: 8/11/2021  1    ESOPHAGOGASTRODUODENOSCOPY  04/11/2018    Dr. Castorena: LA Grade A reflux esophagitis, hiatal hernia; Ectopic gastric mucosa in the upper third of the esophagus; gastric sleeve intact with unremarkable findings, gastritis; biopsy: stomach- Mild chronic antral and oxyntic gastritis, no activity, negative for h pylori    ESOPHAGOGASTRODUODENOSCOPY N/A 11/10/2021    Procedure: EGD (ESOPHAGOGASTRODUODENOSCOPY);  Surgeon: Nhan Dee MD;  Location: [facility] (2ND FLR);  Service: Endoscopy;  Laterality: N/A;    FLEXIBLE SIGMOIDOSCOPY  5/24/2018    Procedure: SIGMOIDOSCOPY, FLEXIBLE;  Surgeon: JENNY Virgen MD;  Location: [facility] OR 2ND FLR;  Service: Colon and Rectal;;    FLEXIBLE SIGMOIDOSCOPY  04/11/2018    Dr. Castorena: Non-patent surgical anastomosis, characterized by friable mucosa.    GASTRIC SLEEVE       HYSTERECTOMY      ILEOSCOPY  04/11/2018    Dr. Castorena: Patient is status-post total colectomy with end ileostomy. unremarkable findings    ILEOSTOMY REVISION      april 2014; august 2014    LAPAROSCOPIC PROCTECTOMY N/A 5/24/2018    Procedure: PROCTECTOMY-LAPAROSCOPIC/CONVERTED TO OPEN;  Surgeon: JENNY Virgen MD;  Location: [facility] OR 2ND FLR;  Service: Colon and Rectal;  Laterality: N/A;    LYSIS OF ADHESIONS  5/24/2018    Procedure: LYSIS, ADHESIONS/ more than 2hours;  Surgeon: JENNY Virgen MD;  [facility];  Service: Colon and Rectal;;    OOPHORECTOMY      PANNICULECTOMY Bilateral 12/19/2019    Procedure: PANNICULECTOMY;  Surgeon: Andrea Alvarado MD;  Location: [facility] OR 2ND FLR;  Service: Plastics;  Laterality: Bilateral;    REVISION COLOSTOMY N/A 12/19/2019    Procedure: REVISION, COLOSTOMY;  Surgeon: JENNY Virgen MD;  Location: [facility] OR 2ND FLR;  Service: Colon and Rectal;  Laterality: N/A;    thumb surgery      TONSILLECTOMY, ADENOIDECTOMY      WISDOM TOOTH EXTRACTION         Social History     Tobacco Use    Smoking status: Former     Types:  Cigarettes     Quit date:      Years since quittin.0    Smokeless tobacco: Never   Substance and Sexual Activity    Alcohol use: Yes     Comment: rarely    Drug use: No    Sexual activity: Never     Birth control/protection: See Surgical Hx       Medications  She has a current medication list which includes the following prescription(s): albuterol, budesonide, clonazepam, diclofenac sodium, diphenoxylate-atropine 2.5-0.025 mg, levocetirizine, loratadine, meclizine, pantoprazole, promethazine, sertraline, symbicort, topiramate, trazodone, ursodiol, vitamin d2, and [DISCONTINUED] humira(cf) pen.      Allergies  Review of patient's allergies indicates:   Allergen Reactions    Adhesive      Cause blisters    Dilaudid [hydromorphone] Nausea And Vomiting    Humira [adalimumab] Hives    Sulfa (sulfonamide antibiotics) Hives    Surgical stainless steel      Had surgical staples, caused irritation and infection       All medications, allergies, and past history have been reviewed.    Objective:      Vitals:  Vitals - 1 value per visit 2022 1/10/2023 1/10/2023   SYSTOLIC 108 - -   DIASTOLIC 58 - -   Pulse 56 - -   Temp - - 98   Resp - - -   SPO2 - - -   Weight (lb) 238.54 - 234.24   Weight (kg) 108.2 - 106.25   Height 64 - 64   BMI (Calculated) 40.9 - 40.2   VISIT REPORT - - -   Pain Score  - 3 -   Some recent data might be hidden       Body surface area is 2.19 meters squared.    Physical Exam:    GENERAL  APPEARANCE -  alert, appears stated age, cooperative, and morbidly obese  BARRIER(S) TO COMMUNICATION -  none VOICE - mildly hyponasal    INTEGUMENTARY  no suspicious head and neck lesions    HEENT  HEAD: Normocephalic, without obvious abnormality, atraumatic  FACE: INSPECTION - Symmetric, no signs of trauma, no suspicious lesion(s)  PALPATION -  No masses SALIVARY GLANDS - non-tender with no appreciable mass  STRENGTH - facial symmetry  NECK/THYROID: normal atraumatic, no neck masses, normal thyroid, no  jvd    EYES  Normal occular alignment and mobility with no visible nystagmus at rest    EARS/NOSE/MOUTH/THROAT  EARS  PINNAE AND EXTERNAL EARS - no suspicious lesion OTOSCOPIC EXAM (surgical microscopy was not used for visualization/instrumentation): EAR EXAM - Normal ear canals, tympanic membranes and mobility, and middle ear spaces bilaterally.  HEARING - grossly intact to voice/finger rub    NOSE AND SINUSES  EXTERNAL NOSE - Grossly normal for age/sex  SEPTUM - normal/no obstruction on anterior exam without decongestion TURBINATES - within normal limits MUCOSA - minimally edematous not particularly congested, no cyanosis no erythema no active purulence     MOUTH AND THROAT   ORAL CAVITY, LIPS, TEETH, GUMS & TONGUE - some thick widening of mucus no thrush no erythema.  OROPHARYNX /TONSILS/PHARYNGEAL WALLS/HYPOPHARYNX - no erythema or exudates  NASOPHARYNX - limited mirror exam - unable to visualize due to anatomy/gag  LARYNX -  - limited mirror exam - clear normal some thick mucus no pooling    CHEST AND LUNG   INSPECTION & AUSCULTATION - clear to auscultation bilaterally and normal effort, no stridor    CARDIOVASCULAR  AUSCULTATION & PERIPHERAL VASCULAR - regular rate and rhythm.    NEUROLOGIC  MENTAL STATUS - alert, interactive CRANIAL NERVES - normal    LYMPHATIC  HEAD AND NECK - non-palpable; SUPRACLAVICULAR - deferred; AXILLARY - deferred; INGUINAL - deferred; LIVER/SPLEEN - deferred        Procedure(s):  None          Labs:  WBC   Date Value Ref Range Status   11/11/2022 6.53 3.90 - 12.70 K/uL Final     Hemoglobin   Date Value Ref Range Status   11/11/2022 13.5 12.0 - 16.0 g/dL Final     Platelets   Date Value Ref Range Status   11/11/2022 259 150 - 450 K/uL Final     Creatinine   Date Value Ref Range Status   11/11/2022 1.0 0.5 - 1.4 mg/dL Final     TSH   Date Value Ref Range Status   11/11/2022 2.100 0.400 - 4.000 uIU/mL Final     Glucose   Date Value Ref Range Status   11/11/2022 87 70 - 110 mg/dL Final      Hemoglobin A1C   Date Value Ref Range Status   05/30/2019 5.6 4.0 - 5.6 % Final     Comment:     ADA Screening Guidelines:  5.7-6.4%  Consistent with prediabetes  >or=6.5%  Consistent with diabetes  High levels of fetal hemoglobin interfere with the HbA1C  assay. Heterozygous hemoglobin variants (HbS, HgC, etc)do  not significantly interfere with this assay.   However, presence of multiple variants may affect accuracy.           Assessment:      Problem List Items Addressed This Visit          ENT    Chronic pansinusitis - Primary       Pulmonary    Moderate persistent asthma without complication    Overview     Not using Symbicort as supposed to   Using as needed   Had an upper respiratory illness in April ,causing Asthma flare  lasting for about 1 month  Lately better   CXR 4/3/2018 The lungs are clear, with normal appearance of pulmonary vasculature and no pleural effusion or pneumothorax.          Other Visit Diagnoses       Nasal polyps        History of endoscopic sinus surgery                       Plan:      No high-resolution CT scan of the sinuses since fall of 2021.  Given the variation in symptoms we will proceed with a repeat high-resolution CT scan for revision sinus mapping allowing for image guidance.      Patient will try to increase the frequency and amount of saline.  She is intolerant of sinus rinses but hopefully will tolerate aerosolized saline and frequent saline sprays.  She is encouraged to use the budesonide 0.5 mg/2 mL as a drop with the head hanging off the bed as per instructions and printed diagram rather than using it as a nebulizer.      There is no evidence of thrush in the mouth/throat today but significant dryness which will hopefully improve some with better nasal breathing post surgery.  We discussed at length all risks associated with surgery including the risks of insufficient saline and irrigation post surgery and limitations of topical delivery of medications without the  benefits of irrigation.

## 2023-03-24 ENCOUNTER — PATIENT MESSAGE (OUTPATIENT)
Dept: OTOLARYNGOLOGY | Facility: CLINIC | Age: 58
End: 2023-03-24
Payer: MEDICARE

## 2023-03-25 ENCOUNTER — PATIENT MESSAGE (OUTPATIENT)
Dept: OTOLARYNGOLOGY | Facility: CLINIC | Age: 58
End: 2023-03-25
Payer: MEDICARE

## 2023-03-27 NOTE — TELEPHONE ENCOUNTER
Called pt and rescheduled CT Sinus that was canceled. CT scan is needed prior to surgery next week. Thanks, Una

## 2023-04-03 ENCOUNTER — ANESTHESIA EVENT (OUTPATIENT)
Dept: SURGERY | Facility: AMBULARY SURGERY CENTER | Age: 58
End: 2023-04-03
Payer: MEDICARE

## 2023-04-04 ENCOUNTER — HOSPITAL ENCOUNTER (OUTPATIENT)
Facility: AMBULARY SURGERY CENTER | Age: 58
Discharge: HOME | End: 2023-04-04
Attending: OTOLARYNGOLOGY | Admitting: OTOLARYNGOLOGY
Payer: MEDICARE

## 2023-04-04 ENCOUNTER — ANESTHESIA (OUTPATIENT)
Dept: SURGERY | Facility: AMBULARY SURGERY CENTER | Age: 58
End: 2023-04-04
Payer: MEDICARE

## 2023-04-04 DIAGNOSIS — J33.9 NASAL POLYPOSIS: Primary | ICD-10-CM

## 2023-04-04 DIAGNOSIS — J32.4 CHRONIC PANSINUSITIS: ICD-10-CM

## 2023-04-04 DIAGNOSIS — Z98.890 HISTORY OF ENDOSCOPIC SINUS SURGERY: ICD-10-CM

## 2023-04-04 PROCEDURE — 31276 PR NASAL/SINUS ENDOSCOPY,EXPLOR FRONTAL SINUS: ICD-10-PCS | Mod: 50,51,, | Performed by: OTOLARYNGOLOGY

## 2023-04-04 PROCEDURE — 31256 EXPLORATION MAXILLARY SINUS: CPT | Mod: RT | Performed by: OTOLARYNGOLOGY

## 2023-04-04 PROCEDURE — 31256 PR NASAL/SINUS ENDOSCOPY,OPEN MAXILL SINUS: ICD-10-PCS | Mod: 50,51,, | Performed by: OTOLARYNGOLOGY

## 2023-04-04 PROCEDURE — D9220A PRA ANESTHESIA: Mod: CRNA,,, | Performed by: STUDENT IN AN ORGANIZED HEALTH CARE EDUCATION/TRAINING PROGRAM

## 2023-04-04 PROCEDURE — 31257 NSL/SINS NDSC TOT W/SPHENDT: CPT | Mod: RT | Performed by: OTOLARYNGOLOGY

## 2023-04-04 PROCEDURE — D9220A PRA ANESTHESIA: ICD-10-PCS | Mod: CRNA,,, | Performed by: STUDENT IN AN ORGANIZED HEALTH CARE EDUCATION/TRAINING PROGRAM

## 2023-04-04 PROCEDURE — 61782 SCAN PROC CRANIAL EXTRA: CPT | Mod: ,,, | Performed by: OTOLARYNGOLOGY

## 2023-04-04 PROCEDURE — D9220A PRA ANESTHESIA: Mod: ANES,,, | Performed by: ANESTHESIOLOGY

## 2023-04-04 PROCEDURE — 31256 EXPLORATION MAXILLARY SINUS: CPT | Mod: 50,51,, | Performed by: OTOLARYNGOLOGY

## 2023-04-04 PROCEDURE — 31267 ENDOSCOPY MAXILLARY SINUS: CPT | Mod: RT | Performed by: OTOLARYNGOLOGY

## 2023-04-04 PROCEDURE — 31276 NSL/SINS NDSC FRNT TISS RMVL: CPT | Mod: LT | Performed by: OTOLARYNGOLOGY

## 2023-04-04 PROCEDURE — 31288 NASAL/SINUS ENDOSCOPY SURG: CPT | Mod: RT | Performed by: OTOLARYNGOLOGY

## 2023-04-04 PROCEDURE — 31257 PR ENDOSCOPY, NASAL/SINUS, W/ETHMOIDECTOMY/SPHENOIDOTOMY: ICD-10-PCS | Mod: 50,,, | Performed by: OTOLARYNGOLOGY

## 2023-04-04 PROCEDURE — D9220A PRA ANESTHESIA: ICD-10-PCS | Mod: ANES,,, | Performed by: ANESTHESIOLOGY

## 2023-04-04 PROCEDURE — 61782 PR STEREOTACTIC COMP ASSIST PROC,CRANIAL,EXTRADURAL: ICD-10-PCS | Mod: ,,, | Performed by: OTOLARYNGOLOGY

## 2023-04-04 PROCEDURE — 31276 NSL/SINS NDSC FRNT TISS RMVL: CPT | Mod: 50,51,, | Performed by: OTOLARYNGOLOGY

## 2023-04-04 PROCEDURE — 31257 NSL/SINS NDSC TOT W/SPHENDT: CPT | Mod: 50,,, | Performed by: OTOLARYNGOLOGY

## 2023-04-04 PROCEDURE — 61782 SCAN PROC CRANIAL EXTRA: CPT

## 2023-04-04 RX ORDER — EPINEPHRINE 1 MG/ML
INJECTION, SOLUTION, CONCENTRATE INTRAVENOUS
Status: DISCONTINUED | OUTPATIENT
Start: 2023-04-04 | End: 2023-04-04 | Stop reason: HOSPADM

## 2023-04-04 RX ORDER — EPHEDRINE SULFATE 50 MG/ML
INJECTION, SOLUTION INTRAVENOUS
Status: DISCONTINUED | OUTPATIENT
Start: 2023-04-04 | End: 2023-04-04

## 2023-04-04 RX ORDER — DOXYCYCLINE 100 MG/1
100 CAPSULE ORAL 2 TIMES DAILY
Qty: 30 CAPSULE | Refills: 0 | Status: SHIPPED | OUTPATIENT
Start: 2023-04-04 | End: 2023-05-03

## 2023-04-04 RX ORDER — DIPHENHYDRAMINE HYDROCHLORIDE 50 MG/ML
12.5 INJECTION INTRAMUSCULAR; INTRAVENOUS EVERY 6 HOURS PRN
Status: DISCONTINUED | OUTPATIENT
Start: 2023-04-04 | End: 2023-04-04 | Stop reason: HOSPADM

## 2023-04-04 RX ORDER — LIDOCAINE HYDROCHLORIDE AND EPINEPHRINE 10; 10 MG/ML; UG/ML
INJECTION, SOLUTION INFILTRATION; PERINEURAL
Status: DISCONTINUED
Start: 2023-04-04 | End: 2023-04-04 | Stop reason: HOSPADM

## 2023-04-04 RX ORDER — ONDANSETRON 2 MG/ML
4 INJECTION INTRAMUSCULAR; INTRAVENOUS ONCE
Status: DISCONTINUED | OUTPATIENT
Start: 2023-04-04 | End: 2023-04-04 | Stop reason: HOSPADM

## 2023-04-04 RX ORDER — MIDAZOLAM HYDROCHLORIDE 1 MG/ML
INJECTION INTRAMUSCULAR; INTRAVENOUS
Status: DISCONTINUED | OUTPATIENT
Start: 2023-04-04 | End: 2023-04-04

## 2023-04-04 RX ORDER — MUPIROCIN 20 MG/G
OINTMENT TOPICAL
Status: DISCONTINUED
Start: 2023-04-04 | End: 2023-04-04 | Stop reason: WASHOUT

## 2023-04-04 RX ORDER — SODIUM CHLORIDE 0.9 G/100ML
IRRIGANT IRRIGATION
Status: DISCONTINUED | OUTPATIENT
Start: 2023-04-04 | End: 2023-04-04 | Stop reason: HOSPADM

## 2023-04-04 RX ORDER — DEXAMETHASONE SODIUM PHOSPHATE 4 MG/ML
INJECTION, SOLUTION INTRA-ARTICULAR; INTRALESIONAL; INTRAMUSCULAR; INTRAVENOUS; SOFT TISSUE
Status: DISCONTINUED | OUTPATIENT
Start: 2023-04-04 | End: 2023-04-04

## 2023-04-04 RX ORDER — FENTANYL CITRATE 50 UG/ML
25 INJECTION, SOLUTION INTRAMUSCULAR; INTRAVENOUS EVERY 5 MIN PRN
Status: COMPLETED | OUTPATIENT
Start: 2023-04-04 | End: 2023-04-04

## 2023-04-04 RX ORDER — LIDOCAINE HYDROCHLORIDE AND EPINEPHRINE 10; 10 MG/ML; UG/ML
INJECTION, SOLUTION INFILTRATION; PERINEURAL
Status: DISCONTINUED | OUTPATIENT
Start: 2023-04-04 | End: 2023-04-04 | Stop reason: HOSPADM

## 2023-04-04 RX ORDER — OXYCODONE HYDROCHLORIDE 5 MG/1
5 TABLET ORAL
Status: DISCONTINUED | OUTPATIENT
Start: 2023-04-04 | End: 2023-04-04 | Stop reason: HOSPADM

## 2023-04-04 RX ORDER — EPINEPHRINE 1 MG/ML
INJECTION INTRAMUSCULAR; INTRAVENOUS; SUBCUTANEOUS
Status: DISCONTINUED
Start: 2023-04-04 | End: 2023-04-04 | Stop reason: HOSPADM

## 2023-04-04 RX ORDER — IBUPROFEN 800 MG/1
800 TABLET ORAL 3 TIMES DAILY
Qty: 30 TABLET | Refills: 0 | Status: SHIPPED | OUTPATIENT
Start: 2023-04-04 | End: 2023-05-03

## 2023-04-04 RX ORDER — LIDOCAINE HYDROCHLORIDE 20 MG/ML
INJECTION INTRAVENOUS
Status: DISCONTINUED | OUTPATIENT
Start: 2023-04-04 | End: 2023-04-04

## 2023-04-04 RX ORDER — ACETAMINOPHEN 10 MG/ML
INJECTION, SOLUTION INTRAVENOUS
Status: DISCONTINUED | OUTPATIENT
Start: 2023-04-04 | End: 2023-04-04

## 2023-04-04 RX ORDER — SODIUM CHLORIDE, SODIUM LACTATE, POTASSIUM CHLORIDE, CALCIUM CHLORIDE 600; 310; 30; 20 MG/100ML; MG/100ML; MG/100ML; MG/100ML
INJECTION, SOLUTION INTRAVENOUS CONTINUOUS
Status: DISCONTINUED | OUTPATIENT
Start: 2023-04-04 | End: 2023-04-04 | Stop reason: HOSPADM

## 2023-04-04 RX ORDER — ONDANSETRON 2 MG/ML
INJECTION INTRAMUSCULAR; INTRAVENOUS
Status: DISCONTINUED | OUTPATIENT
Start: 2023-04-04 | End: 2023-04-04

## 2023-04-04 RX ORDER — LIDOCAINE HYDROCHLORIDE 10 MG/ML
1 INJECTION, SOLUTION EPIDURAL; INFILTRATION; INTRACAUDAL; PERINEURAL ONCE
Status: DISCONTINUED | OUTPATIENT
Start: 2023-04-04 | End: 2023-04-04 | Stop reason: HOSPADM

## 2023-04-04 RX ORDER — DEXMEDETOMIDINE HYDROCHLORIDE 100 UG/ML
INJECTION, SOLUTION INTRAVENOUS
Status: DISCONTINUED | OUTPATIENT
Start: 2023-04-04 | End: 2023-04-04

## 2023-04-04 RX ORDER — ROCURONIUM BROMIDE 10 MG/ML
INJECTION, SOLUTION INTRAVENOUS
Status: DISCONTINUED | OUTPATIENT
Start: 2023-04-04 | End: 2023-04-04

## 2023-04-04 RX ORDER — FENTANYL CITRATE 50 UG/ML
INJECTION, SOLUTION INTRAMUSCULAR; INTRAVENOUS
Status: DISCONTINUED | OUTPATIENT
Start: 2023-04-04 | End: 2023-04-04

## 2023-04-04 RX ORDER — MEPERIDINE HYDROCHLORIDE 50 MG/ML
12.5 INJECTION INTRAMUSCULAR; INTRAVENOUS; SUBCUTANEOUS ONCE
Status: DISCONTINUED | OUTPATIENT
Start: 2023-04-04 | End: 2023-04-04 | Stop reason: HOSPADM

## 2023-04-04 RX ORDER — SEVOFLURANE 250 ML/250ML
LIQUID RESPIRATORY (INHALATION)
Status: DISCONTINUED
Start: 2023-04-04 | End: 2023-04-04 | Stop reason: HOSPADM

## 2023-04-04 RX ORDER — PROPOFOL 10 MG/ML
VIAL (ML) INTRAVENOUS
Status: DISCONTINUED | OUTPATIENT
Start: 2023-04-04 | End: 2023-04-04

## 2023-04-04 RX ADMIN — MIDAZOLAM HYDROCHLORIDE 2 MG: 1 INJECTION INTRAMUSCULAR; INTRAVENOUS at 08:04

## 2023-04-04 RX ADMIN — ACETAMINOPHEN 1000 MG: 10 INJECTION, SOLUTION INTRAVENOUS at 08:04

## 2023-04-04 RX ADMIN — Medication 200 MG: at 08:04

## 2023-04-04 RX ADMIN — OXYCODONE HYDROCHLORIDE 5 MG: 5 TABLET ORAL at 11:04

## 2023-04-04 RX ADMIN — FENTANYL CITRATE 25 MCG: 50 INJECTION, SOLUTION INTRAMUSCULAR; INTRAVENOUS at 10:04

## 2023-04-04 RX ADMIN — ONDANSETRON 4 MG: 2 INJECTION INTRAMUSCULAR; INTRAVENOUS at 08:04

## 2023-04-04 RX ADMIN — SODIUM CHLORIDE, SODIUM LACTATE, POTASSIUM CHLORIDE, CALCIUM CHLORIDE: 600; 310; 30; 20 INJECTION, SOLUTION INTRAVENOUS at 09:04

## 2023-04-04 RX ADMIN — FENTANYL CITRATE 25 MCG: 50 INJECTION, SOLUTION INTRAMUSCULAR; INTRAVENOUS at 11:04

## 2023-04-04 RX ADMIN — EPHEDRINE SULFATE 15 MG: 50 INJECTION, SOLUTION INTRAVENOUS at 08:04

## 2023-04-04 RX ADMIN — DEXMEDETOMIDINE HYDROCHLORIDE 4 MCG: 100 INJECTION, SOLUTION INTRAVENOUS at 10:04

## 2023-04-04 RX ADMIN — ROCURONIUM BROMIDE 50 MG: 10 INJECTION, SOLUTION INTRAVENOUS at 08:04

## 2023-04-04 RX ADMIN — FENTANYL CITRATE 50 MCG: 50 INJECTION, SOLUTION INTRAMUSCULAR; INTRAVENOUS at 08:04

## 2023-04-04 RX ADMIN — FENTANYL CITRATE 25 MCG: 50 INJECTION, SOLUTION INTRAMUSCULAR; INTRAVENOUS at 09:04

## 2023-04-04 RX ADMIN — FENTANYL CITRATE 50 MCG: 50 INJECTION, SOLUTION INTRAMUSCULAR; INTRAVENOUS at 10:04

## 2023-04-04 RX ADMIN — DEXAMETHASONE SODIUM PHOSPHATE 12 MG: 4 INJECTION, SOLUTION INTRA-ARTICULAR; INTRALESIONAL; INTRAMUSCULAR; INTRAVENOUS; SOFT TISSUE at 08:04

## 2023-04-04 RX ADMIN — LIDOCAINE HYDROCHLORIDE 100 MG: 20 INJECTION INTRAVENOUS at 08:04

## 2023-04-04 RX ADMIN — EPHEDRINE SULFATE 15 MG: 50 INJECTION, SOLUTION INTRAVENOUS at 09:04

## 2023-04-04 RX ADMIN — SODIUM CHLORIDE, SODIUM LACTATE, POTASSIUM CHLORIDE, CALCIUM CHLORIDE: 600; 310; 30; 20 INJECTION, SOLUTION INTRAVENOUS at 07:04

## 2023-04-04 NOTE — TRANSFER OF CARE
"Anesthesia Transfer of Care Note    Patient: Ida Orozco    Procedure(s) Performed: Procedure(s) (LRB):  Pan Sinus FESS, WITH IMAGING GUIDANCE Disc loaded (Bilateral)    Patient location: PACU    Anesthesia Type: general    Transport from OR: Transported from OR on room air with adequate spontaneous ventilation    Post pain: adequate analgesia    Post assessment: no apparent anesthetic complications    Post vital signs: stable    Level of consciousness: responds to stimulation and lethargic    Nausea/Vomiting: no nausea/vomiting    Complications: none    Transfer of care protocol was followed      Last vitals:   Visit Vitals  BP (!) 102/54   Pulse (!) 48   Temp 36.7 °C (98.1 °F) (Skin)   Resp 20   Ht 5' 4" (1.626 m)   Wt 106.1 kg (234 lb)   SpO2 98%   Breastfeeding No   BMI 40.17 kg/m²     "

## 2023-04-04 NOTE — DISCHARGE SUMMARY
[facility]  Discharge Note  Short Stay    Procedure(s) (LRB):  Pan Sinus FESS, WITH IMAGING GUIDANCE Disc loaded (Bilateral)      OUTCOME: Patient tolerated treatment/procedure well without complication and is now ready for discharge.    DISPOSITION: Home or Self Care    FINAL DIAGNOSIS:  pansinusitis w/polyposis    FOLLOWUP: In clinic in 2-3 weeks    DISCHARGE INSTRUCTIONS:      POSTOP FESS instructions     Use saline all day and rinses at least twice daily beginning in 2 days with budesonide added twice daily.    TIME SPENT ON DISCHARGE: 5 minutes

## 2023-04-04 NOTE — ANESTHESIA PROCEDURE NOTES
Intubation    Date/Time: 4/4/2023 8:38 AM  Performed by: Samantha Montana CRNA  Authorized by: Saúl Goff MD     Intubation:     Induction:  Intravenous    Intubated:  Postinduction    Mask Ventilation:  Easy mask    Attempts:  1    Attempted By:  CRNA    Method of Intubation:  Video laryngoscopy    Blade:  Smith 3    Laryngeal View Grade: Grade I - full view of cords      Difficult Airway Encountered?: No      Complications:  None    Airway Device:  Oral endotracheal tube    Airway Device Size:  7.0    Style/Cuff Inflation:  Cuffed (inflated to minimal occlusive pressure)    Tube secured:  20    Secured at:  The lips    Placement Verified By:  Capnometry    Complicating Factors:  None    Findings Post-Intubation:  BS equal bilateral and atraumatic/condition of teeth unchanged

## 2023-04-04 NOTE — ANESTHESIA PREPROCEDURE EVALUATION
04/04/2023  Ida Orozco is a 57 y.o., female.      Pre-op Assessment    I have reviewed the NPO Status.   I have reviewed the Medications.     Review of Systems  Anesthesia Hx:  No problems with previous Anesthesia    Social:  Non-Smoker    Cardiovascular:   Exercise tolerance: good ECG has been reviewed.    Hepatic/GI:   PUD, GERD, well controlled Liver Disease,    Musculoskeletal:   Arthritis     Endocrine:  Morbid Obesity / BMI > 40  Psych:   Psychiatric History          Physical Exam  General: Well nourished    Airway:  Mallampati: I   Mouth Opening: Normal  TM Distance: Normal  Tongue: Normal  Neck ROM: Normal ROM    Dental:  Intact    Chest/Lungs:  Clear to auscultation, Normal Respiratory Rate        Anesthesia Plan  Type of Anesthesia, risks & benefits discussed:    Anesthesia Type: Gen ETT  Intra-op Monitoring Plan: Standard ASA Monitors  Post Op Pain Control Plan: multimodal analgesia and IV/PO Opioids PRN  Induction:  IV  Airway Plan: Direct, Post-Induction  Informed Consent: Informed consent signed with the Patient and all parties understand the risks and agree with anesthesia plan.  All questions answered. Patient consented to blood products? No  ASA Score: 3    Ready For Surgery From Anesthesia Perspective.     .

## 2023-04-04 NOTE — PLAN OF CARE
Discharge instructions given to pt, verbalized understanding.  Tolerating PO fluids.  IV removed.  Oxycodone given as ordered for pain.  Mustache drsg changed; dry and intact.  Extra gauze and tape provided for home use.  Ambulating out to son  per RN in no distress.  Follow up 4/21 10am.

## 2023-04-04 NOTE — PATIENT INSTRUCTIONS
INSTRUCTIONS TO FOLLOW AFTER SINUS AND NASAL SURGERY  DR. SCHNEIDER - [facility]    Office hours:  Weekdays 8:00 am to 5:00 pm.  Please call [phone] and ask to speak with his nurse or medical assistant.    After-hours & weekends:  Please call [phone] and ask to speak with Dr. Hernandez or the ENT physician on call.    Your first office visit with Dr. Hernandez after surgery should have been already scheduled.  If you dont know when it is, call Dr. Fischer nurse or medical assistant at [phone].    Please call immediately if you have:    Temperature of 101° F or greater  Any unusual, painful swelling  Any active bleeding that saturates more than a 4x4 gauze  Any thick drainage green or yellow drainage  Changes in vision or swelling around the eye  Pain not relieved by your prescribed pain medication    ACTIVITY:    Sleep on your back with the head of the bead elevated, up on 2-3 pillows, or in a recliner for the first 3 to 5 days. This will help with swelling.     After surgery you may have a lot of nasal drainage. This is normal. You may breathe through your nose if youre able but avoid inhaling forcibly. Let all drainage fall on your mustache dressing and change it as needed.    You may wake up after surgery with thick white stockings on. Wear them until you are walking around more. It is important to walk around often while at home to keep your blood circulating and prevent blood clots.    If you use CPAP or BiPAP to sleep at night, you should wait at least 48 hours before resuming use. Dr. Hernandez will advise you when it is safe to do this.    You may shower 24 hours after surgery.    RESTRICTED ACTIVITIES AFTER SURGERY:    DO NOT blow your nose for 2 weeks. The only exception is that you may lightly blow your nose after using the sinus rinse. If you have to sneeze or cough, do so with your mouth open.     AVOID all heavy lifting, straining or bending for 2 weeks.     AVOID any sexual  activity for 2 weeks after surgery.    AVOID semi-contact sports or vigorous exercising for 3-4 weeks. Dr. Hernandez will let you know when you are cleared to resume exercise.    AVOID flying or swimming for 2 weeks.      DO NOT operate a motor vehicle or any type of heavy machinery within 24 hours of taking prescription pain medication.    DO NOT smoke or be around smokers.    AVOID irritating substances that might make you sneeze, such as dust, chalk, harsh chemicals, and allergic triggers. This might also include spicy foods.    DRESSINGS:    Change the gauze mustache dressing under your nose as needed. (If unsure what this dressing is or how to do this, ask your doctor or nurse before you leave the hospital.) You may have pinkish-red drainage for 2-3 days.    Usually there is no gauze packing placed inside the nose.  If packing is necessary, you will be informed by your surgeon.  Do not touch or pull at the packing. The packing will be removed by your doctor at your first visit after surgery.     You may also have a dissolvable stent or dissolvable sponge placed into the sinuses during surgery.  These usually do not need to be removed unless you are told otherwise by Dr. Hernandez.  You may notice small fragments of these items come out of your nose in the weeks following surgery.    MEDICATIONS:    After surgery, you will be sent home with prescriptions for pain medication and an anti-nausea medication. Antibiotics are usually not necessary.    Most people need pain medication for the first few days after surgery, although a narcotic is rarely necessary. The best pain control comes from a combination of ACETAMINOPHEN (Tylenol) and IBUPROFEN (Motrin). You will be given prescriptions for these at the recommended dose. These can be alternated so that you are taking something every 2 or 3 hours.    Some people have problems with bowel movements after surgery. If you have NOT had a bowel movement 3-5 days after  surgery, go to your local pharmacy and purchase an over the counter stool softener such as COLACE. You can also ask the pharmacist for his or her recommendation. If you still do not have a bowel movement after starting the softener, please call the office.    You may be given an ointment called MUPIROCIN to use after surgery if you also had septal surgery. If you are given this, use a cotton swab to apply gently inside the nostrils. Do this 2 times a day for 1 week.    You will need these over-the-counter medications after surgery:    SALINE SINUS RINSE ([employer] brand): You will use this to rinse out your nose and sinuses after surgery. Begin doing this the day after surgery, unless instructed otherwise by Dr. Hernandez.  You should do this 2 times a day, following the instructions on the box.    AFRIN (regular strength): Only use if you have nasal congestion or bleeding. Use 2 times per day for 3 days, stop for 1 day, continue 2 times per day for 3 days, AND NO MORE, then stop completely.  NOTE:  You may not need to do this at all.    DIET:    Avoid hot and spicy foods for 1 week after surgery.    Begin with bland foods the evening after surgery and advance to your regular diet as tolerated. It is not necessary to take only soft food unless you are recovering from tonsil surgery.    Drink plenty of fluids (water is best).     Avoid alcoholic and caffeinated beverages for 1 week after surgery(unless you are a habitual drinker at risk of withdrawal) because they can cause you to become dehydrated.

## 2023-04-04 NOTE — H&P
[facility]    Subjective:      Patient: Ida Orozco Patient PCP: Eliecer Jones MD         :  1965     Sex:  female      MRN:  [id number]          Date of Visit: 01/10/2023      Chief Complaint: Here for sinus surgery      2023 preop: Ida Orozco is a 57 y.o. female former smoker quitting in  with a history of migraines, HLD, crohn's s/p total colectomy w/end iliostomy (no longer on immunosuppression), obesity, CHELO, GERD, asthma and chronic sinusitis treated initially with oral antibiotics and unable to get high dose steroid drops budesonide without resolution of symptoms of congestion, thick mucus, cough and pressure. Prior balloon dilation sinuplasty x 2 last 3 years ago w/Dr. Tomas.      Patient had a more recent CT head completed 10/12/2022 images reviewed today show high right nasal septal deviation partial opacification of the anterior upper ethmoid frontal outflow area and of the ethmoids bilaterally with what might be middle meatal polyps on the right side, substantial mucosal thickening of both maxillary sinuses of at least 6 mm with complete obstruction of the ostiomeatal complexes bilaterally.  Hypoplastic frontal sinuses bilaterally with some thickening of the floor/outflow bilaterally what appears to be relative sparing of the sphenoid sinuses bilaterally though there is thickening of the area of the natural os bilaterally.  No appreciable thickening or effusion of the middle ears or mastoids bilaterally.  Unable to exclude bony dehiscence of the superior semicircular canals on either side on these limited images.      CT Sinuses 3/28/2023    Essentially pansinusitis with near total opacification of the ethmoids anterior greater than posterior.  Mild but some notable thickening of the mucosa of the sphenoid sinuses.  Thickening of the frontal outflows and floor of the frontal sinuses and opacification the ostiomeatal complex bilaterally with some small Monty cells and notable  maxillary thickening.  No erosion or bony disruption no suspicious or dangerous anatomy noted.              Review of Systems         Past Medical History  She has a past medical history of Anxiety, Asthma, Bronchitis, Crohn's colitis, Depression, Esophageal ulcer, Fatty liver disease, nonalcoholic, Gallstones, GERD (gastroesophageal reflux disease), History of sleeve gastrectomy, PTSD (post-traumatic stress disorder), Seasonal allergies, Symptomatic abdominal panniculus, and Ulcerative colitis.    Family / Surgical / Social History  Her family history includes Cancer in her maternal grandfather, mother, and paternal grandfather; Cirrhosis in her mother; Colon cancer in her maternal grandfather, maternal uncle, and maternal uncle; Heart disease (age of onset: 67) in her father.    Past Surgical History:   Procedure Laterality Date    APPENDECTOMY      CATHETERIZATION OF BOTH LEFT AND RIGHT HEART Right 2019    Procedure: CATHETERIZATION, HEART, BOTH LEFT AND RIGHT;  Surgeon: Beto Malin MD;  Location: [facility] CATH LAB;  Service: Cardiology;  Laterality: Right;     SECTION, LOW TRANSVERSE      CHOLECYSTECTOMY      open    COLECTOMY      total- 2013    CYSTOSCOPY W/ RETROGRADES N/A 2018    Procedure: CYSTOSCOPY, WITH RETROGRADE PYELOGRAM;  Surgeon: Butch Banks MD;  Location: [facility] OR;  Service: Urology;  Laterality: N/A;  [facility] SUR[facility]ICAL CONFIRMED     ENDOSCOPIC ULTRASOUND OF UPPER GASTROINTESTINAL TRACT N/A 11/10/2021    Procedure: ULTRASOUND, UPPER GI TRACT, ENDOSCOPIC;  Surgeon: Nhan Dee MD;  Location: [address]);  Service: Endoscopy;  Laterality: N/A;  MD Milena Whitehead PA-C; Quinn Whitman MD; Miladys Velasquez MA  Caller: Unspecified (3 days ago,  1:20 PM)  Fair enough. Neha, please schedule.       ----- Message -----   From: Milena Murillo PA-C   Sent: 2021  1    ESOPHAGOGASTRODUODENOSCOPY  2018    Dr. Castorena: ZEYNEP HUDSON  reflux esophagitis, hiatal hernia; Ectopic gastric mucosa in the upper third of the esophagus; gastric sleeve intact with unremarkable findings, gastritis; biopsy: stomach- Mild chronic antral and oxyntic gastritis, no activity, negative for h pylori    ESOPHAGOGASTRODUODENOSCOPY N/A 11/10/2021    Procedure: EGD (ESOPHAGOGASTRODUODENOSCOPY);  Surgeon: Nhan Dee MD;  Location: [facility] (2ND FLR);  Service: Endoscopy;  Laterality: N/A;    FLEXIBLE SIGMOIDOSCOPY  2018    Procedure: SIGMOIDOSCOPY, FLEXIBLE;  Surgeon: JENNY Virgen MD;  Location: [facility] OR 2ND FLR;  Service: Colon and Rectal;;    FLEXIBLE SIGMOIDOSCOPY  2018    Dr. Castorena: Non-patent surgical anastomosis, characterized by friable mucosa.    GASTRIC SLEEVE       HYSTERECTOMY      ILEOSCOPY  2018    Dr. Castorena: Patient is status-post total colectomy with end ileostomy. unremarkable findings    ILEOSTOMY REVISION      2014; 2014    LAPAROSCOPIC PROCTECTOMY N/A 2018    Procedure: PROCTECTOMY-LAPAROSCOPIC/CONVERTED TO OPEN;  Surgeon: JENNY Virgen MD;  Location: [facility] OR 2ND FLR;  Service: Colon and Rectal;  Laterality: N/A;    LYSIS OF ADHESIONS  2018    Procedure: LYSIS, ADHESIONS/ more than 2hours;  Surgeon: JENNY Virgen MD;  Location: [facility] OR 2ND FLR;  Service: Colon and Rectal;;    OOPHORECTOMY      PANNICULECTOMY Bilateral 2019    Procedure: PANNICULECTOMY;  Surgeon: Andrea Alvarado MD;  Location: [facility] OR 2ND FLR;  Service: Plastics;  Laterality: Bilateral;    REVISION COLOSTOMY N/A 2019    Procedure: REVISION, COLOSTOMY;  Surgeon: JENNY Virgen MD;  Location: [facility] OR 2ND FLR;  Service: Colon and Rectal;  Laterality: N/A;    thumb surgery      TONSILLECTOMY, ADENOIDECTOMY      WISDOM TOOTH EXTRACTION         Social History     Tobacco Use    Smoking status: Former     Types: Cigarettes     Quit date:      Years since quittin.0    Smokeless tobacco: Never   Substance and  Sexual Activity    Alcohol use: Yes     Comment: rarely    Drug use: No    Sexual activity: Never     Birth control/protection: See Surgical Hx       Medications  She has a current medication list which includes the following prescription(s): albuterol, budesonide, clonazepam, diclofenac sodium, diphenoxylate-atropine 2.5-0.025 mg, levocetirizine, loratadine, meclizine, pantoprazole, promethazine, sertraline, symbicort, topiramate, trazodone, ursodiol, vitamin d2, and [DISCONTINUED] humira(cf) pen.      Allergies  Review of patient's allergies indicates:   Allergen Reactions    Adhesive      Cause blisters    Dilaudid [hydromorphone] Nausea And Vomiting    Humira [adalimumab] Hives    Sulfa (sulfonamide antibiotics) Hives    Surgical stainless steel      Had surgical staples, caused irritation and infection       All medications, allergies, and past history have been reviewed.    Objective:      Vitals:  Vitals - 1 value per visit 12/14/2022 1/10/2023 1/10/2023   SYSTOLIC 108 - -   DIASTOLIC 58 - -   Pulse 56 - -   Temp - - 98   Resp - - -   SPO2 - - -   Weight (lb) 238.54 - 234.24   Weight (kg) 108.2 - 106.25   Height 64 - 64   BMI (Calculated) 40.9 - 40.2   VISIT REPORT - - -   Pain Score  - 3 -   Some recent data might be hidden       Body surface area is 2.19 meters squared.    Physical Exam:    GENERAL  APPEARANCE -  alert, appears stated age, cooperative, and morbidly obese  BARRIER(S) TO COMMUNICATION -  none VOICE - mildly hyponasal    INTEGUMENTARY  no suspicious head and neck lesions    HEENT  HEAD: Normocephalic, without obvious abnormality, atraumatic  FACE: INSPECTION - Symmetric, no signs of trauma, no suspicious lesion(s)  PALPATION -  No masses SALIVARY GLANDS - non-tender with no appreciable mass  STRENGTH - facial symmetry  NECK/THYROID: normal atraumatic, no neck masses, normal thyroid, no jvd    EYES  Normal occular alignment and mobility with no visible nystagmus at  rest    EARS/NOSE/MOUTH/THROAT  EARS  PINNAE AND EXTERNAL EARS - no suspicious lesion OTOSCOPIC EXAM (surgical microscopy was not used for visualization/instrumentation): EAR EXAM - Normal ear canals, tympanic membranes and mobility, and middle ear spaces bilaterally.  HEARING - grossly intact to voice/finger rub    NOSE AND SINUSES  EXTERNAL NOSE - Grossly normal for age/sex  SEPTUM - normal/no obstruction on anterior exam without decongestion TURBINATES - within normal limits MUCOSA - minimally edematous not particularly congested, no cyanosis no erythema no active purulence     MOUTH AND THROAT   ORAL CAVITY, LIPS, TEETH, GUMS & TONGUE - some thick mucus no thrush no erythema.  OROPHARYNX /TONSILS/PHARYNGEAL WALLS/HYPOPHARYNX - no erythema or exudates    CHEST AND LUNG   INSPECTION & AUSCULTATION - clear to auscultation bilaterally and normal effort, no stridor    CARDIOVASCULAR  AUSCULTATION & PERIPHERAL VASCULAR - regular rate and rhythm.    NEUROLOGIC  MENTAL STATUS - alert, interactive CRANIAL NERVES - normal    LYMPHATIC  HEAD AND NECK - non-palpable; SUPRACLAVICULAR - deferred; AXILLARY - deferred; INGUINAL - deferred; LIVER/SPLEEN - deferred        Procedure(s):  None          Labs:  WBC   Date Value Ref Range Status   11/11/2022 6.53 3.90 - 12.70 K/uL Final     Hemoglobin   Date Value Ref Range Status   11/11/2022 13.5 12.0 - 16.0 g/dL Final     Platelets   Date Value Ref Range Status   11/11/2022 259 150 - 450 K/uL Final     Creatinine   Date Value Ref Range Status   11/11/2022 1.0 0.5 - 1.4 mg/dL Final     TSH   Date Value Ref Range Status   11/11/2022 2.100 0.400 - 4.000 uIU/mL Final     Glucose   Date Value Ref Range Status   11/11/2022 87 70 - 110 mg/dL Final     Hemoglobin A1C   Date Value Ref Range Status   05/30/2019 5.6 4.0 - 5.6 % Final     Comment:     ADA Screening Guidelines:  5.7-6.4%  Consistent with prediabetes  >or=6.5%  Consistent with diabetes  High levels of fetal hemoglobin interfere  with the HbA1C  assay. Heterozygous hemoglobin variants (HbS, HgC, etc)do  not significantly interfere with this assay.   However, presence of multiple variants may affect accuracy.           Assessment:      Problem List Items Addressed This Visit          ENT    Chronic pansinusitis - Primary       Pulmonary    Moderate persistent asthma without complication    Overview     Not using Symbicort as supposed to   Using as needed   Had an upper respiratory illness in April ,causing Asthma flare  lasting for about 1 month  Lately better   CXR 4/3/2018 The lungs are clear, with normal appearance of pulmonary vasculature and no pleural effusion or pneumothorax.          Other Visit Diagnoses       Nasal polyps        History of endoscopic sinus surgery                       Plan:      Will proceed with image guided bilateral sinus surgery as planned and consented.

## 2023-04-05 ENCOUNTER — PATIENT MESSAGE (OUTPATIENT)
Dept: OTOLARYNGOLOGY | Facility: CLINIC | Age: 58
End: 2023-04-05
Payer: MEDICARE

## 2023-04-05 VITALS
DIASTOLIC BLOOD PRESSURE: 61 MMHG | HEART RATE: 75 BPM | SYSTOLIC BLOOD PRESSURE: 119 MMHG | TEMPERATURE: 98 F | HEIGHT: 64 IN | OXYGEN SATURATION: 93 % | RESPIRATION RATE: 20 BRPM | WEIGHT: 234 LBS | BODY MASS INDEX: 39.95 KG/M2

## 2023-04-05 RX ORDER — OXYCODONE HYDROCHLORIDE 5 MG/1
5 TABLET ORAL EVERY 12 HOURS PRN
Qty: 10 TABLET | Refills: 0 | Status: SHIPPED | OUTPATIENT
Start: 2023-04-05 | End: 2023-05-03

## 2023-04-05 NOTE — ANESTHESIA POSTPROCEDURE EVALUATION
Anesthesia Post Evaluation    Patient: Ida Orozco    Procedure(s) Performed: Procedure(s) (LRB):  Pan Sinus FESS, WITH IMAGING GUIDANCE Disc loaded (Bilateral)    Final Anesthesia Type: general      Patient location during evaluation: PACU  Patient participation: Yes- Able to Participate  Level of consciousness: awake and alert and oriented  Post-procedure vital signs: reviewed and stable  Pain management: adequate  Airway patency: patent  CHELO mitigation strategies: Multimodal analgesia, Extubation while patient is awake and Verification of full reversal of neuromuscular block  PONV status at discharge: No PONV  Anesthetic complications: no      Cardiovascular status: blood pressure returned to baseline  Respiratory status: unassisted, spontaneous ventilation and room air  Hydration status: euvolemic  Follow-up not needed.          Vitals Value Taken Time   /55 04/04/23 1147   Temp 36.6 °C (97.9 °F) 04/04/23 1028   Pulse 90 04/04/23 1144   Resp 20 04/04/23 1130   SpO2 95 % 04/04/23 1144   Vitals shown include unvalidated device data.      Event Time   Out of Recovery 11:53:00         Pain/Tito Score: Pain Rating Prior to Med Admin: 4 (4/4/2023 11:27 AM)  Pain Rating Post Med Admin: 4 (4/4/2023 11:28 AM)  Tito Score: 10 (4/4/2023 11:28 AM)

## 2023-04-05 NOTE — TELEPHONE ENCOUNTER
Called pt back. Advised that Dr. Hernandez replied to her initial message just a few minutes ago. Pt states that she cannot take Ibuprofen due to prior gastric bypass surgery. States that the Tylenol is not helping. Advised of Dr. Hernandez's message. Pt verbalized understanding. ThanksUna

## 2023-04-05 NOTE — TELEPHONE ENCOUNTER
"----- Message from Makenna Pa sent at 4/5/2023 12:08 PM CDT -----  Regarding: Call back  "Type:  Patient Call Back    Who Called:PT    What is the reqeust in detail:Pt requesting call back had nasal surgery and still not feeling well requesting nurse to give her a call. Please advise    Can the clinic reply by [facility]?no    Best Call Back Number:[phone]      Additional Information:            "

## 2023-04-11 ENCOUNTER — PATIENT MESSAGE (OUTPATIENT)
Dept: OTOLARYNGOLOGY | Facility: CLINIC | Age: 58
End: 2023-04-11
Payer: MEDICARE

## 2023-04-21 ENCOUNTER — OFFICE VISIT (OUTPATIENT)
Dept: OTOLARYNGOLOGY | Facility: CLINIC | Age: 58
End: 2023-04-21
Payer: MEDICARE

## 2023-04-21 VITALS
BODY MASS INDEX: 39.95 KG/M2 | SYSTOLIC BLOOD PRESSURE: 107 MMHG | HEIGHT: 64 IN | WEIGHT: 234 LBS | HEART RATE: 47 BPM | DIASTOLIC BLOOD PRESSURE: 68 MMHG

## 2023-04-21 DIAGNOSIS — J32.4 CHRONIC PANSINUSITIS: Primary | ICD-10-CM

## 2023-04-21 DIAGNOSIS — Z98.890 S/P FESS (FUNCTIONAL ENDOSCOPIC SINUS SURGERY): ICD-10-CM

## 2023-04-21 PROCEDURE — 99999 PR PBB SHADOW E&M-EST. PATIENT-LVL V: CPT | Mod: PBBFAC,,, | Performed by: OTOLARYNGOLOGY

## 2023-04-21 PROCEDURE — 31231 NASAL ENDOSCOPY DX: CPT | Mod: S$GLB,,, | Performed by: OTOLARYNGOLOGY

## 2023-04-21 PROCEDURE — 3078F DIAST BP <80 MM HG: CPT | Mod: CPTII,S$GLB,, | Performed by: OTOLARYNGOLOGY

## 2023-04-21 PROCEDURE — 99214 PR OFFICE/OUTPT VISIT, EST, LEVL IV, 30-39 MIN: ICD-10-PCS | Mod: 25,S$GLB,, | Performed by: OTOLARYNGOLOGY

## 2023-04-21 PROCEDURE — 3074F PR MOST RECENT SYSTOLIC BLOOD PRESSURE < 130 MM HG: ICD-10-PCS | Mod: CPTII,S$GLB,, | Performed by: OTOLARYNGOLOGY

## 2023-04-21 PROCEDURE — 3078F PR MOST RECENT DIASTOLIC BLOOD PRESSURE < 80 MM HG: ICD-10-PCS | Mod: CPTII,S$GLB,, | Performed by: OTOLARYNGOLOGY

## 2023-04-21 PROCEDURE — 1160F RVW MEDS BY RX/DR IN RCRD: CPT | Mod: CPTII,S$GLB,, | Performed by: OTOLARYNGOLOGY

## 2023-04-21 PROCEDURE — 31231 NASAL/SINUS ENDOSCOPY: ICD-10-PCS | Mod: S$GLB,,, | Performed by: OTOLARYNGOLOGY

## 2023-04-21 PROCEDURE — 99999 PR PBB SHADOW E&M-EST. PATIENT-LVL V: ICD-10-PCS | Mod: PBBFAC,,, | Performed by: OTOLARYNGOLOGY

## 2023-04-21 PROCEDURE — 3008F BODY MASS INDEX DOCD: CPT | Mod: CPTII,S$GLB,, | Performed by: OTOLARYNGOLOGY

## 2023-04-21 PROCEDURE — 1160F PR REVIEW ALL MEDS BY PRESCRIBER/CLIN PHARMACIST DOCUMENTED: ICD-10-PCS | Mod: CPTII,S$GLB,, | Performed by: OTOLARYNGOLOGY

## 2023-04-21 PROCEDURE — 3074F SYST BP LT 130 MM HG: CPT | Mod: CPTII,S$GLB,, | Performed by: OTOLARYNGOLOGY

## 2023-04-21 PROCEDURE — 3008F PR BODY MASS INDEX (BMI) DOCUMENTED: ICD-10-PCS | Mod: CPTII,S$GLB,, | Performed by: OTOLARYNGOLOGY

## 2023-04-21 PROCEDURE — 1159F PR MEDICATION LIST DOCUMENTED IN MEDICAL RECORD: ICD-10-PCS | Mod: CPTII,S$GLB,, | Performed by: OTOLARYNGOLOGY

## 2023-04-21 PROCEDURE — 99214 OFFICE O/P EST MOD 30 MIN: CPT | Mod: 25,S$GLB,, | Performed by: OTOLARYNGOLOGY

## 2023-04-21 PROCEDURE — 1159F MED LIST DOCD IN RCRD: CPT | Mod: CPTII,S$GLB,, | Performed by: OTOLARYNGOLOGY

## 2023-04-21 NOTE — PROGRESS NOTES
[facility]  POSTOPERATIVE VISIT NOTE    Subjective:      Patient: Ida Orozco Patient PCP: Eliecer Jones MD         :  1965     Sex:  female      MRN:  [id number]          Date of Visit: 2023      Chief Complaint/Narrative: Post-op Evaluation (S/P Pan Sinus FESS, WITH IMAGING GUIDANCE Disc loaded (Bilateral) /(1) Bilateral total sphenoethmoidectomy, endoscopic. (2) Bilateral maxillary antrostomy w/o removal of contents, endoscopic. (3) Bilateral frontal sinus exploration w/o removal of contents, endoscopic. (4) Use of computer image guidance, cranial, extradural.//Pt states she is breathing better, sense of smell has returned. )      Subjective:  s/p Bilateral pansinus FESS 2023 for pansinusitis with polyps.  Dramatic improvement in symptoms.  She had some pretty significant frontal pressure and pain after surgery and then a large clot came out and headache resolved.  She is rinsing twice daily with budesonide.  She has resolution of her loss of smell.  She is not seeing any blood or purulence.    All medications, allergies, and past history have been reviewed.    Objective:      Vitals:  Vitals - 1 value per visit 2023   SYSTOLIC - - 107   DIASTOLIC - - 68   Pulse - - 47   Temp - - -   Resp - - -   SPO2 - - -   Weight (lb) - - 234   Weight (kg) - - 106.142   Height - - 64   BMI (Calculated) - - 40.1   VISIT REPORT - - -   Pain Score  6 0 -   Some recent data might be hidden       Body surface area is 2.19 meters squared.    Physical Exam:    Normal nasal and eye exam.      Procedure(s):  Flexible digital distal chip at tip Storz endoscope used bilaterally with no topical sprays revealing well desiccated and open sinuses completely on the right side with a wide open frontal outflow, left side with some minimal crusting and some granular healing tissue.  Some minimal anterior supra volar cell tissue remains and there is narrowing concentrically of the frontal outflow.   No pooling in the maxillary or sphenoid sinuses bilaterally.  No purulence.    RIGHT FRONTAL RECESS AND OUTFLOW    LEFT FRONTAL RECESS AND EDEMATOUS / STENOTIC OUTFLOW          Assessment:      Problem List Items Addressed This Visit          ENT    Chronic pansinusitis - Primary     Other Visit Diagnoses       S/P FESS (functional endoscopic sinus surgery)                     Plan/recommendations:      Continue with saline throughout the day to prevent dryness and rinsing twice daily.  Do not add the budesonide to the rinse but instead after the rinse laid down the bed in the Lexi position as discussed and illustrated and drip the budesonide into the nose mostly on the left side.  Allow it to lay there with head off the bed for as long as tolerated then get up rinse the mouth gargle and spit but do not rinse the nose.  Recheck with endoscopy in 3 weeks.  Contact the office sooner if there is any recurrence of frontal headache or any other concerns.    Hopefully the left frontal sinus open entirely or least remain asymptomatic and not require frontal balloon dilation or worse frontal revision surgery.

## 2023-04-21 NOTE — PROCEDURES
"Nasal/sinus endoscopy    Date/Time: 4/21/2023 10:00 AM    Time out: Immediately prior to procedure a "time out" was called to verify the correct patient, procedure, equipment, support staff and site/side marked as required.  Performed by: Jay Hernandez MD  Authorized by: Jay Hernandez MD     Consent Done?:  Yes (Verbal)  Anesthesia:     Local anesthetic:  None    Location details:  Left nostril    Local Atomizer?      Type of Endoscope:  Flexible  Nose:     Procedure Performed:  Nasal Endoscopy  Nasopharynx:      See note for details.    "

## 2023-05-03 ENCOUNTER — OFFICE VISIT (OUTPATIENT)
Dept: PRIMARY CARE CLINIC | Facility: CLINIC | Age: 58
End: 2023-05-03
Payer: MEDICARE

## 2023-05-03 VITALS
TEMPERATURE: 98 F | BODY MASS INDEX: 38.76 KG/M2 | SYSTOLIC BLOOD PRESSURE: 102 MMHG | HEIGHT: 64 IN | DIASTOLIC BLOOD PRESSURE: 58 MMHG | RESPIRATION RATE: 17 BRPM | OXYGEN SATURATION: 98 % | HEART RATE: 54 BPM | WEIGHT: 227.06 LBS

## 2023-05-03 DIAGNOSIS — G47.00 INSOMNIA, UNSPECIFIED TYPE: ICD-10-CM

## 2023-05-03 DIAGNOSIS — F33.0 MILD EPISODE OF RECURRENT MAJOR DEPRESSIVE DISORDER: ICD-10-CM

## 2023-05-03 DIAGNOSIS — Z12.31 ENCOUNTER FOR SCREENING MAMMOGRAM FOR BREAST CANCER: ICD-10-CM

## 2023-05-03 DIAGNOSIS — Z93.2 ILEOSTOMY STATUS: ICD-10-CM

## 2023-05-03 DIAGNOSIS — F41.9 ANXIETY: ICD-10-CM

## 2023-05-03 DIAGNOSIS — M81.0 AGE-RELATED OSTEOPOROSIS WITHOUT CURRENT PATHOLOGICAL FRACTURE: ICD-10-CM

## 2023-05-03 DIAGNOSIS — K52.9 INFLAMMATORY BOWEL DISEASE: ICD-10-CM

## 2023-05-03 DIAGNOSIS — R79.9 ABNORMAL FINDING OF BLOOD CHEMISTRY, UNSPECIFIED: ICD-10-CM

## 2023-05-03 DIAGNOSIS — Z00.00 ROUTINE PHYSICAL EXAMINATION: Primary | ICD-10-CM

## 2023-05-03 DIAGNOSIS — E78.5 HYPERLIPIDEMIA, UNSPECIFIED HYPERLIPIDEMIA TYPE: ICD-10-CM

## 2023-05-03 DIAGNOSIS — Z98.84 BARIATRIC SURGERY STATUS: ICD-10-CM

## 2023-05-03 DIAGNOSIS — Z86.39 PERSONAL HISTORY OF OTHER ENDOCRINE, NUTRITIONAL AND METABOLIC DISEASE: ICD-10-CM

## 2023-05-03 DIAGNOSIS — K50.919 CROHN'S DISEASE WITH COMPLICATION, UNSPECIFIED GASTROINTESTINAL TRACT LOCATION: ICD-10-CM

## 2023-05-03 DIAGNOSIS — J45.40 MODERATE PERSISTENT ASTHMA WITHOUT COMPLICATION: ICD-10-CM

## 2023-05-03 PROBLEM — S39.012S LUMBAR SPINE STRAIN, SEQUELA: Status: RESOLVED | Noted: 2019-06-07 | Resolved: 2023-05-03

## 2023-05-03 PROBLEM — E65 SYMPTOMATIC ABDOMINAL PANNICULUS: Status: RESOLVED | Noted: 2019-12-20 | Resolved: 2023-05-03

## 2023-05-03 PROBLEM — R10.84 GENERALIZED ABDOMINAL PAIN: Status: RESOLVED | Noted: 2021-06-25 | Resolved: 2023-05-03

## 2023-05-03 PROCEDURE — 3008F BODY MASS INDEX DOCD: CPT | Mod: CPTII,S$GLB,, | Performed by: NURSE PRACTITIONER

## 2023-05-03 PROCEDURE — 99999 PR PBB SHADOW E&M-EST. PATIENT-LVL IV: ICD-10-PCS | Mod: PBBFAC,,, | Performed by: NURSE PRACTITIONER

## 2023-05-03 PROCEDURE — 3078F DIAST BP <80 MM HG: CPT | Mod: CPTII,S$GLB,, | Performed by: NURSE PRACTITIONER

## 2023-05-03 PROCEDURE — 99999 PR PBB SHADOW E&M-EST. PATIENT-LVL IV: CPT | Mod: PBBFAC,,, | Performed by: NURSE PRACTITIONER

## 2023-05-03 PROCEDURE — 3008F PR BODY MASS INDEX (BMI) DOCUMENTED: ICD-10-PCS | Mod: CPTII,S$GLB,, | Performed by: NURSE PRACTITIONER

## 2023-05-03 PROCEDURE — 1159F PR MEDICATION LIST DOCUMENTED IN MEDICAL RECORD: ICD-10-PCS | Mod: CPTII,S$GLB,, | Performed by: NURSE PRACTITIONER

## 2023-05-03 PROCEDURE — 1159F MED LIST DOCD IN RCRD: CPT | Mod: CPTII,S$GLB,, | Performed by: NURSE PRACTITIONER

## 2023-05-03 PROCEDURE — 3074F PR MOST RECENT SYSTOLIC BLOOD PRESSURE < 130 MM HG: ICD-10-PCS | Mod: CPTII,S$GLB,, | Performed by: NURSE PRACTITIONER

## 2023-05-03 PROCEDURE — 99396 PR PREVENTIVE VISIT,EST,40-64: ICD-10-PCS | Mod: GZ,S$GLB,, | Performed by: NURSE PRACTITIONER

## 2023-05-03 PROCEDURE — 3078F PR MOST RECENT DIASTOLIC BLOOD PRESSURE < 80 MM HG: ICD-10-PCS | Mod: CPTII,S$GLB,, | Performed by: NURSE PRACTITIONER

## 2023-05-03 PROCEDURE — 3074F SYST BP LT 130 MM HG: CPT | Mod: CPTII,S$GLB,, | Performed by: NURSE PRACTITIONER

## 2023-05-03 PROCEDURE — 99396 PREV VISIT EST AGE 40-64: CPT | Mod: GZ,S$GLB,, | Performed by: NURSE PRACTITIONER

## 2023-05-03 RX ORDER — SERTRALINE HYDROCHLORIDE 100 MG/1
200 TABLET, FILM COATED ORAL DAILY
Qty: 180 TABLET | Refills: 1 | Status: SHIPPED | OUTPATIENT
Start: 2023-05-03 | End: 2023-12-11 | Stop reason: SDUPTHER

## 2023-05-03 RX ORDER — CLONAZEPAM 1 MG/1
1 TABLET ORAL DAILY PRN
Qty: 90 TABLET | Refills: 1 | Status: SHIPPED | OUTPATIENT
Start: 2023-05-03 | End: 2023-11-19 | Stop reason: SDUPTHER

## 2023-05-03 RX ORDER — DIPHENOXYLATE HYDROCHLORIDE AND ATROPINE SULFATE 2.5; .025 MG/1; MG/1
1 TABLET ORAL 4 TIMES DAILY PRN
Qty: 30 TABLET | Refills: 2 | Status: SHIPPED | OUTPATIENT
Start: 2023-05-03

## 2023-05-03 RX ORDER — TRAZODONE HYDROCHLORIDE 100 MG/1
100 TABLET ORAL NIGHTLY PRN
Qty: 90 TABLET | Refills: 1 | Status: SHIPPED | OUTPATIENT
Start: 2023-05-03 | End: 2023-10-02

## 2023-05-03 NOTE — PROGRESS NOTES
"Chief Complaint  Chief Complaint   Patient presents with    Anxiety     Follow up       HPI    Patient here for routine physical exam. Has been losing weight has lost 60 lbs over the past 18 months intentionally.  Due for repeat lab work at this time.    Major depressive disorder- Has been caring for both of her parents, her dad passed in February of this year. Has been having trouble sleeping and waking at the same time every morning. Generally dysphoric and feels "empty". Has been tearful. Anxiety is chronic. Has been taking trazodone and THC gummies which do help her sleep. On trazodone 100 mg nightly. Also on klonopin 1 mg nightly. Not interested in therapy at this time.     Crohns disease- continues with chronic abdominal pain which is unchanged. Ileostomy has been working well. Not currently under the care of GI as she states that she cannot take any biologics.     Hair and skin changes-  Continues to complain of thinning hair. Skin dry and scaly despite adequate hydration. Gastric sleeve in 2016 not currently on bariatric vitamins. No MVI. Constantly feels cold and tired.  No history of hypothyroidism in the past requesting recheck.    PAST MEDICAL HISTORY:  Past Medical History:   Diagnosis Date    Anxiety     Asthma     Last ER visit with covid    Bradycardia     Bronchitis     Crohn's colitis     Depression     Esophageal ulcer 12/16/2014    Fatty liver disease, nonalcoholic     Gallstones     GERD (gastroesophageal reflux disease)     History of sleeve gastrectomy 06/24/2016    PTSD (post-traumatic stress disorder)     Seasonal allergies     Symptomatic abdominal panniculus     Ulcerative colitis        PAST SURGICAL HISTORY:  Past Surgical History:   Procedure Laterality Date    APPENDECTOMY      CATHETERIZATION OF BOTH LEFT AND RIGHT HEART Right 11/13/2019    Procedure: CATHETERIZATION, HEART, BOTH LEFT AND RIGHT;  Surgeon: Beto Malin MD;  Location: [facility] CATH LAB;  Service: Cardiology;  " Laterality: Right;     SECTION, LOW TRANSVERSE      CHOLECYSTECTOMY      open    COLECTOMY      total- 2013    CYSTOSCOPY W/ RETROGRADES N/A 2018    Procedure: CYSTOSCOPY, WITH RETROGRADE PYELOGRAM;  Surgeon: Butch Banks MD;  Location: [facility];  Service: Urology;  Laterality: N/A;  HANSEN SURGICAL CONFIRMED     ENDOSCOPIC ULTRASOUND OF UPPER GASTROINTESTINAL TRACT N/A 11/10/2021    Procedure: ULTRASOUND, UPPER GI TRACT, ENDOSCOPIC;  Surgeon: Nhan Dee MD;  Location: [facility] (2ND FLR);  Service: Endoscopy;  Laterality: N/A;  MD Milena Whitehead PA-C; Quinn Whitman MD; Miladys Velasquez MA  Caller: Unspecified (3 days ago,  1:20 PM)  Fair enough. Neha, please schedule.       ----- Message -----   From: Milena Murillo PA-C   Sent: 2021  1    ESOPHAGOGASTRODUODENOSCOPY  2018    Dr. Castorena: LA Grade A reflux esophagitis, hiatal hernia; Ectopic gastric mucosa in the upper third of the esophagus; gastric sleeve intact with unremarkable findings, gastritis; biopsy: stomach- Mild chronic antral and oxyntic gastritis, no activity, negative for h pylori    ESOPHAGOGASTRODUODENOSCOPY N/A 11/10/2021    Procedure: EGD (ESOPHAGOGASTRODUODENOSCOPY);  Surgeon: Nhan Dee MD;  Location: [facility] (2ND FLR);  Service: Endoscopy;  Laterality: N/A;    FESS, WITH IMAGING GUIDANCE Bilateral 2023    Procedure: Pan Sinus FESS, WITH IMAGING GUIDANCE Disc loaded;  Surgeon: Jay Hernandez MD;  Location: [facility] OR;  Service: ENT;  Laterality: Bilateral;  balloon dilation of sinuses    FLEXIBLE SIGMOIDOSCOPY  2018    Procedure: SIGMOIDOSCOPY, FLEXIBLE;  Surgeon: JENNY Virgen MD;  Location: [facility] 2ND FLR;  Service: Colon and Rectal;;    FLEXIBLE SIGMOIDOSCOPY  2018    Dr. Castorena: Non-patent surgical anastomosis, characterized by friable mucosa.    GASTRIC SLEEVE       HYSTERECTOMY      ILEOSCOPY  2018    Dr. Castorena: Patient is status-post  total colectomy with end ileostomy. unremarkable findings    ILEOSTOMY REVISION      2014; 2014    LAPAROSCOPIC PROCTECTOMY N/A 2018    Procedure: PROCTECTOMY-LAPAROSCOPIC/CONVERTED TO OPEN;  Surgeon: JENNY Virgen MD;  Location: [facility] OR 2ND FLR;  Service: Colon and Rectal;  Laterality: N/A;    LYSIS OF ADHESIONS  2018    Procedure: LYSIS, ADHESIONS/ more than 2hours;  Surgeon: JENNY Virgen MD;  Location: [facility] OR 2ND FLR;  Service: Colon and Rectal;;    OOPHORECTOMY      PANNICULECTOMY Bilateral 2019    Procedure: PANNICULECTOMY;  Surgeon: Andrea Alvarado MD;  Location: [facility] OR 2ND FLR;  Service: Plastics;  Laterality: Bilateral;    REVISION COLOSTOMY N/A 2019    Procedure: REVISION, COLOSTOMY;  Surgeon: JENNY Virgen MD;  Location: [facility] OR 2ND FLR;  Service: Colon and Rectal;  Laterality: N/A;    thumb surgery      TONSILLECTOMY, ADENOIDECTOMY      WISDOM TOOTH EXTRACTION         SOCIAL HISTORY:  Social History     Socioeconomic History    Marital status: Legally    Tobacco Use    Smoking status: Former     Types: Cigarettes     Quit date:      Years since quittin.3    Smokeless tobacco: Never   Substance and Sexual Activity    Alcohol use: Yes     Comment: rarely    Drug use: No    Sexual activity: Never     Birth control/protection: See Surgical Hx       FAMILY HISTORY:  Family History   Problem Relation Age of Onset    Cancer Mother         skin    Cirrhosis Mother     Heart disease Father 67    Cancer Paternal Grandfather         ?    Cancer Maternal Grandfather         colon     Colon cancer Maternal Grandfather     Colon cancer Maternal Uncle     Colon cancer Maternal Uncle     Crohn's disease Neg Hx     Ulcerative colitis Neg Hx     Stomach cancer Neg Hx     Esophageal cancer Neg Hx     Celiac disease Neg Hx     Breast cancer Neg Hx     Ovarian cancer Neg Hx        ALLERGIES AND MEDICATIONS: updated and reviewed.  Review of patient's allergies  indicates:   Allergen Reactions    Adhesive      Cause blisters    Dilaudid [hydromorphone] Nausea And Vomiting    Humira [adalimumab] Hives    Sulfa (sulfonamide antibiotics) Hives    Surgical stainless steel      Had surgical staples, caused irritation and infection     Current Outpatient Medications   Medication Sig Dispense Refill    albuterol (PROVENTIL/VENTOLIN HFA) 90 mcg/actuation inhaler Inhale 2 puffs into the lungs every 4 (four) hours as needed for Wheezing or Shortness of Breath. Rescue 8 g 5    budesonide (PULMICORT) 0.5 mg/2 mL nebulizer solution As directed BID 60 mL 2    diclofenac sodium (VOLTAREN) 1 % Gel Apply 2 g topically 4 (four) times daily. 100 g 5    levocetirizine (XYZAL) 5 MG tablet Take 1 tablet (5 mg total) by mouth every evening. 90 tablet 1    loratadine (CLARITIN) 10 mg tablet Take 1 tablet (10 mg total) by mouth once daily. 90 tablet 1    meclizine (ANTIVERT) 25 mg tablet Take 1 tablet (25 mg total) by mouth 3 (three) times daily as needed for Dizziness. 30 tablet 0    pantoprazole (PROTONIX) 40 MG tablet TAKE ONE TABLET BY MOUTH ONCE DAILY 90 tablet 1    promethazine (PHENERGAN) 25 MG tablet Take 1 tablet (25 mg total) by mouth every 6 (six) hours as needed for Nausea. 15 tablet 2    topiramate (TOPAMAX) 100 MG tablet TAKE ONE TABLET BY MOUTH ONCE DAILY 90 tablet 3    VITAMIN D2 1,250 mcg (50,000 unit) capsule TAKE ONE CAPSULE BY MOUTH EVERY 7 DAYS 12 capsule 3    clonazePAM (KLONOPIN) 1 MG tablet Take 1 tablet (1 mg total) by mouth daily as needed for Anxiety. 90 tablet 1    diphenoxylate-atropine 2.5-0.025 mg (LOMOTIL) 2.5-0.025 mg per tablet Take 1 tablet by mouth 4 (four) times daily as needed for Diarrhea. 30 tablet 2    sertraline (ZOLOFT) 100 MG tablet Take 2 tablets (200 mg total) by mouth once daily. 180 tablet 1    traZODone (DESYREL) 100 MG tablet Take 1 tablet (100 mg total) by mouth nightly as needed for Insomnia. 90 tablet 1     No current facility-administered  "medications for this visit.         ROS  Review of Systems   Constitutional:  Positive for fatigue and unexpected weight change (Intentional weight loss). Negative for chills and fever.   HENT:  Negative for ear pain, postnasal drip and sinus pain.    Respiratory:  Negative for cough and shortness of breath.    Cardiovascular:  Negative for chest pain.   Gastrointestinal:  Positive for diarrhea. Negative for nausea and vomiting.   Skin:         Hair thinning, dry skin   Psychiatric/Behavioral:  Positive for dysphoric mood and sleep disturbance. The patient is nervous/anxious.          PHYSICAL EXAM  Vitals:    05/03/23 0739   BP: (!) 102/58   BP Location: Right arm   Patient Position: Sitting   BP Method: Large (Manual)   Pulse: (!) 54   Resp: 17   Temp: 97.9 °F (36.6 °C)   TempSrc: Temporal   SpO2: 98%   Weight: 103 kg (227 lb 1.2 oz)   Height: 5' 4" (1.626 m)    Body mass index is 38.98 kg/m².  Weight: 103 kg (227 lb 1.2 oz)   Height: 5' 4" (162.6 cm)     Physical Exam  Constitutional:       Appearance: She is well-developed.   HENT:      Head: Normocephalic.      Mouth/Throat:      Pharynx: Uvula midline.   Eyes:      Conjunctiva/sclera: Conjunctivae normal.   Cardiovascular:      Rate and Rhythm: Normal rate and regular rhythm.      Pulses: Normal pulses.           Radial pulses are 2+ on the right side and 2+ on the left side.      Heart sounds: Normal heart sounds. No murmur heard.  Pulmonary:      Effort: Pulmonary effort is normal.      Breath sounds: Normal breath sounds. No wheezing.   Abdominal:      Comments: Right upper quadrant ileostomy intact   Lymphadenopathy:      Cervical: No cervical adenopathy.   Skin:     General: Skin is warm and dry.      Findings: No rash.   Neurological:      Mental Status: She is alert and oriented to person, place, and time.   Psychiatric:         Mood and Affect: Affect is flat and tearful.         Health Maintenance         Date Due Completion Date    Hemoglobin A1c " (Diabetic Prevention Screening) 05/30/2022 5/30/2019    Shingles Vaccine (1 of 2) 08/29/2023 (Originally 7/27/2017) ---    COVID-19 Vaccine (3 - Booster for Pfizer series) 08/29/2023 (Originally 6/11/2021) 4/16/2021    Mammogram 08/09/2023 8/9/2022    Lipid Panel 11/11/2023 11/11/2022    TETANUS VACCINE 10/24/2028 10/24/2018              Assessment & Plan    Problem List Items Addressed This Visit          Unprioritized    Age-related osteoporosis without current pathological fracture    RESOLVED: Anxiety    Overview  Repeat DEXA screening in December.  Continue calcium and vitamin-D supplementation.        Relevant Medications    sertraline (ZOLOFT) 100 MG tablet      Bariatric surgery status-Gastric sleeve     Overview     Gastric sleeve            Relevant Orders    Folate    Iron    Magnesium (Completed)    Vitamin B1    Vitamin B12    Vitamin D    BMI 38.0-38.9,adult    Relevant Orders    Hemoglobin A1C    Crohn's disease with complication    Overview     pt advised to get reestablished with gastroenterologist           Hyperlipidemia    Relevant Orders    Lipid Panel (Completed)    Inflammatory bowel disease    Overview     IBD (suspected UC) s/p TAC with end ileostomy   bloody mucous from the rectum  Ulcerative proctitis           Mild episode of recurrent major depressive disorder    Relevant Orders    CBC Auto Differential (Completed)    Comprehensive Metabolic Panel (Completed)  Patient not interested changing regimen at this time.  Continue Zoloft 100 mg daily.  Trazodone good increase to 150 mg daily though she is not interested.  Continue Klonopin 1 mg nightly.  Consider psych referral.  Not interested in therapy grief counseling.      Moderate persistent asthma without complication    Overview     Appears stable off of all maintenance therapy.           S/p ileostomy revision within the subcutaneous tissue.     Other Visit Diagnoses       Routine physical examination    -  Primary    Relevant Orders     CBC Auto Differential (Completed)    Comprehensive Metabolic Panel (Completed)    Lipid Panel (Completed)    Insomnia, unspecified type        Relevant Medications    traZODone (DESYREL) 100 MG tablet    Personal history of other endocrine, nutritional and metabolic disease        Relevant Orders    Folate    Vitamin B12    Abnormal finding of blood chemistry, unspecified        Relevant Orders    Iron    Hemoglobin A1C    Encounter for screening mammogram for breast cancer        Relevant Orders    Mammo Digital Screening Bilat w/ Manjinder            Follow-up: Follow up in about 6 months (around 11/3/2023) for follow up with Dr. Jones.    Janet Guardado    Medication List with Changes/Refills   Current Medications    ALBUTEROL (PROVENTIL/VENTOLIN HFA) 90 MCG/ACTUATION INHALER    Inhale 2 puffs into the lungs every 4 (four) hours as needed for Wheezing or Shortness of Breath. Rescue    BUDESONIDE (PULMICORT) 0.5 MG/2 ML NEBULIZER SOLUTION    As directed BID    DICLOFENAC SODIUM (VOLTAREN) 1 % GEL    Apply 2 g topically 4 (four) times daily.    LEVOCETIRIZINE (XYZAL) 5 MG TABLET    Take 1 tablet (5 mg total) by mouth every evening.    LORATADINE (CLARITIN) 10 MG TABLET    Take 1 tablet (10 mg total) by mouth once daily.    MECLIZINE (ANTIVERT) 25 MG TABLET    Take 1 tablet (25 mg total) by mouth 3 (three) times daily as needed for Dizziness.    PANTOPRAZOLE (PROTONIX) 40 MG TABLET    TAKE ONE TABLET BY MOUTH ONCE DAILY    PROMETHAZINE (PHENERGAN) 25 MG TABLET    Take 1 tablet (25 mg total) by mouth every 6 (six) hours as needed for Nausea.    TOPIRAMATE (TOPAMAX) 100 MG TABLET    TAKE ONE TABLET BY MOUTH ONCE DAILY    VITAMIN D2 1,250 MCG (50,000 UNIT) CAPSULE    TAKE ONE CAPSULE BY MOUTH EVERY 7 DAYS   Changed and/or Refilled Medications    Modified Medication Previous Medication    CLONAZEPAM (KLONOPIN) 1 MG TABLET clonazePAM (KLONOPIN) 1 MG tablet       Take 1 tablet (1 mg total) by mouth daily as needed for  Anxiety.    TAKE ONE TABLET BY MOUTH ONCE DAILY AS NEEDED FOR ANXIETY    DIPHENOXYLATE-ATROPINE 2.5-0.025 MG (LOMOTIL) 2.5-0.025 MG PER TABLET diphenoxylate-atropine 2.5-0.025 mg (LOMOTIL) 2.5-0.025 mg per tablet       Take 1 tablet by mouth 4 (four) times daily as needed for Diarrhea.    Take 1 tablet by mouth 4 (four) times daily as needed for Diarrhea.    SERTRALINE (ZOLOFT) 100 MG TABLET sertraline (ZOLOFT) 100 MG tablet       Take 2 tablets (200 mg total) by mouth once daily.    TAKE TWO TABLETS BY MOUTH ONCE DAILY.    TRAZODONE (DESYREL) 100 MG TABLET traZODone (DESYREL) 100 MG tablet       Take 1 tablet (100 mg total) by mouth nightly as needed for Insomnia.    Take 1 tablet (100 mg total) by mouth nightly as needed for Insomnia.   Discontinued Medications    DOXYCYCLINE (VIBRAMYCIN) 100 MG CAP    Take 1 capsule (100 mg total) by mouth 2 (two) times daily.    IBUPROFEN (ADVIL,MOTRIN) 800 MG TABLET    Take 1 tablet (800 mg total) by mouth 3 (three) times daily.    OXYCODONE (ROXICODONE) 5 MG IMMEDIATE RELEASE TABLET    Take 1 tablet (5 mg total) by mouth every 12 (twelve) hours as needed for Pain (breakthrough pain only).    SYMBICORT 160-4.5 MCG/ACTUATION HFAA    INHALE 2 PUFFS INTO THE LUNGS EVERY 12 (TWELVE) HOURS. CONTROLLER

## 2023-05-04 ENCOUNTER — TELEPHONE (OUTPATIENT)
Dept: HEPATOLOGY | Facility: CLINIC | Age: 58
End: 2023-05-04
Payer: MEDICARE

## 2023-05-04 ENCOUNTER — PATIENT MESSAGE (OUTPATIENT)
Dept: HEPATOLOGY | Facility: CLINIC | Age: 58
End: 2023-05-04
Payer: MEDICARE

## 2023-05-04 NOTE — TELEPHONE ENCOUNTER
----- Message from Milena Catherine PA-C sent at 5/4/2023 12:33 PM CDT -----  Call patient schedule ultrasound, INR, and AFP with virtual follow-up 1 week later, in July.   Thanks!  Fox

## 2023-05-04 NOTE — TELEPHONE ENCOUNTER
Spoke w pt she stated she will not like to continue her care her and to cancel appts she would not like to do the us and that her liver is just fine

## 2023-05-05 ENCOUNTER — TELEPHONE (OUTPATIENT)
Dept: HEPATOLOGY | Facility: CLINIC | Age: 58
End: 2023-05-05
Payer: MEDICARE

## 2023-05-05 PROBLEM — K74.3 PRIMARY BILIARY CHOLANGITIS: Status: ACTIVE | Noted: 2023-05-05

## 2023-05-05 NOTE — TELEPHONE ENCOUNTER
Spoke with patient and reiterated the necessity for liver monitoring, even if she refuses medication. She was willing to continue following with us.   Will reschedule ultrasound and labs as initially recommended.

## 2023-05-10 ENCOUNTER — PATIENT MESSAGE (OUTPATIENT)
Dept: PRIMARY CARE CLINIC | Facility: CLINIC | Age: 58
End: 2023-05-10
Payer: MEDICARE

## 2023-05-15 ENCOUNTER — TELEPHONE (OUTPATIENT)
Dept: PRIMARY CARE CLINIC | Facility: CLINIC | Age: 58
End: 2023-05-15
Payer: MEDICARE

## 2023-05-15 ENCOUNTER — PATIENT MESSAGE (OUTPATIENT)
Dept: PRIMARY CARE CLINIC | Facility: CLINIC | Age: 58
End: 2023-05-15
Payer: MEDICARE

## 2023-05-15 DIAGNOSIS — J32.4 CHRONIC PANSINUSITIS: ICD-10-CM

## 2023-05-17 RX ORDER — LEVOCETIRIZINE DIHYDROCHLORIDE 5 MG/1
5 TABLET, FILM COATED ORAL NIGHTLY
Qty: 90 TABLET | Refills: 1 | Status: SHIPPED | OUTPATIENT
Start: 2023-05-17 | End: 2023-11-02 | Stop reason: SDUPTHER

## 2023-05-17 NOTE — TELEPHONE ENCOUNTER
Refill Routing Note   Medication(s) are not appropriate for processing by [facility] for the following reason(s):      Patient seen in ED/Hospital since LOV with PCP  No active prescription written by PCP    [facility] action(s):  Defer Care Due:  None identified          Appointments  past 12m or future 3m with PCP    Date Provider   Last Visit   8/29/2022 Eliecer Jones MD   Next Visit   11/3/2023 Eliecer Jones MD   ED visits in past 90 days: 0        Note composed:12:45 PM 05/17/2023        2

## 2023-06-03 DIAGNOSIS — E55.9 VITAMIN D DEFICIENCY: ICD-10-CM

## 2023-06-05 RX ORDER — ERGOCALCIFEROL 1.25 MG/1
CAPSULE ORAL
Qty: 12 CAPSULE | Refills: 0 | Status: SHIPPED | OUTPATIENT
Start: 2023-06-05 | End: 2023-09-05

## 2023-07-05 ENCOUNTER — OFFICE VISIT (OUTPATIENT)
Dept: PRIMARY CARE CLINIC | Facility: CLINIC | Age: 58
End: 2023-07-05
Payer: MEDICARE

## 2023-07-05 VITALS
TEMPERATURE: 97 F | HEART RATE: 66 BPM | RESPIRATION RATE: 18 BRPM | DIASTOLIC BLOOD PRESSURE: 70 MMHG | HEIGHT: 64 IN | BODY MASS INDEX: 39.05 KG/M2 | WEIGHT: 228.75 LBS | OXYGEN SATURATION: 96 % | SYSTOLIC BLOOD PRESSURE: 118 MMHG

## 2023-07-05 DIAGNOSIS — K50.919 CROHN'S DISEASE WITH COMPLICATION, UNSPECIFIED GASTROINTESTINAL TRACT LOCATION: ICD-10-CM

## 2023-07-05 DIAGNOSIS — R42 VERTIGO: Primary | ICD-10-CM

## 2023-07-05 DIAGNOSIS — F43.21 GRIEF REACTION: ICD-10-CM

## 2023-07-05 PROCEDURE — 3074F SYST BP LT 130 MM HG: CPT | Mod: CPTII,S$GLB,, | Performed by: FAMILY MEDICINE

## 2023-07-05 PROCEDURE — 99999 PR PBB SHADOW E&M-EST. PATIENT-LVL V: ICD-10-PCS | Mod: PBBFAC,,, | Performed by: FAMILY MEDICINE

## 2023-07-05 PROCEDURE — 1159F MED LIST DOCD IN RCRD: CPT | Mod: CPTII,S$GLB,, | Performed by: FAMILY MEDICINE

## 2023-07-05 PROCEDURE — 1159F PR MEDICATION LIST DOCUMENTED IN MEDICAL RECORD: ICD-10-PCS | Mod: CPTII,S$GLB,, | Performed by: FAMILY MEDICINE

## 2023-07-05 PROCEDURE — 3008F BODY MASS INDEX DOCD: CPT | Mod: CPTII,S$GLB,, | Performed by: FAMILY MEDICINE

## 2023-07-05 PROCEDURE — 1160F PR REVIEW ALL MEDS BY PRESCRIBER/CLIN PHARMACIST DOCUMENTED: ICD-10-PCS | Mod: CPTII,S$GLB,, | Performed by: FAMILY MEDICINE

## 2023-07-05 PROCEDURE — 3078F DIAST BP <80 MM HG: CPT | Mod: CPTII,S$GLB,, | Performed by: FAMILY MEDICINE

## 2023-07-05 PROCEDURE — 3008F PR BODY MASS INDEX (BMI) DOCUMENTED: ICD-10-PCS | Mod: CPTII,S$GLB,, | Performed by: FAMILY MEDICINE

## 2023-07-05 PROCEDURE — 1160F RVW MEDS BY RX/DR IN RCRD: CPT | Mod: CPTII,S$GLB,, | Performed by: FAMILY MEDICINE

## 2023-07-05 PROCEDURE — 99214 OFFICE O/P EST MOD 30 MIN: CPT | Mod: S$GLB,,, | Performed by: FAMILY MEDICINE

## 2023-07-05 PROCEDURE — 3078F PR MOST RECENT DIASTOLIC BLOOD PRESSURE < 80 MM HG: ICD-10-PCS | Mod: CPTII,S$GLB,, | Performed by: FAMILY MEDICINE

## 2023-07-05 PROCEDURE — 99999 PR PBB SHADOW E&M-EST. PATIENT-LVL V: CPT | Mod: PBBFAC,,, | Performed by: FAMILY MEDICINE

## 2023-07-05 PROCEDURE — 99214 PR OFFICE/OUTPT VISIT, EST, LEVL IV, 30-39 MIN: ICD-10-PCS | Mod: S$GLB,,, | Performed by: FAMILY MEDICINE

## 2023-07-05 PROCEDURE — 3044F PR MOST RECENT HEMOGLOBIN A1C LEVEL <7.0%: ICD-10-PCS | Mod: CPTII,S$GLB,, | Performed by: FAMILY MEDICINE

## 2023-07-05 PROCEDURE — 3074F PR MOST RECENT SYSTOLIC BLOOD PRESSURE < 130 MM HG: ICD-10-PCS | Mod: CPTII,S$GLB,, | Performed by: FAMILY MEDICINE

## 2023-07-05 PROCEDURE — 3044F HG A1C LEVEL LT 7.0%: CPT | Mod: CPTII,S$GLB,, | Performed by: FAMILY MEDICINE

## 2023-07-05 RX ORDER — MECLIZINE HYDROCHLORIDE 25 MG/1
25 TABLET ORAL 3 TIMES DAILY PRN
Qty: 60 TABLET | Refills: 2 | Status: SHIPPED | OUTPATIENT
Start: 2023-07-05 | End: 2023-11-02 | Stop reason: SDUPTHER

## 2023-07-05 NOTE — PROGRESS NOTES
"Subjective:       Patient ID: Ida Orozco is a 57 y.o. female.    Chief Complaint: Follow-up (6 month follow up)    Abdominal pain and diarrhea since last week, gradually improving.  Thinks she has a Crohn's flare, reluctant to take any new meds at this time.  Fever for 1 day, resolved.  Also feels like she has unresolved grief after the passing of her father earlier this year, says she has not cried or processed grief, requesting referral to mental health provider.    Follow-up  Associated symptoms include abdominal pain and a fever. Pertinent negatives include no chest pain.   Review of Systems   Constitutional:  Positive for fever.   Cardiovascular:  Negative for chest pain.   Gastrointestinal:  Positive for abdominal pain and diarrhea.   Allergic/Immunologic: Negative for immunocompromised state.   Neurological:  Positive for dizziness.   Psychiatric/Behavioral:  Positive for dysphoric mood. Negative for agitation.      Objective:      Vitals:    07/05/23 1227   BP: 118/70   BP Location: Right arm   Patient Position: Sitting   BP Method: Large (Manual)   Pulse: 66   Resp: 18   Temp: 97.2 °F (36.2 °C)   TempSrc: Temporal   SpO2: 96%   Weight: 103.8 kg (228 lb 11.6 oz)   Height: 5' 4" (1.626 m)     BP Readings from Last 5 Encounters:   07/05/23 118/70   05/03/23 (!) 102/58   04/21/23 107/68   04/04/23 119/61   12/14/22 (!) 108/58     Wt Readings from Last 5 Encounters:   07/05/23 103.8 kg (228 lb 11.6 oz)   05/03/23 103 kg (227 lb 1.2 oz)   04/21/23 106.1 kg (234 lb)   03/28/23 106.1 kg (234 lb)   01/10/23 106.3 kg (234 lb 3.8 oz)     Physical Exam  Vitals and nursing note reviewed.   Constitutional:       General: She is not in acute distress.     Appearance: Normal appearance. She is well-developed.   HENT:      Head: Normocephalic and atraumatic.   Cardiovascular:      Rate and Rhythm: Normal rate and regular rhythm.      Heart sounds: Normal heart sounds.   Pulmonary:      Effort: Pulmonary effort is " normal.      Breath sounds: Normal breath sounds.   Abdominal:      General: Bowel sounds are normal.      Tenderness: There is abdominal tenderness in the left lower quadrant. There is no guarding or rebound.       Musculoskeletal:      Right lower leg: No edema.      Left lower leg: No edema.   Skin:     General: Skin is warm and dry.   Neurological:      Mental Status: She is alert and oriented to person, place, and time.   Psychiatric:         Mood and Affect: Mood normal.         Behavior: Behavior normal.       Lab Results   Component Value Date    WBC 5.79 05/03/2023    HGB 13.4 05/03/2023    HCT 43.3 05/03/2023     05/03/2023    CHOL 203 (H) 05/03/2023    TRIG 95 05/03/2023    HDL 89 (H) 05/03/2023    ALT 10 05/03/2023    AST 19 05/03/2023     05/03/2023    K 3.7 05/03/2023     05/03/2023    CREATININE 1.0 05/03/2023    BUN 19 05/03/2023    CO2 22 (L) 05/03/2023    TSH 2.100 11/11/2022    INR 1.0 03/29/2022    HGBA1C 5.3 05/03/2023      Assessment:       1. Vertigo    2. Crohn's disease with complication, unspecified gastrointestinal tract location    3. Grief reaction        Plan:       Vertigo  -     meclizine (ANTIVERT) 25 mg tablet; Take 1 tablet (25 mg total) by mouth 3 (three) times daily as needed for Dizziness or Nausea.  Dispense: 60 tablet; Refill: 2    Crohn's disease with complication, unspecified gastrointestinal tract location  -     Ambulatory referral/consult to Gastroenterology; Future; Expected date: 07/12/2023    Grief reaction  -     Ambulatory referral/consult to Primary Care Behavioral Health (Non-Opioids); Future; Expected date: 07/12/2023      Medication List with Changes/Refills   New Medications    MECLIZINE (ANTIVERT) 25 MG TABLET    Take 1 tablet (25 mg total) by mouth 3 (three) times daily as needed for Dizziness or Nausea.   Current Medications    ALBUTEROL (PROVENTIL/VENTOLIN HFA) 90 MCG/ACTUATION INHALER    Inhale 2 puffs into the lungs every 4 (four) hours  as needed for Wheezing or Shortness of Breath. Rescue    BUDESONIDE (PULMICORT) 0.5 MG/2 ML NEBULIZER SOLUTION    As directed BID    CLONAZEPAM (KLONOPIN) 1 MG TABLET    Take 1 tablet (1 mg total) by mouth daily as needed for Anxiety.    DICLOFENAC SODIUM (VOLTAREN) 1 % GEL    Apply 2 g topically 4 (four) times daily.    DIPHENOXYLATE-ATROPINE 2.5-0.025 MG (LOMOTIL) 2.5-0.025 MG PER TABLET    Take 1 tablet by mouth 4 (four) times daily as needed for Diarrhea.    LEVOCETIRIZINE (XYZAL) 5 MG TABLET    TAKE 1 TABLET (5 MG TOTAL) BY MOUTH EVERY EVENING.    LORATADINE (CLARITIN) 10 MG TABLET    Take 1 tablet (10 mg total) by mouth once daily.    PANTOPRAZOLE (PROTONIX) 40 MG TABLET    TAKE ONE TABLET BY MOUTH ONCE DAILY    PROMETHAZINE (PHENERGAN) 25 MG TABLET    Take 1 tablet (25 mg total) by mouth every 6 (six) hours as needed for Nausea.    SERTRALINE (ZOLOFT) 100 MG TABLET    Take 2 tablets (200 mg total) by mouth once daily.    TOPIRAMATE (TOPAMAX) 100 MG TABLET    TAKE ONE TABLET BY MOUTH ONCE DAILY    TRAZODONE (DESYREL) 100 MG TABLET    Take 1 tablet (100 mg total) by mouth nightly as needed for Insomnia.    VITAMIN D2 1,250 MCG (50,000 UNIT) CAPSULE    Take one capsule by mouth once weekly   Discontinued Medications    MECLIZINE (ANTIVERT) 25 MG TABLET    Take 1 tablet (25 mg total) by mouth 3 (three) times daily as needed for Dizziness.

## 2023-07-06 ENCOUNTER — TELEPHONE (OUTPATIENT)
Dept: BEHAVIORAL HEALTH | Facility: CLINIC | Age: 58
End: 2023-07-06
Payer: MEDICARE

## 2023-07-06 NOTE — PROGRESS NOTES
Behavioral Health Community Health Worker  Initial Assessment  Completed by:  Cindy Decker     Date:  7/6/2023    Patient Enrollment in Behavioral Health Program:  Patient verbalized understanding of Behavioral Health Integration services to include:  Patient understands that CHW, LCSW, PharmD and consulting Psychiatrist are members of the care team working collaboratively with his/her primary care provider: Yes  Patient understands that activation of their [facility] patient portal account is required for accessing the full scope of team services: Yes  Patient understands that some counseling sessions may occur via video: Yes  Clinic visits with the psychiatrist may be subject to a co-pay based on your insurance: Yes  Patient consents to enroll in BHI program: Yes    Assessments     Single Item Health Literacy Scale:  How often do you need to have someone help you read instructions, pamphlets or other written material from your doctor or pharmacy?: Never    Promis 10:  Promis 10 Responses  In general, would you say your health is: Good  In general, would you say your quality of life is: Good  In general, how would you rate your physical health?: Good  In general, how would you rate your mental health, including your mood and your ability to think?: Good  In general, how would you rate your satisfaction with your social activities and relationships?: Poor  In general, please rate how well you carry out your usual social activities and roles. (This includes activities at home, at work and in your community, and responsibilities as a parent, child, spouse, employee, friend, etc.): Fair  To what extent are you able to carry out your everyday physical activities such as walking, climbing stairs, carrying groceries, or moving a chair? : Completely  How often have you been bothered by emotional problems such as feeling anxious, depressed or irritable?: Often  In the past 7 days, how would you rate your fatigue on average?:  "Moderate  In the past 7 days, on a scale of 0 to 10 (where 0 is no pain and 10 is the worst pain imaginable) how would you rate your pain on average?: 6  Global Physical Health: 17  Global Mental health Score: 11    Depression PHQ:  PHQ9 2023   Total Score 10         Generalized Anxiety Disorder 7-Item Scale:  GAD7 2023   1. Feeling nervous, anxious, or on edge? 3   2. Not being able to stop or control worrying? 3   3. Worrying too much about different things? 3   4. Trouble relaxing? 3   5. Being so restless that it is hard to sit still? 3   6. Becoming easily annoyed or irritable? 3   7. Feeling afraid as if something awful might happen? 0   8. If you checked off any problems, how difficult have these problems made it for you to do your work, take care of things at home, or get along with other people? 2   DENIZ-7 Score 18       History     Social History     Socioeconomic History    Marital status: Legally    Tobacco Use    Smoking status: Former     Types: Cigarettes     Quit date:      Years since quittin.5    Smokeless tobacco: Never   Substance and Sexual Activity    Alcohol use: Yes     Comment: rarely    Drug use: No    Sexual activity: Never     Birth control/protection: See Surgical Hx       Call Summary     Patient was referred to the BHI (Non-opioid) program by Primary Care Provider, Dr. Eliecer Jones.  CHW contacted Ida Orozco who reports depression and anxiety that limits her activities of daily living (ADLs).   Patient scored "10" on the PHQ9 and "18" on the DENIZ 7. Based on these scores patient is eligible for the Behavioral Health Integration (Non-opioid) Program. CHW completed the intake and scheduled a virtual appointment for patient with Guy Stark LPC on 2023 at 1:30pm.  Patient denied SI/HI. Discussed 988/911/ED. Patient declined grief groups, is the primary care giver of her elderly mother. Patient's father  this past  and patient is " experiencing grief. Patient wakes up at the time her father  every night.

## 2023-07-07 ENCOUNTER — PATIENT MESSAGE (OUTPATIENT)
Dept: BEHAVIORAL HEALTH | Facility: CLINIC | Age: 58
End: 2023-07-07
Payer: MEDICARE

## 2023-07-11 ENCOUNTER — TELEPHONE (OUTPATIENT)
Dept: BEHAVIORAL HEALTH | Facility: CLINIC | Age: 58
End: 2023-07-11
Payer: MEDICARE

## 2023-07-13 ENCOUNTER — TELEPHONE (OUTPATIENT)
Dept: BEHAVIORAL HEALTH | Facility: CLINIC | Age: 58
End: 2023-07-13
Payer: MEDICARE

## 2023-07-13 NOTE — PROGRESS NOTES
CHW reached out to pt to reschedule appointment with Guy Stark LPC from 7/17/2023 at noon to 7/18/2023 at noon, pt was rescheduled to 7/18/2023 at noon for office visit.

## 2023-07-14 NOTE — PROGRESS NOTES
"Primary Care Behavioral Health Integration: Initial  Date:  2023  Patient Name: Ida Orozco  Referral Source:  Eliecer Jones MD  Type of Visit:  In person  Site:  [facility]    Referral Source:  Eliecer Jones MD  Type of Visit:  In person    Chief complaint/reason for encounter: depression and anger      History of Present Illness:  Ida Orozco, a 57 y.o.  female with history of Major Depressive Disorder, Recurrent, Moderate (F33.1) referred by Eliecer Jones MD.  Patient was seen, examined and chart was reviewed.    Met with patient.     Patient began this session by stating, "I feel completely empty inside."  Reported her father passed away in 2023.  Subsequently, patient's mother became sick, and patient has been mother's primary caretaker.  Patient noted her father was diagnosed with Alzheimer's, and he had a spot on his lung.  Described her mother as being very needy.  Also noted mother is an alcoholic, and father had a difficult time living with her because of her drinking.  Patient stated she has no motivation to do things.  There was a time when she was interested in bowling and reading.  She no longer participates in either.  Stated she does not have the motivation to do so.  She resorts to playing games on her tablet.  Noted Trazodone and Klonopin used to help her by "shutting my brain down, but they are no longer working."   Wakes up at 2:00 in the morning, approximately same time her father passed away.  Reported she has heard her  father "calling my name at night."  Stated she believes in the afterlife.  No desire to have sex, no desire to have fun, little motivation to do things, comfortable remaining isolated.  Stated if she is every disturbed while in isolation, she becomes highly irritable and angry, but not violent.  Diagnosed with Crohn's Disease, ovaries were removed, uterus was removed.  Complications with endomytreosis, fibroids on the ovaries.  " Worked for [email or web address], in-house [employer].  Was there for one year.  Could no longer work because she could not control bowel movements and was going to the restroom most of the time.  Filed for disability, is on disability,  allergic to adhesives.  Missed out on son's basketball games, still trying to get Crohn's under control.  Noted she attended group therapy sessions for depression in [place] twice per week.  Noted she does not socialize.  Takes Delta 9 to help her sleep and stated it has been effective.  Has lost considerable weight since moving in with her mother.  [facility] sent patient the Tool 1 - Identification of Triggers to Anxiety worksheet of the CBT Toolbox workbook.  Will review this worksheet with patient during follow-up appointment on August 1, 2023.        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 Rescue, Disp: 8 g, Rfl: 5    budesonide (PULMICORT) 0.5 mg/2 mL nebulizer solution, As directed BID, Disp: 60 mL, Rfl: 2    clonazePAM (KLONOPIN) 1 MG tablet, Take 1 tablet (1 mg total) by mouth daily as needed for Anxiety., Disp: 90 tablet, Rfl: 1    diclofenac sodium (VOLTAREN) 1 % Gel, Apply 2 g topically 4 (four) times daily., Disp: 100 g, Rfl: 5    diphenoxylate-atropine 2.5-0.025 mg (LOMOTIL)  2.5-0.025 mg per tablet, Take 1 tablet by mouth 4 (four) times daily as needed for Diarrhea., Disp: 30 tablet, Rfl: 2    levocetirizine (XYZAL) 5 MG tablet, TAKE 1 TABLET (5 MG TOTAL) BY MOUTH EVERY EVENING., Disp: 90 tablet, Rfl: 1    loratadine (CLARITIN) 10 mg tablet, Take 1 tablet (10 mg total) by mouth once daily., Disp: 90 tablet, Rfl: 1    meclizine (ANTIVERT) 25 mg tablet, Take 1 tablet (25 mg total) by mouth 3 (three) times daily as needed for Dizziness or Nausea., Disp: 60 tablet, Rfl: 2    pantoprazole (PROTONIX) 40 MG tablet, TAKE ONE TABLET BY MOUTH ONCE DAILY, Disp: 90 tablet, Rfl: 1    promethazine (PHENERGAN) 25 MG tablet, Take 1 tablet (25 mg total) by mouth every 6 (six) hours as needed for Nausea., Disp: 15 tablet, Rfl: 2    sertraline (ZOLOFT) 100 MG tablet, Take 2 tablets (200 mg total) by mouth once daily., Disp: 180 tablet, Rfl: 1    topiramate (TOPAMAX) 100 MG tablet, TAKE ONE TABLET BY MOUTH ONCE DAILY, Disp: 90 tablet, Rfl: 3    traZODone (DESYREL) 100 MG tablet, Take 1 tablet (100 mg total) by mouth nightly as needed for Insomnia., Disp: 90 tablet, Rfl: 1    VITAMIN D2 1,250 mcg (50,000 unit) capsule, Take one capsule by mouth once weekly, Disp: 12 capsule, Rfl: 0    Current Symptoms:  Depression: insomnia, worthlessness/guilt, fatigue, and decreased appetite.    Anxiety: restlessness and panic attacks.    Sleep: difficulty falling asleep.    Mallika: denies.    Psychosis: denies .    Risk Assessment:  Patient reports no suicidal ideation  Patient reports no homicidal ideation  Patient reports no self-injurious behavior  Patient reports no violent behavior    Patient advised to call [phone] or present the the nearest ED if they experience suicidal or homicidal ideation, plan or intent.    Discussed and agreed on a safety plan.    Psychiatric History:  Is the patient taking psychiatric medication: Yes - Klonopin 1 mg. PRN, and Zoloft 100 mg tablets - takes 2 tablets at night before going to  bed.   They are not interested in medication changes.  Previous Medication Trials: No   Previous Psychiatric Outpatient Treatment:  Yes - groups for depression in [place]  Previous Psychiatric Hospitalizations:  Yes - Son was getting , didn't have money to buy a dress, suicidal thoughts,   Previous Suicide Attempts:  No  History of Trauma:  No  Access to a Firearm:  Yes    Substance Use History:  Tobacco/Nicotine:  No - Quit since 2007  Alcohol:  occasional - hard liquor  Illicit Substances: No  Misuse of Prescription Medications:  No  Caffeine: Yes - occasionally soft drinks and tea    Mental Status Exam  General Appearance:  overweight     Speech: normal tone, normal rate, normal pitch, normal volume         Level of Cooperation: cooperative        Thought Processes: normal and logical      Mood: depressed        Thought Content: normal, no suicidality, no homicidality, delusions, or paranoia     Affect: sad    Orientation: Oriented x3     Memory: recent >  intact, remote >  intact     Attention Span & Concentration: intact     Fund of General Knowledge: intact and appropriate to age and level of education   Abstract Reasoning: interpretation of similarities was concrete   Judgment & Insight: good       Language:  intact       Results of the PHQ8 7/13/2023   1. Little interest or pleasure in doing things Nearly every day   2. Feeling down, depressed, or hopeless Several days   3. Trouble falling or staying asleep, or sleeping too much More than half the days   4. Feeling tired or having little energy More than half the days   5. poor appetite or overeating More than half the days   6. Feeling bad about yourself - or that you are a failure or have let yourself or your family down Not at all   7. Trouble concentrating on things, such as reading the newspaper or watching television Nearly every day   8. Moving or speaking so slowly that other people could have noticed? Or the opposite - being so fidgety or  restless that you have been moving around a lot more than usual Not at all   Total Score  13        GAD7 7/13/2023 7/13/2023 7/6/2023   1. Feeling nervous, anxious, or on edge? 1 1 3   2. Not being able to stop or control worrying? 2 2 3   3. Worrying too much about different things? 2 2 3   4. Trouble relaxing? 2 2 3   5. Being so restless that it is hard to sit still? 2 2 3   6. Becoming easily annoyed or irritable? 1 1 3   7. Feeling afraid as if something awful might happen? 0 0 0   8. If you checked off any problems, how difficult have these problems made it for you to do your work, take care of things at home, or get along with other people? - - 2   DENIZ-7 Score 10 10 18       Impression:   My diagnostic impression is  experiences depressed mood, lack of motivation to do things, isolates, does not socialize, experiences panic attacks, fatigue, and experiences decreased appetite.  These symptoms are making it difficult for patient to function effectively.      Provisional Diagnosis:  No diagnosis found.    Treatment Goals and Plan: Initial appointment focused on gathering history, identifying treatment goals and developing a treatment plan.      Depression: reducing excessive guilt, reducing fatigue, and reducing negative automatic thoughts to be able to enjoy life outside, getting back to bowling, reading more, spending times with friends.          Future treatment will utilize CBT, Problem-solving Therapy, and Solution-focused Therapy.      Return to Clinic: 2 weeks    Plan to discuss case with [facility] consulting psychiatrist and further recommendations to be potentially be made at that time.  Refer to psychiatry    Length of Appointment:  51 minutes spent face-to-face and 15 minutes spent in non face-to-face clinical care.

## 2023-07-17 ENCOUNTER — TELEPHONE (OUTPATIENT)
Dept: BEHAVIORAL HEALTH | Facility: CLINIC | Age: 58
End: 2023-07-17
Payer: MEDICARE

## 2023-07-17 NOTE — PROGRESS NOTES
CHW reached out to pt to remind her of office appointment with Guy Stark LPC tomorrow. Pt confirmed the appointment.

## 2023-07-18 ENCOUNTER — OFFICE VISIT (OUTPATIENT)
Dept: BEHAVIORAL HEALTH | Facility: CLINIC | Age: 58
End: 2023-07-18
Payer: MEDICARE

## 2023-07-18 DIAGNOSIS — F43.21 GRIEF REACTION: ICD-10-CM

## 2023-07-18 DIAGNOSIS — F33.1 MAJOR DEPRESSIVE DISORDER, RECURRENT, MODERATE: Primary | ICD-10-CM

## 2023-07-18 PROCEDURE — 3044F HG A1C LEVEL LT 7.0%: CPT | Mod: CPTII,S$GLB,, | Performed by: COUNSELOR

## 2023-07-18 PROCEDURE — 90834 PSYTX W PT 45 MINUTES: CPT | Mod: S$GLB,,, | Performed by: COUNSELOR

## 2023-07-18 PROCEDURE — 90834 PR PSYCHOTHERAPY W/PATIENT, 45 MIN: ICD-10-PCS | Mod: S$GLB,,, | Performed by: COUNSELOR

## 2023-07-18 PROCEDURE — 3044F PR MOST RECENT HEMOGLOBIN A1C LEVEL <7.0%: ICD-10-PCS | Mod: CPTII,S$GLB,, | Performed by: COUNSELOR

## 2023-07-26 ENCOUNTER — OFFICE VISIT (OUTPATIENT)
Dept: WOUND CARE | Facility: CLINIC | Age: 58
End: 2023-07-26
Payer: MEDICARE

## 2023-07-26 DIAGNOSIS — Z98.890 STATUS POST PANNICULECTOMY: ICD-10-CM

## 2023-07-26 DIAGNOSIS — Z43.2 ATTENTION TO ILEOSTOMY: Primary | ICD-10-CM

## 2023-07-26 PROCEDURE — 1160F RVW MEDS BY RX/DR IN RCRD: CPT | Mod: CPTII,S$GLB,, | Performed by: CLINICAL NURSE SPECIALIST

## 2023-07-26 PROCEDURE — 99214 OFFICE O/P EST MOD 30 MIN: CPT | Mod: S$GLB,,, | Performed by: CLINICAL NURSE SPECIALIST

## 2023-07-26 PROCEDURE — 1160F PR REVIEW ALL MEDS BY PRESCRIBER/CLIN PHARMACIST DOCUMENTED: ICD-10-PCS | Mod: CPTII,S$GLB,, | Performed by: CLINICAL NURSE SPECIALIST

## 2023-07-26 PROCEDURE — 99214 PR OFFICE/OUTPT VISIT, EST, LEVL IV, 30-39 MIN: ICD-10-PCS | Mod: S$GLB,,, | Performed by: CLINICAL NURSE SPECIALIST

## 2023-07-26 PROCEDURE — 1159F PR MEDICATION LIST DOCUMENTED IN MEDICAL RECORD: ICD-10-PCS | Mod: CPTII,S$GLB,, | Performed by: CLINICAL NURSE SPECIALIST

## 2023-07-26 PROCEDURE — 99999 PR PBB SHADOW E&M-EST. PATIENT-LVL III: ICD-10-PCS | Mod: PBBFAC,,, | Performed by: CLINICAL NURSE SPECIALIST

## 2023-07-26 PROCEDURE — 3044F PR MOST RECENT HEMOGLOBIN A1C LEVEL <7.0%: ICD-10-PCS | Mod: CPTII,S$GLB,, | Performed by: CLINICAL NURSE SPECIALIST

## 2023-07-26 PROCEDURE — 1159F MED LIST DOCD IN RCRD: CPT | Mod: CPTII,S$GLB,, | Performed by: CLINICAL NURSE SPECIALIST

## 2023-07-26 PROCEDURE — 3044F HG A1C LEVEL LT 7.0%: CPT | Mod: CPTII,S$GLB,, | Performed by: CLINICAL NURSE SPECIALIST

## 2023-07-26 PROCEDURE — 99999 PR PBB SHADOW E&M-EST. PATIENT-LVL III: CPT | Mod: PBBFAC,,, | Performed by: CLINICAL NURSE SPECIALIST

## 2023-07-26 NOTE — PROGRESS NOTES
Subjective:       Patient ID: Ida Orozco is a 57 y.o. female.    Chief Complaint: Ileostomy    This pt is known to me and has had ileo for many years, she had a panniculectomy since I last saw her ,resulting in some pouching  issues ventura of late, she looks good and feels much better with her situation.  She has leaks unexpectedly and not sure why at this point .     Review of Systems   Constitutional:  Negative for activity change and appetite change.   HENT: Negative.     Respiratory:  Negative for cough and shortness of breath.    Gastrointestinal:  Negative for abdominal distention and abdominal pain.   Genitourinary:  Negative for difficulty urinating, dysuria and hematuria.   Musculoskeletal:  Negative for joint swelling.   Skin:  Negative for rash.   Neurological: Negative.    Psychiatric/Behavioral: Negative.       Objective:      Physical Exam  Constitutional:       Appearance: She is well-developed.   Pulmonary:      Effort: Pulmonary effort is normal. No respiratory distress.      Breath sounds: Normal breath sounds.   Abdominal:      General: Bowel sounds are normal. There is no distension.      Palpations: Abdomen is soft.      Comments: Right sided ileostomy   Musculoskeletal:         General: Normal range of motion.   Skin:     Findings: No rash.        7/26/23  she has a mm or so of eroded skin around stoma edge as she is cutting a bit too tight and eroded through quickly.   I advised she try a ring 2mm and a light convex instead of deep (27135) and cut to 29 mm or so for better fit,.  Applied same and gave her 2 extra sets to take  She will eval and change order on next available ship time   Skin looks great overall     Assessment:       1. Attention to ileostomy    2. Status post panniculectomy        Plan:       Refitted for light convex instead of deep,  Add a 2 mm ring to help reduce or eliminate the erosion around stoma /skin junction   OHME is DME  Fu as needed   I have reviewed the plan of  care with the patient and she express understanding. I spent over 50% of this 30  minute visit in face to face counseling.

## 2023-07-27 NOTE — PROGRESS NOTES
"Primary Care Behavioral Health Integration: Follow-up  Date:  08/01/2023  Patient Name: Ida Orozco  Referral Source:  Eliecer Jones MD  Type of Visit:  In person  Site:  [facility]    History of Present Illness:  Ida Orozco, a 58 y.o.  female with history of Major Depressive Disorder, Recurrent, Moderate (F33.1) referred by Eliecer Jones MD.  Patient was seen, examined and chart was reviewed.    Met with patient.       Patient began this session by stating she and her  are .  Noted they are still communicating.  Patient stated  has come to realize the things he has done wrong in their relationship, and she acknowledged the things she has done wrong as well.  Patient reported, "I have a difficult time listening to someone with a monotone voice because it reminds me of when my mother would speak to me."  Patient reported she was belittled by her mother "for being too fat and not eating the right foods."  Noted her mother sent her "very mixed messages."    Mother would say, "Ida, you're fat, but here's some cake."  LPC and patient reviewed the Tool 1 - Identification of Triggers to Depression worksheet.  Patient stated, "Initially, I did not want to complete this assignment because it forced me to face the problems I'm having with being depressed."  Patient defined depression as "not caring about myself.  There are days I don't bathe, and I want to sleep all day.  I just want to zone out."  Patient stated she feels the most depressed when she has a lot of time on her hands.  Patient's triggers for depression are as follows:  Herm mother bringing up her past or when her mother tries to joke with her; when other say things to her, and she misinterprets what they said or she does not understand that the comment was actually a joke; the pain she continues to experience due to her Crohn's disease and the fact she no longer has a uterus.  Patient noted she can go weeks " "without a bath or shower.  Acknowledged she does "wipe herself down, and I do wash my hair, my hands, and my private area."  Patient discussed her frustration with "my doctors not believing me when I try to tell them the pain I have with my Crohn's."  Feels like she is being "passed around from one specialist to another, but not getting anywhere."  Another trigger to her depression is doctors who do not believe in the chronicity of her Crohn's and the pain she experiences in her joints, knees, hips, and pain in her stomach.  Noted the pain feels "like labor pains, but 10 times worse."   LPC and patient discussed assertiveness and how to use it when discussing her issues with her doctors.   LPC sent patient the Tool 2 - Feelings Identification worksheet and the Consider Alternate Viewpoints worksheet.  Will review these worksheets during follow-up appointment on August 16, 2023.        Results of the PHQ8 7/16/2023 7/13/2023   1. Little interest or pleasure in doing things Nearly every day Nearly every day   2. Feeling down, depressed, or hopeless Several days Several days   3. Trouble falling or staying asleep, or sleeping too much More than half the days More than half the days   4. Feeling tired or having little energy More than half the days More than half the days   5. poor appetite or overeating More than half the days More than half the days   6. Feeling bad about yourself - or that you are a failure or have let yourself or your family down Not at all Not at all   7. Trouble concentrating on things, such as reading the newspaper or watching television Nearly every day Nearly every day   8. Moving or speaking so slowly that other people could have noticed? Or the opposite - being so fidgety or restless that you have been moving around a lot more than usual Not at all Not at all   Total Score  13 13       GAD7 7/16/2023 7/13/2023 7/13/2023   1. Feeling nervous, anxious, or on edge? 1 1 1   2. Not being able to " stop or control worrying? 2 2 2   3. Worrying too much about different things? 2 2 2   4. Trouble relaxing? 2 2 2   5. Being so restless that it is hard to sit still? 2 2 2   6. Becoming easily annoyed or irritable? 1 1 1   7. Feeling afraid as if something awful might happen? 0 0 0   8. If you checked off any problems, how difficult have these problems made it for you to do your work, take care of things at home, or get along with other people? - - -   DENIZ-7 Score 10 10 10       Mental Status Exam  General Appearance:  unremarkable, age appropriate     Speech: normal tone, normal rate, normal pitch, normal volume         Level of Cooperation: cooperative        Thought Processes: normal and logical      Mood: depressed, sad        Thought Content: normal, no suicidality, no homicidality, delusions, or paranoia     Affect: sad    Orientation: Oriented x3     Memory: recent >  impaired, remote >  impaired     Attention Span & Concentration: intact     Fund of General Knowledge: intact and appropriate to age and level of education   Abstract Reasoning: interpretation of similarities was concrete   Judgment & Insight: fair       Language:  intact       Treatment plan:  Target symptoms: depression  Why chosen therapy is appropriate versus another modality: relevant to diagnosis  Outcome monitoring methods: self-report  Therapeutic intervention type: insight oriented psychotherapy, behavior modifying psychotherapy, supportive psychotherapy    Risk parameters:  Patient reports no suicidal ideation  Patient reports no homicidal ideation  Patient reports no self-injurious behavior  Patient reports no violent behavior    Verbal deficits: None    Patient's response to intervention:  The patient's response to intervention is accepting.    Progress toward goals and other mental status changes:  The patient's progress toward goals is limited.    Patient advised to call [phone] or present the the nearest ED if they experience  suicidal or homicidal ideation, plan or intent.       Impression:  My diagnostic impression is patient continues to experience excessive worrying, difficulty relaxing, depression, irritability, and low self-esteem.  These symptoms are making it difficult for patient to function effectively.    Diagnosis:   Major Depressive Disorder, Recurrent, Moderate (F33.1)    Treatment Goals and Plan: Pt plans to continue CBT, Problem-solving Therapy, and Solution-focused Therapy    Future treatment will utilize CBT, Problem-solving Therapy, and Solution-focused Therapy    Return to Clinic: 2 weeks    Plan to discuss case with [facility] consulting psychiatrist and further recommendations to be potentially be made at that time.  Refer to psychiatry    Length of Appointment:  45 minutes spent face-to-face and 14 minutes spent in non face-to-face clinical care.

## 2023-07-31 ENCOUNTER — TELEPHONE (OUTPATIENT)
Dept: BEHAVIORAL HEALTH | Facility: CLINIC | Age: 58
End: 2023-07-31
Payer: MEDICARE

## 2023-08-01 ENCOUNTER — OFFICE VISIT (OUTPATIENT)
Dept: BEHAVIORAL HEALTH | Facility: CLINIC | Age: 58
End: 2023-08-01
Payer: MEDICARE

## 2023-08-01 ENCOUNTER — PATIENT MESSAGE (OUTPATIENT)
Dept: BEHAVIORAL HEALTH | Facility: CLINIC | Age: 58
End: 2023-08-01
Payer: MEDICARE

## 2023-08-01 DIAGNOSIS — F33.1 MAJOR DEPRESSIVE DISORDER, RECURRENT, MODERATE: Primary | ICD-10-CM

## 2023-08-01 PROCEDURE — 3044F PR MOST RECENT HEMOGLOBIN A1C LEVEL <7.0%: ICD-10-PCS | Mod: CPTII,S$GLB,, | Performed by: COUNSELOR

## 2023-08-01 PROCEDURE — 90834 PR PSYCHOTHERAPY W/PATIENT, 45 MIN: ICD-10-PCS | Mod: S$GLB,,, | Performed by: COUNSELOR

## 2023-08-01 PROCEDURE — 90834 PSYTX W PT 45 MINUTES: CPT | Mod: S$GLB,,, | Performed by: COUNSELOR

## 2023-08-01 PROCEDURE — 3044F HG A1C LEVEL LT 7.0%: CPT | Mod: CPTII,S$GLB,, | Performed by: COUNSELOR

## 2023-08-06 ENCOUNTER — NURSE TRIAGE (OUTPATIENT)
Dept: ADMINISTRATIVE | Facility: CLINIC | Age: 58
End: 2023-08-06
Payer: MEDICARE

## 2023-08-06 NOTE — TELEPHONE ENCOUNTER
"Ida c/o left eye pain that began 3 days ago. Denies recent injury. Eye drainage & swelling. Diagnosed with Chron's years ago. Pt states "my main concern is that I keep getting blurry spots & it hurts to blink." Advised pt per triage protocol to go to nearest ED now for physician eval. V/u.  Reason for Disposition   SEVERE eye pain (e.g., excruciating)    Protocols used: Eye - Pus or [id number]    "

## 2023-08-10 ENCOUNTER — OFFICE VISIT (OUTPATIENT)
Dept: GASTROENTEROLOGY | Facility: CLINIC | Age: 58
End: 2023-08-10
Payer: MEDICARE

## 2023-08-10 ENCOUNTER — LAB VISIT (OUTPATIENT)
Dept: LAB | Facility: HOSPITAL | Age: 58
End: 2023-08-10
Payer: MEDICARE

## 2023-08-10 ENCOUNTER — OFFICE VISIT (OUTPATIENT)
Dept: PRIMARY CARE CLINIC | Facility: CLINIC | Age: 58
End: 2023-08-10
Payer: MEDICARE

## 2023-08-10 VITALS — OXYGEN SATURATION: 96 % | BODY MASS INDEX: 39.69 KG/M2 | HEIGHT: 64 IN | WEIGHT: 232.5 LBS

## 2023-08-10 VITALS
HEIGHT: 65 IN | TEMPERATURE: 98 F | HEART RATE: 59 BPM | SYSTOLIC BLOOD PRESSURE: 126 MMHG | WEIGHT: 232 LBS | DIASTOLIC BLOOD PRESSURE: 57 MMHG | BODY MASS INDEX: 38.65 KG/M2 | RESPIRATION RATE: 16 BRPM

## 2023-08-10 DIAGNOSIS — M25.50 ARTHRALGIA, UNSPECIFIED JOINT: ICD-10-CM

## 2023-08-10 DIAGNOSIS — L03.213 PRESEPTAL CELLULITIS: ICD-10-CM

## 2023-08-10 DIAGNOSIS — H10.32 ACUTE BACTERIAL CONJUNCTIVITIS OF LEFT EYE: Primary | ICD-10-CM

## 2023-08-10 DIAGNOSIS — R19.7 DIARRHEA, UNSPECIFIED TYPE: ICD-10-CM

## 2023-08-10 DIAGNOSIS — K52.9 CHRONIC DIARRHEA: ICD-10-CM

## 2023-08-10 DIAGNOSIS — K50.919 CROHN'S DISEASE WITH COMPLICATION, UNSPECIFIED GASTROINTESTINAL TRACT LOCATION: ICD-10-CM

## 2023-08-10 DIAGNOSIS — K50.919 CROHN'S DISEASE WITH COMPLICATION, UNSPECIFIED GASTROINTESTINAL TRACT LOCATION: Primary | ICD-10-CM

## 2023-08-10 DIAGNOSIS — K58.8 OTHER IRRITABLE BOWEL SYNDROME: ICD-10-CM

## 2023-08-10 DIAGNOSIS — R10.9 ABDOMINAL PAIN, UNSPECIFIED ABDOMINAL LOCATION: ICD-10-CM

## 2023-08-10 DIAGNOSIS — J01.90 ACUTE NON-RECURRENT SINUSITIS, UNSPECIFIED LOCATION: ICD-10-CM

## 2023-08-10 LAB
C DIFF GDH STL QL: NEGATIVE
C DIFF TOX A+B STL QL IA: NEGATIVE
WBC #/AREA STL HPF: NORMAL /[HPF]

## 2023-08-10 PROCEDURE — 1159F MED LIST DOCD IN RCRD: CPT | Mod: CPTII,S$GLB,, | Performed by: INTERNAL MEDICINE

## 2023-08-10 PROCEDURE — 3044F HG A1C LEVEL LT 7.0%: CPT | Mod: CPTII,S$GLB,,

## 2023-08-10 PROCEDURE — 83993 ASSAY FOR CALPROTECTIN FECAL: CPT

## 2023-08-10 PROCEDURE — 87046 STOOL CULTR AEROBIC BACT EA: CPT

## 2023-08-10 PROCEDURE — 99215 OFFICE O/P EST HI 40 MIN: CPT | Mod: S$GLB,,,

## 2023-08-10 PROCEDURE — 3078F PR MOST RECENT DIASTOLIC BLOOD PRESSURE < 80 MM HG: ICD-10-PCS | Mod: CPTII,S$GLB,, | Performed by: INTERNAL MEDICINE

## 2023-08-10 PROCEDURE — 3044F HG A1C LEVEL LT 7.0%: CPT | Mod: CPTII,S$GLB,, | Performed by: INTERNAL MEDICINE

## 2023-08-10 PROCEDURE — 99215 PR OFFICE/OUTPT VISIT, EST, LEVL V, 40-54 MIN: ICD-10-PCS | Mod: S$GLB,,,

## 2023-08-10 PROCEDURE — 3078F DIAST BP <80 MM HG: CPT | Mod: CPTII,S$GLB,, | Performed by: INTERNAL MEDICINE

## 2023-08-10 PROCEDURE — 3074F SYST BP LT 130 MM HG: CPT | Mod: CPTII,S$GLB,, | Performed by: INTERNAL MEDICINE

## 2023-08-10 PROCEDURE — 87329 GIARDIA AG IA: CPT

## 2023-08-10 PROCEDURE — 87427 SHIGA-LIKE TOXIN AG IA: CPT | Mod: 59

## 2023-08-10 PROCEDURE — 87449 NOS EACH ORGANISM AG IA: CPT | Mod: 91

## 2023-08-10 PROCEDURE — 89055 LEUKOCYTE ASSESSMENT FECAL: CPT

## 2023-08-10 PROCEDURE — 87045 FECES CULTURE AEROBIC BACT: CPT

## 2023-08-10 PROCEDURE — 3008F BODY MASS INDEX DOCD: CPT | Mod: CPTII,S$GLB,,

## 2023-08-10 PROCEDURE — 99213 OFFICE O/P EST LOW 20 MIN: CPT | Mod: S$GLB,,, | Performed by: INTERNAL MEDICINE

## 2023-08-10 PROCEDURE — 1160F RVW MEDS BY RX/DR IN RCRD: CPT | Mod: CPTII,S$GLB,, | Performed by: INTERNAL MEDICINE

## 2023-08-10 PROCEDURE — 1160F PR REVIEW ALL MEDS BY PRESCRIBER/CLIN PHARMACIST DOCUMENTED: ICD-10-PCS | Mod: CPTII,S$GLB,, | Performed by: INTERNAL MEDICINE

## 2023-08-10 PROCEDURE — 99999 PR PBB SHADOW E&M-EST. PATIENT-LVL III: CPT | Mod: PBBFAC,,,

## 2023-08-10 PROCEDURE — 99999 PR PBB SHADOW E&M-EST. PATIENT-LVL III: ICD-10-PCS | Mod: PBBFAC,,,

## 2023-08-10 PROCEDURE — 3008F PR BODY MASS INDEX (BMI) DOCUMENTED: ICD-10-PCS | Mod: CPTII,S$GLB,,

## 2023-08-10 PROCEDURE — 99999 PR PBB SHADOW E&M-EST. PATIENT-LVL V: ICD-10-PCS | Mod: PBBFAC,,, | Performed by: INTERNAL MEDICINE

## 2023-08-10 PROCEDURE — 87209 SMEAR COMPLEX STAIN: CPT

## 2023-08-10 PROCEDURE — 99999 PR PBB SHADOW E&M-EST. PATIENT-LVL V: CPT | Mod: PBBFAC,,, | Performed by: INTERNAL MEDICINE

## 2023-08-10 PROCEDURE — 3044F PR MOST RECENT HEMOGLOBIN A1C LEVEL <7.0%: ICD-10-PCS | Mod: CPTII,S$GLB,, | Performed by: INTERNAL MEDICINE

## 2023-08-10 PROCEDURE — 3008F PR BODY MASS INDEX (BMI) DOCUMENTED: ICD-10-PCS | Mod: CPTII,S$GLB,, | Performed by: INTERNAL MEDICINE

## 2023-08-10 PROCEDURE — 87449 NOS EACH ORGANISM AG IA: CPT

## 2023-08-10 PROCEDURE — 99213 PR OFFICE/OUTPT VISIT, EST, LEVL III, 20-29 MIN: ICD-10-PCS | Mod: S$GLB,,, | Performed by: INTERNAL MEDICINE

## 2023-08-10 PROCEDURE — 3074F PR MOST RECENT SYSTOLIC BLOOD PRESSURE < 130 MM HG: ICD-10-PCS | Mod: CPTII,S$GLB,, | Performed by: INTERNAL MEDICINE

## 2023-08-10 PROCEDURE — 3044F PR MOST RECENT HEMOGLOBIN A1C LEVEL <7.0%: ICD-10-PCS | Mod: CPTII,S$GLB,,

## 2023-08-10 PROCEDURE — 3008F BODY MASS INDEX DOCD: CPT | Mod: CPTII,S$GLB,, | Performed by: INTERNAL MEDICINE

## 2023-08-10 PROCEDURE — 1159F PR MEDICATION LIST DOCUMENTED IN MEDICAL RECORD: ICD-10-PCS | Mod: CPTII,S$GLB,, | Performed by: INTERNAL MEDICINE

## 2023-08-10 RX ORDER — PREDNISONE 20 MG/1
20 TABLET ORAL 2 TIMES DAILY
Qty: 10 TABLET | Refills: 0 | Status: SHIPPED | OUTPATIENT
Start: 2023-08-10 | End: 2023-08-15

## 2023-08-10 RX ORDER — TOBRAMYCIN AND DEXAMETHASONE 3; 1 MG/ML; MG/ML
1 SUSPENSION/ DROPS OPHTHALMIC EVERY 6 HOURS
Qty: 5 ML | Refills: 0 | Status: SHIPPED | OUTPATIENT
Start: 2023-08-10 | End: 2023-11-14 | Stop reason: ALTCHOICE

## 2023-08-10 NOTE — PROGRESS NOTES
"GENERAL GI PATIENT INTAKE:    COVID symptoms in the last 7 days (runny nose, sore throat, congestion, cough, fever): No  PCP: Eliecer Jones  If not PCP-  number given to establish [phone]: Yes    ALLERGIES REVIEWED:  Yes    CHIEF COMPLAINT:    Chief Complaint   Patient presents with    GI Problem       VITAL SIGNS:  Ht 5' 4" (1.626 m)   Wt 105.5 kg (232 lb 8 oz)   SpO2 96%   BMI 39.91 kg/m²      Change in medical, surgical, family or social history: Yes      REVIEWED MEDICATION LIST RECONCILED INCLUDING ABOVE MEDS:  Yes        "

## 2023-08-10 NOTE — PROGRESS NOTES
Gastroenterology Clinic Consultation Note    Reason for Visit:  The primary encounter diagnosis was Crohn's disease with complication, unspecified gastrointestinal tract location. Diagnoses of Diarrhea, unspecified type, Arthralgia, unspecified joint, and Abdominal pain, unspecified abdominal location were also pertinent to this visit.    PCP:   Eliecer Jones   [address] / [place] [id number]      Initial HPI   This is a 58 y.o. female presenting for flare of her Crohn's disease. GI hx significant for IBD that was thought to be UC. After failing prednisone and having insurance barriers to Remicade, Humira, and Enbrel she underwent total colectomy in 2012 at [facility]. Her postop course was notable for abscess and fistula formation around stoma site requiring EC fistula repair and stoma relocation 2014 by Dr. Virgen. Patient new to this GI clinic. New to me and referred by her PCP to get back looped in with the GI dept.     Patient presents with multiple chronic complaints today. She is complaining of pain and stiffness to her joints that incorporates her entire right side (ankle, knee, elbow, knuckles), however, her abdominal pain is on the left side of her abdomen. Her ostomy is located on the upper right quadrant. Pain is intermittent and she describes it as cramping (Shahram horse like cramping) that will resolve on its own. Patient reports that she smells blood, but does not see it in her ostomy. Does not measure her output, but reports she empties it 6x/day. Does endorse that she eats fiber and drinks water daily. Patient states that she has increased stool output that increases after eating; this has been ongoing since her surgery in 2014. Does report that the lomotil helps her the most. Imodium does not work for her. Has trialed steroids in the past for her flares but states that steroids cause her weight gain and  make her angry. Does endorse some abdominal pain that has been present for over a year and a half. Was most recently placed on Augmentin on  for orbital cellulitis which is improving. Has not noticed any worsening in her diarrhea since beginning antibiotics.      ROS:  Review of Systems   Constitutional:  Negative for chills, diaphoresis, fever, malaise/fatigue and weight loss.   Eyes:  Negative for redness.   Gastrointestinal:  Positive for abdominal pain and diarrhea. Negative for blood in stool, constipation, heartburn, melena, nausea and vomiting.   Musculoskeletal:  Positive for joint pain.   Neurological:  Negative for dizziness, loss of consciousness and weakness.   Psychiatric/Behavioral:  The patient is nervous/anxious.         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    Surgical History:  has a past surgical history that includes Hysterectomy; Ileostomy revision;  section, low transverse; Cholecystectomy; TONSILLECTOMY, ADENOIDECTOMY; thumb surgery; [place] tooth extraction; Appendectomy; Colectomy; Laparoscopic proctectomy (N/A, 2018); Lysis of adhesions (2018); Flexible sigmoidoscopy (2018); GASTRIC SLEEVE ; Cystoscopy w/ retrogrades (N/A, 2018); Catheterization of both left and right heart (Right, 2019); Panniculectomy (Bilateral, 2019); Revision Colostomy (N/A, 2019); Oophorectomy; Ileoscopy (2018); Flexible sigmoidoscopy (2018); Esophagogastroduodenoscopy (2018); Endoscopic ultrasound of  upper gastrointestinal tract (N/A, 11/10/2021); Esophagogastroduodenoscopy (N/A, 11/10/2021); and fess, with imaging guidance (Bilateral, 4/4/2023).    Family History: family history includes Cancer in her maternal grandfather, mother, and paternal grandfather; Cirrhosis in her mother; Colon cancer in her maternal grandfather, maternal uncle, and maternal uncle; Heart disease (age of onset: 67) in her father..       Review of patient's allergies indicates:   Allergen Reactions    Adhesive      Cause blisters    Dilaudid [hydromorphone] Nausea And Vomiting    Humira [adalimumab] Hives    Sulfa (sulfonamide antibiotics) Hives    Surgical stainless steel      Had surgical staples, caused irritation and infection       Current Outpatient Medications on File Prior to Visit   Medication Sig Dispense Refill    amoxicillin-clavulanate 875-125mg (AUGMENTIN) 875-125 mg per tablet Take 1 tablet by mouth 2 (two) times daily. 14 tablet 0    clonazePAM (KLONOPIN) 1 MG tablet Take 1 tablet (1 mg total) by mouth daily as needed for Anxiety. 90 tablet 1    diphenoxylate-atropine 2.5-0.025 mg (LOMOTIL) 2.5-0.025 mg per tablet Take 1 tablet by mouth 4 (four) times daily as needed for Diarrhea. 30 tablet 2    gentamicin (GARAMYCIN) 0.3 % ophthalmic solution Place 2 drops into the left eye every 4 (four) hours. for 7 days 5 mL 0    HYDROcodone-acetaminophen (NORCO) 5-325 mg per tablet Take 1 tablet by mouth every 6 (six) hours as needed for Pain. 10 tablet 0    levocetirizine (XYZAL) 5 MG tablet TAKE 1 TABLET (5 MG TOTAL) BY MOUTH EVERY EVENING. 90 tablet 1    loratadine (CLARITIN) 10 mg tablet Take 1 tablet (10 mg total) by mouth once daily. 90 tablet 1    meclizine (ANTIVERT) 25 mg tablet Take 1 tablet (25 mg total) by mouth 3 (three) times daily as needed for Dizziness or Nausea. 60 tablet 2    pantoprazole (PROTONIX) 40 MG tablet TAKE ONE TABLET BY MOUTH ONCE DAILY 90 tablet 1    promethazine (PHENERGAN) 25 MG tablet Take 1 tablet  "(25 mg total) by mouth every 6 (six) hours as needed for Nausea. 15 tablet 2    sertraline (ZOLOFT) 100 MG tablet Take 2 tablets (200 mg total) by mouth once daily. 180 tablet 1    topiramate (TOPAMAX) 100 MG tablet TAKE ONE TABLET BY MOUTH ONCE DAILY 90 tablet 3    traZODone (DESYREL) 100 MG tablet Take 1 tablet (100 mg total) by mouth nightly as needed for Insomnia. 90 tablet 1    VITAMIN D2 1,250 mcg (50,000 unit) capsule Take one capsule by mouth once weekly 12 capsule 0    [DISCONTINUED] adalimumab (HUMIRA,CF, PEN) 40 mg/0.4 mL PnKt Inject 0.4 mLs (40 mg total) into the skin every 14 (fourteen) days. 6 pen 2    [DISCONTINUED] albuterol (PROVENTIL/VENTOLIN HFA) 90 mcg/actuation inhaler Inhale 2 puffs into the lungs every 4 (four) hours as needed for Wheezing or Shortness of Breath. Rescue 8 g 5    [DISCONTINUED] budesonide (PULMICORT) 0.5 mg/2 mL nebulizer solution As directed BID 60 mL 2    [DISCONTINUED] diclofenac sodium (VOLTAREN) 1 % Gel Apply 2 g topically 4 (four) times daily. 100 g 5     No current facility-administered medications on file prior to visit.         Objective Findings:    Vital Signs:  Ht 5' 4" (1.626 m)   Wt 105.5 kg (232 lb 8 oz)   SpO2 96%   BMI 39.91 kg/m²   Body mass index is 39.91 kg/m².    Physical Exam:  Physical Exam  Vitals reviewed.   Constitutional:       Appearance: She is normal weight. She is not ill-appearing.   HENT:      Mouth/Throat:      Mouth: Mucous membranes are moist.      Pharynx: Oropharynx is clear.   Eyes:      General: No scleral icterus.  Abdominal:      General: Abdomen is flat. Bowel sounds are normal. There is no distension.      Palpations: Abdomen is soft. There is no mass.      Tenderness: There is abdominal tenderness in the left lower quadrant.      Hernia: No hernia is present.       Skin:     General: Skin is warm and dry.      Capillary Refill: Capillary refill takes less than 2 seconds.      Coloration: Skin is not jaundiced or pale.      " Findings: No bruising or erythema.   Neurological:      Mental Status: She is alert and oriented to person, place, and time. Mental status is at baseline.             Labs:  Lab Results   Component Value Date    WBC 6.63 08/06/2023    HGB 13.1 08/06/2023    HCT 42.9 08/06/2023     08/06/2023    CRP 5.6 08/06/2023    CHOL 203 (H) 05/03/2023    TRIG 95 05/03/2023    HDL 89 (H) 05/03/2023    ALKPHOS 123 08/06/2023    LIPASE 21 10/12/2022    ALT 20 08/06/2023    AST 24 08/06/2023     08/06/2023    K 4.1 08/06/2023     (H) 08/06/2023    CREATININE 1.0 08/06/2023    BUN 11 08/06/2023    CO2 20 (L) 08/06/2023    TSH 2.100 11/11/2022    INR 1.0 03/29/2022    HGBA1C 5.3 05/03/2023       Imaging reviewed: CT Abdomen 10/2022  Impression:     Postop changes of right-sided hemicolectomy with colostomy in place.  No CT evidence of bowel obstruction.     Changes of sleeve gastrectomy.  No CT evidence of gastric obstruction.     Additional stable findings as above.        Electronically signed by: Walker Becker MD  Date:                                            10/12/2022  Time:                                           16:29    Endoscopy reviewed: EUS 11/10/2021  Impression:            - [place]-colored mucosa suggestive of                          short-segment Hernandez's esophagus. Biopsied.                          - A sleeve gastrectomy was found.                          - Normal examined duodenum.                          - The celiac trunk was endosonographically normal.                          - Pancreatic parenchymal abnormalities consisting                          of diffuse echogenicity were noted in the entire                          pancreas. This is consistent with fatty                          infiltration of the pancreas.                          - There was no sign of significant pathology in                          the common bile duct.                          - There was no evidence of  significant pathology                          in the left lobe of the liver. Fine needle biopsy                          performed.   Recommendation:        - Discharge patient to home.                          - Resume previous diet.                          - Continue present medications.                          - Return to referring physician.                          - Patient has a contact number available for                          emergencies. The signs and symptoms of potential                          delayed complications were discussed with the                          patient. Return to normal activities tomorrow.                          Written discharge instructions were provided to                          the patient.                          - Await path results.   Attending Participation:        I personally performed the entire procedure.   Nhan Dee MD   11/10/2021 12:12:46 PM       Assessment:  1. Crohn's disease with complication, unspecified gastrointestinal tract location    2. Diarrhea, unspecified type    3. Arthralgia, unspecified joint    4. Abdominal pain, unspecified abdominal location             Plan:  Referral placed to IBD dept for patient to obtain followup. IBD labs placed this encounter.  Stool studies ordered. Will consider steroids if indicated by stools studies. Can consider ileoscopy pending workup.  CRP normal. Ortho consult placed by PCP.   Will consider CT Abdomen pending stool study results.       Thank you for allowing me to participate in this patient's care.    Sincerely,     Rupal Heredia NP  Gastroenterology Department  [facility]

## 2023-08-10 NOTE — PROGRESS NOTES
Subjective:       Patient ID: Ida Orzoco is a 58 y.o. female.    Chief Complaint: Eye Pain    HPI  Pt visit today for f/u from ER she was seen and treated for preceptal cellulitis conjunctivitis she c/o irritation left eye and redness swelling periorbital area in ER had CT scan left orbit preceptal cellulitis no fever chill no decrease vision no fever chill no n/v/d no sob cp BURRELL she also c/o rt side pain and LLQ pain with chronic diarrhea she has Crhon ds  no fever no sob cp no DOA   Review of Systems   Constitutional:  Negative for unexpected weight change.   Respiratory:  Negative for shortness of breath.    Cardiovascular:  Negative for chest pain.   Gastrointestinal:  Positive for abdominal pain.   Musculoskeletal:  Negative for arthralgias.   Psychiatric/Behavioral:  Negative for dysphoric mood.        Objective:      Physical Exam  Vitals and nursing note reviewed.   Constitutional:       General: She is not in acute distress.     Appearance: She is well-developed.   HENT:      Head: Normocephalic and atraumatic.      Right Ear: External ear normal.      Left Ear: External ear normal.      Nose: Nose normal.      Mouth/Throat:      Pharynx: No oropharyngeal exudate.   Eyes:      Extraocular Movements: Extraocular movements intact.      Conjunctiva/sclera: Conjunctivae normal.      Pupils: Pupils are equal, round, and reactive to light.   Neck:      Thyroid: No thyromegaly.   Cardiovascular:      Rate and Rhythm: Normal rate and regular rhythm.      Heart sounds: Normal heart sounds. No murmur heard.     No friction rub. No gallop.   Pulmonary:      Effort: Pulmonary effort is normal. No respiratory distress.      Breath sounds: Normal breath sounds. No wheezing.   Abdominal:      General: Bowel sounds are normal. There is no distension.      Palpations: Abdomen is soft.      Tenderness: There is no abdominal tenderness.   Musculoskeletal:         General: No tenderness or deformity. Normal range of  motion.      Cervical back: Normal range of motion and neck supple.   Lymphadenopathy:      Cervical: No cervical adenopathy.   Skin:     General: Skin is warm and dry.      Findings: No erythema or rash.   Neurological:      Mental Status: She is alert and oriented to person, place, and time.   Psychiatric:         Mood and Affect: Mood normal.         Thought Content: Thought content normal.         Judgment: Judgment normal.         Assessment:       1. Acute bacterial conjunctivitis of left eye    2. Acute non-recurrent sinusitis, unspecified location    3. Crohn's disease with complication, unspecified gastrointestinal tract location    4. Chronic diarrhea    5. Preseptal cellulitis        Plan:       Acute bacterial conjunctivitis of left eye  -     tobramycin-dexAMETHasone 0.3-0.1% (TOBRADEX) 0.3-0.1 % DrpS; Place 1 drop into the left eye every 6 (six) hours.  Dispense: 5 mL; Refill: 0    Acute non-recurrent sinusitis, unspecified location  -     predniSONE (DELTASONE) 20 MG tablet; Take 1 tablet (20 mg total) by mouth 2 (two) times daily. for 5 days  Dispense: 10 tablet; Refill: 0    Crohn's disease with complication, unspecified gastrointestinal tract location  Comments:  continue with tx as per gastroenterology     Chronic diarrhea  Comments:  prob from metformin vs other    Preseptal cellulitis        Medication List with Changes/Refills   New Medications    PREDNISONE (DELTASONE) 20 MG TABLET    Take 1 tablet (20 mg total) by mouth 2 (two) times daily. for 5 days    TOBRAMYCIN-DEXAMETHASONE 0.3-0.1% (TOBRADEX) 0.3-0.1 % DRPS    Place 1 drop into the left eye every 6 (six) hours.   Current Medications    AMOXICILLIN-CLAVULANATE 875-125MG (AUGMENTIN) 875-125 MG PER TABLET    Take 1 tablet by mouth 2 (two) times daily.    CLONAZEPAM (KLONOPIN) 1 MG TABLET    Take 1 tablet (1 mg total) by mouth daily as needed for Anxiety.    DIPHENOXYLATE-ATROPINE 2.5-0.025 MG (LOMOTIL) 2.5-0.025 MG PER TABLET    Take 1  tablet by mouth 4 (four) times daily as needed for Diarrhea.    HYDROCODONE-ACETAMINOPHEN (NORCO) 5-325 MG PER TABLET    Take 1 tablet by mouth every 6 (six) hours as needed for Pain.    LEVOCETIRIZINE (XYZAL) 5 MG TABLET    TAKE 1 TABLET (5 MG TOTAL) BY MOUTH EVERY EVENING.    LORATADINE (CLARITIN) 10 MG TABLET    Take 1 tablet (10 mg total) by mouth once daily.    MECLIZINE (ANTIVERT) 25 MG TABLET    Take 1 tablet (25 mg total) by mouth 3 (three) times daily as needed for Dizziness or Nausea.    PANTOPRAZOLE (PROTONIX) 40 MG TABLET    TAKE ONE TABLET BY MOUTH ONCE DAILY    PROMETHAZINE (PHENERGAN) 25 MG TABLET    Take 1 tablet (25 mg total) by mouth every 6 (six) hours as needed for Nausea.    SERTRALINE (ZOLOFT) 100 MG TABLET    Take 2 tablets (200 mg total) by mouth once daily.    TOPIRAMATE (TOPAMAX) 100 MG TABLET    TAKE ONE TABLET BY MOUTH ONCE DAILY    TRAZODONE (DESYREL) 100 MG TABLET    Take 1 tablet (100 mg total) by mouth nightly as needed for Insomnia.    VITAMIN D2 1,250 MCG (50,000 UNIT) CAPSULE    Take one capsule by mouth once weekly   Discontinued Medications    GENTAMICIN (GARAMYCIN) 0.3 % OPHTHALMIC SOLUTION    Place 2 drops into the left eye every 4 (four) hours. for 7 days

## 2023-08-11 ENCOUNTER — TELEPHONE (OUTPATIENT)
Dept: GASTROENTEROLOGY | Facility: CLINIC | Age: 58
End: 2023-08-11
Payer: MEDICARE

## 2023-08-11 LAB
CRYPTOSP AG STL QL IA: NEGATIVE
E COLI SXT1 STL QL IA: NEGATIVE
E COLI SXT2 STL QL IA: NEGATIVE
G LAMBLIA AG STL QL IA: NEGATIVE

## 2023-08-11 NOTE — TELEPHONE ENCOUNTER
Called patient notified of NP process  -voiced understanding  - very complicated case with many complications throughout her care  -currently has ileostomy and in flare  -starting Prednisone today

## 2023-08-12 LAB — BACTERIA STL CULT: NORMAL

## 2023-08-15 ENCOUNTER — PATIENT MESSAGE (OUTPATIENT)
Dept: WOUND CARE | Facility: CLINIC | Age: 58
End: 2023-08-15
Payer: MEDICARE

## 2023-08-15 ENCOUNTER — TELEPHONE (OUTPATIENT)
Dept: BEHAVIORAL HEALTH | Facility: CLINIC | Age: 58
End: 2023-08-15
Payer: MEDICARE

## 2023-08-15 NOTE — PROGRESS NOTES
"Primary Care Behavioral Health Integration: Follow-up  Date:  08/16/2023  Patient Name: Ida Orozco  Referral Source:  Eliecer Jones MD  Type of Visit:  In person  Site:  [facility]    History of Present Illness:  Ida Orozco, a 58 y.o.  female with history of Major Depressive Disorder, Recurrent, Moderate (F33.1) referred by Eliecer Jones MD.  Patient was seen, examined and chart was reviewed.    Met with patient.     Patient began this session by stating, " I'm feeling total chaos.  I had to rush my mother to this Emergency Room because she was having congestive heart failure.  When the  came, he was very rude, and he began yelling at me.  He wanted the DNR papers handed to him.  I only had them on my phone, so I could not provide them to him.  And then he did not want to transport her to [facility] where her cardiologist is.  So they took her here.  She was eventually placed in ICU, and then she was released."  Patient stated she looked at the Tool 2 worksheet and the "Consider Alternate Viewpoints" worksheet, but she did not complete them because of the situation with her mother.  Noted mother is supposed to go into an assisted living facility in [place], [place].  Stated, "I'm homeless.  My mother is selling her house to my my son, Loc.  I have a few friends I can stay with, but I don't know for how long."  Patient stated she cannot own a house because lamin is on disability and receives approximately $900 per month.  Noted she has been maintaining her personal hygiene by sitting on the side of the tub and bathing her body.  Stated "the unknown really scares me, and my mother has been a major influence on my negative thinking."  Mother told her she can stay in the house as long as it has not been sold.  Stated she will not allow her youngest son, Chris, to live with her because he is a drug addict and an abuses alcohol.  Labeled him as "toxic and " "narcissistic."  Noted mother wants to see patient and  , mother considers him a leech.  Patient reported, "I was homeless before.  The physical distance from my mother will be a relief for me because I bottle up most of the negative things she has told me for most of my life."  Will review her completed Tool 2 and "Consider Alternate Viewpoints" worksheets during follow-up session on August 30, 2023.      Results of the PHQ8 8/1/2023 7/16/2023 7/13/2023   1. Little interest or pleasure in doing things Nearly every day Nearly every day Nearly every day   2. Feeling down, depressed, or hopeless More than half the days Several days Several days   3. Trouble falling or staying asleep, or sleeping too much Nearly every day More than half the days More than half the days   4. Feeling tired or having little energy More than half the days More than half the days More than half the days   5. poor appetite or overeating More than half the days More than half the days More than half the days   6. Feeling bad about yourself - or that you are a failure or have let yourself or your family down More than half the days Not at all Not at all   7. Trouble concentrating on things, such as reading the newspaper or watching television Nearly every day Nearly every day Nearly every day   8. Moving or speaking so slowly that other people could have noticed? Or the opposite - being so fidgety or restless that you have been moving around a lot more than usual Not at all Not at all Not at all   Total Score  17 13 13       GAD7 8/1/2023 7/16/2023 7/13/2023   1. Feeling nervous, anxious, or on edge? 0 1 1   2. Not being able to stop or control worrying? 0 2 2   3. Worrying too much about different things? 0 2 2   4. Trouble relaxing? 0 2 2   5. Being so restless that it is hard to sit still? 0 2 2   6. Becoming easily annoyed or irritable? 0 1 1   7. Feeling afraid as if something awful might happen? 0 0 0   8. If you checked " off any problems, how difficult have these problems made it for you to do your work, take care of things at home, or get along with other people? - - -   DENIZ-7 Score 0 10 10       Mental Status Exam  General Appearance:  overweight     Speech: normal tone, normal rate, normal pitch, normal volume         Level of Cooperation: cooperative        Thought Processes: normal and logical      Mood: angry, depressed, irritable        Thought Content: normal, no suicidality, no homicidality, delusions, or paranoia     Affect: expansive, increased in intensity, irritable    Orientation: Oriented x3     Memory: recent >  intact, remote >  intact     Attention Span & Concentration: intact     Fund of General Knowledge: intact and appropriate to age and level of education   Abstract Reasoning: interpretation of similarities was concrete   Judgment & Insight: fair       Language:  intact       Treatment plan:  Target symptoms: depression, anxiety   Why chosen therapy is appropriate versus another modality: relevant to diagnosis  Outcome monitoring methods: self-report  Therapeutic intervention type: insight oriented psychotherapy, behavior modifying psychotherapy, supportive psychotherapy    Risk parameters:  Patient reports no suicidal ideation  Patient reports no homicidal ideation  Patient reports no self-injurious behavior  Patient reports no violent behavior    Verbal deficits: None    Patient's response to intervention:  The patient's response to intervention is accepting.    Progress toward goals and other mental status changes:  The patient's progress toward goals is limited.    Patient advised to call [phone] or present the the nearest ED if they experience suicidal or homicidal ideation, plan or intent.       Impression:  My diagnostic impression is patient continues to experience excessive worrying, difficulty relaxing, difficulty concentrating, feeling on edge, and depressed mood.  These symptoms are making it  difficult for patient to function effectively.      Diagnosis:   Major Depressive Disorder, Recurrent, Moderate (F33.1)    Treatment Goals and Plan: Pt plans to continue CBT, Problem-solving Therapy, and Solution-focused Therapy    Future treatment will utilize CBT, Problem-solving Therapy, and Solution-focused Therapy    Return to Clinic: 2 weeks    Plan to discuss case with [facility] consulting psychiatrist and further recommendations to be potentially be made at that time.  Refer to psychiatry    Length of Appointment:   48 minutes spent face-to-face and 13 minutes spent in non face-to-face clinical care.

## 2023-08-15 NOTE — PROGRESS NOTES
CHW reached out to pt to remind her of office appointment with Guy Stark LPC tomorrow. Pt confirmed the appointment, moved appt from 1:30 to 2:15pm same day.

## 2023-08-16 ENCOUNTER — OFFICE VISIT (OUTPATIENT)
Dept: BEHAVIORAL HEALTH | Facility: CLINIC | Age: 58
End: 2023-08-16
Payer: MEDICAID

## 2023-08-16 ENCOUNTER — PATIENT MESSAGE (OUTPATIENT)
Dept: GASTROENTEROLOGY | Facility: CLINIC | Age: 58
End: 2023-08-16
Payer: MEDICARE

## 2023-08-16 DIAGNOSIS — F33.1 MAJOR DEPRESSIVE DISORDER, RECURRENT, MODERATE: Primary | ICD-10-CM

## 2023-08-16 LAB
CALPROTECTIN STL-MCNT: <27.1 MCG/G
O+P STL MICRO: NORMAL

## 2023-08-16 PROCEDURE — 90834 PR PSYCHOTHERAPY W/PATIENT, 45 MIN: ICD-10-PCS | Mod: S$GLB,,, | Performed by: COUNSELOR

## 2023-08-16 PROCEDURE — 3044F PR MOST RECENT HEMOGLOBIN A1C LEVEL <7.0%: ICD-10-PCS | Mod: CPTII,S$GLB,, | Performed by: COUNSELOR

## 2023-08-16 PROCEDURE — 3044F HG A1C LEVEL LT 7.0%: CPT | Mod: CPTII,S$GLB,, | Performed by: COUNSELOR

## 2023-08-16 PROCEDURE — 90834 PSYTX W PT 45 MINUTES: CPT | Mod: S$GLB,,, | Performed by: COUNSELOR

## 2023-08-17 ENCOUNTER — TELEPHONE (OUTPATIENT)
Dept: GASTROENTEROLOGY | Facility: CLINIC | Age: 58
End: 2023-08-17
Payer: MEDICARE

## 2023-08-25 ENCOUNTER — PATIENT MESSAGE (OUTPATIENT)
Dept: ORTHOPEDICS | Facility: CLINIC | Age: 58
End: 2023-08-25
Payer: MEDICARE

## 2023-08-25 DIAGNOSIS — M25.551 RIGHT HIP PAIN: ICD-10-CM

## 2023-08-25 DIAGNOSIS — M25.561 ACUTE PAIN OF RIGHT KNEE: Primary | ICD-10-CM

## 2023-09-01 ENCOUNTER — TELEPHONE (OUTPATIENT)
Dept: BEHAVIORAL HEALTH | Facility: CLINIC | Age: 58
End: 2023-09-01
Payer: MEDICARE

## 2023-09-01 NOTE — PROGRESS NOTES
CHW reached out to pt to offer to reschedule her BHI visit. Pt declined.  Pt said her mother will enter assisted living center and she will become homeless. Pt received a bill from [facility] and said she took care of a $200 bill via Financial Assistance.  Pt declined to receive referral information for homeless facilities, pt declined to see if Medicaid has paid the bill. Requested closure.

## 2023-09-02 DIAGNOSIS — E55.9 VITAMIN D DEFICIENCY: ICD-10-CM

## 2023-09-02 NOTE — TELEPHONE ENCOUNTER
No care due was identified.  [facility] Embedded Care Due Messages. Reference number: [id number].   9/02/2023 1:52:09 PM CDT

## 2023-09-05 RX ORDER — PANTOPRAZOLE SODIUM 40 MG/1
TABLET, DELAYED RELEASE ORAL
Qty: 90 TABLET | Refills: 3 | Status: SHIPPED | OUTPATIENT
Start: 2023-09-05 | End: 2023-12-11

## 2023-09-05 RX ORDER — ERGOCALCIFEROL 1.25 MG/1
CAPSULE ORAL
Qty: 12 CAPSULE | Refills: 1 | Status: SHIPPED | OUTPATIENT
Start: 2023-09-05 | End: 2023-12-11 | Stop reason: SDUPTHER

## 2023-09-05 NOTE — TELEPHONE ENCOUNTER
Refill Routing Note   Medication(s) are not appropriate for processing by [facility] for the following reason(s):      Medication outside of protocol  Patient seen in ED/Hospital since LOV with provider    [facility] action(s):  Defer  Route Care Due:  None identified          Pharmacist review requested: Yes     Appointments  past 12m or future 3m with PCP    Date Provider   Last Visit   7/5/2023 Eliecer Jones MD   Next Visit   10/2/2023 Eliecer Jones MD   ED visits in past 90 days: 1        Note composed:10:10 AM 09/05/2023

## 2023-09-05 NOTE — TELEPHONE ENCOUNTER
Refill Routing Note   Medication(s) are not appropriate for processing by [facility] for the following reason(s):      - Outside of protocol    [facility] action(s):  Route  Approve       Medication Therapy Plan: approve pantoprazole- ED visit unrelated to requested rx; route vitamin d- oop  Medication reconciliation completed: No     Appointments  past 12m or future 3m with PCP    Date Provider   Last Visit   7/5/2023 Eliecer Jones MD   Next Visit   10/2/2023 Eliecer Jones MD   ED visits in past 90 days: 1        Note composed:3:25 PM 09/05/2023

## 2023-09-08 ENCOUNTER — PATIENT MESSAGE (OUTPATIENT)
Dept: GASTROENTEROLOGY | Facility: CLINIC | Age: 58
End: 2023-09-08
Payer: MEDICARE

## 2023-09-13 NOTE — PROGRESS NOTES
"  Subjective:     HPI:   Ida Orozco is a 58 y.o. female who presents for eval R leg pain    S/p Right knee arthroscopy w/chondroplasty (plica removed) with Dr. Milligan     She says she is been doing a lot of activity lately she is been doing a lot more walking than ever before she was taking care of her ailing father and lifting him before he passed and she is been helping her mom.      She is had about a year of lateral hip pain down the lateral thigh to the lateral knee some anterior shin pain some shooting pain down her leg into the anterior shin at night.  Moderate to severe activity-related relieved with rest    Medications: Tylenol (650mg, bid), Can't take NSAIDs due to Chrons diagnosis and her liver.   "Dont believe in FLORESITA", worried about side effects  Has tried robaxin and flexeril with no relief    Injections: Yes, the patient may have had one injection in her right knee back in 2016 and the patient stated that th injection didn't help.    Physical Therapy: Yes, completed OP-PT in 2016, the patient stated that the OP-PT wasn't helpful  With knee scope    Bracing: None.     Assistive Devices: None.     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 The patient was working  prior to being disabled for [employer] for [employer]/salv[employer].     Social support: The patient stated that they live at home with their ex  . The patient stated that their ex   would be able to help take care of them if they were to have surgery.     Dad   Mom now in assisted living  Was living with her mom ?now going to live with ex-       ROS:  The updated medical history is in the chart and has been reviewed. A review of systems is " updated and there is no reported vision changes, ear/nose/mouth/throat complaints,  chest pain, shortness of breath, abdominal pain, urological complaints, fevers or chills, psychiatric complaints. Musculoskeletal and neurologcial symptoms are as documented. All other systems are negative.      Objective:   Exam:  There were no vitals filed for this visit.  Body mass index is 38.86 kg/m².    Physical examination included assessment of the patient's general appearance with particular attention to development, nutrition, body habitus, attention to grooming, and any evidence of distress.  Constitutional: The patient is a well-developed, well-nourished patient in no acute distress.   Cardiovascular: Vascular examination included warmth and capillary refill as well inspection for edema and assessment of pedal pulses. Pulses are palpable and regular.  Musculoskeletal: Gait was assessed as to whether it was steady, non-antalgic, and/or required the use of an assist device. The patient was also asked to walk independently and get onto the examination table.  Skin: The skin was examined for any obvious rashes or lesions in the extremity.  Neurologic: Sensation is intact to light touch in the extremity. The patient has good coordination without hyperreflexia and is alert and oriented to person, place and time and has normal mood and affect.     All of the above were examined and found to be within normal limits except for the following pertinent clinical findings:    Antalgic gait with a limp.  Negative Trendelenburg.  She can do a single leg stance without pain.  She is significant tenderness to palpation over the right greater trochanter.  No groin pain with straight leg raise 0-100 flexion 30 abduction 20 abduction 30 external 20 internal rotation which does not recreate hip pain.  No significant limb length discrepancy supine.      Right knee 0-120 degree knee range of motion 5° valgus alignment she is tender palpation  the medial joint line no effusion knee stable anterior-posterior varus valgus stresses without flexion contracture or extensor lag.  Negative Annamaria sign.  She is significantly tender along the IT band at the distal lateral thigh down to the lateral knee and femoral condyle      Imaging:    HIP R NORMAL           Indication:  Right hip pain  Exam Ordered: Radiographs include an anteroposterior pelvis, an anteroposterior and lateral view of the proximal femur including the hip joint.  Details of Examination: Exam shows no evidence of joint space narrowing, fracture or dislocation.  No other significant findings are noted.  Impression: Normal Exam, Right Hip, KNEE R ARTHRITIS    Indication:  Right knee pain  Exam Ordered: Radiographs of the right knee include a standing anteroposterior view, a standing posterioanterior view, a lateral view in full flexion, and a sunrise view  Details of Examination: Exam shows evidence of joint space narrowing, osteophyte formation, and subchondral sclerosis, all consistent with degenerative arthritis of the knee.  No other significant findings are noted.  Impression:  Degenerative Arthritis, Right Knee, and KNEE L ARTHRITIS     Indication:  Left knee pain  Exam Ordered: Radiographs of the left knee include a standing anteroposterior view, a standing posterioanterior view, a lateral view in full flexion, and a sunrise view  Details of Examination: Exam shows evidence of joint space narrowing, osteophyte formation, and subchondral sclerosis, all consistent with degenerative arthritis of the knee.  No other significant findings are noted.  Impression:  Degenerative Arthritis, Left Knee    R hip no sig degen changes  B knee Klg2/3 varus OA      Assessment:       ICD-10-CM ICD-9-CM   1. Iliotibial band syndrome affecting right lower leg  M76.31 728.89   2. Greater trochanteric bursitis of right hip  M70.61 726.5   3. Primary osteoarthritis of right knee  M17.11 715.16      BMI 38, sig  wt loss  Chrons dz - no meds, has appt with IBD specialist in November  Gastric sleeve  PBC on ursodiol, LFTs WNL  All: humira, dilaudid, staples     Plan:       At this point I feel the patient has a diagnosis of trochanteric bursitis.  We discussed all the treatment options including but not limited to, the use of non-steroidal anti-inflammatory drugs (NSAIDs), physical therapy visits for stretching/strengthening and modalities treatment, as well as a consistent stretching program at home.  We further discussed the role of corticosteroid injections into the trochanteric bursa.  I explained that this is a short-term solution, however it does often allow for return to physical therapy and stretching and the ability to alleviate the acute pain.      The above findings were discussed with patient length. We discussed the risks of conservative versus surgical management knee arthritis. Conservative management consisting of anti-inflammatory medications, glucosamine/chondroitin sulfate, weight loss, physical therapy, activity modification, as well as injections (lubricant versus corticosteroid) was discussed at length. At this point considering the patient's level of activity, pain, and radiographic findings I recommend continued conservative management of the knee arthritis.     I explained the potentially adverse gastric, cardiac, and renal effects of NSAIDs and explained that if the patient wishes to take it for longer that the patient should discuss this with their primary care physician to determine if it is safe to do so.    X - be careful with liver Tylenol   Avoid gastric sleeve Aleve    Voltaren Gel   x AAHKS non-op arthritis info   x Bursitis info    Total Joint Info    HEP: [facility] [employer] home exercise conditioning program    x PT   X R knee CSI: intra-articular steroid injection    CSI: greater trochanter bursitis    HA: hyaluronic acid injection    Brace:     Referral:      IT band foam  roller/massage    Ref to sports medicine for non-op GTB/IT band syndrome management, US guided GTB injection    Requets ref for hand eval for finger pain    F/u PRN knee arthritis    Orders Placed This Encounter   Procedures    Ambulatory referral/consult to Physical/Occupational Therapy     Standing Status:   Future     Standing Expiration Date:   10/14/2024     Referral Priority:   Routine     Referral Type:   Physical Medicine     Referral Reason:   Specialty Services Required     Number of Visits Requested:   1    Ambulatory referral/consult to Sports Medicine     Standing Status:   Future     Standing Expiration Date:   10/14/2024     Referral Priority:   Routine     Referral Type:   Consultation     Referral Reason:   Specialty Services Required     Requested Specialty:   Sports Medicine     Number of Visits Requested:   1             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, with liver fibrosis, on ursodiol 2023    PTSD (post-traumatic stress disorder)     Seasonal allergies     Symptomatic abdominal panniculus     Ulcerative colitis        Past Surgical History:   Procedure Laterality Date    APPENDECTOMY      CATHETERIZATION OF BOTH LEFT AND RIGHT HEART Right 2019    Procedure: CATHETERIZATION, HEART, BOTH LEFT AND RIGHT;  Surgeon: Beto Malin MD;  Location: [facility] CATH LAB;  Service: Cardiology;  Laterality: Right;     SECTION, LOW TRANSVERSE      CHOLECYSTECTOMY      open    COLECTOMY      total- 2013    CYSTOSCOPY W/ RETROGRADES N/A 2018    Procedure: CYSTOSCOPY, WITH RETROGRADE PYELOGRAM;  Surgeon: Butch Banks MD;  Location: [facility] OR;   Service: Urology;  Laterality: N/A;  [facility] CONFIRMED 8/17/DME    ENDOSCOPIC ULTRASOUND OF UPPER GASTROINTESTINAL TRACT N/A 11/10/2021    Procedure: ULTRASOUND, UPPER GI TRACT, ENDOSCOPIC;  Surgeon: Nhan Dee MD;  Location: [facility] (2ND FLR);  Service: Endoscopy;  Laterality: N/A;  MD Milena Whitehead PA-C; Quinn Whitman MD; Miladys Velasquez MA  Caller: Unspecified (3 days ago,  1:20 PM)  Fair enough. Neha, please schedule.       ----- Message -----   From: Milena Murillo PA-C   Sent: 8/11/2021  1    ESOPHAGOGASTRODUODENOSCOPY  04/11/2018    Dr. Castorena: LA Grade A reflux esophagitis, hiatal hernia; Ectopic gastric mucosa in the upper third of the esophagus; gastric sleeve intact with unremarkable findings, gastritis; biopsy: stomach- Mild chronic antral and oxyntic gastritis, no activity, negative for h pylori    ESOPHAGOGASTRODUODENOSCOPY N/A 11/10/2021    Procedure: EGD (ESOPHAGOGASTRODUODENOSCOPY);  Surgeon: Nhan Dee MD;  Location: [facility] (2ND FLR);  Service: Endoscopy;  Laterality: N/A;    FESS, WITH IMAGING GUIDANCE Bilateral 4/4/2023    Procedure: Pan Sinus FESS, WITH IMAGING GUIDANCE Disc loaded;  Surgeon: Jay Hernandez MD;  Location: [facility] OR;  Service: ENT;  Laterality: Bilateral;  balloon dilation of sinuses    FLEXIBLE SIGMOIDOSCOPY  5/24/2018    Procedure: SIGMOIDOSCOPY, FLEXIBLE;  Surgeon: JENNY Virgen MD;  [address];  Service: Colon and Rectal;;    FLEXIBLE SIGMOIDOSCOPY  04/11/2018    Dr. Castorena: Non-patent surgical anastomosis, characterized by friable mucosa.    GASTRIC SLEEVE       HYSTERECTOMY      ILEOSCOPY  04/11/2018    Dr. Castorena: Patient is status-post total colectomy with end ileostomy. unremarkable findings    ILEOSTOMY REVISION      april 2014; august 2014    LAPAROSCOPIC PROCTECTOMY N/A 5/24/2018    Procedure: PROCTECTOMY-LAPAROSCOPIC/CONVERTED TO OPEN;  Surgeon: JENNY Virgen MD;  Location: [facility] OR 2ND FLR;   Service: Colon and Rectal;  Laterality: N/A;    LYSIS OF ADHESIONS  2018    Procedure: LYSIS, ADHESIONS/ more than 2hours;  Surgeon: JENNY Virgen MD;  Location: [facility] OR 2ND FLR;  Service: Colon and Rectal;;    OOPHORECTOMY      PANNICULECTOMY Bilateral 2019    Procedure: PANNICULECTOMY;  Surgeon: Andrea Alvarado MD;  [facility] OR 2ND FLR;  Service: Plastics;  Laterality: Bilateral;    REVISION COLOSTOMY N/A 2019    Procedure: REVISION, COLOSTOMY;  Surgeon: JENNY Virgen MD;  Location: [facility] OR 2ND FLR;  Service: Colon and Rectal;  Laterality: N/A;    thumb surgery      TONSILLECTOMY, ADENOIDECTOMY      WISDOM TOOTH EXTRACTION         Family History   Problem Relation Age of Onset    Cancer Mother         skin    Cirrhosis Mother     Heart disease Father 67    Cancer Paternal Grandfather         ?    Cancer Maternal Grandfather         colon     Colon cancer Maternal Grandfather     Colon cancer Maternal Uncle     Colon cancer Maternal Uncle     Crohn's disease Neg Hx     Ulcerative colitis Neg Hx     Stomach cancer Neg Hx     Esophageal cancer Neg Hx     Celiac disease Neg Hx     Breast cancer Neg Hx     Ovarian cancer Neg Hx        Social History     Socioeconomic History    Marital status: Legally    Tobacco Use    Smoking status: Former     Current packs/day: 0.00     Types: Cigarettes     Quit date:      Years since quittin.7    Smokeless tobacco: Never   Substance and Sexual Activity    Alcohol use: Yes     Comment: rarely    Drug use: No    Sexual activity: Never     Birth control/protection: See Surgical Hx     Social Determinants of Health     Stress: No Stress Concern Present (3/10/2020)    [employer] - Occupational Stress Questionnaire     Feeling of Stress : Not at all

## 2023-09-14 ENCOUNTER — HOSPITAL ENCOUNTER (OUTPATIENT)
Dept: RADIOLOGY | Facility: HOSPITAL | Age: 58
Discharge: HOME | End: 2023-09-14
Attending: ORTHOPAEDIC SURGERY
Payer: MEDICARE

## 2023-09-14 ENCOUNTER — OFFICE VISIT (OUTPATIENT)
Dept: ORTHOPEDICS | Facility: CLINIC | Age: 58
End: 2023-09-14
Payer: MEDICARE

## 2023-09-14 VITALS — BODY MASS INDEX: 38.31 KG/M2 | WEIGHT: 229.94 LBS | HEIGHT: 65 IN

## 2023-09-14 DIAGNOSIS — M17.11 PRIMARY OSTEOARTHRITIS OF RIGHT KNEE: ICD-10-CM

## 2023-09-14 DIAGNOSIS — M25.561 ACUTE PAIN OF RIGHT KNEE: ICD-10-CM

## 2023-09-14 DIAGNOSIS — M25.551 RIGHT HIP PAIN: ICD-10-CM

## 2023-09-14 DIAGNOSIS — M76.31 ILIOTIBIAL BAND SYNDROME AFFECTING RIGHT LOWER LEG: Primary | ICD-10-CM

## 2023-09-14 DIAGNOSIS — M70.61 GREATER TROCHANTERIC BURSITIS OF RIGHT HIP: ICD-10-CM

## 2023-09-14 PROCEDURE — 3044F HG A1C LEVEL LT 7.0%: CPT | Mod: CPTII,S$GLB,, | Performed by: ORTHOPAEDIC SURGERY

## 2023-09-14 PROCEDURE — 3008F BODY MASS INDEX DOCD: CPT | Mod: CPTII,S$GLB,, | Performed by: ORTHOPAEDIC SURGERY

## 2023-09-14 PROCEDURE — 1159F MED LIST DOCD IN RCRD: CPT | Mod: CPTII,S$GLB,, | Performed by: ORTHOPAEDIC SURGERY

## 2023-09-14 PROCEDURE — 73562 X-RAY EXAM OF KNEE 3: CPT | Mod: 59,TC,LT

## 2023-09-14 PROCEDURE — 99999 PR PBB SHADOW E&M-EST. PATIENT-LVL IV: ICD-10-PCS | Mod: PBBFAC,,, | Performed by: ORTHOPAEDIC SURGERY

## 2023-09-14 PROCEDURE — 1159F PR MEDICATION LIST DOCUMENTED IN MEDICAL RECORD: ICD-10-PCS | Mod: CPTII,S$GLB,, | Performed by: ORTHOPAEDIC SURGERY

## 2023-09-14 PROCEDURE — 99214 OFFICE O/P EST MOD 30 MIN: CPT | Mod: 25,S$GLB,, | Performed by: ORTHOPAEDIC SURGERY

## 2023-09-14 PROCEDURE — 73502 X-RAY EXAM HIP UNI 2-3 VIEWS: CPT | Mod: TC,RT

## 2023-09-14 PROCEDURE — 73564 X-RAY EXAM KNEE 4 OR MORE: CPT | Mod: 26,RT,, | Performed by: RADIOLOGY

## 2023-09-14 PROCEDURE — 73502 X-RAY EXAM HIP UNI 2-3 VIEWS: CPT | Mod: 26,RT,, | Performed by: RADIOLOGY

## 2023-09-14 PROCEDURE — 20610 DRAIN/INJ JOINT/BURSA W/O US: CPT | Mod: RT,S$GLB,, | Performed by: ORTHOPAEDIC SURGERY

## 2023-09-14 PROCEDURE — 73502 XR HIP WITH PELVIS WHEN PERFORMED, 2 OR 3  VIEWS RIGHT: ICD-10-PCS | Mod: 26,RT,, | Performed by: RADIOLOGY

## 2023-09-14 PROCEDURE — 3044F PR MOST RECENT HEMOGLOBIN A1C LEVEL <7.0%: ICD-10-PCS | Mod: CPTII,S$GLB,, | Performed by: ORTHOPAEDIC SURGERY

## 2023-09-14 PROCEDURE — 99214 PR OFFICE/OUTPT VISIT, EST, LEVL IV, 30-39 MIN: ICD-10-PCS | Mod: 25,S$GLB,, | Performed by: ORTHOPAEDIC SURGERY

## 2023-09-14 PROCEDURE — 73562 X-RAY EXAM OF KNEE 3: CPT | Mod: 26,LT,, | Performed by: RADIOLOGY

## 2023-09-14 PROCEDURE — 20610 LARGE JOINT ASPIRATION/INJECTION: R KNEE: ICD-10-PCS | Mod: RT,S$GLB,, | Performed by: ORTHOPAEDIC SURGERY

## 2023-09-14 PROCEDURE — 3008F PR BODY MASS INDEX (BMI) DOCUMENTED: ICD-10-PCS | Mod: CPTII,S$GLB,, | Performed by: ORTHOPAEDIC SURGERY

## 2023-09-14 PROCEDURE — 99999 PR PBB SHADOW E&M-EST. PATIENT-LVL IV: CPT | Mod: PBBFAC,,, | Performed by: ORTHOPAEDIC SURGERY

## 2023-09-14 PROCEDURE — 73562 XR KNEE ORTHO RIGHT WITH FLEXION: ICD-10-PCS | Mod: 26,LT,, | Performed by: RADIOLOGY

## 2023-09-14 PROCEDURE — 73564 XR KNEE ORTHO RIGHT WITH FLEXION: ICD-10-PCS | Mod: 26,RT,, | Performed by: RADIOLOGY

## 2023-09-14 RX ADMIN — TRIAMCINOLONE ACETONIDE 40 MG: 40 INJECTION, SUSPENSION INTRA-ARTICULAR; INTRAMUSCULAR at 08:09

## 2023-09-14 NOTE — PROGRESS NOTES
Injection Information    Triamcinolone Acetonide Injectable Suspension (40mg/mL)  Lot Number: [id number]  Expiration Date: 1/31/2025

## 2023-09-14 NOTE — PROGRESS NOTES
----- Message from Jillian Aggarwal MD sent at 1/17/2022  3:04 PM CST -----  Please call the patient to schedule weekly labs and injections for 4 weeks.      Dr. Freedom Coyle HISTORY OF PRESENT ILLNESS   Ida Orozco, a 58 y.o. female, presents today for evaluation of her bilateral HIP.    Patient reports onset of chronic and insidious  pain beginning 6 months ago. Patient reports no known injury or trauma. Pain is located along lateral aspect of HIP. Pain is 6/10 at present & up to 8/10 with provacative activity including all activity including ADLs, walking, and sitting to standing. Pain is described as sharp, ache, and dull. Patient states pain does not radiate.     Associated symptoms include instability, weakness, popping, and clicking. Pain is aggravated by activities above & occur daily . Symptoms do interfere with sleep. Patient reports pain & symptoms are staying the same . Patient reports  no prior surgery  to HIP. She is status post gastric sleeve & has become more active with weight loss. She also reports bilateral foot pain.    Prior treatment Ida Orozco has tried   OTC Acetaminophen - Yes  OTC NSAID - No - s/p gastric sleeve  Rx NSAID - No - s/p gastric sleeve  Rx Narcotic/Other - No   Brace - No   Injection - Cortisone - No   Injection - Biologics - No   Activity Modification - Yes  Physical Therapy - Yes - current   Home Exercise Program - Yes  Assistive Device - No  Other - ice and heat      Review of systems (ROS):  A 10+ review of systems was performed with pertinent positives and negatives noted above in the history of present illness. Other systems were negative unless otherwise specified.    PHYSICAL EXAMINATION  General:  The patient is alert and oriented x 3.  Mood is pleasant.  Observation of ears, eyes and nose reveal no gross abnormalities.  HEENT: NCAT, sclera nonicteric  Lungs: Respirations are equal and unlabored.   Gait is coordinated. Patient can toe walk and heel walk without difficulty.    HIP/PELVIS EXAMINATION    Observation/Inspection  Gait:   Antalgic   Alignment:  Neutral   Scars:   None   Muscle atrophy: None   Effusion:  None   Warmth:  None    Discoloration:   None   Leg lengths:   Equal   Pelvis:    Level     Tenderness/Crepitus (T/C):      T / C  Lateral Gluteal region  + / -  Trochanteric bursa   + / -  Piriformis    - / -  SI joint    - / -  Psoas tendon   - / -  Rectus insertion  - / -  Adductor insertion  - / -  Pubic symphysis  - / -    ROM: (* = pain)    Flexion:      120 degrees  External rotation:   40 degrees  Internal rotation with axial load:  30 degrees  Internal rotation without axial load:  40 degrees  Abduction:    45 degrees  Adduction:     20 degrees    Special Tests:  Pain w/ forced internal rotation (FADIR):  +  Pain w/ forced external rotation (MOHIT):  +   Circumduction test:     -  [facility] test:     -   Log roll:       -   Snapping hip (internal):    -   Sit-up pain:      NT  Resisted sit-up pain:     NT  Resisted sit-up with adductor contraction pain:  NT  Step-down test:     NT  Trendelenburg test:     NT  Bridge test      NT    Extremity Neuro-vascular Examination:   Sensation:  Grossly intact to light touch all dermatomal regions.   Motor Function:  Fully intact motor function at hip, knee, foot and ankle    DTRs;  quadriceps and  achilles 2+.  No clonus and downgoing Babinski.    Vascular status:  DP and PT pulses 2+, brisk capillary refill, symmetric.    Skin:  intact, compartments soft.    Other Findings:    ASSESSMENT & PLAN  Assessment  #1 Ton[place] Grade II osteoarthritis of hip, right   W/ tendinosis of lateral gluteal tendons    No evidence of neurologic pathology  No evidence of vascular pathology    Imaging studies reviewed:  X-ray pelvis and hip, right, 23.09     Plan  We discussed the importance of appropriate diet, weight, and regular exercise    We discussed options including    Watchful waiting / relative rest    Physical therapy X    Injection therapy lateral glute tendon CSI, right   Consultation Podiatry referral placed    The patient chooses As above   x = prescribed  CSI = corticosteroid injection  VSI  = viscosupplement injection  PRPI = platelet rich plasma injection  ia = intra articular  R = right  L = left  B = bilateral   nfSx = surgical consultation was recommended, but patient is not interested in consultation at this time    Physical Therapy        Formal (fPT), @ [facility] facility T    Formal (fPT), @ [facility] facility        Homegoing (hgPT), per concurrent fPT recommendations    Homegoing (hgPT), per prior fPT recommendations    Homegoing (hgPT), handout provided        w/  (atPT)    [blank] = not prescribed  x = prescribed  b = prescribed, and begin as indicated  t = continue as indicated  r = prescribed, and restart as indicated  p = completed prior as indicated  hs = prescribed, and with high school   col = prescribed, and with college or university   nfPT = physical therapy was recommended, but patient is not interested in PT at this time    Activity (e.g. sports, work) restrictions    [blank] = as tolerated  pt = per physical therapist  at = per   NWB = non weight bearing on affected lower extremity, with crutches assistance for ambulation    Bracing    [blank] = not prescribed  r = recommended, but not fit with at todays visit  f = prescribed and fit with at todays visit  t = continue as indicated  d = d/c  p = as needed  rare = use on rare, as-needed basis; advised against chronic use    Pain management    [blank] = No prescription necessary. A handout detailing dosing of appropriate   over-the-counter musculoskeletal analgesics was made available to the patient.   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

## 2023-09-17 RX ORDER — TRIAMCINOLONE ACETONIDE 40 MG/ML
40 INJECTION, SUSPENSION INTRA-ARTICULAR; INTRAMUSCULAR
Status: DISCONTINUED | OUTPATIENT
Start: 2023-09-14 | End: 2023-09-17 | Stop reason: HOSPADM

## 2023-09-17 NOTE — PROCEDURES
Large Joint Aspiration/Injection: R knee    Date/Time: 9/14/2023 8:40 AM    Performed by: Jayce Hinkle III, MD  Authorized by: Jayce Hinkle III, MD    Consent Done?:  Yes (Verbal)  Indications:  Pain  Timeout: prior to procedure the correct patient, procedure, and site was verified    Prep: patient was prepped and draped in usual sterile fashion      Local anesthesia used?: Yes    Local anesthetic:  Lidocaine 1% without epinephrine  Anesthetic total (ml):  5      Details:  Needle Size:  21 G  Location:  Knee  Site:  R knee  Medications:  40 mg triamcinolone acetonide 40 mg/mL  Patient tolerance:  Patient tolerated the procedure well with no immediate complications

## 2023-09-18 ENCOUNTER — PATIENT MESSAGE (OUTPATIENT)
Dept: PRIMARY CARE CLINIC | Facility: CLINIC | Age: 58
End: 2023-09-18
Payer: MEDICARE

## 2023-09-20 ENCOUNTER — PATIENT MESSAGE (OUTPATIENT)
Dept: WOUND CARE | Facility: CLINIC | Age: 58
End: 2023-09-20
Payer: MEDICARE

## 2023-09-20 DIAGNOSIS — B37.2 CANDIDAL INTERTRIGO: Primary | ICD-10-CM

## 2023-09-20 RX ORDER — NYSTATIN 100000 [USP'U]/G
POWDER TOPICAL 2 TIMES DAILY
Qty: 60 G | Refills: 3 | Status: SHIPPED | OUTPATIENT
Start: 2023-09-20 | End: 2023-12-01 | Stop reason: SDUPTHER

## 2023-09-27 ENCOUNTER — OFFICE VISIT (OUTPATIENT)
Dept: SPORTS MEDICINE | Facility: CLINIC | Age: 58
End: 2023-09-27
Payer: MEDICARE

## 2023-09-27 VITALS — TEMPERATURE: 98 F | HEIGHT: 65 IN | WEIGHT: 229.25 LBS | BODY MASS INDEX: 38.2 KG/M2

## 2023-09-27 DIAGNOSIS — M76.31 ILIOTIBIAL BAND SYNDROME AFFECTING RIGHT LOWER LEG: ICD-10-CM

## 2023-09-27 DIAGNOSIS — M70.61 GREATER TROCHANTERIC BURSITIS OF RIGHT HIP: ICD-10-CM

## 2023-09-27 DIAGNOSIS — M16.11 PRIMARY OSTEOARTHRITIS OF RIGHT HIP: ICD-10-CM

## 2023-09-27 DIAGNOSIS — M76.01 GLUTEAL TENDONITIS OF RIGHT BUTTOCK: Primary | ICD-10-CM

## 2023-09-27 DIAGNOSIS — M79.672 BILATERAL FOOT PAIN: ICD-10-CM

## 2023-09-27 DIAGNOSIS — M79.671 BILATERAL FOOT PAIN: ICD-10-CM

## 2023-09-27 PROCEDURE — 3044F HG A1C LEVEL LT 7.0%: CPT | Mod: CPTII,S$GLB,, | Performed by: FAMILY MEDICINE

## 2023-09-27 PROCEDURE — 99204 PR OFFICE/OUTPT VISIT, NEW, LEVL IV, 45-59 MIN: ICD-10-PCS | Mod: 25,S$GLB,, | Performed by: FAMILY MEDICINE

## 2023-09-27 PROCEDURE — 99204 OFFICE O/P NEW MOD 45 MIN: CPT | Mod: 25,S$GLB,, | Performed by: FAMILY MEDICINE

## 2023-09-27 PROCEDURE — 3044F PR MOST RECENT HEMOGLOBIN A1C LEVEL <7.0%: ICD-10-PCS | Mod: CPTII,S$GLB,, | Performed by: FAMILY MEDICINE

## 2023-09-27 PROCEDURE — 3008F PR BODY MASS INDEX (BMI) DOCUMENTED: ICD-10-PCS | Mod: CPTII,S$GLB,, | Performed by: FAMILY MEDICINE

## 2023-09-27 PROCEDURE — 99999 PR PBB SHADOW E&M-EST. PATIENT-LVL III: CPT | Mod: PBBFAC,,, | Performed by: FAMILY MEDICINE

## 2023-09-27 PROCEDURE — 99999 PR PBB SHADOW E&M-EST. PATIENT-LVL III: ICD-10-PCS | Mod: PBBFAC,,, | Performed by: FAMILY MEDICINE

## 2023-09-27 PROCEDURE — 3008F BODY MASS INDEX DOCD: CPT | Mod: CPTII,S$GLB,, | Performed by: FAMILY MEDICINE

## 2023-10-02 ENCOUNTER — OFFICE VISIT (OUTPATIENT)
Dept: PRIMARY CARE CLINIC | Facility: CLINIC | Age: 58
End: 2023-10-02
Payer: MEDICARE

## 2023-10-02 VITALS
WEIGHT: 221 LBS | TEMPERATURE: 98 F | HEIGHT: 65 IN | SYSTOLIC BLOOD PRESSURE: 124 MMHG | DIASTOLIC BLOOD PRESSURE: 76 MMHG | OXYGEN SATURATION: 98 % | RESPIRATION RATE: 16 BRPM | HEART RATE: 58 BPM | BODY MASS INDEX: 36.82 KG/M2

## 2023-10-02 DIAGNOSIS — G47.01 INSOMNIA SECONDARY TO CHRONIC PAIN: Primary | ICD-10-CM

## 2023-10-02 DIAGNOSIS — M25.561 CHRONIC PAIN OF RIGHT KNEE: ICD-10-CM

## 2023-10-02 DIAGNOSIS — G89.29 INSOMNIA SECONDARY TO CHRONIC PAIN: Primary | ICD-10-CM

## 2023-10-02 DIAGNOSIS — F33.0 MILD EPISODE OF RECURRENT MAJOR DEPRESSIVE DISORDER: ICD-10-CM

## 2023-10-02 DIAGNOSIS — R42 VERTIGO: ICD-10-CM

## 2023-10-02 DIAGNOSIS — M70.61 TROCHANTERIC BURSITIS, RIGHT HIP: ICD-10-CM

## 2023-10-02 DIAGNOSIS — G89.29 CHRONIC PAIN OF RIGHT KNEE: ICD-10-CM

## 2023-10-02 PROCEDURE — 99999 PR PBB SHADOW E&M-EST. PATIENT-LVL V: CPT | Mod: PBBFAC,,, | Performed by: FAMILY MEDICINE

## 2023-10-02 PROCEDURE — 99999 PR PBB SHADOW E&M-EST. PATIENT-LVL V: ICD-10-PCS | Mod: PBBFAC,,, | Performed by: FAMILY MEDICINE

## 2023-10-02 PROCEDURE — 3044F HG A1C LEVEL LT 7.0%: CPT | Mod: CPTII,S$GLB,, | Performed by: FAMILY MEDICINE

## 2023-10-02 PROCEDURE — 3078F PR MOST RECENT DIASTOLIC BLOOD PRESSURE < 80 MM HG: ICD-10-PCS | Mod: CPTII,S$GLB,, | Performed by: FAMILY MEDICINE

## 2023-10-02 PROCEDURE — 99214 OFFICE O/P EST MOD 30 MIN: CPT | Mod: S$GLB,,, | Performed by: FAMILY MEDICINE

## 2023-10-02 PROCEDURE — 3078F DIAST BP <80 MM HG: CPT | Mod: CPTII,S$GLB,, | Performed by: FAMILY MEDICINE

## 2023-10-02 PROCEDURE — 1160F PR REVIEW ALL MEDS BY PRESCRIBER/CLIN PHARMACIST DOCUMENTED: ICD-10-PCS | Mod: CPTII,S$GLB,, | Performed by: FAMILY MEDICINE

## 2023-10-02 PROCEDURE — 99214 PR OFFICE/OUTPT VISIT, EST, LEVL IV, 30-39 MIN: ICD-10-PCS | Mod: S$GLB,,, | Performed by: FAMILY MEDICINE

## 2023-10-02 PROCEDURE — 1159F MED LIST DOCD IN RCRD: CPT | Mod: CPTII,S$GLB,, | Performed by: FAMILY MEDICINE

## 2023-10-02 PROCEDURE — 3008F BODY MASS INDEX DOCD: CPT | Mod: CPTII,S$GLB,, | Performed by: FAMILY MEDICINE

## 2023-10-02 PROCEDURE — 3074F SYST BP LT 130 MM HG: CPT | Mod: CPTII,S$GLB,, | Performed by: FAMILY MEDICINE

## 2023-10-02 PROCEDURE — 3074F PR MOST RECENT SYSTOLIC BLOOD PRESSURE < 130 MM HG: ICD-10-PCS | Mod: CPTII,S$GLB,, | Performed by: FAMILY MEDICINE

## 2023-10-02 PROCEDURE — 1159F PR MEDICATION LIST DOCUMENTED IN MEDICAL RECORD: ICD-10-PCS | Mod: CPTII,S$GLB,, | Performed by: FAMILY MEDICINE

## 2023-10-02 PROCEDURE — 3044F PR MOST RECENT HEMOGLOBIN A1C LEVEL <7.0%: ICD-10-PCS | Mod: CPTII,S$GLB,, | Performed by: FAMILY MEDICINE

## 2023-10-02 PROCEDURE — 1160F RVW MEDS BY RX/DR IN RCRD: CPT | Mod: CPTII,S$GLB,, | Performed by: FAMILY MEDICINE

## 2023-10-02 PROCEDURE — 3008F PR BODY MASS INDEX (BMI) DOCUMENTED: ICD-10-PCS | Mod: CPTII,S$GLB,, | Performed by: FAMILY MEDICINE

## 2023-10-02 RX ORDER — TOPIRAMATE 100 MG/1
100 TABLET, FILM COATED ORAL 2 TIMES DAILY
Qty: 180 TABLET | Refills: 3 | Status: SHIPPED | OUTPATIENT
Start: 2023-10-02

## 2023-10-02 RX ORDER — AMITRIPTYLINE HYDROCHLORIDE 10 MG/1
10-20 TABLET, FILM COATED ORAL NIGHTLY PRN
Qty: 60 TABLET | Refills: 0 | Status: SHIPPED | OUTPATIENT
Start: 2023-10-02 | End: 2023-10-25 | Stop reason: SDUPTHER

## 2023-10-02 NOTE — PROGRESS NOTES
"Subjective:       Patient ID: Ida Orozco is a 58 y.o. female.    Chief Complaint: Ankle Pain (Bilateral/Started 3 weeks ago), Knee Pain (Started 3 weeks ago/Right ), and Dizziness    Complains of bilateral ankle pain and right knee and hip pain. Has been to PT twice, feels not helping yet. Having trouble sleeping because pain radiating from R hip down leg, has tried elevating legs w/o relief. Has had cortisone injections in R knee and R trochanteric bursa w/o relief. Still struggling with chronic vertigo, worse since bout of preorbital cellulitis in early August. Off balance when walking, bumping into things with extensive bruising. Dizziness exacerbated by any type of head movement while lying down, frequent nausea, "I feel like I'm drunk." Says meclizine not helping. Still having trouble sleeping, despite increasing trazodone up to 300 mg qHS      Review of Systems   HENT:  Positive for ear pain. Negative for ear discharge, hearing loss, rhinorrhea and sore throat.    Respiratory:  Negative for cough.    Gastrointestinal:  Positive for abdominal pain. Negative for diarrhea and vomiting.   Musculoskeletal:  Positive for arthralgias and neck pain.   Skin:  Negative for rash.   Neurological:  Positive for dizziness and headaches.   Psychiatric/Behavioral:  Positive for dysphoric mood and sleep disturbance.        Objective:      Vitals:    10/02/23 1332   BP: 124/76   BP Location: Right arm   Patient Position: Sitting   BP Method: Large (Manual)   Pulse: (!) 58   Resp: 16   Temp: 97.5 °F (36.4 °C)   TempSrc: Temporal   SpO2: 98%   Weight: 100.3 kg (221 lb 0.2 oz)   Height: 5' 4.5" (1.638 m)     BP Readings from Last 5 Encounters:   10/02/23 124/76   08/10/23 (!) 126/57   08/06/23 (!) 126/57   07/05/23 118/70   05/03/23 (!) 102/58     Wt Readings from Last 5 Encounters:   10/02/23 100.3 kg (221 lb 0.2 oz)   09/27/23 104 kg (229 lb 4.5 oz)   09/14/23 104.3 kg (229 lb 15 oz)   08/10/23 105.2 kg (232 lb)   08/10/23 " 105.5 kg (232 lb 8 oz)     Physical Exam  Vitals and nursing note reviewed.   Constitutional:       General: She is not in acute distress.     Appearance: Normal appearance. She is well-developed.   HENT:      Head: Normocephalic and atraumatic.   Cardiovascular:      Rate and Rhythm: Normal rate and regular rhythm.      Heart sounds: Normal heart sounds.   Pulmonary:      Effort: Pulmonary effort is normal.      Breath sounds: Normal breath sounds.   Musculoskeletal:      Right knee: Decreased range of motion.      Left knee: Decreased range of motion.      Right lower leg: No edema.      Left lower leg: No edema.   Skin:     General: Skin is warm and dry.   Neurological:      Mental Status: She is alert and oriented to person, place, and time.   Psychiatric:         Mood and Affect: Mood normal.         Behavior: Behavior normal.         Lab Results   Component Value Date    WBC 6.63 08/06/2023    HGB 13.1 08/06/2023    HCT 42.9 08/06/2023     08/06/2023    CHOL 203 (H) 05/03/2023    TRIG 95 05/03/2023    HDL 89 (H) 05/03/2023    ALT 20 08/06/2023    AST 24 08/06/2023     08/06/2023    K 4.1 08/06/2023     (H) 08/06/2023    CREATININE 1.0 08/06/2023    BUN 11 08/06/2023    CO2 20 (L) 08/06/2023    TSH 2.100 11/11/2022    INR 1.0 03/29/2022    HGBA1C 5.3 05/03/2023      Assessment:       1. Insomnia secondary to chronic pain    2. Vertigo    3. Mild episode of recurrent major depressive disorder    4. Chronic pain of right knee    5. Trochanteric bursitis, right hip        Plan:       Insomnia secondary to chronic pain  -     amitriptyline (ELAVIL) 10 MG tablet; Take 1-2 tablets (10-20 mg total) by mouth nightly as needed for Insomnia.  Dispense: 60 tablet; Refill: 0  Patient advised to message with update in a few weeks  Vertigo  -     Ambulatory referral/consult to ENT; Future; Expected date: 10/09/2023    Mild episode of recurrent major depressive disorder  -     Ambulatory referral/consult to  Psychiatry; Future; Expected date: 10/09/2023  -     topiramate (TOPAMAX) 100 MG tablet; Take 1 tablet (100 mg total) by mouth 2 (two) times daily.  Dispense: 180 tablet; Refill: 3  Increase Topamax, continue Zoloft.  Would benefit from psychiatric evaluation.  Chronic pain of right knee  Follow-up with ortho  Trochanteric bursitis, right hip      Medication List with Changes/Refills   New Medications    AMITRIPTYLINE (ELAVIL) 10 MG TABLET    Take 1-2 tablets (10-20 mg total) by mouth nightly as needed for Insomnia.   Current Medications    CLONAZEPAM (KLONOPIN) 1 MG TABLET    Take 1 tablet (1 mg total) by mouth daily as needed for Anxiety.    DIPHENOXYLATE-ATROPINE 2.5-0.025 MG (LOMOTIL) 2.5-0.025 MG PER TABLET    Take 1 tablet by mouth 4 (four) times daily as needed for Diarrhea.    LEVOCETIRIZINE (XYZAL) 5 MG TABLET    TAKE 1 TABLET (5 MG TOTAL) BY MOUTH EVERY EVENING.    LORATADINE (CLARITIN) 10 MG TABLET    Take 1 tablet (10 mg total) by mouth once daily.    MECLIZINE (ANTIVERT) 25 MG TABLET    Take 1 tablet (25 mg total) by mouth 3 (three) times daily as needed for Dizziness or Nausea.    NYSTATIN (MYCOSTATIN) POWDER    Apply topically 2 (two) times daily.    PANTOPRAZOLE (PROTONIX) 40 MG TABLET    Take one tablet by mouth once daily    PROMETHAZINE (PHENERGAN) 25 MG TABLET    Take 1 tablet (25 mg total) by mouth every 6 (six) hours as needed for Nausea.    SERTRALINE (ZOLOFT) 100 MG TABLET    Take 2 tablets (200 mg total) by mouth once daily.    TOBRAMYCIN-DEXAMETHASONE 0.3-0.1% (TOBRADEX) 0.3-0.1 % DRPS    Place 1 drop into the left eye every 6 (six) hours.    VITAMIN D2 1,250 MCG (50,000 UNIT) CAPSULE    TAKE ONE CAPSULE BY MOUTH ONCE WEEKLY   Changed and/or Refilled Medications    Modified Medication Previous Medication    TOPIRAMATE (TOPAMAX) 100 MG TABLET topiramate (TOPAMAX) 100 MG tablet       Take 1 tablet (100 mg total) by mouth 2 (two) times daily.    TAKE ONE TABLET BY MOUTH ONCE DAILY    Discontinued Medications    TRAZODONE (DESYREL) 100 MG TABLET    Take 1 tablet (100 mg total) by mouth nightly as needed for Insomnia.

## 2023-10-03 DIAGNOSIS — M79.641 PAIN IN RIGHT HAND: Primary | ICD-10-CM

## 2023-10-03 DIAGNOSIS — M79.642 PAIN IN LEFT HAND: ICD-10-CM

## 2023-10-10 ENCOUNTER — PATIENT MESSAGE (OUTPATIENT)
Dept: PRIMARY CARE CLINIC | Facility: CLINIC | Age: 58
End: 2023-10-10
Payer: MEDICARE

## 2023-10-11 ENCOUNTER — HOSPITAL ENCOUNTER (OUTPATIENT)
Dept: RADIOLOGY | Facility: OTHER | Age: 58
Discharge: HOME | End: 2023-10-11
Attending: ORTHOPAEDIC SURGERY
Payer: MEDICARE

## 2023-10-11 ENCOUNTER — OFFICE VISIT (OUTPATIENT)
Dept: ORTHOPEDICS | Facility: CLINIC | Age: 58
End: 2023-10-11
Payer: MEDICARE

## 2023-10-11 VITALS — HEIGHT: 65 IN | BODY MASS INDEX: 36.84 KG/M2 | WEIGHT: 221.13 LBS

## 2023-10-11 DIAGNOSIS — M79.642 PAIN IN LEFT HAND: ICD-10-CM

## 2023-10-11 DIAGNOSIS — M79.642 CHRONIC PAIN OF LEFT HAND: ICD-10-CM

## 2023-10-11 DIAGNOSIS — G89.29 CHRONIC PAIN OF LEFT HAND: ICD-10-CM

## 2023-10-11 DIAGNOSIS — M79.641 PAIN IN RIGHT HAND: ICD-10-CM

## 2023-10-11 DIAGNOSIS — M18.12 PRIMARY OSTEOARTHRITIS OF FIRST CARPOMETACARPAL JOINT OF LEFT HAND: ICD-10-CM

## 2023-10-11 DIAGNOSIS — G56.03 BILATERAL CARPAL TUNNEL SYNDROME: Primary | ICD-10-CM

## 2023-10-11 PROCEDURE — 3008F PR BODY MASS INDEX (BMI) DOCUMENTED: ICD-10-PCS | Mod: CPTII,S$GLB,, | Performed by: ORTHOPAEDIC SURGERY

## 2023-10-11 PROCEDURE — 73130 X-RAY EXAM OF HAND: CPT | Mod: 26,RT,, | Performed by: RADIOLOGY

## 2023-10-11 PROCEDURE — 3044F PR MOST RECENT HEMOGLOBIN A1C LEVEL <7.0%: ICD-10-PCS | Mod: CPTII,S$GLB,, | Performed by: ORTHOPAEDIC SURGERY

## 2023-10-11 PROCEDURE — 73130 X-RAY EXAM OF HAND: CPT | Mod: TC,FY,LT

## 2023-10-11 PROCEDURE — 99204 PR OFFICE/OUTPT VISIT, NEW, LEVL IV, 45-59 MIN: ICD-10-PCS | Mod: S$GLB,,, | Performed by: ORTHOPAEDIC SURGERY

## 2023-10-11 PROCEDURE — 1159F PR MEDICATION LIST DOCUMENTED IN MEDICAL RECORD: ICD-10-PCS | Mod: CPTII,S$GLB,, | Performed by: ORTHOPAEDIC SURGERY

## 2023-10-11 PROCEDURE — 99999 PR PBB SHADOW E&M-EST. PATIENT-LVL III: CPT | Mod: PBBFAC,,, | Performed by: ORTHOPAEDIC SURGERY

## 2023-10-11 PROCEDURE — 3008F BODY MASS INDEX DOCD: CPT | Mod: CPTII,S$GLB,, | Performed by: ORTHOPAEDIC SURGERY

## 2023-10-11 PROCEDURE — 1160F RVW MEDS BY RX/DR IN RCRD: CPT | Mod: CPTII,S$GLB,, | Performed by: ORTHOPAEDIC SURGERY

## 2023-10-11 PROCEDURE — 3044F HG A1C LEVEL LT 7.0%: CPT | Mod: CPTII,S$GLB,, | Performed by: ORTHOPAEDIC SURGERY

## 2023-10-11 PROCEDURE — 1160F PR REVIEW ALL MEDS BY PRESCRIBER/CLIN PHARMACIST DOCUMENTED: ICD-10-PCS | Mod: CPTII,S$GLB,, | Performed by: ORTHOPAEDIC SURGERY

## 2023-10-11 PROCEDURE — 73130 XR HAND COMPLETE 3 VIEW RIGHT: ICD-10-PCS | Mod: 26,RT,, | Performed by: RADIOLOGY

## 2023-10-11 PROCEDURE — 99999 PR PBB SHADOW E&M-EST. PATIENT-LVL III: ICD-10-PCS | Mod: PBBFAC,,, | Performed by: ORTHOPAEDIC SURGERY

## 2023-10-11 PROCEDURE — 1159F MED LIST DOCD IN RCRD: CPT | Mod: CPTII,S$GLB,, | Performed by: ORTHOPAEDIC SURGERY

## 2023-10-11 PROCEDURE — 99204 OFFICE O/P NEW MOD 45 MIN: CPT | Mod: S$GLB,,, | Performed by: ORTHOPAEDIC SURGERY

## 2023-10-11 PROCEDURE — 73130 X-RAY EXAM OF HAND: CPT | Mod: 26,LT,, | Performed by: RADIOLOGY

## 2023-10-11 PROCEDURE — 73130 X-RAY EXAM OF HAND: CPT | Mod: TC,FY,RT

## 2023-10-11 NOTE — PROGRESS NOTES
"  [facility]  History & Physical  Orthopedics    SUBJECTIVE:      Chief Complaint:   Chief Complaint   Patient presents with    Left Hand - Pain, Numbness    Right Hand - Pain, Swelling       Referring Provider: Jayce Hinkle III, *     History of Present Illness:  Patient is a 58 y.o. right hand dominant female who presents today with complaints of bilateral hand pain and numbness.  She states this has been going on for 1-2 years but has gotten progressively worse.  Patient had does have a history of Crohn's disease for which she endorses significant joint pain all over her body but this feels different.  Of note the patient is a avid bowler and states that this has caused her to have weakness in her right upper extremity where she feels like she can not hold a ball appropriately.  The patient endorses that her numbness can be constant.    Vitals:    10/11/23 0941   Weight: 100.3 kg (221 lb 1.9 oz)   Height: 5' 4.5" (1.638 m)   PainSc:   8   PainLoc: Finger       The patient denies any fevers, chills, N/V, D/C and presents for evaluation.       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, with liver fibrosis, on ursodiol 2023    PTSD (post-traumatic stress disorder)     Seasonal allergies     Symptomatic abdominal panniculus     Ulcerative colitis      Past Surgical History:   Procedure Laterality Date    APPENDECTOMY      CATHETERIZATION OF BOTH LEFT AND RIGHT HEART Right 2019    Procedure: CATHETERIZATION, HEART, BOTH LEFT AND RIGHT;  Surgeon: Beto Malin MD;  Location: [facility] CATH LAB;  Service: Cardiology;  Laterality: Right;     " SECTION, LOW TRANSVERSE      CHOLECYSTECTOMY      open    COLECTOMY      total- june 2013    CYSTOSCOPY W/ RETROGRADES N/A 8/20/2018    Procedure: CYSTOSCOPY, WITH RETROGRADE PYELOGRAM;  Surgeon: Butch Banks MD;  Location: [facility];  Service: Urology;  Laterality: N/A;  [facility] CONFIRMED 8/17/DME    ENDOSCOPIC ULTRASOUND OF UPPER GASTROINTESTINAL TRACT N/A 11/10/2021    Procedure: ULTRASOUND, UPPER GI TRACT, ENDOSCOPIC;  Surgeon: Nhan Dee MD;  Location: [facility] (2ND FLR);  Service: Endoscopy;  Laterality: N/A;  MD Milena Whitehead PA-C; Quinn Whitman MD; Miladys Velasquez MA  Caller: Unspecified (3 days ago,  1:20 PM)  Fair enough. Neha, please schedule.       ----- Message -----   From: Milena Murillo PA-C   Sent: 8/11/2021  1    ESOPHAGOGASTRODUODENOSCOPY  04/11/2018    Dr. Castorena: LA Grade A reflux esophagitis, hiatal hernia; Ectopic gastric mucosa in the upper third of the esophagus; gastric sleeve intact with unremarkable findings, gastritis; biopsy: stomach- Mild chronic antral and oxyntic gastritis, no activity, negative for h pylori    ESOPHAGOGASTRODUODENOSCOPY N/A 11/10/2021    Procedure: EGD (ESOPHAGOGASTRODUODENOSCOPY);  Surgeon: Nhan Dee MD;  Location: [facility] (2ND FLR);  Service: Endoscopy;  Laterality: N/A;    FESS, WITH IMAGING GUIDANCE Bilateral 4/4/2023    Procedure: Pan Sinus FESS, WITH IMAGING GUIDANCE Disc loaded;  Surgeon: Jay Hernandez MD;  Location: [facility] OR;  Service: ENT;  Laterality: Bilateral;  balloon dilation of sinuses    FLEXIBLE SIGMOIDOSCOPY  5/24/2018    Procedure: SIGMOIDOSCOPY, FLEXIBLE;  Surgeon: JENNY Virgen MD;  Location: [facility] 2ND FLR;  Service: Colon and Rectal;;    FLEXIBLE SIGMOIDOSCOPY  04/11/2018    Dr. Castorena: Non-patent surgical anastomosis, characterized by friable mucosa.    GASTRIC SLEEVE       HYSTERECTOMY      ILEOSCOPY  04/11/2018    Dr. Castorena: Patient is status-post total colectomy with end  ileostomy. unremarkable findings    ILEOSTOMY REVISION      april 2014; august 2014    LAPAROSCOPIC PROCTECTOMY N/A 5/24/2018    Procedure: PROCTECTOMY-LAPAROSCOPIC/CONVERTED TO OPEN;  Surgeon: JENNY Virgen MD;  Location: [facility] OR 2ND FLR;  Service: Colon and Rectal;  Laterality: N/A;    LYSIS OF ADHESIONS  5/24/2018    Procedure: LYSIS, ADHESIONS/ more than 2hours;  Surgeon: JENNY Virgen MD;  Location: [facility] OR 2ND FLR;  Service: Colon and Rectal;;    OOPHORECTOMY      PANNICULECTOMY Bilateral 12/19/2019    Procedure: PANNICULECTOMY;  Surgeon: Andrea Alvarado MD;  [facility]: [facility] 2ND FLR;  Service: Plastics;  Laterality: Bilateral;    REVISION COLOSTOMY N/A 12/19/2019    Procedure: REVISION, COLOSTOMY;  Surgeon: JENNY Virgen MD;  Location: [facility] OR 2ND FLR;  Service: Colon and Rectal;  Laterality: N/A;    thumb surgery      TONSILLECTOMY, ADENOIDECTOMY      WISDOM TOOTH EXTRACTION       Review of patient's allergies indicates:   Allergen Reactions    Adhesive      Cause blisters    Dilaudid [hydromorphone] Nausea And Vomiting    Humira [adalimumab] Hives    Sulfa (sulfonamide antibiotics) Hives    Surgical stainless steel      Had surgical staples, caused irritation and infection     Social History     Social History Narrative    Not on file     Family History   Problem Relation Age of Onset    Cancer Mother         skin    Cirrhosis Mother     Heart disease Father 67    Cancer Paternal Grandfather         ?    Cancer Maternal Grandfather         colon     Colon cancer Maternal Grandfather     Colon cancer Maternal Uncle     Colon cancer Maternal Uncle     Crohn's disease Neg Hx     Ulcerative colitis Neg Hx     Stomach cancer Neg Hx     Esophageal cancer Neg Hx     Celiac disease Neg Hx     Breast cancer Neg Hx     Ovarian cancer Neg Hx          Current Outpatient Medications:     amitriptyline (ELAVIL) 10 MG tablet, Take 1-2 tablets (10-20 mg total) by mouth nightly as needed for Insomnia., Disp: 60  "tablet, Rfl: 0    clonazePAM (KLONOPIN) 1 MG tablet, Take 1 tablet (1 mg total) by mouth daily as needed for Anxiety., Disp: 90 tablet, Rfl: 1    diphenoxylate-atropine 2.5-0.025 mg (LOMOTIL) 2.5-0.025 mg per tablet, Take 1 tablet by mouth 4 (four) times daily as needed for Diarrhea., Disp: 30 tablet, Rfl: 2    levocetirizine (XYZAL) 5 MG tablet, TAKE 1 TABLET (5 MG TOTAL) BY MOUTH EVERY EVENING., Disp: 90 tablet, Rfl: 1    loratadine (CLARITIN) 10 mg tablet, Take 1 tablet (10 mg total) by mouth once daily., Disp: 90 tablet, Rfl: 1    meclizine (ANTIVERT) 25 mg tablet, Take 1 tablet (25 mg total) by mouth 3 (three) times daily as needed for Dizziness or Nausea., Disp: 60 tablet, Rfl: 2    nystatin (MYCOSTATIN) powder, Apply topically 2 (two) times daily., Disp: 60 g, Rfl: 3    pantoprazole (PROTONIX) 40 MG tablet, Take one tablet by mouth once daily, Disp: 90 tablet, Rfl: 3    promethazine (PHENERGAN) 25 MG tablet, Take 1 tablet (25 mg total) by mouth every 6 (six) hours as needed for Nausea., Disp: 15 tablet, Rfl: 2    sertraline (ZOLOFT) 100 MG tablet, Take 2 tablets (200 mg total) by mouth once daily., Disp: 180 tablet, Rfl: 1    tobramycin-dexAMETHasone 0.3-0.1% (TOBRADEX) 0.3-0.1 % DrpS, Place 1 drop into the left eye every 6 (six) hours., Disp: 5 mL, Rfl: 0    topiramate (TOPAMAX) 100 MG tablet, Take 1 tablet (100 mg total) by mouth 2 (two) times daily., Disp: 180 tablet, Rfl: 3    VITAMIN D2 1,250 mcg (50,000 unit) capsule, TAKE ONE CAPSULE BY MOUTH ONCE WEEKLY, Disp: 12 capsule, Rfl: 1    ROS    Review of Systems:  Constitutional: no fever or chills  Eyes: no visual changes  ENT: no nasal congestion or sore throat  Respiratory: no cough or shortness of breath  Cardiovascular: no chest pain  Gastrointestinal: no nausea or vomiting, tolerating diet  Musculoskeletal: pain and soreness    OBJECTIVE:      Vital Signs (Most Recent):  Vitals:    10/11/23 0941   Weight: 100.3 kg (221 lb 1.9 oz)   Height: 5' 4.5" " (1.638 m)     Body mass index is 37.37 kg/m².    Physical Exam    Physical Exam:  Constitutional: The patient appears well-developed and well-nourished. No distress.   Head: Normocephalic and atraumatic.   Nose: Nose normal.   Eyes: Conjunctivae and EOM are normal.   Neck: No tracheal deviation present.   Cardiovascular: Normal rate and intact distal pulses.    Pulmonary/Chest: Effort normal. No respiratory distress.   Abdominal: There is no guarding.   Lymphatic: Negative for adenopathy   Neurological: The patient is alert.   Psychiatric: The patient has a normal mood and affect.     Bilateral Hand/Wrist Examination:    Observation/Inspection:  Swelling  none    Deformity  none  Discoloration  none     Scars   none    Atrophy  none    HAND/WRIST EXAMINATION:  Finkelstein's Test   Neg  WHAT Test    Neg  Snuff box tenderness   Neg  Sabillon's Test    Neg  Hook of Hamate Tenderness  Neg  CMC grind    Neg  Circumduction test   Neg  TFCC Compression Test  Neg    ROM hand/wrist/elbow full    Neurovascular Exam:  Digits WWP, brisk CR < 3s throughout  NVI motor/LTS to M/R/U nerves, radial pulse 2+  2+ biceps and brachioradialis reflexes  Tinel's Test - Carpal Tunnel  + bilaterally  Tinel's Test - Cubital Tunnel  Neg  Phalen's Test    + bilaterally  Median Nerve Compression Test + bilaterally    Diagnostic Results:    X-rays AP, lateral and oblique of the left wrist reviewed by me shows 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 yo female with   Encounter Diagnoses   Name Primary?    Bilateral carpal tunnel syndrome Yes    Primary osteoarthritis of first carpometacarpal joint of left hand     Chronic pain of left hand       Plan:  We have discussed the natural history of carpal tunnel and basilar thumb arthritis including treatment options such as splinting, oral and topical anti-inflammatories, cortisone injections and surgery. I have ordered an EMG/NCS.    - f/u after EMG/NCS    The  patient's pathophysiology was explained in detail with reference to x-rays, models, other visual aids as appropriate.  Treatment options were discussed in detail.  Questions were invited and answered to the patient's satisfaction. I reviewed Primary care , and other specialty's notes to better coordinate patient's care.        Etelvina Nicholas MD    Please be aware that this note has been generated with the assistance of [employer] voice-to-text.  Please excuse any spelling or grammatical errors.

## 2023-10-18 ENCOUNTER — PATIENT MESSAGE (OUTPATIENT)
Dept: CARDIOLOGY | Facility: CLINIC | Age: 58
End: 2023-10-18
Payer: MEDICARE

## 2023-10-19 ENCOUNTER — OFFICE VISIT (OUTPATIENT)
Dept: PODIATRY | Facility: CLINIC | Age: 58
End: 2023-10-19
Payer: MEDICARE

## 2023-10-19 ENCOUNTER — HOSPITAL ENCOUNTER (OUTPATIENT)
Dept: RADIOLOGY | Facility: HOSPITAL | Age: 58
Discharge: HOME | End: 2023-10-19
Attending: PODIATRIST
Payer: MEDICARE

## 2023-10-19 VITALS
DIASTOLIC BLOOD PRESSURE: 65 MMHG | BODY MASS INDEX: 38.75 KG/M2 | SYSTOLIC BLOOD PRESSURE: 116 MMHG | HEART RATE: 57 BPM | WEIGHT: 232.56 LBS | HEIGHT: 65 IN

## 2023-10-19 DIAGNOSIS — G89.29 CHRONIC PAIN OF BOTH ANKLES: ICD-10-CM

## 2023-10-19 DIAGNOSIS — M25.572 CHRONIC PAIN OF BOTH ANKLES: ICD-10-CM

## 2023-10-19 DIAGNOSIS — M25.571 CHRONIC PAIN OF BOTH ANKLES: ICD-10-CM

## 2023-10-19 PROCEDURE — 99999 PR PBB SHADOW E&M-EST. PATIENT-LVL IV: CPT | Mod: PBBFAC,,, | Performed by: PODIATRIST

## 2023-10-19 PROCEDURE — 3074F PR MOST RECENT SYSTOLIC BLOOD PRESSURE < 130 MM HG: ICD-10-PCS | Mod: CPTII,S$GLB,, | Performed by: PODIATRIST

## 2023-10-19 PROCEDURE — 3008F BODY MASS INDEX DOCD: CPT | Mod: CPTII,S$GLB,, | Performed by: PODIATRIST

## 2023-10-19 PROCEDURE — 3008F PR BODY MASS INDEX (BMI) DOCUMENTED: ICD-10-PCS | Mod: CPTII,S$GLB,, | Performed by: PODIATRIST

## 2023-10-19 PROCEDURE — 73610 X-RAY EXAM OF ANKLE: CPT | Mod: TC,50

## 2023-10-19 PROCEDURE — 73610 X-RAY EXAM OF ANKLE: CPT | Mod: 26,50,, | Performed by: INTERNAL MEDICINE

## 2023-10-19 PROCEDURE — 73610 XR ANKLE COMPLETE 3 VIEW BILATERAL: ICD-10-PCS | Mod: 26,50,, | Performed by: INTERNAL MEDICINE

## 2023-10-19 PROCEDURE — 3044F HG A1C LEVEL LT 7.0%: CPT | Mod: CPTII,S$GLB,, | Performed by: PODIATRIST

## 2023-10-19 PROCEDURE — 3044F PR MOST RECENT HEMOGLOBIN A1C LEVEL <7.0%: ICD-10-PCS | Mod: CPTII,S$GLB,, | Performed by: PODIATRIST

## 2023-10-19 PROCEDURE — 3078F PR MOST RECENT DIASTOLIC BLOOD PRESSURE < 80 MM HG: ICD-10-PCS | Mod: CPTII,S$GLB,, | Performed by: PODIATRIST

## 2023-10-19 PROCEDURE — 99999 PR PBB SHADOW E&M-EST. PATIENT-LVL IV: ICD-10-PCS | Mod: PBBFAC,,, | Performed by: PODIATRIST

## 2023-10-19 PROCEDURE — 99203 OFFICE O/P NEW LOW 30 MIN: CPT | Mod: S$GLB,,, | Performed by: PODIATRIST

## 2023-10-19 PROCEDURE — 3074F SYST BP LT 130 MM HG: CPT | Mod: CPTII,S$GLB,, | Performed by: PODIATRIST

## 2023-10-19 PROCEDURE — 3078F DIAST BP <80 MM HG: CPT | Mod: CPTII,S$GLB,, | Performed by: PODIATRIST

## 2023-10-19 PROCEDURE — 99203 PR OFFICE/OUTPT VISIT, NEW, LEVL III, 30-44 MIN: ICD-10-PCS | Mod: S$GLB,,, | Performed by: PODIATRIST

## 2023-10-19 NOTE — PROGRESS NOTES
Subjective:      Patient ID: Ida Orozco is a 58 y.o. female.    Chief Complaint: Foot Pain (Bilateral foot pain )    Ida is a 58 y.o. female who presents to the podiatry clinic  with complaint of  bilateral ankle pain. Onset of the symptoms was several years ago. Precipitating event:  pt states she has fractured her left ankle over 7 times . Current symptoms include: ability to bear weight, but with some pain, stiffness, and worsening symptoms after a period of activity. Aggravating factors: any weight bearing. Symptoms have progressed to a point and plateaued. Patient has had prior foot problems. Evaluation to date: none. Treatment to date: none. Patients rates pain 6/10 on pain scale.    Review of Systems   Constitutional: Negative for chills, fever and malaise/fatigue.   HENT:  Negative for hearing loss.    Cardiovascular:  Negative for claudication.   Respiratory:  Negative for shortness of breath.    Skin:  Negative for flushing and rash.   Musculoskeletal:  Positive for arthritis, joint pain and joint swelling. Negative for myalgias.   Neurological:  Negative for loss of balance, numbness, paresthesias and sensory change.   Psychiatric/Behavioral:  Negative for altered mental status.          Objective:      Physical Exam  Vitals reviewed.   Cardiovascular:      Pulses:           Dorsalis pedis pulses are 2+ on the right side and 2+ on the left side.        Posterior tibial pulses are 2+ on the right side and 2+ on the left side.      Comments: No edema noted b/L  Musculoskeletal:         General: Swelling present. Normal range of motion.      Comments: POP to b/L ankles        Feet:      Right foot:      Protective Sensation: 5 sites tested.  5 sites sensed.      Left foot:      Protective Sensation: 5 sites tested.  5 sites sensed.   Skin:     Comments: Normal skin tugor noted.   No open lesion noted b/L  Skin temp is warm to warm from proximal to distal b/L.  Webspaces clean, dry, and intact      Neurological:      Mental Status: She is alert.      Comments: Gross sensation intact b/L               Assessment:       Encounter Diagnosis   Name Primary?    Chronic pain of both ankles          Plan:       Ida was seen today for foot pain.    Diagnoses and all orders for this visit:    Chronic pain of both ankles  -     Ambulatory referral/consult to Podiatry  -     X-Ray Ankle Complete Bilateral; Future      I counseled the patient on her conditions, their implications and medical management.      X-rays taken and reviewed with pt, pt advised narrowing of ankle joints seen b/L    Two ankle braces dispensed to be worn in shoes    Pt instructed to purchase OTC Voltaren gel 1%, use on affected area. Pt advised discontinue usage if any adverse effects should occur.   [place] patches recommended    Pt states she would like some surgical options for her ankle  Will schedule with Dr Adams    Call or return to clinic prn if these symptoms worsen or fail to improve as anticipated.    .

## 2023-10-25 DIAGNOSIS — G89.29 INSOMNIA SECONDARY TO CHRONIC PAIN: ICD-10-CM

## 2023-10-25 DIAGNOSIS — G47.01 INSOMNIA SECONDARY TO CHRONIC PAIN: ICD-10-CM

## 2023-10-25 NOTE — TELEPHONE ENCOUNTER
No care due was identified.  [facility] Embedded Care Due Messages. Reference number: [id number].   10/25/2023 5:19:49 PM CDT

## 2023-10-26 RX ORDER — AMITRIPTYLINE HYDROCHLORIDE 10 MG/1
10-20 TABLET, FILM COATED ORAL NIGHTLY PRN
Qty: 60 TABLET | Refills: 5 | Status: SHIPPED | OUTPATIENT
Start: 2023-10-26 | End: 2023-12-01 | Stop reason: SDUPTHER

## 2023-11-02 DIAGNOSIS — J32.4 CHRONIC PANSINUSITIS: ICD-10-CM

## 2023-11-02 DIAGNOSIS — R42 VERTIGO: ICD-10-CM

## 2023-11-03 RX ORDER — MECLIZINE HYDROCHLORIDE 25 MG/1
25 TABLET ORAL 3 TIMES DAILY PRN
Qty: 60 TABLET | Refills: 2 | Status: SHIPPED | OUTPATIENT
Start: 2023-11-03

## 2023-11-03 NOTE — TELEPHONE ENCOUNTER
No care due was identified.  [facility] Embedded Care Due Messages. Reference number: [id number].   11/02/2023 9:37:49 PM CDT

## 2023-11-10 RX ORDER — LEVOCETIRIZINE DIHYDROCHLORIDE 5 MG/1
5 TABLET, FILM COATED ORAL NIGHTLY
Qty: 90 TABLET | Refills: 1 | Status: SHIPPED | OUTPATIENT
Start: 2023-11-10 | End: 2024-11-09

## 2023-11-10 RX ORDER — CLONAZEPAM 1 MG/1
1 TABLET ORAL DAILY PRN
Qty: 90 TABLET | Refills: 1 | OUTPATIENT
Start: 2023-11-10

## 2023-11-10 RX ORDER — LORATADINE 10 MG/1
10 TABLET ORAL DAILY
Qty: 90 TABLET | Refills: 1 | Status: SHIPPED | OUTPATIENT
Start: 2023-11-10 | End: 2023-11-20 | Stop reason: SDUPTHER

## 2023-11-13 PROCEDURE — 99358 PROLONG SERVICE W/O CONTACT: CPT | Mod: S$GLB,,, | Performed by: INTERNAL MEDICINE

## 2023-11-13 PROCEDURE — 99358 PR PROLONGED SERV,NO CONTACT,1ST HR: ICD-10-PCS | Mod: S$GLB,,, | Performed by: INTERNAL MEDICINE

## 2023-11-13 NOTE — PROGRESS NOTES
"[facility]  Inflammatory Bowel Disease   New Patient Consultation Note         TODAY'S VISIT DATE:  11/14/2023    Reason for Consult:    Chief Complaint   Patient presents with    Inflammatory Bowel Disease       PCP: Eliecer Jones      Referring MD:   Rupal Heredia    History of Present Illness:  Ida Orozco is a 58 y.o. 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      Patient reports recurrent intermittent left lower quadrant abdominal pain adjacent to ostomy in right lower quadrant "for years" associated with onset of mouth ulcers and diffuse joint pains just prior to each episode. She reports pain being worse after consumption of meals consisting of fiber, bread, and pasta products. She reports emptying her ostomy bag approximately 6 times daily with only liquid output; she denies seeing blood in output but reports output smelling bloody. She reports symptom association with nausea and intermittent dysphagia to solid foods after her gastric sleeve. She denies symptom association with fevers, vomiting, and weight loss. She denies FH of IBD, but reports FH of colon cancer (maternal uncle and grandmother; unclear age of diagnosis).     IBD History:  Patient reports developing recurrent post-prandial non-bloody diarrhea and abdominal pain following cholecystectomy in 1991. She reports being evaluated by multiple providers and told symptoms were normal following cholecystectomy; no endoscopic evaluation performed. In approximately 2005, she reports developing recurrent diffuse joint pains associated with GI symptoms.     In approximately 2010, patient then " "developed bloody diarrhea and rectal pain and was subsequently diagnosed with ulcerative colitis by Dr. Wilton Murphy in [place]. She does not recall being told of the extent of her disease distribution endoscopically. She was initially treated with high dose oral steroids and 5-ASA products without symptomatic improvement. She reports being treated with REMICADE for approximately 6 months in [id number] also without reported improvement. Patient reports then being treated with HUMIRA and ENBREL also without improvement; she does not recall having drug levels monitored.     In 6/2013, patient underwent total abdominal colectomy with end ileostomy at [facility]. Pathology results from that procedure (see documents from [employer] [facility] from 8/2014) showed "idiopathic inflammatory bowel disease with prominent involvement from the descending colon sections distally with zones of marked mucosal acute and chronic inflammation, areas of mucosal architectural disruption with glandular skip zones, surface nodularity and zones of epithelial erosion. Also associated are areas with prominent glandular regenerative activity with a marked inflammatory type nuclear atypia and focally prominent crypt abscess formation." There was no pathologic abnormalities associated with the mesentery or IC valve. Findings were felt to be consistent with chronic UC.     In 2014, patient was noted to develop partial stenosis of ostomy site and underwent exploratory laparotomy in 4/2014 with resection of unspecified amount of ischemic distal ileum and creation of [place] ostomy. 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 In 8/2014, she was admitted to [facility] for management of recurrent thania-ileostomy abscess formation with concern for enterocutaneous " fistula formation near ostomy site. Ileoscopy performed in 2014 was notable for a stenotic ileostomy site requiring a  endoscope to traverse area; patient was noted to have a small fistula tracking just proximal to the stoma into the abdominal cavity; biopsies of fistula site showed a small fragment of ulcerated and inflamed granulation tissue. She underwent repeat ileostomy revision and drainage of abscess by CRS here at that time; operative report not available for review. She was discharged without IBD specific medications and routine follow-up with GI.     In 2016, patient underwent sleeve gastrectomy for weight loss management.     On chart review, she was seen by GI clinic here in 2018 for evaluation of approximately 6-7 month duration of rectal bleeding. MRE in 2018 showed evidence of prior colectomy with right lower quadrant ileostomy and increased enhancement of anal canal possibly from active inflammation. Ileostomy obtained in 2018 was normal and flexible sigmoidoscopy in 2018 showed a non-patent surgical anastomosis in the rectum with friable mucosa; no biopsies obtained. She was evaluated by CRS here and underwent completion proctectomy in 2018 with operative findings notable for a rectal stricture preventing endoscopic evaluation. Pathology results showed significantly narrowed bowel lumen (grossly 0.6 cm) with mucosal atrophy and fibrosis (no residual mucosa identified) and focal patchy chronic inflammation.    Due to pannus formation associated with weight loss following sleeve gastrectomy, she eventually underwent panniculectomy with revision of ileostomy with CRS and plastic surgery here in 2019.     In 2021, patient was diagnosed with seronegative PBC following liver biopsy for evaluation of transaminitis.     In 2021, she was referred to IBD clinic here and evaluated by Dr. Chi. At that time, she reported emptying her ostomy 8-9 times daily with associated  "intermittent passage of blood from ostomy, mouth ulcers, and "phantom" rectal pain. Fecal calprotectin noted to be elevated to 80 in 6/2021, however repeat fecal calprotectin in 12/2021 was undetectable. No medical therapy was initiated based on low suspicion for active IBD contributing to symptoms. Gabapentin was given for "phantom" rectal  pain. Follow-up appointment planned for 7/2022, however this was not scheduled.     In 6/2022, she was evaluated by rheumatology here with regards to recurrent diffuse arthralgias. Patient felt to either have inflammatory arthritis versus fibromyalgia. She was re-initiated on HUMIRA 40 mg Q2 weeks in 6/2022 with resolution of joint pains, however this was discontinued in 8/2022 due to development of hives after each injection.     In 8/2023, she was re-evaluated by GEMMA in general GI clinic here with concern for recurrent left sided abdominal pain associated with arthralgias and smelling blood in ostomy output without blood seen in output. Fecal calprotectin again undetectable and stool testing for C.diff, giardia, cryptosporidium, and culture were negative. She was referred back to IBD clinic here for further evaluation.     IBD Details:  Dx Date: 2010  Disease type/distribution: IBD-unspecified; unclear initial disease distribution, but only definitive evidence of distal colonic involvement based on surgical pathology results available.   Current Treatment: None  Start Date:  Response:   Optimized:        Adverse reactions:   Prior surgeries:   6/2013: Total abdominal colectomy with end ileostomy at [facility].     8/2014: Ileostomy revision and drainage of RLQ intra-abdominal abscess with enterocutaneous fistula with CRS here.  6/2016: Sleeve gastrectomy  5/2018: Open completion proctectomy with CRS here. Operative findings notable for rectal stricture that prevented endoscopic evaluation. Pathology results showed significantly narrowed bowel lumen (grossly 0.6 cm) with mucosal " atrophy and fibrosis (no residual mucosa identified) and focal patchy chronic inflammation.  12/2019: Panniculectomy and revision ileostomy with CRS and plastic surgery here.     CRP Elevation: N  Disease Complications: Enterocutaneous fistula formation in 2014  Extraintestinal manifestations: Joint pains, mouth ulcers  Prior treatments:  Steroids: Yes; given high doses after initial diagnosis with associated with skin thinning.   5ASA:  Asacol  IMM: None  TNF Inh:   -Remicade; used at time of initial diagnosis; no improvement in symptoms; no drug levels on file.   -Humira; used at time of initial diagnosis and then 6/2022-8/2022 improvement in joint pains only; no drug levels on file; associated with development of hives with re-initiation.   -Enbrel; used at time of initial diagnosis; no improvement in symptoms.    Anti-Integrin: None   IL 12/23: None  GUY Inh: None    Previous Clinical Trials: None    Last Colonoscopy:   -Ileoscopy 4/2018: Normal. No biopsies obtained.   -Flexible sigmoidoscopy 4/2018: Non-patent surgical anastomosis in the rectum with friable mucosa. No biopsies obtained.     Other Endoscopies:   -EUS 11/2021 for evaluation of elevated liver enzymes: [place]-colored mucosa of lower esophagus (biopsies showed benign squamous and gastric-type mucosa with chronic active inflammation), fatty infiltration of the pancreas, and normal bile ducts and liver.   -EGD 4/2018 for evaluation of reflux: LA grade A esophagitis, 2 cm sliding hiatal hernia, sleeve gastrectomy with healthy appearing mucosa, normal duodenum.     Imaging:  MRE: 1/2018:  Status post colectomy with right lower quadrant ileostomy.  Rectal stump proximal end abuts the bladder wall of uncertain significance.Increased enhancement of the anal canal, possibly representing active inflammation.  Correlate with direct visualization. Normal appearance of small bowel.   CT:    Other:     Pertinent Labs:  Lab Results   Component Value Date     "SEDRATE 25 (H) 08/06/2023    CRP 5.6 08/06/2023     Lab Results   Component Value Date    TTGIGA 11 01/17/2018     (H) 01/17/2018     Lab Results   Component Value Date    TSH 2.100 11/11/2022    FREET4 0.88 05/25/2021     Lab Results   Component Value Date    TAMYXPMR37RG 33 05/03/2023    WPRYEICZ51 242 05/03/2023     Lab Results   Component Value Date    HEPBSAG Negative 06/02/2022    HEPBCAB Negative 06/02/2022    HEPCAB Negative 06/02/2022     Lab Results   Component Value Date    WFA80WBTU Negative 06/02/2022     Lab Results   Component Value Date    NIL 0.83157 06/02/2022    MITOGENNIL 9.946 06/02/2022     Lab Results   Component Value Date    TPTMINTERP SEE BELOW 01/17/2018     Lab Results   Component Value Date    STOOLCULTURE  08/10/2023     No Salmonella,Shigella,Vibrio,Campylobacter,Yersinia isolated.    ECLRRZABGK9S Negative 08/10/2023    CKNJBRCPNB1R Negative 08/10/2023    CDIFFICILEAN Negative 08/10/2023    CDIFFTOX Negative 08/10/2023     Lab Results   Component Value Date    CALPROTECTIN <27.1 08/10/2023       Therapeutic Drug Monitoring Labs:  No results found for: "PROMETH"  No results found for: "ANSADAINIT", "INFLIXIMAB", "INFLIXINTERP"    Vaccinations:  Lab Results   Component Value Date    HEPBSAB Negative 06/02/2022     Lab Results   Component Value Date    HEPAIGG Negative 06/02/2022     Lab Results   Component Value Date    VARICELLAZOS 2.01 (H) 06/02/2022    VARICELLAINT Positive (A) 06/02/2022     Immunization History   Administered Date(s) Administered    COVID-19, MRNA, LN-S, PF (Pfizer) (Purple Cap) 03/15/2021, 04/16/2021    Hepatitis A / Hepatitis B 06/21/2022, 07/19/2022    Influenza 09/09/2021, 10/04/2022    Influenza - Quadrivalent - PF *Preferred* (6 months and older) 10/14/2015, 09/08/2016, 10/13/2017, 10/12/2018, 11/18/2019    Pneumococcal Conjugate - 20 Valent 08/29/2022    Pneumococcal Polysaccharide - 23 Valent 04/04/2016    Td - PF (ADULT) 10/24/2018     Review of " Systems  Review of Systems   Constitutional:  Positive for malaise/fatigue. Negative for chills, fever and weight loss.   Eyes:  Negative for blurred vision and double vision.   Respiratory:  Negative for cough, sputum production and shortness of breath.    Cardiovascular:  Negative for chest pain and leg swelling.   Gastrointestinal:  Positive for abdominal pain, diarrhea and nausea. Negative for blood in stool, heartburn, melena and vomiting.   Musculoskeletal:  Positive for joint pain.   Skin:  Negative for rash.   Neurological:  Negative for seizures.       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, with liver fibrosis, on ursodiol 2023    PTSD (post-traumatic stress disorder)     Seasonal allergies     Symptomatic abdominal panniculus     Ulcerative colitis        Surgical History:  Past Surgical History:   Procedure Laterality Date    APPENDECTOMY      CATHETERIZATION OF BOTH LEFT AND RIGHT HEART Right 2019    Procedure: CATHETERIZATION, HEART, BOTH LEFT AND RIGHT;  Surgeon: Beto Malin MD;  Location: [facility] CATH LAB;  Service: Cardiology;  Laterality: Right;     SECTION, LOW TRANSVERSE      CHOLECYSTECTOMY      open    COLECTOMY      total- 2013    CYSTOSCOPY W/ RETROGRADES N/A 2018    Procedure: CYSTOSCOPY, WITH RETROGRADE PYELOGRAM;  Surgeon: Butch Banks MD;  Location: [facility] OR;  Service: Urology;  Laterality: N/A;  [facility] SURGICAL CONFIRMED     ENDOSCOPIC ULTRASOUND OF UPPER GASTROINTESTINAL TRACT N/A 11/10/2021    Procedure: ULTRASOUND, UPPER GI TRACT, ENDOSCOPIC;  Surgeon: Nhan Dee MD;  Location: [facility] ENDO ([address] [place] [address]);   Service: Endoscopy;  Laterality: N/A;  MD Milena Whitehead PA-C; Quinn Whitman MD; Miladys Velasquez MA  Caller: Unspecified (3 days ago,  1:20 PM)  Fair enough. Neha, please schedule.       ----- Message -----   From: Milena Murillo PA-C   Sent: 8/11/2021  1    ESOPHAGOGASTRODUODENOSCOPY  04/11/2018    Dr. Castorena: LA Grade A reflux esophagitis, hiatal hernia; Ectopic gastric mucosa in the upper third of the esophagus; gastric sleeve intact with unremarkable findings, gastritis; biopsy: stomach- Mild chronic antral and oxyntic gastritis, no activity, negative for h pylori    ESOPHAGOGASTRODUODENOSCOPY N/A 11/10/2021    Procedure: EGD (ESOPHAGOGASTRODUODENOSCOPY);  Surgeon: Nhan Dee MD;  Location: [facility] (2ND FLR);  Service: Endoscopy;  Laterality: N/A;    FESS, WITH IMAGING GUIDANCE Bilateral 4/4/2023    Procedure: Pan Sinus FESS, WITH IMAGING GUIDANCE Disc loaded;  Surgeon: Jay Hernandez MD;  [facility];  Service: ENT;  Laterality: Bilateral;  balloon dilation of sinuses    FLEXIBLE SIGMOIDOSCOPY  5/24/2018    Procedure: SIGMOIDOSCOPY, FLEXIBLE;  Surgeon: JENNY Virgen MD;  Location: [facility] OR 2ND FLR;  Service: Colon and Rectal;;    FLEXIBLE SIGMOIDOSCOPY  04/11/2018    Dr. Castorena: Non-patent surgical anastomosis, characterized by friable mucosa.    GASTRIC SLEEVE       HYSTERECTOMY      ILEOSCOPY  04/11/2018    Dr. Castorena: Patient is status-post total colectomy with end ileostomy. unremarkable findings    ILEOSTOMY REVISION      april 2014; august 2014    LAPAROSCOPIC PROCTECTOMY N/A 5/24/2018    Procedure: PROCTECTOMY-LAPAROSCOPIC/CONVERTED TO OPEN;  Surgeon: JENNY Virgen MD;  Location: [facility] OR 2ND FLR;  Service: Colon and Rectal;  Laterality: N/A;    LYSIS OF ADHESIONS  5/24/2018    Procedure: LYSIS, ADHESIONS/ more than 2hours;  Surgeon: JENNY Vigren MD;  Location: [facility] OR 2ND FLR;  Service: Colon and Rectal;;    OOPHORECTOMY      PANNICULECTOMY Bilateral  2019    Procedure: PANNICULECTOMY;  Surgeon: Andrea Alvarado MD;  Location: [facility] OR 2ND FLR;  Service: Plastics;  Laterality: Bilateral;    REVISION COLOSTOMY N/A 2019    Procedure: REVISION, COLOSTOMY;  Surgeon: JENNY Virgen MD;  Location: [facility] OR 2ND FLR;  Service: Colon and Rectal;  Laterality: N/A;    thumb surgery      TONSILLECTOMY, ADENOIDECTOMY      WISDOM TOOTH EXTRACTION         Family History:   Family History   Problem Relation Age of Onset    Cancer Mother         skin    Cirrhosis Mother     Heart disease Father 67    Cancer Paternal Grandfather         ?    Cancer Maternal Grandfather         colon     Colon cancer Maternal Grandfather     Colon cancer Maternal Uncle     Colon cancer Maternal Uncle     Crohn's disease Neg Hx     Ulcerative colitis Neg Hx     Stomach cancer Neg Hx     Esophageal cancer Neg Hx     Celiac disease Neg Hx     Breast cancer Neg Hx     Ovarian cancer Neg Hx        Social History:   Social History     Tobacco Use    Smoking status: Former     Current packs/day: 0.00     Types: Cigarettes     Quit date:      Years since quittin.8    Smokeless tobacco: Never   Substance Use Topics    Alcohol use: Yes     Comment: rarely    Drug use: No       Allergies: Reviewed    Home Medications:   Medication List with Changes/Refills   Current Medications    AMITRIPTYLINE (ELAVIL) 10 MG TABLET    Take 1-2 tablets (10-20 mg total) by mouth nightly as needed for Insomnia.    CLONAZEPAM (KLONOPIN) 1 MG TABLET    Take 1 tablet (1 mg total) by mouth daily as needed for Anxiety.    DIPHENOXYLATE-ATROPINE 2.5-0.025 MG (LOMOTIL) 2.5-0.025 MG PER TABLET    Take 1 tablet by mouth 4 (four) times daily as needed for Diarrhea.    LEVOCETIRIZINE (XYZAL) 5 MG TABLET    Take 1 tablet (5 mg total) by mouth every evening.    LORATADINE (CLARITIN) 10 MG TABLET    Take 1 tablet (10 mg total) by mouth once daily.    MECLIZINE (ANTIVERT) 25 MG TABLET    Take 1 tablet (25 mg total) by  mouth 3 (three) times daily as needed for Dizziness or Nausea.    MELATONIN 10 MG TAB    Take 1 tablet by mouth nightly as needed.    NYSTATIN (MYCOSTATIN) POWDER    Apply topically 2 (two) times daily.    PANTOPRAZOLE (PROTONIX) 40 MG TABLET    Take one tablet by mouth once daily    PROMETHAZINE (PHENERGAN) 25 MG TABLET    Take 1 tablet (25 mg total) by mouth every 6 (six) hours as needed for Nausea.    SERTRALINE (ZOLOFT) 100 MG TABLET    Take 2 tablets (200 mg total) by mouth once daily.    TOPIRAMATE (TOPAMAX) 100 MG TABLET    Take 1 tablet (100 mg total) by mouth 2 (two) times daily.    UNABLE TO FIND    Take 1 Dose by mouth daily as needed (as needed for sleep and anxiety). medication name: Delta 8 and Delta 9 hemp gummies    VITAMIN D2 1,250 MCG (50,000 UNIT) CAPSULE    TAKE ONE CAPSULE BY MOUTH ONCE WEEKLY   Discontinued Medications    TOBRAMYCIN-DEXAMETHASONE 0.3-0.1% (TOBRADEX) 0.3-0.1 % DRPS    Place 1 drop into the left eye every 6 (six) hours.       Physical Exam:  Vital Signs:  /73 (BP Location: Left arm, Patient Position: Sitting)   Pulse 78   Temp 98.4 °F (36.9 °C) (Temporal)   Wt 102.4 kg (225 lb 12 oz)   SpO2 96%   BMI 38.15 kg/m²   Body mass index is 38.15 kg/m².    Physical Exam  Constitutional:       General: She is not in acute distress.     Appearance: She is obese. She is not ill-appearing.   HENT:      Mouth/Throat:      Mouth: Mucous membranes are moist. Oral lesions present. No injury.      Dentition: Abnormal dentition.      Pharynx: Oropharynx is clear. No oropharyngeal exudate.      Comments: Small ulcer present over left upper posterior mouth.   Eyes:      General: No scleral icterus.  Cardiovascular:      Rate and Rhythm: Normal rate and regular rhythm.      Pulses: Normal pulses.      Heart sounds: Normal heart sounds.   Pulmonary:      Effort: Pulmonary effort is normal.      Breath sounds: Normal breath sounds.   Abdominal:      General: There is no distension.       Palpations: Abdomen is soft. There is no mass.      Tenderness: There is no abdominal tenderness.      Hernia: No hernia is present.      Comments: Ostomy in place in right lower quadrant.    Musculoskeletal:      Right lower leg: No edema.      Left lower leg: No edema.   Skin:     General: Skin is warm and dry.      Coloration: Skin is not jaundiced.   Neurological:      General: No focal deficit present.      Mental Status: She is alert and oriented to person, place, and time.       Labs: reviewed and pertinent noted above    Assessment/Plan:  Ida Orozco is a 58 y.o. 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 The following issues were addresssed:    #1. IBD-unspecified: Based on review of pathology results from colectomy in 2013, suspect patient had underlying ulcerative colitis of distal colon. 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 She is already status post evaluation of current GI symptoms without evidence of celiac disease or pancreatic insufficiency. Her current GI symptoms may be related to underlying functional disorder such as IBS-D, however given concern for co-inciding history of arthralgias, mouth ulcers, and reported history of Crohn's disease, will plan for repeat endoscopic evaluation of symptoms with EGD and ileostomy to ensure to signs of active inflammation. If no evidence of underlying Crohn's disease based on endoscopy, will plan  to refer back to general GI department for IBS management.     #2. Dysphagia, unspecified type: Differential diagnoses include possible underlying GERD, stricture, or esophageal motility disorder. EGD for further evaluation.     #3. PBC: Continued follow-up with hepatology to monitor for progressive liver disease given F3 fibrosis noted on last liver biopsy in 11/2021.     Ex smoker:  - recommended to continue smoking cessation    Follow up: Follow up if symptoms worsen or fail to improve.        Ted Garcia MD, PGY-VI  Gastroenterology Fellow  [facility]

## 2023-11-14 ENCOUNTER — OFFICE VISIT (OUTPATIENT)
Dept: GASTROENTEROLOGY | Facility: CLINIC | Age: 58
End: 2023-11-14
Payer: MEDICARE

## 2023-11-14 ENCOUNTER — TELEPHONE (OUTPATIENT)
Dept: ENDOSCOPY | Facility: HOSPITAL | Age: 58
End: 2023-11-14
Payer: MEDICARE

## 2023-11-14 VITALS
HEART RATE: 78 BPM | SYSTOLIC BLOOD PRESSURE: 112 MMHG | BODY MASS INDEX: 38.15 KG/M2 | WEIGHT: 225.75 LBS | TEMPERATURE: 98 F | DIASTOLIC BLOOD PRESSURE: 73 MMHG | OXYGEN SATURATION: 96 %

## 2023-11-14 DIAGNOSIS — R10.32 ABDOMINAL PAIN, CHRONIC, LEFT LOWER QUADRANT: ICD-10-CM

## 2023-11-14 DIAGNOSIS — R13.10 DYSPHAGIA, UNSPECIFIED TYPE: ICD-10-CM

## 2023-11-14 DIAGNOSIS — G89.29 ABDOMINAL PAIN, CHRONIC, LEFT LOWER QUADRANT: ICD-10-CM

## 2023-11-14 DIAGNOSIS — K52.9 INFLAMMATORY BOWEL DISEASE: Primary | ICD-10-CM

## 2023-11-14 PROBLEM — K50.919 CROHN'S DISEASE WITH COMPLICATION: Status: RESOLVED | Noted: 2017-10-13 | Resolved: 2023-11-14

## 2023-11-14 PROCEDURE — 3044F HG A1C LEVEL LT 7.0%: CPT | Mod: CPTII,S$GLB,, | Performed by: INTERNAL MEDICINE

## 2023-11-14 PROCEDURE — 3008F BODY MASS INDEX DOCD: CPT | Mod: CPTII,S$GLB,, | Performed by: INTERNAL MEDICINE

## 2023-11-14 PROCEDURE — 99214 PR OFFICE/OUTPT VISIT, EST, LEVL IV, 30-39 MIN: ICD-10-PCS | Mod: GC,S$GLB,, | Performed by: INTERNAL MEDICINE

## 2023-11-14 PROCEDURE — 99214 OFFICE O/P EST MOD 30 MIN: CPT | Mod: GC,S$GLB,, | Performed by: INTERNAL MEDICINE

## 2023-11-14 PROCEDURE — 3074F SYST BP LT 130 MM HG: CPT | Mod: CPTII,S$GLB,, | Performed by: INTERNAL MEDICINE

## 2023-11-14 PROCEDURE — 3008F PR BODY MASS INDEX (BMI) DOCUMENTED: ICD-10-PCS | Mod: CPTII,S$GLB,, | Performed by: INTERNAL MEDICINE

## 2023-11-14 PROCEDURE — 3078F PR MOST RECENT DIASTOLIC BLOOD PRESSURE < 80 MM HG: ICD-10-PCS | Mod: CPTII,S$GLB,, | Performed by: INTERNAL MEDICINE

## 2023-11-14 PROCEDURE — 3044F PR MOST RECENT HEMOGLOBIN A1C LEVEL <7.0%: ICD-10-PCS | Mod: CPTII,S$GLB,, | Performed by: INTERNAL MEDICINE

## 2023-11-14 PROCEDURE — 3078F DIAST BP <80 MM HG: CPT | Mod: CPTII,S$GLB,, | Performed by: INTERNAL MEDICINE

## 2023-11-14 PROCEDURE — 99499 UNLISTED E&M SERVICE: CPT | Mod: S$GLB,,, | Performed by: STUDENT IN AN ORGANIZED HEALTH CARE EDUCATION/TRAINING PROGRAM

## 2023-11-14 PROCEDURE — 1159F MED LIST DOCD IN RCRD: CPT | Mod: CPTII,S$GLB,, | Performed by: INTERNAL MEDICINE

## 2023-11-14 PROCEDURE — 3074F PR MOST RECENT SYSTOLIC BLOOD PRESSURE < 130 MM HG: ICD-10-PCS | Mod: CPTII,S$GLB,, | Performed by: INTERNAL MEDICINE

## 2023-11-14 PROCEDURE — 1159F PR MEDICATION LIST DOCUMENTED IN MEDICAL RECORD: ICD-10-PCS | Mod: CPTII,S$GLB,, | Performed by: INTERNAL MEDICINE

## 2023-11-14 RX ORDER — ACETAMINOPHEN, DIPHENHYDRAMINE HCL, PHENYLEPHRINE HCL 325; 25; 5 MG/1; MG/1; MG/1
1 TABLET ORAL NIGHTLY PRN
COMMUNITY

## 2023-11-14 NOTE — PROGRESS NOTES
"Subjective:   Ida Orozco is a 58 y.o. female who presents for a dizziness evaluation. Referred per Dr. Jones.  The symptoms started several years ago and have gradually worsened.   She describes dizziness as  room spinning and "feeling drunk" .   The attacks occur intermittently and last minutes.   Positions that worsen symptoms: turning head too fast to the left and laying laying down turning to left.  Associated ear symptoms (with episodes): Nausea and left occipital headache. Reports she has normal hearing.   Has otalgia. Left intermittent  (10/10) with left side gland swelling (neck) upper jaw region.   Reports history of left eye cellulitis and orbital bone tenderness in the past which has resolved after sinus surgery with Dr. Hernandez. Reports she has bilateral maxillary/frontal pressure with right nasal congestion/obstruction. Dislikes nasal rinses or Flonase. Musical tinnitus bilaterally L>R. Hyperacusis bilaterally.   Associated symptoms include: none. Denies any weakness, paresthesias, numbness, LOC, confusion, difficulty swallowing, or difficulty with speech.    Recent infections: none.   Head trauma: denied.   Previous ear surgery: no. Reports recurrent ear infections in the past.   Noise exposure:  yes . Heavy metal music in the 80s  Previous workup: none.   Previous treatment includes: meclizine which she has been taking a day recently.  Headache: occipital headaches. Photosensitivity  Cardiac hx; Bradycardia.  Diabetic hx: none  New medication or changes: none  There is not a family history of hearing loss at a young age.   Report right hip and knee problems. Hx of falls and having to hold onto doors and bumping into walls.   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    Past Surgical History  She has a past surgical history that includes Hysterectomy; Ileostomy revision;  section, low transverse; Cholecystectomy; TONSILLECTOMY, ADENOIDECTOMY; thumb surgery; [place] tooth extraction; Appendectomy; Colectomy; Laparoscopic proctectomy (N/A, 2018); Lysis of adhesions (2018); Flexible sigmoidoscopy (2018); GASTRIC SLEEVE ; Cystoscopy w/ retrogrades (N/A, 2018); Catheterization of both left and right heart (Right, 2019); Panniculectomy (Bilateral, 2019); Revision Colostomy (N/A, 2019); Oophorectomy; Ileoscopy (2018); Flexible sigmoidoscopy (2018); Esophagogastroduodenoscopy (2018); Endoscopic ultrasound of upper gastrointestinal tract (N/A, 11/10/2021); Esophagogastroduodenoscopy (N/A, 11/10/2021); and fess, with imaging guidance (Bilateral, 2023).    Family History  Her family history includes Cancer in her maternal grandfather, mother, and paternal grandfather; Cirrhosis in her mother; Colon cancer in her maternal grandfather, maternal uncle, and maternal uncle; Heart disease (age of onset: 67) in her father.    Social History  She reports that she quit smoking about 14 years ago. Her smoking use included cigarettes. She has never used smokeless tobacco. She reports current alcohol use. She reports that she does not use drugs.    Allergies  She is allergic to adhesive, dilaudid [hydromorphone], humira [adalimumab], sulfa (sulfonamide antibiotics), and surgical stainless steel.    Medications  She has a current medication list which includes the following prescription(s): amitriptyline, clonazepam, diphenoxylate-atropine 2.5-0.025 mg, levocetirizine, loratadine, meclizine, melatonin, nystatin, pantoprazole, promethazine, sertraline,  topiramate, UNABLE TO FIND, vitamin d2, and [DISCONTINUED] humira(cf) pen.  Review of Systems     Constitutional: Positive for fatigue.      HENT: Positive for ear pain, facial swelling, postnasal drip, sinus infection, sinus pressure, sore throat, trouble swallowing and voice change.      Eyes:  Positive for eye itching and photophobia.     Respiratory:  Positive for cough and wheezing.      Cardiovascular:  Negative for chest pain, foot swelling, irregular heartbeat and swollen veins.     Gastrointestinal:  Positive for abdominal pain and diarrhea.     Genitourinary: Negative for difficulty urinating, sexual problems and frequent urination.     Musc: Positive for aching muscles.     Skin: Negative for rash.     Allergy: Positive for seasonal allergies.     Endocrine: Positive for cold intolerance.     Neurological: Positive for dizziness, headaches and tremors.     Hematologic: Positive for bruises/bleeds easily and swollen glands.     Psychiatric: Positive for nervous/anxious and sleep disturbance.         Objective:     Constitutional:   She is oriented to person, place, and time. She appears well-developed and well-nourished. She appears alert. She is cooperative.  Non-toxic appearance. She does not have a sickly appearance. Normal speech.      Head:  Normocephalic and atraumatic. Head is without TMJ tenderness. Salivary glands normal.  Facial strength is normal.      Ears:  Hearing normal to normal and whispered voice; external ear normal without scars, lesions, or masses; ear canal, tympanic membrane, and middle ear normal..   Right Ear: No lacerations. No drainage, swelling or tenderness. No foreign bodies. No mastoid tenderness. Tympanic membrane is not injected, not scarred, not perforated, not erythematous, not retracted and not bulging. Tympanic membrane mobility is normal. No middle ear effusion. No PE tube. No hemotympanum. Decreased hearing is noted.   Left Ear: No lacerations. No drainage, swelling  or tenderness. No foreign bodies. No mastoid tenderness. Tympanic membrane is not injected, not scarred, not perforated, not erythematous, not retracted and not bulging. Tympanic membrane mobility is normal.  No middle ear effusion.  No PE tube. No hemotympanum. Decreased hearing is noted.     Nose:  Nose normal including turbinates, nasal mucosa, sinuses and nasal septum. No mucosal edema, rhinorrhea, sinus tenderness, septal deviation or polyps. Turbinates normal, no turbinate masses and no turbinate hypertrophy.  Right sinus exhibits no maxillary sinus tenderness and no frontal sinus tenderness. Left sinus exhibits no maxillary sinus tenderness and no frontal sinus tenderness.     Mouth/Throat  Oropharynx clear and moist without lesions or asymmetry and normal uvula midline. Normal dentition. No uvula swelling, oral lesions or trismus. No oropharyngeal exudate, posterior oropharyngeal edema or posterior oropharyngeal erythema.     Neck:  Thyroid normal, trachea normal and phonation normal. Thyroid tenderness is present. No thyroid mass and no thyromegaly present.     She has no cervical adenopathy.     Psychiatric:   She has a normal mood and affect. Her speech is normal and behavior is normal.     Neurological:   She is alert and oriented to person, place, and time. No cranial nerve deficit or sensory deficit. Coordination and gait abnormal.   Altaf-Hallpike Left: Negative for torsional and up-beating nystagmus: patient immediatlely complained of room spinning sensation which lasted seconds.    Altaf-Hallpike Right: Negative for torsional and up-beating nystagmus    Heel to Gamino: normal    Romberg: Positive ( mild to moderate) swaying noted     Head thrust: no saccade    Fukada test: unable to perform. Patient reported she was very unstable.     EOM: no saccade      Imaging  No pertinent imaging available.  Audiogram    I independently reviewed the tracings of the complete audiometric evaluation performed today. I  reviewed the audiogram with the patient as well. Pertinent findings include left ear with mild SNHL and left ear with mild SNHL. Asymmetrical hearing noted from 500 HZ to 1000 Hz and 3000 HZ to 4000Hz. Type A tympanogram bilaterally.  Assessment:     1. ASNHL (asymmetrical sensorineural hearing loss)    2. Vertigo    3. Sensorineural hearing loss (SNHL) of both ears    4. Imbalance    5. Hyperacusis of both ears    6. Nonintractable headache, unspecified chronicity pattern, unspecified headache type    7. Other disorders of vestibular function, bilateral      Plan:   Diagnoses and all orders for this visit:    ASNHL (asymmetrical sensorineural hearing loss)  -     MRI IAC/Temporal Bones W W/O Contrast; Future   -    Creatine , serum  We discussed the potential causes of asymmetrical hearing loss including: normal aging (presbycusis), noise-induced hearing loss, genetic causes, medications/drugs, microvascular changes, injury to the head or ear and structural problems of the inner ear.  An MRI was recommended to rule out a definite cause and help clarify whether there is an underlying structural abnormality.     Vertigo  -     Ambulatory referral/consult to ENT  -     CREATININE, SERUM; Future  -     MRI IAC/Temporal Bones W W/O Contrast; Future  -     Ambulatory referral/consult to Physical/Occupational Therapy; Future  -     Electronystagmographyx; Future  HPI characteristic to left BPPV. Symptomatic in clinic when I laid her down on left side. No nystagmus noted.   Epley maneuver performed.   Information provided to do at home 2-3 times a day. I feel that vestibular therapy can help.  Sensorineural hearing loss (SNHL) of both ears  Audiometric testing interpretation consistent with sensorineural hearing loss.  Discussed the etiology of SNHL. Medically cleared for hearing amplification, and will follow-up with Audiology if interested. Hearing conservation in noisy environments. Return to clinic every year for  audiometric testing.   Imbalance  -     Ambulatory referral/consult to Physical/Occupational Therapy; Future  -     Electronystagmographyx; Future  Recommended she purchase a cane. She is high risk for falls.   Hyperacusis of both ears  Hearing protection recommendation   Nonintractable headache, unspecified chronicity pattern, unspecified headache type  Possible recommendation to neurology.   Other disorders of vestibular function, bilateral  -     Electronystagmographyx; Future    RTC as needed or if symptoms persist.  Questions answered.

## 2023-11-14 NOTE — TELEPHONE ENCOUNTER
"Contacted the patient to schedule an endoscopy procedure(s) EGD/Ileoscopy. The patient did not answer the call and left a voice message requesting a call back.        --- Message -----   From: Anatoly Singh MD   Sent: 2023   1:50 PM CST   To: [facility]     Procedure: EGD/ileoscopy     Diagnosis: Diarrhea, Abdominal pain, Dysphagia, and Nausea/Vomiting     Procedure Timin-12 weeks     *If within 4 weeks selected, please aldo as high priority*     *If greater than 12 weeks, please select "4-12 weeks" and delay sending until 2 months prior to requested date*     Provider: Myself     Location: [address]     Additional Scheduling Information: No scheduling concerns     Prep Specifications:half a miralax prep     Have you attached a patient to this message: Yes     "

## 2023-11-15 ENCOUNTER — CLINICAL SUPPORT (OUTPATIENT)
Dept: AUDIOLOGY | Facility: CLINIC | Age: 58
End: 2023-11-15
Payer: MEDICARE

## 2023-11-15 ENCOUNTER — OFFICE VISIT (OUTPATIENT)
Dept: OTOLARYNGOLOGY | Facility: CLINIC | Age: 58
End: 2023-11-15
Payer: MEDICARE

## 2023-11-15 ENCOUNTER — LAB VISIT (OUTPATIENT)
Dept: LAB | Facility: HOSPITAL | Age: 58
End: 2023-11-15
Payer: MEDICAID

## 2023-11-15 DIAGNOSIS — H81.8X3 OTHER DISORDERS OF VESTIBULAR FUNCTION, BILATERAL: ICD-10-CM

## 2023-11-15 DIAGNOSIS — H93.293 ABNORMAL AUDITORY PERCEPTION, BILATERAL: ICD-10-CM

## 2023-11-15 DIAGNOSIS — H90.3 ASNHL (ASYMMETRICAL SENSORINEURAL HEARING LOSS): Primary | ICD-10-CM

## 2023-11-15 DIAGNOSIS — R26.89 IMBALANCE: ICD-10-CM

## 2023-11-15 DIAGNOSIS — R51.9 NONINTRACTABLE HEADACHE, UNSPECIFIED CHRONICITY PATTERN, UNSPECIFIED HEADACHE TYPE: ICD-10-CM

## 2023-11-15 DIAGNOSIS — R42 DIZZINESS AND GIDDINESS: ICD-10-CM

## 2023-11-15 DIAGNOSIS — R42 VERTIGO: ICD-10-CM

## 2023-11-15 DIAGNOSIS — H90.42 SENSORINEURAL HEARING LOSS (SNHL) OF LEFT EAR WITH UNRESTRICTED HEARING OF RIGHT EAR: Primary | ICD-10-CM

## 2023-11-15 DIAGNOSIS — H90.3 SENSORINEURAL HEARING LOSS (SNHL) OF BOTH EARS: ICD-10-CM

## 2023-11-15 DIAGNOSIS — H93.233 HYPERACUSIS OF BOTH EARS: ICD-10-CM

## 2023-11-15 LAB
CREAT SERPL-MCNC: 0.8 MG/DL (ref 0.5–1.4)
EST. GFR  (NO RACE VARIABLE): >60 ML/MIN/1.73 M^2

## 2023-11-15 PROCEDURE — 99214 PR OFFICE/OUTPT VISIT, EST, LEVL IV, 30-39 MIN: ICD-10-PCS | Mod: 25,S$GLB,,

## 2023-11-15 PROCEDURE — 95992 PR CANALITH REPOSITIONING PROCEDURE, PER DAY: ICD-10-PCS | Mod: S$GLB,,,

## 2023-11-15 PROCEDURE — 99999 PR PBB SHADOW E&M-EST. PATIENT-LVL III: ICD-10-PCS | Mod: PBBFAC,,,

## 2023-11-15 PROCEDURE — 1160F PR REVIEW ALL MEDS BY PRESCRIBER/CLIN PHARMACIST DOCUMENTED: ICD-10-PCS | Mod: CPTII,S$GLB,,

## 2023-11-15 PROCEDURE — 92567 PR TYMPA2METRY: ICD-10-PCS | Mod: S$GLB,,,

## 2023-11-15 PROCEDURE — 95992 CANALITH REPOSITIONING PROC: CPT | Mod: S$GLB,,,

## 2023-11-15 PROCEDURE — 99999 PR PBB SHADOW E&M-EST. PATIENT-LVL III: CPT | Mod: PBBFAC,,,

## 2023-11-15 PROCEDURE — 3044F HG A1C LEVEL LT 7.0%: CPT | Mod: CPTII,S$GLB,,

## 2023-11-15 PROCEDURE — 92557 PR COMPREHENSIVE HEARING TEST: ICD-10-PCS | Mod: S$GLB,,,

## 2023-11-15 PROCEDURE — 82565 ASSAY OF CREATININE: CPT

## 2023-11-15 PROCEDURE — 1159F MED LIST DOCD IN RCRD: CPT | Mod: CPTII,S$GLB,,

## 2023-11-15 PROCEDURE — 36415 COLL VENOUS BLD VENIPUNCTURE: CPT

## 2023-11-15 PROCEDURE — 1159F PR MEDICATION LIST DOCUMENTED IN MEDICAL RECORD: ICD-10-PCS | Mod: CPTII,S$GLB,,

## 2023-11-15 PROCEDURE — 92557 COMPREHENSIVE HEARING TEST: CPT | Mod: S$GLB,,,

## 2023-11-15 PROCEDURE — 92567 TYMPANOMETRY: CPT | Mod: S$GLB,,,

## 2023-11-15 PROCEDURE — 99999 PR PBB SHADOW E&M-EST. PATIENT-LVL I: CPT | Mod: PBBFAC,,,

## 2023-11-15 PROCEDURE — 99999 PR PBB SHADOW E&M-EST. PATIENT-LVL I: ICD-10-PCS | Mod: PBBFAC,,,

## 2023-11-15 PROCEDURE — 3044F PR MOST RECENT HEMOGLOBIN A1C LEVEL <7.0%: ICD-10-PCS | Mod: CPTII,S$GLB,,

## 2023-11-15 PROCEDURE — 99214 OFFICE O/P EST MOD 30 MIN: CPT | Mod: 25,S$GLB,,

## 2023-11-15 PROCEDURE — 1160F RVW MEDS BY RX/DR IN RCRD: CPT | Mod: CPTII,S$GLB,,

## 2023-11-15 NOTE — PROGRESS NOTES
I spent 30 minutes on 11/13/23 reviewing Ida Orozco medical records prior to clinic visit on 11/14/23.

## 2023-11-15 NOTE — PROGRESS NOTES
"History:  Ida Orozco, a 58 y.o. female, was seen today for an audiologic evaluation. She reported a 4 year history of episodic room-spinning vertigo and imbalance provoked by quick movements, rising from seated/supine, and turning her head while driving. She reported a recent fall during an episode and noted that her physical therapist has observed nystagmus that suggested left ear involvement. Ms. Orozco also has difficulty understanding speech in noise and thinks her right ear may hear better than her left ear. She endorsed left ear pain/sensation of "seasickness" in response to loud percussive sounds, and she experiences a subjective perception of musical sound in both ears, more in the left, when in quiet for about 6 years.      Results:  Tympanometry revealed Type A tympanogram in the right ear and Type A tympanogram in the left ear.   Pure tone audiometry revealed  normal hearing sensitivity in the right ear and essentially mild sensorineural hearing loss in the left ear. Of note, marked responses to air conduction at 4627-6117 Hz in the left ear may be subthreshold. Patient reported left ear pain when stimulus was presented at marked levels, though she did not hear the stimulus and would not tolerate increases in intensity. No pain reported in response to bone conducted signals.  Speech reception thresholds were obtained at 25 dB in the right ear and 25 dB in the left ear.   Word recognition scores were 100% in the right ear and 100% in the left ear.      Recommendations:  Otologic evaluation  Hearing protection in noise  Return for follow-up audiologic evaluation if changes are noted/per ENT plan of care.  If communication difficulty persists, patient may consider a hearing aid consult in the future, pending medical clearance.                "

## 2023-11-16 ENCOUNTER — PATIENT MESSAGE (OUTPATIENT)
Dept: URGENT CARE | Facility: CLINIC | Age: 58
End: 2023-11-16
Payer: MEDICARE

## 2023-11-16 ENCOUNTER — PATIENT MESSAGE (OUTPATIENT)
Dept: PRIMARY CARE CLINIC | Facility: CLINIC | Age: 58
End: 2023-11-16
Payer: MEDICARE

## 2023-11-17 ENCOUNTER — HOSPITAL ENCOUNTER (EMERGENCY)
Facility: HOSPITAL | Age: 58
Discharge: HOME | End: 2023-11-17
Attending: EMERGENCY MEDICINE
Payer: MEDICARE

## 2023-11-17 ENCOUNTER — PATIENT MESSAGE (OUTPATIENT)
Dept: PRIMARY CARE CLINIC | Facility: CLINIC | Age: 58
End: 2023-11-17
Payer: MEDICARE

## 2023-11-17 VITALS
WEIGHT: 223 LBS | RESPIRATION RATE: 16 BRPM | OXYGEN SATURATION: 99 % | TEMPERATURE: 99 F | SYSTOLIC BLOOD PRESSURE: 108 MMHG | DIASTOLIC BLOOD PRESSURE: 54 MMHG | BODY MASS INDEX: 37.15 KG/M2 | HEART RATE: 64 BPM | HEIGHT: 65 IN

## 2023-11-17 DIAGNOSIS — S69.92XA INJURY TO FINGERNAIL OF LEFT HAND, INITIAL ENCOUNTER: Primary | ICD-10-CM

## 2023-11-17 DIAGNOSIS — S69.90XA FINGER INJURY: ICD-10-CM

## 2023-11-17 PROCEDURE — 99283 EMERGENCY DEPT VISIT LOW MDM: CPT

## 2023-11-17 PROCEDURE — 25000003 PHARM REV CODE 250: Performed by: PHYSICIAN ASSISTANT

## 2023-11-17 RX ORDER — ACETAMINOPHEN 500 MG
1000 TABLET ORAL
Status: COMPLETED | OUTPATIENT
Start: 2023-11-17 | End: 2023-11-17

## 2023-11-17 RX ADMIN — ACETAMINOPHEN 1000 MG: 500 TABLET ORAL at 02:11

## 2023-11-17 NOTE — ED TRIAGE NOTES
Pt reports smashing L middle finger in front door and could not wait to go to the PCP because they said she should come to the ER so that it wont get infected.  Pt reports pain and middle fingernail forn.    LOC: The patient is awake, alert and aware of environment with an appropriate affect, the patient is oriented x 3 and speaking appropriately.  APPEARANCE: Patient resting comfortably and in no acute distress, patient is clean and well groomed, patient's clothing is properly fastened.  SKIN: The skin is warm and dry, color consistent with ethnicity, patient has normal skin turgor and moist mucus membranes, skin intact, no breakdown or bruising noted.  MUSCULOSKELETAL: Patient moving all extremities spontaneously, no obvious swelling or deformities noted.  RESPIRATORY: Airway is open and patent, respirations are spontaneous, patient has a normal effort and rate, no accessory muscle use noted,   CARDIAC: Patient has a normal rate and regular rhythm, no periphreal edema noted, capillary refill < 3 seconds.  ABDOMEN: Soft and non tender to palpation, no distention noted, normoactive bowel sounds present in all four quadrants.  NEUROLOGIC:  facial expression is symmetrical, patient moving all extremities spontaneously, normal sensation in all extremities when touched with a finger.  Follows all commands appropriately.

## 2023-11-17 NOTE — DISCHARGE INSTRUCTIONS
You can apply neosporin or any antibiotic ointment to the area twice a day for the next few days.    You can alternate ibuprofen and tylenol for pain as needed. Follow up in hand clinic if you continue to have pain.     Return to the ER if you develop swelling, redness, cloudy drainage or any other worrisome symptoms.

## 2023-11-17 NOTE — ED PROVIDER NOTES
Encounter Date: 2023       History     Chief Complaint   Patient presents with    Hand Pain     Smashed L middle finger in door yest     58-year-old female with below medical history presents to the ED with a finger injury.  Patient smashed her left middle finger in the front door yesterday.  She states that her fingernail was cut.  She was directed to the ED. she states that she has baseline numbness to the tips of her fingers and denies any changes.       Review of patient's allergies indicates:   Allergen Reactions    Adhesive      Cause blisters    Dilaudid [hydromorphone] Nausea And Vomiting    Humira [adalimumab] Hives    Sulfa (sulfonamide antibiotics) Hives    Surgical stainless steel      Had surgical staples, caused irritation and infection     Past Medical History:   Diagnosis Date    Anxiety     Asthma     Last ER visit with covid    Bradycardia     Bronchitis     Depression     Esophageal ulcer 2014    Fatty liver disease, nonalcoholic     Gallstones     GERD (gastroesophageal reflux disease)     History of sleeve gastrectomy 2016    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, with liver fibrosis, on ursodiol 2023    PTSD (post-traumatic stress disorder)     Seasonal allergies     Symptomatic abdominal panniculus     Ulcerative colitis      Past Surgical History:   Procedure Laterality Date    APPENDECTOMY      CATHETERIZATION OF BOTH LEFT AND RIGHT HEART Right 2019    Procedure: CATHETERIZATION, HEART, BOTH LEFT AND RIGHT;  Surgeon: Beto Malin MD;  Location: [facility] CATH LAB;  Service: Cardiology;  Laterality: Right;     SECTION, LOW TRANSVERSE      CHOLECYSTECTOMY      open    COLECTOMY      total- 2013    CYSTOSCOPY W/ RETROGRADES N/A 2018    Procedure: CYSTOSCOPY, WITH RETROGRADE PYELOGRAM;  Surgeon: Butch Banks MD;  Location: [facility] OR;  Service: Urology;  Laterality: N/A;  [facility] SURGICAL CONFIRMED     ENDOSCOPIC ULTRASOUND OF UPPER  GASTROINTESTINAL TRACT N/A 11/10/2021    Procedure: ULTRASOUND, UPPER GI TRACT, ENDOSCOPIC;  Surgeon: Nhan Dee MD;  Location: [facility] (2ND FLR);  Service: Endoscopy;  Laterality: N/A;  MD Milena Whitehead PA-C; Quinn Whitman MD; Miladys Velasquez MA  Caller: Unspecified (3 days ago,  1:20 PM)  Fair enough. Neha, please schedule.       ----- Message -----   From: Milena Murillo PA-C   Sent: 8/11/2021  1    ESOPHAGOGASTRODUODENOSCOPY  04/11/2018    Dr. Castorena: LA Grade A reflux esophagitis, hiatal hernia; Ectopic gastric mucosa in the upper third of the esophagus; gastric sleeve intact with unremarkable findings, gastritis; biopsy: stomach- Mild chronic antral and oxyntic gastritis, no activity, negative for h pylori    ESOPHAGOGASTRODUODENOSCOPY N/A 11/10/2021    Procedure: EGD (ESOPHAGOGASTRODUODENOSCOPY);  Surgeon: Nhan Dee MD;  Location: [facility] (2ND FLR);  Service: Endoscopy;  Laterality: N/A;    FESS, WITH IMAGING GUIDANCE Bilateral 4/4/2023    Procedure: Pan Sinus FESS, WITH IMAGING GUIDANCE Disc loaded;  Surgeon: Jay Hernandez MD;  Location: [facility] OR;  Service: ENT;  Laterality: Bilateral;  balloon dilation of sinuses    FLEXIBLE SIGMOIDOSCOPY  5/24/2018    Procedure: SIGMOIDOSCOPY, FLEXIBLE;  Surgeon: JENNY Virgen MD;  Location: [facility] OR 2ND FLR;  Service: Colon and Rectal;;    FLEXIBLE SIGMOIDOSCOPY  04/11/2018    Dr. Castorena: Non-patent surgical anastomosis, characterized by friable mucosa.    GASTRIC SLEEVE       HYSTERECTOMY      ILEOSCOPY  04/11/2018    Dr. Castorena: Patient is status-post total colectomy with end ileostomy. unremarkable findings    ILEOSTOMY REVISION      april 2014; august 2014    LAPAROSCOPIC PROCTECTOMY N/A 5/24/2018    Procedure: PROCTECTOMY-LAPAROSCOPIC/CONVERTED TO OPEN;  Surgeon: JENNY Virgen MD;  Location: [facility] OR 2ND FLR;  Service: Colon and Rectal;  Laterality: N/A;    LYSIS OF ADHESIONS  5/24/2018    Procedure: LYSIS,  ADHESIONS/ more than 2hours;  Surgeon: JENNY Virgen MD;  Location: [facility] OR 2ND FLR;  Service: Colon and Rectal;;    OOPHORECTOMY      PANNICULECTOMY Bilateral 2019    Procedure: PANNICULECTOMY;  Surgeon: Andrea Alvarado MD;  Location: [facility] OR 2ND FLR;  Service: Plastics;  Laterality: Bilateral;    REVISION COLOSTOMY N/A 2019    Procedure: REVISION, COLOSTOMY;  Surgeon: JENNY Virgen MD;  Location: [facility] OR 2ND FLR;  Service: Colon and Rectal;  Laterality: N/A;    thumb surgery      TONSILLECTOMY, ADENOIDECTOMY      WISDOM TOOTH EXTRACTION       Family History   Problem Relation Age of Onset    Cancer Mother         skin    Cirrhosis Mother     Heart disease Father 67    Cancer Paternal Grandfather         ?    Cancer Maternal Grandfather         colon     Colon cancer Maternal Grandfather     Colon cancer Maternal Uncle     Colon cancer Maternal Uncle     Crohn's disease Neg Hx     Ulcerative colitis Neg Hx     Stomach cancer Neg Hx     Esophageal cancer Neg Hx     Celiac disease Neg Hx     Breast cancer Neg Hx     Ovarian cancer Neg Hx      Social History     Tobacco Use    Smoking status: Former     Current packs/day: 0.00     Types: Cigarettes     Quit date:      Years since quittin.8    Smokeless tobacco: Never   Substance Use Topics    Alcohol use: Yes     Comment: rarely    Drug use: No     Review of Systems   Musculoskeletal:         Finger injury         Physical Exam     Initial Vitals [23 1252]   BP Pulse Resp Temp SpO2   114/74 87 16 98.8 °F (37.1 °C) 99 %      MAP       --         Physical Exam    Nursing note and vitals reviewed.  Constitutional: She appears well-developed and well-nourished. She is not diaphoretic.  Non-toxic appearance. She does not appear ill. No distress.   HENT:   Head: Normocephalic and atraumatic.   Neck: Neck supple.   Cardiovascular:  Normal rate and regular rhythm.     Exam reveals no gallop and no friction rub.       No murmur  heard.  Pulmonary/Chest: Effort normal and breath sounds normal. No accessory muscle usage. No tachypnea. No respiratory distress. She has no decreased breath sounds. She has no wheezes. She has no rhonchi. She has no rales.   Abdominal: She exhibits no distension.   Musculoskeletal:      Cervical back: Neck supple.      Comments: TTP to the tip of the L middle finger. No swelling. No ecchymosis. There is a      Neurological: She is alert.   Skin: No rash noted.   Psychiatric: She has a normal mood and affect. Her behavior is normal.               ED Course   Procedures  Labs Reviewed - No data to display       Imaging Results              X-Ray Hand 2 View Left (Final result)  Result time 11/17/23 14:17:50      Final result by Kvng Pepper MD (11/17/23 14:17:50)                   Impression:      As above      Electronically signed by: Kvng Pepper MD  Date:    11/17/2023  Time:    14:17               Narrative:    EXAMINATION:  XR HAND 2 VIEW LEFT    TECHNIQUE:  Two views of the left hand were obtained, with PA and lateral projections submitted.    COMPARISON:  Comparison is made to 10/11/2023.  Clinical information obtained from the electronic medical record indicates trauma to the left middle finger on 11/16/2023.    FINDINGS:  Visualized osseous structures appear intact, with no definite evidence of recent fracture or other significant abnormality identified.  No radiopaque soft tissue foreign body.  No significant detrimental interval change since 10/11/2023 is appreciated.                                       Medications   acetaminophen tablet 1,000 mg (1,000 mg Oral Given 11/17/23 1436)     Medical Decision Making  58-year-old female presents to the ED for evaluation of a finger and fingernail injury sustained yesterday.  Baseline neurovascular status.  X-ray revealed no acute fracture. There is a laceration to the nail, but will leave the nail intact.  Bleeding is controlled.  Dressing with Xeroform gauze  applied.  Patient advised to apply antibiotic ointment twice a day for the next couple of days.  She is already established with hand clinic.  Advised to follow-up with hand clinic or PCP if symptoms are not improving.  ED return precautions given.  Patient voiced understanding.     Amount and/or Complexity of Data Reviewed  Radiology: ordered.    Risk  OTC drugs.              Attending Attestation:             Attending ED Notes:   The face-to-face encounter and management were performed solely by the GEMMA.  I was immediately available for consultation, but was not involved in the care of the patient.                            Clinical Impression:  Final diagnoses:  [S69.90XA] Finger injury  [S69.92XA] Injury to fingernail of left hand, initial encounter (Primary)          ED Disposition Condition    Discharge Stable          ED Prescriptions    None       Follow-up Information    None          Lise Negrete PA-C  11/17/23 [id number]       Mihai Samaniego MD  11/17/23 [id number]

## 2023-11-20 ENCOUNTER — PATIENT MESSAGE (OUTPATIENT)
Dept: ORTHOPEDICS | Facility: CLINIC | Age: 58
End: 2023-11-20
Payer: MEDICARE

## 2023-11-20 ENCOUNTER — TELEPHONE (OUTPATIENT)
Dept: ENDOSCOPY | Facility: HOSPITAL | Age: 58
End: 2023-11-20
Payer: MEDICARE

## 2023-11-20 DIAGNOSIS — R11.2 NAUSEA AND VOMITING, UNSPECIFIED VOMITING TYPE: ICD-10-CM

## 2023-11-20 DIAGNOSIS — R13.10 DYSPHAGIA, UNSPECIFIED TYPE: ICD-10-CM

## 2023-11-20 DIAGNOSIS — R19.7 DIARRHEA, UNSPECIFIED TYPE: Primary | ICD-10-CM

## 2023-11-20 DIAGNOSIS — R10.9 ABDOMINAL PAIN, UNSPECIFIED ABDOMINAL LOCATION: ICD-10-CM

## 2023-11-20 DIAGNOSIS — J32.4 CHRONIC PANSINUSITIS: ICD-10-CM

## 2023-11-20 RX ORDER — CLONAZEPAM 1 MG/1
1 TABLET ORAL DAILY PRN
Qty: 90 TABLET | Refills: 1 | Status: SHIPPED | OUTPATIENT
Start: 2023-11-20

## 2023-11-20 RX ORDER — LORATADINE 10 MG/1
10 TABLET ORAL DAILY
Qty: 90 TABLET | Refills: 1 | Status: SHIPPED | OUTPATIENT
Start: 2023-11-20

## 2023-11-20 NOTE — TELEPHONE ENCOUNTER
Spoke to patient to schedule procedure(s) Ileoscopy through [facility]       Physician to perform procedure(s) Dr. CHRISTINA Singh  Date of Procedure (s) 1/23/24  Arrival Time 7:10 AM  Time of Procedure(s) 8:10 AM   Location of Procedure(s) [place] 4th Floor  Type of Rx Prep sent to patient: Miralax  Instructions provided to patient via [facility]    Patient was informed on the following information and verbalized understanding. Screening questionnaire reviewed with patient and complete. If procedure requires anesthesia, a responsible adult needs to be present to accompany the patient home, patient cannot drive after receiving anesthesia. Appointment details are tentative, especially check-in time. Patient will receive a prep-op call 7 days prior to confirm check-in time for procedure. If applicable the patient should contact their pharmacy to verify Rx for procedure prep is ready for pick-up. Patient was advised to call the scheduling department at [phone] if pharmacy states no Rx is available. Patient was advised to call the endoscopy scheduling department if any questions or concerns arise.      [facility] Scheduling Department

## 2023-11-20 NOTE — TELEPHONE ENCOUNTER
Refill Routing Note   Medication(s) are not appropriate for processing by [facility] for the following reason(s):        Non-participating provider    [facility] action(s):  Route        Medication Therapy Plan: E-Prescribing Status: Receipt confirmed by pharmacy (11/10/2023  8:15 AM CST)      Appointments  past 12m or future 3m with PCP    Date Provider   Last Visit   5/3/2023 Janet Guardado NP   Next Visit   Visit date not found Janet Guardado NP   ED visits in past 90 days: 1        Note composed:2:59 PM 11/20/2023

## 2023-12-01 DIAGNOSIS — B37.2 CANDIDAL INTERTRIGO: ICD-10-CM

## 2023-12-01 DIAGNOSIS — G89.29 INSOMNIA SECONDARY TO CHRONIC PAIN: ICD-10-CM

## 2023-12-01 DIAGNOSIS — G47.01 INSOMNIA SECONDARY TO CHRONIC PAIN: ICD-10-CM

## 2023-12-02 NOTE — TELEPHONE ENCOUNTER
No care due was identified.  [facility] Embedded Care Due Messages. Reference number: [id number].   12/01/2023 8:59:34 PM CST

## 2023-12-03 RX ORDER — AMITRIPTYLINE HYDROCHLORIDE 10 MG/1
10-20 TABLET, FILM COATED ORAL NIGHTLY PRN
Qty: 60 TABLET | Refills: 5 | Status: SHIPPED | OUTPATIENT
Start: 2023-12-03

## 2023-12-04 RX ORDER — NYSTATIN 100000 [USP'U]/G
POWDER TOPICAL 2 TIMES DAILY
Qty: 60 G | Refills: 3 | Status: SHIPPED | OUTPATIENT
Start: 2023-12-04

## 2023-12-11 ENCOUNTER — OFFICE VISIT (OUTPATIENT)
Dept: PRIMARY CARE CLINIC | Facility: CLINIC | Age: 58
End: 2023-12-11
Payer: MEDICARE

## 2023-12-11 VITALS
SYSTOLIC BLOOD PRESSURE: 104 MMHG | OXYGEN SATURATION: 96 % | TEMPERATURE: 98 F | HEIGHT: 65 IN | WEIGHT: 232.69 LBS | DIASTOLIC BLOOD PRESSURE: 60 MMHG | RESPIRATION RATE: 18 BRPM | HEART RATE: 68 BPM | BODY MASS INDEX: 38.77 KG/M2

## 2023-12-11 DIAGNOSIS — Z12.31 ENCOUNTER FOR SCREENING MAMMOGRAM FOR BREAST CANCER: ICD-10-CM

## 2023-12-11 DIAGNOSIS — Z12.2 SCREENING FOR LUNG CANCER: ICD-10-CM

## 2023-12-11 DIAGNOSIS — E55.9 VITAMIN D DEFICIENCY: ICD-10-CM

## 2023-12-11 DIAGNOSIS — F41.9 ANXIETY: Primary | ICD-10-CM

## 2023-12-11 DIAGNOSIS — Z87.891 FORMER SMOKER: ICD-10-CM

## 2023-12-11 PROCEDURE — 3074F PR MOST RECENT SYSTOLIC BLOOD PRESSURE < 130 MM HG: ICD-10-PCS | Mod: CPTII,S$GLB,, | Performed by: FAMILY MEDICINE

## 2023-12-11 PROCEDURE — 99214 PR OFFICE/OUTPT VISIT, EST, LEVL IV, 30-39 MIN: ICD-10-PCS | Mod: S$GLB,,, | Performed by: FAMILY MEDICINE

## 2023-12-11 PROCEDURE — 3074F SYST BP LT 130 MM HG: CPT | Mod: CPTII,S$GLB,, | Performed by: FAMILY MEDICINE

## 2023-12-11 PROCEDURE — 99214 OFFICE O/P EST MOD 30 MIN: CPT | Mod: S$GLB,,, | Performed by: FAMILY MEDICINE

## 2023-12-11 PROCEDURE — 3008F BODY MASS INDEX DOCD: CPT | Mod: CPTII,S$GLB,, | Performed by: FAMILY MEDICINE

## 2023-12-11 PROCEDURE — 99999 PR PBB SHADOW E&M-EST. PATIENT-LVL IV: ICD-10-PCS | Mod: PBBFAC,,, | Performed by: FAMILY MEDICINE

## 2023-12-11 PROCEDURE — 99999 PR PBB SHADOW E&M-EST. PATIENT-LVL IV: CPT | Mod: PBBFAC,,, | Performed by: FAMILY MEDICINE

## 2023-12-11 PROCEDURE — 3008F PR BODY MASS INDEX (BMI) DOCUMENTED: ICD-10-PCS | Mod: CPTII,S$GLB,, | Performed by: FAMILY MEDICINE

## 2023-12-11 PROCEDURE — 1159F MED LIST DOCD IN RCRD: CPT | Mod: CPTII,S$GLB,, | Performed by: FAMILY MEDICINE

## 2023-12-11 PROCEDURE — 3078F PR MOST RECENT DIASTOLIC BLOOD PRESSURE < 80 MM HG: ICD-10-PCS | Mod: CPTII,S$GLB,, | Performed by: FAMILY MEDICINE

## 2023-12-11 PROCEDURE — 3044F PR MOST RECENT HEMOGLOBIN A1C LEVEL <7.0%: ICD-10-PCS | Mod: CPTII,S$GLB,, | Performed by: FAMILY MEDICINE

## 2023-12-11 PROCEDURE — 3078F DIAST BP <80 MM HG: CPT | Mod: CPTII,S$GLB,, | Performed by: FAMILY MEDICINE

## 2023-12-11 PROCEDURE — 1160F RVW MEDS BY RX/DR IN RCRD: CPT | Mod: CPTII,S$GLB,, | Performed by: FAMILY MEDICINE

## 2023-12-11 PROCEDURE — 3044F HG A1C LEVEL LT 7.0%: CPT | Mod: CPTII,S$GLB,, | Performed by: FAMILY MEDICINE

## 2023-12-11 PROCEDURE — 1159F PR MEDICATION LIST DOCUMENTED IN MEDICAL RECORD: ICD-10-PCS | Mod: CPTII,S$GLB,, | Performed by: FAMILY MEDICINE

## 2023-12-11 PROCEDURE — 1160F PR REVIEW ALL MEDS BY PRESCRIBER/CLIN PHARMACIST DOCUMENTED: ICD-10-PCS | Mod: CPTII,S$GLB,, | Performed by: FAMILY MEDICINE

## 2023-12-11 RX ORDER — ERGOCALCIFEROL 1.25 MG/1
50000 CAPSULE ORAL
Qty: 12 CAPSULE | Refills: 3 | Status: SHIPPED | OUTPATIENT
Start: 2023-12-11

## 2023-12-11 RX ORDER — SERTRALINE HYDROCHLORIDE 100 MG/1
200 TABLET, FILM COATED ORAL DAILY
Qty: 180 TABLET | Refills: 3 | Status: SHIPPED | OUTPATIENT
Start: 2023-12-11

## 2023-12-11 NOTE — PROGRESS NOTES
"Subjective:       Patient ID: Ida Orozco is a 58 y.o. female.    Chief Complaint: Follow-up (6 month follow up/reg check up) and Medication Refill    Anxiety - stable on current regimen, needs refill Zoloft  Vit D def - slightly better on most recent labs    Follow-up  Associated symptoms include congestion. Pertinent negatives include no arthralgias, chest pain, headaches, joint swelling, neck pain, vomiting or weakness.   Medication Refill  Associated symptoms include congestion. Pertinent negatives include no arthralgias, chest pain, headaches, joint swelling, neck pain, vomiting or weakness.     Review of Systems   Constitutional:  Negative for activity change and unexpected weight change.   HENT:  Positive for congestion. Negative for hearing loss, rhinorrhea and trouble swallowing.    Eyes:  Negative for discharge and visual disturbance.   Respiratory:  Negative for chest tightness and wheezing.    Cardiovascular:  Negative for chest pain and palpitations.   Gastrointestinal:  Negative for blood in stool, constipation, diarrhea and vomiting.   Endocrine: Negative for polydipsia and polyuria.   Genitourinary:  Negative for difficulty urinating, dysuria, hematuria and menstrual problem.   Musculoskeletal:  Negative for arthralgias, joint swelling and neck pain.   Neurological:  Negative for weakness and headaches.   Psychiatric/Behavioral:  Negative for confusion and dysphoric mood.        Objective:      Vitals:    12/11/23 0934   BP: 104/60   BP Location: Right arm   Patient Position: Sitting   BP Method: Medium (Manual)   Pulse: 68   Resp: 18   Temp: 97.5 °F (36.4 °C)   TempSrc: Temporal   SpO2: 96%   Weight: 105.6 kg (232 lb 11.1 oz)   Height: 5' 4.5" (1.638 m)     BP Readings from Last 5 Encounters:   12/11/23 104/60   11/17/23 (!) 108/54   11/14/23 112/73   10/19/23 116/65   10/02/23 124/76     Wt Readings from Last 5 Encounters:   12/11/23 105.6 kg (232 lb 11.1 oz)   11/17/23 101.2 kg (223 lb) "   11/14/23 102.4 kg (225 lb 12 oz)   10/19/23 105.5 kg (232 lb 9.4 oz)   10/11/23 100.3 kg (221 lb 1.9 oz)     Physical Exam  Vitals and nursing note reviewed.   Constitutional:       General: She is not in acute distress.     Appearance: Normal appearance. She is well-developed.   HENT:      Head: Normocephalic and atraumatic.   Cardiovascular:      Rate and Rhythm: Normal rate and regular rhythm.      Heart sounds: Normal heart sounds.   Pulmonary:      Effort: Pulmonary effort is normal.      Breath sounds: Normal breath sounds.   Musculoskeletal:      Right lower leg: No edema.      Left lower leg: No edema.   Skin:     General: Skin is warm and dry.   Neurological:      Mental Status: She is alert and oriented to person, place, and time.   Psychiatric:         Mood and Affect: Mood normal.         Behavior: Behavior normal.         Lab Results   Component Value Date    WBC 6.63 08/06/2023    HGB 13.1 08/06/2023    HCT 42.9 08/06/2023     08/06/2023    CHOL 203 (H) 05/03/2023    TRIG 95 05/03/2023    HDL 89 (H) 05/03/2023    ALT 20 08/06/2023    AST 24 08/06/2023     08/06/2023    K 4.1 08/06/2023     (H) 08/06/2023    CREATININE 0.8 11/15/2023    BUN 11 08/06/2023    CO2 20 (L) 08/06/2023    TSH 2.100 11/11/2022    INR 1.0 03/29/2022    HGBA1C 5.3 05/03/2023      Assessment:       1. Anxiety    2. Encounter for screening mammogram for breast cancer    3. Vitamin D deficiency    4. Former smoker    5. Screening for lung cancer        Plan:       Anxiety  -     sertraline (ZOLOFT) 100 MG tablet; Take 2 tablets (200 mg total) by mouth once daily.  Dispense: 180 tablet; Refill: 3  Stable on current regimen  Encounter for screening mammogram for breast cancer  Refuses mammogram  Vitamin D deficiency  -     Vitamin D; Future; Expected date: 12/11/2023  -     ergocalciferol (VITAMIN D2) 50,000 unit Cap; Take 1 capsule (50,000 Units total) by mouth every 7 days.  Dispense: 12 capsule; Refill:  3    Former smoker  -     CT Chest Lung Screening Low Dose; Future; Expected date: 12/11/2023    Screening for lung cancer  -     CT Chest Lung Screening Low Dose; Future; Expected date: 12/11/2023      Medication List with Changes/Refills   Current Medications    AMITRIPTYLINE (ELAVIL) 10 MG TABLET    Take 1-2 tablets (10-20 mg total) by mouth nightly as needed for Insomnia.    CLONAZEPAM (KLONOPIN) 1 MG TABLET    Take 1 tablet (1 mg total) by mouth daily as needed for Anxiety.    DIPHENOXYLATE-ATROPINE 2.5-0.025 MG (LOMOTIL) 2.5-0.025 MG PER TABLET    Take 1 tablet by mouth 4 (four) times daily as needed for Diarrhea.    LEVOCETIRIZINE (XYZAL) 5 MG TABLET    Take 1 tablet (5 mg total) by mouth every evening.    LORATADINE (CLARITIN) 10 MG TABLET    Take 1 tablet (10 mg total) by mouth once daily.    MECLIZINE (ANTIVERT) 25 MG TABLET    Take 1 tablet (25 mg total) by mouth 3 (three) times daily as needed for Dizziness or Nausea.    MELATONIN 10 MG TAB    Take 1 tablet by mouth nightly as needed.    NYSTATIN (MYCOSTATIN) POWDER    Apply topically 2 (two) times daily.    PROMETHAZINE (PHENERGAN) 25 MG TABLET    Take 1 tablet (25 mg total) by mouth every 6 (six) hours as needed for Nausea.    TOPIRAMATE (TOPAMAX) 100 MG TABLET    Take 1 tablet (100 mg total) by mouth 2 (two) times daily.    UNABLE TO FIND    Take 1 Dose by mouth daily as needed (as needed for sleep and anxiety). medication name: Delta 8 and Delta 9 hemp gummies   Changed and/or Refilled Medications    Modified Medication Previous Medication    ERGOCALCIFEROL (VITAMIN D2) 50,000 UNIT CAP VITAMIN D2 1,250 mcg (50,000 unit) capsule       Take 1 capsule (50,000 Units total) by mouth every 7 days.    TAKE ONE CAPSULE BY MOUTH ONCE WEEKLY    SERTRALINE (ZOLOFT) 100 MG TABLET sertraline (ZOLOFT) 100 MG tablet       Take 2 tablets (200 mg total) by mouth once daily.    Take 2 tablets (200 mg total) by mouth once daily.   Discontinued Medications     PANTOPRAZOLE (PROTONIX) 40 MG TABLET    Take one tablet by mouth once daily

## 2024-01-09 ENCOUNTER — PROCEDURE VISIT (OUTPATIENT)
Dept: NEUROLOGY | Facility: CLINIC | Age: 59
End: 2024-01-09
Payer: MEDICARE

## 2024-01-09 DIAGNOSIS — G56.03 BILATERAL CARPAL TUNNEL SYNDROME: ICD-10-CM

## 2024-01-09 PROCEDURE — 95911 NRV CNDJ TEST 9-10 STUDIES: CPT | Mod: HCNC,S$GLB,, | Performed by: PHYSICAL MEDICINE & REHABILITATION

## 2024-01-09 PROCEDURE — 95886 MUSC TEST DONE W/N TEST COMP: CPT | Mod: HCNC,S$GLB,, | Performed by: PHYSICAL MEDICINE & REHABILITATION

## 2024-01-09 PROCEDURE — 95885 MUSC TST DONE W/NERV TST LIM: CPT | Mod: 59,HCNC,S$GLB, | Performed by: PHYSICAL MEDICINE & REHABILITATION

## 2024-01-09 NOTE — PROCEDURES
Test Date:  2024    Patient: ida kent : 1965 Physician: Tino Bach D.O.   ID#: [id number] SEX: Female Ref. Phys: Kvng Triana MD     HPI: Ida Orozco is a 58 y.o.female who presents for NCS/EMG to evaluate for CTS bilaterally.      NCV & EMG Findings:  Evaluation of the left Ulnar (ADM) motor nerve showed decreased conduction velocity (Abv Elbow-Bel Elbow) and decreased conduction velocity (Abv Elbow-Bel Elbow).  The left Median-Radial (Dig I) sensory nerve showed abnormal peak latency difference ((Median Wrist-Dig I)-(Radial Wrist-Dig I)).  The right Median-Ulnar Palmar sensory nerve showed abnormal peak latency difference ((Median Palm-Wrist)-(Ulnar Palm-Wrist)).  All remaining nerves (as indicated in the following tables) were within normal limits.  All examined muscles (as indicated in the following table) showed no evidence of electrical instability.    Impression:  There is evidence of a left ulnar mononeuropathy at/near the elbow, such as would be seen in cubital tunnel syndrome.  There is focal demyelination across the elbow.  There is no sensory axonal loss.  There are not neuropathic changes in the ulnar innervated hand muscles.  This is graded as Mild in severity on the Left.    There is electrophysiologic evidence of a bilateral sensory median mononeuropathy across the wrist (I.e. Carpal tunnel syndrome).  There is no motor axonal loss.  There is no active denervation.  This is graded as Mild in severity bilaterally.        ___________________________  Tino Bach D.O.        NCS+  Motor Nerve Results      Latency Amplitude F-Lat Segment Distance CV Comment   Site (ms) Norm (mV) Norm (ms)  (cm) (m/s) Norm    Left Median (APB)   Wrist 3.5  < 4.4 6.6  > 4.2  Wrist-Palm - - -    Elbow 7.4 - 6.5 -  Elbow-Wrist 23 59  > 51    Right Median (APB)   Wrist 3.5  < 4.4 8.5  > 4.2  Wrist-Palm - - -    Elbow 7.4 - 8.3 -  Elbow-Wrist 22 56  > 51    Left Ulnar (ADM)   Wrist 2.4  <  3.7 7.5  > 3.0         Bel Elbow 7.0 - 6.5 -  Bel Elbow-Wrist 26 57  > 52    Abv Elbow 9.5 - 4.9 -  Abv Elbow-Bel Elbow 8 *32  > 43    Left Ulnar (FDI)   Wrist 3.3 - 6.8 -         Bel Elbow 6.9 - 5.0 -  Bel Elbow-Wrist 26 72  > 52    Abv Elbow 9.3 - 3.1 -  Abv Elbow-Bel Elbow 8 *33  > 43    Right Ulnar (ADM)   Wrist 3.1  < 3.7 9.6  > 3.0         Bel Elbow 7.0 - 7.9 -  Bel Elbow-Wrist 26 67  > 52    Abv Elbow 8.4 - 7.1 -  Abv Elbow-Bel Elbow 10 71  > 43      Sensory Nerve Results      Latency (Peak) Amplitude (P-P) Segment Distance CV Comment   Site (ms) Norm (µV) Norm  (cm) (m/s) Norm    Left Median   Wrist-Dig I 2.8 - 46 - Wrist-Dig I 10 36 -    Wrist-Dig II 3.2  < 4.0 56  > 8 Wrist-Dig II 14 44  > 39    Palm-Wrist 2.0 - 70 - Palm-Wrist - - -    Right Median   Wrist-Dig I 2.8 - 44 - Wrist-Dig I 10 36 -    Wrist-Dig II 3.5  < 4.0 39  > 8 Wrist-Dig II 14 40  > 39    Palm-Wrist 2.3 - 22 - Palm-Wrist - - -    Left Ulnar   Wrist-Dig V 2.9  < 4.0 15  > 4 Wrist-Dig V 14 48  > 38    Palm-Wrist 1.85 - 19 - Palm-Wrist - - -    Right Ulnar   Palm-Wrist 1.80 - 7 - Palm-Wrist - - -    Left Radial   Wrist-Dig I 2.3 - 10 - Wrist-Dig I 10 43 -    Right Radial   Wrist-Dig I 2.5 - 6 - Wrist-Dig I 10 40 -      Inter-Nerve Comparisons     Nerve 1 Value 1 Nerve 2 Value 2 Parameter Result Normal   Sensory Sites   R Median Wrist-Dig I 2.8 ms R Radial Wrist-Dig I 2.5 ms Peak Lat Diff 0.30 ms <0.40   L Median Wrist-Dig I 2.8 ms L Radial Wrist-Dig I 2.3 ms Peak Lat Diff *0.50 ms <0.40   R Median Palm-Wrist 2.3 ms R Ulnar Palm-Wrist 1.8 ms Peak Lat Diff *0.50 ms <0.30   L Median Palm-Wrist 2.0 ms L Ulnar Palm-Wrist 1.9 ms Peak Lat Diff 0.15 ms <0.30     EMG+     Side Muscle Nerve Root Ins Act Fibs Psw Amp Dur Poly Recrt Int Pat Comment   Left Deltoid Axillary C5-C6 Nml Nml Nml Nml Nml 0 Nml Nml    Left Triceps Radial C6-C8 Nml Nml Nml Nml Nml 0 Nml Nml    Left FDI Ulnar C8-T1 Nml Nml Nml Nml Nml 0 Nml Nml    Left APB Median C8-T1 Nml Nml Nml  Nml Nml 0 Nml Nml    Left ADM Ulnar C8-T1 Nml Nml Nml Nml Nml 0 Nml Nml    Right APB Median C8-T1 Nml Nml Nml Nml Nml 0 Nml Nml            Waveforms:    Motor              Sensory

## 2024-01-10 ENCOUNTER — OFFICE VISIT (OUTPATIENT)
Dept: ORTHOPEDICS | Facility: CLINIC | Age: 59
End: 2024-01-10
Payer: MEDICARE

## 2024-01-10 DIAGNOSIS — G56.22 CUBITAL TUNNEL SYNDROME ON LEFT: ICD-10-CM

## 2024-01-10 DIAGNOSIS — G56.01 CARPAL TUNNEL SYNDROME OF RIGHT WRIST: ICD-10-CM

## 2024-01-10 DIAGNOSIS — G56.02 CARPAL TUNNEL SYNDROME OF LEFT WRIST: Primary | ICD-10-CM

## 2024-01-10 PROCEDURE — 1160F RVW MEDS BY RX/DR IN RCRD: CPT | Mod: HCNC,CPTII,S$GLB, | Performed by: ORTHOPAEDIC SURGERY

## 2024-01-10 PROCEDURE — 1159F MED LIST DOCD IN RCRD: CPT | Mod: HCNC,CPTII,S$GLB, | Performed by: ORTHOPAEDIC SURGERY

## 2024-01-10 PROCEDURE — 99999 PR PBB SHADOW E&M-EST. PATIENT-LVL IV: CPT | Mod: PBBFAC,HCNC,, | Performed by: ORTHOPAEDIC SURGERY

## 2024-01-10 PROCEDURE — 99214 OFFICE O/P EST MOD 30 MIN: CPT | Mod: HCNC,S$GLB,, | Performed by: ORTHOPAEDIC SURGERY

## 2024-01-10 NOTE — PROGRESS NOTES
Ida Orozco presents for follow up evaluation of   Encounter Diagnoses   Name Primary?    Cubital tunnel syndrome on left Yes    Carpal tunnel syndrome of left wrist     Carpal tunnel syndrome of right wrist    The patient has had an EMG/NCS which we reviewed together today and it showed:  Impression:  There is evidence of a left ulnar mononeuropathy at/near the elbow, such as would be seen in cubital tunnel syndrome.  There is focal demyelination across the elbow.  There is no sensory axonal loss.  There are not neuropathic changes in the ulnar innervated hand muscles.  This is graded as Mild in severity on the Left.    There is electrophysiologic evidence of a bilateral sensory median mononeuropathy across the wrist (I.e. Carpal tunnel syndrome).  There is no motor axonal loss.  There is no active denervation.  This is graded as Mild in severity bilaterally.      She has been trying to wear the carpal tunnel braces at night, although her hands continue to go numb in the evening. She also notes that the numbness in her left small and ring finger has worsened since we last saw her 10.11.23. She has not tried injections.    PE:    AA&O x 4.  NAD  HEENT:  NCAT, sclera nonicteric  Lungs:  Respirations are equal and unlabored.  CV:  2+ bilateral upper and lower extremity pulses.  MSK:  Neurovascularly intact bilaterally.  5/5 thenar and intrinsic musculature strength.  Full range of motion hands, wrists and elbows.    A/P: Bilateral carpal tunnel syndrome, ulnar nerve compression    We have discussed conservative vs. surgical treatment as well as risks, benefits and alternatives for carpal tunnel syndrome, ulnar nerve compression.  She has exhausted conservative measures and would like to proceed with surgery. Surgery would include Left carpal tunnel release, left ulnar nerve decompression at elbow / right carpal tunnel steroid injection.     We have discussed risks of hand surgery which include but are not limited  to blood clots in the legs that can travel to the lungs (pulmonary embolism). Pulmonary embolism can cause shortness of breath, chest pain, and even shock. Other risks include urinary tract infection, nausea and vomiting (usually related to pain medication), chronic pain, bleeding, nerve damage, blood vessel injury, scarring and infection of the hand which can require re-operation. Furthermore, the risks of anesthesia include potential heart, lung, kidney, and liver damage.  Informed consent was obtained.  she understands and would like to proceed with surgery in the near future.    Call with any questions/concerns in the interim        Etelvina Nicholas MD    Please be aware that this note has been generated with the assistance of marty voice-to-text.  Please excuse any spelling or grammatical errors.

## 2024-01-17 ENCOUNTER — TELEPHONE (OUTPATIENT)
Dept: ENDOSCOPY | Facility: HOSPITAL | Age: 59
End: 2024-01-17
Payer: MEDICARE

## 2024-01-17 ENCOUNTER — PATIENT MESSAGE (OUTPATIENT)
Dept: ENDOSCOPY | Facility: HOSPITAL | Age: 59
End: 2024-01-17
Payer: MEDICARE

## 2024-01-23 ENCOUNTER — ANESTHESIA EVENT (OUTPATIENT)
Dept: ENDOSCOPY | Facility: HOSPITAL | Age: 59
End: 2024-01-23
Payer: MEDICARE

## 2024-01-23 ENCOUNTER — HOSPITAL ENCOUNTER (OUTPATIENT)
Facility: HOSPITAL | Age: 59
Discharge: HOME | End: 2024-01-23
Attending: INTERNAL MEDICINE | Admitting: INTERNAL MEDICINE
Payer: MEDICARE

## 2024-01-23 ENCOUNTER — ANESTHESIA (OUTPATIENT)
Dept: ENDOSCOPY | Facility: HOSPITAL | Age: 59
End: 2024-01-23
Payer: MEDICARE

## 2024-01-23 VITALS
WEIGHT: 212 LBS | DIASTOLIC BLOOD PRESSURE: 62 MMHG | HEART RATE: 70 BPM | OXYGEN SATURATION: 97 % | SYSTOLIC BLOOD PRESSURE: 104 MMHG | TEMPERATURE: 98 F | HEIGHT: 64 IN | RESPIRATION RATE: 16 BRPM | BODY MASS INDEX: 36.19 KG/M2

## 2024-01-23 DIAGNOSIS — R19.7 DIARRHEA: ICD-10-CM

## 2024-01-23 PROCEDURE — 37000008 HC ANESTHESIA 1ST 15 MINUTES: Mod: HCNC | Performed by: INTERNAL MEDICINE

## 2024-01-23 PROCEDURE — 27201012 HC FORCEPS, HOT/COLD, DISP: Mod: HCNC | Performed by: INTERNAL MEDICINE

## 2024-01-23 PROCEDURE — 44382 SMALL BOWEL ENDOSCOPY: CPT | Mod: HCNC | Performed by: INTERNAL MEDICINE

## 2024-01-23 PROCEDURE — 63600175 PHARM REV CODE 636 W HCPCS: Mod: HCNC | Performed by: NURSE ANESTHETIST, CERTIFIED REGISTERED

## 2024-01-23 PROCEDURE — 44382 SMALL BOWEL ENDOSCOPY: CPT | Mod: HCNC,,, | Performed by: INTERNAL MEDICINE

## 2024-01-23 PROCEDURE — 25000003 PHARM REV CODE 250: Mod: HCNC | Performed by: NURSE ANESTHETIST, CERTIFIED REGISTERED

## 2024-01-23 PROCEDURE — 88305 TISSUE EXAM BY PATHOLOGIST: CPT | Mod: 59,HCNC | Performed by: PATHOLOGY

## 2024-01-23 PROCEDURE — 43239 EGD BIOPSY SINGLE/MULTIPLE: CPT | Mod: 51,HCNC,, | Performed by: INTERNAL MEDICINE

## 2024-01-23 PROCEDURE — E9220 PRA ENDO ANESTHESIA: HCPCS | Mod: HCNC,,, | Performed by: NURSE ANESTHETIST, CERTIFIED REGISTERED

## 2024-01-23 PROCEDURE — 37000009 HC ANESTHESIA EA ADD 15 MINS: Mod: HCNC | Performed by: INTERNAL MEDICINE

## 2024-01-23 PROCEDURE — 43239 EGD BIOPSY SINGLE/MULTIPLE: CPT | Mod: HCNC | Performed by: INTERNAL MEDICINE

## 2024-01-23 PROCEDURE — 88312 SPECIAL STAINS GROUP 1: CPT | Mod: 26,HCNC,, | Performed by: PATHOLOGY

## 2024-01-23 PROCEDURE — 25000003 PHARM REV CODE 250: Mod: HCNC | Performed by: INTERNAL MEDICINE

## 2024-01-23 PROCEDURE — 88305 TISSUE EXAM BY PATHOLOGIST: CPT | Mod: 26,HCNC,, | Performed by: PATHOLOGY

## 2024-01-23 PROCEDURE — 88312 SPECIAL STAINS GROUP 1: CPT | Mod: HCNC | Performed by: PATHOLOGY

## 2024-01-23 RX ORDER — LIDOCAINE HYDROCHLORIDE 20 MG/ML
INJECTION INTRAVENOUS
Status: DISCONTINUED | OUTPATIENT
Start: 2024-01-23 | End: 2024-01-23

## 2024-01-23 RX ORDER — PROPOFOL 10 MG/ML
VIAL (ML) INTRAVENOUS
Status: DISCONTINUED | OUTPATIENT
Start: 2024-01-23 | End: 2024-01-23

## 2024-01-23 RX ORDER — SODIUM CHLORIDE 9 MG/ML
INJECTION, SOLUTION INTRAVENOUS CONTINUOUS
Status: DISCONTINUED | OUTPATIENT
Start: 2024-01-23 | End: 2024-01-23 | Stop reason: HOSPADM

## 2024-01-23 RX ORDER — PROPOFOL 10 MG/ML
INJECTION, EMULSION INTRAVENOUS CONTINUOUS PRN
Status: DISCONTINUED | OUTPATIENT
Start: 2024-01-23 | End: 2024-01-23

## 2024-01-23 RX ADMIN — Medication 100 MG: at 07:01

## 2024-01-23 RX ADMIN — Medication 40 MG: at 07:01

## 2024-01-23 RX ADMIN — GLYCOPYRROLATE 0.2 MG: 0.2 INJECTION, SOLUTION INTRAMUSCULAR; INTRAVENOUS at 07:01

## 2024-01-23 RX ADMIN — PROPOFOL 200 MCG/KG/MIN: 10 INJECTION, EMULSION INTRAVENOUS at 07:01

## 2024-01-23 RX ADMIN — SODIUM CHLORIDE: 0.9 INJECTION, SOLUTION INTRAVENOUS at 07:01

## 2024-01-23 RX ADMIN — LIDOCAINE HYDROCHLORIDE 100 MG: 20 INJECTION INTRAVENOUS at 07:01

## 2024-01-23 NOTE — TRANSFER OF CARE
Anesthesia Transfer of Care Note    Patient: Ida Orozco    Procedure(s) Performed: Procedure(s) (LRB):  ILEOSCOPY (N/A)  EGD (ESOPHAGOGASTRODUODENOSCOPY) (N/A)    Patient location: GI    Anesthesia Type: general    Transport from OR: Transported from OR on room air with adequate spontaneous ventilation    Post pain: adequate analgesia    Post assessment: no apparent anesthetic complications    Post vital signs: stable    Level of consciousness: responds to stimulation and awake    Nausea/Vomiting: no nausea/vomiting    Complications: none    Transfer of care protocol was followed      Last vitals: Visit Vitals  BP 99/62   Pulse 71   Temp 98.1   Resp 18   Ht    Wt    SpO2 99%   Breastfeeding    BMI

## 2024-01-23 NOTE — PROVATION PATIENT INSTRUCTIONS
Discharge Summary/Instructions after an Endoscopic Procedure  Patient Name: Ida Orozco  Patient MRN: [id number]  Patient YOB: 1965 Tuesday, January 23, 2024  Anatoly Singh MD  Dear patient,  As a result of recent federal legislation (The Federal Cures Act), you may   receive lab or pathology results from your procedure in your [facility]   account before your physician is able to contact you. Your physician or   their representative will relay the results to you with their   recommendations at their soonest availability.  Thank you,  RESTRICTIONS:  During your procedure today, you received medications for sedation.  These   medications may affect your judgment, balance and coordination.  Therefore,   for 24 hours, you have the following restrictions:   - DO NOT drive a car, operate machinery, make legal/financial decisions,   sign important papers or drink alcohol.    ACTIVITY:  Today: no heavy lifting, straining or running due to procedural   sedation/anesthesia.  The following day: return to full activity including work.  DIET:  Eat and drink normally unless instructed otherwise.     TREATMENT FOR COMMON SIDE EFFECTS:  - Mild abdominal pain, nausea, belching, bloating or excessive gas:  rest,   eat lightly and use a heating pad.  - Sore Throat: treat with throat lozenges and/or gargle with warm salt   water.  - Because air was used during the procedure, expelling large amounts of air   from your rectum or belching is normal.  - If a bowel prep was taken, you may not have a bowel movement for 1-3 days.    This is normal.  SYMPTOMS TO WATCH FOR AND REPORT TO YOUR PHYSICIAN:  1. Abdominal pain or bloating, other than gas cramps.  2. Chest pain.  3. Back pain.  4. Signs of infection such as: chills or fever occurring within 24 hours   after the procedure.  5. Rectal bleeding, which would show as bright red, maroon, or black stools.   (A tablespoon of blood from the rectum is not serious, especially  if   hemorrhoids are present.)  6. Vomiting.  7. Weakness or dizziness.  GO DIRECTLY TO THE NEAREST EMERGENCY ROOM IF YOU HAVE ANY OF THE FOLLOWING:      Difficulty breathing              Chills and/or fever over 101 F   Persistent vomiting and/or vomiting blood   Severe abdominal pain   Severe chest pain   Black, tarry stools   Bleeding- more than one tablespoon   Any other symptom or condition that you feel may need urgent attention  Your doctor recommends these additional instructions:  If any biopsies were taken, your doctors clinic will contact you in 1 to 2   weeks with any results.  - Discharge patient to home.   - Resume previous diet today.   - Continue present medications.   - Await pathology results.   - Return to primary care physician as previously scheduled.   - If biopsies are unrevealing will have her follow up in general GI clinic   for further evaluation of symptoms.  For questions, problems or results please call your physician - Anatoly Singh MD at Work:  [phone].  [facility] [place], EMERGENCY ROOM PHONE NUMBER: [phone]  IF A COMPLICATION OR EMERGENCY SITUATION ARISES AND YOU ARE UNABLE TO REACH   YOUR PHYSICIAN - GO DIRECTLY TO THE EMERGENCY ROOM.  Anatoly Singh MD  1/23/2024 7:59:20 AM  This report has been verified and signed electronically.  Dear patient,  As a result of recent federal legislation (The Federal Cures Act), you may   receive lab or pathology results from your procedure in your [facility]   account before your physician is able to contact you. Your physician or   their representative will relay the results to you with their   recommendations at their soonest availability.  Thank you,  PROVATION

## 2024-01-23 NOTE — PLAN OF CARE
Written and verbal discharge instructions reviewed with patient. Patient verbalizes understanding.  All questions and concerns addressed.

## 2024-01-23 NOTE — H&P
Short Stay Endoscopy History and Physical    PCP - Eliecer Jones MD    Procedure - EGD and ileoscopy  ASA - 2  Mallampati - per anesthesia  History of Anesthesia problems - no  Family history Anesthesia problems -  no     HPI:  This is a 58 y.o. female here for evaluation of :     EGD  Reflux - no  Dysphagia - yes  Abdominal pain - no  Diarrhea - no  Anemia - no  GI bleeding - no  Other - nausea    ileoscopy  Screening - no  History of polyps - no  Diarrhea - yes  Anemia - no  Blood in stools - no  Abdominal pain - yes  Other - no    ROS:  CONSTITUTIONAL: Denies weight change,  fatigue, fevers, chills, night sweats.  CARDIOVASCULAR: Denies chest pain, shortness of breath, orthopnea and edema.  RESPIRATORY: Denies cough, hemoptysis, dyspnea, and wheezing.  GI: See HPI.    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, with liver fibrosis, on ursodiol 2023    PTSD (post-traumatic stress disorder)     Seasonal allergies     Symptomatic abdominal panniculus     Ulcerative colitis        Surgical History:   Past Surgical History:   Procedure Laterality Date    APPENDECTOMY      CATHETERIZATION OF BOTH LEFT AND RIGHT HEART Right 2019    Procedure: CATHETERIZATION, HEART, BOTH LEFT AND RIGHT;  Surgeon: Beto Malin MD;  [place] [facility] [place];  Service: Cardiology;  Laterality: Right;     SECTION, LOW TRANSVERSE      CHOLECYSTECTOMY      open    COLECTOMY      total- 2013    CYSTOSCOPY W/ RETROGRADES N/A 2018    Procedure: CYSTOSCOPY, WITH RETROGRADE PYELOGRAM;  Surgeon: Butch Banks MD;  [place] [facility] [place];  Service: Urology;  Laterality: N/A;  HANSEN  SURGICAL CONFIRMED 8/17/DME    ENDOSCOPIC ULTRASOUND OF UPPER GASTROINTESTINAL TRACT N/A 11/10/2021    Procedure: ULTRASOUND, UPPER GI TRACT, ENDOSCOPIC;  Surgeon: Nhan Dee MD;  Location: [facility] ENDO (2ND FLR);  Service: Endoscopy;  Laterality: N/A;  MD Milena Whitehead PA-C; Quinn Whitman MD; Miladys Velasquez MA  Caller: Unspecified (3 days ago,  1:20 PM)  Fair enough. Neha, please schedule.       ----- Message -----   From: Milena Murillo PA-C   Sent: 8/11/2021  1    ESOPHAGOGASTRODUODENOSCOPY  04/11/2018    Dr. Castorena: LA Grade A reflux esophagitis, hiatal hernia; Ectopic gastric mucosa in the upper third of the esophagus; gastric sleeve intact with unremarkable findings, gastritis; biopsy: stomach- Mild chronic antral and oxyntic gastritis, no activity, negative for h pylori    ESOPHAGOGASTRODUODENOSCOPY N/A 11/10/2021    Procedure: EGD (ESOPHAGOGASTRODUODENOSCOPY);  Surgeon: Nhan Dee MD;  Location: [facility] ENDO (2ND FLR);  Service: Endoscopy;  Laterality: N/A;    FESS, WITH IMAGING GUIDANCE Bilateral 4/4/2023    Procedure: Pan Sinus FESS, WITH IMAGING GUIDANCE Disc loaded;  Surgeon: Jay Hernandez MD;  Location: [facility] OR;  Service: ENT;  Laterality: Bilateral;  balloon dilation of sinuses    FLEXIBLE SIGMOIDOSCOPY  5/24/2018    Procedure: SIGMOIDOSCOPY, FLEXIBLE;  Surgeon: JENNY Virgen MD;  Location: [facility] OR 2ND FLR;  Service: Colon and Rectal;;    FLEXIBLE SIGMOIDOSCOPY  04/11/2018    Dr. Castorena: Non-patent surgical anastomosis, characterized by friable mucosa.    GASTRIC SLEEVE       HYSTERECTOMY      ILEOSCOPY  04/11/2018    Dr. Castorena: Patient is status-post total colectomy with end ileostomy. unremarkable findings    ILEOSTOMY REVISION      april 2014; august 2014    LAPAROSCOPIC PROCTECTOMY N/A 5/24/2018    Procedure: PROCTECTOMY-LAPAROSCOPIC/CONVERTED TO OPEN;  Surgeon: JENNY Virgen MD;  Location: [facility] OR 2ND FLR;  Service: Colon and Rectal;  Laterality:  N/A;    LYSIS OF ADHESIONS  5/24/2018    Procedure: LYSIS, ADHESIONS/ more than 2hours;  Surgeon: JENNY Virgen MD;  [facility] OR 2ND FLR;  Service: Colon and Rectal;;    OOPHORECTOMY      PANNICULECTOMY Bilateral 12/19/2019    Procedure: PANNICULECTOMY;  Surgeon: Andrea Alvarado MD;  Location: [facility] OR 2ND FLR;  Service: Plastics;  Laterality: Bilateral;    REVISION COLOSTOMY N/A 12/19/2019    Procedure: REVISION, COLOSTOMY;  Surgeon: JENNY Virgen MD;  Location: [facility] OR 2ND FLR;  Service: Colon and Rectal;  Laterality: N/A;    thumb surgery      TONSILLECTOMY, ADENOIDECTOMY      WISDOM TOOTH EXTRACTION         Family History:   Family History   Problem Relation Age of Onset    Cancer Mother         skin    Cirrhosis Mother     Heart disease Father 67    Cancer Paternal Grandfather         ?    Cancer Maternal Grandfather         colon     Colon cancer Maternal Grandfather     Colon cancer Maternal Uncle     Colon cancer Maternal Uncle     Crohn's disease Neg Hx     Ulcerative colitis Neg Hx     Stomach cancer Neg Hx     Esophageal cancer Neg Hx     Celiac disease Neg Hx     Breast cancer Neg Hx     Ovarian cancer Neg Hx        Social History:   Social History     Tobacco Use    Smoking status: Former     Current packs/day: 0.00     Average packs/day: 1 pack/day for 25.0 years (25.0 ttl pk-yrs)     Types: Cigarettes     Start date: 1984     Quit date: 2009     Years since quitting: 15.0    Smokeless tobacco: Never   Substance Use Topics    Alcohol use: Yes     Comment: rarely    Drug use: No       Allergies: Reviewed    Medications:   No current facility-administered medications on file prior to encounter.     Current Outpatient Medications on File Prior to Encounter   Medication Sig Dispense Refill    clonazePAM (KLONOPIN) 1 MG tablet Take 1 tablet (1 mg total) by mouth daily as needed for Anxiety. 90 tablet 1    diphenoxylate-atropine 2.5-0.025 mg (LOMOTIL) 2.5-0.025 mg per tablet Take 1 tablet by  mouth 4 (four) times daily as needed for Diarrhea. 30 tablet 2    levocetirizine (XYZAL) 5 MG tablet Take 1 tablet (5 mg total) by mouth every evening. 90 tablet 1    loratadine (CLARITIN) 10 mg tablet Take 1 tablet (10 mg total) by mouth once daily. 90 tablet 1    meclizine (ANTIVERT) 25 mg tablet Take 1 tablet (25 mg total) by mouth 3 (three) times daily as needed for Dizziness or Nausea. 60 tablet 2    melatonin 10 mg Tab Take 1 tablet by mouth nightly as needed.      promethazine (PHENERGAN) 25 MG tablet Take 1 tablet (25 mg total) by mouth every 6 (six) hours as needed for Nausea. 15 tablet 2    topiramate (TOPAMAX) 100 MG tablet Take 1 tablet (100 mg total) by mouth 2 (two) times daily. 180 tablet 3    UNABLE TO FIND Take 1 Dose by mouth daily as needed (as needed for sleep and anxiety). medication name: Delta 8 and Delta 9 hemp gummies      [DISCONTINUED] adalimumab (HUMIRA,CF, PEN) 40 mg/0.4 mL PnKt Inject 0.4 mLs (40 mg total) into the skin every 14 (fourteen) days. 6 pen 2       Physical Exam:  Vital Signs:   Vitals:    01/23/24 0716   BP: (!) 107/55   Pulse: 64   Resp: 18   Temp: 97.9 °F (36.6 °C)     General Appearance: Well appearing in no acute distress  ENT: OP clear  Chest: CTA B  CV: RRR, no m/r/g  Abd: s/nt/nd/nabs  Ext: no edema    Labs:Reviewed    Plan:   I have explained the risks and benefits of upper endoscopy and  ileoscopy to the patient including but not limited to bleeding, perforation, infection, and death. The patient wishes to proceed.

## 2024-01-23 NOTE — ANESTHESIA PREPROCEDURE EVALUATION
01/23/2024  Ida Orozco is a 58 y.o., female.    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    Chronic pain of right knee    Plica of knee    Dietary folate deficiency anemia    Vitamin B12 deficiency    Hyperlipidemia    Vitamin D deficiency    Moderate persistent asthma without complication    DDD (degenerative disc disease), lumbar    Former smoker    Bariatric surgery status-Gastric sleeve     Redundant skin of abdomen    Inflammatory bowel disease    Vaginal atrophy    Urinary incontinence, mixed    Dietary iron deficiency    Candidal intertrigo    Sleep arousal disorder    Ileostomy in place    Mild episode of recurrent major depressive disorder    Diarrhea    Sinus bradycardia    Vasovagal syndrome    Chronic pansinusitis    Fatty liver    Age-related osteoporosis without current pathological fracture    Vertigo    Nasal polyposis    History of endoscopic sinus surgery    BMI 38.0-38.9,adult    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, with liver fibrosis, on ursodiol    Insomnia secondary to chronic pain    Trochanteric bursitis, right hip     Review of patient's allergies indicates:   Allergen Reactions    Adhesive      Cause blisters    Dilaudid [hydromorphone] Nausea  And Vomiting    Humira [adalimumab] Hives    Sulfa (sulfonamide antibiotics) Hives    Surgical stainless steel      Had surgical staples, caused irritation and infection     Social History     Socioeconomic History    Marital status: Legally    Tobacco Use    Smoking status: Former     Current packs/day: 0.00     Average packs/day: 1 pack/day for 25.0 years (25.0 ttl pk-yrs)     Types: Cigarettes     Start date: 1984     Quit date: 2009     Years since quitting: 15.0    Smokeless tobacco: Never   Substance and Sexual Activity    Alcohol use: Yes     Comment: rarely    Drug use: No    Sexual activity: Not Currently     Birth control/protection: See Surgical Hx     Social Determinants of Health     Financial Resource Strain: Patient Declined (11/15/2023)    Overall Financial Resource Strain (CARDIA)     Difficulty of Paying Living Expenses: Patient declined   Food Insecurity: Patient Declined (11/15/2023)    Hunger Vital Sign     Worried About Running Out of Food in the Last Year: Patient declined     Ran Out of Food in the Last Year: Patient declined   Transportation Needs: Patient Declined (11/15/2023)    PRAPARE - Transportation     Lack of Transportation (Medical): Patient declined     Lack of Transportation (Non-Medical): Patient declined   Physical Activity: Unknown (11/15/2023)    Exercise Vital Sign     Days of Exercise per Week: Patient declined     Minutes of Exercise per Session: 0 min   Stress: Patient Declined (11/15/2023)    [employer] [employer] - Occupational Stress Questionnaire     Feeling of Stress : Patient declined   Social Connections: Unknown (11/15/2023)    Social Connection and Isolation Panel [NHANES]     Frequency of Communication with Friends and Family: Patient declined     Frequency of Social Gatherings with Friends and Family: Patient declined     Active Member of Clubs or Organizations: Patient declined     Attends Club or Organization Meetings: Patient  declined     Marital Status: Patient declined   Housing Stability: Unknown (11/15/2023)    Housing Stability Vital Sign     Unable to Pay for Housing in the Last Year: Patient refused     Unstable Housing in the Last Year: Patient refused     Past Surgical History:   Procedure Laterality Date    APPENDECTOMY      CATHETERIZATION OF BOTH LEFT AND RIGHT HEART Right 2019    Procedure: CATHETERIZATION, HEART, BOTH LEFT AND RIGHT;  Surgeon: Beto Malin MD;  Location: [facility] CATH LAB;  Service: Cardiology;  Laterality: Right;     SECTION, LOW TRANSVERSE      CHOLECYSTECTOMY      open    COLECTOMY      total- 2013    CYSTOSCOPY W/ RETROGRADES N/A 2018    Procedure: CYSTOSCOPY, WITH RETROGRADE PYELOGRAM;  Surgeon: Butch Banks MD;  Location: [facility] OR;  Service: Urology;  Laterality: N/A;  HANSEN SURGICAL CONFIRMED     ENDOSCOPIC ULTRASOUND OF UPPER GASTROINTESTINAL TRACT N/A 11/10/2021    Procedure: ULTRASOUND, UPPER GI TRACT, ENDOSCOPIC;  Surgeon: Nhan Dee MD;  Location: [facility] ENDO (2ND FLR);  Service: Endoscopy;  Laterality: N/A;  MD Milena Whiteheda PA-C; Quinn Whitman MD; Miladys Velasquez MA  Caller: Unspecified (3 days ago,  1:20 PM)  Fair enough. Neha, please schedule.       ----- Message -----   From: Milena Murillo PA-C   Sent: 2021  1    ESOPHAGOGASTRODUODENOSCOPY  2018    Dr. Castorena: LA Grade A reflux esophagitis, hiatal hernia; Ectopic gastric mucosa in the upper third of the esophagus; gastric sleeve intact with unremarkable findings, gastritis; biopsy: stomach- Mild chronic antral and oxyntic gastritis, no activity, negative for h pylori    ESOPHAGOGASTRODUODENOSCOPY N/A 11/10/2021    Procedure: EGD (ESOPHAGOGASTRODUODENOSCOPY);  Surgeon: Nhan Dee MD;  Location: [facility] ENDO (2ND FLR);  Service: Endoscopy;  Laterality: N/A;    FESS, WITH IMAGING GUIDANCE Bilateral 2023    Procedure: Pan Sinus FESS, WITH IMAGING GUIDANCE  Disc loaded;  Surgeon: Jay Hernandez MD;  Location: [facility] OR;  Service: ENT;  Laterality: Bilateral;  balloon dilation of sinuses    FLEXIBLE SIGMOIDOSCOPY  5/24/2018    Procedure: SIGMOIDOSCOPY, FLEXIBLE;  Surgeon: JENNY Virgen MD;  Location: [facility] OR 2ND FLR;  Service: Colon and Rectal;;    FLEXIBLE SIGMOIDOSCOPY  04/11/2018    Dr. Castorena: Non-patent surgical anastomosis, characterized by friable mucosa.    GASTRIC SLEEVE       HYSTERECTOMY      ILEOSCOPY  04/11/2018    Dr. Castorena: Patient is status-post total colectomy with end ileostomy. unremarkable findings    ILEOSTOMY REVISION      april 2014; august 2014    LAPAROSCOPIC PROCTECTOMY N/A 5/24/2018    Procedure: PROCTECTOMY-LAPAROSCOPIC/CONVERTED TO OPEN;  Surgeon: JENNY Virgen MD;  Location: [facility] OR 2ND FLR;  Service: Colon and Rectal;  Laterality: N/A;    LYSIS OF ADHESIONS  5/24/2018    Procedure: LYSIS, ADHESIONS/ more than 2hours;  Surgeon: JENNY Virgen MD;  Location: [facility] OR 2ND FLR;  Service: Colon and Rectal;;    OOPHORECTOMY      PANNICULECTOMY Bilateral 12/19/2019    Procedure: PANNICULECTOMY;  Surgeon: Andrea Alvarado MD;  Location: [facility] OR 2ND FLR;  Service: Plastics;  Laterality: Bilateral;    REVISION COLOSTOMY N/A 12/19/2019    Procedure: REVISION, COLOSTOMY;  Surgeon: JENNY Virgen MD;  Location: [facility] OR 2ND FLR;  Service: Colon and Rectal;  Laterality: N/A;    thumb surgery      TONSILLECTOMY, ADENOIDECTOMY      WISDOM TOOTH EXTRACTION       No current facility-administered medications on file prior to encounter.     Current Outpatient Medications on File Prior to Encounter   Medication Sig Dispense Refill    clonazePAM (KLONOPIN) 1 MG tablet Take 1 tablet (1 mg total) by mouth daily as needed for Anxiety. 90 tablet 1    diphenoxylate-atropine 2.5-0.025 mg (LOMOTIL) 2.5-0.025 mg per tablet Take 1 tablet by mouth 4 (four) times daily as needed for Diarrhea. 30 tablet 2    levocetirizine (XYZAL) 5 MG tablet Take 1 tablet (5  mg total) by mouth every evening. 90 tablet 1    loratadine (CLARITIN) 10 mg tablet Take 1 tablet (10 mg total) by mouth once daily. 90 tablet 1    meclizine (ANTIVERT) 25 mg tablet Take 1 tablet (25 mg total) by mouth 3 (three) times daily as needed for Dizziness or Nausea. 60 tablet 2    melatonin 10 mg Tab Take 1 tablet by mouth nightly as needed.      promethazine (PHENERGAN) 25 MG tablet Take 1 tablet (25 mg total) by mouth every 6 (six) hours as needed for Nausea. 15 tablet 2    topiramate (TOPAMAX) 100 MG tablet Take 1 tablet (100 mg total) by mouth 2 (two) times daily. 180 tablet 3    UNABLE TO FIND Take 1 Dose by mouth daily as needed (as needed for sleep and anxiety). medication name: Delta 8 and Delta 9 hemp gummies      [DISCONTINUED] adalimumab (HUMIRA,CF, PEN) 40 mg/0.4 mL PnKt Inject 0.4 mLs (40 mg total) into the skin every 14 (fourteen) days. 6 pen 2     Pre-op Assessment    I have reviewed the NPO Status.      Review of Systems  Anesthesia Hx:               Denies Personal Hx of Anesthesia complications.                    Social:  Former Smoker, Social Alcohol Use       Hematology/Oncology:  Hematology Normal   Oncology Normal                                   EENT/Dental:  EENT/Dental Normal           Cardiovascular:                hyperlipidemia                             Pulmonary:    Asthma mild  Recent URI (1 month ago - lingering sinus drip), resolved                 Renal/:  Renal/ Normal                 Hepatic/GI:     GERD      Bowel Conditions:  Inflammatory Bowel Disease, Ulcerative Colitis     Liver Disease, Fatty Liver        Musculoskeletal:         Spine Disorders: lumbar Degenerative disease and Disc disease           Neurological:  Neurology Normal                                      Endocrine:  Endocrine Normal            Dermatological:  Skin Normal    Psych:   anxiety depression                Physical Exam    Airway:  Mallampati: I   Neck ROM: Normal  ROM        Anesthesia Plan  Type of Anesthesia, risks & benefits discussed:    Anesthesia Type: Gen Natural Airway  Intra-op Monitoring Plan: Standard ASA Monitors  Induction:  IV  Informed Consent: Informed consent signed with the Patient and all parties understand the risks and agree with anesthesia plan.  All questions answered.   ASA Score: 3    Ready For Surgery From Anesthesia Perspective.     .

## 2024-01-23 NOTE — ANESTHESIA POSTPROCEDURE EVALUATION
Anesthesia Post Evaluation    Patient: Ida Orozco    Procedure(s) Performed: Procedure(s) (LRB):  ILEOSCOPY (N/A)  EGD (ESOPHAGOGASTRODUODENOSCOPY) (N/A)    Final Anesthesia Type: general      Patient location during evaluation: PACU  Patient participation: Yes- Able to Participate  Level of consciousness: awake and alert  Post-procedure vital signs: reviewed and stable  Pain management: adequate  Airway patency: patent    PONV status at discharge: No PONV  Anesthetic complications: no      Cardiovascular status: blood pressure returned to baseline  Respiratory status: unassisted  Hydration status: euvolemic  Follow-up not needed.              Vitals Value Taken Time   /62 01/23/24 0835   Temp 36.7 °C (98.1 °F) 01/23/24 0805   Pulse 70 01/23/24 0835   Resp 16 01/23/24 0835   SpO2 97 % 01/23/24 0835         Event Time   Out of Recovery 08:41:55         Pain/Tito Score: Tito Score: 10 (1/23/2024  8:36 AM)

## 2024-01-23 NOTE — PROVATION PATIENT INSTRUCTIONS
Discharge Summary/Instructions after an Endoscopic Procedure  Patient Name: Ida Orozco  Patient MRN: [id number]  Patient YOB: 1965 Tuesday, January 23, 2024  Anatoly Singh MD  Dear patient,  As a result of recent federal legislation (The Federal Cures Act), you may   receive lab or pathology results from your procedure in your [facility]   account before your physician is able to contact you. Your physician or   their representative will relay the results to you with their   recommendations at their soonest availability.  Thank you,  RESTRICTIONS:  During your procedure today, you received medications for sedation.  These   medications may affect your judgment, balance and coordination.  Therefore,   for 24 hours, you have the following restrictions:   - DO NOT drive a car, operate machinery, make legal/financial decisions,   sign important papers or drink alcohol.    ACTIVITY:  Today: no heavy lifting, straining or running due to procedural   sedation/anesthesia.  The following day: return to full activity including work.  DIET:  Eat and drink normally unless instructed otherwise.     TREATMENT FOR COMMON SIDE EFFECTS:  - Mild abdominal pain, nausea, belching, bloating or excessive gas:  rest,   eat lightly and use a heating pad.  - Sore Throat: treat with throat lozenges and/or gargle with warm salt   water.  - Because air was used during the procedure, expelling large amounts of air   from your rectum or belching is normal.  - If a bowel prep was taken, you may not have a bowel movement for 1-3 days.    This is normal.  SYMPTOMS TO WATCH FOR AND REPORT TO YOUR PHYSICIAN:  1. Abdominal pain or bloating, other than gas cramps.  2. Chest pain.  3. Back pain.  4. Signs of infection such as: chills or fever occurring within 24 hours   after the procedure.  5. Rectal bleeding, which would show as bright red, maroon, or black stools.   (A tablespoon of blood from the rectum is not serious, especially  if   hemorrhoids are present.)  6. Vomiting.  7. Weakness or dizziness.  GO DIRECTLY TO THE NEAREST EMERGENCY ROOM IF YOU HAVE ANY OF THE FOLLOWING:      Difficulty breathing              Chills and/or fever over 101 F   Persistent vomiting and/or vomiting blood   Severe abdominal pain   Severe chest pain   Black, tarry stools   Bleeding- more than one tablespoon   Any other symptom or condition that you feel may need urgent attention  Your doctor recommends these additional instructions:  If any biopsies were taken, your doctors clinic will contact you in 1 to 2   weeks with any results.  - Discharge patient to home.   - Ileoscopy today.  - Await pathology results.   - Return to primary care physician as previously scheduled.  For questions, problems or results please call your physician - Anatoly Singh MD at Work:  [phone].  [facility] [place], EMERGENCY ROOM PHONE NUMBER: [phone]  IF A COMPLICATION OR EMERGENCY SITUATION ARISES AND YOU ARE UNABLE TO REACH   YOUR PHYSICIAN - GO DIRECTLY TO THE EMERGENCY ROOM.  Anatoly Singh MD  1/23/2024 7:49:38 AM  This report has been verified and signed electronically.  Dear patient,  As a result of recent federal legislation (The Federal Cures Act), you may   receive lab or pathology results from your procedure in your [facility]   account before your physician is able to contact you. Your physician or   their representative will relay the results to you with their   recommendations at their soonest availability.  Thank you,  PROVATION

## 2024-01-26 ENCOUNTER — PATIENT MESSAGE (OUTPATIENT)
Dept: OTOLARYNGOLOGY | Facility: CLINIC | Age: 59
End: 2024-01-26
Payer: MEDICARE

## 2024-01-31 LAB
FINAL PATHOLOGIC DIAGNOSIS: NORMAL
GROSS: NORMAL
Lab: NORMAL
SUPPLEMENTAL DIAGNOSIS: NORMAL

## 2024-02-02 RX ORDER — CEFAZOLIN SODIUM 2 G/50ML
2 SOLUTION INTRAVENOUS
Status: CANCELLED | OUTPATIENT
Start: 2024-02-02

## 2024-02-02 RX ORDER — MUPIROCIN 20 MG/G
OINTMENT TOPICAL
Status: CANCELLED | OUTPATIENT
Start: 2024-02-02

## 2024-02-23 ENCOUNTER — ANESTHESIA EVENT (OUTPATIENT)
Dept: SURGERY | Facility: HOSPITAL | Age: 59
End: 2024-02-23
Payer: MEDICARE

## 2024-02-26 ENCOUNTER — TELEPHONE (OUTPATIENT)
Dept: ORTHOPEDICS | Facility: CLINIC | Age: 59
End: 2024-02-26
Payer: MEDICARE

## 2024-02-26 NOTE — TELEPHONE ENCOUNTER
Spoke to patient to inform them of their surgery arrival time (630 am), [place], [address] [facility] [address]:  Verbalized understanding of instructions for pre-wash, and reviewed NPO after midnight.   Confirmed call in regards to medication instructions from anesthesia.   Confirmed patient was NOT using a rideshare service for surgery arrival or  transportation.

## 2024-02-27 ENCOUNTER — HOSPITAL ENCOUNTER (OUTPATIENT)
Facility: HOSPITAL | Age: 59
Discharge: HOME | End: 2024-02-27
Attending: ORTHOPAEDIC SURGERY | Admitting: ORTHOPAEDIC SURGERY
Payer: MEDICARE

## 2024-02-27 ENCOUNTER — ANESTHESIA (OUTPATIENT)
Dept: SURGERY | Facility: HOSPITAL | Age: 59
End: 2024-02-27
Payer: MEDICARE

## 2024-02-27 VITALS
TEMPERATURE: 98 F | DIASTOLIC BLOOD PRESSURE: 76 MMHG | BODY MASS INDEX: 36.19 KG/M2 | SYSTOLIC BLOOD PRESSURE: 156 MMHG | HEART RATE: 70 BPM | OXYGEN SATURATION: 95 % | HEIGHT: 64 IN | RESPIRATION RATE: 19 BRPM | WEIGHT: 212 LBS

## 2024-02-27 DIAGNOSIS — G56.22 CUBITAL TUNNEL SYNDROME ON LEFT: ICD-10-CM

## 2024-02-27 DIAGNOSIS — G56.02 CARPAL TUNNEL SYNDROME OF LEFT WRIST: Primary | ICD-10-CM

## 2024-02-27 DIAGNOSIS — G56.01 CARPAL TUNNEL SYNDROME OF RIGHT WRIST: ICD-10-CM

## 2024-02-27 DIAGNOSIS — Z00.00 ENCOUNTER FOR MEDICARE ANNUAL WELLNESS EXAM: ICD-10-CM

## 2024-02-27 PROCEDURE — 37000009 HC ANESTHESIA EA ADD 15 MINS: Performed by: ORTHOPAEDIC SURGERY

## 2024-02-27 PROCEDURE — 99900035 HC TECH TIME PER 15 MIN (STAT)

## 2024-02-27 PROCEDURE — 25000003 PHARM REV CODE 250: Performed by: NURSE ANESTHETIST, CERTIFIED REGISTERED

## 2024-02-27 PROCEDURE — 27201423 OPTIME MED/SURG SUP & DEVICES STERILE SUPPLY: Performed by: ORTHOPAEDIC SURGERY

## 2024-02-27 PROCEDURE — 20526 THER INJECTION CARP TUNNEL: CPT | Mod: 59,51,HCNC,RT | Performed by: ORTHOPAEDIC SURGERY

## 2024-02-27 PROCEDURE — 25000003 PHARM REV CODE 250: Performed by: ORTHOPAEDIC SURGERY

## 2024-02-27 PROCEDURE — 64415 NJX AA&/STRD BRCH PLXS IMG: CPT | Performed by: ANESTHESIOLOGY

## 2024-02-27 PROCEDURE — 94761 N-INVAS EAR/PLS OXIMETRY MLT: CPT

## 2024-02-27 PROCEDURE — 37000008 HC ANESTHESIA 1ST 15 MINUTES: Performed by: ORTHOPAEDIC SURGERY

## 2024-02-27 PROCEDURE — 63600175 PHARM REV CODE 636 W HCPCS: Performed by: ANESTHESIOLOGY

## 2024-02-27 PROCEDURE — D9220A PRA ANESTHESIA: Mod: CRNA,,, | Performed by: NURSE ANESTHETIST, CERTIFIED REGISTERED

## 2024-02-27 PROCEDURE — 71000015 HC POSTOP RECOV 1ST HR: Performed by: ORTHOPAEDIC SURGERY

## 2024-02-27 PROCEDURE — 63600175 PHARM REV CODE 636 W HCPCS: Performed by: ORTHOPAEDIC SURGERY

## 2024-02-27 PROCEDURE — 36000706: Performed by: ORTHOPAEDIC SURGERY

## 2024-02-27 PROCEDURE — 64721 CARPAL TUNNEL SURGERY: CPT | Mod: 51,HCNC,LT, | Performed by: ORTHOPAEDIC SURGERY

## 2024-02-27 PROCEDURE — 64718 REVISE ULNAR NERVE AT ELBOW: CPT | Mod: HCNC,LT,, | Performed by: ORTHOPAEDIC SURGERY

## 2024-02-27 PROCEDURE — 71000033 HC RECOVERY, INTIAL HOUR: Performed by: ORTHOPAEDIC SURGERY

## 2024-02-27 PROCEDURE — 63600175 PHARM REV CODE 636 W HCPCS: Performed by: NURSE ANESTHETIST, CERTIFIED REGISTERED

## 2024-02-27 PROCEDURE — 25000003 PHARM REV CODE 250: Performed by: PHYSICIAN ASSISTANT

## 2024-02-27 PROCEDURE — 63600175 PHARM REV CODE 636 W HCPCS: Performed by: PHYSICIAN ASSISTANT

## 2024-02-27 PROCEDURE — D9220A PRA ANESTHESIA: Mod: ANES,,, | Performed by: ANESTHESIOLOGY

## 2024-02-27 PROCEDURE — 36000707: Performed by: ORTHOPAEDIC SURGERY

## 2024-02-27 RX ORDER — DEXMEDETOMIDINE HYDROCHLORIDE 100 UG/ML
INJECTION, SOLUTION INTRAVENOUS
Status: DISCONTINUED | OUTPATIENT
Start: 2024-02-27 | End: 2024-02-27

## 2024-02-27 RX ORDER — SODIUM CHLORIDE 0.9 % (FLUSH) 0.9 %
3 SYRINGE (ML) INJECTION
Status: DISCONTINUED | OUTPATIENT
Start: 2024-02-27 | End: 2024-02-27 | Stop reason: HOSPADM

## 2024-02-27 RX ORDER — MIDAZOLAM HYDROCHLORIDE 1 MG/ML
INJECTION, SOLUTION INTRAMUSCULAR; INTRAVENOUS
Status: DISCONTINUED | OUTPATIENT
Start: 2024-02-27 | End: 2024-02-27

## 2024-02-27 RX ORDER — MIDAZOLAM HYDROCHLORIDE 1 MG/ML
1 INJECTION INTRAMUSCULAR; INTRAVENOUS
Status: DISCONTINUED | OUTPATIENT
Start: 2024-02-27 | End: 2024-02-27 | Stop reason: HOSPADM

## 2024-02-27 RX ORDER — DOCUSATE SODIUM 100 MG/1
100 CAPSULE, LIQUID FILLED ORAL 2 TIMES DAILY
Qty: 30 CAPSULE | Refills: 1 | Status: SHIPPED | OUTPATIENT
Start: 2024-02-27 | End: 2024-05-15

## 2024-02-27 RX ORDER — PROPOFOL 10 MG/ML
VIAL (ML) INTRAVENOUS CONTINUOUS PRN
Status: DISCONTINUED | OUTPATIENT
Start: 2024-02-27 | End: 2024-02-27

## 2024-02-27 RX ORDER — DEXAMETHASONE SODIUM PHOSPHATE 4 MG/ML
INJECTION, SOLUTION INTRA-ARTICULAR; INTRALESIONAL; INTRAMUSCULAR; INTRAVENOUS; SOFT TISSUE
Status: DISCONTINUED | OUTPATIENT
Start: 2024-02-27 | End: 2024-02-27

## 2024-02-27 RX ORDER — HYDROCODONE BITARTRATE AND ACETAMINOPHEN 5; 325 MG/1; MG/1
1 TABLET ORAL EVERY 6 HOURS PRN
Qty: 30 TABLET | Refills: 0 | Status: SHIPPED | OUTPATIENT
Start: 2024-02-27 | End: 2024-05-01

## 2024-02-27 RX ORDER — LIDOCAINE HYDROCHLORIDE 10 MG/ML
INJECTION, SOLUTION EPIDURAL; INFILTRATION; INTRACAUDAL; PERINEURAL
Status: DISCONTINUED | OUTPATIENT
Start: 2024-02-27 | End: 2024-02-27 | Stop reason: HOSPADM

## 2024-02-27 RX ORDER — LIDOCAINE HYDROCHLORIDE 20 MG/ML
INJECTION INTRAVENOUS
Status: DISCONTINUED | OUTPATIENT
Start: 2024-02-27 | End: 2024-02-27

## 2024-02-27 RX ORDER — FENTANYL CITRATE 50 UG/ML
100 INJECTION, SOLUTION INTRAMUSCULAR; INTRAVENOUS
Status: DISCONTINUED | OUTPATIENT
Start: 2024-02-27 | End: 2024-02-27 | Stop reason: HOSPADM

## 2024-02-27 RX ORDER — ONDANSETRON HYDROCHLORIDE 2 MG/ML
4 INJECTION, SOLUTION INTRAVENOUS ONCE AS NEEDED
Status: DISCONTINUED | OUTPATIENT
Start: 2024-02-27 | End: 2024-02-27 | Stop reason: HOSPADM

## 2024-02-27 RX ORDER — ONDANSETRON HYDROCHLORIDE 2 MG/ML
INJECTION, SOLUTION INTRAVENOUS
Status: DISCONTINUED | OUTPATIENT
Start: 2024-02-27 | End: 2024-02-27

## 2024-02-27 RX ORDER — METHYLPREDNISOLONE ACETATE 40 MG/ML
INJECTION, SUSPENSION INTRA-ARTICULAR; INTRALESIONAL; INTRAMUSCULAR; SOFT TISSUE
Status: DISCONTINUED | OUTPATIENT
Start: 2024-02-27 | End: 2024-02-27 | Stop reason: HOSPADM

## 2024-02-27 RX ORDER — MUPIROCIN 20 MG/G
OINTMENT TOPICAL
Status: DISCONTINUED | OUTPATIENT
Start: 2024-02-27 | End: 2024-02-27 | Stop reason: HOSPADM

## 2024-02-27 RX ORDER — MUPIROCIN 20 MG/G
OINTMENT TOPICAL 2 TIMES DAILY
Status: DISCONTINUED | OUTPATIENT
Start: 2024-02-27 | End: 2024-02-27 | Stop reason: HOSPADM

## 2024-02-27 RX ORDER — HYDROCODONE BITARTRATE AND ACETAMINOPHEN 5; 325 MG/1; MG/1
1 TABLET ORAL EVERY 4 HOURS PRN
Status: DISCONTINUED | OUTPATIENT
Start: 2024-02-27 | End: 2024-02-27 | Stop reason: HOSPADM

## 2024-02-27 RX ORDER — CELECOXIB 200 MG/1
400 CAPSULE ORAL
Status: DISCONTINUED | OUTPATIENT
Start: 2024-02-27 | End: 2024-02-27 | Stop reason: HOSPADM

## 2024-02-27 RX ORDER — ONDANSETRON HYDROCHLORIDE 8 MG/1
8 TABLET, FILM COATED ORAL EVERY 8 HOURS PRN
Qty: 30 TABLET | Refills: 0 | Status: SHIPPED | OUTPATIENT
Start: 2024-02-27

## 2024-02-27 RX ORDER — PROPOFOL 10 MG/ML
VIAL (ML) INTRAVENOUS
Status: DISCONTINUED | OUTPATIENT
Start: 2024-02-27 | End: 2024-02-27

## 2024-02-27 RX ORDER — ROPIVACAINE HYDROCHLORIDE 5 MG/ML
INJECTION, SOLUTION EPIDURAL; INFILTRATION; PERINEURAL
Status: COMPLETED | OUTPATIENT
Start: 2024-02-27 | End: 2024-02-27

## 2024-02-27 RX ORDER — ACETAMINOPHEN 500 MG
1000 TABLET ORAL
Status: COMPLETED | OUTPATIENT
Start: 2024-02-27 | End: 2024-02-27

## 2024-02-27 RX ADMIN — DEXMEDETOMIDINE 10 MCG: 100 INJECTION, SOLUTION, CONCENTRATE INTRAVENOUS at 08:02

## 2024-02-27 RX ADMIN — MIDAZOLAM 2 MG: 1 INJECTION INTRAMUSCULAR; INTRAVENOUS at 07:02

## 2024-02-27 RX ADMIN — ACETAMINOPHEN 1000 MG: 500 TABLET ORAL at 07:02

## 2024-02-27 RX ADMIN — GLYCOPYRROLATE 0.2 MG: 0.2 INJECTION, SOLUTION INTRAMUSCULAR; INTRAVENOUS at 08:02

## 2024-02-27 RX ADMIN — DEXAMETHASONE SODIUM PHOSPHATE 4 MG: 4 INJECTION, SOLUTION INTRAMUSCULAR; INTRAVENOUS at 08:02

## 2024-02-27 RX ADMIN — MUPIROCIN: 20 OINTMENT TOPICAL at 07:02

## 2024-02-27 RX ADMIN — SODIUM CHLORIDE: 9 INJECTION, SOLUTION INTRAVENOUS at 08:02

## 2024-02-27 RX ADMIN — PROPOFOL 75 MCG/KG/MIN: 10 INJECTION, EMULSION INTRAVENOUS at 08:02

## 2024-02-27 RX ADMIN — LIDOCAINE HYDROCHLORIDE 60 MG: 20 INJECTION INTRAVENOUS at 08:02

## 2024-02-27 RX ADMIN — PROPOFOL 50 MG: 10 INJECTION, EMULSION INTRAVENOUS at 08:02

## 2024-02-27 RX ADMIN — FENTANYL CITRATE 50 MCG: 50 INJECTION INTRAMUSCULAR; INTRAVENOUS at 08:02

## 2024-02-27 RX ADMIN — FENTANYL CITRATE 50 MCG: 50 INJECTION INTRAMUSCULAR; INTRAVENOUS at 07:02

## 2024-02-27 RX ADMIN — CEFAZOLIN 2 G: 2 INJECTION, POWDER, FOR SOLUTION INTRAMUSCULAR; INTRAVENOUS at 08:02

## 2024-02-27 RX ADMIN — ONDANSETRON 4 MG: 2 INJECTION INTRAMUSCULAR; INTRAVENOUS at 09:02

## 2024-02-27 RX ADMIN — MIDAZOLAM HYDROCHLORIDE 1 MG: 1 INJECTION, SOLUTION INTRAMUSCULAR; INTRAVENOUS at 08:02

## 2024-02-27 RX ADMIN — PROPOFOL 40 MG: 10 INJECTION, EMULSION INTRAVENOUS at 09:02

## 2024-02-27 RX ADMIN — HYDROCODONE BITARTRATE AND ACETAMINOPHEN 1 TABLET: 5; 325 TABLET ORAL at 10:02

## 2024-02-27 RX ADMIN — ROPIVACAINE HYDROCHLORIDE 25 ML: 5 INJECTION EPIDURAL; INFILTRATION; PERINEURAL at 08:02

## 2024-02-27 NOTE — ANESTHESIA PREPROCEDURE EVALUATION
02/27/2024  Pre-operative evaluation for Procedure(s) (LRB):  RELEASE, CARPAL TUNNEL (Left)  DECOMPRESSION, NERVE ULNAR NERVE ELBOW (Left)  INJECTION, STEROID CARPAL TUNNEL (Right)    Ida Orozco is a 58 y.o. female     Patient Active Problem List   Diagnosis    GERD (gastroesophageal reflux disease)    S/p ileostomy revision within the subcutaneous tissue.    Chronic pain of right knee    Plica of knee    Dietary folate deficiency anemia    Vitamin B12 deficiency    Hyperlipidemia    Vitamin D deficiency    Moderate persistent asthma without complication    DDD (degenerative disc disease), lumbar    Former smoker    Bariatric surgery status-Gastric sleeve     Redundant skin of abdomen    Inflammatory bowel disease    Vaginal atrophy    Urinary incontinence, mixed    Dietary iron deficiency    Candidal intertrigo    Sleep arousal disorder    Ileostomy in place    Mild episode of recurrent major depressive disorder    Diarrhea    Sinus bradycardia    Vasovagal syndrome    Chronic pansinusitis    Fatty liver    Age-related osteoporosis without current pathological fracture    Vertigo    Nasal polyposis    History of endoscopic sinus surgery    BMI 38.0-38.9,adult    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     Current Outpatient Medications on File Prior to Encounter   Medication Sig Dispense Refill    amitriptyline  (ELAVIL) 10 MG tablet Take 1-2 tablets (10-20 mg total) by mouth nightly as needed for Insomnia. 60 tablet 5    clonazePAM (KLONOPIN) 1 MG tablet Take 1 tablet (1 mg total) by mouth daily as needed for Anxiety. 90 tablet 1    diphenoxylate-atropine 2.5-0.025 mg (LOMOTIL) 2.5-0.025 mg per tablet Take 1 tablet by mouth 4 (four) times daily as needed for Diarrhea. 30 tablet 2    ergocalciferol (VITAMIN D2) 50,000 unit Cap Take 1 capsule (50,000 Units total) by mouth every 7 days. 12 capsule 3    levocetirizine (XYZAL) 5 MG tablet Take 1 tablet (5 mg total) by mouth every evening. 90 tablet 1    loratadine (CLARITIN) 10 mg tablet Take 1 tablet (10 mg total) by mouth once daily. 90 tablet 1    meclizine (ANTIVERT) 25 mg tablet Take 1 tablet (25 mg total) by mouth 3 (three) times daily as needed for Dizziness or Nausea. 60 tablet 2    melatonin 10 mg Tab Take 1 tablet by mouth nightly as needed.      nystatin (MYCOSTATIN) powder Apply topically 2 (two) times daily. 60 g 3    sertraline (ZOLOFT) 100 MG tablet Take 2 tablets (200 mg total) by mouth once daily. 180 tablet 3    topiramate (TOPAMAX) 100 MG tablet Take 1 tablet (100 mg total) by mouth 2 (two) times daily. 180 tablet 3    UNABLE TO FIND Take 1 Dose by mouth daily as needed (as needed for sleep and anxiety). medication name: Delta 8 and Delta 9 hemp gummies      promethazine (PHENERGAN) 25 MG tablet Take 1 tablet (25 mg total) by mouth every 6 (six) hours as needed for Nausea. 15 tablet 2    [DISCONTINUED] adalimumab (HUMIRA,CF, PEN) 40 mg/0.4 mL PnKt Inject 0.4 mLs (40 mg total) into the skin every 14 (fourteen) days. 6 pen 2       Past Surgical History:   Procedure Laterality Date    APPENDECTOMY      CATHETERIZATION OF BOTH LEFT AND RIGHT HEART Right 2019    Procedure: CATHETERIZATION, HEART, BOTH LEFT AND RIGHT;  Surgeon: Beto Malin MD;  Location: [facility] CATH LAB;  Service: Cardiology;  Laterality: Right;     SECTION, LOW TRANSVERSE       CHOLECYSTECTOMY      open    COLECTOMY      total- june 2013    CYSTOSCOPY W/ RETROGRADES N/A 8/20/2018    Procedure: CYSTOSCOPY, WITH RETROGRADE PYELOGRAM;  Surgeon: Butch Banks MD;  Location: [facility];  Service: Urology;  Laterality: N/A;  [facility] SUR[facility]ICAL CONFIRMED 8/17/DME    ENDOSCOPIC ULTRASOUND OF UPPER GASTROINTESTINAL TRACT N/A 11/10/2021    Procedure: ULTRASOUND, UPPER GI TRACT, ENDOSCOPIC;  Surgeon: Nhan Dee MD;  Location: [facility] (2ND FLR);  Service: Endoscopy;  Laterality: N/A;  MD Milena Whitehead PA-C; Quinn Whitamn MD; Miladys Velasquez MA  Caller: Unspecified (3 days ago,  1:20 PM)  Fair enough. Neha, please schedule.       ----- Message -----   From: Milena Murillo PA-C   Sent: 8/11/2021  1    ESOPHAGOGASTRODUODENOSCOPY  04/11/2018    Dr. Castorena: LA Grade A reflux esophagitis, hiatal hernia; Ectopic gastric mucosa in the upper third of the esophagus; gastric sleeve intact with unremarkable findings, gastritis; biopsy: stomach- Mild chronic antral and oxyntic gastritis, no activity, negative for h pylori    ESOPHAGOGASTRODUODENOSCOPY N/A 11/10/2021    Procedure: EGD (ESOPHAGOGASTRODUODENOSCOPY);  Surgeon: Nhan Dee MD;  Location: [facility] (2ND FLR);  Service: Endoscopy;  Laterality: N/A;    ESOPHAGOGASTRODUODENOSCOPY N/A 1/23/2024    Procedure: EGD (ESOPHAGOGASTRODUODENOSCOPY);  Surgeon: Anatoly Singh MD;  Location: [facility] (4TH FLR);  Service: Endoscopy;  Laterality: N/A;  Ref By: magdaleno Stover sent via portal Half a mirPeriscopex prep.AC  1/17-lvm for precall-MS  1/18-pt confirmed appt-Kpvt    FESS, WITH IMAGING GUIDANCE Bilateral 4/4/2023    Procedure: Pan Sinus FESS, WITH IMAGING GUIDANCE Disc loaded;  Surgeon: Jay Hernandez MD;  Location: [facility] OR;  Service: ENT;  Laterality: Bilateral;  balloon dilation of sinuses    FLEXIBLE SIGMOIDOSCOPY  5/24/2018    Procedure: SIGMOIDOSCOPY, FLEXIBLE;  Surgeon: JENNY Virgen MD;  Location: [facility]  OR 2ND FLR;  Service: Colon and Rectal;;    FLEXIBLE SIGMOIDOSCOPY  04/11/2018    Dr. Castorena: Non-patent surgical anastomosis, characterized by friable mucosa.    GASTRIC SLEEVE       HYSTERECTOMY      ILEOSCOPY  04/11/2018    Dr. Castorena: Patient is status-post total colectomy with end ileostomy. unremarkable findings    ILEOSCOPY N/A 1/23/2024    Procedure: ILEOSCOPY;  Surgeon: Anatoly Singh MD;  Location: [facility] ENDO (4TH FLR);  Service: Endoscopy;  Laterality: N/A;    ILEOSTOMY REVISION      april 2014; august 2014    LAPAROSCOPIC PROCTECTOMY N/A 5/24/2018    Procedure: PROCTECTOMY-LAPAROSCOPIC/CONVERTED TO OPEN;  Surgeon: JENNY Virgen MD;  Location: [facility] OR 2ND FLR;  Service: Colon and Rectal;  Laterality: N/A;    LYSIS OF ADHESIONS  5/24/2018    Procedure: LYSIS, ADHESIONS/ more than 2hours;  Surgeon: JENNY Virgen MD;  Location: [facility] OR 2ND FLR;  Service: Colon and Rectal;;    OOPHORECTOMY      PANNICULECTOMY Bilateral 12/19/2019    Procedure: PANNICULECTOMY;  Surgeon: Andrea Alvarado MD;  Location: [facility] OR 2ND FLR;  Service: Plastics;  Laterality: Bilateral;    REVISION COLOSTOMY N/A 12/19/2019    Procedure: REVISION, COLOSTOMY;  Surgeon: JENNY Virgen MD;  Location: [facility] OR 2ND FLR;  Service: Colon and Rectal;  Laterality: N/A;    thumb surgery      TONSILLECTOMY, ADENOIDECTOMY      WISDOM TOOTH EXTRACTION         Social History     Socioeconomic History    Marital status: Legally    Tobacco Use    Smoking status: Former     Current packs/day: 0.00     Average packs/day: 1 pack/day for 25.0 years (25.0 ttl pk-yrs)     Types: Cigarettes     Start date: 1984     Quit date: 2009     Years since quitting: 15.1    Smokeless tobacco: Never   Substance and Sexual Activity    Alcohol use: Yes     Comment: extremely rarely (once a year)    Drug use: No    Sexual activity: Not Currently     Birth control/protection: See Surgical Hx     Social Determinants of Health     Financial Resource Strain:  Patient Declined (11/15/2023)    Overall Financial Resource Strain (CARDIA)     Difficulty of Paying Living Expenses: Patient declined   Food Insecurity: Patient Declined (11/15/2023)    Hunger Vital Sign     Worried About Running Out of Food in the Last Year: Patient declined     Ran Out of Food in the Last Year: Patient declined   Transportation Needs: Patient Declined (11/15/2023)    PRAPARE - Transportation     Lack of Transportation (Medical): Patient declined     Lack of Transportation (Non-Medical): Patient declined   Physical Activity: Unknown (11/15/2023)    Exercise Vital Sign     Days of Exercise per Week: Patient declined     Minutes of Exercise per Session: 0 min   Stress: Patient Declined (11/15/2023)    [employer] [place] [employer] - Occupational Stress Questionnaire     Feeling of Stress : Patient declined   Social Connections: Unknown (11/15/2023)    Social Connection and Isolation Panel [NHANES]     Frequency of Communication with Friends and Family: Patient declined     Frequency of Social Gatherings with Friends and Family: Patient declined     Active Member of Clubs or Organizations: Patient declined     Attends Club or Organization Meetings: Patient declined     Marital Status: Patient declined   Housing Stability: Unknown (11/15/2023)    Housing Stability Vital Sign     Unable to Pay for Housing in the Last Year: Patient refused     Unstable Housing in the Last Year: Patient refused       EKG:  Normal sinus rhythm   Minimal voltage criteria for LVH, may be normal variant   Septal infarct (cited on or before 15-AUG-2016)  is possible   Abnormal ECG   When compared with ECG of 04-JAN-2021 01:40,   Questionable change in initial forces of Septal leads   Axis has shifted leftward   Confirmed by Anatoly Goins MD ([id number]) on 10/12/2022 4:36:43 PM       2D Echo:  The left ventricle is normal in size with normal systolic function.  The estimated ejection fraction is 65%.  Normal left  ventricular diastolic function.  Normal right ventricular size with normal right ventricular systolic function.  Normal central venous pressure (3 mmHg).        Pre-op Assessment    I have reviewed the Patient Summary Reports.     I have reviewed the Nursing Notes. I have reviewed the NPO Status.   I have reviewed the Medications.     Review of Systems  Anesthesia Hx:             Denies Family Hx of Anesthesia complications.    Denies Personal Hx of Anesthesia complications.                    Cardiovascular:  Exercise tolerance: good        Denies Dysrhythmias.   Denies Angina.       Denies BURRELL.                            Pulmonary:    Asthma    Denies Recent URI.                 Renal/:   Denies Chronic Renal Disease.                Hepatic/GI:   PUD,  GERD             Musculoskeletal:  Arthritis               Neurological:    Denies CVA.    Denies Seizures.                                Endocrine:  Denies Diabetes.           Psych:    depression                Physical Exam  General: Well nourished, Cooperative, Alert and Oriented    Airway:  Mallampati: II / I  Mouth Opening: Normal  TM Distance: Normal  Tongue: Normal  Neck ROM: Normal ROM    Dental:  Intact    Chest/Lungs:  Clear to auscultation, Normal Respiratory Rate    Heart:  Rate: Normal  Rhythm: Regular Rhythm        Anesthesia Plan  Type of Anesthesia, risks & benefits discussed:    Anesthesia Type: Gen Natural Airway, Regional  Intra-op Monitoring Plan: Standard ASA Monitors  Post Op Pain Control Plan: multimodal analgesia and peripheral nerve block  Induction:  IV  Informed Consent: Informed consent signed with the Patient and all parties understand the risks and agree with anesthesia plan.  All questions answered. Patient consented to blood products? Yes  ASA Score: 2  Day of Surgery Review of History & Physical: H&P Update referred to the surgeon/provider.    Ready For Surgery From Anesthesia Perspective.     .

## 2024-02-27 NOTE — DISCHARGE INSTRUCTIONS
HAND SURGERY - Care of the Hand after Surgery       Post-Op Care  It is important to follow your orthopaedic surgeon's instructions carefully after you return home. You should ask   someone to check on you that evening. The protocols described here are general in nature. Every person and   every surgery is different so the information given here is for guidance only. If you have questions you should   contact us.     Day of Surgery    You were place in a plaster splint today to protect the surgical area during healing. Do not remove the plaster splint until you are seen by Occupational Therapy or Dr. Nicholas for your first postop visit    The hand and fingers may be numb for quite some time after surgery. This is due to the anesthetic block used at the time of surgery for pain control.    Begin taking liquids and food as soon as you can. You should always eat some solid food, a sandwich or light meal, a little while before taking your pain meds.     Day 3  Things are much the same on the third day after your surgery. Usually you have less pain and feel like doing more.    Showering: It is important to keep the incision absolutely dry while showering or bathing (use two plastic bags over your hand) or a shower bag.   If you feel that the pain medication you were given after surgery is stronger than you really need you can reduce the dose, take it less frequently or switch to ibuprofen or Tylenol. If you received antibiotics they should be taken until the entire prescription is completed.    Day 10  Occupational Therapy will see you between this time. Please let us know if you are not scheduled [phone]    Day 14  We will see you back after your surgery to review with you what was done in surgery and will talk about rehab and answer any questions you may have.       HAND SURGERY  Driving: You can drive if you are comfortable and have regained full finger movements and if you have sufficient power to control the  vehicle.   Return to work: Timing of your return to work is variable according to your occupation and specific surgery. We should discuss this at your follow up visit if not already discussed prior.  Elevation: Hand elevation is important to prevent swelling and stiffness of the fingers. One minute of leaving your hand dangling negates four hours of keeping it elevated. Please remember not to walk with your hand hanging down or to sit with your hand resting in your lap. It is fine, however, to lower your hand for light use and you should get back to normal light activities as soon as possible as guided by common sense.     Post-operative exercises  [x] Bend your fingers  Relax your hand. Start with your fingers straight and close together. Bend the end and middle joints of your fingers. Keep your wrist and knuckles straight. Moving slowly and smoothly, return your hand to the starting position. Repeat with your other hand. If you can, perform multiple repetitions of this exercise on each hand.    [x] Open your hand wide                       Spread your fingers apart as wide as you can and hold that position. Slowly relax your fingers and bring them together. Return to the open-wide position. Repeat with each hand and gradually add to the number of repetitions.    [x] Make a fist  Start with your fingers straight and spread apart. Make a loose, gentle fist and wrap your thumb around the outside of your fingers. Be careful not to squeeze your fingers together too tightly. Moving slowly and smoothly, return to the starting position. Repeat. Perform this exercise on both hands.    [x] Touch your fingertips  Straighten your fingers and thumb. Bend your thumb across your palm, touching the tip of your thumb to the pad of your hand just below your pinky finger. If you can't make your thumb touch, just stretch as far as you can. Return your thumb to its starting position, as shown in images 1 through 3.  For the next  "exercise, form the letter O by touching your thumb to each fingertip, as shown in images 4 through 6. Moving slowly and smoothly, touch your index finger to your thumb, then straighten your fingers. Touch your middle finger to your thumb and straighten. Follow with your ring and pinky fingers.    [x] Walk your fingers  Rest your hand on a flat surface, such as a tabletop, with your palm facing down and your fingers spread slightly apart. Moving one finger at a time, slowly walk your fingers toward your thumb. Start by lifting and moving your index finger toward your thumb. Follow by lifting and moving your middle finger toward your thumb. Proceed with moving your ring finger and then your pinky finger toward your thumb. Don't move your wrist or thumb while doing this exercise. Repeat with your other hand.      HAND SURGERY - Common Concerns and Frequently Asked Questions    When to Call the Doctor  Pain, burning, or numbness of the fingers or the back of the hand not relieved by elevation of the arm  Pale or cold finger; bluish nail beds  Red line or streak going up the arm  Excessive swelling  Fever over 100.3ºF  Pain unrelieved by pain medication    Tips for one armed living  It helps to have...   In the shower   Plastic bags and rubber bands to cover bandages - the bags that newspapers come in are good to cover the hand and wrist. Otherwise small trash can liners will do. Use two at a time.   Bottle sponge (soft sponge on a long stick) - for the armpit of your "good" hand.   Shower brush   A hair brush in the shower will help you to wash your hair.   Cotton evelina cloth bathrobe - to dry your back.    In the bathroom   Toothpaste, shampoo, etc. in flip-top or pump (not screw top) dispensers.   Consider an electric razor.   Flossers (dental floss on a "Y" shaped handle).    In the kitchen   Dycem mat (rubber jar opener mat) - to help open jars, but also keep things from sliding around while you are working on " "them.    Double suction cup pads ("little Octopus") - to hold items while you use or wash them.   Electric can opener with a lid magnet strong enough to hold the can in the air - for one handed use.   In the bedroom   Back scratcher.   Large sleeve shirts and tops.   Put away clothing which buttons, fastens or snaps in the back or which uses drawstrings.    Sports bra or a camisole instead of a bra.   [employer] nylons can be pulled on one handed - most others can't.   A "wash and wear" haircut.     "

## 2024-02-27 NOTE — DISCHARGE SUMMARY
[facility] - Surgery (Hospital)  Discharge Note  Short Stay    Procedure(s) (LRB):  RELEASE, CARPAL TUNNEL (Left)  DECOMPRESSION, NERVE ULNAR NERVE ELBOW (Left)  INJECTION, STEROID CARPAL TUNNEL (Right)      OUTCOME: Patient tolerated treatment/procedure well without complication and is now ready for discharge.    DISPOSITION: Home or Self Care    FINAL DIAGNOSIS:  left carpal tunnel syndrome, left cubital tunnel syndrome, right carpal tunnel syndrome    FOLLOWUP: In clinic    DISCHARGE INSTRUCTIONS:    Discharge Procedure Orders   Diet general     Keep surgical extremity elevated     Ice to affected area     Lifting restrictions     No driving, operating heavy equipment or signing legal documents while taking pain medication.     Leave dressing on - Keep it clean, dry, and intact until clinic visit     Call MD for:  temperature >100.4     Call MD for:  persistent nausea and vomiting     Call MD for:  severe uncontrolled pain     Call MD for:  difficulty breathing, headache or visual disturbances     Call MD for:  redness, tenderness, or signs of infection (pain, swelling, redness, odor or green/yellow discharge around incision site)     Call MD for:  hives     Call MD for:  persistent dizziness or light-headedness     Call MD for:  extreme fatigue

## 2024-02-27 NOTE — PLAN OF CARE
Need site marked, Full H&P.    Anesthesia aware of the need for an IV.     informed about need for an IV and that she is doing okay.    Belongings are being locked in a locker. Bed in lowest, locked position with side rails up X2. Call light within reach. Questions answered. Patient decided to not do the blood refusal after talking with anesthesia. Ongoing monitoring.

## 2024-02-27 NOTE — PLAN OF CARE
Discharge plan reviewed w/ pt and spouse at bedside. AVSS on RA. L arm dressing CDI. Pain controlled w/ PRN medications. All Rx delivered to bedside. Pt states she is ready for discharge.

## 2024-02-27 NOTE — ANESTHESIA PROCEDURE NOTES
Supraclavicular Single Shot Nerve Block    Patient location during procedure: pre-op   Block not for primary anesthetic.  Reason for block: at surgeon's request and post-op pain management   Post-op Pain Location: L arm pain   Start time: 2/27/2024 8:01 AM  Timeout: 2/27/2024 8:01 AM   End time: 2/27/2024 8:03 AM    Staffing  Authorizing Provider: Darrell Eric MD  Performing Provider: Darrell Eric MD    Staffing  Performed by: Darrell Eric MD  Authorized by: Darrell Eric MD    Preanesthetic Checklist  Completed: patient identified, IV checked, site marked, risks and benefits discussed, surgical consent, monitors and equipment checked, pre-op evaluation and timeout performed  Peripheral Block  Patient position: supine  Prep: ChloraPrep  Patient monitoring: heart rate, cardiac monitor, continuous pulse ox, continuous capnometry and frequent blood pressure checks  Block type: supraclavicular  Laterality: left  Injection technique: single shot  Needle  Needle type: Stimuplex   Needle gauge: 22 G  Needle length: 2 in  Needle localization: anatomical landmarks and ultrasound guidance   -ultrasound image captured on disc.  Assessment  Injection assessment: negative aspiration, negative parasthesia and local visualized surrounding nerve  Paresthesia pain: none  Heart rate change: no  Slow fractionated injection: yes  Pain Tolerance: comfortable throughout block and no complaints  Medications:    Medications: ropivacaine (NAROPIN) injection 0.5% - Perineural   25 mL - 2/27/2024 8:03:00 AM    Additional Notes  VSS.  [facility] RN monitoring vitals throughout procedure.  Patient tolerated procedure well.

## 2024-02-27 NOTE — OP NOTE
[facility] \[facility] [place] [facility]\"")  Surgery Department  Operative Note    SUMMARY     Date of Procedure: 2/27/2024     Procedure:    1. Left elbow ulnar nerve decompression, cpt 94430  2. Left carpal tunnel recurrent release, cpt 20069  3. Right carpal tunnel steroid injection, cpt 79541    Surgeon(s) and Role:     * Etelvina Nicholas MD - Primary    Assisting Surgeon: None     Assistant: Mihir Rogers    Pre-Operative Diagnosis: Cubital tunnel syndrome on left [G56.22]  Carpal tunnel syndrome of left wrist [G56.02]  Carpal tunnel syndrome of right wrist [G56.01]    Post-Operative Diagnosis: Post-Op Diagnosis Codes:     * Cubital tunnel syndrome on left [G56.22]     * Carpal tunnel syndrome of left wrist [G56.02]     * Carpal tunnel syndrome of right wrist [G56.01]    Anesthesia: Regional    Indication for Procedure: 59 yo female with bilateral carpal and left cubital tunnel syndrome confirmed by EMG/NCS.  Risks and benefits of the procedure were discussed with the patient and informed consent was obtained.    Description of the Findings of the Procedure: The patient was seen in the preoperative holding area and the left elbow was marked. The patient was taken to the OR, placed supine on the table, and a padded hand table was used. After regional and monitored anesthesia care was administered without difficulty, a time-out procedure was performed identifying the patient, the operative site and the procedure to be performed.  40 mg of methylprednisolone was sterilely injected into the right carpal tunnel.  A tourniquet was placed on the arm and the left upper extremity was prepped and draped in standard sterile fashion. The upper extremity was elevated and exsanguinated with an Esmarch bandage, and the tourniquet was inflated to 250 mm Hg.  A 5 cm incision was made at the left medial elbow, tenotomy scissors were used to dissect down to Valentine ligament.  The medial antebrachial cutaneous nerve was identified and  protected throughout the case.  Valentine ligament was released and the ulnar nerve was identified.  The ulnar nerve was traced distally between the 2 heads of the FCU muscle.  The FCU fascia was released and the ulnar nerve was decompressed 6 cm distal to the medial epicondyle.  In a similar manner the ulnar nerve was traced proximally, the overlying fascia was released 6 cm proximally, the intermuscular septum as well as the arcade of [place] were released.  The elbow was put through range of motion, the ulnar nerve was stable and there were no new sites of compression along the nerve.  The elbow was copiously irrigated with normal saline.  4-0 monocryl was used to repair the subcutaneous layer, 4-0 monocryl was used to close the skin in a running subcuticular fashion. Dermabond was used to close the skin.    Attention was turned to the carpal tunnel, 2 cm incision was made over the left carpal tunnel.  The palmar fascia was sharply incised.  Arnold retractors were used to expose the transverse carpal ligament, this was sharply incised.  A carpal tunnel skid was placed underneath the transverse carpal ligament proximally, and the proximal transverse carpal ligament as well as the distal forearm fascia was sharply released. The median nerve was found to be significantly compressed through the carpal tunnel. The carpal tunnel skid was replaced distally, and the transverse carpal ligament was released under direct visualization.  The median nerve was found to be completely decompressed. The neurovascular bundles were identified and protected throughout the case. The wound was then irrigated with normal saline using a bulb syringe. 3-0 prolene was used to close the skin in a horizontal mattress fashion.    The neurovascular bundles were identified and protected throughout the case. Adaptic, 4x4, cast padding, a posterior elbow splint and coban were used to dress the hand. The tourniquet was deflated and the hand and  fingers were pink and well-perfused.  The patient tolerated the procedure well.  She was taken awake, alert and in good condition to the recovery room.    Complications: No    Estimated Blood Loss (EBL): minimal           Specimens: none           Condition: Good    Disposition: PACU - hemodynamically stable.    Attestation: I was present and scrubbed for the entire procedure.

## 2024-02-27 NOTE — TRANSFER OF CARE
"Anesthesia Transfer of Care Note    Patient: Ida Orozco    Procedure(s) Performed: Procedure(s) (LRB):  RELEASE, CARPAL TUNNEL (Left)  DECOMPRESSION, NERVE ULNAR NERVE ELBOW (Left)  INJECTION, STEROID CARPAL TUNNEL (Right)    Patient location: PACU    Anesthesia Type: regional    Transport from OR: Transported from OR on room air with adequate spontaneous ventilation    Post pain: adequate analgesia    Post assessment: no apparent anesthetic complications and tolerated procedure well    Post vital signs: stable    Level of consciousness: awake, alert and oriented    Nausea/Vomiting: no nausea/vomiting    Complications: none    Transfer of care protocol was followed      Last vitals: Visit Vitals  /68   Pulse (!) 58   Temp 36 °C (96.8 °F) (Temporal)   Resp 20   Ht 5' 4" (1.626 m)   Wt 96.2 kg (212 lb)   SpO2 95%   Breastfeeding No   BMI 36.39 kg/m²     "

## 2024-02-27 NOTE — H&P
Ida Orozco presents for follow up evaluation of        Encounter Diagnoses   Name Primary?    Cubital tunnel syndrome on left Yes    Carpal tunnel syndrome of left wrist      Carpal tunnel syndrome of right wrist     The patient has had an EMG/NCS which we reviewed together today and it showed:  Impression:  There is evidence of a left ulnar mononeuropathy at/near the elbow, such as would be seen in cubital tunnel syndrome.  There is focal demyelination across the elbow.  There is no sensory axonal loss.  There are not neuropathic changes in the ulnar innervated hand muscles.  This is graded as Mild in severity on the Left.    There is electrophysiologic evidence of a bilateral sensory median mononeuropathy across the wrist (I.e. Carpal tunnel syndrome).  There is no motor axonal loss.  There is no active denervation.  This is graded as Mild in severity bilaterally.       She has been trying to wear the carpal tunnel braces at night, although her hands continue to go numb in the evening. She also notes that the numbness in her left small and ring finger has worsened since we last saw her 10.11.23. She has not tried injections.     PE:     AA&O x 4.  NAD  HEENT:  NCAT, sclera nonicteric  Lungs:  Respirations are equal and unlabored.  CV:  2+ bilateral upper and lower extremity pulses.  MSK:  Neurovascularly intact bilaterally.  5/5 thenar and intrinsic musculature strength.  Full range of motion hands, wrists and elbows.     A/P: Bilateral carpal tunnel syndrome, ulnar nerve compression     We have discussed conservative vs. surgical treatment as well as risks, benefits and alternatives for carpal tunnel syndrome, ulnar nerve compression.  She has exhausted conservative measures and would like to proceed with surgery. Surgery would include Left carpal tunnel release, left ulnar nerve decompression at elbow / right carpal tunnel steroid injection.     We have discussed risks of hand surgery which include but are  not limited to blood clots in the legs that can travel to the lungs (pulmonary embolism). Pulmonary embolism can cause shortness of breath, chest pain, and even shock. Other risks include urinary tract infection, nausea and vomiting (usually related to pain medication), chronic pain, bleeding, nerve damage, blood vessel injury, scarring and infection of the hand which can require re-operation. Furthermore, the risks of anesthesia include potential heart, lung, kidney, and liver damage.  Informed consent was obtained.  she understands and would like to proceed with surgery in the near future.     Call with any questions/concerns in the interim          Etelvina Nicholas MD     Please be aware that this note has been generated with the assistance of [facility] voice-to-text.  Please excuse any spelling or grammatical errors.

## 2024-02-28 NOTE — ANESTHESIA POSTPROCEDURE EVALUATION
Anesthesia Post Evaluation    Patient: Ida Orozco    Procedure(s) Performed: Procedure(s) (LRB):  RELEASE, CARPAL TUNNEL (Left)  DECOMPRESSION, NERVE ULNAR NERVE ELBOW (Left)  INJECTION, STEROID CARPAL TUNNEL (Right)    Final Anesthesia Type: general      Patient location during evaluation: PACU  Patient participation: Yes- Able to Participate  Level of consciousness: awake and alert and oriented  Post-procedure vital signs: reviewed and stable  Pain management: adequate  Airway patency: patent    PONV status at discharge: No PONV  Anesthetic complications: no      Cardiovascular status: blood pressure returned to baseline and hemodynamically stable  Respiratory status: unassisted, room air and spontaneous ventilation  Hydration status: euvolemic  Follow-up not needed.              Vitals Value Taken Time   /76 02/27/24 1047   Temp 36.6 °C (97.9 °F) 02/27/24 1045   Pulse 80 02/27/24 1054   Resp 49 02/27/24 1054   SpO2 96 % 02/27/24 1054   Vitals shown include unvalidated device data.      Event Time   Out of Recovery 10:40:00         Pain/Tito Score: Pain Rating Prior to Med Admin: 5 (2/27/2024 10:46 AM)  Tito Score: 9 (2/27/2024 10:05 AM)

## 2024-03-08 ENCOUNTER — CLINICAL SUPPORT (OUTPATIENT)
Dept: REHABILITATION | Facility: HOSPITAL | Age: 59
End: 2024-03-08
Attending: ORTHOPAEDIC SURGERY
Payer: MEDICARE

## 2024-03-08 DIAGNOSIS — G56.22 CUBITAL TUNNEL SYNDROME ON LEFT: ICD-10-CM

## 2024-03-08 DIAGNOSIS — G56.02 CARPAL TUNNEL SYNDROME OF LEFT WRIST: ICD-10-CM

## 2024-03-08 PROCEDURE — 97165 OT EVAL LOW COMPLEX 30 MIN: CPT

## 2024-03-08 PROCEDURE — 97110 THERAPEUTIC EXERCISES: CPT

## 2024-03-11 ENCOUNTER — OFFICE VISIT (OUTPATIENT)
Dept: ORTHOPEDICS | Facility: CLINIC | Age: 59
End: 2024-03-11
Payer: MEDICARE

## 2024-03-11 DIAGNOSIS — Z98.890 POST-OPERATIVE STATE: Primary | ICD-10-CM

## 2024-03-11 DIAGNOSIS — G56.02 CARPAL TUNNEL SYNDROME OF LEFT WRIST: ICD-10-CM

## 2024-03-11 DIAGNOSIS — G56.22 CUBITAL TUNNEL SYNDROME ON LEFT: ICD-10-CM

## 2024-03-11 PROCEDURE — 99024 POSTOP FOLLOW-UP VISIT: CPT | Mod: HCNC,S$GLB,, | Performed by: ORTHOPAEDIC SURGERY

## 2024-03-11 PROCEDURE — 99999 PR PBB SHADOW E&M-EST. PATIENT-LVL III: CPT | Mod: PBBFAC,HCNC,, | Performed by: ORTHOPAEDIC SURGERY

## 2024-03-11 PROCEDURE — 1159F MED LIST DOCD IN RCRD: CPT | Mod: HCNC,CPTII,S$GLB, | Performed by: ORTHOPAEDIC SURGERY

## 2024-03-11 PROCEDURE — 1160F RVW MEDS BY RX/DR IN RCRD: CPT | Mod: HCNC,CPTII,S$GLB, | Performed by: ORTHOPAEDIC SURGERY

## 2024-03-11 RX ORDER — CLINDAMYCIN HYDROCHLORIDE 300 MG/1
300 CAPSULE ORAL EVERY 6 HOURS
Qty: 20 CAPSULE | Refills: 0 | Status: CANCELLED | OUTPATIENT
Start: 2024-03-11 | End: 2024-03-16

## 2024-03-11 RX ORDER — DOXYCYCLINE HYCLATE 100 MG
100 TABLET ORAL 2 TIMES DAILY
Qty: 10 TABLET | Refills: 0 | Status: SHIPPED | OUTPATIENT
Start: 2024-03-11 | End: 2024-03-16

## 2024-03-11 NOTE — PROGRESS NOTES
Ida Orozco presents for post-operative evaluation of   Encounter Diagnoses   Name Primary?    Post-operative state Yes    Cubital tunnel syndrome on left     Carpal tunnel syndrome of left wrist    .   History of Present Illness     The patient is now 15d s/p 2.27.24 Left elbow ulnar n. decompression, Left recurrent carpal tunnel release / Right carpal tunnel csi. She was seen by OT 3.8.24, sutures left hand removed. Patient states that monocryl suture end was still present on her left elbow so she took scissors that she sterilized and clipped the end on Friday. Area looks a little red today in clinic. She noted a pustule at the site. She has an ileostomy and has to care for this daily.        Vitals:    03/11/24 1020   PainSc: 0-No pain   PainLoc: Hand       PE:    AA&O x 4.  NAD  HEENT:  NCAT, sclera nonicteric  Lungs:  Respirations are equal and unlabored.  CV:  2+ bilateral upper and lower extremity pulses.  MSK:   Physical Exam     Small area of redness proximal elbow incision. The left hand incision is well healed, left elbow incision healing.  Full wrist and finger motion.  Neurovascularly intact and has 5/5 thenar and intrinsic musculature strength.      Diagnostic studies and other clinical records review:  Results    No new xrays today.  EMG/NCS 1.9.24 Impression:  There is evidence of a left ulnar mononeuropathy at/near the elbow, such as would be seen in cubital tunnel syndrome.  There is focal demyelination across the elbow.  There is no sensory axonal loss.  There are not neuropathic changes in the ulnar innervated hand muscles.  This is graded as Mild in severity on the Left.    There is electrophysiologic evidence of a bilateral sensory median mononeuropathy across the wrist (I.e. Carpal tunnel syndrome).  There is no motor axonal loss.  There is no active denervation.  This is graded as Mild in severity bilaterally.      Assessment: Status post above, doing well    ICD-10-CM ICD-9-CM   1.  Post-operative state  Z98.890 V45.89   2. Cubital tunnel syndrome on left  G56.22 354.2   3. Carpal tunnel syndrome of left wrist  G56.02 354.0        Plan:   Assessment & Plan       Continue with range of motion; strengthening at 6 weeks, abx called in, she states she has a history of staph infections  F/U 6 weeks   Call with any questions/concerns in the interim        Etelvina Nicholas MD    Please be aware that this note has been generated with the assistance of [employer] voice-to-text.  Please excuse any spelling or grammatical errors.  This note was generated with the assistance of ambient listening technology. Verbal consent was obtained by the patient and accompanying visitor(s) for the recording of patient appointment to facilitate this note. I attest to having reviewed and edited the generated note for accuracy, though some syntax or spelling errors may persist. Please contact the author of this note for any clarification.

## 2024-03-12 NOTE — PLAN OF CARE
" [facility]  Occupational Therapy Initial Evaluation     Name: Ida Orozco  Clinic Number: [id number]    Therapy Diagnosis:   Encounter Diagnoses   Name Primary?    Cubital tunnel syndrome on left     Carpal tunnel syndrome of left wrist      Physician: Etelvina Nicholas MD    Physician Orders: Eval and Treat  Medical Diagnosis: Cubital tunnel syndrome on left [G56.22]  Carpal tunnel syndrome of left wrist [G56.02]    Surgical Procedure and Date: 2/27/2024   Procedure:    1. Left elbow ulnar nerve decompression, cpt 38645  2. Left carpal tunnel recurrent release, cpt 55528  3. Right carpal tunnel steroid injection, cpt 21503,     Evaluation Date: 3/8/2024  Insurance Authorization Period Expiration: 1/11/2025  Plan of Care Certification Period: 5/3/2024  Date of Return to MD: 3/11/2024  Visit # / Visits authorized: 1 / 1  FOTO Function Score  52% function  - 3/12/2024    Precautions:  Standard    Time In:10:00 am  Time Out: 11:00 am  Total Appointment Time (timed & untimed codes): 60 minutes minutes    Subjective     Date of Onset: "about 3 to 4 years"    History of Current Condition/Mechanism of Injury: Ida reports: that she began dropping things and then paresthesias and numbness began.    Falls: 4 months ago    Involved Side: Left  Dominant Side: Right    Mechanism of Injury: progressive onset    Prior Therapy: "a long time ago"    Pain:  Functional Pain Scale Rating 0-10:   0/10 on average  0/10 at best  3 to 4/10 at worst  Location: elbow from post-op ortho-glass splint and at CTR site.  Description: Burning   Aggravating Factors: rubbing from cast  Easing Factors: NA at this time    Occupation:    Working presently: disability due to colostomy      Functional Limitations/Social History:    Previous functional status includes: Independent with all ADLs.     Current Functional Status   Home/Living environment: lives with an adult     Limitation of Functional Status as " "follows:   ADLs/IADLs:     - Feeding: Independent     - Bathing: Modified Independent     - Dressing/Grooming: with assistance    - Home Management: "Hasn't got done."    - Driving: Independent      Leisure: None    Patient's Goals for Therapy: "Get my.. 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    Ida Orozco  has a past surgical history that includes Hysterectomy; Ileostomy revision;  section, low transverse; Cholecystectomy; TONSILLECTOMY, ADENOIDECTOMY; thumb surgery; [place] tooth extraction; Appendectomy; Colectomy; Laparoscopic proctectomy (N/A, 2018); Lysis of adhesions (2018); Flexible sigmoidoscopy (2018); GASTRIC SLEEVE ; Cystoscopy w/ retrogrades (N/A, 2018); Catheterization of both left and right heart (Right, 2019); Panniculectomy (Bilateral, 2019); Revision Colostomy (N/A, 2019); Oophorectomy; Ileoscopy (2018); Flexible sigmoidoscopy (2018); Esophagogastroduodenoscopy (2018); Endoscopic ultrasound of upper gastrointestinal tract (N/A, 11/10/2021); Esophagogastroduodenoscopy (N/A, 11/10/2021); fess, with imaging guidance (Bilateral, 2023); Ileoscopy (N/A, 2024); Esophagogastroduodenoscopy (N/A, 2024); Carpal tunnel release (Left, 2024); Decompression of nerve (Left, 2024); and Injection of steroid (Right, 2024).    Ida has a current medication list which includes the following prescription(s): amitriptyline, clonazepam, diphenoxylate-atropine " 2.5-0.025 mg, docusate sodium, doxycycline, ergocalciferol, hydrocodone-acetaminophen, levocetirizine, loratadine, meclizine, melatonin, nystatin, ondansetron, promethazine, sertraline, topiramate, UNABLE TO FIND, and [DISCONTINUED] humira(cf) pen.    Review of patient's allergies indicates:   Allergen Reactions    Adhesive      Cause blisters    Dilaudid [hydromorphone] Nausea And Vomiting    Humira [adalimumab] Hives    Sulfa (sulfonamide antibiotics) Hives    Surgical stainless steel      Had surgical staples, caused irritation and infection        Objective     Observation/Appearance:  Pt presented in bulky post-op dressing and orthogalss splint. Surgery sites healthy in appearance. Sutures in place at CTR site and Dermabond at cubital tunnel release site. Only mild edema visible at CTR site as expected at this time.       AROM: Hand, Wrist, FA, Elbow all WNL       Sensation TBA  NT Distribution 3/8/2024 3/8/2024    Right  Left    Quilcene Dennis     Normal 1.65-2.83     Diminished Light Touch 3.22-3.61     Diminished Protective 3.84-4.31     Loss of Protective 4.56-6.65     Untestable >6.65     2 Point Discrimination     Static     Dynamic          Strength (Dyanmometer) and Pinch Strength (Pinch Gauge)  Measured in pounds and psi. Average of three trials.   3/8/2024 3/8/2024    Right  Left    Rung II     Cifuentes Pinch     3pt Pinch     2pt Pinch           CMS Impairment/Limitation/Restriction for FOTO Wrist Survey    Therapist reviewed FOTO scores for Ida KARLY Orozco on 3/8/2024.   FOTO documents entered into [employer] - see Media section.    Limitation Score: 52%           Treatment     Total Treatment time separate from Evaluation time:40 min  Sutures at Carpal Tunnel Release site were removed and streri-strips were applied. Pt was educated in post post suture removal care. ( 5 minutes non-billable)  Ida received the treatments listed below:      supervised modalities NT      direct contact modalities after  being cleared for contraindications: NT    Therapeutic exercises to develop ROM for 30 minutes, including:  Exercise Reps/Time   AROM Elbow FA in 3 planes, 1/10 each plane   Fa AROM 10 reps   Wrist AROM: e/f, RD/UD x 10 each   TGE: Wave, Straight Fist, Hook, Fist, X 10 each   Thumb AROM:Pinky Slides X 10    Education Educated in HEP                       manual therapy techniques: NT   neuromuscular re-education  NT     therapeutic activities to improve functional performance for NT   Patient Education and Home Exercises      Education provided:   -role of OT, goals for OT, scheduling/cancellations, insurance limitations with patient.  -Additional Education provided: HEP and post-suture removal care    Written Home Exercises Provided: yes.  Exercises were reviewed and Ida was able to demonstrate them prior to the end of the session.    Ida demonstrated good  understanding of the education provided.     Pt was advised to perform these exercises free of pain, and to stop performing them if pain occurs.    See EMR under Patient Instructions for exercises provided 3/8/2024.    Assessment     Ida Orozco is a 58 y.o. female referred to outpatient occupational therapy and presents with a medical diagnosis of Cubital tunnel syndrome on left [G56.22]  Carpal tunnel syndrome of left wrist [G56.02].    Following medical record review it is determined that pt will benefit from occupational therapy services in order to maximize pain free and/or functional use of left UE. The following goals were discussed with the patient and patient is in agreement with them as to be addressed in the treatment plan. The patient's rehab potential is Good.     Anticipated barriers to occupational therapy: None    Plan of care discussed with patient: Yes  Patient's spiritual, cultural and educational needs considered and patient is agreeable to the plan of care and goals as stated below:     Medical Necessity is demonstrated by the  following  Occupational Profile/History  Co-morbidities and personal factors that may impact the plan of care [x] LOW: Brief chart review  [] MODERATE: Expanded chart review   [] HIGH: Extensive chart review    Moderate / High Support Documentation: NA     Examination  Performance deficits relating to physical, cognitive or psychosocial skills that result in activity limitations and/or participation restrictions  [x] LOW: addressing 1-3 Performance deficits  [] MODERATE: 3-5 Performance deficits  [] HIGH: 5+ Performance deficits (please support below)    Moderate / High Support Documentation:    Physical:  Muscle Power/Strength  Muscle Endurance  Skin Integrity/Scar Formation   Strength  Pinch Strength  Gross Motor Coordination  Fine Motor Coordination  Proprioception Functions  Pain    Cognitive:  No Deficits    Psychosocial:    No Deficits     Treatment Options [] LOW: Limited options  [] MODERATE: Several options  [] HIGH: Multiple options      Decision Making/ Complexity Score: low       The following goals were discussed with the patient and patient is in agreement with them as to be addressed in the treatment plan.       Goals:   The following goals were discussed with the patient and patient is in agreement with them as to be addressed in the treatment plan.     Long Term Goals (LTGs); to be met by discharge.  LTG #1: Pt will report a pain level of 0 -2 out of 10 with ADLs/IADLs   LTG #2: Pt will demo improved FOTO score by 20 points.   LTG #3: Pt will return to prior level of function for ADLs /IADLs    Short Term Goals (STGs); to be met within 4 weeks 4/5/2024  STG #1: Pt will report 3 to 4 out of 10 pain level during light ADL's  STG #2: Pt will demo improved FOTO score by 10 points.   STG #3: Pt will report independence in self-care ADLs'    Plan   Certification Period/Plan of care expiration: 3/8/2024 to 5/3/2024.    Outpatient Occupational Therapy 2 times weekly for 8 weeks to include the following  interventions: Paraffin, Fluidotherapy, Manual therapy/joint mobilizations, Modalities for pain management, US 3 mhz, Therapeutic exercises/activities., Iontophoresis with 2.0 cc Dexamethasone, Strengthening, and Orthotic Fabrication/Fit/Training.    Faye Dial, OT

## 2024-03-18 ENCOUNTER — TELEPHONE (OUTPATIENT)
Dept: GASTROENTEROLOGY | Facility: CLINIC | Age: 59
End: 2024-03-18
Payer: MEDICARE

## 2024-03-18 NOTE — TELEPHONE ENCOUNTER
----- Message from Angela Lerma sent at 3/18/2024 11:18 AM CDT -----  Regarding: Reschedule appt  Contact: pt at [phone]  Pt is calling to speak with someone in provider's office. Pt is asking for a return call  regarding rescheduling appt for 03/18/24. Pt stated her ride canceled. please call pt at [phone]

## 2024-03-26 ENCOUNTER — PATIENT MESSAGE (OUTPATIENT)
Dept: ORTHOPEDICS | Facility: CLINIC | Age: 59
End: 2024-03-26
Payer: MEDICARE

## 2024-04-22 ENCOUNTER — OFFICE VISIT (OUTPATIENT)
Dept: ORTHOPEDICS | Facility: CLINIC | Age: 59
End: 2024-04-22
Payer: MEDICARE

## 2024-04-22 ENCOUNTER — PATIENT MESSAGE (OUTPATIENT)
Dept: ORTHOPEDICS | Facility: CLINIC | Age: 59
End: 2024-04-22
Payer: MEDICARE

## 2024-04-22 VITALS — WEIGHT: 219.81 LBS | HEIGHT: 64 IN | BODY MASS INDEX: 37.52 KG/M2

## 2024-04-22 DIAGNOSIS — G56.22 CUBITAL TUNNEL SYNDROME ON LEFT: ICD-10-CM

## 2024-04-22 DIAGNOSIS — Z98.890 POST-OPERATIVE STATE: Primary | ICD-10-CM

## 2024-04-22 DIAGNOSIS — G56.02 CARPAL TUNNEL SYNDROME OF LEFT WRIST: ICD-10-CM

## 2024-04-22 DIAGNOSIS — G56.01 CARPAL TUNNEL SYNDROME OF RIGHT WRIST: ICD-10-CM

## 2024-04-22 PROCEDURE — 1160F RVW MEDS BY RX/DR IN RCRD: CPT | Mod: HCNC,CPTII,S$GLB, | Performed by: ORTHOPAEDIC SURGERY

## 2024-04-22 PROCEDURE — 99999 PR PBB SHADOW E&M-EST. PATIENT-LVL II: CPT | Mod: PBBFAC,HCNC,, | Performed by: ORTHOPAEDIC SURGERY

## 2024-04-22 PROCEDURE — 99024 POSTOP FOLLOW-UP VISIT: CPT | Mod: HCNC,S$GLB,, | Performed by: ORTHOPAEDIC SURGERY

## 2024-04-22 PROCEDURE — 1159F MED LIST DOCD IN RCRD: CPT | Mod: HCNC,CPTII,S$GLB, | Performed by: ORTHOPAEDIC SURGERY

## 2024-04-22 NOTE — PROGRESS NOTES
Ida Orozco presents for post-operative evaluation of   Encounter Diagnoses   Name Primary?    Post-operative state Yes    Cubital tunnel syndrome on left     Carpal tunnel syndrome of left wrist     Carpal tunnel syndrome of right wrist    .   History of Present Illness     The patient is now 7 weeks 6 days s/p 2.27.24 Left elbow ulnar n. decompression, Left recurrent carpal tunnel release / Right carpal tunnel csi. She is doing well.    Her right wrist was injected DOS and provided short term relief. Her EMG/NCS showed mild carpal tunnel syndrome. She is wanting to wait on right carpal tunnel release until she is recovered from her left.    Vitals:    04/22/24 1412   PainSc:   4   PainLoc: Wrist       PE:    AA&O x 4.  NAD  HEENT:  NCAT, sclera nonicteric  Lungs:  Respirations are equal and unlabored.  CV:  2+ bilateral upper and lower extremity pulses.  MSK: The incision is well healed.  Full wrist and finger motion.  Neurovascularly intact and has 5/5 thenar and intrinsic musculature strength.    Physical Exam         Diagnostic studies and other clinical records review:  Results       No New Imaging today    EMG/NCS 1.9.24 Impression:  There is evidence of a left ulnar mononeuropathy at/near the elbow, such as would be seen in cubital tunnel syndrome.  There is focal demyelination across the elbow.  There is no sensory axonal loss.  There are not neuropathic changes in the ulnar innervated hand muscles.  This is graded as Mild in severity on the Left.    There is electrophysiologic evidence of a bilateral sensory median mononeuropathy across the wrist (I.e. Carpal tunnel syndrome).  There is no motor axonal loss.  There is no active denervation.  This is graded as Mild in severity bilaterally.    Assessment & Plan: Status post above, doing well     Assessment & Plan       Continue with range of motion; strengthening at 6 weeks  F/U 8 weeks   Call with any questions/concerns in the interim        Etelvina CARMEN  MD Cristopher    Please be aware that this note has been generated with the assistance of [employer] voice-to-text.  Please excuse any spelling or grammatical errors.    This note was generated with the assistance of ambient listening technology. Verbal consent was obtained by the patient and accompanying visitor(s) for the recording of patient appointment to facilitate this note. I attest to having reviewed and edited the generated note for accuracy, though some syntax or spelling errors may persist. Please contact the author of this note for any clarification.

## 2024-04-26 ENCOUNTER — PATIENT MESSAGE (OUTPATIENT)
Dept: ORTHOPEDICS | Facility: CLINIC | Age: 59
End: 2024-04-26
Payer: MEDICARE

## 2024-04-28 NOTE — PROGRESS NOTES
[facility]   PROGRESS NOTE    Name: Ida Orozco  : 1965  Date of Service: 2024   PCP: Eliecer Jones MD    Reason for visit:   Chief Complaint   Patient presents with    Follow-up       HPI:   Ms. Ida Orozco is a 58 year old female presenting as a follow-up visit. 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 She has an additional PSH for cholecystectomy.       GI Treatment Course:  Patient reports developing recurrent post-prandial non-bloody diarrhea and abdominal pain following cholecystectomy in . She reports being evaluated by multiple providers and told symptoms were normal following cholecystectomy; no endoscopic evaluation performed. In approximately , she reports developing recurrent diffuse joint pains associated with GI symptoms.   In approximately , patient then developed bloody diarrhea and rectal pain and was subsequently diagnosed with ulcerative colitis by Dr. Wilton Murphy in [place]. She does not recall being told of the extent of her disease distribution endoscopically. She was initially treated with high dose oral steroids and 5-ASA products without symptomatic improvement. She reports being treated with REMICADE for approximately 6 months in [id number] also without reported improvement. Patient reports then being treated with HUMIRA and ENBREL also without improvement; she does not recall having drug levels monitored.   In 2013, patient underwent total abdominal colectomy with end ileostomy at [facility]  "[facility]. Pathology results from that procedure (see documents from [employer] from 2014) showed "idiopathic inflammatory bowel disease with prominent involvement from the descending colon sections distally with zones of marked mucosal acute and chronic inflammation, areas of mucosal architectural disruption with glandular skip zones, surface nodularity and zones of epithelial erosion. Also associated are areas with prominent glandular regenerative activity with a marked inflammatory type nuclear atypia and focally prominent crypt abscess formation." There was no pathologic abnormalities associated with the mesentery or IC valve. Findings were felt to be consistent with chronic UC.   In , patient was noted to develop partial stenosis of ostomy site and underwent exploratory laparotomy in 2014 with resection of unspecified amount of ischemic distal ileum and creation of Dailey ostomy. In 2014, patient was admitted to [facility] for management of thanai-ileostomy abscess in the right lower quadrant with concern for enterocutaneous fistula formation; abscess was managed with I&D. In 2014, she was admitted to [facility] for management of recurrent thania-ileostomy abscess formation with concern for enterocutaneous fistula formation near ostomy site. Ileoscopy performed in 2014 was notable for a stenotic ileostomy site requiring a  endoscope to traverse area; patient was noted to have a small fistula tracking just proximal to the stoma into the abdominal cavity; biopsies of fistula site showed a small fragment of ulcerated and inflamed granulation tissue. She underwent repeat ileostomy revision and drainage of abscess by CRS here at that time; operative report not available for review. She was discharged without IBD specific medications and routine follow-up with GI.   In 2016, patient underwent sleeve gastrectomy for weight loss management.   She was evaluated by GI clinic " "here in 1/2018 for approximately 6-7 month duration of rectal bleeding. MRE in 1/2018 showed evidence of prior colectomy with right lower quadrant ileostomy and increased enhancement of anal canal possibly from active inflammation. Ileostomy obtained in 4/2018 was normal and flexible sigmoidoscopy in 4/2018 showed a non-patent surgical anastomosis in the rectum with friable mucosa; no biopsies obtained. She was evaluated by CRS here and underwent completion proctectomy in 5/2018 with operative findings notable for a rectal stricture preventing endoscopic evaluation. Pathology results showed significantly narrowed bowel lumen (grossly 0.6 cm) with mucosal atrophy and fibrosis (no residual mucosa identified) and focal patchy chronic inflammation.  12/2019: Due to pannus formation associated with weight loss following sleeve gastrectomy, she eventually underwent panniculectomy with revision of ileostomy with CRS and plastic surgery here.   11/2021: Patient was diagnosed with seronegative PBC following liver biopsy for evaluation of transaminitis.   12/2021: Patient was referred to IBD clinic here and evaluated by Dr. Chi. At that time, she reported emptying her ostomy 8-9 times daily with associated intermittent passage of blood from ostomy, mouth ulcers, and "phantom" rectal pain. Fecal calprotectin noted to be elevated to 80 in 6/2021, however repeat fecal calprotectin in 12/2021 was undetectable. No medical therapy was initiated based on low suspicion for active IBD contributing to symptoms. Gabapentin was given for "phantom" rectal pain. Follow-up appointment planned for 7/2022, however this was not scheduled.   6/2022: Evaluated by rheumatology here with regards to recurrent diffuse arthralgias. Patient felt to either have inflammatory arthritis versus fibromyalgia. She was re-initiated on HUMIRA 40 mg Q2 weeks in 6/2022 with resolution of joint pains, however this was discontinued in 8/2022 due to development " "of hives after each injection.   8/2023: Evaluated by GEMMA in general GI clinic here with concern for recurrent left sided abdominal pain associated with arthralgias and smelling blood in ostomy output without blood seen in output. Fecal calprotectin again undetectable and stool testing for C.diff, giardia, cryptosporidium, and culture were negative. She was referred back to IBD clinic here for further evaluation.    11/2023: Evaluation by Dr. Singh for intermittent left lower quadrant abdominal pain adjacent to ostomy in right lower quadrant "for years". She reported pain worse after consumption of meals consisting of fiber, bread, and pasta products. She reported emptying her ostomy bag approximately 6 times daily with only liquid output; denied seeing blood in output but reported output smelling bloody. She also reported symptom association with nausea and intermittent dysphagia to solid foods. Abdominal pain suspected to be secondary to functional disorder such as IBS. EGD and ileostomy ordered to rule out signs of active IBD and other organic diseases. EGD and ileoscopy performed in 1/2024 without evidence of EoE, H.pylori, celiac disease, or active IBD.       Interval History:   EGD and ileoscopy performed in 1/2024 without evidence of EoE, H.pylori, celiac disease, or active IBD. Patient referred back to general GI clinic to discuss treatment of functional GI disorder.     Patient reports on-going intermittent bilateral lower abdominal pain for approximately the last 15-20 years that she describes as a "charley horse" sensation unrelated to PO intake or urination. He also reports on-going pain near stoma site, however she denies noticing erythema or irritation around site. She continues to empty her ostomy bag approximately 4-6 times daily with only liquid output and she continues to deny seeing blood in ostomy output.       Most Recent Upper Endoscopy:   -EGD in 1/2024 for abdominal pain and dysphagia: LA " Grade B esophagitis, sleeve gastrectomy with healthy mucosa, and normal duodenum. Esophageal, gastric, and duodenal biopsies unremarkable.   -EUS 11/2021 for evaluation of elevated liver enzymes: Stanley-colored mucosa of lower esophagus (biopsies showed benign squamous and gastric-type mucosa with chronic active inflammation), fatty infiltration of the pancreas, and normal bile ducts and liver.   -EGD 4/2018 for evaluation of reflux: LA grade A esophagitis, 2 cm sliding hiatal hernia, sleeve gastrectomy with healthy appearing mucosa, normal duodenum.     Most Recent Colonoscopy:   -Ileoscopy in 1/2024: Normal. Biopsies normal.   -Ileoscopy 4/2018: Normal. No biopsies obtained.   -Flexible sigmoidoscopy 4/2018: Non-patent surgical anastomosis in the rectum with friable mucosa. No biopsies obtained.     Review of Systems:   Review of Systems   Constitutional:  Positive for malaise/fatigue. Negative for chills, fever and weight loss.   Eyes:  Negative for blurred vision and double vision.   Respiratory:  Negative for cough, sputum production and shortness of breath.    Cardiovascular:  Negative for chest pain and leg swelling.   Gastrointestinal:  Positive for abdominal pain, diarrhea and nausea. Negative for blood in stool, heartburn, melena and vomiting.   Musculoskeletal:  Positive for joint pain.   Skin:  Negative for rash.   Neurological:  Negative for seizures.       Medications:   Current Outpatient Medications:     amitriptyline (ELAVIL) 10 MG tablet, Take 1-2 tablets (10-20 mg total) by mouth nightly as needed for Insomnia., Disp: 60 tablet, Rfl: 5    clonazePAM (KLONOPIN) 1 MG tablet, Take 1 tablet (1 mg total) by mouth daily as needed for Anxiety., Disp: 90 tablet, Rfl: 1    dicyclomine (BENTYL) 10 MG capsule, Take 1 capsule (10 mg total) by mouth 4 (four) times daily as needed (Abdominal spasms)., Disp: 120 capsule, Rfl: 0    diphenoxylate-atropine 2.5-0.025 mg (LOMOTIL) 2.5-0.025 mg per tablet, Take 1  "tablet by mouth 4 (four) times daily as needed for Diarrhea., Disp: 30 tablet, Rfl: 2    docusate sodium (COLACE) 100 MG capsule, Take 1 capsule (100 mg total) by mouth 2 (two) times daily., Disp: 30 capsule, Rfl: 1    ergocalciferol (VITAMIN D2) 50,000 unit Cap, Take 1 capsule (50,000 Units total) by mouth every 7 days., Disp: 12 capsule, Rfl: 3    HYDROcodone-acetaminophen (NORCO) 5-325 mg per tablet, Take 1 tablet by mouth every 6 (six) hours as needed for Pain., Disp: 30 tablet, Rfl: 0    levocetirizine (XYZAL) 5 MG tablet, Take 1 tablet (5 mg total) by mouth every evening., Disp: 90 tablet, Rfl: 1    loratadine (CLARITIN) 10 mg tablet, Take 1 tablet (10 mg total) by mouth once daily., Disp: 90 tablet, Rfl: 1    meclizine (ANTIVERT) 25 mg tablet, Take 1 tablet (25 mg total) by mouth 3 (three) times daily as needed for Dizziness or Nausea., Disp: 60 tablet, Rfl: 2    melatonin 10 mg Tab, Take 1 tablet by mouth nightly as needed., Disp: , Rfl:     nystatin (MYCOSTATIN) powder, Apply topically 2 (two) times daily., Disp: 60 g, Rfl: 3    ondansetron (ZOFRAN) 8 MG tablet, Take 1 tablet (8 mg total) by mouth every 8 (eight) hours as needed for Nausea., Disp: 30 tablet, Rfl: 0    promethazine (PHENERGAN) 25 MG tablet, Take 1 tablet (25 mg total) by mouth every 6 (six) hours as needed for Nausea., Disp: 15 tablet, Rfl: 2    sertraline (ZOLOFT) 100 MG tablet, Take 2 tablets (200 mg total) by mouth once daily., Disp: 180 tablet, Rfl: 3    topiramate (TOPAMAX) 100 MG tablet, Take 1 tablet (100 mg total) by mouth 2 (two) times daily., Disp: 180 tablet, Rfl: 3    UNABLE TO FIND, Take 1 Dose by mouth daily as needed (as needed for sleep and anxiety). medication name: Delta 8 and Delta 9 hemp gummies, Disp: , Rfl:      Past medical history, past surgical history, family history, allergies, and social history reviewed and updated in EMR.     Vitals:   Vitals:    04/29/24 1421   Weight: 100.8 kg (222 lb 3.6 oz)   Height: 5' 4" " (1.626 m)     Body mass index is 38.14 kg/m².    Physical Exam:   Physical Exam  Constitutional:       General: She is not in acute distress.     Appearance: She is obese. She is not ill-appearing.   Eyes:      General: No scleral icterus.  Cardiovascular:      Rate and Rhythm: Normal rate and regular rhythm.      Pulses: Normal pulses.      Heart sounds: Normal heart sounds.   Pulmonary:      Effort: Pulmonary effort is normal.      Breath sounds: Normal breath sounds.   Abdominal:      General: There is no distension.      Palpations: Abdomen is soft. There is no mass.      Tenderness: There is no abdominal tenderness.      Hernia: No hernia is present.      Comments: Well-healed crescent-shaped surgical scar in right upper quadrant. Ostomy in place in right lower quadrant containing liquid brown stool.    Musculoskeletal:      Right lower leg: No edema.      Left lower leg: No edema.   Skin:     General: Skin is warm and dry.      Coloration: Skin is not jaundiced.   Neurological:      General: No focal deficit present.      Mental Status: She is alert and oriented to person, place, and time.         Labs:    Latest Reference Range & Units 11/11/22 08:25 05/03/23 08:51 08/06/23 19:26   Hemoglobin 12.0 - 16.0 g/dL 13.5 13.4 13.1   Hematocrit 37.0 - 48.5 % 43.6 43.3 42.9   MCV 82 - 98 fL 89 95 94   MCH 27.0 - 31.0 pg 27.4 29.3 28.7   MCHC 32.0 - 36.0 g/dL 31.0 (L) 30.9 (L) 30.5 (L)   RDW 11.5 - 14.5 % 15.7 (H) 14.4 14.5   Platelet Count 150 - 450 K/uL 259 217 221   (L): Data is abnormally low  (H): Data is abnormally high     Latest Reference Range & Units 11/11/22 08:25 05/03/23 08:51 08/06/23 19:26   ALP 55 - 135 U/L 102 100 123   PROTEIN TOTAL 6.0 - 8.4 g/dL 6.7 7.1 7.0   Albumin 3.5 - 5.2 g/dL 3.2 (L) 3.4 (L) 3.5   BILIRUBIN TOTAL 0.1 - 1.0 mg/dL 0.4 0.3 0.2   AST 10 - 40 U/L 19 19 24   ALT 10 - 44 U/L 21 10 20   (L): Data is abnormally low    Abdominal Imaging:   -MR elastrography 8/2021:   1.  Liver fat  fraction measures 4%, consistent with normal.  2.  Liver elasticity measures 3.58 kPa consistent with F1 or F2 fibrosis.    -MRCP 8/2021:   Satisfactory MRCP in setting of cholecystectomy. No retained stones, strictures, or duct dilatation.       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 She is status post EGD and ileoscopy in 1/2024 without evidence of EoE, H.pylori, celiac disease, and active IBD contributing to symptoms. Etiology of presentation mostly likely secondary to functional issue versus scar tissue from multiple previous abdominal surgeries causing spasms of abdominal wall musculature.     Problem List:   #1. Chronic bilateral lower abdominal pain  #2. IBD.   #3. Ileostomy in place  #4. PBC  #5. Obesity    Plan:   -Trial of Bentyl QID PRN for attempted relief of abdominal spasms.   -CT enterography for evaluation of small bowel disease/lesions contributing to symptoms.   -Schedule follow-up with hepatology with regards to PBC management.     Disposition: Follow-up in 2-3 months.     Discussed with Dr. Gu.         Ted Garcia MD, PGY-VI  Gastroenterology Fellow  [facility]   intact

## 2024-04-29 ENCOUNTER — OFFICE VISIT (OUTPATIENT)
Dept: GASTROENTEROLOGY | Facility: CLINIC | Age: 59
End: 2024-04-29
Payer: MEDICARE

## 2024-04-29 VITALS — WEIGHT: 222.25 LBS | HEIGHT: 64 IN | BODY MASS INDEX: 37.94 KG/M2

## 2024-04-29 DIAGNOSIS — G89.29 ABDOMINAL PAIN, CHRONIC, LEFT LOWER QUADRANT: Primary | ICD-10-CM

## 2024-04-29 DIAGNOSIS — K52.9 INFLAMMATORY BOWEL DISEASE: ICD-10-CM

## 2024-04-29 DIAGNOSIS — K74.3 PRIMARY BILIARY CHOLANGITIS: ICD-10-CM

## 2024-04-29 DIAGNOSIS — E66.9 CLASS 2 OBESITY WITH BODY MASS INDEX (BMI) OF 38.0 TO 38.9 IN ADULT, UNSPECIFIED OBESITY TYPE, UNSPECIFIED WHETHER SERIOUS COMORBIDITY PRESENT: ICD-10-CM

## 2024-04-29 DIAGNOSIS — Z93.2 ILEOSTOMY IN PLACE: ICD-10-CM

## 2024-04-29 DIAGNOSIS — R10.32 ABDOMINAL PAIN, CHRONIC, LEFT LOWER QUADRANT: Primary | ICD-10-CM

## 2024-04-29 PROBLEM — E66.01 MORBID (SEVERE) OBESITY DUE TO EXCESS CALORIES: Status: ACTIVE | Noted: 2024-04-29

## 2024-04-29 PROCEDURE — 3008F BODY MASS INDEX DOCD: CPT | Mod: HCNC,CPTII,GC,S$GLB | Performed by: STUDENT IN AN ORGANIZED HEALTH CARE EDUCATION/TRAINING PROGRAM

## 2024-04-29 PROCEDURE — 99213 OFFICE O/P EST LOW 20 MIN: CPT | Mod: HCNC,GC,S$GLB, | Performed by: STUDENT IN AN ORGANIZED HEALTH CARE EDUCATION/TRAINING PROGRAM

## 2024-04-29 PROCEDURE — 1159F MED LIST DOCD IN RCRD: CPT | Mod: HCNC,CPTII,GC,S$GLB | Performed by: STUDENT IN AN ORGANIZED HEALTH CARE EDUCATION/TRAINING PROGRAM

## 2024-04-29 PROCEDURE — 99999 PR PBB SHADOW E&M-EST. PATIENT-LVL IV: CPT | Mod: PBBFAC,HCNC,GC, | Performed by: STUDENT IN AN ORGANIZED HEALTH CARE EDUCATION/TRAINING PROGRAM

## 2024-04-29 RX ORDER — DICYCLOMINE HYDROCHLORIDE 10 MG/1
10 CAPSULE ORAL 4 TIMES DAILY PRN
Qty: 120 CAPSULE | Refills: 0 | Status: SHIPPED | OUTPATIENT
Start: 2024-04-29 | End: 2024-06-10

## 2024-04-29 NOTE — PROGRESS NOTES
"GENERAL GI PATIENT INTAKE:    COVID symptoms in the last 7 days (runny nose, sore throat, congestion, cough, fever): No  PCP: Eliecer Jones  If not PCP-  number given to establish [phone]: N/A    ALLERGIES REVIEWED:  Yes    CHIEF COMPLAINT:    Chief Complaint   Patient presents with    Follow-up       VITAL SIGNS:  Ht 5' 4" (1.626 m)   Wt 100.8 kg (222 lb 3.6 oz)   BMI 38.14 kg/m²      Change in medical, surgical, family or social history: No      REVIEWED MEDICATION LIST RECONCILED INCLUDING ABOVE MEDS:  Yes     "

## 2024-04-29 NOTE — PATIENT INSTRUCTIONS
#1. Try taking Bentyl as needed (up to 4 times daily) for attempted relief of abdominal pain/spasms.     #2. We will help you schedule a follow-up visit with the hepatology department.     #3. We will help you schedule a special CT scan of your small intestines.

## 2024-04-29 NOTE — PROGRESS NOTES
I have seen the patient, reviewed Ted Garcia MD, PGY-VI the GI fellow's history and physical, assessment, and plan. I have personally interviewed and examined the patient at bedside and I discussed the case with the GI fellow and I agree with the findings and plan as documented in the fellow's note.

## 2024-04-30 ENCOUNTER — PATIENT MESSAGE (OUTPATIENT)
Dept: GASTROENTEROLOGY | Facility: CLINIC | Age: 59
End: 2024-04-30
Payer: MEDICARE

## 2024-05-01 ENCOUNTER — OFFICE VISIT (OUTPATIENT)
Dept: PRIMARY CARE CLINIC | Facility: CLINIC | Age: 59
End: 2024-05-01
Payer: MEDICARE

## 2024-05-01 VITALS
HEART RATE: 66 BPM | TEMPERATURE: 98 F | OXYGEN SATURATION: 98 % | BODY MASS INDEX: 37.92 KG/M2 | SYSTOLIC BLOOD PRESSURE: 120 MMHG | DIASTOLIC BLOOD PRESSURE: 66 MMHG | HEIGHT: 64 IN | RESPIRATION RATE: 18 BRPM | WEIGHT: 222.13 LBS

## 2024-05-01 DIAGNOSIS — F33.0 MILD EPISODE OF RECURRENT MAJOR DEPRESSIVE DISORDER: ICD-10-CM

## 2024-05-01 DIAGNOSIS — E78.5 HYPERLIPIDEMIA, UNSPECIFIED HYPERLIPIDEMIA TYPE: ICD-10-CM

## 2024-05-01 DIAGNOSIS — E66.01 SEVERE OBESITY (BMI 35.0-39.9) WITH COMORBIDITY: ICD-10-CM

## 2024-05-01 DIAGNOSIS — E55.9 VITAMIN D DEFICIENCY: ICD-10-CM

## 2024-05-01 DIAGNOSIS — Z00.00 ANNUAL PHYSICAL EXAM: Primary | ICD-10-CM

## 2024-05-01 DIAGNOSIS — E53.8 VITAMIN B12 DEFICIENCY: ICD-10-CM

## 2024-05-01 DIAGNOSIS — Z12.31 ENCOUNTER FOR SCREENING MAMMOGRAM FOR BREAST CANCER: ICD-10-CM

## 2024-05-01 DIAGNOSIS — F41.9 ANXIETY: ICD-10-CM

## 2024-05-01 DIAGNOSIS — F51.04 CHRONIC INSOMNIA: ICD-10-CM

## 2024-05-01 PROCEDURE — 3008F BODY MASS INDEX DOCD: CPT | Mod: HCNC,CPTII,S$GLB, | Performed by: FAMILY MEDICINE

## 2024-05-01 PROCEDURE — 1159F MED LIST DOCD IN RCRD: CPT | Mod: HCNC,CPTII,S$GLB, | Performed by: FAMILY MEDICINE

## 2024-05-01 PROCEDURE — 1160F RVW MEDS BY RX/DR IN RCRD: CPT | Mod: HCNC,CPTII,S$GLB, | Performed by: FAMILY MEDICINE

## 2024-05-01 PROCEDURE — 3074F SYST BP LT 130 MM HG: CPT | Mod: HCNC,CPTII,S$GLB, | Performed by: FAMILY MEDICINE

## 2024-05-01 PROCEDURE — 3078F DIAST BP <80 MM HG: CPT | Mod: HCNC,CPTII,S$GLB, | Performed by: FAMILY MEDICINE

## 2024-05-01 PROCEDURE — 99396 PREV VISIT EST AGE 40-64: CPT | Mod: HCNC,S$GLB,, | Performed by: FAMILY MEDICINE

## 2024-05-01 PROCEDURE — 99999 PR PBB SHADOW E&M-EST. PATIENT-LVL IV: CPT | Mod: PBBFAC,HCNC,, | Performed by: FAMILY MEDICINE

## 2024-05-01 RX ORDER — ESZOPICLONE 2 MG/1
2 TABLET, FILM COATED ORAL NIGHTLY
Qty: 30 TABLET | Refills: 0 | Status: SHIPPED | OUTPATIENT
Start: 2024-05-01

## 2024-05-01 RX ORDER — CLONAZEPAM 1 MG/1
1 TABLET ORAL DAILY PRN
Qty: 90 TABLET | Refills: 1 | Status: SHIPPED | OUTPATIENT
Start: 2024-05-01 | End: 2024-06-07 | Stop reason: SDUPTHER

## 2024-05-01 NOTE — PROGRESS NOTES
Patient ID: Ida Orozco is a 58 y.o. female.    Chief Complaint: Annual Exam    History of Present Illness    Patient presents today for annual exam.    Patient recently underwent surgery for carpal tunnel and elbow complaints and now expresses concerns about her thumb, experiencing pain and lack of resistance that weren't present prior to the surgery. She likens these symptoms to a past experience of a ligament tear in her other thumb and is worried that she has inflicted similar damage to her thumb. She reported these concerns to her surgeon, but was reassured that she should not be experiencing pain due to the nerves.    Patient stated she was previously flagged for malnutrition as shown in her labs and needs documentation stating that for her insurance company to justify her need for Vitamin D. Further, she faces financial difficulties that hinder her ability to secure sufficient food due to loss of her home and subsisting on Social Security income. She has been without food stamps for three months and relies on assistance from the [employer] or her son to meet her financial obligations.    Patient reports a history of insomnia and has tried Trazodone, Amitriptyline, Melatonin and Cannabis gummies without any significant improvement. She refuses to take Ambien due to potential side effects and admits to drinking a lot of water and waking often due to thirst.    Patient mentioned that she used to take medication for bradycardia, but it is no longer a problem and the medication had been stopped due to its impact on her heart.    Patient requests a renewal of her Klonopin prescription and mentions it as the only effective medication for her while she also explores natural treatment options.    ROS:  Cardiovascular: -palpitations  Musculoskeletal: +joint pain  Psychiatric: +sleep difficulty         Physical Exam    General: No acute distress. Well-developed. Well-nourished.  Eyes: EOMI. Sclerae anicteric.  HENT:  Normocephalic. Atraumatic. Nares patent. Moist oral mucosa.  Cardiovascular: Regular rate. Regular rhythm. No murmurs. No rubs. No gallops. Normal S1, S2.  Respiratory: Normal respiratory effort. Clear to auscultation bilaterally. No rales. No rhonchi. No wheezing.  Abdomen: Colostomy in right lower quadrant.  Musculoskeletal: No  obvious deformity. Decrease in muscle mass in the thenar eminence.  Extremities: No lower extremity edema.  Neurological: Alert & oriented x3. No slurred speech. Normal gait.  Psychiatric: Normal mood. Normal affect. Good insight. Good judgment.  Skin: Warm. Dry. No rash.         Assessment & Plan    INSOMNIA:  - Prescribed Lunesta, 2 mg, to be taken 30 minutes before bedtime with a full glass of water for the patient's insomnia.  - Discontinued the use of Amitriptyline, replacing it with Lunesta.  - Instructed the patient to trial the newly prescribed Lunesta for 30 days and provide feedback on its effectiveness.  - Explained the potential side effects of sleep medications and emphasized the importance of adherence to the prescribed regimen.  LAB FINDINGS:  - Ordered fasting labs for the patient, to be conducted the following morning.  - Highlighted the necessity of fasting prior to the labs.  THUMB PAIN AND SUSPECTED MUSCLE ATROPHY:  - Referred the patient back to her hand surgeon for further evaluation of thumb pain and suspected muscle atrophy.  PREVENTIVE MEASURES:  - Educated the patient on the importance of preventive measures, including mammograms and shingles vaccines.            Follow up in about 1 year (around 5/1/2025) for annual physical.    This note was generated with the assistance of ambient listening technology. Verbal consent was obtained by the patient and accompanying visitor(s) for the recording of patient appointment to facilitate this note. I attest to having reviewed and edited the generated note for accuracy, though some syntax or spelling errors may persist.  Please contact the author of this note for any clarification.

## 2024-05-02 ENCOUNTER — PATIENT MESSAGE (OUTPATIENT)
Dept: GASTROENTEROLOGY | Facility: HOSPITAL | Age: 59
End: 2024-05-02
Payer: MEDICARE

## 2024-05-02 DIAGNOSIS — E53.8 VITAMIN B12 DEFICIENCY: Primary | ICD-10-CM

## 2024-05-02 RX ORDER — CHOLECALCIFEROL (VITAMIN D3) 25 MCG
1000 TABLET,CHEWABLE ORAL DAILY
Qty: 30 CAPSULE | Refills: 3 | Status: SHIPPED | OUTPATIENT
Start: 2024-05-02 | End: 2024-05-14

## 2024-05-03 ENCOUNTER — PATIENT MESSAGE (OUTPATIENT)
Dept: GASTROENTEROLOGY | Facility: CLINIC | Age: 59
End: 2024-05-03
Payer: MEDICARE

## 2024-05-08 ENCOUNTER — OFFICE VISIT (OUTPATIENT)
Dept: ORTHOPEDICS | Facility: CLINIC | Age: 59
End: 2024-05-08
Payer: MEDICARE

## 2024-05-08 DIAGNOSIS — G56.01 CARPAL TUNNEL SYNDROME OF RIGHT WRIST: ICD-10-CM

## 2024-05-08 DIAGNOSIS — G56.22 CUBITAL TUNNEL SYNDROME ON LEFT: ICD-10-CM

## 2024-05-08 DIAGNOSIS — Z98.890 POST-OPERATIVE STATE: Primary | ICD-10-CM

## 2024-05-08 DIAGNOSIS — M79.642 LEFT HAND PAIN: ICD-10-CM

## 2024-05-08 DIAGNOSIS — G56.02 CARPAL TUNNEL SYNDROME OF LEFT WRIST: ICD-10-CM

## 2024-05-08 DIAGNOSIS — S63.602A SPRAIN OF LEFT THUMB, INITIAL ENCOUNTER: ICD-10-CM

## 2024-05-08 PROCEDURE — 1159F MED LIST DOCD IN RCRD: CPT | Mod: HCNC,CPTII,S$GLB, | Performed by: ORTHOPAEDIC SURGERY

## 2024-05-08 PROCEDURE — 1160F RVW MEDS BY RX/DR IN RCRD: CPT | Mod: HCNC,CPTII,S$GLB, | Performed by: ORTHOPAEDIC SURGERY

## 2024-05-08 PROCEDURE — 99024 POSTOP FOLLOW-UP VISIT: CPT | Mod: HCNC,S$GLB,, | Performed by: ORTHOPAEDIC SURGERY

## 2024-05-08 PROCEDURE — 99214 OFFICE O/P EST MOD 30 MIN: CPT | Mod: HCNC,24,S$GLB, | Performed by: ORTHOPAEDIC SURGERY

## 2024-05-08 PROCEDURE — 99999 PR PBB SHADOW E&M-EST. PATIENT-LVL II: CPT | Mod: PBBFAC,HCNC,, | Performed by: ORTHOPAEDIC SURGERY

## 2024-05-08 NOTE — PROGRESS NOTES
Ida Orozco presents postop procedure below with new problem in postoperative period.  Encounter Diagnoses   Name Primary?    Post-operative state Yes    Cubital tunnel syndrome on left     Carpal tunnel syndrome of left wrist     Carpal tunnel syndrome of right wrist     Left hand pain     Sprain of left thumb, initial encounter      .   History of Present Illness     The patient is now 10 weeks 1 days s/p 2.27.24 Left elbow ulnar n. decompression, Left recurrent carpal tunnel release / Right carpal tunnel csi. She is doing well concerning her postop recovery, mild tenderness over carpal tunnel incision that is relieved by topical frankincense.     Her right wrist was injected DOS and provided short term relief. Her EMG/NCS showed mild carpal tunnel syndrome. Her symptoms are worsening today with pain mostly at night.    Patient is here today for new problem left thumb injury that occurred when she was walking her dog a few weeks ago. Valgus stress to her left thumb due to her dog pulling away from her while the leash was wrapped around her thumb. Pain has not resolved.      Vitals:    05/08/24 0958   PainSc:   5   PainLoc: Finger       PE:    AA&O x 4.  NAD  HEENT:  NCAT, sclera nonicteric  Lungs:  Respirations are equal and unlabored.  CV:  2+ bilateral upper and lower extremity pulses.  MSK:  The incision is well healed.  Full wrist and finger motion.  Neurovascularly intact and has 5/5 thenar and intrinsic musculature strength.      Left thumb tender to palpation over UCL, instability with stress to thumb UCL. Swelling thumb MCP joint  Physical Exam         Diagnostic studies and other clinical records review:  Results       No New Imaging today    EMG/NCS 1.9.24 Impression:  There is evidence of a left ulnar mononeuropathy at/near the elbow, such as would be seen in cubital tunnel syndrome.  There is focal demyelination across the elbow.  There is no sensory axonal loss.  There are not neuropathic changes  in the ulnar innervated hand muscles.  This is graded as Mild in severity on the Left.    There is electrophysiologic evidence of a bilateral sensory median mononeuropathy across the wrist (I.e. Carpal tunnel syndrome).  There is no motor axonal loss.  There is no active denervation.  This is graded as Mild in severity bilaterally.    Assessment & Plan: Status post above, doing well     Assessment & Plan     The patient and I had a thorough discussion today. We discussed the working diagnosis as well as several other potential alternative diagnoses. Treatment options were discussed, both conservative and surgical. Conservative treatment options would include things such as activity modifications, workplace modifications, a period of rest, oral vs topical OTC and prescription anti-inflammatory medications, occupational therapy, splinting/bracing, immobilization, corticosteroid injections, and others. Surgical options were discussed as well.   We have discussed her diagnosis as Left thumb UCL sprain.  I have ordered MRI left thumb without contrast .  I have provided a left thumb keeper brace for stabilization and symptom mitigation until MRI is performed. We discussed injecting right carpal tunnel if surgery is recommended for left thumb    Follow up after MRI left thumb for results review  Call with any questions/concerns in the interim  Left hand/wrist postop cubital and carpal tunnel release: Continue with range of motion and strengthening; f/u scheduled  Call with any questions/concerns in the interim        Etelvina Nicholas MD    Please be aware that this note has been generated with the assistance of [email or web address] voice-to-text.  Please excuse any spelling or grammatical errors.    This note was generated with the assistance of ambient listening technology. Verbal consent was obtained by the patient and accompanying visitor(s) for the recording of patient appointment to facilitate this note. I attest to having reviewed  and edited the generated note for accuracy, though some syntax or spelling errors may persist. Please contact the author of this note for any clarification.

## 2024-05-10 ENCOUNTER — PATIENT MESSAGE (OUTPATIENT)
Dept: PRIMARY CARE CLINIC | Facility: CLINIC | Age: 59
End: 2024-05-10
Payer: MEDICARE

## 2024-05-11 ENCOUNTER — HOSPITAL ENCOUNTER (OUTPATIENT)
Dept: RADIOLOGY | Facility: HOSPITAL | Age: 59
Discharge: HOME | End: 2024-05-11
Attending: ORTHOPAEDIC SURGERY
Payer: MEDICARE

## 2024-05-11 ENCOUNTER — HOSPITAL ENCOUNTER (OUTPATIENT)
Dept: RADIOLOGY | Facility: HOSPITAL | Age: 59
Discharge: HOME | End: 2024-05-11
Attending: STUDENT IN AN ORGANIZED HEALTH CARE EDUCATION/TRAINING PROGRAM
Payer: MEDICARE

## 2024-05-11 ENCOUNTER — PATIENT MESSAGE (OUTPATIENT)
Dept: GASTROENTEROLOGY | Facility: CLINIC | Age: 59
End: 2024-05-11
Payer: MEDICARE

## 2024-05-11 DIAGNOSIS — M79.642 LEFT HAND PAIN: ICD-10-CM

## 2024-05-11 DIAGNOSIS — R10.32 ABDOMINAL PAIN, CHRONIC, LEFT LOWER QUADRANT: Primary | ICD-10-CM

## 2024-05-11 DIAGNOSIS — G89.29 ABDOMINAL PAIN, CHRONIC, LEFT LOWER QUADRANT: ICD-10-CM

## 2024-05-11 DIAGNOSIS — G89.29 ABDOMINAL PAIN, CHRONIC, LEFT LOWER QUADRANT: Primary | ICD-10-CM

## 2024-05-11 DIAGNOSIS — R10.32 ABDOMINAL PAIN, CHRONIC, LEFT LOWER QUADRANT: ICD-10-CM

## 2024-05-11 PROCEDURE — 73130 X-RAY EXAM OF HAND: CPT | Mod: 26,HCNC,LT, | Performed by: RADIOLOGY

## 2024-05-11 PROCEDURE — 73130 X-RAY EXAM OF HAND: CPT | Mod: TC,HCNC,LT

## 2024-05-13 ENCOUNTER — TELEPHONE (OUTPATIENT)
Dept: PRIMARY CARE CLINIC | Facility: CLINIC | Age: 59
End: 2024-05-13
Payer: MEDICARE

## 2024-05-13 NOTE — TELEPHONE ENCOUNTER
----- Message from Alexandra Disla sent at 5/13/2024  9:00 AM CDT -----  Contact: Patient, [phone]  Patient is returning a phone call.  Who left a message for the patient: Mayra  Does patient know what this is regarding:  Ear issues  Would you like a call back, or a response through your [facility] portal?:   Call back  Comments: Missed your call, please call hr back. Thanks.

## 2024-05-14 ENCOUNTER — OFFICE VISIT (OUTPATIENT)
Dept: PRIMARY CARE CLINIC | Facility: CLINIC | Age: 59
End: 2024-05-14
Payer: MEDICARE

## 2024-05-14 VITALS
HEIGHT: 64 IN | OXYGEN SATURATION: 99 % | HEART RATE: 65 BPM | BODY MASS INDEX: 37.94 KG/M2 | WEIGHT: 222.25 LBS | RESPIRATION RATE: 18 BRPM | SYSTOLIC BLOOD PRESSURE: 120 MMHG | DIASTOLIC BLOOD PRESSURE: 68 MMHG

## 2024-05-14 DIAGNOSIS — F43.21 GRIEF: ICD-10-CM

## 2024-05-14 DIAGNOSIS — J01.90 ACUTE NON-RECURRENT SINUSITIS, UNSPECIFIED LOCATION: Primary | ICD-10-CM

## 2024-05-14 PROCEDURE — 1159F MED LIST DOCD IN RCRD: CPT | Mod: HCNC,CPTII,S$GLB, | Performed by: NURSE PRACTITIONER

## 2024-05-14 PROCEDURE — 3008F BODY MASS INDEX DOCD: CPT | Mod: HCNC,CPTII,S$GLB, | Performed by: NURSE PRACTITIONER

## 2024-05-14 PROCEDURE — 99999 PR PBB SHADOW E&M-EST. PATIENT-LVL III: CPT | Mod: PBBFAC,HCNC,, | Performed by: NURSE PRACTITIONER

## 2024-05-14 PROCEDURE — 99214 OFFICE O/P EST MOD 30 MIN: CPT | Mod: HCNC,S$GLB,, | Performed by: NURSE PRACTITIONER

## 2024-05-14 PROCEDURE — 3074F SYST BP LT 130 MM HG: CPT | Mod: HCNC,CPTII,S$GLB, | Performed by: NURSE PRACTITIONER

## 2024-05-14 PROCEDURE — 3078F DIAST BP <80 MM HG: CPT | Mod: HCNC,CPTII,S$GLB, | Performed by: NURSE PRACTITIONER

## 2024-05-14 RX ORDER — AZITHROMYCIN 250 MG/1
TABLET, FILM COATED ORAL
Qty: 6 TABLET | Refills: 0 | Status: SHIPPED | OUTPATIENT
Start: 2024-05-14 | End: 2024-05-14

## 2024-05-14 RX ORDER — AMOXICILLIN AND CLAVULANATE POTASSIUM 875; 125 MG/1; MG/1
1 TABLET, FILM COATED ORAL 2 TIMES DAILY
Qty: 14 TABLET | Refills: 0 | Status: SHIPPED | OUTPATIENT
Start: 2024-05-14 | End: 2024-06-10

## 2024-05-14 RX ORDER — CLONAZEPAM 1 MG/1
1 TABLET ORAL 2 TIMES DAILY
Qty: 28 TABLET | Refills: 0 | Status: SHIPPED | OUTPATIENT
Start: 2024-05-14 | End: 2024-05-23 | Stop reason: SDUPTHER

## 2024-05-14 NOTE — PROGRESS NOTES
Chief Complaint  Chief Complaint   Patient presents with    Otalgia       HPI    Presents to clinic with complaints of ear pain particularly on the right side. Was running a fever mostly at night last week with noted left ear pain. No drainage from the ear. Left jaw pain and parotid swelling on the left. Tenderness to touch. Pain 10/10 down the back of her neck. Pain with swallowing. No drainage from her sinuses, feels like she has in infection in her sinus cavity. Has been treating with ear drops to her left ear including ciprofloxacin drops to the ear.             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, with liver fibrosis, on ursodiol 2023    PTSD (post-traumatic stress disorder)     Seasonal allergies     Symptomatic abdominal panniculus     Ulcerative colitis        PAST SURGICAL HISTORY:  Past Surgical History:   Procedure Laterality Date    APPENDECTOMY      CARPAL TUNNEL RELEASE Left 2024    Procedure: RELEASE, CARPAL TUNNEL;  Surgeon: Etelvina Nicholas MD;  Location: [facility] OR;  Service: Orthopedics;  Laterality: Left;    CATHETERIZATION OF BOTH LEFT AND RIGHT HEART Right 2019    Procedure: CATHETERIZATION, HEART, BOTH LEFT AND RIGHT;  Surgeon: Beto Malin MD;  Location: [facility] CATH LAB;  Service: Cardiology;  Laterality: Right;     SECTION, LOW TRANSVERSE      CHOLECYSTECTOMY      open    COLECTOMY      total- 2013    CYSTOSCOPY W/ RETROGRADES N/A 2018    Procedure: CYSTOSCOPY, WITH RETROGRADE PYELOGRAM;  Surgeon: Butch Banks MD;  Location: [facility] OR;  Service: Urology;  Laterality: N/A;  [facility] SURGICAL CONFIRMED      DECOMPRESSION OF NERVE Left 2/27/2024    Procedure: DECOMPRESSION, NERVE ULNAR NERVE ELBOW;  Surgeon: Etelvina Nicholas MD;  Location: [facility] OR;  Service: Orthopedics;  Laterality: Left;    ENDOSCOPIC ULTRASOUND OF UPPER GASTROINTESTINAL TRACT N/A 11/10/2021    Procedure: ULTRASOUND, UPPER GI TRACT, ENDOSCOPIC;  Surgeon: Nhan Dee MD;  Location: [facility] (2ND FLR);  Service: Endoscopy;  Laterality: N/A;  MD Milena Whitehead PA-C; Quinn Whitman MD; Miladys Velasquez MA  Caller: Unspecified (3 days ago,  1:20 PM)  Fair enough. Neha, please schedule.       ----- Message -----   From: Milena Murillo PA-C   Sent: 8/11/2021  1    ESOPHAGOGASTRODUODENOSCOPY  04/11/2018    Dr. Castorena: LA Grade A reflux esophagitis, hiatal hernia; Ectopic gastric mucosa in the upper third of the esophagus; gastric sleeve intact with unremarkable findings, gastritis; biopsy: stomach- Mild chronic antral and oxyntic gastritis, no activity, negative for h pylori    ESOPHAGOGASTRODUODENOSCOPY N/A 11/10/2021    Procedure: EGD (ESOPHAGOGASTRODUODENOSCOPY);  Surgeon: Nhan Dee MD;  Location: [facility] (2ND FLR);  Service: Endoscopy;  Laterality: N/A;    ESOPHAGOGASTRODUODENOSCOPY N/A 1/23/2024    Procedure: EGD (ESOPHAGOGASTRODUODENOSCOPY);  Surgeon: Anatoly Singh MD;  Location: [facility] (4TH FLR);  Service: Endoscopy;  Laterality: N/A;  Ref By: Dr. Singh,instr sent via portal Half a miralax prep.AC  1/17-lvm for precall-MS  1/18-pt confirmed appt-Kpvt    FESS, WITH IMAGING GUIDANCE Bilateral 4/4/2023    Procedure: Pan Sinus FESS, WITH IMAGING GUIDANCE Disc loaded;  Surgeon: Jay Hernandez MD;  Location: [facility] OR;  Service: ENT;  Laterality: Bilateral;  balloon dilation of sinuses    FLEXIBLE SIGMOIDOSCOPY  5/24/2018    Procedure: SIGMOIDOSCOPY, FLEXIBLE;  Surgeon: JENNY Virgen MD;  Location: [facility] OR [address] [place] [address];  Service: Colon and Rectal;;    FLEXIBLE SIGMOIDOSCOPY  04/11/2018    Dr. Castorena:  Non-patent surgical anastomosis, characterized by friable mucosa.    GASTRIC SLEEVE       HYSTERECTOMY      ILEOSCOPY  04/11/2018    Dr. Castorena: Patient is status-post total colectomy with end ileostomy. unremarkable findings    ILEOSCOPY N/A 1/23/2024    Procedure: ILEOSCOPY;  Surgeon: Anatoly Singh MD;  Location: [facility] ENDO (4TH FLR);  Service: Endoscopy;  Laterality: N/A;    ILEOSTOMY REVISION      april 2014; august 2014    INJECTION OF STEROID Right 2/27/2024    Procedure: INJECTION, STEROID CARPAL TUNNEL;  Surgeon: Etelvina Nicholas MD;  Location: [facility] OR;  Service: Orthopedics;  Laterality: Right;    LAPAROSCOPIC PROCTECTOMY N/A 5/24/2018    Procedure: PROCTECTOMY-LAPAROSCOPIC/CONVERTED TO OPEN;  Surgeon: JENNY Virgen MD;  Location: [facility] OR 2ND FLR;  Service: Colon and Rectal;  Laterality: N/A;    LYSIS OF ADHESIONS  5/24/2018    Procedure: LYSIS, ADHESIONS/ more than 2hours;  Surgeon: JENNY Virgen MD;  Location: [facility] OR 2ND FLR;  Service: Colon and Rectal;;    OOPHORECTOMY      PANNICULECTOMY Bilateral 12/19/2019    Procedure: PANNICULECTOMY;  Surgeon: Andrea Alvarado MD;  Location: [facility] OR 2ND FLR;  Service: Plastics;  Laterality: Bilateral;    REVISION COLOSTOMY N/A 12/19/2019    Procedure: REVISION, COLOSTOMY;  Surgeon: JENNY Virgen MD;  Location: [facility] OR 2ND FLR;  Service: Colon and Rectal;  Laterality: N/A;    thumb surgery      TONSILLECTOMY, ADENOIDECTOMY      WISDOM TOOTH EXTRACTION         SOCIAL HISTORY:  Social History     Socioeconomic History    Marital status: Legally    Tobacco Use    Smoking status: Former     Current packs/day: 0.00     Average packs/day: 1 pack/day for 25.0 years (25.0 ttl pk-yrs)     Types: Cigarettes     Start date: 1984     Quit date: 2009     Years since quitting: 15.3    Smokeless tobacco: Never   Substance and Sexual Activity    Alcohol use: Yes     Comment: extremely rarely (once a year)    Drug use: No    Sexual activity: Not Currently      Birth control/protection: See Surgical Hx     Social Determinants of Health     Financial Resource Strain: Patient Declined (11/15/2023)    Overall Financial Resource Strain (CARDIA)     Difficulty of Paying Living Expenses: Patient declined   Food Insecurity: Patient Declined (11/15/2023)    Hunger Vital Sign     Worried About Running Out of Food in the Last Year: Patient declined     Ran Out of Food in the Last Year: Patient declined   Transportation Needs: Patient Declined (11/15/2023)    PRAPARE - Transportation     Lack of Transportation (Medical): Patient declined     Lack of Transportation (Non-Medical): Patient declined   Physical Activity: Unknown (11/15/2023)    Exercise Vital Sign     Days of Exercise per Week: Patient declined     Minutes of Exercise per Session: 0 min   Stress: Patient Declined (11/15/2023)    [employer] - Occupational Stress Questionnaire     Feeling of Stress : Patient declined   Housing Stability: Unknown (11/15/2023)    Housing Stability Vital Sign     Unable to Pay for Housing in the Last Year: Patient refused     Unstable Housing in the Last Year: Patient refused       FAMILY HISTORY:  Family History   Problem Relation Name Age of Onset    Cancer Mother          skin    Cirrhosis Mother      Heart disease Father  67    Cancer Paternal Grandfather          ?    Cancer Maternal Grandfather          colon     Colon cancer Maternal Grandfather      Colon cancer Maternal Uncle      Colon cancer Maternal Uncle      Crohn's disease Neg Hx      Ulcerative colitis Neg Hx      Stomach cancer Neg Hx      Esophageal cancer Neg Hx      Celiac disease Neg Hx      Breast cancer Neg Hx      Ovarian cancer Neg Hx         ALLERGIES AND MEDICATIONS: updated and reviewed.  Review of patient's allergies indicates:   Allergen Reactions    Adhesive      Cause blisters    Dilaudid [hydromorphone] Nausea And Vomiting    Humira [adalimumab] Hives    Sulfa (sulfonamide  antibiotics) Hives    Surgical stainless steel      Had surgical staples, caused irritation and infection     Current Outpatient Medications   Medication Sig Dispense Refill    clonazePAM (KLONOPIN) 1 MG tablet Take 1 tablet (1 mg total) by mouth daily as needed for Anxiety. 90 tablet 1    diphenoxylate-atropine 2.5-0.025 mg (LOMOTIL) 2.5-0.025 mg per tablet Take 1 tablet by mouth 4 (four) times daily as needed for Diarrhea. 30 tablet 2    docusate sodium (COLACE) 100 MG capsule Take 1 capsule (100 mg total) by mouth 2 (two) times daily. 30 capsule 1    eszopiclone (LUNESTA) 2 MG Tab Take 1 tablet (2 mg total) by mouth every evening. 30 tablet 0    levocetirizine (XYZAL) 5 MG tablet Take 1 tablet (5 mg total) by mouth every evening. 90 tablet 1    loratadine (CLARITIN) 10 mg tablet Take 1 tablet (10 mg total) by mouth once daily. 90 tablet 1    meclizine (ANTIVERT) 25 mg tablet Take 1 tablet (25 mg total) by mouth 3 (three) times daily as needed for Dizziness or Nausea. 60 tablet 2    melatonin 10 mg Tab Take 1 tablet by mouth nightly as needed.      nystatin (MYCOSTATIN) powder Apply topically 2 (two) times daily. 60 g 3    ondansetron (ZOFRAN) 8 MG tablet Take 1 tablet (8 mg total) by mouth every 8 (eight) hours as needed for Nausea. 30 tablet 0    sertraline (ZOLOFT) 100 MG tablet Take 2 tablets (200 mg total) by mouth once daily. 180 tablet 3    topiramate (TOPAMAX) 100 MG tablet Take 1 tablet (100 mg total) by mouth 2 (two) times daily. 180 tablet 3    amoxicillin-clavulanate 875-125mg (AUGMENTIN) 875-125 mg per tablet Take 1 tablet by mouth 2 (two) times daily. 14 tablet 0    clonazePAM (KLONOPIN) 1 MG tablet Take 1 tablet (1 mg total) by mouth 2 (two) times daily. for 14 days 28 tablet 0    UNABLE TO FIND Take 1 Dose by mouth daily as needed (as needed for sleep and anxiety). medication name: Delta 8 and Delta 9 hemp gummies       No current facility-administered medications for this visit.  "        ROS  Review of Systems   Constitutional:  Negative for chills and fever.   HENT:  Positive for congestion, ear pain, sinus pressure, sinus pain and sore throat. Negative for ear discharge and postnasal drip.    Respiratory:  Negative for cough and shortness of breath.    Cardiovascular:  Negative for chest pain.   Gastrointestinal:  Negative for diarrhea, nausea and vomiting.           PHYSICAL EXAM  Vitals:    05/14/24 1059   BP: 120/68   BP Location: Left arm   Patient Position: Sitting   BP Method: Large (Manual)   Pulse: 65   Resp: 18   SpO2: 99%   Weight: 100.8 kg (222 lb 3.6 oz)   Height: 5' 4" (1.626 m)    Body mass index is 38.14 kg/m².  Weight: 100.8 kg (222 lb 3.6 oz) (patient declined)   Height: 5' 4" (162.6 cm)     Physical Exam  Constitutional:       Appearance: She is well-developed.   HENT:      Head: Normocephalic.      Left Ear: No drainage. Tympanic membrane is not injected, scarred, perforated, erythematous, retracted or bulging.      Nose:      Left Sinus: Maxillary sinus tenderness present.      Mouth/Throat:      Pharynx: Uvula midline.   Eyes:      Conjunctiva/sclera: Conjunctivae normal.   Cardiovascular:      Rate and Rhythm: Normal rate and regular rhythm.      Pulses: Normal pulses.           Radial pulses are 2+ on the right side and 2+ on the left side.      Heart sounds: Normal heart sounds. No murmur heard.  Pulmonary:      Effort: Pulmonary effort is normal.      Breath sounds: Normal breath sounds. No wheezing.   Lymphadenopathy:      Cervical: No cervical adenopathy.   Skin:     General: Skin is warm and dry.      Findings: No rash.   Neurological:      Mental Status: She is alert and oriented to person, place, and time.           Health Maintenance         Date Due Completion Date    Shingles Vaccine (1 of 2) 05/01/2025 (Originally 7/27/2017) ---    Mammogram 05/01/2025 (Originally 8/9/2023) 8/9/2022    Lipid Panel 05/02/2025 5/2/2024    Hemoglobin A1c (Diabetic Prevention " Screening) 2026 5/3/2023    TETANUS VACCINE 10/24/2028 10/24/2018              Assessment & Plan    Problem List Items Addressed This Visit    None  Visit Diagnoses       Acute non-recurrent sinusitis, unspecified location    -  Primary    Relevant Medications    amoxicillin-clavulanate 875-125mg (AUGMENTIN) 875-125 mg per tablet    Grief        Relevant Medications    clonazePAM (KLONOPIN) 1 MG tablet  Okay to double up on Klonopin to twice daily for 2 weeks in light of grief with recent loss of her mother who  yesterday.  New prescription sent in.            Follow-up: Follow up if symptoms worsen or fail to improve.    Janet Guardado    Medication List with Changes/Refills   New Medications    AMOXICILLIN-CLAVULANATE 875-125MG (AUGMENTIN) 875-125 MG PER TABLET    Take 1 tablet by mouth 2 (two) times daily.    CLONAZEPAM (KLONOPIN) 1 MG TABLET    Take 1 tablet (1 mg total) by mouth 2 (two) times daily. for 14 days   Current Medications    CLONAZEPAM (KLONOPIN) 1 MG TABLET    Take 1 tablet (1 mg total) by mouth daily as needed for Anxiety.    DIPHENOXYLATE-ATROPINE 2.5-0.025 MG (LOMOTIL) 2.5-0.025 MG PER TABLET    Take 1 tablet by mouth 4 (four) times daily as needed for Diarrhea.    DOCUSATE SODIUM (COLACE) 100 MG CAPSULE    Take 1 capsule (100 mg total) by mouth 2 (two) times daily.    ESZOPICLONE (LUNESTA) 2 MG TAB    Take 1 tablet (2 mg total) by mouth every evening.    LEVOCETIRIZINE (XYZAL) 5 MG TABLET    Take 1 tablet (5 mg total) by mouth every evening.    LORATADINE (CLARITIN) 10 MG TABLET    Take 1 tablet (10 mg total) by mouth once daily.    MECLIZINE (ANTIVERT) 25 MG TABLET    Take 1 tablet (25 mg total) by mouth 3 (three) times daily as needed for Dizziness or Nausea.    MELATONIN 10 MG TAB    Take 1 tablet by mouth nightly as needed.    NYSTATIN (MYCOSTATIN) POWDER    Apply topically 2 (two) times daily.    ONDANSETRON (ZOFRAN) 8 MG TABLET    Take 1 tablet (8 mg total) by mouth every 8  (eight) hours as needed for Nausea.    SERTRALINE (ZOLOFT) 100 MG TABLET    Take 2 tablets (200 mg total) by mouth once daily.    TOPIRAMATE (TOPAMAX) 100 MG TABLET    Take 1 tablet (100 mg total) by mouth 2 (two) times daily.    UNABLE TO FIND    Take 1 Dose by mouth daily as needed (as needed for sleep and anxiety). medication name: Delta 8 and Delta 9 hemp gummies   Discontinued Medications    CYANOCOBALAMIN, VITAMIN B-12, 1,000 MCG CAP    Take 1,000 mcg by mouth once daily.    DICYCLOMINE (BENTYL) 10 MG CAPSULE    Take 1 capsule (10 mg total) by mouth 4 (four) times daily as needed (Abdominal spasms).    ERGOCALCIFEROL (VITAMIN D2) 50,000 UNIT CAP    Take 1 capsule (50,000 Units total) by mouth every 7 days.    PROMETHAZINE (PHENERGAN) 25 MG TABLET    Take 1 tablet (25 mg total) by mouth every 6 (six) hours as needed for Nausea.

## 2024-05-15 RX ORDER — HYOSCYAMINE SULFATE 0.125 MG
125 TABLET ORAL EVERY 6 HOURS PRN
Qty: 30 TABLET | Refills: 3 | Status: SHIPPED | OUTPATIENT
Start: 2024-05-15 | End: 2024-05-24 | Stop reason: SDUPTHER

## 2024-05-15 RX ORDER — DIPHENOXYLATE HYDROCHLORIDE AND ATROPINE SULFATE 2.5; .025 MG/1; MG/1
1 TABLET ORAL 4 TIMES DAILY PRN
Qty: 30 TABLET | Refills: 2 | Status: SHIPPED | OUTPATIENT
Start: 2024-05-15

## 2024-05-15 NOTE — TELEPHONE ENCOUNTER
Refill Routing Note   Medication(s) are not appropriate for processing by [facility] for the following reason(s):        Non-participating provider    [facility] action(s):  Route               Appointments  past 12m or future 3m with PCP    Date Provider   Last Visit   5/14/2024 Janet Guardado NP   Next Visit   Visit date not found Janet Guardado NP   ED visits in past 90 days: 0        Note composed:4:00 PM 05/15/2024

## 2024-05-23 DIAGNOSIS — F43.21 GRIEF: ICD-10-CM

## 2024-05-23 RX ORDER — CLONAZEPAM 1 MG/1
1 TABLET ORAL 2 TIMES DAILY
Qty: 28 TABLET | Refills: 0 | Status: SHIPPED | OUTPATIENT
Start: 2024-05-23 | End: 2024-06-06

## 2024-05-24 DIAGNOSIS — R10.32 ABDOMINAL PAIN, CHRONIC, LEFT LOWER QUADRANT: ICD-10-CM

## 2024-05-24 DIAGNOSIS — G89.29 ABDOMINAL PAIN, CHRONIC, LEFT LOWER QUADRANT: ICD-10-CM

## 2024-05-24 RX ORDER — HYOSCYAMINE SULFATE 0.125 MG
125 TABLET ORAL EVERY 6 HOURS PRN
Qty: 30 TABLET | Refills: 3 | Status: SHIPPED | OUTPATIENT
Start: 2024-05-24

## 2024-05-25 DIAGNOSIS — J32.4 CHRONIC PANSINUSITIS: ICD-10-CM

## 2024-05-25 DIAGNOSIS — R42 VERTIGO: ICD-10-CM

## 2024-05-25 DIAGNOSIS — B37.2 CANDIDAL INTERTRIGO: ICD-10-CM

## 2024-05-25 DIAGNOSIS — F41.9 ANXIETY: ICD-10-CM

## 2024-05-26 NOTE — TELEPHONE ENCOUNTER
No care due was identified.  Health [facility] Embedded Care Due Messages. Reference number: [id number].   5/25/2024 11:20:44 PM CDT

## 2024-05-27 ENCOUNTER — PATIENT MESSAGE (OUTPATIENT)
Dept: PRIMARY CARE CLINIC | Facility: CLINIC | Age: 59
End: 2024-05-27
Payer: MEDICARE

## 2024-05-27 RX ORDER — NYSTATIN 100000 [USP'U]/G
POWDER TOPICAL 2 TIMES DAILY
Qty: 60 G | Refills: 3 | Status: SHIPPED | OUTPATIENT
Start: 2024-05-27

## 2024-05-27 RX ORDER — MECLIZINE HYDROCHLORIDE 25 MG/1
25 TABLET ORAL 3 TIMES DAILY PRN
Qty: 60 TABLET | Refills: 3 | Status: SHIPPED | OUTPATIENT
Start: 2024-05-27

## 2024-05-27 RX ORDER — LORATADINE 10 MG/1
10 TABLET ORAL DAILY
Qty: 90 TABLET | Refills: 3 | Status: SHIPPED | OUTPATIENT
Start: 2024-05-27

## 2024-05-27 RX ORDER — PROMETHAZINE HYDROCHLORIDE AND DEXTROMETHORPHAN HYDROBROMIDE 6.25; 15 MG/5ML; MG/5ML
5 SYRUP ORAL EVERY 6 HOURS PRN
Qty: 180 ML | Refills: 1 | Status: SHIPPED | OUTPATIENT
Start: 2024-05-27

## 2024-05-27 RX ORDER — LEVOCETIRIZINE DIHYDROCHLORIDE 5 MG/1
5 TABLET, FILM COATED ORAL NIGHTLY
Qty: 90 TABLET | Refills: 3 | Status: SHIPPED | OUTPATIENT
Start: 2024-05-27 | End: 2025-05-22

## 2024-05-27 RX ORDER — SERTRALINE HYDROCHLORIDE 100 MG/1
200 TABLET, FILM COATED ORAL DAILY
Qty: 180 TABLET | Refills: 3 | Status: SHIPPED | OUTPATIENT
Start: 2024-05-27

## 2024-05-27 NOTE — TELEPHONE ENCOUNTER
No care due was identified.  Health [facility] Embedded Care Due Messages. Reference number: [id number].   5/27/2024 10:05:06 AM CDT

## 2024-05-27 NOTE — TELEPHONE ENCOUNTER
Refill Decision Note   Ida Ernesto  is requesting a refill authorization.  Brief Assessment and Rationale for Refill:  Approve     Medication Therapy Plan:         Comments:     Note composed:10:21 AM 05/27/2024

## 2024-05-31 ENCOUNTER — HOSPITAL ENCOUNTER (OUTPATIENT)
Dept: RADIOLOGY | Facility: HOSPITAL | Age: 59
Discharge: HOME | End: 2024-05-31
Attending: ORTHOPAEDIC SURGERY
Payer: MEDICARE

## 2024-05-31 DIAGNOSIS — S63.602A SPRAIN OF LEFT THUMB, INITIAL ENCOUNTER: ICD-10-CM

## 2024-05-31 PROCEDURE — 73218 MRI UPPER EXTREMITY W/O DYE: CPT | Mod: 26,HCNC,LT, | Performed by: RADIOLOGY

## 2024-05-31 PROCEDURE — 73218 MRI UPPER EXTREMITY W/O DYE: CPT | Mod: TC,HCNC,LT

## 2024-06-05 ENCOUNTER — PATIENT MESSAGE (OUTPATIENT)
Dept: PRIMARY CARE CLINIC | Facility: CLINIC | Age: 59
End: 2024-06-05
Payer: MEDICARE

## 2024-06-06 RX ORDER — HYDROXYZINE HYDROCHLORIDE 25 MG/1
25 TABLET, FILM COATED ORAL 3 TIMES DAILY
Qty: 30 TABLET | Refills: 0 | Status: SHIPPED | OUTPATIENT
Start: 2024-06-06

## 2024-06-07 DIAGNOSIS — F41.9 ANXIETY: ICD-10-CM

## 2024-06-07 RX ORDER — CLONAZEPAM 1 MG/1
1 TABLET ORAL DAILY PRN
Qty: 90 TABLET | Refills: 1 | Status: SHIPPED | OUTPATIENT
Start: 2024-06-07

## 2024-06-07 NOTE — TELEPHONE ENCOUNTER
No care due was identified.  [facility] Embedded Care Due Messages. Reference number: [id number].   6/07/2024 2:05:24 PM CDT   Body Location Override (Optional - Billing Will Still Be Based On Selected Body Map Location If Applicable): left upper cutaneous lip Patient Name (Optional- Will Render 'the Patient' If Blank): Kostas Agee Date Of Previous Biopsy (Optional): 01/15/2020 Previous Accession (Optional): [id number] Biopsy Photograph Reviewed: Yes Referring Physician (Optional): GLORIA Arteaga MD Consent Type: Consent 1 (Standard) Eye Shield Used: No Surgeon Performing Repair (Optional): DON Goyal MD Initial Size Of Lesion: 0.8 Number Of Stages: 2 Primary Defect Length In Cm (Final Defect Size - Required For Flaps/Grafts): 1.3 Primary Defect Width In Cm (Final Defect Size - Required For Flaps/Grafts): 1.1 Repair Type: Referred to ASC for closure Oculoplastic Surgeon Procedure Text (A): After obtaining clear surgical margins the patient was sent to oculoplastics for surgical repair.  The patient understands they will receive post-surgical care and follow-up from the referring physician's office. Otolaryngologist Procedure Text (A): After obtaining clear surgical margins the patient was sent to otolaryngology for surgical repair.  The patient understands they will receive post-surgical care and follow-up from the referring physician's office. Plastic Surgeon (A): Dr. Shae Rojo Plastic Surgeon Procedure Text (A): After obtaining clear surgical margins the patient was sent to plastics for surgical repair.  The patient understands they will receive post-surgical care and follow-up from the referring physician's office. Mid-Level Procedure Text (A): After obtaining clear surgical margins the patient was sent to a mid-level provider for surgical repair.  The patient understands they will receive post-surgical care and follow-up from the mid-level provider. Provider Procedure Text (A): After obtaining clear surgical margins the defect was repaired by another provider. Asc Procedure Text (A): After obtaining clear surgical margins the patient was sent to an ASC for surgical repair.  The patient understands they will receive post-surgical care and follow-up from the ASC physician. Suturegard Retention Suture: 2-0 Nylon Retention Suture Bite Size: 3 mm Length To Time In Minutes Device Was In Place: 10 Simple / Intermediate / Complex Repair - Final Wound Length In Cm: 0 Undermining Type: Entire Wound Debridement Text: The wound edges were debrided prior to proceeding with the closure to facilitate wound healing. Helical Rim Text: The closure involved the helical rim. Vermilion Border Text: The closure involved the vermilion border. Nostril Rim Text: The closure involved the nostril rim. Retention Suture Text: Retention sutures were placed to support the closure and prevent dehiscence. Area H Indication Text: Tumors in this location are included in Area H (eyelids, eyebrows, nose, lips, chin, ear, pre-auricular, post-auricular, temple, genitalia, hands, feet, ankles and areola).  Tissue conservation is critical in these anatomic locations. Area M Indication Text: Tumors in this location are included in Area M (cheek, forehead, scalp, neck, jawline and pretibial skin).  Mohs surgery is indicated for tumors in these anatomic locations. Area L Indication Text: Tumors in this location are included in Area L (trunk and extremities).  Mohs surgery is indicated for larger tumors, or tumors with aggressive histologic features, in these anatomic locations. Tumor Debulked?: curette Stage 1: Number Of Blocks?: 1 Stage 2: Additional Anesthesia Type: 1% lidocaine with epinephrine Medical Necessity Statement: Based on my medical judgement, Mohs surgery is the most appropriate treatment for this cancer compared to other treatments. Alternatives Discussed Intro (Do Not Add Period): I discussed alternative treatments to Mohs surgery and specifically discussed the risks and benefits of Consent 1/Introductory Paragraph: Patient H&P reviewed and updated. Patient cleared for surgery.  The rationale for Mohs was explained to the patient and consent was obtained. The risks, benefits and alternatives to therapy were discussed in detail. Specifically, the risks of infection, scarring, bleeding, prolonged wound healing, incomplete removal, allergy to anesthesia, nerve injury and recurrence were addressed. Prior to the procedure, the treatment site was clearly identified and confirmed by the patient. Consent 2/Introductory Paragraph: Mohs surgery was explained to the patient and consent was obtained. The risks, benefits and alternatives to therapy were discussed in detail. Specifically, the risks of infection, scarring, bleeding, prolonged wound healing, incomplete removal, allergy to anesthesia, nerve injury and recurrence were addressed. Prior to the procedure, the treatment site was clearly identified and confirmed by the patient. All components of Universal Protocol/PAUSE Rule completed. Consent 3/Introductory Paragraph: I gave the patient a chance to ask questions they had about the procedure.  Following this I explained the Mohs procedure and consent was obtained. The risks, benefits and alternatives to therapy were discussed in detail. Specifically, the risks of infection, scarring, bleeding, prolonged wound healing, incomplete removal, allergy to anesthesia, nerve injury and recurrence were addressed. Prior to the procedure, the treatment site was clearly identified and confirmed by the patient. All components of Universal Protocol/PAUSE Rule completed. Consent (Temporal Branch)/Introductory Paragraph: The rationale for Mohs was explained to the patient and consent was obtained. The risks, benefits and alternatives to therapy were discussed in detail. Specifically, the risks of damage to the temporal branch of the facial nerve, infection, scarring, bleeding, prolonged wound healing, incomplete removal, allergy to anesthesia, and recurrence were addressed. Prior to the procedure, the treatment site was clearly identified and confirmed by the patient. All components of Universal Protocol/PAUSE Rule completed. Consent (Marginal Mandibular)/Introductory Paragraph: The rationale for Mohs was explained to the patient and consent was obtained. The risks, benefits and alternatives to therapy were discussed in detail. Specifically, the risks of damage to the marginal mandibular branch of the facial nerve, infection, scarring, bleeding, prolonged wound healing, incomplete removal, allergy to anesthesia, and recurrence were addressed. Prior to the procedure, the treatment site was clearly identified and confirmed by the patient. All components of Universal Protocol/PAUSE Rule completed. Consent (Spinal Accessory)/Introductory Paragraph: The rationale for Mohs was explained to the patient and consent was obtained. The risks, benefits and alternatives to therapy were discussed in detail. Specifically, the risks of damage to the spinal accessory nerve, infection, scarring, bleeding, prolonged wound healing, incomplete removal, allergy to anesthesia, and recurrence were addressed. Prior to the procedure, the treatment site was clearly identified and confirmed by the patient. All components of Universal Protocol/PAUSE Rule completed. Consent (Near Eyelid Margin)/Introductory Paragraph: The rationale for Mohs was explained to the patient and consent was obtained. The risks, benefits and alternatives to therapy were discussed in detail. Specifically, the risks of ectropion or eyelid deformity, infection, scarring, bleeding, prolonged wound healing, incomplete removal, allergy to anesthesia, nerve injury and recurrence were addressed. Prior to the procedure, the treatment site was clearly identified and confirmed by the patient. All components of Universal Protocol/PAUSE Rule completed. Consent (Ear)/Introductory Paragraph: The rationale for Mohs was explained to the patient and consent was obtained. The risks, benefits and alternatives to therapy were discussed in detail. Specifically, the risks of ear deformity, infection, scarring, bleeding, prolonged wound healing, incomplete removal, allergy to anesthesia, nerve injury and recurrence were addressed. Prior to the procedure, the treatment site was clearly identified and confirmed by the patient. All components of Universal Protocol/PAUSE Rule completed. Consent (Nose)/Introductory Paragraph: The rationale for Mohs was explained to the patient and consent was obtained. The risks, benefits and alternatives to therapy were discussed in detail. Specifically, the risks of nasal deformity, changes in the flow of air through the nose, infection, scarring, bleeding, prolonged wound healing, incomplete removal, allergy to anesthesia, nerve injury and recurrence were addressed. Prior to the procedure, the treatment site was clearly identified and confirmed by the patient. All components of Universal Protocol/PAUSE Rule completed. Consent (Lip)/Introductory Paragraph: The rationale for Mohs was explained to the patient and consent was obtained. The risks, benefits and alternatives to therapy were discussed in detail. Specifically, the risks of lip deformity, changes in the oral aperture, infection, scarring, bleeding, prolonged wound healing, incomplete removal, allergy to anesthesia, nerve injury and recurrence were addressed. Prior to the procedure, the treatment site was clearly identified and confirmed by the patient. All components of Universal Protocol/PAUSE Rule completed. Consent (Scalp)/Introductory Paragraph: The rationale for Mohs was explained to the patient and consent was obtained. The risks, benefits and alternatives to therapy were discussed in detail. Specifically, the risks of changes in hair growth pattern secondary to repair, infection, scarring, bleeding, prolonged wound healing, incomplete removal, allergy to anesthesia, nerve injury and recurrence were addressed. Prior to the procedure, the treatment site was clearly identified and confirmed by the patient. All components of Universal Protocol/PAUSE Rule completed. Detail Level: Detailed Postop Diagnosis: same Anesthesia Volume In Cc: 3 Hemostasis: Electrocautery Estimated Blood Loss (Cc): minimal Repair Anesthesia Method: local infiltration Anesthesia Volume In Cc: 1.5 Deep Sutures: 4-0 Vicryl Epidermal Sutures: GluStitch Epidermal Closure: none-dermabond Suturegard Intro: Intraoperative tissue expansion was performed, utilizing the SUTUREGARD device, in order to reduce wound tension. Suturegard Body: The suture ends were repeatedly re-tightened and re-clamped to achieve the desired tissue expansion. Donor Site Anesthesia Type: same as repair anesthesia Closure 2 Information: This tab is for additional flaps and grafts, including complex repair and grafts and complex repair and flaps. You can also specify a different location for the additional defect, if the location is the same you do not need to select a new one. We will insert the automated text for the repair you select below just as we do for solitary flaps and grafts. Please note that at this time if you select a location with a different insurance zone you will need to override the ICD10 and CPT if appropriate. Closure 3 Information: This tab is for additional flaps and grafts above and beyond our usual structured repairs.  Please note if you enter information here it will not currently bill and you will need to add the billing information manually. Unna Boot Text: An Unna boot was placed to help immobilize the limb and facilitate more rapid healing. Home Suture Removal Text: Patient was provided instructions on removing sutures and will remove their sutures at home.  If they have any questions or difficulties they will call the office. Post-Care Instructions: I reviewed with the patient in detail post-care instructions. Patient is not to engage in any heavy lifting, exercise, or swimming for the next 14 days. Should the patient develop any fevers, chills, bleeding, severe pain patient will contact the office immediately. Patient will follow up with referring physician within 6 months. Pain Refusal Text: I offered to prescribe pain medication but the patient refused to take this medication. Mauc Instructions: By selecting yes to the question below the MAUC number will be added into the note.  This will be calculated automatically based on the diagnosis chosen, the size entered, the body zone selected (H,M,L) and the specific indications you chose. You will also have the option to override the Mohs AUC if you disagree with the automatically calculated number and this option is found in the Case Summary tab. Where Do You Want The Question To Include Opioid Counseling Located?: Case Summary Tab Eye Protection Verbiage: Before proceeding with the stage, a plastic scleral shield was inserted. The globe was anesthetized with a few drops of 1% lidocaine with 1:100,000 epinephrine. Then, an appropriate sized scleral shield was chosen and coated with lacrilube ointment. The shield was gently inserted and left in place for the duration of each stage. After the stage was completed, the shield was gently removed. Mohs Method Verbiage: An incision at a 45 degree angle following the standard Mohs approach was done and the specimen was harvested as a microscopic controlled layer. Surgeon/Pathologist Verbiage (Will Incorporate Name Of Surgeon From Intro If Not Blank): operated in two distinct and integrated capacities as the surgeon and pathologist. Mohs Histo Method Verbiage: Each section was then chromacoded and processed in the Mohs lab using the Mohs protocol and submitted for frozen section. Subsequent Stages Histo Method Verbiage: Using a similar technique to that described above, a thin layer of tissue was removed from all areas where tumor was visible on the previous stage.  The tissue was again oriented, mapped, dyed, and processed as above. Mohs Rapid Report Verbiage: The area of clinically evident tumor was marked with skin marking ink.  An incision using a #15-blade scalpel at a 45 degree angle following the standard Mohs approach was done and the specimen was harvested as a microscopic controlled layer. The specimen was taken to the lab, oriented and divided into the necessary number of pieces, chromacoded and submitted for horizontal frozen sections. The Mohs surgeon then examined the prepared microscopic sections. Any areas of residual tumor were indicated on the map. This was repeated in successive stages until a tumor free defect was achieved. Complex Repair Preamble Text (Leave Blank If You Do Not Want): Extensive wide undermining was performed. Intermediate Repair Preamble Text (Leave Blank If You Do Not Want): Undermining was performed with blunt dissection. Non-Graft Cartilage Fenestration Text: The cartilage was fenestrated with a 2mm punch biopsy to help facilitate healing. Graft Cartilage Fenestration Text: The cartilage was fenestrated with a 2mm punch biopsy to help facilitate graft survival and healing. Secondary Intention Text (Leave Blank If You Do Not Want): The defect will heal with secondary intention. No Repair - Repaired With Adjacent Surgical Defect Text (Leave Blank If You Do Not Want): After obtaining clear surgical margins the defect was repaired concurrently with another surgical defect which was in close approximation. Advancement Flap (Single) Text: The defect edges were debeveled with a #15 scalpel blade.  Given the location of the defect and the proximity to free margins a single advancement flap was deemed most appropriate.  Using a sterile surgical marker, an appropriate advancement flap was drawn incorporating the defect and placing the expected incisions within the relaxed skin tension lines where possible.    The area thus outlined was incised deep to adipose tissue with a #15 scalpel blade.  The skin margins were undermined to an appropriate distance in all directions utilizing iris scissors. Advancement Flap (Double) Text: The defect edges were debeveled with a #15 scalpel blade.  Given the location of the defect and the proximity to free margins a double advancement flap was deemed most appropriate.  Using a sterile surgical marker, the appropriate advancement flaps were drawn incorporating the defect and placing the expected incisions within the relaxed skin tension lines where possible.    The area thus outlined was incised deep to adipose tissue with a #15 scalpel blade.  The skin margins were undermined to an appropriate distance in all directions utilizing iris scissors. Burow's Advancement Flap Text: The defect edges were debeveled with a #15 scalpel blade.  Given the location of the defect and the proximity to free margins a Burow's advancement flap was deemed most appropriate.  Using a sterile surgical marker, the appropriate advancement flap was drawn incorporating the defect and placing the expected incisions within the relaxed skin tension lines where possible.    The area thus outlined was incised deep to adipose tissue with a #15 scalpel blade.  The skin margins were undermined to an appropriate distance in all directions utilizing iris scissors. Chonodrocutaneous Helical Advancement Flap Text: The defect edges were debeveled with a #15 scalpel blade.  Given the location of the defect and the proximity to free margins a chondrocutaneous helical advancement flap was deemed most appropriate.  Using a sterile surgical marker, the appropriate advancement flap was drawn incorporating the defect and placing the expected incisions within the relaxed skin tension lines where possible.    The area thus outlined was incised deep to adipose tissue with a #15 scalpel blade.  The skin margins were undermined to an appropriate distance in all directions utilizing iris scissors. Crescentic Advancement Flap Text: The defect edges were debeveled with a #15 scalpel blade.  Given the location of the defect and the proximity to free margins a crescentic advancement flap was deemed most appropriate.  Using a sterile surgical marker, the appropriate advancement flap was drawn incorporating the defect and placing the expected incisions within the relaxed skin tension lines where possible.    The area thus outlined was incised deep to adipose tissue with a #15 scalpel blade.  The skin margins were undermined to an appropriate distance in all directions utilizing iris scissors. A-T Advancement Flap Text: The defect edges were debeveled with a #15 scalpel blade.  Given the location of the defect, shape of the defect and the proximity to free margins an A-T advancement flap was deemed most appropriate.  Using a sterile surgical marker, an appropriate advancement flap was drawn incorporating the defect and placing the expected incisions within the relaxed skin tension lines where possible.    The area thus outlined was incised deep to adipose tissue with a #15 scalpel blade.  The skin margins were undermined to an appropriate distance in all directions utilizing iris scissors. O-T Advancement Flap Text: The defect edges were debeveled with a #15 scalpel blade.  Given the location of the defect, shape of the defect and the proximity to free margins an O-T advancement flap was deemed most appropriate.  Using a sterile surgical marker, an appropriate advancement flap was drawn incorporating the defect and placing the expected incisions within the relaxed skin tension lines where possible.    The area thus outlined was incised deep to adipose tissue with a #15 scalpel blade.  The skin margins were undermined to an appropriate distance in all directions utilizing iris scissors. O-L Flap Text: The defect edges were debeveled with a #15 scalpel blade.  Given the location of the defect, shape of the defect and the proximity to free margins an O-L flap was deemed most appropriate.  Using a sterile surgical marker, an appropriate advancement flap was drawn incorporating the defect and placing the expected incisions within the relaxed skin tension lines where possible.    The area thus outlined was incised deep to adipose tissue with a #15 scalpel blade.  The skin margins were undermined to an appropriate distance in all directions utilizing iris scissors. O-Z Flap Text: The defect edges were debeveled with a #15 scalpel blade.  Given the location of the defect, shape of the defect and the proximity to free margins an O-Z flap was deemed most appropriate.  Using a sterile surgical marker, an appropriate transposition flap was drawn incorporating the defect and placing the expected incisions within the relaxed skin tension lines where possible. The area thus outlined was incised deep to adipose tissue with a #15 scalpel blade.  The skin margins were undermined to an appropriate distance in all directions utilizing iris scissors. Double O-Z Flap Text: The defect edges were debeveled with a #15 scalpel blade.  Given the location of the defect, shape of the defect and the proximity to free margins a Double O-Z flap was deemed most appropriate.  Using a sterile surgical marker, an appropriate transposition flap was drawn incorporating the defect and placing the expected incisions within the relaxed skin tension lines where possible. The area thus outlined was incised deep to adipose tissue with a #15 scalpel blade.  The skin margins were undermined to an appropriate distance in all directions utilizing iris scissors. V-Y Flap Text: The defect edges were debeveled with a #15 scalpel blade.  Given the location of the defect, shape of the defect and the proximity to free margins a V-Y flap was deemed most appropriate.  Using a sterile surgical marker, an appropriate advancement flap was drawn incorporating the defect and placing the expected incisions within the relaxed skin tension lines where possible.    The area thus outlined was incised deep to adipose tissue with a #15 scalpel blade.  The skin margins were undermined to an appropriate distance in all directions utilizing iris scissors. Advancement-Rotation Flap Text: The defect edges were debeveled with a #15 scalpel blade.  Given the location of the defect, shape of the defect and the proximity to free margins an advancement-rotation flap was deemed most appropriate.  Using a sterile surgical marker, an appropriate flap was drawn incorporating the defect and placing the expected incisions within the relaxed skin tension lines where possible. The area thus outlined was incised deep to adipose tissue with a #15 scalpel blade.  The skin margins were undermined to an appropriate distance in all directions utilizing iris scissors. Mercedes Flap Text: The defect edges were debeveled with a #15 scalpel blade.  Given the location of the defect, shape of the defect and the proximity to free margins a Mercedes flap was deemed most appropriate.  Using a sterile surgical marker, an appropriate advancement flap was drawn incorporating the defect and placing the expected incisions within the relaxed skin tension lines where possible. The area thus outlined was incised deep to adipose tissue with a #15 scalpel blade.  The skin margins were undermined to an appropriate distance in all directions utilizing iris scissors. Modified Advancement Flap Text: The defect edges were debeveled with a #15 scalpel blade.  Given the location of the defect, shape of the defect and the proximity to free margins a modified advancement flap was deemed most appropriate.  Using a sterile surgical marker, an appropriate advancement flap was drawn incorporating the defect and placing the expected incisions within the relaxed skin tension lines where possible.    The area thus outlined was incised deep to adipose tissue with a #15 scalpel blade.  The skin margins were undermined to an appropriate distance in all directions utilizing iris scissors. Mucosal Advancement Flap Text: Given the location of the defect, shape of the defect and the proximity to free margins a mucosal advancement flap was deemed most appropriate. Incisions were made with a 15 blade scalpel in the appropriate fashion along the cutaneous vermillion border and the mucosal lip. The remaining actinically damaged mucosal tissue was excised.  The mucosal advancement flap was then elevated to the gingival sulcus with care taken to preserve the neurovascular structures and advanced into the primary defect. Care was taken to ensure that precise realignment of the vermillion border was achieved. Hatchet Flap Text: The defect edges were debeveled with a #15 scalpel blade.  Given the location of the defect, shape of the defect and the proximity to free margins a hatchet flap was deemed most appropriate.  Using a sterile surgical marker, an appropriate hatchet flap was drawn incorporating the defect and placing the expected incisions within the relaxed skin tension lines where possible.    The area thus outlined was incised deep to adipose tissue with a #15 scalpel blade.  The skin margins were undermined to an appropriate distance in all directions utilizing iris scissors. Rotation Flap Text: The defect edges were debeveled with a #15 scalpel blade.  Given the location of the defect, shape of the defect and the proximity to free margins a rotation flap was deemed most appropriate.  Using a sterile surgical marker, an appropriate rotation flap was drawn incorporating the defect and placing the expected incisions within the relaxed skin tension lines where possible.    The area thus outlined was incised deep to adipose tissue with a #15 scalpel blade.  The skin margins were undermined to an appropriate distance in all directions utilizing iris scissors. Spiral Flap Text: The defect edges were debeveled with a #15 scalpel blade.  Given the location of the defect, shape of the defect and the proximity to free margins a spiral flap was deemed most appropriate.  Using a sterile surgical marker, an appropriate rotation flap was drawn incorporating the defect and placing the expected incisions within the relaxed skin tension lines where possible. The area thus outlined was incised deep to adipose tissue with a #15 scalpel blade.  The skin margins were undermined to an appropriate distance in all directions utilizing iris scissors. Star Wedge Flap Text: The defect edges were debeveled with a #15 scalpel blade.  Given the location of the defect, shape of the defect and the proximity to free margins a star wedge flap was deemed most appropriate.  Using a sterile surgical marker, an appropriate rotation flap was drawn incorporating the defect and placing the expected incisions within the relaxed skin tension lines where possible. The area thus outlined was incised deep to adipose tissue with a #15 scalpel blade.  The skin margins were undermined to an appropriate distance in all directions utilizing iris scissors. Transposition Flap Text: The defect edges were debeveled with a #15 scalpel blade.  Given the location of the defect and the proximity to free margins a transposition flap was deemed most appropriate.  Using a sterile surgical marker, an appropriate transposition flap was drawn incorporating the defect.    The area thus outlined was incised deep to adipose tissue with a #15 scalpel blade.  The skin margins were undermined to an appropriate distance in all directions utilizing iris scissors. Muscle Hinge Flap Text: The defect edges were debeveled with a #15 scalpel blade.  Given the size, depth and location of the defect and the proximity to free margins a muscle hinge flap was deemed most appropriate.  Using a sterile surgical marker, an appropriate hinge flap was drawn incorporating the defect. The area thus outlined was incised with a #15 scalpel blade.  The skin margins were undermined to an appropriate distance in all directions utilizing iris scissors. Melolabial Transposition Flap Text: The defect edges were debeveled with a #15 scalpel blade.  Given the location of the defect and the proximity to free margins a melolabial flap was deemed most appropriate.  Using a sterile surgical marker, an appropriate melolabial transposition flap was drawn incorporating the defect.    The area thus outlined was incised deep to adipose tissue with a #15 scalpel blade.  The skin margins were undermined to an appropriate distance in all directions utilizing iris scissors. Rhombic Flap Text: The defect edges were debeveled with a #15 scalpel blade.  Given the location of the defect and the proximity to free margins a rhombic flap was deemed most appropriate.  Using a sterile surgical marker, an appropriate rhombic flap was drawn incorporating the defect.    The area thus outlined was incised deep to adipose tissue with a #15 scalpel blade.  The skin margins were undermined to an appropriate distance in all directions utilizing iris scissors. Rhomboid Transposition Flap Text: The defect edges were debeveled with a #15 scalpel blade.  Given the location of the defect and the proximity to free margins a rhomboid transposition flap was deemed most appropriate.  Using a sterile surgical marker, an appropriate rhomboid flap was drawn incorporating the defect.    The area thus outlined was incised deep to adipose tissue with a #15 scalpel blade.  The skin margins were undermined to an appropriate distance in all directions utilizing iris scissors. Bi-Rhombic Flap Text: The defect edges were debeveled with a #15 scalpel blade.  Given the location of the defect and the proximity to free margins a bi-rhombic flap was deemed most appropriate.  Using a sterile surgical marker, an appropriate rhombic flap was drawn incorporating the defect. The area thus outlined was incised deep to adipose tissue with a #15 scalpel blade.  The skin margins were undermined to an appropriate distance in all directions utilizing iris scissors. Helical Rim Advancement Flap Text: The defect edges were debeveled with a #15 blade scalpel.  Given the location of the defect and the proximity to free margins (helical rim) a double helical rim advancement flap was deemed most appropriate.  Using a sterile surgical marker, the appropriate advancement flaps were drawn incorporating the defect and placing the expected incisions between the helical rim and antihelix where possible.  The area thus outlined was incised through and through with a #15 scalpel blade.  With a skin hook and iris scissors, the flaps were gently and sharply undermined and freed up. Bilateral Helical Rim Advancement Flap Text: The defect edges were debeveled with a #15 blade scalpel.  Given the location of the defect and the proximity to free margins (helical rim) a bilateral helical rim advancement flap was deemed most appropriate.  Using a sterile surgical marker, the appropriate advancement flaps were drawn incorporating the defect and placing the expected incisions between the helical rim and antihelix where possible.  The area thus outlined was incised through and through with a #15 scalpel blade.  With a skin hook and iris scissors, the flaps were gently and sharply undermined and freed up. Ear Star Wedge Flap Text: The defect edges were debeveled with a #15 blade scalpel.  Given the location of the defect and the proximity to free margins (helical rim) an ear star wedge flap was deemed most appropriate.  Using a sterile surgical marker, the appropriate flap was drawn incorporating the defect and placing the expected incisions between the helical rim and antihelix where possible.  The area thus outlined was incised through and through with a #15 scalpel blade. Banner Transposition Flap Text: The defect edges were debeveled with a #15 scalpel blade.  Given the location of the defect and the proximity to free margins a Banner transposition flap was deemed most appropriate.  Using a sterile surgical marker, an appropriate flap drawn around the defect. The area thus outlined was incised deep to adipose tissue with a #15 scalpel blade.  The skin margins were undermined to an appropriate distance in all directions utilizing iris scissors. Bilobed Flap Text: The defect edges were debeveled with a #15 scalpel blade.  Given the location of the defect and the proximity to free margins a bilobe flap was deemed most appropriate.  Using a sterile surgical marker, an appropriate bilobe flap drawn around the defect.    The area thus outlined was incised deep to adipose tissue with a #15 scalpel blade.  The skin margins were undermined to an appropriate distance in all directions utilizing iris scissors. Bilobed Transposition Flap Text: The defect edges were debeveled with a #15 scalpel blade.  Given the location of the defect and the proximity to free margins a bilobed transposition flap was deemed most appropriate.  Using a sterile surgical marker, an appropriate bilobe flap drawn around the defect.    The area thus outlined was incised deep to adipose tissue with a #15 scalpel blade.  The skin margins were undermined to an appropriate distance in all directions utilizing iris scissors. Trilobed Flap Text: The defect edges were debeveled with a #15 scalpel blade.  Given the location of the defect and the proximity to free margins a trilobed flap was deemed most appropriate.  Using a sterile surgical marker, an appropriate trilobed flap drawn around the defect.    The area thus outlined was incised deep to adipose tissue with a #15 scalpel blade.  The skin margins were undermined to an appropriate distance in all directions utilizing iris scissors. Dorsal Nasal Flap Text: The defect edges were debeveled with a #15 scalpel blade.  Given the location of the defect and the proximity to free margins a dorsal nasal flap was deemed most appropriate.  Using a sterile surgical marker, an appropriate dorsal nasal flap was drawn around the defect.    The area thus outlined was incised deep to adipose tissue with a #15 scalpel blade.  The skin margins were undermined to an appropriate distance in all directions utilizing iris scissors. Island Pedicle Flap Text: The defect edges were debeveled with a #15 scalpel blade.  Given the location of the defect, shape of the defect and the proximity to free margins an island pedicle advancement flap was deemed most appropriate.  Using a sterile surgical marker, an appropriate advancement flap was drawn incorporating the defect, outlining the appropriate donor tissue and placing the expected incisions within the relaxed skin tension lines where possible.    The area thus outlined was incised deep to adipose tissue with a #15 scalpel blade.  The skin margins were undermined to an appropriate distance in all directions around the primary defect and laterally outward around the island pedicle utilizing iris scissors.  There was minimal undermining beneath the pedicle flap. Island Pedicle Flap With Canthal Suspension Text: The defect edges were debeveled with a #15 scalpel blade.  Given the location of the defect, shape of the defect and the proximity to free margins an island pedicle advancement flap was deemed most appropriate.  Using a sterile surgical marker, an appropriate advancement flap was drawn incorporating the defect, outlining the appropriate donor tissue and placing the expected incisions within the relaxed skin tension lines where possible. The area thus outlined was incised deep to adipose tissue with a #15 scalpel blade.  The skin margins were undermined to an appropriate distance in all directions around the primary defect and laterally outward around the island pedicle utilizing iris scissors.  There was minimal undermining beneath the pedicle flap. A suspension suture was placed in the canthal tendon to prevent tension and prevent ectropion. Alar Island Pedicle Flap Text: The defect edges were debeveled with a #15 scalpel blade.  Given the location of the defect, shape of the defect and the proximity to the alar rim an island pedicle advancement flap was deemed most appropriate.  Using a sterile surgical marker, an appropriate advancement flap was drawn incorporating the defect, outlining the appropriate donor tissue and placing the expected incisions within the nasal ala running parallel to the alar rim. The area thus outlined was incised with a #15 scalpel blade.  The skin margins were undermined minimally to an appropriate distance in all directions around the primary defect and laterally outward around the island pedicle utilizing iris scissors.  There was minimal undermining beneath the pedicle flap. Double Island Pedicle Flap Text: The defect edges were debeveled with a #15 scalpel blade.  Given the location of the defect, shape of the defect and the proximity to free margins a double island pedicle advancement flap was deemed most appropriate.  Using a sterile surgical marker, an appropriate advancement flap was drawn incorporating the defect, outlining the appropriate donor tissue and placing the expected incisions within the relaxed skin tension lines where possible.    The area thus outlined was incised deep to adipose tissue with a #15 scalpel blade.  The skin margins were undermined to an appropriate distance in all directions around the primary defect and laterally outward around the island pedicle utilizing iris scissors.  There was minimal undermining beneath the pedicle flap. Island Pedicle Flap-Requiring Vessel Identification Text: The defect edges were debeveled with a #15 scalpel blade.  Given the location of the defect, shape of the defect and the proximity to free margins an island pedicle advancement flap was deemed most appropriate.  Using a sterile surgical marker, an appropriate advancement flap was drawn, based on the axial vessel mentioned above, incorporating the defect, outlining the appropriate donor tissue and placing the expected incisions within the relaxed skin tension lines where possible.    The area thus outlined was incised deep to adipose tissue with a #15 scalpel blade.  The skin margins were undermined to an appropriate distance in all directions around the primary defect and laterally outward around the island pedicle utilizing iris scissors.  There was minimal undermining beneath the pedicle flap. Keystone Flap Text: The defect edges were debeveled with a #15 scalpel blade.  Given the location of the defect, shape of the defect a keystone flap was deemed most appropriate.  Using a sterile surgical marker, an appropriate keystone flap was drawn incorporating the defect, outlining the appropriate donor tissue and placing the expected incisions within the relaxed skin tension lines where possible. The area thus outlined was incised deep to adipose tissue with a #15 scalpel blade.  The skin margins were undermined to an appropriate distance in all directions around the primary defect and laterally outward around the flap utilizing iris scissors. O-T Plasty Text: The defect edges were debeveled with a #15 scalpel blade.  Given the location of the defect, shape of the defect and the proximity to free margins an O-T plasty was deemed most appropriate.  Using a sterile surgical marker, an appropriate O-T plasty was drawn incorporating the defect and placing the expected incisions within the relaxed skin tension lines where possible.    The area thus outlined was incised deep to adipose tissue with a #15 scalpel blade.  The skin margins were undermined to an appropriate distance in all directions utilizing iris scissors. O-Z Plasty Text: The defect edges were debeveled with a #15 scalpel blade.  Given the location of the defect, shape of the defect and the proximity to free margins an O-Z plasty (double transposition flap) was deemed most appropriate.  Using a sterile surgical marker, the appropriate transposition flaps were drawn incorporating the defect and placing the expected incisions within the relaxed skin tension lines where possible.    The area thus outlined was incised deep to adipose tissue with a #15 scalpel blade.  The skin margins were undermined to an appropriate distance in all directions utilizing iris scissors.  Hemostasis was achieved with electrocautery.  The flaps were then transposed into place, one clockwise and the other counterclockwise, and anchored with interrupted buried subcutaneous sutures. Double O-Z Plasty Text: The defect edges were debeveled with a #15 scalpel blade.  Given the location of the defect, shape of the defect and the proximity to free margins a Double O-Z plasty (double transposition flap) was deemed most appropriate.  Using a sterile surgical marker, the appropriate transposition flaps were drawn incorporating the defect and placing the expected incisions within the relaxed skin tension lines where possible. The area thus outlined was incised deep to adipose tissue with a #15 scalpel blade.  The skin margins were undermined to an appropriate distance in all directions utilizing iris scissors.  Hemostasis was achieved with electrocautery.  The flaps were then transposed into place, one clockwise and the other counterclockwise, and anchored with interrupted buried subcutaneous sutures. S Plasty Text: Given the location and shape of the defect, and the orientation of relaxed skin tension lines, an S-plasty was deemed most appropriate for repair.  Using a sterile surgical marker, the appropriate outline of the S-plasty was drawn, incorporating the defect and placing the expected incisions within the relaxed skin tension lines where possible.  The area thus outlined was incised deep to adipose tissue with a #15 scalpel blade.  The skin margins were undermined to an appropriate distance in all directions utilizing iris scissors. The skin flaps were advanced over the defect.  The opposing margins were then approximated with interrupted buried subcutaneous sutures. V-Y Plasty Text: The defect edges were debeveled with a #15 scalpel blade.  Given the location of the defect, shape of the defect and the proximity to free margins an V-Y advancement flap was deemed most appropriate.  Using a sterile surgical marker, an appropriate advancement flap was drawn incorporating the defect and placing the expected incisions within the relaxed skin tension lines where possible.    The area thus outlined was incised deep to adipose tissue with a #15 scalpel blade.  The skin margins were undermined to an appropriate distance in all directions utilizing iris scissors. H Plasty Text: Given the location of the defect, shape of the defect and the proximity to free margins a H-plasty was deemed most appropriate for repair.  Using a sterile surgical marker, the appropriate advancement arms of the H-plasty were drawn incorporating the defect and placing the expected incisions within the relaxed skin tension lines where possible. The area thus outlined was incised deep to adipose tissue with a #15 scalpel blade. The skin margins were undermined to an appropriate distance in all directions utilizing iris scissors.  The opposing advancement arms were then advanced into place in opposite direction and anchored with interrupted buried subcutaneous sutures. W Plasty Text: The lesion was extirpated to the level of the fat with a #15 scalpel blade.  Given the location of the defect, shape of the defect and the proximity to free margins a W-plasty was deemed most appropriate for repair.  Using a sterile surgical marker, the appropriate transposition arms of the W-plasty were drawn incorporating the defect and placing the expected incisions within the relaxed skin tension lines where possible.    The area thus outlined was incised deep to adipose tissue with a #15 scalpel blade.  The skin margins were undermined to an appropriate distance in all directions utilizing iris scissors.  The opposing transposition arms were then transposed into place in opposite direction and anchored with interrupted buried subcutaneous sutures. Z Plasty Text: The lesion was extirpated to the level of the fat with a #15 scalpel blade.  Given the location of the defect, shape of the defect and the proximity to free margins a Z-plasty was deemed most appropriate for repair.  Using a sterile surgical marker, the appropriate transposition arms of the Z-plasty were drawn incorporating the defect and placing the expected incisions within the relaxed skin tension lines where possible.    The area thus outlined was incised deep to adipose tissue with a #15 scalpel blade.  The skin margins were undermined to an appropriate distance in all directions utilizing iris scissors.  The opposing transposition arms were then transposed into place in opposite direction and anchored with interrupted buried subcutaneous sutures. Cheek Interpolation Flap Text: A decision was made to reconstruct the defect utilizing an interpolation axial flap and a staged reconstruction.  A telfa template was made of the defect.  This telfa template was then used to outline the Cheek Interpolation flap.  The donor area for the pedicle flap was then injected with anesthesia.  The flap was excised through the skin and subcutaneous tissue down to the layer of the underlying musculature.  The interpolation flap was carefully excised within this deep plane to maintain its blood supply.  The edges of the donor site were undermined.   The donor site was closed in a primary fashion.  The pedicle was then rotated into position and sutured.  Once the tube was sutured into place, adequate blood supply was confirmed with blanching and refill.  The pedicle was then wrapped with xeroform gauze and dressed appropriately with a telfa and gauze bandage to ensure continued blood supply and protect the attached pedicle. Cheek-To-Nose Interpolation Flap Text: A decision was made to reconstruct the defect utilizing an interpolation axial flap and a staged reconstruction.  A telfa template was made of the defect.  This telfa template was then used to outline the Cheek-To-Nose Interpolation flap.  The donor area for the pedicle flap was then injected with anesthesia.  The flap was excised through the skin and subcutaneous tissue down to the layer of the underlying musculature.  The interpolation flap was carefully excised within this deep plane to maintain its blood supply.  The edges of the donor site were undermined.   The donor site was closed in a primary fashion.  The pedicle was then rotated into position and sutured.  Once the tube was sutured into place, adequate blood supply was confirmed with blanching and refill.  The pedicle was then wrapped with xeroform gauze and dressed appropriately with a telfa and gauze bandage to ensure continued blood supply and protect the attached pedicle. Interpolation Flap Text: A decision was made to reconstruct the defect utilizing an interpolation axial flap and a staged reconstruction.  A telfa template was made of the defect.  This telfa template was then used to outline the interpolation flap.  The donor area for the pedicle flap was then injected with anesthesia.  The flap was excised through the skin and subcutaneous tissue down to the layer of the underlying musculature.  The interpolation flap was carefully excised within this deep plane to maintain its blood supply.  The edges of the donor site were undermined.   The donor site was closed in a primary fashion.  The pedicle was then rotated into position and sutured.  Once the tube was sutured into place, adequate blood supply was confirmed with blanching and refill.  The pedicle was then wrapped with xeroform gauze and dressed appropriately with a telfa and gauze bandage to ensure continued blood supply and protect the attached pedicle. Melolabial Interpolation Flap Text: A decision was made to reconstruct the defect utilizing an interpolation axial flap and a staged reconstruction.  A telfa template was made of the defect.  This telfa template was then used to outline the melolabial interpolation flap.  The donor area for the pedicle flap was then injected with anesthesia.  The flap was excised through the skin and subcutaneous tissue down to the layer of the underlying musculature.  The pedicle flap was carefully excised within this deep plane to maintain its blood supply.  The edges of the donor site were undermined.   The donor site was closed in a primary fashion.  The pedicle was then rotated into position and sutured.  Once the tube was sutured into place, adequate blood supply was confirmed with blanching and refill.  The pedicle was then wrapped with xeroform gauze and dressed appropriately with a telfa and gauze bandage to ensure continued blood supply and protect the attached pedicle. Mastoid Interpolation Flap Text: A decision was made to reconstruct the defect utilizing an interpolation axial flap and a staged reconstruction.  A telfa template was made of the defect.  This telfa template was then used to outline the mastoid interpolation flap.  The donor area for the pedicle flap was then injected with anesthesia.  The flap was excised through the skin and subcutaneous tissue down to the layer of the underlying musculature.  The pedicle flap was carefully excised within this deep plane to maintain its blood supply.  The edges of the donor site were undermined.   The donor site was closed in a primary fashion.  The pedicle was then rotated into position and sutured.  Once the tube was sutured into place, adequate blood supply was confirmed with blanching and refill.  The pedicle was then wrapped with xeroform gauze and dressed appropriately with a telfa and gauze bandage to ensure continued blood supply and protect the attached pedicle. Posterior Auricular Interpolation Flap Text: A decision was made to reconstruct the defect utilizing an interpolation axial flap and a staged reconstruction.  A telfa template was made of the defect.  This telfa template was then used to outline the posterior auricular interpolation flap.  The donor area for the pedicle flap was then injected with anesthesia.  The flap was excised through the skin and subcutaneous tissue down to the layer of the underlying musculature.  The pedicle flap was carefully excised within this deep plane to maintain its blood supply.  The edges of the donor site were undermined.   The donor site was closed in a primary fashion.  The pedicle was then rotated into position and sutured.  Once the tube was sutured into place, adequate blood supply was confirmed with blanching and refill.  The pedicle was then wrapped with xeroform gauze and dressed appropriately with a telfa and gauze bandage to ensure continued blood supply and protect the attached pedicle. Paramedian Forehead Flap Text: A decision was made to reconstruct the defect utilizing an interpolation axial flap and a staged reconstruction.  A telfa template was made of the defect.  This telfa template was then used to outline the paramedian forehead pedicle flap.  The donor area for the pedicle flap was then injected with anesthesia.  The flap was excised through the skin and subcutaneous tissue down to the layer of the underlying musculature.  The pedicle flap was carefully excised within this deep plane to maintain its blood supply.  The edges of the donor site were undermined.   The donor site was closed in a primary fashion.  The pedicle was then rotated into position and sutured.  Once the tube was sutured into place, adequate blood supply was confirmed with blanching and refill.  The pedicle was then wrapped with xeroform gauze and dressed appropriately with a telfa and gauze bandage to ensure continued blood supply and protect the attached pedicle. Cheiloplasty (Less Than 50%) Text: A decision was made to reconstruct the defect with a  cheiloplasty.  The defect was undermined extensively.  Additional obicularis oris muscle was excised with a 15 blade scalpel.  The defect was converted into a full thickness wedge, of less than 50% of the vertical height of the lip, to facilite a better cosmetic result.  Small vessels were then tied off with 5-0 monocyrl. The obicularis oris, superficial fascia, adipose and dermis were then reapproximated.  After the deeper layers were approximated the epidermis was reapproximated with particular care given to realign the vermillion border. Cheiloplasty (Complex) Text: A decision was made to reconstruct the defect with a  cheiloplasty.  The defect was undermined extensively.  Additional obicularis oris muscle was excised with a 15 blade scalpel.  The defect was converted into a full thickness wedge to facilite a better cosmetic result.  Small vessels were then tied off with 5-0 monocyrl. The obicularis oris, superficial fascia, adipose and dermis were then reapproximated.  After the deeper layers were approximated the epidermis was reapproximated with particular care given to realign the vermillion border. Ear Wedge Repair Text: A wedge excision was completed by carrying down an excision through the full thickness of the ear and cartilage with an inward facing Burow's triangle. The wound was then closed in a layered fashion. Full Thickness Lip Wedge Repair (Flap) Text: Given the location of the defect and the proximity to free margins a full thickness wedge repair was deemed most appropriate.  Using a sterile surgical marker, the appropriate repair was drawn incorporating the defect and placing the expected incisions perpendicular to the vermillion border.  The vermillion border was also meticulously outlined to ensure appropriate reapproximation during the repair.  The area thus outlined was incised through and through with a #15 scalpel blade.  The muscularis and dermis were reaproximated with deep sutures following hemostasis. Care was taken to realign the vermillion border before proceeding with the superficial closure.  Once the vermillion was realigned the superfical and mucosal closure was finished. Ftsg Text: The defect edges were debeveled with a #15 scalpel blade.  Given the location of the defect, shape of the defect and the proximity to free margins a full thickness skin graft was deemed most appropriate.  Using a sterile surgical marker, the primary defect shape was transferred to the donor site. The area thus outlined was incised deep to adipose tissue with a #15 scalpel blade.  The harvested graft was then trimmed of adipose tissue until only dermis and epidermis was left.  The skin margins of the secondary defect were undermined to an appropriate distance in all directions utilizing iris scissors.  The secondary defect was closed with interrupted buried subcutaneous sutures.  The skin edges were then re-apposed with running  sutures.  The skin graft was then placed in the primary defect and oriented appropriately. Split-Thickness Skin Graft Text: The defect edges were debeveled with a #15 scalpel blade.  Given the location of the defect, shape of the defect and the proximity to free margins a split thickness skin graft was deemed most appropriate.  Using a sterile surgical marker, the primary defect shape was transferred to the donor site. The split thickness graft was then harvested.  The skin graft was then placed in the primary defect and oriented appropriately. Cartilage Graft Text: The defect edges were debeveled with a #15 scalpel blade.  Given the location of the defect, shape of the defect, the fact the defect involved a full thickness cartilage defect a cartilage graft was deemed most appropriate.  An appropriate donor site was identified, cleansed, and anesthetized. The cartilage graft was then harvested and transferred to the recipient site, oriented appropriately and then sutured into place.  The secondary defect was then repaired using a primary closure. Composite Graft Text: The defect edges were debeveled with a #15 scalpel blade.  Given the location of the defect, shape of the defect, the proximity to free margins and the fact the defect was full thickness a composite graft was deemed most appropriate.  The defect was outline and then transferred to the donor site.  A full thickness graft was then excised from the donor site. The graft was then placed in the primary defect, oriented appropriately and then sutured into place.  The secondary defect was then repaired using a primary closure. Epidermal Autograft Text: The defect edges were debeveled with a #15 scalpel blade.  Given the location of the defect, shape of the defect and the proximity to free margins an epidermal autograft was deemed most appropriate.  Using a sterile surgical marker, the primary defect shape was transferred to the donor site. The epidermal graft was then harvested.  The skin graft was then placed in the primary defect and oriented appropriately. Dermal Autograft Text: The defect edges were debeveled with a #15 scalpel blade.  Given the location of the defect, shape of the defect and the proximity to free margins a dermal autograft was deemed most appropriate.  Using a sterile surgical marker, the primary defect shape was transferred to the donor site. The area thus outlined was incised deep to adipose tissue with a #15 scalpel blade.  The harvested graft was then trimmed of adipose and epidermal tissue until only dermis was left.  The skin graft was then placed in the primary defect and oriented appropriately. Skin Substitute Text: The defect edges were debeveled with a #15 scalpel blade.  Given the location of the defect, shape of the defect and the proximity to free margins a skin substitute graft was deemed most appropriate.  The graft material was trimmed to fit the size of the defect. The graft was then placed in the primary defect and oriented appropriately. Tissue Cultured Epidermal Autograft Text: The defect edges were debeveled with a #15 scalpel blade.  Given the location of the defect, shape of the defect and the proximity to free margins a tissue cultured epidermal autograft was deemed most appropriate.  The graft was then trimmed to fit the size of the defect.  The graft was then placed in the primary defect and oriented appropriately. Xenograft Text: The defect edges were debeveled with a #15 scalpel blade.  Given the location of the defect, shape of the defect and the proximity to free margins a xenograft was deemed most appropriate.  The graft was then trimmed to fit the size of the defect.  The graft was then placed in the primary defect and oriented appropriately. Purse String (Simple) Text: Given the location of the defect and the characteristics of the surrounding skin a pursestring closure was deemed most appropriate.  Undermining was performed circumfirentially around the surgical defect.  A purstring suture was then placed and tightened. Purse String (Intermediate) Text: Given the location of the defect and the characteristics of the surrounding skin a pursestring intermediate closure was deemed most appropriate.  Undermining was performed circumfirentially around the surgical defect.  A purstring suture was then placed and tightened. Partial Purse String (Simple) Text: Given the location of the defect and the characteristics of the surrounding skin a simple purse string closure was deemed most appropriate.  Undermining was performed circumfirentially around the surgical defect.  A purse string suture was then placed and tightened. Wound tension only allowed a partial closure of the circular defect. Partial Purse String (Intermediate) Text: Given the location of the defect and the characteristics of the surrounding skin an intermediate purse string closure was deemed most appropriate.  Undermining was performed circumfirentially around the surgical defect.  A purse string suture was then placed and tightened. Wound tension only allowed a partial closure of the circular defect. Localized Dermabrasion With Wire Brush Text: The patient was draped in routine manner.  Localized dermabrasion using 3 x 17 mm wire brush was performed in routine manner to papillary dermis. This spot dermabrasion is being performed to complete skin cancer reconstruction. It also will eliminate the other sun damaged precancerous cells that are known to be part of the regional effect of a lifetime's worth of sun exposure. This localized dermabrasion is therapeutic and should not be considered cosmetic in any regard. Tarsorrhaphy Text: A tarsorrhaphy was performed using Frost sutures. Complex Repair And Flap Additional Text (Will Appearing After The Standard Complex Repair Text): The complex repair was not sufficient to completely close the primary defect. The remaining additional defect was repaired with the flap mentioned below. Complex Repair And Graft Additional Text (Will Appearing After The Standard Complex Repair Text): The complex repair was not sufficient to completely close the primary defect. The remaining additional defect was repaired with the graft mentioned below. Manual Repair Warning Statement: We plan on removing the manually selected variable below in favor of our much easier automatic structured text blocks found in the previous tab. We decided to do this to help make the flow better and give you the full power of structured data. Manual selection is never going to be ideal in our platform and I would encourage you to avoid using manual selection from this point on, especially since I will be sunsetting this feature. It is important that you do one of two things with the customized text below. First, you can save all of the text in a word file so you can have it for future reference. Second, transfer the text to the appropriate area in the Library tab. Lastly, if there is a flap or graft type which we do not have you need to let us know right away so I can add it in before the variable is hidden. No need to panic, we plan to give you roughly 6 months to make the change. Same Histology In Subsequent Stages Text: The pattern and morphology of the tumor is as described in the first stage. No Residual Tumor Seen Histology Text: There were no malignant cells seen in the sections examined. Inflammation Suggestive Of Cancer Camouflage Histology Text: There was a dense lymphocytic infiltrate which prevented adequate histologic evaluation of adjacent structures. Bcc Histology Text: There were numerous aggregates of basaloid cells. Bcc Infiltrative Histology Text: There were numerous aggregates of basaloid cells demonstrating an infiltrative pattern. Information: Selecting Yes will display possible errors in your note based on the variables you have selected. This validation is only offered as a suggestion for you. PLEASE NOTE THAT THE VALIDATION TEXT WILL BE REMOVED WHEN YOU FINALIZE YOUR NOTE. IF YOU WANT TO FAX A PRELIMINARY NOTE YOU WILL NEED TO TOGGLE THIS TO 'NO' IF YOU DO NOT WANT IT IN YOUR FAXED NOTE.

## 2024-06-10 ENCOUNTER — OFFICE VISIT (OUTPATIENT)
Dept: ORTHOPEDICS | Facility: CLINIC | Age: 59
End: 2024-06-10
Payer: MEDICARE

## 2024-06-10 DIAGNOSIS — M19.031 PRIMARY OSTEOARTHRITIS OF RIGHT WRIST: ICD-10-CM

## 2024-06-10 DIAGNOSIS — S63.642D RUPTURE OF RADIAL COLLATERAL LIGAMENT OF LEFT THUMB, SUBSEQUENT ENCOUNTER: Primary | ICD-10-CM

## 2024-06-10 PROCEDURE — 1160F RVW MEDS BY RX/DR IN RCRD: CPT | Mod: HCNC,CPTII,S$GLB, | Performed by: ORTHOPAEDIC SURGERY

## 2024-06-10 PROCEDURE — 99214 OFFICE O/P EST MOD 30 MIN: CPT | Mod: HCNC,S$GLB,, | Performed by: ORTHOPAEDIC SURGERY

## 2024-06-10 PROCEDURE — 99999 PR PBB SHADOW E&M-EST. PATIENT-LVL V: CPT | Mod: PBBFAC,HCNC,, | Performed by: ORTHOPAEDIC SURGERY

## 2024-06-10 PROCEDURE — 1159F MED LIST DOCD IN RCRD: CPT | Mod: HCNC,CPTII,S$GLB, | Performed by: ORTHOPAEDIC SURGERY

## 2024-06-10 NOTE — PROGRESS NOTES
Ida Orozco presents for follow up evaluation of   Encounter Diagnoses   Name Primary?    Primary osteoarthritis of right wrist     Rupture of radial collateral ligament of left thumb, subsequent encounter Yes   The patient has had an MRI without contrast left thumb  which we reviewed together today and it showed: grade 2 sprain of the distal attachment of left thumb UCL. DOI:4.17.24; valgus stress to left thumb; dog pulled on her leash while thumb was wrapped around the leash. Secondarily she is c/o of right wrist popping and clicking with active flexion and extension pain 6/10 and continued numbness of right hand, worse in the am.    Of note she is 3m 14d s/p 2.27.24 Left elbow ulnar n. decompression, Left recurrent carpal tunnel release / Right carpal tunnel csi. She has no return of symptoms with her left.   PE:    AA&O x 4.  NAD  HEENT:  NCAT, sclera nonicteric  Lungs:  Respirations are equal and unlabored.  CV:  2+ bilateral upper and lower extremity pulses.  MSK:  Left thumb tender to palpation over UCL, instability with stress to thumb UCL. Swelling thumb MCP joint. Neurovascularly intact bilaterally.  5/5 thenar and intrinsic musculature strength.  Otherwise, full range of motion hands, wrists and elbows.    Right wrist/hand: +TTP radiocarpal joint, Neurovascularly intact bilaterally.  5/5 thenar and intrinsic musculature strength.  Otherwise, full range of motion hands, wrists and elbows.      A/P:   Encounter Diagnoses   Name Primary?    Primary osteoarthritis of right wrist     Rupture of radial collateral ligament of left thumb, subsequent encounter Yes     Plan: The patient and I had a thorough discussion today. We discussed the working diagnosis as well as several other potential alternative diagnoses. Treatment options were discussed, both conservative and surgical. Conservative treatment options would include things such as activity modifications, workplace modifications, a period of rest, oral  vs topical OTC and prescription anti-inflammatory medications, occupational therapy, splinting/bracing, immobilization, corticosteroid injections, and others. Surgical options were discussed as well.     We have discussed conservative vs. surgical treatment as well as risks, benefits and alternatives for left thumb UCL tear.  She has exhausted conservative measures and would like to proceed with surgery. Surgery would include left thumb UCL repair, left thumb UCL arthrotomy / Right radiocarpal wrist steroid injection . Consent signed today in clinic. Light use of the hand will be indicated for the first 4-6 weeks     We have discussed risks of hand surgery which include but are not limited to blood clots in the legs that can travel to the lungs (pulmonary embolism). Pulmonary embolism can cause shortness of breath, chest pain, and even shock. Other risks include urinary tract infection, nausea and vomiting (usually related to pain medication), chronic pain, bleeding, nerve damage, blood vessel injury, scarring and infection of the hand which can require re-operation. Furthermore, the risks of anesthesia include potential heart, lung, kidney, and liver damage.  Informed consent was obtained. she understands and would like to proceed with surgery in the near future.      Call with any questions/concerns in the interim        Etelvina Nicholas MD    Please be aware that this note has been generated with the assistance of [facility] voice-to-text.  Please excuse any spelling or grammatical errors.

## 2024-06-10 NOTE — PATIENT INSTRUCTIONS
[facility]  HAND CLINIC SURGERY INSTRUCTIONS  Etelvina Nicholas MD  Date of Surgery: 6.20.24    Location of Surgery:      [x] [place]    [address] [place] [address]    [] [facility]  [address] [place] [address]  *Pre Op Anesthesia Clinic: [phone]* You will need to contact this department to schedule a telephone or in person visit prior to your surgery date.    [] [facility]  [address]nd [place] [address]    PRE-OPERATIVE INSTRUCTIONS:    Dr. Nicholas's clinic staff will call you THE DAY BEFORE SURGERY with your arrival time. You will be notified in the afternoon between 3:00p-5:00pm. We will attempt to provide your arrival time earlier and will call you if this is at all possible.     DO NOT eat or drink after midnight, this includes gum/hard candy, coffee.   You may have ONLY SMALL sips of WATER the morning of surgery to take your medication, NOTHING ELSE.    You ARE NOT to drive or take an Uber/Lyft/taxi home from surgery. Please arrange a friend/family member to accompany you at the surgery center and drive you home.  Transportation: [facility]    PLEASE REMOVE nail polish and/or artificial nails prior to your surgery date if applicable. NA    PRE-OPERATIVE MEDICATION INSTRUCTIONS:    If you are diabetic, DO NOT take any diabetic medication the night before surgery or morning of surgery. If you are type I diabetic on an insulin pump, please bring your monitor the morning of surgery.   NA    MUST HOLD SEMAGLUTIDE MEDICATIONS 7 DAYS PRIOR TO SURGERY, examples: Mounjaro, Ozempic, Trulicity.   NA    If you have high blood pressure please take your medication in the morning like normal with a SIP OF WATER; with the exception of any Angiotensin receptor blocker (end in sartan) and ACE inhibitors (end in pril).  NA    If you are taking blood thinners (Aspirin,  Eliquis, Lovenox, Coumadin, etc), our office will contact the prescribing physician for guidance on when you are to stop/re-start your blood thinner medication.   NA    Other medications to dose with a SIP OF WATER AM of surgery:   sertraline (ZOLOFT) 100 MG tablet dose am if needed    Other Important Medication Instructions:   NA    Please HOLD Vitamins 1-7 days prior to surgery, CONTINUE Vitamin D up until the AM of your surgery.    Avoid anti-inflammatory medications 5-7 days before your surgery. This includes Aleve/Naproxen, Celebrex, Meloxicam/Mobic, Voltaren/Diclofenac.   You may dose ibuprofen/Advil/Motrin up until the day before your surgery.  You may take extra strength Tylenol/Acetaminophen.    HOLD ALL OTHER MEDICATIONS AM OF SURGERY THAT ARE NOT DISCUSSED ABOVE        POST-OPERATIVE MEDICATION INSTRUCTIONS:    Your post-operative pain medication will be DELIVERED BEDSIDE to you at the surgery center by the [facility]. You DO NOT need to pick this medication up from the [facility].     TAKE post-operative pain medication as directed.   You may also take over-the-counter extra strength Tylenol/acetaminophen as directed on the bottle for breakthrough pain between dosed of prescribed pain medication.   Please note, some pain medications contain Tylenol/acetaminophen.   DO NOT exceed 3000mg of Tylenol/acetaminophen in 1 day.  You may also use an over-the-counter anti-inflammatory medication also known as an NSAID,  (Aleve/Naproxen) as directed on the bottle for breakthrough pain between doses of prescribed pain medication.  DO NOT take NSAIDs if you have been previously instructed not to by a physician.     POST-OPERATIVE INSTRUCTIONS:    DO NOT let your operative area become wet, Your dressings/bandages/splints must remain dry.   Recommend waterproof arm cast cover to be worn when showering and bathing and can be purchased on [employer]. Garbage bags, plastic shopping bags, or umbrella bags can also  be used instead.    DO NOT remove any post-operative dressings/bandages/splints until you are seen by Dr. Jewell or Occupational Therapy   These dressings/bandages/splints are in place to keep the operative site clean, dry, and in optimal healing position     ELEVATE your hand/arm above the level of your heart as much as possible to decrease post-operative swelling for first 48 hours.  Swelling after surgery is TO BE EXPECTED.      ICE the operative site following surgery for 20-30 minutes, 4-5 times per day.  Be sure to have a towel between your dressings/bandages/splint to keep from getting wet.    MOVE the parts of your hand/arm that are not immobilized in a sling or splint.   Make a gentle fist with your fingers, bend/straighten your elbow, etc.   DO NOT attempt any strengthening exercises (hand putty, exercise balls, etc) on your operative side as this can increase swelling.     *CALL the [facility] at [phone]*  If at any time following surgery your dressings/bandages/splints: get wet, come loose, feels tight, feels uncomfortable.  Or if you are concerned about possible infection, worsening pain, worsening swelling or have any other concerns regarding your surgery.   Signs of infection:  fever (over 100.3), chills, body aches, increased warmth, redness, significant increase in swelling & pain at surgical site.     OUTPATIENT FOLLOW UP:  YOU WILL BE SEEN FIRST BY OCCUPATIONAL THERAPY 5-7 DAYS AFTER YOUR SURGERY. *Your splint / soft dressing will be removed as well as your sutures if applicable.  YOU WILL THEN BE SEEN BY DR. JEWELL IN CLINIC 2 WEEKS AFTER SURGERY    FMLA or Disability paperwork can be sent to [facility]  *Only needed if you are going to be out of work 2 weeks or more*  (P) [phone] (F) [phone] or email [email or web address]

## 2024-06-10 NOTE — H&P (VIEW-ONLY)
Ida Orozco presents for follow up evaluation of   Encounter Diagnoses   Name Primary?    Primary osteoarthritis of right wrist     Rupture of radial collateral ligament of left thumb, subsequent encounter Yes   The patient has had an MRI without contrast left thumb  which we reviewed together today and it showed: grade 2 sprain of the distal attachment of left thumb UCL. DOI:[id number]; valgus stress to left thumb; dog pulled on her leash while thumb was wrapped around the leash. Secondarily she is c/o of right wrist popping and clicking with active flexion and extension pain 6/10 and continued numbness of right hand, worse in the am.    Of note she is 3m 14d s/p 2.27.24 Left elbow ulnar n. decompression, Left recurrent carpal tunnel release / Right carpal tunnel csi. She has no return of symptoms with her left.   PE:    AA&O x 4.  NAD  HEENT:  NCAT, sclera nonicteric  Lungs:  Respirations are equal and unlabored.  CV:  2+ bilateral upper and lower extremity pulses.  MSK:  Left thumb tender to palpation over UCL, instability with stress to thumb UCL. Swelling thumb MCP joint. Neurovascularly intact bilaterally.  5/5 thenar and intrinsic musculature strength.  Otherwise, full range of motion hands, wrists and elbows.    Right wrist/hand: +TTP radiocarpal joint, Neurovascularly intact bilaterally.  5/5 thenar and intrinsic musculature strength.  Otherwise, full range of motion hands, wrists and elbows.      A/P:   Encounter Diagnoses   Name Primary?    Primary osteoarthritis of right wrist     Rupture of radial collateral ligament of left thumb, subsequent encounter Yes     Plan: The patient and I had a thorough discussion today. We discussed the working diagnosis as well as several other potential alternative diagnoses. Treatment options were discussed, both conservative and surgical. Conservative treatment options would include things such as activity modifications, workplace modifications, a period of rest, oral  vs topical OTC and prescription anti-inflammatory medications, occupational therapy, splinting/bracing, immobilization, corticosteroid injections, and others. Surgical options were discussed as well.     We have discussed conservative vs. surgical treatment as well as risks, benefits and alternatives for left thumb UCL tear.  She has exhausted conservative measures and would like to proceed with surgery. Surgery would include left thumb UCL repair, left thumb UCL arthrotomy / Right radiocarpal wrist steroid injection . Consent signed today in clinic. Light use of the hand will be indicated for the first 4-6 weeks     We have discussed risks of hand surgery which include but are not limited to blood clots in the legs that can travel to the lungs (pulmonary embolism). Pulmonary embolism can cause shortness of breath, chest pain, and even shock. Other risks include urinary tract infection, nausea and vomiting (usually related to pain medication), chronic pain, bleeding, nerve damage, blood vessel injury, scarring and infection of the hand which can require re-operation. Furthermore, the risks of anesthesia include potential heart, lung, kidney, and liver damage.  Informed consent was obtained. she understands and would like to proceed with surgery in the near future.      Call with any questions/concerns in the interim        Etelvina Nicholas MD    Please be aware that this note has been generated with the assistance of [facility] voice-to-text.  Please excuse any spelling or grammatical errors.

## 2024-06-14 ENCOUNTER — ANESTHESIA EVENT (OUTPATIENT)
Dept: SURGERY | Facility: HOSPITAL | Age: 59
End: 2024-06-14
Payer: MEDICARE

## 2024-06-19 ENCOUNTER — TELEPHONE (OUTPATIENT)
Dept: ORTHOPEDICS | Facility: CLINIC | Age: 59
End: 2024-06-19
Payer: MEDICARE

## 2024-06-19 RX ORDER — DOCUSATE SODIUM 100 MG/1
100 CAPSULE, LIQUID FILLED ORAL 2 TIMES DAILY
Qty: 30 CAPSULE | Refills: 1 | Status: SHIPPED | OUTPATIENT
Start: 2024-06-19

## 2024-06-19 RX ORDER — CEFAZOLIN SODIUM 2 G/50ML
2 SOLUTION INTRAVENOUS
Status: CANCELLED | OUTPATIENT
Start: 2024-06-19

## 2024-06-19 RX ORDER — HYDROCODONE BITARTRATE AND ACETAMINOPHEN 5; 325 MG/1; MG/1
1 TABLET ORAL EVERY 6 HOURS PRN
Qty: 30 TABLET | Refills: 0 | Status: SHIPPED | OUTPATIENT
Start: 2024-06-19

## 2024-06-19 RX ORDER — MUPIROCIN 20 MG/G
OINTMENT TOPICAL
Status: CANCELLED | OUTPATIENT
Start: 2024-06-19

## 2024-06-19 RX ORDER — ONDANSETRON HYDROCHLORIDE 8 MG/1
8 TABLET, FILM COATED ORAL EVERY 8 HOURS PRN
Qty: 30 TABLET | Refills: 0 | Status: SHIPPED | OUTPATIENT
Start: 2024-06-19

## 2024-06-19 NOTE — TELEPHONE ENCOUNTER
Spoke to patient to inform them of their surgery arrival time (6 am), [place], [address] [facility] A:  Verbalized understanding of instructions for pre-wash, and reviewed NPO after midnight.   Confirmed call in regards to medication instructions from anesthesia.   Confirmed patient was NOT using a rideshare service for surgery arrival or  transportation.              PRE-OPERATIVE MEDICATION INSTRUCTIONS:     If you are diabetic, DO NOT take any diabetic medication the night before surgery or morning of surgery. If you are type I diabetic on an insulin pump, please bring your monitor the morning of surgery.   NA     MUST HOLD SEMAGLUTIDE MEDICATIONS 7 DAYS PRIOR TO SURGERY, examples: Mounjaro, Ozempic, Trulicity.   NA     If you have high blood pressure please take your medication in the morning like normal with a SIP OF WATER; with the exception of any Angiotensin receptor blocker (end in sartan) and ACE inhibitors (end in pril).  NA     If you are taking blood thinners (Aspirin, Eliquis, Lovenox, Coumadin, etc), our office will contact the prescribing physician for guidance on when you are to stop/re-start your blood thinner medication.   NA     Other medications to dose with a SIP OF WATER AM of surgery:   sertraline (ZOLOFT) 100 MG tablet dose am if needed     Other Important Medication Instructions:   NA     Please HOLD Vitamins 1-7 days prior to surgery, CONTINUE Vitamin D up until the AM of your surgery.     Avoid anti-inflammatory medications 5-7 days before your surgery. This includes Aleve/Naproxen, Celebrex, Meloxicam/Mobic, Voltaren/Diclofenac.   You may dose ibuprofen/Advil/Motrin up until the day before your surgery.  You may take extra strength Tylenol/Acetaminophen.     HOLD ALL OTHER MEDICATIONS AM OF SURGERY THAT ARE NOT DISCUSSED ABOVE

## 2024-06-20 ENCOUNTER — HOSPITAL ENCOUNTER (OUTPATIENT)
Facility: HOSPITAL | Age: 59
Discharge: HOME | End: 2024-06-20
Attending: ORTHOPAEDIC SURGERY | Admitting: ORTHOPAEDIC SURGERY
Payer: MEDICARE

## 2024-06-20 ENCOUNTER — ANESTHESIA (OUTPATIENT)
Dept: SURGERY | Facility: HOSPITAL | Age: 59
End: 2024-06-20
Payer: MEDICARE

## 2024-06-20 VITALS
RESPIRATION RATE: 20 BRPM | HEIGHT: 64 IN | SYSTOLIC BLOOD PRESSURE: 131 MMHG | WEIGHT: 211 LBS | TEMPERATURE: 98 F | HEART RATE: 58 BPM | OXYGEN SATURATION: 93 % | BODY MASS INDEX: 36.02 KG/M2 | DIASTOLIC BLOOD PRESSURE: 73 MMHG

## 2024-06-20 DIAGNOSIS — S63.642D RUPTURE OF RADIAL COLLATERAL LIGAMENT OF LEFT THUMB, SUBSEQUENT ENCOUNTER: Primary | ICD-10-CM

## 2024-06-20 DIAGNOSIS — M19.031 PRIMARY OSTEOARTHRITIS OF RIGHT WRIST: ICD-10-CM

## 2024-06-20 DIAGNOSIS — M79.645 FINGER PAIN, LEFT: Primary | ICD-10-CM

## 2024-06-20 PROBLEM — S63.642A RUPTURE OF RADIAL COLLATERAL LIGAMENT OF LEFT THUMB: Status: ACTIVE | Noted: 2024-06-20

## 2024-06-20 PROCEDURE — 63600175 PHARM REV CODE 636 W HCPCS: Performed by: ANESTHESIOLOGY

## 2024-06-20 PROCEDURE — 25000003 PHARM REV CODE 250: Performed by: ANESTHESIOLOGY

## 2024-06-20 PROCEDURE — 94761 N-INVAS EAR/PLS OXIMETRY MLT: CPT

## 2024-06-20 PROCEDURE — 36000708 HC OR TIME LEV III 1ST 15 MIN: Performed by: ORTHOPAEDIC SURGERY

## 2024-06-20 PROCEDURE — 64415 NJX AA&/STRD BRCH PLXS IMG: CPT | Performed by: STUDENT IN AN ORGANIZED HEALTH CARE EDUCATION/TRAINING PROGRAM

## 2024-06-20 PROCEDURE — 63600175 PHARM REV CODE 636 W HCPCS: Performed by: ORTHOPAEDIC SURGERY

## 2024-06-20 PROCEDURE — 25000003 PHARM REV CODE 250: Performed by: ORTHOPAEDIC SURGERY

## 2024-06-20 PROCEDURE — 63600175 PHARM REV CODE 636 W HCPCS: Performed by: PHYSICIAN ASSISTANT

## 2024-06-20 PROCEDURE — 71000015 HC POSTOP RECOV 1ST HR: Performed by: ORTHOPAEDIC SURGERY

## 2024-06-20 PROCEDURE — 37000008 HC ANESTHESIA 1ST 15 MINUTES: Performed by: ORTHOPAEDIC SURGERY

## 2024-06-20 PROCEDURE — C1713 ANCHOR/SCREW BN/BN,TIS/BN: HCPCS | Performed by: ORTHOPAEDIC SURGERY

## 2024-06-20 PROCEDURE — 99900035 HC TECH TIME PER 15 MIN (STAT)

## 2024-06-20 PROCEDURE — 25000003 PHARM REV CODE 250: Performed by: NURSE ANESTHETIST, CERTIFIED REGISTERED

## 2024-06-20 PROCEDURE — 25000003 PHARM REV CODE 250: Performed by: PHYSICIAN ASSISTANT

## 2024-06-20 PROCEDURE — 26541 REPAIR HAND JOINT WITH GRAFT: CPT | Mod: HCNC,LT,, | Performed by: ORTHOPAEDIC SURGERY

## 2024-06-20 PROCEDURE — 37000009 HC ANESTHESIA EA ADD 15 MINS: Performed by: ORTHOPAEDIC SURGERY

## 2024-06-20 PROCEDURE — 63600175 PHARM REV CODE 636 W HCPCS: Performed by: NURSE ANESTHETIST, CERTIFIED REGISTERED

## 2024-06-20 PROCEDURE — 64415 NJX AA&/STRD BRCH PLXS IMG: CPT | Mod: 59,LT,, | Performed by: ANESTHESIOLOGY

## 2024-06-20 PROCEDURE — 20605 DRAIN/INJ JOINT/BURSA W/O US: CPT | Mod: XS,HCNC,RT, | Performed by: ORTHOPAEDIC SURGERY

## 2024-06-20 PROCEDURE — 27201423 OPTIME MED/SURG SUP & DEVICES STERILE SUPPLY: Performed by: ORTHOPAEDIC SURGERY

## 2024-06-20 PROCEDURE — 36000709 HC OR TIME LEV III EA ADD 15 MIN: Performed by: ORTHOPAEDIC SURGERY

## 2024-06-20 PROCEDURE — 71000033 HC RECOVERY, INTIAL HOUR: Performed by: ORTHOPAEDIC SURGERY

## 2024-06-20 DEVICE — H/W INTERNALBRACE LGMNT AUGMNT REPR KIT
Type: IMPLANTABLE DEVICE | Site: HAND | Status: FUNCTIONAL
Brand: ARTHREX®

## 2024-06-20 RX ORDER — PROPOFOL 10 MG/ML
INJECTION, EMULSION INTRAVENOUS CONTINUOUS PRN
Status: DISCONTINUED | OUTPATIENT
Start: 2024-06-20 | End: 2024-06-20

## 2024-06-20 RX ORDER — METHYLPREDNISOLONE ACETATE 40 MG/ML
INJECTION, SUSPENSION INTRA-ARTICULAR; INTRALESIONAL; INTRAMUSCULAR; SOFT TISSUE
Status: DISCONTINUED | OUTPATIENT
Start: 2024-06-20 | End: 2024-06-20 | Stop reason: HOSPADM

## 2024-06-20 RX ORDER — ACETAMINOPHEN 500 MG
1000 TABLET ORAL
Status: COMPLETED | OUTPATIENT
Start: 2024-06-20 | End: 2024-06-20

## 2024-06-20 RX ORDER — FAMOTIDINE 10 MG/ML
20 INJECTION INTRAVENOUS ONCE
Status: COMPLETED | OUTPATIENT
Start: 2024-06-20 | End: 2024-06-20

## 2024-06-20 RX ORDER — FAMOTIDINE 10 MG/ML
INJECTION INTRAVENOUS
Status: DISCONTINUED | OUTPATIENT
Start: 2024-06-20 | End: 2024-06-20

## 2024-06-20 RX ORDER — LIDOCAINE HYDROCHLORIDE 20 MG/ML
INJECTION INTRAVENOUS
Status: DISCONTINUED | OUTPATIENT
Start: 2024-06-20 | End: 2024-06-20

## 2024-06-20 RX ORDER — MUPIROCIN 20 MG/G
OINTMENT TOPICAL
Status: DISCONTINUED | OUTPATIENT
Start: 2024-06-20 | End: 2024-06-20 | Stop reason: HOSPADM

## 2024-06-20 RX ORDER — ONDANSETRON HYDROCHLORIDE 2 MG/ML
4 INJECTION, SOLUTION INTRAVENOUS ONCE AS NEEDED
Status: DISCONTINUED | OUTPATIENT
Start: 2024-06-20 | End: 2024-06-20 | Stop reason: HOSPADM

## 2024-06-20 RX ORDER — MUPIROCIN 20 MG/G
OINTMENT TOPICAL 2 TIMES DAILY
Status: DISCONTINUED | OUTPATIENT
Start: 2024-06-20 | End: 2024-06-20 | Stop reason: HOSPADM

## 2024-06-20 RX ORDER — DEXAMETHASONE SODIUM PHOSPHATE 4 MG/ML
INJECTION, SOLUTION INTRA-ARTICULAR; INTRALESIONAL; INTRAMUSCULAR; INTRAVENOUS; SOFT TISSUE
Status: DISCONTINUED | OUTPATIENT
Start: 2024-06-20 | End: 2024-06-20

## 2024-06-20 RX ORDER — FENTANYL CITRATE 50 UG/ML
100 INJECTION, SOLUTION INTRAMUSCULAR; INTRAVENOUS
Status: DISCONTINUED | OUTPATIENT
Start: 2024-06-20 | End: 2024-06-20 | Stop reason: HOSPADM

## 2024-06-20 RX ORDER — MIDAZOLAM HYDROCHLORIDE 1 MG/ML
1 INJECTION, SOLUTION INTRAMUSCULAR; INTRAVENOUS
Status: DISCONTINUED | OUTPATIENT
Start: 2024-06-20 | End: 2024-06-20 | Stop reason: HOSPADM

## 2024-06-20 RX ORDER — SODIUM CHLORIDE 0.9 % (FLUSH) 0.9 %
3 SYRINGE (ML) INJECTION
Status: DISCONTINUED | OUTPATIENT
Start: 2024-06-20 | End: 2024-06-20 | Stop reason: HOSPADM

## 2024-06-20 RX ORDER — HYDROCODONE BITARTRATE AND ACETAMINOPHEN 5; 325 MG/1; MG/1
1 TABLET ORAL EVERY 4 HOURS PRN
Status: DISCONTINUED | OUTPATIENT
Start: 2024-06-20 | End: 2024-06-20 | Stop reason: HOSPADM

## 2024-06-20 RX ORDER — ROPIVACAINE HYDROCHLORIDE 5 MG/ML
INJECTION, SOLUTION EPIDURAL; INFILTRATION; PERINEURAL
Status: COMPLETED | OUTPATIENT
Start: 2024-06-20 | End: 2024-06-20

## 2024-06-20 RX ORDER — LIDOCAINE HYDROCHLORIDE 10 MG/ML
INJECTION, SOLUTION EPIDURAL; INFILTRATION; INTRACAUDAL; PERINEURAL
Status: DISCONTINUED | OUTPATIENT
Start: 2024-06-20 | End: 2024-06-20 | Stop reason: HOSPADM

## 2024-06-20 RX ORDER — ONDANSETRON HYDROCHLORIDE 2 MG/ML
INJECTION, SOLUTION INTRAVENOUS
Status: DISCONTINUED | OUTPATIENT
Start: 2024-06-20 | End: 2024-06-20

## 2024-06-20 RX ORDER — SODIUM CHLORIDE 9 MG/ML
INJECTION, SOLUTION INTRAVENOUS CONTINUOUS PRN
Status: DISCONTINUED | OUTPATIENT
Start: 2024-06-20 | End: 2024-06-20

## 2024-06-20 RX ADMIN — LIDOCAINE HYDROCHLORIDE 60 MG: 20 INJECTION INTRAVENOUS at 08:06

## 2024-06-20 RX ADMIN — ROPIVACAINE HYDROCHLORIDE 25 ML: 5 INJECTION EPIDURAL; INFILTRATION; PERINEURAL at 07:06

## 2024-06-20 RX ADMIN — FENTANYL CITRATE 100 MCG: 50 INJECTION, SOLUTION INTRAMUSCULAR; INTRAVENOUS at 07:06

## 2024-06-20 RX ADMIN — SODIUM CHLORIDE: 0.9 INJECTION, SOLUTION INTRAVENOUS at 07:06

## 2024-06-20 RX ADMIN — CEFAZOLIN 2 G: 2 INJECTION, POWDER, FOR SOLUTION INTRAMUSCULAR; INTRAVENOUS at 08:06

## 2024-06-20 RX ADMIN — MIDAZOLAM 2 MG: 1 INJECTION INTRAMUSCULAR; INTRAVENOUS at 07:06

## 2024-06-20 RX ADMIN — FAMOTIDINE 20 MG: 10 INJECTION, SOLUTION INTRAVENOUS at 08:06

## 2024-06-20 RX ADMIN — PROPOFOL 125 MCG/KG/MIN: 10 INJECTION, EMULSION INTRAVENOUS at 08:06

## 2024-06-20 RX ADMIN — ACETAMINOPHEN 1000 MG: 500 TABLET ORAL at 06:06

## 2024-06-20 RX ADMIN — DEXAMETHASONE SODIUM PHOSPHATE 4 MG: 4 INJECTION, SOLUTION INTRAMUSCULAR; INTRAVENOUS at 08:06

## 2024-06-20 RX ADMIN — ONDANSETRON 4 MG: 2 INJECTION INTRAMUSCULAR; INTRAVENOUS at 08:06

## 2024-06-20 RX ADMIN — MIDAZOLAM 2 MG: 1 INJECTION INTRAMUSCULAR; INTRAVENOUS at 08:06

## 2024-06-20 RX ADMIN — MUPIROCIN: 20 OINTMENT TOPICAL at 06:06

## 2024-06-20 NOTE — OP NOTE
[facility])  Surgery Department  Operative Note    SUMMARY     Date of Procedure: 6/20/2024     Procedure:   1. Left thumb ulnar collateral ligament reconstruction, cpt 52024   2. Left thumb MP joint arthrotomy, cpt 60780  3. Right wrist steroid injection, cpt 52511    Surgeons and Role:     * Etelvina Nicholas MD - Primary    Assisting Surgeon: None     Assistant: Mihir Rogers    Pre-Operative Diagnosis:      * Rupture of radial collateral ligament of left thumb, subsequent encounter [S63.642D]     * Primary osteoarthritis of right wrist [M19.031]    Post-Operative Diagnosis:     * Rupture of radial collateral ligament of left thumb, subsequent encounter [S63.642D]     * Primary osteoarthritis of right wrist [M19.031]    Anesthesia: Regional    Indication for Procedure: 57 yo female who injured her thumb, MRI confirmed ulnar collateral ligament tear. Risks and benefits of the procedure were discussed with the patient and informed consent was obtained.    Description of the Findings of the Procedure: The patient was seen in the preoperative holding area and the left hand was marked.  Regional block was performed to by anesthesia without difficulty. The patient was taken to the OR, placed supine on the table, and a padded hand table was used.  After monitored anesthesia care was administered without difficulty, a time-out procedure was performed identifying the patient, the operative site and the procedure to be performed.  40 mg of methylprednisolone was sterilely injected into the right wrist.  A tourniquet was placed on the arm and the left upper extremity was prepped and draped in standard sterile fashion.  The upper extremity was exsanguinated with an Esmarch bandage, and the tourniquet was inflated to 250 mm Hg.      A 15 blade scalpel was used to make a 3 cm incision curvilenear incision on the left thumb. Littler scissors were used to dissect down to the adductor aponeurosis, this was  sharply incised.  Dorsal sensory nerve branches were identified and protected.  The ulnar collateral ligament was then identified and found to be torn at the proximal phalanx insertion. An arthrotomy was made, the joint was inspected and there was good cartilage found without defect.  An Arthrex Swivel lock suture anchor was pre-drilled and then inserted into the proximal phalanx base with a fiberwire and labral tape. The MP joint was held in reduction and the FiberWire was used to repair the ulnar collateral ligament back to the proximal phalanx base. A second k-wire was used in the metacarpal head. This was drilled and a swivel lock anchor was used to insert the labral tape to form the internal brace.  X-ray was used to check check anchor placement, stability of the MCP joint, there was no subluxation of the proximal phalanx, and no gapping of the ulnar collateral ligament repair or joint.  4-0  PDS was used to repair the MP joint capsule as well as the abductor aponeurosis. The wound was copiously irrigated with normal saline. The neurovascular bundles were identified and protected throughout the case.  Bipolar electrocautery was used for hemostasis.  4-0 monocryl was used to close the skin subcutaneously and in a running subcuticular manner.  3-0 Prolene was used to close the skin in a horizontal mattress fashion.  Adaptic, 4x4, cast padding, a thumb spica splint and Coban were used to dress the wrist.  The tourniquet was deflated and the hand and fingers were pink and well-perfused.  The patient tolerated the procedure well.  She was taken awake and alert to the recovery room.        Complications: No    Estimated Blood Loss (EBL): minimal           Implants:   Implant Name Type Inv. Item Serial No.  Lot No. LRB No. Used Action   KIT INTERNALBRACE HAND/WRIST - [id number]  KIT INTERNALBRACE HAND/WRIST  [employer] [id number] Left 1 Implanted       Specimens:   Specimen (24h ago, onward)      None                     Condition: Good    Disposition: PACU - hemodynamically stable.    Attestation: I was present and scrubbed for the entire procedure.

## 2024-06-20 NOTE — ANESTHESIA PROCEDURE NOTES
L Supraclavicular SS    Patient location during procedure: pre-op   Block not for primary anesthetic.  Reason for block: at surgeon's request and post-op pain management   Post-op Pain Location: L hand pain   Start time: 6/20/2024 7:41 AM  Timeout: 6/20/2024 7:40 AM   End time: 6/20/2024 7:50 AM    Staffing  Authorizing Provider: Darrell Eric MD  Performing Provider: Mook Warner MD    Staffing  Performed by: Mook Warner MD  Authorized by: Darrell Eric MD    Preanesthetic Checklist  Completed: patient identified, IV checked, site marked, risks and benefits discussed, surgical consent, monitors and equipment checked, pre-op evaluation and timeout performed  Peripheral Block  Patient position: supine  Prep: ChloraPrep  Patient monitoring: heart rate, cardiac monitor, continuous pulse ox, continuous capnometry and frequent blood pressure checks  Block type: supraclavicular  Laterality: left  Injection technique: single shot  Needle  Needle type: Stimuplex   Needle gauge: 22 G  Needle length: 2 in  Needle localization: anatomical landmarks and ultrasound guidance   -ultrasound image captured on disc.  Assessment  Injection assessment: negative aspiration, negative parasthesia and local visualized surrounding nerve  Paresthesia pain: none  Heart rate change: no  Slow fractionated injection: yes  Pain Tolerance: comfortable throughout block and no complaints  Medications:    Medications: ropivacaine (NAROPIN) injection 0.5% - Perineural   25 mL - 6/20/2024 7:50:00 AM    Additional Notes  Ropi w/ epi 1:200k  VSS.  [facility] RN monitoring vitals throughout procedure.  Patient tolerated procedure well.

## 2024-06-20 NOTE — TRANSFER OF CARE
"Anesthesia Transfer of Care Note    Patient: Ida Orozco    Procedure(s) Performed: Procedure(s) (LRB):  RECONSTRUCTION, LIGAMENT, ULNAR COLLATERAL (Left)  ARTHROTOMY, HAND (Right)  INJECTION, STEROID Radiocarpal wrist (Right)    Patient location: PACU    Anesthesia Type: general    Transport from OR: Transported from OR on 6-10 L/min O2 by face mask with adequate spontaneous ventilation    Post pain: adequate analgesia    Post assessment: no apparent anesthetic complications    Post vital signs: stable    Level of consciousness: awake    Nausea/Vomiting: no nausea/vomiting    Complications: none    Transfer of care protocol was followed      Last vitals: Visit Vitals  BP (!) 94/52   Pulse 68   Temp 36.1 °C (97 °F) (Temporal)   Resp 18   Ht 5' 4" (1.626 m)   Wt 95.7 kg (211 lb)   SpO2 95%   Breastfeeding No   BMI 36.22 kg/m²     "

## 2024-06-20 NOTE — PROGRESS NOTES
Pt stating her stomach is hurting, visibly grimacing. Pt states she usually takes protonix daily and did not take any this morning. Notified anesthesia and ordered pepcid IV.

## 2024-06-20 NOTE — ANESTHESIA PREPROCEDURE EVALUATION
06/20/2024  Pre-operative evaluation for Procedure(s) (LRB):  RELEASE, CARPAL TUNNEL (Left)  DECOMPRESSION, NERVE ULNAR NERVE ELBOW (Left)  INJECTION, STEROID CARPAL TUNNEL (Right)    Ida Orozco is a 58 y.o. female     Patient Active Problem List   Diagnosis    GERD (gastroesophageal reflux disease)    S/p ileostomy revision within the subcutaneous tissue.    Chronic pain of right knee    Plica of knee    Dietary folate deficiency anemia    Vitamin B12 deficiency    Hyperlipidemia    Vitamin D deficiency    Moderate persistent asthma without complication    Anxiety    DDD (degenerative disc disease), lumbar    Former smoker    Bariatric surgery status-Gastric sleeve     Redundant skin of abdomen    Inflammatory bowel disease    Vaginal atrophy    Urinary incontinence, mixed    Dietary iron deficiency    Candidal intertrigo    Sleep arousal disorder    Ileostomy in place    Mild episode of recurrent major depressive disorder    Diarrhea    Sinus bradycardia    Vasovagal syndrome    Chronic pansinusitis    Fatty liver    Age-related osteoporosis without current pathological fracture    Vertigo    Nasal polyposis    History of endoscopic sinus surgery    BMI 38.0-38.9,adult    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, with liver fibrosis, on ursodiol    Insomnia secondary to chronic pain    Trochanteric bursitis, right hip    Cubital tunnel syndrome on left    Carpal tunnel syndrome of left wrist    Carpal tunnel syndrome of right wrist    Severe obesity (BMI 35.0-39.9) with comorbidity    Chronic insomnia       Review of patient's allergies indicates:   Allergen Reactions    Adhesive      Cause blisters    Dilaudid [hydromorphone] Nausea And Vomiting    Humira [adalimumab] Hives    Sulfa (sulfonamide antibiotics) Hives    Surgical stainless steel      Had surgical staples, caused irritation and  infection       No current facility-administered medications on file prior to encounter.     Current Outpatient Medications on File Prior to Encounter   Medication Sig Dispense Refill    clonazePAM (KLONOPIN) 1 MG tablet Take 1 tablet (1 mg total) by mouth daily as needed for Anxiety. 90 tablet 1    diphenoxylate-atropine 2.5-0.025 mg (LOMOTIL) 2.5-0.025 mg per tablet Take 1 tablet by mouth 4 (four) times daily as needed for Diarrhea. 30 tablet 2    eszopiclone (LUNESTA) 2 MG Tab Take 1 tablet (2 mg total) by mouth every evening. 30 tablet 0    hydrOXYzine HCL (ATARAX) 25 MG tablet Take 1 tablet (25 mg total) by mouth 3 (three) times daily. 30 tablet 0    levocetirizine (XYZAL) 5 MG tablet Take 1 tablet (5 mg total) by mouth every evening. 90 tablet 3    meclizine (ANTIVERT) 25 mg tablet Take 1 tablet (25 mg total) by mouth 3 (three) times daily as needed for Dizziness or Nausea. 60 tablet 3    nystatin (MYCOSTATIN) powder Apply topically 2 (two) times daily. 60 g 3    sertraline (ZOLOFT) 100 MG tablet Take 2 tablets (200 mg total) by mouth once daily. 180 tablet 3    topiramate (TOPAMAX) 100 MG tablet Take 1 tablet (100 mg total) by mouth 2 (two) times daily. 180 tablet 3    docusate sodium (COLACE) 100 MG capsule Take 1 capsule (100 mg total) by mouth 2 (two) times daily. 30 capsule 1    HYDROcodone-acetaminophen (NORCO) 5-325 mg per tablet Take 1 tablet by mouth every 6 (six) hours as needed for Pain. 30 tablet 0    hyoscyamine (ANASPAZ,LEVSIN) 0.125 mg Tab Take 1 tablet (125 mcg total) by mouth every 6 (six) hours as needed (Abdominal spasms). 30 tablet 3    loratadine (CLARITIN) 10 mg tablet Take 1 tablet (10 mg total) by mouth once daily. 90 tablet 3    ondansetron (ZOFRAN) 8 MG tablet Take 1 tablet (8 mg total) by mouth every 8 (eight) hours as needed for Nausea. 30 tablet 0    ondansetron (ZOFRAN) 8 MG tablet Take 1 tablet (8 mg total) by mouth every 8 (eight) hours as needed for Nausea. Please deliver to  bedside at [facility] 30 tablet 0    promethazine-dextromethorphan (PROMETHAZINE-DM) 6.25-15 mg/5 mL Syrp Take 5 mLs by mouth every 6 (six) hours as needed (cough). 180 mL 1    [DISCONTINUED] adalimumab (HUMIRA,CF, PEN) 40 mg/0.4 mL PnKt Inject 0.4 mLs (40 mg total) into the skin every 14 (fourteen) days. 6 pen 2       Past Surgical History:   Procedure Laterality Date    APPENDECTOMY      CARPAL TUNNEL RELEASE Left 2024    Procedure: RELEASE, CARPAL TUNNEL;  Surgeon: Etelvina Nicholas MD;  Location: [facility] OR;  Service: Orthopedics;  Laterality: Left;    CATHETERIZATION OF BOTH LEFT AND RIGHT HEART Right 2019    Procedure: CATHETERIZATION, HEART, BOTH LEFT AND RIGHT;  Surgeon: Beto Malin MD;  Location: [facility] CATH LAB;  Service: Cardiology;  Laterality: Right;     SECTION, LOW TRANSVERSE      CHOLECYSTECTOMY      open    COLECTOMY      total- 2013    CYSTOSCOPY W/ RETROGRADES N/A 2018    Procedure: CYSTOSCOPY, WITH RETROGRADE PYELOGRAM;  Surgeon: Butch Banks MD;  Location: [facility] OR;  Service: Urology;  Laterality: N/A;  HANSEN SURGICAL CONFIRMED     DECOMPRESSION OF NERVE Left 2024    Procedure: DECOMPRESSION, NERVE ULNAR NERVE ELBOW;  Surgeon: Etelvina Nicholas MD;  Location: [facility] ER;  Service: Orthopedics;  Laterality: Left;    ENDOSCOPIC ULTRASOUND OF UPPER GASTROINTESTINAL TRACT N/A 11/10/2021    Procedure: ULTRASOUND, UPPER GI TRACT, ENDOSCOPIC;  Surgeon: Nhan Dee MD;  Location: [address]);  Service: Endoscopy;  Laterality: N/A;  MD Milena Whitehead PA-C; Quinn Whitman MD; Miladys Velasquez MA  Caller: Unspecified (3 days ago,  1:20 PM)  Fair enough. Neha, please schedule.       ----- Message -----   From: Milena Murillo PA-C   Sent: 2021  1    ESOPHAGOGASTRODUODENOSCOPY  2018    Dr. Castorena: LA Grade A reflux esophagitis, hiatal hernia; Ectopic gastric mucosa in the upper third of the esophagus; gastric  sleeve intact with unremarkable findings, gastritis; biopsy: stomach- Mild chronic antral and oxyntic gastritis, no activity, negative for h pylori    ESOPHAGOGASTRODUODENOSCOPY N/A 11/10/2021    Procedure: EGD (ESOPHAGOGASTRODUODENOSCOPY);  Surgeon: Nhan Dee MD;  Location: [facility] ENDO (2ND FLR);  Service: Endoscopy;  Laterality: N/A;    ESOPHAGOGASTRODUODENOSCOPY N/A 1/23/2024    Procedure: EGD (ESOPHAGOGASTRODUODENOSCOPY);  Surgeon: Anatoly Singh MD;  Location: [facility] ENDO (4TH FLR);  Service: Endoscopy;  Laterality: N/A;  Ref By: magdaleno Stover sent via portal Half a miralax prep.AC  1/17-lvm for precall-MS  1/18-pt confirmed appt-Kpvt    FESS, WITH IMAGING GUIDANCE Bilateral 4/4/2023    Procedure: Pan Sinus FESS, WITH IMAGING GUIDANCE Disc loaded;  Surgeon: Jay Hernadnez MD;  Location: [facility] OR;  Service: ENT;  Laterality: Bilateral;  balloon dilation of sinuses    FLEXIBLE SIGMOIDOSCOPY  5/24/2018    Procedure: SIGMOIDOSCOPY, FLEXIBLE;  Surgeon: JENNY Virgen MD;  Location: [facility] OR 2ND FLR;  Service: Colon and Rectal;;    FLEXIBLE SIGMOIDOSCOPY  04/11/2018    Dr. Castorena: Non-patent surgical anastomosis, characterized by friable mucosa.    GASTRIC SLEEVE       HYSTERECTOMY      ILEOSCOPY  04/11/2018    Dr. Castorena: Patient is status-post total colectomy with end ileostomy. unremarkable findings    ILEOSCOPY N/A 1/23/2024    Procedure: ILEOSCOPY;  Surgeon: Anatoly Singh MD;  Location: [facility] ENDO (4TH FLR);  Service: Endoscopy;  Laterality: N/A;    ILEOSTOMY REVISION      april 2014; august 2014    INJECTION OF STEROID Right 2/27/2024    Procedure: INJECTION, STEROID CARPAL TUNNEL;  Surgeon: Etelvina Nicholas MD;  Location: [facility] OR;  Service: Orthopedics;  Laterality: Right;    LAPAROSCOPIC PROCTECTOMY N/A 5/24/2018    Procedure: PROCTECTOMY-LAPAROSCOPIC/CONVERTED TO OPEN;  Surgeon: JENNY Virgen MD;  Location: [facility] OR 2ND FLR;  Service: Colon and Rectal;  Laterality: N/A;    LYSIS OF  ADHESIONS  5/24/2018    Procedure: LYSIS, ADHESIONS/ more than 2hours;  Surgeon: JENNY Virgen MD;  Location: [facility] OR 2ND FLR;  Service: Colon and Rectal;;    OOPHORECTOMY      PANNICULECTOMY Bilateral 12/19/2019    Procedure: PANNICULECTOMY;  Surgeon: Andrea Alvarado MD;  Location: [facility] OR 2ND FLR;  Service: Plastics;  Laterality: Bilateral;    REVISION COLOSTOMY N/A 12/19/2019    Procedure: REVISION, COLOSTOMY;  Surgeon: JENNY Virgen MD;  Location: [facility] OR 2ND FLR;  Service: Colon and Rectal;  Laterality: N/A;    thumb surgery      TONSILLECTOMY, ADENOIDECTOMY      WISDOM TOOTH EXTRACTION         Social History     Socioeconomic History    Marital status: Legally    Tobacco Use    Smoking status: Former     Current packs/day: 0.00     Average packs/day: 1 pack/day for 25.0 years (25.0 ttl pk-yrs)     Types: Cigarettes     Start date: 1984     Quit date: 2009     Years since quitting: 15.4    Smokeless tobacco: Never   Substance and Sexual Activity    Alcohol use: Yes     Comment: extremely rarely (once a year)    Drug use: Yes     Frequency: 7.0 times per week     Types: Marijuana     Comment: gummies nightly    Sexual activity: Not Currently     Birth control/protection: See Surgical Hx     Social Determinants of Health     Financial Resource Strain: Patient Declined (11/15/2023)    Overall Financial Resource Strain (CARDIA)     Difficulty of Paying Living Expenses: Patient declined   Food Insecurity: Patient Declined (11/15/2023)    Hunger Vital Sign     Worried About Running Out of Food in the Last Year: Patient declined     Ran Out of Food in the Last Year: Patient declined   Transportation Needs: Patient Declined (11/15/2023)    PRAPARE - Transportation     Lack of Transportation (Medical): Patient declined     Lack of Transportation (Non-Medical): Patient declined   Physical Activity: Unknown (11/15/2023)    Exercise Vital Sign     Days of Exercise per Week: Patient declined     Minutes of  Exercise per Session: 0 min   Stress: Patient Declined (11/15/2023)    [employer] - Occupational Stress Questionnaire     Feeling of Stress : Patient declined   Housing Stability: Unknown (11/15/2023)    Housing Stability Vital Sign     Unable to Pay for Housing in the Last Year: Patient refused     Unstable Housing in the Last Year: Patient refused       EKG:  Normal sinus rhythm   Minimal voltage criteria for LVH, may be normal variant   Septal infarct (cited on or before 15-AUG-2016)  is possible   Abnormal ECG   When compared with ECG of 04-JAN-2021 01:40,   Questionable change in initial forces of Septal leads   Axis has shifted leftward   Confirmed by Anatoly Goins MD ([id number]) on 10/12/2022 4:36:43 PM       2D Echo:  The left ventricle is normal in size with normal systolic function.  The estimated ejection fraction is 65%.  Normal left ventricular diastolic function.  Normal right ventricular size with normal right ventricular systolic function.  Normal central venous pressure (3 mmHg).        Pre-op Assessment    I have reviewed the Patient Summary Reports.     I have reviewed the Nursing Notes. I have reviewed the NPO Status.   I have reviewed the Medications.     Review of Systems  Anesthesia Hx:             Denies Family Hx of Anesthesia complications.    Denies Personal Hx of Anesthesia complications.                    Cardiovascular:  Exercise tolerance: good        Denies Dysrhythmias.   Denies Angina.       Denies BURRELL.                            Pulmonary:    Asthma    Denies Recent URI.                 Renal/:   Denies Chronic Renal Disease.                Hepatic/GI:   PUD,  GERD             Musculoskeletal:  Arthritis               Neurological:    Denies CVA.    Denies Seizures.                                Endocrine:  Denies Diabetes.           Psych:    depression                Physical Exam  General: Well nourished, Cooperative, Alert and  Oriented    Airway:  Mallampati: II / I  Mouth Opening: Normal  TM Distance: Normal  Tongue: Normal  Neck ROM: Normal ROM    Dental:  Intact    Chest/Lungs:  Clear to auscultation, Normal Respiratory Rate    Heart:  Rate: Normal  Rhythm: Regular Rhythm        Anesthesia Plan  Type of Anesthesia, risks & benefits discussed:    Anesthesia Type: Gen Natural Airway, Regional  Intra-op Monitoring Plan: Standard ASA Monitors  Post Op Pain Control Plan: multimodal analgesia and peripheral nerve block  Induction:  IV  Informed Consent: Informed consent signed with the Patient and all parties understand the risks and agree with anesthesia plan.  All questions answered. Patient consented to blood products? Yes  ASA Score: 2  Day of Surgery Review of History & Physical: H&P Update referred to the surgeon/provider.    Ready For Surgery From Anesthesia Perspective.     .

## 2024-06-20 NOTE — DISCHARGE SUMMARY
[place] - Surgery (Hospital)  Discharge Note  Short Stay    Procedure(s) (LRB):  RECONSTRUCTION, LIGAMENT, ULNAR COLLATERAL (Left)  ARTHROTOMY, HAND (Left)  INJECTION, STEROID Radiocarpal wrist (Right)      OUTCOME: Patient tolerated treatment/procedure well without complication and is now ready for discharge.    DISPOSITION: Home or Self Care    FINAL DIAGNOSIS:  Left thumb UCL tear    FOLLOWUP: In clinic    DISCHARGE INSTRUCTIONS:    Discharge Procedure Orders   Diet general     Keep surgical extremity elevated     Ice to affected area     Lifting restrictions     No driving, operating heavy equipment or signing legal documents while taking pain medication.     Leave dressing on - Keep it clean, dry, and intact until clinic visit     Call MD for:  temperature >100.4     Call MD for:  persistent nausea and vomiting     Call MD for:  severe uncontrolled pain     Call MD for:  difficulty breathing, headache or visual disturbances     Call MD for:  redness, tenderness, or signs of infection (pain, swelling, redness, odor or green/yellow discharge around incision site)     Call MD for:  hives     Call MD for:  persistent dizziness or light-headedness     Call MD for:  extreme fatigue

## 2024-06-20 NOTE — PLAN OF CARE
Discharge instructions reviewed and pt verbalizes understanding. Pain control appropriate. Dressing is clean, dry, and intact. VSS and afebrile. Meds delivered to bedside. Pt ambulatory. AVS complete.

## 2024-06-20 NOTE — DISCHARGE INSTRUCTIONS
HAND SURGERY - Care of the Hand after Surgery       Post-Op Care  It is important to follow your orthopaedic surgeon's instructions carefully after you return home. You should ask   someone to check on you that evening. The protocols described here are general in nature. Every person and   every surgery is different so the information given here is for guidance only. If you have questions you should   contact us.     Day of Surgery    You were place in a plaster splint today to protect the surgical area during healing. Do not remove the plaster splint until you are seen by Occupational Therapy or Dr. Nicholas for your first postop visit    The hand and fingers may be numb for quite some time after surgery. This is due to the anesthetic block used at the time of surgery for pain control. If you have an arm sling you may remove the sling at your convenience once you regain control of your arm from the anesthetic block.     Begin taking liquids and food as soon as you can. You should always eat some solid food, a sandwich or light meal, a little while before taking your pain meds.     Day 3  Things are much the same on the third day after your surgery. Usually you have less pain and feel like doing more.    Showering: It is important to keep the incision absolutely dry while showering or bathing (use two plastic bags over your hand) or a shower bag.   If you feel that the pain medication you were given after surgery is stronger than you really need you can reduce the dose, take it less frequently or switch to ibuprofen or Tylenol. If you received antibiotics they should be taken until the entire prescription is completed.    Day 5  Occupational Therapy will see you between this time. Please let us know if you are not scheduled [phone]    Day 21 (3wks)  We will see you back after your surgery to review with you what was done in surgery and will talk about rehab and answer any questions you may have.       HAND  SURGERY  Driving: You can drive if you are comfortable and have regained full finger movements and if you have sufficient power to control the vehicle.   Return to work: Timing of your return to work is variable according to your occupation and specific surgery. We should discuss this at your follow up visit if not already discussed prior.  Elevation: Hand elevation is important to prevent swelling and stiffness of the fingers. One minute of leaving your hand dangling negates four hours of keeping it elevated. Please remember not to walk with your hand hanging down or to sit with your hand resting in your lap. It is fine, however, to lower your hand for light use and you should get back to normal light activities as soon as possible as guided by common sense.     Post-operative exercises  [] Bend your fingers  Relax your hand. Start with your fingers straight and close together. Bend the end and middle joints of your fingers. Keep your wrist and knuckles straight. Moving slowly and smoothly, return your hand to the starting position. Repeat with your other hand. If you can, perform multiple repetitions of this exercise on each hand.    [] Open your hand wide                       Spread your fingers apart as wide as you can and hold that position. Slowly relax your fingers and bring them together. Return to the open-wide position. Repeat with each hand and gradually add to the number of repetitions.    [] Make a fist  Start with your fingers straight and spread apart. Make a loose, gentle fist and wrap your thumb around the outside of your fingers. Be careful not to squeeze your fingers together too tightly. Moving slowly and smoothly, return to the starting position. Repeat. Perform this exercise on both hands.    [] Touch your fingertips  Straighten your fingers and thumb. Bend your thumb across your palm, touching the tip of your thumb to the pad of your hand just below your pinky finger. If you can't make your  "thumb touch, just stretch as far as you can. Return your thumb to its starting position, as shown in images 1 through 3.  For the next exercise, form the letter O by touching your thumb to each fingertip, as shown in images 4 through 6. Moving slowly and smoothly, touch your index finger to your thumb, then straighten your fingers. Touch your middle finger to your thumb and straighten. Follow with your ring and pinky fingers.    [] Walk your fingers  Rest your hand on a flat surface, such as a tabletop, with your palm facing down and your fingers spread slightly apart. Moving one finger at a time, slowly walk your fingers toward your thumb. Start by lifting and moving your index finger toward your thumb. Follow by lifting and moving your middle finger toward your thumb. Proceed with moving your ring finger and then your pinky finger toward your thumb. Don't move your wrist or thumb while doing this exercise. Repeat with your other hand.      HAND SURGERY - Common Concerns and Frequently Asked Questions    When to Call the Doctor  Pain, burning, or numbness of the fingers or the back of the hand not relieved by elevation of the arm  Pale or cold finger; bluish nail beds  Red line or streak going up the arm  Excessive swelling  Fever over 100.3ºF  Pain unrelieved by pain medication    Tips for one armed living  It helps to have...   In the shower   Plastic bags and rubber bands to cover bandages - the bags that newspapers come in are good to cover the hand and wrist. Otherwise small trash can liners will do. Use two at a time.   Bottle sponge (soft sponge on a long stick) - for the armpit of your "good" hand.   Shower brush   A hair brush in the shower will help you to wash your hair.   Cotton evelina cloth bathrobe - to dry your back.    In the bathroom   Toothpaste, shampoo, etc. in flip-top or pump (not screw top) dispensers.   Consider an electric razor.   Flossers (dental floss on a "Y" shaped handle).    In the " "kitchen   Dycem mat (rubber jar opener mat) - to help open jars, but also keep things from sliding around while you are working on them.    Double suction cup pads ("little Octopus") - to hold items while you use or wash them.   Electric can opener with a lid magnet strong enough to hold the can in the air - for one handed use.   In the bedroom   Back scratcher.   Large sleeve shirts and tops.   Put away clothing which buttons, fastens or snaps in the back or which uses drawstrings.    Sports bra or a camisole instead of a bra.   [employer] nylons can be pulled on one handed - most others can't.   A "wash and wear" haircut.     "

## 2024-06-20 NOTE — ANESTHESIA POSTPROCEDURE EVALUATION
Anesthesia Post Evaluation    Patient: Ida Orozco    Procedure(s) Performed: Procedure(s) (LRB):  RECONSTRUCTION, LIGAMENT, ULNAR COLLATERAL (Left)  ARTHROTOMY, HAND (Right)  INJECTION, STEROID Radiocarpal wrist (Right)    Final Anesthesia Type: general      Patient location during evaluation: PACU  Patient participation: Yes- Able to Participate  Level of consciousness: awake and alert and oriented  Post-procedure vital signs: reviewed and stable  Pain management: adequate  Airway patency: patent    PONV status at discharge: No PONV  Anesthetic complications: no      Cardiovascular status: blood pressure returned to baseline and hemodynamically stable  Respiratory status: unassisted, room air and spontaneous ventilation  Hydration status: euvolemic  Follow-up not needed.              Vitals Value Taken Time   /73 06/20/24 1032   Temp 36.5 °C (97.7 °F) 06/20/24 0959   Pulse 60 06/20/24 1039   Resp 25 06/20/24 1039   SpO2 95 % 06/20/24 1039   Vitals shown include unfiled device data.      Event Time   Out of Recovery 10:29:00         Pain/Tito Score: Pain Rating Prior to Med Admin: 0 (6/20/2024  7:42 AM)  Pain Rating Post Med Admin: 0 (6/20/2024  7:40 AM)  Tito Score: 10 (6/20/2024 10:30 AM)

## 2024-06-20 NOTE — PLAN OF CARE
Poc reviewed with pt. Verbalized uunderstanding, questions answered, reassurance provided.     Belongings in locker    Pt has colostomy.

## 2024-06-21 ENCOUNTER — TELEPHONE (OUTPATIENT)
Dept: ORTHOPEDICS | Facility: CLINIC | Age: 59
End: 2024-06-21
Payer: MEDICARE

## 2024-06-21 ENCOUNTER — PATIENT MESSAGE (OUTPATIENT)
Dept: ORTHOPEDICS | Facility: CLINIC | Age: 59
End: 2024-06-21
Payer: MEDICARE

## 2024-06-21 NOTE — TELEPHONE ENCOUNTER
Pain 8/10 dull achy unable to sleep since block wore off this morning at 2:30am.   She has been trying tylenol 650mg for breakthrough pain without relief.     h/o gastric sleeve - no IBU    6.20.24 Left thumb UCL reconstruction, arthrotomy / Right dorsal wrist steroid injection    Dosing Norco 5-325mg    Patient education on doubling Norco to 10-650mg for 24hrs then trying norco 5mg q4h for pain relief.

## 2024-06-25 ENCOUNTER — CLINICAL SUPPORT (OUTPATIENT)
Dept: REHABILITATION | Facility: HOSPITAL | Age: 59
End: 2024-06-25
Attending: ORTHOPAEDIC SURGERY
Payer: MEDICARE

## 2024-06-25 DIAGNOSIS — M79.645 THUMB PAIN, LEFT: Primary | ICD-10-CM

## 2024-06-25 DIAGNOSIS — M25.642 DECREASED RANGE OF MOTION OF LEFT THUMB: ICD-10-CM

## 2024-06-25 PROCEDURE — 97760 ORTHOTIC MGMT&TRAING 1ST ENC: CPT

## 2024-06-25 PROCEDURE — L3913 HFO W/O JOINTS CF: HCPCS

## 2024-06-25 NOTE — PLAN OF CARE
OT Orthosis Only    TIME RECORD    Date:  6/25/2024    Start Time:  10:00 am  Stop Time:  10:55        Occupational Therapy Orthotic Note    Patient: Ida Orozco  MRN: [id number]  Date of visit: 6/25/2024  Referring Physician:  Etelvina Nicholas MD   Next MD visit: 7/10/24  Primary Diagnosis:  Rupture of radial collateral ligament of left thumb, subsequent encounter [U80.631K]   Treatment Diagnosis:   Encounter Diagnoses   Name Primary?    Thumb pain, left Yes    Decreased range of motion of left thumb      DOS 6/20/24  S/p left thumb UCL repair      Subjective:     Pt reports she has some thumb pain today. She presents for custom orthosis and initiation of gentle thumb ROM.     Objective:     Patient seen by OT this session for fabrication of orthosis per MD orders. Fabricated and applied hand based thumb spica with IP free and MP in slight flexion. ()    Assessment:     Orthosis with good fit following fabrication. Pt. Able to andreas/doff independently.      Patient Education/Response:     Orthotic Fit and Training, 10 minutes:    Pt. Instructed to wear continuous, remove for bathing or hygiene or dressing change. Patient/caregiver were provided instructions on orthosis purpose, wear schedule, care and precautions to monitor for increased pain/edema, pressure points, skin breakdown or redness/skin irritation Patient/caregiver to contact clinic for adjustments as needed.  Pt instructed on gentle thumb flexion and extension. She verbalized and demonstrated understanding. Discussed NWB precautions and no gripping or pinching at this time.         Plans and Goals:     Goal of Orthosis to protect sx site/protect injury site. Pt to have initial OT evaluation at next session this week. Orthosis checks PRN.     LEANA Garcia, [place]

## 2024-06-27 PROBLEM — M25.642 DECREASED RANGE OF MOTION OF LEFT THUMB: Status: ACTIVE | Noted: 2024-06-27

## 2024-06-27 PROBLEM — M79.645 THUMB PAIN, LEFT: Status: ACTIVE | Noted: 2024-06-27

## 2024-07-01 RX ORDER — HYDROXYZINE HYDROCHLORIDE 25 MG/1
25 TABLET, FILM COATED ORAL 3 TIMES DAILY
Qty: 30 TABLET | Refills: 0 | Status: SHIPPED | OUTPATIENT
Start: 2024-07-01

## 2024-07-01 RX ORDER — PROMETHAZINE HYDROCHLORIDE AND DEXTROMETHORPHAN HYDROBROMIDE 6.25; 15 MG/5ML; MG/5ML
5 SYRUP ORAL EVERY 6 HOURS PRN
Qty: 180 ML | Refills: 0 | Status: SHIPPED | OUTPATIENT
Start: 2024-07-01

## 2024-07-01 NOTE — TELEPHONE ENCOUNTER
Refill Routing Note   Medication(s) are not appropriate for processing by [facility] for the following reason(s):        Non-participating provider    [facility] action(s):  Route               Appointments  past 12m or future 3m with PCP    Date Provider   Last Visit   5/14/2024 Janet Guardado NP   Next Visit   Visit date not found Janet Guardado NP   ED visits in past 90 days: 0        Note composed:2:37 PM 07/01/2024

## 2024-07-05 ENCOUNTER — CLINICAL SUPPORT (OUTPATIENT)
Dept: REHABILITATION | Facility: HOSPITAL | Age: 59
End: 2024-07-05
Payer: MEDICARE

## 2024-07-05 DIAGNOSIS — M79.645 THUMB PAIN, LEFT: Primary | ICD-10-CM

## 2024-07-05 DIAGNOSIS — M25.642 DECREASED RANGE OF MOTION OF LEFT THUMB: ICD-10-CM

## 2024-07-05 PROCEDURE — 97530 THERAPEUTIC ACTIVITIES: CPT

## 2024-07-05 PROCEDURE — 97166 OT EVAL MOD COMPLEX 45 MIN: CPT

## 2024-07-05 NOTE — PATIENT INSTRUCTIONS
[facility], OCCUPATIONAL THERAPY  HOME EXERCISE PROGRAM          Complete 10 repetitions of each exercise 4 times a day:                _

## 2024-07-09 ENCOUNTER — TELEPHONE (OUTPATIENT)
Dept: ORTHOPEDICS | Facility: CLINIC | Age: 59
End: 2024-07-09
Payer: MEDICARE

## 2024-07-09 ENCOUNTER — CLINICAL SUPPORT (OUTPATIENT)
Dept: REHABILITATION | Facility: HOSPITAL | Age: 59
End: 2024-07-09
Payer: MEDICARE

## 2024-07-09 DIAGNOSIS — M79.645 THUMB PAIN, LEFT: Primary | ICD-10-CM

## 2024-07-09 DIAGNOSIS — M25.642 DECREASED RANGE OF MOTION OF LEFT THUMB: ICD-10-CM

## 2024-07-09 PROCEDURE — 97110 THERAPEUTIC EXERCISES: CPT | Mod: CO

## 2024-07-09 PROCEDURE — 97140 MANUAL THERAPY 1/> REGIONS: CPT | Mod: CO

## 2024-07-09 PROCEDURE — 97022 WHIRLPOOL THERAPY: CPT | Mod: CO

## 2024-07-09 NOTE — TELEPHONE ENCOUNTER
Patient communication     Notified patient to stop at [facility] Location - 1st Floor [address] [facility], Please park in [place] and use [place] elevators 45 minutes prior to your appointment time for X-ray. After your X-ray please proceed to [address]th Floor [address] for Appointment on 7/10/24 with Dr. Nicholas for x-rays.     Made them aware that this is not a scheduled xray appointment and they might be running behind as they are considered a walk-in xray.    Verbalized the Following:  *Please arrive at your informed time above, if you are more than 15 Minutes late to your appointment with Dr. Nicholas we will have to reschedule your appointment. This will allow you to be seen in a timely manor and be conscious to other patients being seen that same day*

## 2024-07-09 NOTE — PROGRESS NOTES
"[facility]  Occupational Therapy Treatment Note     Date: 7/9/2024  Name: Ida Orozco  Clinic Number: [id number]    Therapy Diagnosis:   Encounter Diagnoses   Name Primary?    Thumb pain, left Yes    Decreased range of motion of left thumb      Physician: Etelvina Nicholas MD    Physician Orders: Eval and Treat  Medical Diagnosis: Lesion of ulnar nerve, left upper limb [G56.22], Carpal tunnel syndrome, left upper limb [G56.02], Sprain of metacarpophalangeal joint of left thumb, subsequent encounter [S63.336J]   Surgical Procedure and Date: 6/20/24, s/p left thumb UCL repair     Onset Date: 4/17/24  Evaluation Date: 7/5/2024  Insurance Authorization Period Expiration: 12/31/24  Plan of Care Certification Period: 9/13/24  Date of Return to MD: 7/10/24  Visit # / Visits authorized: 3 / 21     FOTO: 1/3, 6/25/24  35%  Lobby access code: [email or web address]      Precautions:  Standard, NWB, no pinching, precautions per UCL protocol; allergy to adhesives     Time In:9:30 am  Time Out: 10:20 am  Total Appointment Time (timed & untimed codes): 50 minutes      Subjective     Patient reports: "Its feeling better today, the splint still bothers me though."  She was compliant with home exercise program given last session.   Response to previous treatment:first f/u  Functional change: none yet     Pain: 4/10  Location: left thumb       Objective     Objective Measures updated at progress report unless specified.  Observation/Appearance:  arrived with orthosis, with pad over incision and dorsal thumb. Pt reported orthosis was rubbing on thumb. Healing incision with sutures with mild redness noted. No drainage noted. A few sutures in crease of thumb noted to be a tight and started getting healed over.            Edema. Measured in centimeters.    7/5/2024 7/5/2024     Right  Left    Thumb:       MP 8.3 9.2   Prox. Phalanx 6.3 6.8   IP 6.3 6.2   Distal Phalanx          Elbow and Wrist ROM. Measured in degrees.    " 7/9/2024 7/9/2024     Right Left   Elbow Ext/Flex       Supination/Pronation       Wrist Ext/Flex 72/76 74/72   Wrist RD/UD WFL       15/30         Hand ROM. Measured in degrees.    7/5/2024 7/5/2024     Right  Left                    Thumb: MP 65 12/42                IP 50 28       Rad ADD/ABD 50 48       Pal ADD/ABD 45 45          Opposition To DPC Briefly to tip of SF      Sensation: reports numbness to dorsal thumb, and ulnar aspect of thumb near incision, not formally tested today  Distribution 7/5/2024 7/5/2024     Right  Left    [place]       Normal 1.65-2.83       Diminished Light Touch 3.22-3.61       Diminished Protective 3.84-4.31       Loss of Protective 4.56-6.65       Untestable >6.65       2 Point Discrimination       Static       Dynamic              Strength (Dyanmometer) and Pinch Strength (Pinch Gauge)  Measured in pounds and psi. Average of three trials.  **deferred due to contraindications**    7/5/2024 7/5/2024     Right  Left    Rung II       Cifuentes Pinch       3pt Pinch       2pt Pinch             Manual Muscle Testing     Manual Muscle Test: NT, deferred    7/9/2024 7/9/2024     Left Right   Wrist Extension        Wrist Flexion       Radial Deviation       Ulnar Deviation       Supination       Pronation       Elbow Extension       Elbow Flexion                      CMS Impairment/Limitation/Restriction for FOTO Hand Survey     FOTO scores for Ida Orozco on 7/5/2024.   FOTO documents entered into [employer] - see Media section.     Intake Score: 35%              Treatment     Ida received the treatments listed below:      therapeutic exercises to develop ROM for 20 minutes including:     AROM Wrist  Ext/flx  RD/UD    X 5-10 reps each    AROM thumb:  Palm abd/add  Rad abd/add  IP blocks  Gentle Opposition  Composite flx  Lifts- thumb only  Spreads  TGEs    X 5-10 reps each         manual therapy techniques: Manual Lymphatic Drainage were applied to the: L hand for 10 minutes,  including:  Retrograde massage    therapeutic activities to improve functional performance for 10  minutes, including:  In hand manip with poms  Isospheres   Orthosis adjustments       supervised modalities after being cleared for contradictions: hot pack for 10 minutes to L hand.    Patient Education and Home Exercises     Education provided:   - cont HEP  - Progress towards goals     Written Home Exercises Provided: Patient instructed to cont prior HEP.  Exercises were reviewed and Ida was able to demonstrate them prior to the end of the session.  Ida demonstrated good  understanding of the home exercise program provided. See electronic medical record under Patient Instructions for exercises provided during therapy sessions.       Assessment     Pt tolerated session well. Reported pain is improving.  Client Care conference completed with evaluating therapist in regards to this patients POC as evidenced by co signature of supervising therapist.       Ida is progressing well towards her goals and there are no updates to goals at this time. Pt prognosis is Good.     Patient will continue to benefit from skilled outpatient occupational therapy to address the deficits listed in the problem list on initial evaluation provide patient/family education and to maximize patient's level of independence in the home and community environment.     Patient's spiritual, cultural and educational needs considered and patient agreeable to plan of care and goals.    Anticipated barriers to occupational therapy: none    Goals:  Long Term Goals (LTGs); to be met by discharge.  1) Pt will report pain no higher than 2/10 with daily tasks,   2) Pt will have an improved FOTO score by at least 20 points  3) Pt will demonstrate improved left thumb  AROM WFL for performing daily tasks, grasping  4) Pt will report return to prior level of function  5)  and pinch strength assessed when appropriate and set goals     Short Term Goals  (STGs); to be met within 4 weeks (8/5/24).  1) Pt will report or demonstrate independence with HEP, orthosis wear, and scar management  2) Pt will report pain no higher than 4/10 with self care and daily tasks  3) Pt will demonstrate improved left thumb AROM by at least 10-15 degrees for improved function and hand use  4) Pt will demonstrate improved left thumb edema by at least 0.3 cm for improved functional use    Plan     Updates/Grading for next session: cont as tolerated     MANSOOR White/KEYUR   7/9/2024

## 2024-07-10 ENCOUNTER — HOSPITAL ENCOUNTER (OUTPATIENT)
Dept: RADIOLOGY | Facility: OTHER | Age: 59
Discharge: HOME | End: 2024-07-10
Attending: ORTHOPAEDIC SURGERY
Payer: MEDICARE

## 2024-07-10 ENCOUNTER — OFFICE VISIT (OUTPATIENT)
Dept: ORTHOPEDICS | Facility: CLINIC | Age: 59
End: 2024-07-10
Payer: MEDICARE

## 2024-07-10 DIAGNOSIS — S63.642D RUPTURE OF ULNAR COLLATERAL LIGAMENT OF LEFT THUMB, SUBSEQUENT ENCOUNTER: Primary | ICD-10-CM

## 2024-07-10 DIAGNOSIS — M79.645 FINGER PAIN, LEFT: ICD-10-CM

## 2024-07-10 PROCEDURE — 99999 PR PBB SHADOW E&M-EST. PATIENT-LVL III: CPT | Mod: PBBFAC,HCNC,, | Performed by: ORTHOPAEDIC SURGERY

## 2024-07-10 PROCEDURE — 73140 X-RAY EXAM OF FINGER(S): CPT | Mod: 26,HCNC,LT, | Performed by: RADIOLOGY

## 2024-07-10 PROCEDURE — 1159F MED LIST DOCD IN RCRD: CPT | Mod: HCNC,CPTII,S$GLB, | Performed by: ORTHOPAEDIC SURGERY

## 2024-07-10 PROCEDURE — 99024 POSTOP FOLLOW-UP VISIT: CPT | Mod: HCNC,S$GLB,, | Performed by: ORTHOPAEDIC SURGERY

## 2024-07-10 PROCEDURE — 1160F RVW MEDS BY RX/DR IN RCRD: CPT | Mod: HCNC,CPTII,S$GLB, | Performed by: ORTHOPAEDIC SURGERY

## 2024-07-10 PROCEDURE — 73140 X-RAY EXAM OF FINGER(S): CPT | Mod: TC,HCNC,FY,LT

## 2024-07-10 NOTE — PROGRESS NOTES
Ida Orozco presents for post-operative evaluation of No diagnosis found..     History of Present Illness           The patient is now 3 weeks s/p L thumb UCL reconstruction and L thumb MP joint arthrotomy.  Overall the patient reports doing well.  The patient reports appropriate postoperative soreness with well controlled overall pain.  She has been seeing the therapist as scheduled and performing at home exercises. She is wearing her splint daily. Does endorse some mild pain over the incision with mild numbness and tingling. Is overall happy with her current ROM.     Vitals:    07/10/24 1032   PainSc: 0-No pain   PainLoc: Hand       PE:    AA&O x 4.  NAD  HEENT:  NCAT, sclera nonicteric  Lungs:  Respirations are equal and unlabored.  CV:  2+ bilateral upper and lower extremity pulses.  MSK: The incision is well healed.  Full wrist and finger motion. Thumb able to flex to the base of the pinky.  Neurovascularly intact and has 5/5 thenar and intrinsic musculature strength.        Assessment & Plan: Status post above, doing well   Assessment & Plan             Continue with range of motion; strengthening at 6 weeks  Continue therapy sessions  Continue daily splint for another 3 weeks   F/U 3-4 weeks   Call with any questions/concerns in the interim

## 2024-07-16 ENCOUNTER — CLINICAL SUPPORT (OUTPATIENT)
Dept: REHABILITATION | Facility: HOSPITAL | Age: 59
End: 2024-07-16
Payer: MEDICARE

## 2024-07-16 DIAGNOSIS — M25.642 DECREASED RANGE OF MOTION OF LEFT THUMB: ICD-10-CM

## 2024-07-16 DIAGNOSIS — M79.645 THUMB PAIN, LEFT: Primary | ICD-10-CM

## 2024-07-16 PROCEDURE — 97530 THERAPEUTIC ACTIVITIES: CPT | Mod: CO

## 2024-07-16 PROCEDURE — 97022 WHIRLPOOL THERAPY: CPT | Mod: CO

## 2024-07-16 PROCEDURE — 97140 MANUAL THERAPY 1/> REGIONS: CPT | Mod: CO

## 2024-07-16 NOTE — PLAN OF CARE
[facility]  Occupational Therapy Initial Evaluation     Name: Ida Orozco  Clinic Number: [id number]    Therapy Diagnosis:   Encounter Diagnoses   Name Primary?    Thumb pain, left Yes    Decreased range of motion of left thumb      Physician: Etelvina Nicholas MD    Physician Orders: Eval and Treat  Medical Diagnosis: Lesion of ulnar nerve, left upper limb [G56.22], Carpal tunnel syndrome, left upper limb [G56.02], Sprain of metacarpophalangeal joint of left thumb, subsequent encounter [S62.221W]   Surgical Procedure and Date: 6/20/24, s/p left thumb UCL repair    Onset Date: 4/17/24  Evaluation Date: 7/5/2024  Insurance Authorization Period Expiration: 12/31/24  Plan of Care Certification Period: 9/13/24  Date of Return to MD: 7/10/24  Visit # / Visits authorized: 2 / 21 (had 1 prior visit for custom orthosis)    FOTO: 1/3, 6/25/24  35%  Lobby access code: [email or web address]     Precautions:  Standard, NWB, no pinching, precautions per UCL protocol; allergy to adhesives    Time In:9:20 am  Time Out: 10:20 am  Total Appointment Time (timed & untimed codes): 60 minutes    Subjective     Date of Onset: 4/17/24, surgery date 6/20/24    History of Current Condition/Mechanism of Injury: Ida reports: Pt had 1 visit prior for custom orthosis. She missed her scheduled visit for eval last week. She presents today for initial OT evaluation. She reports injured thumb due to valgus stress to UCL of thumb when her thumb got caught on her dog's leash. She reports she has been having moderate pain. She also stated she felt like she jammed thumb a bit yesterday but is feeling better than yesterday. She also reported during session that she had been trying to stretch her fingers, but also demonstrated this while starting to put pressure through the thumb. Discussed wearing orthosis, not bearing weight through hand or digits, and not putting lateral stress through the thumb as this may affect her surgical repair.  She verbalized understanding. Therapist notified MD as well.     Falls: no    Involved Side: left  Dominant Side: Right    Mechanism of Injury: leash got caught on thumb  Surgical Procedure: see above  Imaging: MRI on 24 indicated: UCL sprain of the thumb, as above. No full-thickness tear/retraction     Prior Therapy: 1 visit for orthosis    Pain:  Functional Pain Scale Rating 0-10:   5/10 on average  4/10 at best  12/10 at worst (reports jammed her thumb yesterday)  Location: left thumb  Description: Aching and Dull  Aggravating Factors: the orthosis and bending it.   Easing Factors: ice    Occupation:  pt on disability  Working presently: disability  Duties: n/a    Functional Limitations/Social History:    Previous functional status includes: Independent with all ADLs.      Current Functional Status   Home/Living environment: lives with their spouse, dog      Limitation of Functional Status as follows:   ADLs/IADLs:     - Feeding: I    - Bathing: I    - Dressing/Grooming: doing most dressing herself. Pulling bra over head    - Home Management: needs help with cleaning. She can only use R hand with cleaning and laundry    - Driving: I     Leisure: has been unable to return to [employer]    Patient's Goals for Therapy: to have full use of her thumb.     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    Ida Orozco  has a past surgical history that includes Hysterectomy; Ileostomy revision;  section, low transverse; Cholecystectomy; TONSILLECTOMY, ADENOIDECTOMY; thumb  surgery; [place] tooth extraction; Appendectomy; Colectomy; Laparoscopic proctectomy (N/A, 5/24/2018); Lysis of adhesions (5/24/2018); Flexible sigmoidoscopy (5/24/2018); GASTRIC SLEEVE ; Cystoscopy w/ retrogrades (N/A, 8/20/2018); Catheterization of both left and right heart (Right, 11/13/2019); Panniculectomy (Bilateral, 12/19/2019); Revision Colostomy (N/A, 12/19/2019); Oophorectomy; Ileoscopy (04/11/2018); Flexible sigmoidoscopy (04/11/2018); Esophagogastroduodenoscopy (04/11/2018); Endoscopic ultrasound of upper gastrointestinal tract (N/A, 11/10/2021); Esophagogastroduodenoscopy (N/A, 11/10/2021); fess, with imaging guidance (Bilateral, 4/4/2023); Ileoscopy (N/A, 1/23/2024); Esophagogastroduodenoscopy (N/A, 1/23/2024); Carpal tunnel release (Left, 2/27/2024); Decompression of nerve (Left, 2/27/2024); Injection of steroid (Right, 2/27/2024); Ulnar collateral ligament reconstruction (Left, 6/20/2024); Hand Arthrotomy (Right, 6/20/2024); and Injection of steroid (Right, 6/20/2024).    Ida has a current medication list which includes the following prescription(s): clonazepam, diphenoxylate-atropine 2.5-0.025 mg, docusate sodium, eszopiclone, hydrocodone-acetaminophen, hydroxyzine hcl, hydroxyzine hcl, hyoscyamine, levocetirizine, loratadine, meclizine, nystatin, ondansetron, ondansetron, promethazine-dextromethorphan, sertraline, topiramate, and [DISCONTINUED] humira(cf) pen.    Review of patient's allergies indicates:   Allergen Reactions    Adhesive      Cause blisters    Dilaudid [hydromorphone] Nausea And Vomiting    Humira [adalimumab] Hives    Sulfa (sulfonamide antibiotics) Hives    Surgical stainless steel      Had surgical staples, caused irritation and infection        Objective     Observation/Appearance:  arrived with orthosis, with pad over incision and dorsal thumb. Pt reported orthosis was rubbing on thumb. Healing incision with sutures with mild redness noted. No drainage noted. A few sutures in  crease of thumb noted to be a tight and started getting healed over.         Edema. Measured in centimeters.   7/5/2024 7/5/2024    Right  Left    Thumb:     MP 8.3 9.2   Prox. Phalanx 6.3 6.8   IP 6.3 6.2   Distal Phalanx       Elbow and Wrist ROM. Measured in degrees.   7/9/2024 7/9/2024    Right Left   Elbow Ext/Flex     Supination/Pronation     Wrist Ext/Flex 72/76 74/72   Wrist RD/UD WFL       15/30       Hand ROM. Measured in degrees.   7/5/2024 7/5/2024    Right  Left              Thumb: MP 65 12/42                IP 50 28       Rad ADD/ABD 50 48       Pal ADD/ABD 45 45          Opposition To DPC Briefly to tip of SF     Sensation: reports numbness to dorsal thumb, and ulnar aspect of thumb near incision, not formally tested today  Distribution 7/5/2024 7/5/2024    Right  Left    [place] Dennis     Normal 1.65-2.83     Diminished Light Touch 3.22-3.61     Diminished Protective 3.84-4.31     Loss of Protective 4.56-6.65     Untestable >6.65     2 Point Discrimination     Static     Dynamic          Strength (Dyanmometer) and Pinch Strength (Pinch Gauge)  Measured in pounds and psi. Average of three trials.  **deferred due to contraindications**   7/5/2024 7/5/2024    Right  Left    Rung II     Cifuentes Pinch     3pt Pinch     2pt Pinch         Manual Muscle Testing    Manual Muscle Test: NT, deferred   7/9/2024 7/9/2024    Left Right   Wrist Extension      Wrist Flexion     Radial Deviation     Ulnar Deviation     Supination     Pronation     Elbow Extension     Elbow Flexion               CMS Impairment/Limitation/Restriction for FOTO Hand Survey    FOTO scores for Ida Orozco on 7/5/2024.   FOTO documents entered into [employer] - see Media section.    Intake Score: 35%           Treatment     Total Treatment time separate from Evaluation time:30 minutes    Ida received the treatments listed below:      Sutures removed today during session as pt is now 15 days post op. 2 sutures with difficulty removing due  to being tight and had started getting healed over. Increased time spent for removing last 2 sutures due to tightness and pt moving hand. For last suture pt took the suture removal scissors off table from therapist, and cut the suture herself. Therapist notified MD of this and that there may be possible small piece of suture left in hand.     therapeutic activities to improve functional performance for 15  minutes, including: **mod cuing required  -reviewed orthosis wear, and reviewed precautions. Discussed no lateral stress or pressure to thumb as this may affect the surgical repair.  -educated on and performed HEP including:    AROM Wrist  Ext/flx  RD/UD   X 5-10 reps each    AROM thumb:  Palm abd/add  Rad abd/add  IP blocks  Gentle Opposition  Composite flx  Lifts- thumb only  Spreads  TGEs   X 5-10 reps each        Orthosis adjustments for improved comfort and pad added. Pt reported satisfactory fit.         Patient Education and Home Exercises      Education provided:   -role of OT, goals for OT, scheduling/cancellations, insurance limitations with patient.  -Additional Education provided:   -educated on HEP/ROM as above  -educated and reviewed precautions, no weight bearing through thumb, and no pinching or lateral stress to thumb  -educated on incision care, keeping it clean, and not soaking it until incision is fully closed and healed.  -reviewed that pt can notify therapist at clinic if she needs an adjustment to orthosis.     Written Home Exercises Provided: yes.  Exercises were reviewed and Ida was able to demonstrate them prior to the end of the session.    Ida demonstrated fair  understanding of the education provided.     Pt was advised to perform these exercises free of pain, and to stop performing them if pain occurs.    See EMR under Patient Instructions for exercises provided 7/5/2024.    Assessment     Ida Orozco is a 58 y.o. female referred to outpatient occupational therapy and presents  with a medical diagnosis of s/p left thumb UCL repair.    Following medical record review it is determined that pt will benefit from occupational therapy services in order to maximize pain free and/or functional use of left hand. The following goals were discussed with the patient and patient is in agreement with them as to be addressed in the treatment plan. The patient's rehab potential is Good.     Anticipated barriers to occupational therapy: none at this time    Plan of care discussed with patient: Yes  Patient's spiritual, cultural and educational needs considered and patient is agreeable to the plan of care and goals as stated below:     Medical Necessity is demonstrated by the following  Occupational Profile/History  Co-morbidities and personal factors that may impact the plan of care [] LOW: Brief chart review  [x] MODERATE: Expanded chart review   [] HIGH: Extensive chart review    Moderate / High Support Documentation: expanded chart review     Examination  Performance deficits relating to physical, cognitive or psychosocial skills that result in activity limitations and/or participation restrictions  [] LOW: addressing 1-3 Performance deficits  [] MODERATE: 3-5 Performance deficits  [x] HIGH: 5+ Performance deficits (please support below)    Moderate / High Support Documentation:    Physical:  Joint Mobility  Joint Stability  Muscle Power/Strength  Muscle Endurance  Skin Integrity/Scar Formation  Edema   Strength  Pinch Strength  Pain    Cognitive:  Safety Awareness/Insight to Disability    Psychosocial:    Social Interaction  Habits  Routines     Treatment Options [] LOW: Limited options  [x] MODERATE: Several options  [] HIGH: Multiple options     min assistance required     Decision Making/ Complexity Score: moderate       The following goals were discussed with the patient and patient is in agreement with them as to be addressed in the treatment plan.     Goals:   Long Term Goals (LTGs); to be  met by discharge.  1) Pt will report pain no higher than 2/10 with daily tasks,   2) Pt will have an improved FOTO score by at least 20 points  3) Pt will demonstrate improved left thumb  AROM WFL for performing daily tasks, grasping  4) Pt will report return to prior level of function  5)  and pinch strength assessed when appropriate and set goals    Short Term Goals (STGs); to be met within 4 weeks (8/5/24).  1) Pt will report or demonstrate independence with HEP, orthosis wear, and scar management  2) Pt will report pain no higher than 4/10 with self care and daily tasks  3) Pt will demonstrate improved left thumb AROM by at least 10-15 degrees for improved function and hand use  4) Pt will demonstrate improved left thumb edema by at least 0.3 cm for improved functional use      Plan   Certification Period/Plan of care expiration: 7/5/2024 to 9/13/24.    Outpatient Occupational Therapy 2-3 times weekly for 10 weeks to include the following interventions: Paraffin, Fluidotherapy, Manual therapy/joint mobilizations, Modalities for pain management, US 3 mhz, Therapeutic exercises/activities., Strengthening, Orthotic Fabrication/Fit/Training, Edema Control, Scar Management, Joint Protection, and Energy Conservation. Orthosis adjustments as needed    LEANA Garcia, T      I certify the need for these services furnished under the plan of treatment and while under my care.     ____________________________________________________________________  Physician/Referring Practitioner   Date of Signature

## 2024-07-16 NOTE — PROGRESS NOTES
"[facility]  Occupational Therapy Treatment Note     Date: 7/16/2024  Name: Ida Orozco  Clinic Number: [id number]    Therapy Diagnosis:   Encounter Diagnoses   Name Primary?    Thumb pain, left Yes    Decreased range of motion of left thumb      Physician: Etelvina Nicholas MD    Physician Orders: Eval and Treat  Medical Diagnosis: Lesion of ulnar nerve, left upper limb [G56.22], Carpal tunnel syndrome, left upper limb [G56.02], Sprain of metacarpophalangeal joint of left thumb, subsequent encounter [S68.914R]   Surgical Procedure and Date: 6/20/24, s/p left thumb UCL repair     Onset Date: 4/17/24  Evaluation Date: 7/5/2024  Insurance Authorization Period Expiration: 12/31/24  Plan of Care Certification Period: 9/13/24  Date of Return to MD: 7/10/24  Visit # / Visits authorized: 3 / 21     FOTO: 1/3, 6/25/24  35%  Lobby access code: [email or web address]      Precautions:  Standard, NWB, no pinching, precautions per UCL protocol; allergy to adhesives     Time In:8:48 am  Time Out: 9:28 am  Total Appointment Time (timed & untimed codes): 40 minutes    8:48  9:28    Subjective     Patient reports:"Nini been wearing a different brace because the other one bothers me too much."  She was compliant with home exercise program given last session.   Response to previous treatment:first f/u  Functional change: none yet     Pain: 4/10  Location: left thumb       Objective     Objective Measures updated at progress report unless specified.  Observation/Appearance:  arrived with orthosis, with pad over incision and dorsal thumb. Pt reported orthosis was rubbing on thumb. Healing incision with sutures with mild redness noted. No drainage noted. A few sutures in crease of thumb noted to be a tight and started getting healed over.            Edema. Measured in centimeters.    7/5/2024 7/5/2024     Right  Left    Thumb:       MP 8.3 9.2   Prox. Phalanx 6.3 6.8   IP 6.3 6.2   Distal Phalanx          Elbow and Wrist ROM. " Measured in degrees.    7/9/2024 7/9/2024     Right Left   Elbow Ext/Flex       Supination/Pronation       Wrist Ext/Flex 72/76 74/72   Wrist RD/UD WFL       15/30         Hand ROM. Measured in degrees.    7/5/2024 7/5/2024     Right  Left                    Thumb: MP 65 12/42                IP 50 28       Rad ADD/ABD 50 48       Pal ADD/ABD 45 45          Opposition To DPC Briefly to tip of SF      Sensation: reports numbness to dorsal thumb, and ulnar aspect of thumb near incision, not formally tested today  Distribution 7/5/2024 7/5/2024     Right  Left    [place]       Normal 1.65-2.83       Diminished Light Touch 3.22-3.61       Diminished Protective 3.84-4.31       Loss of Protective 4.56-6.65       Untestable >6.65       2 Point Discrimination       Static       Dynamic              Strength (Dyanmometer) and Pinch Strength (Pinch Gauge)  Measured in pounds and psi. Average of three trials.  **deferred due to contraindications**    7/5/2024 7/5/2024     Right  Left    Rung II       Cifuentes Pinch       3pt Pinch       2pt Pinch             Manual Muscle Testing     Manual Muscle Test: NT, deferred    7/9/2024 7/9/2024     Left Right   Wrist Extension        Wrist Flexion       Radial Deviation       Ulnar Deviation       Supination       Pronation       Elbow Extension       Elbow Flexion                      CMS Impairment/Limitation/Restriction for FOTO Hand Survey     FOTO scores for Ida Orozco on 7/5/2024.   FOTO documents entered into [employer] - see Media section.     Intake Score: 35%              Treatment     Ida received the treatments listed below:      therapeutic exercises to develop ROM for 10 minutes including:     AROM Wrist  Ext/flx  RD/UD    X 5-10 reps each    AROM thumb:  Palm abd/add  Rad abd/add  IP blocks  Gentle Opposition  Composite flx  Lifts- thumb only  Spreads  TGEs    X 5-10 reps each         manual therapy techniques: Manual Lymphatic Drainage were applied to the: L  hand for 10 minutes, including:  Retrograde massage    therapeutic activities to improve functional performance for 10  minutes, including:  In hand manip with poms  Isospheres         supervised modalities after being cleared for contradictions: fluido x 10 min     Patient Education and Home Exercises     Education provided:   - cont HEP  - Progress towards goals     Written Home Exercises Provided: Patient instructed to cont prior HEP.  Exercises were reviewed and Ida was able to demonstrate them prior to the end of the session.  Ida demonstrated good  understanding of the home exercise program provided. See electronic medical record under Patient Instructions for exercises provided during therapy sessions.       Assessment     Pt arrived with prefab thumb spica donned today, reported the custom orthosis is too uncomfortable. Instructed to bring orthosis next time for adjustments.      Ida is progressing well towards her goals and there are no updates to goals at this time. Pt prognosis is Good.     Patient will continue to benefit from skilled outpatient occupational therapy to address the deficits listed in the problem list on initial evaluation provide patient/family education and to maximize patient's level of independence in the home and community environment.     Patient's spiritual, cultural and educational needs considered and patient agreeable to plan of care and goals.    Anticipated barriers to occupational therapy: none    Goals:  Long Term Goals (LTGs); to be met by discharge.  1) Pt will report pain no higher than 2/10 with daily tasks,   2) Pt will have an improved FOTO score by at least 20 points  3) Pt will demonstrate improved left thumb  AROM WFL for performing daily tasks, grasping  4) Pt will report return to prior level of function  5)  and pinch strength assessed when appropriate and set goals     Short Term Goals (STGs); to be met within 4 weeks (8/5/24).  1) Pt will report or  demonstrate independence with HEP, orthosis wear, and scar management  2) Pt will report pain no higher than 4/10 with self care and daily tasks  3) Pt will demonstrate improved left thumb AROM by at least 10-15 degrees for improved function and hand use  4) Pt will demonstrate improved left thumb edema by at least 0.3 cm for improved functional use    Plan     Updates/Grading for next session: cont as tolerated     MANSOOR White/KEYUR   7/16/2024

## 2024-07-22 NOTE — PROGRESS NOTES
[facility]  Occupational Therapy Treatment Note     Date: 7/23/2024  Name: Ida Orozco  Clinic Number: [id number]    Therapy Diagnosis:   Encounter Diagnoses   Name Primary?    Thumb pain, left Yes    Decreased range of motion of left thumb        Physician: Etelvina Nicholas MD    Physician Orders: Eval and Treat  Medical Diagnosis: Lesion of ulnar nerve, left upper limb [G56.22], Carpal tunnel syndrome, left upper limb [G56.02], Sprain of metacarpophalangeal joint of left thumb, subsequent encounter [S63.817O]   Surgical Procedure and Date: 6/20/24, s/p left thumb UCL repair     Onset Date: 4/17/24  Evaluation Date: 7/5/2024  Insurance Authorization Period Expiration: 12/31/24  Plan of Care Certification Period: 9/13/24  Date of Return to MD: 7/10/24  Visit # / Visits authorized: 4 / 21     FOTO: 1/3, 6/25/24  35%  Lobby access code: [email or web address]      Precautions:  Standard, NWB, no pinching, precautions per UCL protocol; allergy to adhesives     Time In: 9:43 am  Time Out: 10:30 am  Total Appointment Time (timed & untimed codes): 47 minutes        Subjective     Patient reports: improving ROM however still has pain  She was compliant with home exercise program given last session.   Response to previous treatment: good   Functional change: opening bottles     Pain: 4/10  Location: left thumb       Objective     Objective Measures updated at progress report unless specified.  Observation/Appearance:  arrived with orthosis, with pad over incision and dorsal thumb. Pt reported orthosis was rubbing on thumb. Healing incision with sutures with mild redness noted. No drainage noted. A few sutures in crease of thumb noted to be a tight and started getting healed over.            Edema. Measured in centimeters.    7/5/2024 7/5/2024     Right  Left    Thumb:       MP 8.3 9.2   Prox. Phalanx 6.3 6.8   IP 6.3 6.2   Distal Phalanx          Elbow and Wrist ROM. Measured in degrees.    7/9/2024 7/9/2024      Right Left   Elbow Ext/Flex       Supination/Pronation       Wrist Ext/Flex 72/76 74/72   Wrist RD/UD WFL       15/30         Hand ROM. Measured in degrees.    7/5/2024 7/5/2024     Right  Left                    Thumb: MP 65 12/42                IP 50 28       Rad ADD/ABD 50 48       Pal ADD/ABD 45 45          Opposition To DPC Briefly to tip of SF      Sensation: reports numbness to dorsal thumb, and ulnar aspect of thumb near incision, not formally tested today  Distribution 7/5/2024 7/5/2024     Right  Left    [place]       Normal 1.65-2.83       Diminished Light Touch 3.22-3.61       Diminished Protective 3.84-4.31       Loss of Protective 4.56-6.65       Untestable >6.65       2 Point Discrimination       Static       Dynamic              Strength (Dyanmometer) and Pinch Strength (Pinch Gauge)  Measured in pounds and psi. Average of three trials.  **deferred due to contraindications**    7/5/2024 7/5/2024     Right  Left    Rung II       Cifuentes Pinch       3pt Pinch       2pt Pinch             Manual Muscle Testing     Manual Muscle Test: NT, deferred    7/9/2024 7/9/2024     Left Right   Wrist Extension        Wrist Flexion       Radial Deviation       Ulnar Deviation       Supination       Pronation       Elbow Extension       Elbow Flexion                      CMS Impairment/Limitation/Restriction for FOTO Hand Survey     FOTO scores for Ida Orozco on 7/5/2024.   FOTO documents entered into [employer] - see Media section.     Intake Score: 35%              Treatment     Ida received the treatments listed below:      therapeutic exercises to develop ROM for 15 minutes including:     AROM Wrist  Ext/flx  RD/UD    X 5-10 reps each    AROM thumb:  Palm abd/add  Rad abd/add  IP blocks  Gentle Opposition  Composite flx  Lifts- thumb only  Spreads  TGEs    X 5-10 reps each         manual therapy techniques: Manual Lymphatic Drainage were applied to the: L hand for 8 minutes, including:  Gentle scar  massage    therapeutic activities to improve functional performance for 14  minutes, including:  In hand manip with poms  Isospheres   Wrist maze x3 min  *orthosis adjustments to decr pressure on incision    supervised modalities after being cleared for contradictions: fluido x 10 min     Patient Education and Home Exercises     Education provided:   - cont HEP  - Progress towards goals     Written Home Exercises Provided: Patient instructed to cont prior HEP.  Exercises were reviewed and Ida was able to demonstrate them prior to the end of the session.  Ida demonstrated good  understanding of the home exercise program provided. See electronic medical record under Patient Instructions for exercises provided during therapy sessions.       Assessment     Pt tolerated session fairly well with pain at times due to positioning.     Ida is progressing well towards her goals and there are no updates to goals at this time. Pt prognosis is Good.     Patient will continue to benefit from skilled outpatient occupational therapy to address the deficits listed in the problem list on initial evaluation provide patient/family education and to maximize patient's level of independence in the home and community environment.     Patient's spiritual, cultural and educational needs considered and patient agreeable to plan of care and goals.    Anticipated barriers to occupational therapy: none    Goals:  Long Term Goals (LTGs); to be met by discharge.  1) Pt will report pain no higher than 2/10 with daily tasks,   2) Pt will have an improved FOTO score by at least 20 points  3) Pt will demonstrate improved left thumb  AROM WFL for performing daily tasks, grasping  4) Pt will report return to prior level of function  5)  and pinch strength assessed when appropriate and set goals     Short Term Goals (STGs); to be met within 4 weeks (8/5/24).  1) Pt will report or demonstrate independence with HEP, orthosis wear, and scar  management  2) Pt will report pain no higher than 4/10 with self care and daily tasks  3) Pt will demonstrate improved left thumb AROM by at least 10-15 degrees for improved function and hand use  4) Pt will demonstrate improved left thumb edema by at least 0.3 cm for improved functional use    Plan     Updates/Grading for next session: cont as tolerated     INNA White   7/23/2024

## 2024-07-23 ENCOUNTER — CLINICAL SUPPORT (OUTPATIENT)
Dept: REHABILITATION | Facility: HOSPITAL | Age: 59
End: 2024-07-23
Payer: MEDICARE

## 2024-07-23 DIAGNOSIS — M25.642 DECREASED RANGE OF MOTION OF LEFT THUMB: ICD-10-CM

## 2024-07-23 DIAGNOSIS — M79.645 THUMB PAIN, LEFT: Primary | ICD-10-CM

## 2024-07-23 DIAGNOSIS — F51.04 CHRONIC INSOMNIA: ICD-10-CM

## 2024-07-23 PROCEDURE — 97110 THERAPEUTIC EXERCISES: CPT | Mod: CO

## 2024-07-23 PROCEDURE — 97022 WHIRLPOOL THERAPY: CPT | Mod: CO

## 2024-07-23 PROCEDURE — 97530 THERAPEUTIC ACTIVITIES: CPT | Mod: CO

## 2024-07-24 RX ORDER — ESZOPICLONE 2 MG/1
2 TABLET, FILM COATED ORAL NIGHTLY
Qty: 30 TABLET | Refills: 1 | Status: SHIPPED | OUTPATIENT
Start: 2024-07-24

## 2024-07-24 NOTE — TELEPHONE ENCOUNTER
No care due was identified.  [facility] Embedded Care Due Messages. Reference number: [id number].   7/23/2024 8:59:51 PM CDT

## 2024-07-24 NOTE — TELEPHONE ENCOUNTER
Refill Routing Note   Medication(s) are not appropriate for processing by [facility] for the following reason(s):        Outside of protocol    [facility] action(s):  Route             Appointments  past 12m or future 3m with PCP    Date Provider   Last Visit   5/1/2024 Eliecer Jones MD   Next Visit   Visit date not found Eliecer Jones MD   ED visits in past 90 days: 0        Note composed:7:54 AM 07/24/2024

## 2024-07-24 NOTE — PROGRESS NOTES
"[facility] OUTPATIENT THERAPY AND WELLNESS  Occupational Therapy Treatment Note     Date: 7/25/2024  Name: Ida Orozco  Clinic Number: [id number]    Therapy Diagnosis:   Encounter Diagnoses   Name Primary?    Thumb pain, left Yes    Decreased range of motion of left thumb          Physician: Etelvina Nicholas MD    Physician Orders: Eval and Treat  Medical Diagnosis: Lesion of ulnar nerve, left upper limb [G56.22], Carpal tunnel syndrome, left upper limb [G56.02], Sprain of metacarpophalangeal joint of left thumb, subsequent encounter [S63.841A]   Surgical Procedure and Date: 6/20/24, s/p left thumb UCL repair     Onset Date: 4/17/24  Evaluation Date: 7/5/2024  Insurance Authorization Period Expiration: 12/31/24  Plan of Care Certification Period: 9/13/24  Date of Return to MD: 7/10/24  Visit # / Visits authorized: 4 / 21     FOTO: 1/3, 6/25/24  35%  Lobby access code: [email or web address]      Precautions:  Standard, NWB, no pinching, precautions per UCL protocol; allergy to adhesives     Time In: 9:28 am  Time Out: 10:18 am  Total Appointment Time (timed & untimed codes): 50 minutes        Subjective     Patient reports: "I'm doing good."  She was compliant with home exercise program given last session.   Response to previous treatment: decr pain, able to fully oppose thumb  Functional change: opening bottles     Pain: 4/10  Location: left thumb       Objective     Objective Measures updated at progress report unless specified.  Observation/Appearance:  arrived with orthosis, with pad over incision and dorsal thumb. Pt reported orthosis was rubbing on thumb. Healing incision with sutures with mild redness noted. No drainage noted. A few sutures in crease of thumb noted to be a tight and started getting healed over.            Edema. Measured in centimeters.    7/5/2024 7/5/2024     Right  Left    Thumb:       MP 8.3 9.2   Prox. Phalanx 6.3 6.8   IP 6.3 6.2   Distal Phalanx          Elbow and Wrist ROM. Measured in degrees.    " 7/9/2024 7/9/2024     Right Left   Elbow Ext/Flex       Supination/Pronation       Wrist Ext/Flex 72/76 74/72   Wrist RD/UD WFL       15/30         Hand ROM. Measured in degrees.    7/5/2024 7/5/2024     Right  Left                    Thumb: MP 65 12/42                IP 50 28       Rad ADD/ABD 50 48       Pal ADD/ABD 45 45          Opposition To DPC Briefly to tip of SF      Sensation: reports numbness to dorsal thumb, and ulnar aspect of thumb near incision, not formally tested today  Distribution 7/5/2024 7/5/2024     Right  Left    [place]       Normal 1.65-2.83       Diminished Light Touch 3.22-3.61       Diminished Protective 3.84-4.31       Loss of Protective 4.56-6.65       Untestable >6.65       2 Point Discrimination       Static       Dynamic              Strength (Dyanmometer) and Pinch Strength (Pinch Gauge)  Measured in pounds and psi. Average of three trials.  **deferred due to contraindications**    7/5/2024 7/5/2024     Right  Left    Rung II       Cifuentes Pinch       3pt Pinch       2pt Pinch             Manual Muscle Testing     Manual Muscle Test: NT, deferred    7/9/2024 7/9/2024     Left Right   Wrist Extension        Wrist Flexion       Radial Deviation       Ulnar Deviation       Supination       Pronation       Elbow Extension       Elbow Flexion                      CMS Impairment/Limitation/Restriction for FOTO Hand Survey     FOTO scores for Ida Orozco on 7/5/2024.   FOTO documents entered into [employer] - see Media section.     Intake Score: 35%              Treatment     Ida received the treatments listed below:      therapeutic exercises to develop ROM for 22 minutes including:     AROM Wrist  Ext/flx  RD/UD    X 5-10 reps each    AROM thumb:  Palm abd/add  Rad abd/add  IP blocks  Gentle Opposition  Composite flx  Lifts- thumb only  Spreads  TGEs    X 5-10 reps each         manual therapy techniques: Manual Lymphatic Drainage were applied to the: L hand for 8 minutes,  including:  Gentle scar massage    therapeutic activities to improve functional performance for 10  minutes, including:  In hand manip with poms x 2 sets   Isospheres x 3 min  Wrist maze x3 min    supervised modalities after being cleared for contradictions: fluido x 10 min     Patient Education and Home Exercises     Education provided:   - cont HEP  - Progress towards goals     Written Home Exercises Provided: Patient instructed to cont prior HEP.  Exercises were reviewed and Ida was able to demonstrate them prior to the end of the session.  Ida demonstrated good  understanding of the home exercise program provided. See electronic medical record under Patient Instructions for exercises provided during therapy sessions.       Assessment     Improving tolerance to exercises noted today.     Ida is progressing well towards her goals and there are no updates to goals at this time. Pt prognosis is Good.     Patient will continue to benefit from skilled outpatient occupational therapy to address the deficits listed in the problem list on initial evaluation provide patient/family education and to maximize patient's level of independence in the home and community environment.     Patient's spiritual, cultural and educational needs considered and patient agreeable to plan of care and goals.    Anticipated barriers to occupational therapy: none    Goals:  Long Term Goals (LTGs); to be met by discharge.  1) Pt will report pain no higher than 2/10 with daily tasks,   2) Pt will have an improved FOTO score by at least 20 points  3) Pt will demonstrate improved left thumb  AROM WFL for performing daily tasks, grasping  4) Pt will report return to prior level of function  5)  and pinch strength assessed when appropriate and set goals     Short Term Goals (STGs); to be met within 4 weeks (8/5/24).  1) Pt will report or demonstrate independence with HEP, orthosis wear, and scar management  2) Pt will report pain no  higher than 4/10 with self care and daily tasks  3) Pt will demonstrate improved left thumb AROM by at least 10-15 degrees for improved function and hand use  4) Pt will demonstrate improved left thumb edema by at least 0.3 cm for improved functional use    Plan     Updates/Grading for next session: cont as tolerated     MANSOOR White/KEYUR   7/25/2024

## 2024-07-25 ENCOUNTER — CLINICAL SUPPORT (OUTPATIENT)
Dept: REHABILITATION | Facility: HOSPITAL | Age: 59
End: 2024-07-25
Payer: MEDICARE

## 2024-07-25 DIAGNOSIS — M25.642 DECREASED RANGE OF MOTION OF LEFT THUMB: ICD-10-CM

## 2024-07-25 DIAGNOSIS — M79.645 THUMB PAIN, LEFT: Primary | ICD-10-CM

## 2024-07-25 PROCEDURE — 97110 THERAPEUTIC EXERCISES: CPT | Mod: KX,CO

## 2024-07-25 PROCEDURE — 97140 MANUAL THERAPY 1/> REGIONS: CPT | Mod: KX,CO

## 2024-07-25 PROCEDURE — 97022 WHIRLPOOL THERAPY: CPT | Mod: KX,CO

## 2024-07-26 ENCOUNTER — PATIENT MESSAGE (OUTPATIENT)
Dept: PRIMARY CARE CLINIC | Facility: CLINIC | Age: 59
End: 2024-07-26
Payer: MEDICARE

## 2024-07-26 NOTE — TELEPHONE ENCOUNTER
Called pt regarding message. Pt stated she had a fall on 7/22. Pt reported right leg pain & bruise on her right hip. Pt has scheduled a appt. Pt is requesting PCP recommendation.

## 2024-07-30 ENCOUNTER — CLINICAL SUPPORT (OUTPATIENT)
Dept: REHABILITATION | Facility: HOSPITAL | Age: 59
End: 2024-07-30
Payer: MEDICARE

## 2024-07-30 DIAGNOSIS — M79.645 THUMB PAIN, LEFT: Primary | ICD-10-CM

## 2024-07-30 DIAGNOSIS — M25.642 DECREASED RANGE OF MOTION OF LEFT THUMB: ICD-10-CM

## 2024-07-30 PROCEDURE — 97022 WHIRLPOOL THERAPY: CPT | Mod: KX,CO

## 2024-07-30 PROCEDURE — 97140 MANUAL THERAPY 1/> REGIONS: CPT | Mod: KX,CO

## 2024-07-30 PROCEDURE — 97530 THERAPEUTIC ACTIVITIES: CPT | Mod: KX,CO

## 2024-07-30 NOTE — PROGRESS NOTES
[facility]  Occupational Therapy Treatment Note     Date: 7/30/2024  Name: Ida Orozco  Clinic Number: [id number]    Therapy Diagnosis:   Encounter Diagnoses   Name Primary?    Thumb pain, left Yes    Decreased range of motion of left thumb            Physician: Etelvina Nicholas MD    Physician Orders: Eval and Treat  Medical Diagnosis: Lesion of ulnar nerve, left upper limb [G56.22], Carpal tunnel syndrome, left upper limb [G56.02], Sprain of metacarpophalangeal joint of left thumb, subsequent encounter [S63.044G]   Surgical Procedure and Date: 6/20/24, s/p left thumb UCL repair     Onset Date: 4/17/24  Evaluation Date: 7/5/2024  Insurance Authorization Period Expiration: 12/31/24  Plan of Care Certification Period: 9/13/24  Date of Return to MD: 7/10/24  Visit # / Visits authorized: 7 / 21     FOTO: 1/3, 6/25/24  35%  Lobby access code: [email or web address]      Precautions:  Standard, NWB, no pinching, precautions per UCL protocol; allergy to adhesives     Time In: 9:29 am  Time Out: 10:24 am  Total Appointment Time (timed & untimed codes): 55 minutes        Subjective     Patient reports: ROM improving however splint still hurts   She was compliant with home exercise program given last session.   Response to previous treatment: decr pain, able to fully oppose thumb  Functional change: opening bottles     Pain: 4/10  Location: left thumb       Objective     Objective Measures updated at progress report unless specified.  Observation/Appearance:  arrived with orthosis, with pad over incision and dorsal thumb. Pt reported orthosis was rubbing on thumb. Healing incision with sutures with mild redness noted. No drainage noted. A few sutures in crease of thumb noted to be a tight and started getting healed over.            Edema. Measured in centimeters.    7/5/2024 7/5/2024     Right  Left    Thumb:       MP 8.3 9.2   Prox. Phalanx 6.3 6.8   IP 6.3 6.2   Distal Phalanx          Elbow and Wrist ROM.  Measured in degrees.    7/9/2024 7/9/2024     Right Left   Elbow Ext/Flex       Supination/Pronation       Wrist Ext/Flex 72/76 74/72   Wrist RD/UD WFL       15/30         Hand ROM. Measured in degrees.    7/5/2024 7/5/2024     Right  Left                    Thumb: MP 65 12/42                IP 50 28       Rad ADD/ABD 50 48       Pal ADD/ABD 45 45          Opposition To DPC Briefly to tip of SF      Sensation: reports numbness to dorsal thumb, and ulnar aspect of thumb near incision, not formally tested today  Distribution 7/5/2024 7/5/2024     Right  Left    [place]       Normal 1.65-2.83       Diminished Light Touch 3.22-3.61       Diminished Protective 3.84-4.31       Loss of Protective 4.56-6.65       Untestable >6.65       2 Point Discrimination       Static       Dynamic              Strength (Dyanmometer) and Pinch Strength (Pinch Gauge)  Measured in pounds and psi. Average of three trials.  **deferred due to contraindications**    7/5/2024 7/5/2024     Right  Left    Rung II       Cifuentes Pinch       3pt Pinch       2pt Pinch             Manual Muscle Testing     Manual Muscle Test: NT, deferred    7/9/2024 7/9/2024     Left Right   Wrist Extension        Wrist Flexion       Radial Deviation       Ulnar Deviation       Supination       Pronation       Elbow Extension       Elbow Flexion                      CMS Impairment/Limitation/Restriction for FOTO Hand Survey     FOTO scores for Ida Orozco on 7/5/2024.   FOTO documents entered into [employer] - see Media section.     Intake Score: 35%              Treatment     Ida received the treatments listed below:      therapeutic exercises to develop ROM for 10 minutes including:     AROM Wrist  Ext/flx  RD/UD    X 5-10 reps each    AROM thumb:  Palm abd/add  Rad abd/add  IP blocks  Gentle Opposition  Composite flx  Lifts- thumb only  Spreads  TGEs    X 5-10 reps each         manual therapy techniques: Manual Lymphatic Drainage were applied to the: L  hand for 8 minutes, including:  Gentle scar massage    therapeutic activities to improve functional performance for 27 minutes, including:  In hand manip with poms x 2 sets   Isospheres x 3 min  Wrist maze x3 min  Octy( stopped with pain)  Orthosis adjustments     supervised modalities after being cleared for contradictions: fluido x 10 min     Patient Education and Home Exercises     Education provided:   - cont HEP  - Progress towards goals     Written Home Exercises Provided: Patient instructed to cont prior HEP.  Exercises were reviewed and Ida was able to demonstrate them prior to the end of the session.  Ida demonstrated good  understanding of the home exercise program provided. See electronic medical record under Patient Instructions for exercises provided during therapy sessions.       Assessment     She tolerated session fairly well.     Ida is progressing well towards her goals and there are no updates to goals at this time. Pt prognosis is Good.     Patient will continue to benefit from skilled outpatient occupational therapy to address the deficits listed in the problem list on initial evaluation provide patient/family education and to maximize patient's level of independence in the home and community environment.     Patient's spiritual, cultural and educational needs considered and patient agreeable to plan of care and goals.    Anticipated barriers to occupational therapy: none    Goals:  Long Term Goals (LTGs); to be met by discharge.  1) Pt will report pain no higher than 2/10 with daily tasks,   2) Pt will have an improved FOTO score by at least 20 points  3) Pt will demonstrate improved left thumb  AROM WFL for performing daily tasks, grasping  4) Pt will report return to prior level of function  5)  and pinch strength assessed when appropriate and set goals     Short Term Goals (STGs); to be met within 4 weeks (8/5/24).  1) Pt will report or demonstrate independence with HEP, orthosis  wear, and scar management  2) Pt will report pain no higher than 4/10 with self care and daily tasks  3) Pt will demonstrate improved left thumb AROM by at least 10-15 degrees for improved function and hand use  4) Pt will demonstrate improved left thumb edema by at least 0.3 cm for improved functional use    Plan     Updates/Grading for next session: cont as tolerated     INNA White   7/30/2024
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
## 2024-08-12 PROBLEM — S89.91XA RIGHT LEG INJURY, INITIAL ENCOUNTER: Status: ACTIVE | Noted: 2024-08-12

## 2024-08-12 PROBLEM — M79.604 LEG PAIN, ANTERIOR, RIGHT: Status: ACTIVE | Noted: 2024-08-12

## 2024-08-14 ENCOUNTER — OFFICE VISIT (OUTPATIENT)
Dept: ORTHOPEDICS | Facility: CLINIC | Age: 59
End: 2024-08-14
Payer: MEDICARE

## 2024-08-14 DIAGNOSIS — S63.642D RUPTURE OF ULNAR COLLATERAL LIGAMENT OF LEFT THUMB, SUBSEQUENT ENCOUNTER: Primary | ICD-10-CM

## 2024-08-14 PROCEDURE — 1159F MED LIST DOCD IN RCRD: CPT | Mod: HCNC,CPTII,S$GLB, | Performed by: ORTHOPAEDIC SURGERY

## 2024-08-14 PROCEDURE — 99999 PR PBB SHADOW E&M-EST. PATIENT-LVL III: CPT | Mod: PBBFAC,HCNC,, | Performed by: ORTHOPAEDIC SURGERY

## 2024-08-14 PROCEDURE — 99024 POSTOP FOLLOW-UP VISIT: CPT | Mod: HCNC,S$GLB,, | Performed by: ORTHOPAEDIC SURGERY

## 2024-08-14 PROCEDURE — 1160F RVW MEDS BY RX/DR IN RCRD: CPT | Mod: HCNC,CPTII,S$GLB, | Performed by: ORTHOPAEDIC SURGERY

## 2024-08-14 NOTE — PROGRESS NOTES
Ida Orozco presents for post-operative evaluation of   Encounter Diagnosis   Name Primary?    Rupture of ulnar collateral ligament of left thumb, subsequent encounter Yes   .     History of Present Illness           The patient is now 7 weeks s/p L thumb UCL reconstruction and L thumb MP joint arthrotomy.  Overall the patient reports doing well.  The patient reports resolved pain and soreness, however describes some stiffness, feels like she needs to crack her knuckle.  She has been seeing the therapist as scheduled and performing at home exercises. She is wearing her splint daily. Is overall happy with her current ROM.     Vitals:    08/14/24 1045   PainSc: 0-No pain   PainLoc: Hand       PE:    AA&O x 4.  NAD  HEENT:  NCAT, sclera nonicteric  Lungs:  Respirations are equal and unlabored.  CV:  2+ bilateral upper and lower extremity pulses.  MSK: The incision is well healed.  Full wrist and finger motion. Thumb able to flex to the base of the pinky.  Neurovascularly intact and has 5/5 thenar and intrinsic musculature strength.        Assessment & Plan: Status post above, doing well   Assessment & Plan             Continue with range of motion and strengthening   Continue therapy sessions  No longer needs thumb splint  F/U 6 weeks   Call with any questions/concerns in the interim

## 2024-08-16 ENCOUNTER — OFFICE VISIT (OUTPATIENT)
Dept: PRIMARY CARE CLINIC | Facility: CLINIC | Age: 59
End: 2024-08-16
Payer: MEDICARE

## 2024-08-16 VITALS
TEMPERATURE: 97 F | WEIGHT: 225.75 LBS | RESPIRATION RATE: 18 BRPM | HEART RATE: 52 BPM | HEIGHT: 64 IN | DIASTOLIC BLOOD PRESSURE: 60 MMHG | OXYGEN SATURATION: 98 % | BODY MASS INDEX: 38.54 KG/M2 | SYSTOLIC BLOOD PRESSURE: 100 MMHG

## 2024-08-16 DIAGNOSIS — S80.11XA CONTUSION OF MULTIPLE SITES OF RIGHT LOWER EXTREMITY, INITIAL ENCOUNTER: ICD-10-CM

## 2024-08-16 DIAGNOSIS — R42 VERTIGO: Primary | ICD-10-CM

## 2024-08-16 DIAGNOSIS — S80.12XA CONTUSION OF LEFT LOWER EXTREMITY, INITIAL ENCOUNTER: ICD-10-CM

## 2024-08-16 PROCEDURE — 99999 PR PBB SHADOW E&M-EST. PATIENT-LVL IV: CPT | Mod: PBBFAC,HCNC,, | Performed by: FAMILY MEDICINE

## 2024-08-16 NOTE — PROGRESS NOTES
Patient ID: Ida Orozco is a 59 y.o. female.    Chief Complaint: Back Pain and Fall (8/12)    History of Present Illness    CHIEF COMPLAINT:  Patient presents today for dizziness and falls.    DIZZINESS AND VERTIGO:  She reports experiencing dizziness, vertigo, and lightheadedness. Her vertigo medication is not effective. She has a history of undergoing the Epley maneuver, which initially provided relief. She expresses frustration with ongoing symptoms and reports falling frequently, noting a tendency to fall towards the right side. She denies any recent head trauma.    RECENT FALL:  She reports a fall on Monday while going up steps, resulting in extensive bruising to her right knee, right lower leg, left medial leg, and a small contusion to her right posterior shoulder. She denies hitting her head during the fall and demonstrates full range of motion in her shoulders despite the contusion.    LEFT EAR ISSUES:  She reports frequent fluid accumulation in her left ear with associated swelling of the lymph node behind the ear. She experiences significant ear discomfort during air travel, particularly when flying in [place]. She reports some degree of tinnitus and possible hearing loss, stating she has undergone hearing tests previously. She denies experiencing similar symptoms in the right ear.    ENT HISTORY:  She reports previous ENT consultations for sinus problems, with her last visit in November. She states her inner ear issues have not been adequately addressed by [facility], who primarily focused on operating and removing infection from her sinuses.    MEDICAL HISTORY:  She reports a history of ligament tear in her hands. She mentions having previously worn a brace due to this condition, which she was recently advised by her doctor to remove.    SOCIAL HISTORY:  She reports having a dog at home.    ROS:  Constitutional: +dizziness, +lightheadedness  ENT: +hearing loss, -ear pain  Musculoskeletal: -joint  pain         Physical Exam    General: Well-developed. Well-nourished. No acute distress.  Eyes: EOMI. Sclerae anicteric.  HENT: Normocephalic. Atraumatic. Nares patent. Moist oral mucosa.  Cardiovascular: Regular rate. Regular rhythm. No murmurs. No rubs. No gallops. Normal S1, S2.  Respiratory: Normal respiratory effort. Clear to auscultation bilaterally. No rales. No rhonchi. No wheezing.  Musculoskeletal: No  obvious deformity. Extensive bruising to the right knee. Bruising to the anteromedial surface of the right lower leg. Bruising to the left medial leg. Small contusion to the right posterior shoulder.  Extremities: No lower extremity edema.  Neurological: Alert & oriented x3. No slurred speech. Normal gait.  Psychiatric: Normal mood. Normal affect. Good insight. Good judgment.  Skin: Warm. Dry. No rash.         Assessment & Plan    - Patient reports falling recently, resulting in extensive bruising  - Neurotology referral (Dr. Miller) due to persistent vertigo symptoms  VERTIGO:  - Continued vertigo medication, noting it is not effectively managing symptoms.  - Referred to Dr. Cade Baxter, neurologist specializing in diseases of the inner ear and vertigo.  FALL PREVENTION:  - Patient to avoid falls.            Follow up if symptoms worsen or fail to improve.    This note was generated with the assistance of ambient listening technology. Verbal consent was obtained by the patient and accompanying visitor(s) for the recording of patient appointment to facilitate this note. I attest to having reviewed and edited the generated note for accuracy, though some syntax or spelling errors may persist. Please contact the author of this note for any clarification.

## 2024-08-19 ENCOUNTER — CLINICAL SUPPORT (OUTPATIENT)
Dept: REHABILITATION | Facility: HOSPITAL | Age: 59
End: 2024-08-19
Payer: MEDICARE

## 2024-08-19 DIAGNOSIS — M79.645 THUMB PAIN, LEFT: Primary | ICD-10-CM

## 2024-08-19 DIAGNOSIS — M25.642 DECREASED RANGE OF MOTION OF LEFT THUMB: ICD-10-CM

## 2024-08-19 PROCEDURE — 97022 WHIRLPOOL THERAPY: CPT

## 2024-08-19 PROCEDURE — 97110 THERAPEUTIC EXERCISES: CPT

## 2024-08-19 NOTE — PATIENT INSTRUCTIONS
[facility]  OCCUPATIONAL THERAPY  HOME EXERCISE PROGRAM     Complete the following strengthening program 1x/day.                       _

## 2024-08-19 NOTE — PROGRESS NOTES
[facility]  Occupational Therapy Treatment Note     Date: 8/19/2024  Name: Ida Orozco  Clinic Number: [id number]    Therapy Diagnosis:   Encounter Diagnoses   Name Primary?    Thumb pain, left Yes    Decreased range of motion of left thumb          Physician: Etelvina Nicholas MD    Physician Orders: Eval and Treat  Medical Diagnosis: Lesion of ulnar nerve, left upper limb [G56.22], Carpal tunnel syndrome, left upper limb [G56.02], Sprain of metacarpophalangeal joint of left thumb, subsequent encounter [S63.648X]   Surgical Procedure and Date: 6/20/24, s/p left thumb UCL repair     Onset Date: 4/17/24  Evaluation Date: 7/5/2024  Insurance Authorization Period Expiration: 12/31/24  Plan of Care Certification Period: 9/13/24  Date of Return to MD: 7/10/24  Visit # / Visits authorized: 8 / 21     FOTO: 1/3, 6/25/24  35%  Lobby access code: [email or web address]      Precautions:  Standard,  precautions per UCL protocol; allergy to adhesives     Time In: 11:25 am  Time Out: 12:15 am  Total Appointment Time (timed & untimed codes): 50 minutes        Subjective     Patient reports: she has been able to discontinue the orthosis. She reports it is feeling better, but still has difficulty bending the thumb IP.   She was compliant with home exercise program given last session.   Response to previous treatment: cont with stiffness in thumb, occasional pain  Functional change: opening bottles, doing light tasks with her hand.     Pain: 4/10  Location: left thumb       Objective     Objective Measures updated at progress report unless specified.  Observation/Appearance:  arrived with orthosis, with pad over incision and dorsal thumb. Pt reported orthosis was rubbing on thumb. Healing incision with sutures with mild redness noted. No drainage noted. A few sutures in crease of thumb noted to be a tight and started getting healed over.            Edema. Measured in centimeters.    7/5/2024 7/5/2024 8/19/24     Right   Left  L   Thumb:        MP 8.3 9.2 8   Prox. Phalanx 6.3 6.8 6.7   IP 6.3 6.2 5.8   Distal Phalanx           Elbow and Wrist ROM. Measured in degrees.    7/9/2024 7/9/2024     Right Left   Elbow Ext/Flex       Supination/Pronation       Wrist Ext/Flex 72/76 74/72   Wrist RD/UD WFL       15/30         Hand ROM. Measured in degrees.    7/5/2024 7/5/2024 8/19/24     Right  Left  L                     Thumb: MP 65 12/42 15/55                IP 50 28 30       Rad ADD/ABD 50 48 52       Pal ADD/ABD 45 45 50          Opposition To DPC Briefly to tip of SF To base of SF      Sensation: reports numbness to dorsal thumb, and ulnar aspect of thumb near incision, not formally tested today  Distribution 7/5/2024 7/5/2024     Right  Left    [place]       Normal 1.65-2.83       Diminished Light Touch 3.22-3.61       Diminished Protective 3.84-4.31       Loss of Protective 4.56-6.65       Untestable >6.65       2 Point Discrimination       Static       Dynamic              Strength (Dyanmometer) and Pinch Strength (Pinch Gauge)  Measured in pounds and psi. Average of three trials.      8/19/2024 8/19/2024     Right  Left    Rung II  58  34   Cifuentes Pinch  14.5 8    3pt Pinch       2pt Pinch             Manual Muscle Testing     Manual Muscle Test: NT, deferred    7/9/2024 7/9/2024     Left Right   Wrist Extension        Wrist Flexion       Radial Deviation       Ulnar Deviation       Supination       Pronation       Elbow Extension       Elbow Flexion                      CMS Impairment/Limitation/Restriction for FOTO Hand Survey     FOTO scores for Ida BRANDT Ernesto on 7/5/2024.   FOTO documents entered into [employer] - see Media section.     Intake Score: 35%              Treatment     Ida received the treatments listed below:        supervised modalities after being cleared for contradictions: fluido x 10 min     therapeutic exercises to develop ROM for 24 minutes including:      AROM Wrist  Ext/flx  RD/UD    X 10 reps each   "  AROM thumb:  Palm abd/add  Rad abd/add  IP blocks  Gentle Opposition  Composite flx  Lifts- thumb only  Spreads  TGEs    X 10 reps each    Yellow putty Molding/rolling x 1'45"  Gripping x 10 reps        manual therapy techniques: Manual Lymphatic Drainage were applied to the: L hand for 8 minutes, including:  Gentle scar massage    therapeutic activities to improve functional performance for 8 minutes, including: (supervised)  In hand manip with poms x 2 sets (NT)  Isospheres x 3 min  Wrist maze x3 min  Octy x 2' (discontinue after today)      Patient Education and Home Exercises     Education provided:   - cont HEP. Discussed keeping activities and exercise pain free.      Written Home Exercises Provided: Patient instructed to cont prior HEP.  Exercises were reviewed and Ida was able to demonstrate them prior to the end of the session.  Ida demonstrated good  understanding of the home exercise program provided. See electronic medical record under Patient Instructions for exercises provided during therapy sessions.       Assessment     She tolerated session fairly today. Progressed with light strengthening as tolerated with yellow putty. She cont to report pain and stiffness in thumb MP and IP joints. Will progress as tolerated with functional tasks, strengthening, and ROM.     Ida is progressing fairly well towards her goals and there are no updates to goals at this time. Pt prognosis is Good.     Patient will continue to benefit from skilled outpatient occupational therapy to address the deficits listed in the problem list on initial evaluation provide patient/family education and to maximize patient's level of independence in the home and community environment.     Patient's spiritual, cultural and educational needs considered and patient agreeable to plan of care and goals.    Anticipated barriers to occupational therapy: none    Goals:  Long Term Goals (LTGs); to be met by discharge.  1) Pt will report " pain no higher than 2/10 with daily tasks,   2) Pt will have an improved FOTO score by at least 20 points  3) Pt will demonstrate improved left thumb  AROM WFL for performing daily tasks, grasping  4) Pt will report return to prior level of function  5)  and pinch strength assessed when appropriate and set goals     Short Term Goals (STGs); to be met within 4 weeks (8/5/24).  1) Pt will report or demonstrate independence with HEP, orthosis wear, and scar management  2) Pt will report pain no higher than 4/10 with self care and daily tasks  3) Pt will demonstrate improved left thumb AROM by at least 10-15 degrees for improved function and hand use  4) Pt will demonstrate improved left thumb edema by at least 0.3 cm for improved functional use    Plan     Updates/Grading for next session: cont as tolerated     LEANA Garcia, CHT   8/19/2024

## 2024-08-21 ENCOUNTER — PATIENT MESSAGE (OUTPATIENT)
Dept: ORTHOPEDICS | Facility: CLINIC | Age: 59
End: 2024-08-21
Payer: MEDICARE

## 2024-08-23 ENCOUNTER — CLINICAL SUPPORT (OUTPATIENT)
Dept: REHABILITATION | Facility: HOSPITAL | Age: 59
End: 2024-08-23
Payer: MEDICARE

## 2024-08-23 ENCOUNTER — TELEPHONE (OUTPATIENT)
Dept: ORTHOPEDICS | Facility: CLINIC | Age: 59
End: 2024-08-23
Payer: MEDICARE

## 2024-08-23 DIAGNOSIS — M79.645 THUMB PAIN, LEFT: Primary | ICD-10-CM

## 2024-08-23 DIAGNOSIS — M25.642 DECREASED RANGE OF MOTION OF LEFT THUMB: ICD-10-CM

## 2024-08-23 PROCEDURE — 97022 WHIRLPOOL THERAPY: CPT | Mod: CO

## 2024-08-23 PROCEDURE — 97530 THERAPEUTIC ACTIVITIES: CPT | Mod: CO

## 2024-08-23 PROCEDURE — 97110 THERAPEUTIC EXERCISES: CPT | Mod: CO

## 2024-08-23 PROCEDURE — 97140 MANUAL THERAPY 1/> REGIONS: CPT | Mod: CO

## 2024-08-23 NOTE — PROGRESS NOTES
[facility]  Occupational Therapy Treatment Note     Date: 8/23/2024  Name: Ida Orozco  Clinic Number: [id number]    Therapy Diagnosis:   Encounter Diagnoses   Name Primary?    Thumb pain, left Yes    Decreased range of motion of left thumb            Physician: Etelvina Nicholas MD    Physician Orders: Eval and Treat  Medical Diagnosis: Lesion of ulnar nerve, left upper limb [G56.22], Carpal tunnel syndrome, left upper limb [G56.02], Sprain of metacarpophalangeal joint of left thumb, subsequent encounter [S63.102O]   Surgical Procedure and Date: 6/20/24, s/p left thumb UCL repair     Onset Date: 4/17/24  Evaluation Date: 7/5/2024  Insurance Authorization Period Expiration: 12/31/24  Plan of Care Certification Period: 9/13/24  Date of Return to MD: 7/10/24  Visit # / Visits authorized: 9 / 21     FOTO: 1/3, 6/25/24  35%  Lobby access code: [email or web address]      Precautions:  Standard,  precautions per UCL protocol; allergy to adhesives     Time In: 10:07 am  Time Out: 11 am  Total Appointment Time (timed & untimed codes): 53 minutes        Subjective     Patient reports: having more hip pain than hand pain   She was compliant with home exercise program given last session.   Response to previous treatment: cont with stiffness in thumb, occasional pain  Functional change: opening bottles, doing light tasks with her hand.     Pain: 4/10  Location: left thumb       Objective     Objective Measures updated at progress report unless specified.  Observation/Appearance:  arrived with orthosis, with pad over incision and dorsal thumb. Pt reported orthosis was rubbing on thumb. Healing incision with sutures with mild redness noted. No drainage noted. A few sutures in crease of thumb noted to be a tight and started getting healed over.            Edema. Measured in centimeters.    7/5/2024 7/5/2024 8/19/24     Right  Left  L   Thumb:        MP 8.3 9.2 8   Prox. Phalanx 6.3 6.8 6.7   IP 6.3 6.2 5.8   Distal  Phalanx           Elbow and Wrist ROM. Measured in degrees.    7/9/2024 7/9/2024     Right Left   Elbow Ext/Flex       Supination/Pronation       Wrist Ext/Flex 72/76 74/72   Wrist RD/UD WFL       15/30         Hand ROM. Measured in degrees.    7/5/2024 7/5/2024 8/19/24     Right  Left  L                     Thumb: MP 65 12/42 15/55                IP 50 28 30       Rad ADD/ABD 50 48 52       Pal ADD/ABD 45 45 50          Opposition To DPC Briefly to tip of SF To base of SF      Sensation: reports numbness to dorsal thumb, and ulnar aspect of thumb near incision, not formally tested today  Distribution 7/5/2024 7/5/2024     Right  Left    [place]       Normal 1.65-2.83       Diminished Light Touch 3.22-3.61       Diminished Protective 3.84-4.31       Loss of Protective 4.56-6.65       Untestable >6.65       2 Point Discrimination       Static       Dynamic              Strength (Dyanmometer) and Pinch Strength (Pinch Gauge)  Measured in pounds and psi. Average of three trials.      8/19/2024 8/19/2024     Right  Left    Rung II  58  34   Cifuentes Pinch  14.5 8    3pt Pinch       2pt Pinch             Manual Muscle Testing     Manual Muscle Test: NT, deferred    7/9/2024 7/9/2024     Left Right   Wrist Extension        Wrist Flexion       Radial Deviation       Ulnar Deviation       Supination       Pronation       Elbow Extension       Elbow Flexion                      CMS Impairment/Limitation/Restriction for FOTO Hand Survey     FOTO scores for Ida Orozco on 7/5/2024.   FOTO documents entered into [employer] - see Media section.     Intake Score: 35%              Treatment     Ida received the treatments listed below:        supervised modalities after being cleared for contradictions: fluido x 10 min     therapeutic exercises to develop ROM for 15 minutes including:      AROM Wrist  Ext/flx  RD/UD    X 10 reps each    AROM thumb:  Palm abd/add  Rad abd/add  IP blocks  Gentle Opposition  Composite  "flx  Lifts- thumb only  Spreads  TGEs    X 10 reps each    Yellow putty (NT) Molding/rolling x 1'45"  Gripping x 10 reps        manual therapy techniques: Manual Lymphatic Drainage were applied to the: L hand for 8 minutes, including:  Gentle scar massage    therapeutic activities to improve functional performance for 20  minutes, including:   In hand manip with poms x 2 sets (NT)  Isospheres x 3 min  Wrist maze x3 min  Yellow CP pinches   Hand gripper         Patient Education and Home Exercises     Education provided:   - cont HEP. Discussed keeping activities and exercise pain free.      Written Home Exercises Provided: Patient instructed to cont prior HEP.  Exercises were reviewed and Ida was able to demonstrate them prior to the end of the session.  Ida demonstrated good  understanding of the home exercise program provided. See electronic medical record under Patient Instructions for exercises provided during therapy sessions.       Assessment     She tolerated session fairly today. Progressed with light strengthening. Will progress as tolerated with functional tasks, strengthening, and ROM.     Ida is progressing fairly well towards her goals and there are no updates to goals at this time. Pt prognosis is Good.     Patient will continue to benefit from skilled outpatient occupational therapy to address the deficits listed in the problem list on initial evaluation provide patient/family education and to maximize patient's level of independence in the home and community environment.     Patient's spiritual, cultural and educational needs considered and patient agreeable to plan of care and goals.    Anticipated barriers to occupational therapy: none    Goals:  Long Term Goals (LTGs); to be met by discharge.  1) Pt will report pain no higher than 2/10 with daily tasks,   2) Pt will have an improved FOTO score by at least 20 points  3) Pt will demonstrate improved left thumb  AROM WFL for performing daily " tasks, grasping  4) Pt will report return to prior level of function  5)  and pinch strength assessed when appropriate and set goals     Short Term Goals (STGs); to be met within 4 weeks (8/5/24).  1) Pt will report or demonstrate independence with HEP, orthosis wear, and scar management  2) Pt will report pain no higher than 4/10 with self care and daily tasks  3) Pt will demonstrate improved left thumb AROM by at least 10-15 degrees for improved function and hand use  4) Pt will demonstrate improved left thumb edema by at least 0.3 cm for improved functional use    Plan     Updates/Grading for next session: cont as tolerated     INNA White   8/23/2024

## 2024-08-23 NOTE — TELEPHONE ENCOUNTER
Scheduled patient to see Dr. Quinn on 8/28 at 11:00 am. Patient confirmed appt details.    Mary Hitchcock MS, [facility]     Sports Medicine Assistant to:  Sylvain Quinn M.D.  System Director, [facility]  Head Medical Team Physician, [place] [employer] [facility]  President-Elect, [facility] [employer]  Chief Medical Officer, [facility]

## 2024-08-23 NOTE — TELEPHONE ENCOUNTER
Patient does not wish to be seen today or go to ER as they are not going to do anything. Discussed that we are attempting to get her a sooner appointment for her right hip.     Patient does not feel the need to be seen today. Encouraged patient to seek emergency care if the condition worsens.

## 2024-08-28 ENCOUNTER — OFFICE VISIT (OUTPATIENT)
Dept: SPORTS MEDICINE | Facility: CLINIC | Age: 59
End: 2024-08-28
Payer: MEDICARE

## 2024-08-28 ENCOUNTER — HOSPITAL ENCOUNTER (OUTPATIENT)
Dept: RADIOLOGY | Facility: HOSPITAL | Age: 59
Discharge: HOME | End: 2024-08-28
Attending: FAMILY MEDICINE
Payer: MEDICARE

## 2024-08-28 VITALS — HEIGHT: 64 IN | WEIGHT: 224 LBS | BODY MASS INDEX: 38.24 KG/M2 | TEMPERATURE: 98 F

## 2024-08-28 DIAGNOSIS — M25.561 ACUTE PAIN OF RIGHT KNEE: ICD-10-CM

## 2024-08-28 DIAGNOSIS — S76.011S TEAR OF RIGHT GLUTEUS MINIMUS TENDON, SEQUELA: ICD-10-CM

## 2024-08-28 DIAGNOSIS — S76.011S TEAR OF RIGHT GLUTEUS MEDIUS TENDON, SEQUELA: ICD-10-CM

## 2024-08-28 DIAGNOSIS — M25.551 PAIN OF RIGHT HIP: ICD-10-CM

## 2024-08-28 DIAGNOSIS — M25.551 CHRONIC PAIN OF RIGHT HIP: Primary | ICD-10-CM

## 2024-08-28 DIAGNOSIS — M79.651 ACUTE PAIN OF RIGHT THIGH: ICD-10-CM

## 2024-08-28 DIAGNOSIS — M16.11 PRIMARY OSTEOARTHRITIS OF RIGHT HIP: ICD-10-CM

## 2024-08-28 DIAGNOSIS — G89.29 CHRONIC PAIN OF RIGHT HIP: Primary | ICD-10-CM

## 2024-08-28 DIAGNOSIS — M67.80 TENDINOSIS: ICD-10-CM

## 2024-08-28 PROCEDURE — 99214 OFFICE O/P EST MOD 30 MIN: CPT | Mod: 25,HCNC,S$GLB, | Performed by: FAMILY MEDICINE

## 2024-08-28 PROCEDURE — 1159F MED LIST DOCD IN RCRD: CPT | Mod: HCNC,CPTII,S$GLB, | Performed by: FAMILY MEDICINE

## 2024-08-28 PROCEDURE — 20611 DRAIN/INJ JOINT/BURSA W/US: CPT | Mod: HCNC,RT,S$GLB, | Performed by: FAMILY MEDICINE

## 2024-08-28 PROCEDURE — 73564 X-RAY EXAM KNEE 4 OR MORE: CPT | Mod: 26,50,HCNC, | Performed by: RADIOLOGY

## 2024-08-28 PROCEDURE — 73564 X-RAY EXAM KNEE 4 OR MORE: CPT | Mod: TC,50,HCNC

## 2024-08-28 PROCEDURE — 3008F BODY MASS INDEX DOCD: CPT | Mod: HCNC,CPTII,S$GLB, | Performed by: FAMILY MEDICINE

## 2024-08-28 PROCEDURE — 99999 PR PBB SHADOW E&M-EST. PATIENT-LVL IV: CPT | Mod: PBBFAC,HCNC,, | Performed by: FAMILY MEDICINE

## 2024-08-28 PROCEDURE — 73552 X-RAY EXAM OF FEMUR 2/>: CPT | Mod: TC,HCNC,RT

## 2024-08-28 PROCEDURE — 1160F RVW MEDS BY RX/DR IN RCRD: CPT | Mod: HCNC,CPTII,S$GLB, | Performed by: FAMILY MEDICINE

## 2024-08-28 RX ORDER — TRIAMCINOLONE ACETONIDE 40 MG/ML
40 INJECTION, SUSPENSION INTRA-ARTICULAR; INTRAMUSCULAR
Status: DISCONTINUED | OUTPATIENT
Start: 2024-08-28 | End: 2024-08-28 | Stop reason: HOSPADM

## 2024-08-28 RX ADMIN — TRIAMCINOLONE ACETONIDE 40 MG: 40 INJECTION, SUSPENSION INTRA-ARTICULAR; INTRAMUSCULAR at 11:08

## 2024-08-28 NOTE — PROGRESS NOTES
HISTORY OF PRESENT ILLNESS   8/28/24  Ida Orozco, a 59 y.o. female, presents today for follow up evaluation of her right hip. At last appointment, 9/27/2023, patient underwent lateral glute tendon CSI & pain/symptoms did improve. Pain returned on 8/12 following a fall. Patient went to the ED and received hip x-rays and tib/fib x-rays. Patient would like knee and femur x-rays today. Pain is 7/10 at present & up to 10/10 with provacative activity including ADLs and walking. Patient has not been doing physical therapy.    9/27/23  Ida Orozco, a 59 y.o. female, presents today for evaluation of her bilateral HIP.    Patient reports onset of chronic and insidious  pain beginning 6 months ago. Patient reports no known injury or trauma. Pain is located along lateral aspect of HIP. Pain is 6/10 at present & up to 8/10 with provacative activity including all activity including ADLs, walking, and sitting to standing. Pain is described as sharp, ache, and dull. Patient states pain does not radiate.     Associated symptoms include instability, weakness, popping, and clicking. Pain is aggravated by activities above & occur daily . Symptoms do interfere with sleep. Patient reports pain & symptoms are staying the same . Patient reports  no prior surgery  to HIP. She is status post gastric sleeve & has become more active with weight loss. She also reports bilateral foot pain.    Prior treatment Ida Orozco has tried   OTC Acetaminophen - Yes  OTC NSAID - No - s/p gastric sleeve  Rx NSAID - No - s/p gastric sleeve  Rx Narcotic/Other - No   Brace - No   Injection - Cortisone - No   Injection - Biologics - No   Activity Modification - Yes  Physical Therapy - Yes - current   Home Exercise Program - Yes  Assistive Device - No  Other - ice and heat    Review of systems (ROS):  A 10+ review of systems was performed with pertinent positives and negatives noted above in the history of present illness. Other systems were negative  unless otherwise specified.    PHYSICAL EXAMINATION  General:  The patient is alert and oriented x 3.  Mood is pleasant.  Observation of ears, eyes and nose reveal no gross abnormalities.  HEENT: NCAT, sclera nonicteric  Lungs: Respirations are equal and unlabored.   Gait is coordinated. Patient can toe walk and heel walk without difficulty.    HIP/PELVIS EXAMINATION    Observation/Inspection  Gait:   Antalgic   Alignment:  Neutral   Scars:   None   Muscle atrophy: None   Effusion:  None   Warmth:  None   Discoloration:   None   Leg lengths:   Equal   Pelvis:    Level     Tenderness/Crepitus (T/C):      T / C  Lateral Gluteal region  + / -  Trochanteric bursa   + / -  Piriformis    - / -  SI joint    - / -  Psoas tendon   - / -  Rectus insertion  - / -  Adductor insertion  - / -  Pubic symphysis  - / -    ROM: (* = pain)    Flexion:      120 degrees  External rotation:   40 degrees  Internal rotation with axial load:  30 degrees  Internal rotation without axial load:  40 degrees  Abduction:    45 degrees  Adduction:     20 degrees    Special Tests:  Pain w/ forced internal rotation (FADIR):  +  Pain w/ forced external rotation (MOHIT):  +   Circumduction test:     -  Stinchfield test:     -   Log roll:       -   Snapping hip (internal):    -   Sit-up pain:      NT  Resisted sit-up pain:     NT  Resisted sit-up with adductor contraction pain:  NT  Step-down test:     NT  Trendelenburg test:     NT  Bridge test      NT    Extremity Neuro-vascular Examination:   Sensation:  Grossly intact to light touch all dermatomal regions.   Motor Function:  Fully intact motor function at hip, knee, foot and ankle    DTRs;  quadriceps and  achilles 2+.  No clonus and downgoing Babinski.    Vascular status:  DP and PT pulses 2+, brisk capillary refill, symmetric.    Skin:  intact, compartments soft.    Other Findings:    ASSESSMENT & PLAN  Assessment  #1 Tonnis Grade II osteoarthritis of hip, right   W/ tendinosis of lateral  gluteal tendons    No evidence of neurologic pathology  No evidence of vascular pathology    Imaging studies reviewed:  X-ray pelvis and hip, right, 23.09  X-ray femur right 24.08    Plan  We discussed the importance of appropriate diet, weight, and regular exercise    We discussed options including    Watchful waiting / relative rest    Physical therapy X    Injection therapy lateral glute tendon CSI, right  Csi iahip right   Consultation    The patient chooses As above   x = prescribed  CSI = corticosteroid injection  VSI = viscosupplement injection  PRPI = platelet rich plasma injection  ia = intra articular  R = right  L = left  B = bilateral   nfSx = surgical consultation was recommended, but patient is not interested in consultation at this time    Physical Therapy        Formal (fPT), @ [facility] facility b   Formal (fPT), @ [facility] facility        Homegoing (hgPT), per concurrent fPT recommendations    Homegoing (hgPT), per prior fPT recommendations    Homegoing (hgPT), handout provided        w/  (atPT)    [blank] = not prescribed  x = prescribed  b = prescribed, and begin as indicated  t = continue as indicated  r = prescribed, and restart as indicated  p = completed prior as indicated  hs = prescribed, and with high school   col = prescribed, and with college or university   nfPT = physical therapy was recommended, but patient is not interested in PT at this time    Activity (e.g. sports, work) restrictions    [blank] = as tolerated  pt = per physical therapist  at = per   NWB = non weight bearing on affected lower extremity, with crutches assistance for ambulation    Bracing    [blank] = not prescribed  r = recommended, but not fit with at todays visit  f = prescribed and fit with at todays visit  t = continue as indicated  d = d/c  p = as needed  rare = use on rare, as-needed basis; advised against chronic use    Pain management    [blank] = No  prescription necessary. A handout detailing dosing of appropriate   over-the-counter musculoskeletal analgesics was made available to the patient.   m = meloxicam x 14 days  mp = 14 day course of meloxicam prescribed prior    Follow up 12 weeks   [blank] = as needed  [number] = in [number] weeks  CSI = for corticosteroid injection  VSI = for viscosupplement injection or injection series  PRP = for platelet rich plasma injection or injection series  MRI = after MRI imaging  ns = should surgical options be deferred (no surgery)  o = appointment offered, deferred by patient    Should symptoms worsen or fail to resolve, consider    Revisiting the above options and / or Mri hip   Vs  Further eval of lumbar spine     Vocation:

## 2024-08-28 NOTE — PROCEDURES
"Large Joint Aspiration/Injection: R hip joint    Date/Time: 8/28/2024 11:00 AM    Performed by: Sylvain Quinn MD  Authorized by: Sylvain Quinn MD    Consent Done?:  Yes (Verbal)  Indications:  Pain  Site marked: the procedure site was marked    Timeout: prior to procedure the correct patient, procedure, and site was verified    Prep: patient was prepped and draped in usual sterile fashion    Local anesthesia used?: No      Details:  Needle Size:  22 G  Ultrasonic Guidance for needle placement?: Yes    Images are saved and documented.  Approach:  Anterior  Location:  Hip  Site:  R hip joint  Medications:  40 mg triamcinolone acetonide 40 mg/mL  Patient tolerance:  Patient tolerated the procedure well with no immediate complications     Description of ultrasound utilization for needle guidance:   Ultrasound guidance used for needle localization. Images saved and stored for documentation. The hip joint was visualized. Dynamic visualization of the 22g x 3.5" needle was continuous throughout the procedure.    Precautionary:  The patient's femoral artery, vein, and nerve were identified, and visualized throughout the procedure, so as to avoid needle contact with those structures.     "

## 2024-08-28 NOTE — PROCEDURES
"Tendon Origin: R hip joint    Date/Time: 8/28/2024 11:00 AM    Performed by: Sylvain Quinn MD  Authorized by: Sylvain Quinn MD    Consent Done?:  Yes (Verbal)  Timeout: prior to procedure the correct patient, procedure, and site was verified    Indications:  Pain  Site marked: the procedure site was marked    Timeout: prior to procedure the correct patient, procedure, and site was verified    Location:  Hip  Site:  R hip joint  Prep: patient was prepped and draped in usual sterile fashion    Ultrasonic Guidance for Needle Placement?: Yes    Needle size:  22 G  Approach:  Posterolateral  Medications:  40 mg triamcinolone acetonide 40 mg/mL  Patient tolerance:  Patient tolerated the procedure well with no immediate complications   Gluteus medius and gluteus minimus muscles, tendon sheaths, and tendon insertions injection    Description of ultrasound utilization for needle guidance:   Ultrasound guidance used for needle localization. Images saved and stored for documentation. The gluteus medius and minimus muscles and tendons were visualized at their insertions on the greater trochanter. Dynamic visualization of the 22g x 3.5" needle was continuous throughout the procedure.    "

## 2024-08-28 NOTE — PROGRESS NOTES
[facility]  Occupational Therapy Treatment Note     Date: 8/29/2024  Name: Ida Orozco  Clinic Number: [id number]    Therapy Diagnosis:   Encounter Diagnoses   Name Primary?    Thumb pain, left Yes    Decreased range of motion of left thumb              Physician: Etelvina Nicholas MD    Physician Orders: Eval and Treat  Medical Diagnosis: Lesion of ulnar nerve, left upper limb [G56.22], Carpal tunnel syndrome, left upper limb [G56.02], Sprain of metacarpophalangeal joint of left thumb, subsequent encounter [S63.814Y]   Surgical Procedure and Date: 6/20/24, s/p left thumb UCL repair     Onset Date: 4/17/24  Evaluation Date: 7/5/2024  Insurance Authorization Period Expiration: 12/31/24  Plan of Care Certification Period: 9/13/24  Date of Return to MD: 7/10/24  Visit # / Visits authorized: 10 / 41     FOTO: 1/3, 6/25/24  35%  Lobby access code: [email or web address]      Precautions:  Standard,  precautions per UCL protocol; allergy to adhesives     Time In: 9:58 am  Time Out: 10:51 am  Total Appointment Time (timed & untimed codes): 53 minutes        Subjective     Patient reports: finding ways to do IP blocks   She was compliant with home exercise program given last session.   Response to previous treatment: cont with stiffness in thumb, occasional pain  Functional change: opening bottles, doing light tasks with her hand.     Pain: 4/10  Location: left thumb       Objective     Objective Measures updated at progress report unless specified.  Observation/Appearance:  arrived with orthosis, with pad over incision and dorsal thumb. Pt reported orthosis was rubbing on thumb. Healing incision with sutures with mild redness noted. No drainage noted. A few sutures in crease of thumb noted to be a tight and started getting healed over.            Edema. Measured in centimeters.    7/5/2024 7/5/2024 8/19/24     Right  Left  L   Thumb:        MP 8.3 9.2 8   Prox. Phalanx 6.3 6.8 6.7   IP 6.3 6.2 5.8   Distal  Phalanx           Elbow and Wrist ROM. Measured in degrees.    7/9/2024 7/9/2024     Right Left   Elbow Ext/Flex       Supination/Pronation       Wrist Ext/Flex 72/76 74/72   Wrist RD/UD WFL       15/30         Hand ROM. Measured in degrees.    7/5/2024 7/5/2024 8/19/24     Right  Left  L                     Thumb: MP 65 12/42 15/55                IP 50 28 30       Rad ADD/ABD 50 48 52       Pal ADD/ABD 45 45 50          Opposition To DPC Briefly to tip of SF To base of SF      Sensation: reports numbness to dorsal thumb, and ulnar aspect of thumb near incision, not formally tested today  Distribution 7/5/2024 7/5/2024     Right  Left    [place]       Normal 1.65-2.83       Diminished Light Touch 3.22-3.61       Diminished Protective 3.84-4.31       Loss of Protective 4.56-6.65       Untestable >6.65       2 Point Discrimination       Static       Dynamic              Strength (Dyanmometer) and Pinch Strength (Pinch Gauge)  Measured in pounds and psi. Average of three trials.      8/19/2024 8/19/2024     Right  Left    Rung II  58  34   Cifuentes Pinch  14.5 8    3pt Pinch       2pt Pinch             Manual Muscle Testing     Manual Muscle Test: NT, deferred    7/9/2024 7/9/2024     Left Right   Wrist Extension        Wrist Flexion       Radial Deviation       Ulnar Deviation       Supination       Pronation       Elbow Extension       Elbow Flexion                      CMS Impairment/Limitation/Restriction for FOTO Hand Survey     FOTO scores for Ida Orozco on 7/5/2024.   FOTO documents entered into [employer] - see Media section.     Intake Score: 35%              Treatment     Ida received the treatments listed below:        supervised modalities after being cleared for contradictions: fluido x 10 min     therapeutic exercises to develop ROM for 15 minutes including:      AROM Wrist  Ext/flx  RD/UD    X 10 reps each    AROM thumb:  Palm abd/add  Rad abd/add  IP blocks  Gentle Opposition  Composite  "flx  Lifts- thumb only  Spreads  TGEs    X 10 reps each    Yellow putty (NT) Molding/rolling x 1'45"  Gripping x 10 reps   Gripping with PHG red spring 2nd rung  X 30    Thumbsizer 1 yellow RB X 20         manual therapy techniques: Manual Lymphatic Drainage were applied to the: L hand for 8 minutes, including:  Gentle scar massage    therapeutic activities to improve functional performance for 20  minutes, including:   In hand manip with coins x3 sets  Isospheres x 3 min  Wrist maze x3 min  Yellow CP pinches with med poms lat and 3pt x 1 set ea         Patient Education and Home Exercises     Education provided:   - cont HEP. Discussed keeping activities and exercise pain free.      Written Home Exercises Provided: Patient instructed to cont prior HEP.  Exercises were reviewed and Ida was able to demonstrate them prior to the end of the session.  Ida demonstrated good  understanding of the home exercise program provided. See electronic medical record under Patient Instructions for exercises provided during therapy sessions.       Assessment     She tolerated session fairly well today. . Will progress as tolerated with functional tasks, strengthening, and ROM.     Ida is progressing fairly well towards her goals and there are no updates to goals at this time. Pt prognosis is Good.     Patient will continue to benefit from skilled outpatient occupational therapy to address the deficits listed in the problem list on initial evaluation provide patient/family education and to maximize patient's level of independence in the home and community environment.     Patient's spiritual, cultural and educational needs considered and patient agreeable to plan of care and goals.    Anticipated barriers to occupational therapy: none    Goals:  Long Term Goals (LTGs); to be met by discharge.  1) Pt will report pain no higher than 2/10 with daily tasks,   2) Pt will have an improved FOTO score by at least 20 points  3) Pt will " demonstrate improved left thumb  AROM WFL for performing daily tasks, grasping  4) Pt will report return to prior level of function  5)  and pinch strength assessed when appropriate and set goals     Short Term Goals (STGs); to be met within 4 weeks (8/5/24).  1) Pt will report or demonstrate independence with HEP, orthosis wear, and scar management  2) Pt will report pain no higher than 4/10 with self care and daily tasks  3) Pt will demonstrate improved left thumb AROM by at least 10-15 degrees for improved function and hand use  4) Pt will demonstrate improved left thumb edema by at least 0.3 cm for improved functional use    Plan     Updates/Grading for next session: cont as tolerated     MANSOOR White/KEYUR   8/29/2024

## 2024-08-29 ENCOUNTER — CLINICAL SUPPORT (OUTPATIENT)
Dept: REHABILITATION | Facility: HOSPITAL | Age: 59
End: 2024-08-29
Payer: MEDICARE

## 2024-08-29 DIAGNOSIS — M25.642 DECREASED RANGE OF MOTION OF LEFT THUMB: ICD-10-CM

## 2024-08-29 DIAGNOSIS — M79.645 THUMB PAIN, LEFT: Primary | ICD-10-CM

## 2024-08-29 PROCEDURE — 97140 MANUAL THERAPY 1/> REGIONS: CPT | Mod: CO

## 2024-08-29 PROCEDURE — 97530 THERAPEUTIC ACTIVITIES: CPT | Mod: CO

## 2024-08-29 PROCEDURE — 97022 WHIRLPOOL THERAPY: CPT | Mod: CO

## 2024-09-03 ENCOUNTER — PATIENT MESSAGE (OUTPATIENT)
Dept: BEHAVIORAL HEALTH | Facility: CLINIC | Age: 59
End: 2024-09-03
Payer: MEDICARE

## 2024-09-03 ENCOUNTER — PATIENT MESSAGE (OUTPATIENT)
Dept: PRIMARY CARE CLINIC | Facility: CLINIC | Age: 59
End: 2024-09-03
Payer: MEDICARE

## 2024-09-03 RX ORDER — HYDROXYZINE HYDROCHLORIDE 25 MG/1
25 TABLET, FILM COATED ORAL 3 TIMES DAILY
Qty: 30 TABLET | Refills: 5 | Status: SHIPPED | OUTPATIENT
Start: 2024-09-03 | End: 2024-09-05

## 2024-09-03 NOTE — TELEPHONE ENCOUNTER
No charge for visit  Needs workability forms, advised needs to see pcp    SOURAV Wong CNP     Please see the attached refill request.

## 2024-09-04 DIAGNOSIS — F51.04 CHRONIC INSOMNIA: ICD-10-CM

## 2024-09-04 RX ORDER — ESZOPICLONE 2 MG/1
2 TABLET, FILM COATED ORAL NIGHTLY
Qty: 30 TABLET | Refills: 3 | Status: SHIPPED | OUTPATIENT
Start: 2024-09-04

## 2024-09-04 NOTE — TELEPHONE ENCOUNTER
No care due was identified.  [facility] Embedded Care Due Messages. Reference number: [id number].   9/04/2024 12:48:13 PM CDT

## 2024-09-09 ENCOUNTER — CLINICAL SUPPORT (OUTPATIENT)
Dept: REHABILITATION | Facility: HOSPITAL | Age: 59
End: 2024-09-09
Payer: MEDICARE

## 2024-09-09 DIAGNOSIS — M79.645 THUMB PAIN, LEFT: Primary | ICD-10-CM

## 2024-09-09 DIAGNOSIS — M25.642 DECREASED RANGE OF MOTION OF LEFT THUMB: ICD-10-CM

## 2024-09-09 PROCEDURE — 97140 MANUAL THERAPY 1/> REGIONS: CPT

## 2024-09-09 PROCEDURE — 97110 THERAPEUTIC EXERCISES: CPT

## 2024-09-09 NOTE — PROGRESS NOTES
[facility]  Occupational Therapy Treatment Note     Date: 9/9/2024  Name: Ida Orozco  Clinic Number: [id number]    Therapy Diagnosis:   Encounter Diagnoses   Name Primary?    Thumb pain, left Yes    Decreased range of motion of left thumb          Physician: Etelvina Nicholas MD    Physician Orders: Eval and Treat  Medical Diagnosis: Lesion of ulnar nerve, left upper limb [G56.22], Carpal tunnel syndrome, left upper limb [G56.02], Sprain of metacarpophalangeal joint of left thumb, subsequent encounter [S63.648I]   Surgical Procedure and Date: 6/20/24, s/p left thumb UCL repair     Onset Date: 4/17/24  Evaluation Date: 7/5/2024  Insurance Authorization Period Expiration: 12/31/24  Plan of Care Certification Period: 9/13/24  Date of Return to MD: 7/10/24  Visit # / Visits authorized: 10 / 41     FOTO: 1/3, 6/25/24  35%  Lobby access code: [email or web address]      Precautions:  Standard,  precautions per UCL protocol; allergy to adhesives     Time In: 10:30 am  Time Out: 11:25 am  Total Appointment Time (timed & untimed codes): 55 minutes        Subjective     Patient reports: she has been stretching, and using her hand as able. She reports continued stiffness in thumb, but she thinks it may partially be due to arthritis  She was compliant with home exercise program given last session.   Response to previous treatment: cont with stiffness in thumb, occasional pain  Functional change: opening bottles, doing light tasks with her hand.     Pain: 4/10  Location: left thumb       Objective     Objective Measures updated at progress report unless specified.  Observation/Appearance:  arrived with orthosis, with pad over incision and dorsal thumb. Pt reported orthosis was rubbing on thumb. Healing incision with sutures with mild redness noted. No drainage noted. A few sutures in crease of thumb noted to be a tight and started getting healed over.            Edema. Measured in centimeters.    7/5/2024 7/5/2024  8/19/24     Right  Left  L   Thumb:        MP 8.3 9.2 8   Prox. Phalanx 6.3 6.8 6.7   IP 6.3 6.2 5.8   Distal Phalanx           Elbow and Wrist ROM. Measured in degrees.    7/9/2024 7/9/2024     Right Left   Elbow Ext/Flex       Supination/Pronation       Wrist Ext/Flex 72/76 74/72   Wrist RD/UD WFL       15/30         Hand ROM. Measured in degrees.    7/5/2024 7/5/2024 8/19/24     Right  Left  L                     Thumb: MP 65 12/42 15/55                IP 50 28 30       Rad ADD/ABD 50 48 52       Pal ADD/ABD 45 45 50          Opposition To DPC Briefly to tip of SF To base of SF      Sensation: reports numbness to dorsal thumb, and ulnar aspect of thumb near incision, not formally tested today  Distribution 7/5/2024 7/5/2024     Right  Left    [place]       Normal 1.65-2.83       Diminished Light Touch 3.22-3.61       Diminished Protective 3.84-4.31       Loss of Protective 4.56-6.65       Untestable >6.65       2 Point Discrimination       Static       Dynamic              Strength (Dyanmometer) and Pinch Strength (Pinch Gauge)  Measured in pounds and psi. Average of three trials.      8/19/2024 8/19/2024     Right  Left    Rung II  58  34   Cifuentes Pinch  14.5 8    3pt Pinch       2pt Pinch             Manual Muscle Testing     Manual Muscle Test: NT, deferred    7/9/2024 7/9/2024     Left Right   Wrist Extension        Wrist Flexion       Radial Deviation       Ulnar Deviation       Supination       Pronation       Elbow Extension       Elbow Flexion                      CMS Impairment/Limitation/Restriction for FOTO Hand Survey     FOTO scores for Ida Orozco on 7/5/2024.   FOTO documents entered into [employer] - see Media section.     Intake Score: 35%              Treatment     Ida received the treatments listed below:        supervised modalities after being cleared for contradictions: fluido x 10 min    therapeutic exercises to develop ROM for 20 minutes including: (5 min supervised)     PROM to  "thumb, isolated joints and composite  X 10 reps each, pain free   AROM Wrist  Ext/flx  RD/UD    X 10 reps each    AROM thumb:  Palm abd/add  Rad abd/add  IP blocks  Gentle Opposition  Composite flx  Lifts- thumb only  Spreads    X 10 reps each    Yellow putty (NT) Molding/rolling x 1'45"  Gripping x 10 reps   Gripping with PHG red spring 2nd rung  X 30    Thumbsizer 1 yellow RB X 20         manual therapy techniques: Manual Lymphatic Drainage were applied to the: L hand for 8 minutes, including:  Gentle scar massage    therapeutic activities to improve functional performance for 17  minutes, including: (supervised)  In hand manip with coins x3 sets  Isospheres x 3 min  Wrist maze x3 min (NT)  Yellow CP pinches with med poms lat and 3pt x 1 set ea (deferred 3 pt today), red clothespin caused pain        Patient Education and Home Exercises     Education provided:   - cont HEP. Discussed keeping activities and exercise pain free.      Written Home Exercises Provided: Patient instructed to cont prior HEP.  Exercises were reviewed and Ida was able to demonstrate them prior to the end of the session.  Ida demonstrated good  understanding of the home exercise program provided. See electronic medical record under Patient Instructions for exercises provided during therapy sessions.       Assessment     She tolerated session fairly well today. She does report some pain with light resistance with 3 pt pinches today. End range tightness and stiffness still noted in thumb with flexion. Reviewed refraining from activities that cause too much lateral stress to thumb, and that certain activities like opening larger lids or holding them may still be difficult. She reports she is using her hand and doing most daily activities.  Will progress as tolerated with functional tasks, strengthening, and ROM.     Ida is progressing fairly well towards her goals and there are no updates to goals at this time. Pt prognosis is Good. "     Patient will continue to benefit from skilled outpatient occupational therapy to address the deficits listed in the problem list on initial evaluation provide patient/family education and to maximize patient's level of independence in the home and community environment.     Patient's spiritual, cultural and educational needs considered and patient agreeable to plan of care and goals.    Anticipated barriers to occupational therapy: none    Goals:  Long Term Goals (LTGs); to be met by discharge.  1) Pt will report pain no higher than 2/10 with daily tasks,   2) Pt will have an improved FOTO score by at least 20 points  3) Pt will demonstrate improved left thumb  AROM WFL for performing daily tasks, grasping  4) Pt will report return to prior level of function  5)  and pinch strength assessed when appropriate and set goals     Short Term Goals (STGs); to be met within 4 weeks (8/5/24).  1) Pt will report or demonstrate independence with HEP, orthosis wear, and scar management  2) Pt will report pain no higher than 4/10 with self care and daily tasks  3) Pt will demonstrate improved left thumb AROM by at least 10-15 degrees for improved function and hand use  4) Pt will demonstrate improved left thumb edema by at least 0.3 cm for improved functional use    Plan     Updates/Grading for next session: cont as tolerated. Reassess next session for possible POC update.     LEANA Garcia, CLIFTON   9/9/2024

## 2024-09-12 ENCOUNTER — CLINICAL SUPPORT (OUTPATIENT)
Dept: REHABILITATION | Facility: HOSPITAL | Age: 59
End: 2024-09-12
Attending: FAMILY MEDICINE
Payer: MEDICARE

## 2024-09-12 DIAGNOSIS — M25.551 PAIN OF RIGHT HIP: ICD-10-CM

## 2024-09-12 DIAGNOSIS — R26.9 GAIT ABNORMALITY: ICD-10-CM

## 2024-09-12 DIAGNOSIS — M25.551 RIGHT HIP PAIN: Primary | ICD-10-CM

## 2024-09-12 PROCEDURE — 97110 THERAPEUTIC EXERCISES: CPT | Mod: HCNC | Performed by: PHYSICAL THERAPIST

## 2024-09-12 PROCEDURE — 97161 PT EVAL LOW COMPLEX 20 MIN: CPT | Mod: HCNC | Performed by: PHYSICAL THERAPIST

## 2024-09-12 NOTE — PROGRESS NOTES
[facility]  Physical Therapy Initial Evaluation    Name: Ida Orozco  Clinic Number: [id number]    Therapy Diagnosis:   Encounter Diagnoses   Name Primary?    Pain of right hip     Right hip pain Yes    Gait abnormality      Physician: Sylvain Quinn, *    Physician Orders: PT Eval and Treat  Medical Diagnosis from Referral: M25.551 (ICD-10-CM) - Pain of right hip   Evaluation Date: 9/12/2024  Authorization Period Expiration: 12/31/2024  Plan of Care Expiration: 12/12/2024  Visit # / Visits authorized: 1/1    FOTO: 29  FOTO 1st Follow-up: NA  FOTO 2nd Follow-up: NA    Time In: 1245  Time Out: 1345  Total Billable Time: 60 minutes    Precautions: Standard    Subjective     Date of onset: chronic  History of current condition - Ida reports: chronic Hx of R hip pain. Feels like pain new since most recent fall in August. Describes tightness posterolateral hip and pain around greater troch with most active/passive movements. Recently saw Dr. Quinn who gave her injections in the R hip but report no relief.  10-minute walking tolerance.     MD Interval Hx:  8/28/24  Ida Orozco, a 59 y.o. female, presents today for follow up evaluation of her right hip. At last appointment, 9/27/2023, patient underwent lateral glute tendon CSI & pain/symptoms did improve. Pain returned on 8/12 following a fall. Patient went to the ED and received hip x-rays and tib/fib x-rays. Patient would like knee and femur x-rays today. Pain is 7/10 at present & up to 10/10 with provacative activity including ADLs and walking. Patient has not been doing physical therapy.     9/27/23  Ida Orozco, a 59 y.o. female, presents today for evaluation of her bilateral HIP.  Patient reports onset of chronic and insidious  pain beginning 6 months ago. Patient reports no known injury or trauma. Pain is located along lateral aspect of HIP. Pain is 6/10 at present & up to 8/10 with provacative activity including all  activity including ADLs, walking, and sitting to standing. Pain is described as sharp, ache, and dull. Patient states pain does not radiate.   Associated symptoms include instability, weakness, popping, and clicking. Pain is aggravated by activities above & occur daily . Symptoms do interfere with sleep. Patient reports pain & symptoms are staying the same . Patient reports  no prior surgery  to HIP. She is status post gastric sleeve & has become more active with weight loss. She also reports bilateral foot pain.    Imaging: see chart    Prior Therapy: small stint on R hip  Social History: Lives with family  Occupation: Retired; cares for   Prior Level of Function: Intermittent issues with R hip pain  Current Level of Function: Issues with prolonged standing and walking    Pain:  Current 7/10, worst 9/10, best 4/10   Location: right lateral hip  Description: Aching, Dull, Tight, Deep, and Sharp  Aggravating Factors: Sitting, Standing, Walking, Flexing, Lifting, and Getting out of bed/chair  Easing Factors: ice and rest    Pts Goals: Improve prolonged standing and walking tolerance    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, with liver fibrosis, on ursodiol 2023    PTSD (post-traumatic stress disorder)     Seasonal allergies     Symptomatic abdominal panniculus     Ulcerative colitis     Vertigo        Surgical History:   Ida Orozco  has a past surgical history that includes Hysterectomy; Ileostomy revision;  section, low transverse; Cholecystectomy; TONSILLECTOMY, ADENOIDECTOMY; thumb surgery; [place] tooth extraction; Appendectomy; Colectomy; Laparoscopic  "proctectomy (N/A, 5/24/2018); Lysis of adhesions (5/24/2018); Flexible sigmoidoscopy (5/24/2018); GASTRIC SLEEVE ; Cystoscopy w/ retrogrades (N/A, 8/20/2018); Catheterization of both left and right heart (Right, 11/13/2019); Panniculectomy (Bilateral, 12/19/2019); Revision Colostomy (N/A, 12/19/2019); Oophorectomy; Ileoscopy (04/11/2018); Flexible sigmoidoscopy (04/11/2018); Esophagogastroduodenoscopy (04/11/2018); Endoscopic ultrasound of upper gastrointestinal tract (N/A, 11/10/2021); Esophagogastroduodenoscopy (N/A, 11/10/2021); fess, with imaging guidance (Bilateral, 4/4/2023); Ileoscopy (N/A, 1/23/2024); Esophagogastroduodenoscopy (N/A, 1/23/2024); Carpal tunnel release (Left, 2/27/2024); Decompression of nerve (Left, 2/27/2024); Injection of steroid (Right, 2/27/2024); Ulnar collateral ligament reconstruction (Left, 6/20/2024); Hand Arthrotomy (Right, 6/20/2024); and Injection of steroid (Right, 6/20/2024).    Medications:   Ida has a current medication list which includes the following prescription(s): clonazepam, diphenoxylate-atropine 2.5-0.025 mg, eszopiclone, hyoscyamine, levocetirizine, loratadine, meclizine, nystatin, and [DISCONTINUED] humira(cf) pen.    Allergies:   Review of patient's allergies indicates:   Allergen Reactions    Adhesive      Cause blisters    Dilaudid [hydromorphone] Nausea And Vomiting    Humira [adalimumab] Hives    Sulfa (sulfonamide antibiotics) Hives    Surgical stainless steel      Had surgical staples, caused irritation and infection        Objective     Observation: Moderate hip drop B in stance phase gait    Functional Tests:   SLS EO:   R: unable" L: 3""  Double leg squat: decreased depth; quad dominant  Single leg squat: unable B    Hip Range of Motion:   R PROM  L PROM   Flexion 125 125   Abduction 50 55   Extension 0* 5   Ext. Rotation 35* 55   Int. Rotation 25* 40     *= pain    Lower Extremity Strength   Right LE Left LE   Quadriceps: 4+/5 5/5   Hamstrings: 4+/5 5/5 "   Iliopsoas: 4/5 4+/5   Hip extension:  3+/5 4+/5   PGM: 3+/5 4+/5   Hip ER: 3+/5 4+/5   Hip IR: 3+/5 4+/5       Special Tests:   MOHIT: + on R   CLEMENTE: -  Hip Scour: -  Slump: -    Flexibility:    Hamstrings: R = normal ; L = normal    Charles's test: R = normal ; L = normal   Yeison test: R = normal ; L = normal      Joint Mobility: Hyper R hip    Palpation: TTP R greater troch     Intake Outcome Measure for FOTO Hip Survey    Therapist reviewed FOTO scores for Ida Orozco on 9/12/2024.   FOTO documents entered into [employer] - see Media section.    Intake Score: 29%     Treatment     Treatment Time In: 1245  Treatment Time Out: 1345  Total Treatment time separate from Evaluation: 30 minutes    Ida received the treatments listed below:      Therapeutic exercises to develop strength, endurance, ROM, flexibility, posture, and core stabilization for 30 minutes including:  Access Code: [email or web address]  URL: [email or web address]  Date: 09/12/2024  Prepared by: Manohar Mayorga    Exercises  - Supine Posterior Pelvic Tilt  - 1 x daily - 7 x weekly - 3 sets - 10 reps  - Hooklying Isometric Clamshell  - 1 x daily - 7 x weekly - 3 sets - 10 reps  - Bilateral Bent Leg Lift  - 1 x daily - 7 x weekly - 3 sets - 10 reps  - Diagonal Hip Extension with Resistance  - 1 x daily - 7 x weekly - 3 sets - 10 reps    Neuromuscular re-education activities to improve: Balance, Coordination, Kinesthetic, Sense, Proprioception, and Posture for 0 minutes. The following activities were included:     Therapeutic activities to improve functional performance for 0 minutes, including:    Home Exercises and Patient Education Provided     Education provided:   - Stability program  - Prognosis, activity modification, goals for therapy, role of therapy for care, exercises/HEP    Written Home Exercises Provided:  Exercises were reviewed and Ida was able to demonstrate them prior to the end of the session.   Pt received a written copy of exercises to perform  at home. Ida demonstrated good understanding of the education provided.     See EMR under patient instructions for exercises given.     Assessment     Ida is a 59 y.o. female referred to outpatient Physical Therapy with suspected R hip trochanteric bursitis. Movement assessment and requisite provocative movements was inconsistent, but most resembles lateral hip bursitis versus glute tendinopathy. Current deficits include decreased R hip strength, decreased core strength, and decreased balance. Deficits contributing to issues with prolonged standing, walking, and ADL performance.    Pt will benefit from skilled outpatient Physical Therapy to address the deficits stated above and in the chart below, provide pt/family education, and to maximize pt's level of independence. Pt prognosis is Fair.     Plan of care discussed with patient: Yes  Pt's spiritual, cultural and educational needs considered and patient is agreeable to the plan of care and goals as stated below:     Anticipated Barriers for therapy: chronic condition    Medical Necessity is demonstrated by the following:    History  Co-morbidities and personal factors that may impact the plan of care [x] LOW: no personal factors / co-morbidities  [] MODERATE: 1-2 personal factors / co-morbidities  [] HIGH: 3+ personal factors / co-morbidities    Moderate / High Support Documentation:   Co-morbidities affecting plan of care: None    Personal Factors:   No deficits     Examination  Body Structures and Functions, activity limitations and participation restrictions that may impact the plan of care [] LOW: addressing 1-2 elements  [] MODERATE: 3+ elements  [x] HIGH: 4+ elements (please support below)    Moderate / High Support Documentation:    Anxiety     Asthma     Last ER visit with covid    Bradycardia     Bronchitis     Depression     Esophageal ulcer 12/16/2014    Fatty liver disease, nonalcoholic     Gallstones     GERD (gastroesophageal reflux disease)      History of sleeve gastrectomy 06/24/2016    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  Pt will improve R hip ROM to WFL in order to be able to perform ADLs without difficulty.  Pt will improve R hip strength by 1/3 MMT grade to increase tolerance for ADL and work activities  Pt will demonstrate tolerance to HEP to improve independence with ADL's    Long Term Goals: 12 weeks  Pt will report decreased R hip pain  < / =  2/10  to increase tolerance for ADL performance and recreational routine  Pt will improve R hip ROM to WNL in order to be able to perform ADLs without difficulty  Pt will improve R hip strength by 1/3 MMT grade to increase tolerance for ADL and work activities  Pt will report at CJ level (20-40% impaired) on FOTO Hip to demonstrate increase in LE function with every day tasks.     Plan   Plan of care Certification: 9/12/2024 to 12/12/2024.    Outpatient Physical Therapy 2 times weekly for 12 weeks to include the following interventions: Gait Training, Manual Therapy, Moist Heat/ Ice, Neuromuscular Re-ed, Patient Education, Therapeutic Activites, Therapeutic Exercise, and Functional Dry Needling with/or without Electrical Stimulation as needed.     Benigno Mayorga, PT, DPT , DPT, OCS  Board Certified in Orthopedic Physical Therapy

## 2024-09-12 NOTE — PLAN OF CARE
[facility]  Physical Therapy Initial Evaluation    Name: Ida Orozco  Clinic Number: [id number]    Therapy Diagnosis:   Encounter Diagnoses   Name Primary?    Pain of right hip     Right hip pain Yes    Gait abnormality      Physician: Sylvain Quinn, *    Physician Orders: PT Eval and Treat  Medical Diagnosis from Referral: M25.551 (ICD-10-CM) - Pain of right hip   Evaluation Date: 9/12/2024  Authorization Period Expiration: 12/31/2024  Plan of Care Expiration: 12/12/2024  Visit # / Visits authorized: 1/1    FOTO: 29  FOTO 1st Follow-up: NA  FOTO 2nd Follow-up: NA    Time In: 1245  Time Out: 1345  Total Billable Time: 60 minutes    Precautions: Standard    Subjective     Date of onset: chronic  History of current condition - Ida reports: chronic Hx of R hip pain. Feels like pain new since most recent fall in August. Describes tightness posterolateral hip and pain around greater troch with most active/passive movements. Recently saw Dr. Quinn who gave her injections in the R hip but report no relief.  10-minute walking tolerance.     MD Interval Hx:  8/28/24  Ida Orozco, a 59 y.o. female, presents today for follow up evaluation of her right hip. At last appointment, 9/27/2023, patient underwent lateral glute tendon CSI & pain/symptoms did improve. Pain returned on 8/12 following a fall. Patient went to the ED and received hip x-rays and tib/fib x-rays. Patient would like knee and femur x-rays today. Pain is 7/10 at present & up to 10/10 with provacative activity including ADLs and walking. Patient has not been doing physical therapy.     9/27/23  Ida Orozco, a 59 y.o. female, presents today for evaluation of her bilateral HIP.  Patient reports onset of chronic and insidious  pain beginning 6 months ago. Patient reports no known injury or trauma. Pain is located along lateral aspect of HIP. Pain is 6/10 at present & up to 8/10 with provacative activity including all  activity including ADLs, walking, and sitting to standing. Pain is described as sharp, ache, and dull. Patient states pain does not radiate.   Associated symptoms include instability, weakness, popping, and clicking. Pain is aggravated by activities above & occur daily . Symptoms do interfere with sleep. Patient reports pain & symptoms are staying the same . Patient reports  no prior surgery  to HIP. She is status post gastric sleeve & has become more active with weight loss. She also reports bilateral foot pain.    Imaging: see chart    Prior Therapy: small stint on R hip  Social History: Lives with family  Occupation: Retired; cares for   Prior Level of Function: Intermittent issues with R hip pain  Current Level of Function: Issues with prolonged standing and walking    Pain:  Current 7/10, worst 9/10, best 4/10   Location: right lateral hip  Description: Aching, Dull, Tight, Deep, and Sharp  Aggravating Factors: Sitting, Standing, Walking, Flexing, Lifting, and Getting out of bed/chair  Easing Factors: ice and rest    Pts Goals: Improve prolonged standing and walking tolerance    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, with liver fibrosis, on ursodiol 2023    PTSD (post-traumatic stress disorder)     Seasonal allergies     Symptomatic abdominal panniculus     Ulcerative colitis     Vertigo        Surgical History:   Ida Orozco  has a past surgical history that includes Hysterectomy; Ileostomy revision;  section, low transverse; Cholecystectomy; TONSILLECTOMY, ADENOIDECTOMY; thumb surgery; [place] tooth extraction; Appendectomy; Colectomy; Laparoscopic  "proctectomy (N/A, 5/24/2018); Lysis of adhesions (5/24/2018); Flexible sigmoidoscopy (5/24/2018); GASTRIC SLEEVE ; Cystoscopy w/ retrogrades (N/A, 8/20/2018); Catheterization of both left and right heart (Right, 11/13/2019); Panniculectomy (Bilateral, 12/19/2019); Revision Colostomy (N/A, 12/19/2019); Oophorectomy; Ileoscopy (04/11/2018); Flexible sigmoidoscopy (04/11/2018); Esophagogastroduodenoscopy (04/11/2018); Endoscopic ultrasound of upper gastrointestinal tract (N/A, 11/10/2021); Esophagogastroduodenoscopy (N/A, 11/10/2021); fess, with imaging guidance (Bilateral, 4/4/2023); Ileoscopy (N/A, 1/23/2024); Esophagogastroduodenoscopy (N/A, 1/23/2024); Carpal tunnel release (Left, 2/27/2024); Decompression of nerve (Left, 2/27/2024); Injection of steroid (Right, 2/27/2024); Ulnar collateral ligament reconstruction (Left, 6/20/2024); Hand Arthrotomy (Right, 6/20/2024); and Injection of steroid (Right, 6/20/2024).    Medications:   Ida has a current medication list which includes the following prescription(s): clonazepam, diphenoxylate-atropine 2.5-0.025 mg, eszopiclone, hyoscyamine, levocetirizine, loratadine, meclizine, nystatin, and [DISCONTINUED] humira(cf) pen.    Allergies:   Review of patient's allergies indicates:   Allergen Reactions    Adhesive      Cause blisters    Dilaudid [hydromorphone] Nausea And Vomiting    Humira [adalimumab] Hives    Sulfa (sulfonamide antibiotics) Hives    Surgical stainless steel      Had surgical staples, caused irritation and infection        Objective     Observation: Moderate hip drop B in stance phase gait    Functional Tests:   SLS EO:   R: unable" L: 3""  Double leg squat: decreased depth; quad dominant  Single leg squat: unable B    Hip Range of Motion:   R PROM  L PROM   Flexion 125 125   Abduction 50 55   Extension 0* 5   Ext. Rotation 35* 55   Int. Rotation 25* 40     *= pain    Lower Extremity Strength   Right LE Left LE   Quadriceps: 4+/5 5/5   Hamstrings: 4+/5 5/5 "   Iliopsoas: 4/5 4+/5   Hip extension:  3+/5 4+/5   PGM: 3+/5 4+/5   Hip ER: 3+/5 4+/5   Hip IR: 3+/5 4+/5       Special Tests:   MOHIT: + on R   CLEMENTE: -  Hip Scour: -  Slump: -    Flexibility:    Hamstrings: R = normal ; L = normal    Charles's test: R = normal ; L = normal   Yeison test: R = normal ; L = normal      Joint Mobility: Hyper R hip    Palpation: TTP R greater troch     Intake Outcome Measure for FOTO Hip Survey    Therapist reviewed FOTO scores for Ida Orozco on 9/12/2024.   FOTO documents entered into [employer] - see Media section.    Intake Score: 29%     Treatment     Treatment Time In: 1245  Treatment Time Out: 1345  Total Treatment time separate from Evaluation: 30 minutes    Ida received the treatments listed below:      Therapeutic exercises to develop strength, endurance, ROM, flexibility, posture, and core stabilization for 30 minutes including:  Access Code: [email or web address]  URL: [email or web address]  Date: 09/12/2024  Prepared by: Manohar Mayorga    Exercises  - Supine Posterior Pelvic Tilt  - 1 x daily - 7 x weekly - 3 sets - 10 reps  - Hooklying Isometric Clamshell  - 1 x daily - 7 x weekly - 3 sets - 10 reps  - Bilateral Bent Leg Lift  - 1 x daily - 7 x weekly - 3 sets - 10 reps  - Diagonal Hip Extension with Resistance  - 1 x daily - 7 x weekly - 3 sets - 10 reps    Neuromuscular re-education activities to improve: Balance, Coordination, Kinesthetic, Sense, Proprioception, and Posture for 0 minutes. The following activities were included:     Therapeutic activities to improve functional performance for 0 minutes, including:    Home Exercises and Patient Education Provided     Education provided:   - Stability program  - Prognosis, activity modification, goals for therapy, role of therapy for care, exercises/HEP    Written Home Exercises Provided:  Exercises were reviewed and Ida was able to demonstrate them prior to the end of the session.   Pt received a written copy of exercises to perform  at home. Ida demonstrated good understanding of the education provided.     See EMR under patient instructions for exercises given.     Assessment     Ida is a 59 y.o. female referred to outpatient Physical Therapy with suspected R hip trochanteric bursitis. Movement assessment and requisite provocative movements was inconsistent, but most resembles lateral hip bursitis versus glute tendinopathy. Current deficits include decreased R hip strength, decreased core strength, and decreased balance. Deficits contributing to issues with prolonged standing, walking, and ADL performance.    Pt will benefit from skilled outpatient Physical Therapy to address the deficits stated above and in the chart below, provide pt/family education, and to maximize pt's level of independence. Pt prognosis is Fair.     Plan of care discussed with patient: Yes  Pt's spiritual, cultural and educational needs considered and patient is agreeable to the plan of care and goals as stated below:     Anticipated Barriers for therapy: chronic condition    Medical Necessity is demonstrated by the following:    History  Co-morbidities and personal factors that may impact the plan of care [x] LOW: no personal factors / co-morbidities  [] MODERATE: 1-2 personal factors / co-morbidities  [] HIGH: 3+ personal factors / co-morbidities    Moderate / High Support Documentation:   Co-morbidities affecting plan of care: None    Personal Factors:   No deficits     Examination  Body Structures and Functions, activity limitations and participation restrictions that may impact the plan of care [] LOW: addressing 1-2 elements  [] MODERATE: 3+ elements  [x] HIGH: 4+ elements (please support below)    Moderate / High Support Documentation:    Anxiety     Asthma     Last ER visit with covid    Bradycardia     Bronchitis     Depression     Esophageal ulcer 12/16/2014    Fatty liver disease, nonalcoholic     Gallstones     GERD (gastroesophageal reflux disease)      History of sleeve gastrectomy 06/24/2016    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  Pt will improve R hip ROM to WFL in order to be able to perform ADLs without difficulty.  Pt will improve R hip strength by 1/3 MMT grade to increase tolerance for ADL and work activities  Pt will demonstrate tolerance to HEP to improve independence with ADL's    Long Term Goals: 12 weeks  Pt will report decreased R hip pain  < / =  2/10  to increase tolerance for ADL performance and recreational routine  Pt will improve R hip ROM to WNL in order to be able to perform ADLs without difficulty  Pt will improve R hip strength by 1/3 MMT grade to increase tolerance for ADL and work activities  Pt will report at CJ level (20-40% impaired) on FOTO Hip to demonstrate increase in LE function with every day tasks.     Plan   Plan of care Certification: 9/12/2024 to 12/12/2024.    Outpatient Physical Therapy 2 times weekly for 12 weeks to include the following interventions: Gait Training, Manual Therapy, Moist Heat/ Ice, Neuromuscular Re-ed, Patient Education, Therapeutic Activites, Therapeutic Exercise, and Functional Dry Needling with/or without Electrical Stimulation as needed.     Benigno Mayorga, PT, DPT , DPT, OCS  Board Certified in Orthopedic Physical Therapy

## 2024-09-16 NOTE — PROGRESS NOTES
[facility]  Occupational Therapy Treatment Note     Date: 9/17/2024  Name: Ida Orozco  Clinic Number: [id number]    Therapy Diagnosis:   Encounter Diagnoses   Name Primary?    Thumb pain, left Yes    Decreased range of motion of left thumb                Physician: Etelvina Nicholas MD    Physician Orders: Eval and Treat  Medical Diagnosis: Lesion of ulnar nerve, left upper limb [G56.22], Carpal tunnel syndrome, left upper limb [G56.02], Sprain of metacarpophalangeal joint of left thumb, subsequent encounter [S63.508I]   Surgical Procedure and Date: 6/20/24, s/p left thumb UCL repair     Onset Date: 4/17/24  Evaluation Date: 7/5/2024  Insurance Authorization Period Expiration: 12/31/24  Plan of Care Certification Period: 9/13/24  Date of Return to MD: 7/10/24  Visit # / Visits authorized: 11 / 41     FOTO: 1/3, 6/25/24  35%  Lobby access code: [email or web address]      Precautions:  Standard,  precautions per UCL protocol; allergy to adhesives     Time In: 10:27 am  Time Out: 11:23 am  Total Appointment Time (timed & untimed codes): 56 minutes        Subjective     Patient reports: having trouble with mood stability due to issues with psychiatric care   She was compliant with home exercise program given last session.   Response to previous treatment: cont with stiffness in thumb, occasional pain  Functional change: opening bottles, doing light tasks with her hand.     Pain: 4/10  Location: left thumb       Objective     Objective Measures updated at progress report unless specified.  Observation/Appearance:  arrived with orthosis, with pad over incision and dorsal thumb. Pt reported orthosis was rubbing on thumb. Healing incision with sutures with mild redness noted. No drainage noted. A few sutures in crease of thumb noted to be a tight and started getting healed over.            Edema. Measured in centimeters.    7/5/2024 7/5/2024 8/19/24     Right  Left  L   Thumb:        MP 8.3 9.2 8   Prox.  Phalanx 6.3 6.8 6.7   IP 6.3 6.2 5.8   Distal Phalanx           Elbow and Wrist ROM. Measured in degrees.    7/9/2024 7/9/2024     Right Left   Elbow Ext/Flex       Supination/Pronation       Wrist Ext/Flex 72/76 74/72   Wrist RD/UD WFL       15/30         Hand ROM. Measured in degrees.    7/5/2024 7/5/2024 8/19/24 9/17/24     Right  Left  L Left                       Thumb: MP 65 12/42 15/55 14/56                IP 50 28 30 44       Rad ADD/ABD 50 48 52        Pal ADD/ABD 45 45 50           Opposition To DPC Briefly to tip of SF To base of SF Mid between MP crease and dpc       Sensation: reports numbness to dorsal thumb, and ulnar aspect of thumb near incision, not formally tested today  Distribution 7/5/2024 7/5/2024     Right  Left    [place]       Normal 1.65-2.83       Diminished Light Touch 3.22-3.61       Diminished Protective 3.84-4.31       Loss of Protective 4.56-6.65       Untestable >6.65       2 Point Discrimination       Static       Dynamic              Strength (Dyanmometer) and Pinch Strength (Pinch Gauge)  Measured in pounds and psi. Average of three trials.      8/19/2024 8/19/2024 9/17/24     Right  Left  Left   Rung II  58  34 43   Cif[place]es Pinch  14.5 8  10   3pt Pinch        2pt Pinch              Manual Muscle Testing     Manual Muscle Test: NT, deferred    7/9/2024 7/9/2024     Left Right   Wrist Extension        Wrist Flexion       Radial Deviation       Ulnar Deviation       Supination       Pronation       Elbow Extension       Elbow Flexion                      CMS Impairment/Limitation/Restriction for FOTO Hand Survey     FOTO scores for Ida Orozco on 7/5/2024.   FOTO documents entered into [email or web address] - see Media section.     Intake Score: 35%              Treatment     Ida received the treatments listed below:        supervised modalities after being cleared for contradictions: fluido x 10 min     therapeutic exercises to develop ROM for 0 minutes including:      AROM  "Wrist  Ext/flx  RD/UD    X 10 reps each    AROM thumb:  Palm abd/add  Rad abd/add  IP blocks  Gentle Opposition  Composite flx  Lifts- thumb only  Spreads  TGEs    X 10 reps each    Yellow putty (NT) Molding/rolling x 1'45"  Gripping x 10 reps   Gripping with PHG red spring 2nd rung  X 30 (NT)   Thumbsizer 1 yellow RB X 20  (NT)        manual therapy techniques: Manual Lymphatic Drainage were applied to the: L hand for 0 minutes, including:  Gentle scar massage    therapeutic activities to improve functional performance for 46  minutes, including:   Updated measures   In hand manip with coins x3 sets  Isospheres x 3 min  Wrist maze x3 min  Yellow CP pinches with med poms lat and 3pt x 1 set ea (NT)  FMC with grooved pegboard, remove with yellow CP 2 sets  Golf tees in styrofoam x 15 in/out (2 sets)         Patient Education and Home Exercises     Education provided:   - cont HEP. Discussed keeping activities and exercise pain free.      Written Home Exercises Provided: Patient instructed to cont prior HEP.  Exercises were reviewed and Ida was able to demonstrate them prior to the end of the session.  Ida demonstrated good  understanding of the home exercise program provided. See electronic medical record under Patient Instructions for exercises provided during therapy sessions.       Assessment     Updated measures show improved ROM and strength today. She tolerated session well today. Will progress as tolerated with functional tasks, strengthening, and ROM.      Ida is progressing fairly well towards her goals and there are no updates to goals at this time. Pt prognosis is Good.     Patient will continue to benefit from skilled outpatient occupational therapy to address the deficits listed in the problem list on initial evaluation provide patient/family education and to maximize patient's level of independence in the home and community environment.     Patient's spiritual, cultural and educational needs " considered and patient agreeable to plan of care and goals.    Anticipated barriers to occupational therapy: none    Goals:  Long Term Goals (LTGs); to be met by discharge.  1) Pt will report pain no higher than 2/10 with daily tasks,   2) Pt will have an improved FOTO score by at least 20 points  3) Pt will demonstrate improved left thumb  AROM WFL for performing daily tasks, grasping  4) Pt will report return to prior level of function  5)  and pinch strength assessed when appropriate and set goals     Short Term Goals (STGs); to be met within 4 weeks (8/5/24).  1) Pt will report or demonstrate independence with HEP, orthosis wear, and scar management  2) Pt will report pain no higher than 4/10 with self care and daily tasks  3) Pt will demonstrate improved left thumb AROM by at least 10-15 degrees for improved function and hand use  4) Pt will demonstrate improved left thumb edema by at least 0.3 cm for improved functional use    Plan     Updates/Grading for next session: cont as tolerated     INNA White   9/17/2024

## 2024-09-17 ENCOUNTER — CLINICAL SUPPORT (OUTPATIENT)
Dept: REHABILITATION | Facility: HOSPITAL | Age: 59
End: 2024-09-17
Payer: MEDICARE

## 2024-09-17 DIAGNOSIS — M25.642 DECREASED RANGE OF MOTION OF LEFT THUMB: ICD-10-CM

## 2024-09-17 DIAGNOSIS — M79.645 THUMB PAIN, LEFT: Primary | ICD-10-CM

## 2024-09-17 PROCEDURE — 97530 THERAPEUTIC ACTIVITIES: CPT | Mod: CO

## 2024-09-17 PROCEDURE — 97022 WHIRLPOOL THERAPY: CPT | Mod: CO

## 2024-09-19 ENCOUNTER — PATIENT MESSAGE (OUTPATIENT)
Dept: PRIMARY CARE CLINIC | Facility: CLINIC | Age: 59
End: 2024-09-19
Payer: MEDICARE

## 2024-09-19 ENCOUNTER — PATIENT MESSAGE (OUTPATIENT)
Dept: REHABILITATION | Facility: HOSPITAL | Age: 59
End: 2024-09-19
Payer: MEDICARE

## 2024-09-23 ENCOUNTER — TELEPHONE (OUTPATIENT)
Dept: SPORTS MEDICINE | Facility: CLINIC | Age: 59
End: 2024-09-23
Payer: MEDICARE

## 2024-09-23 ENCOUNTER — PATIENT MESSAGE (OUTPATIENT)
Dept: SPORTS MEDICINE | Facility: CLINIC | Age: 59
End: 2024-09-23
Payer: MEDICARE

## 2024-09-23 DIAGNOSIS — G89.29 CHRONIC PAIN OF RIGHT HIP: Primary | ICD-10-CM

## 2024-09-23 DIAGNOSIS — M25.551 CHRONIC PAIN OF RIGHT HIP: Primary | ICD-10-CM

## 2024-09-23 DIAGNOSIS — M16.11 PRIMARY OSTEOARTHRITIS OF RIGHT HIP: ICD-10-CM

## 2024-09-23 RX ORDER — METHOCARBAMOL 750 MG/1
750 TABLET, FILM COATED ORAL 3 TIMES DAILY
Qty: 30 TABLET | Refills: 0 | Status: SHIPPED | OUTPATIENT
Start: 2024-09-23 | End: 2024-10-03

## 2024-09-23 NOTE — TELEPHONE ENCOUNTER
Spoke with patient regarding continued hip pain. Will call in Robaxin order and order MRI of right hip.

## 2024-09-25 ENCOUNTER — OFFICE VISIT (OUTPATIENT)
Dept: ORTHOPEDICS | Facility: CLINIC | Age: 59
End: 2024-09-25
Payer: MEDICARE

## 2024-09-25 DIAGNOSIS — S63.642D RUPTURE OF ULNAR COLLATERAL LIGAMENT OF LEFT THUMB, SUBSEQUENT ENCOUNTER: Primary | ICD-10-CM

## 2024-09-25 PROCEDURE — 99499 UNLISTED E&M SERVICE: CPT | Mod: 95,,, | Performed by: ORTHOPAEDIC SURGERY

## 2024-09-25 NOTE — PROGRESS NOTES
Established Patient - Audio Only Telehealth Visit     Called, no answer       This service was not originating from a related E/M service provided within the previous 7 days nor will  to an E/M service or procedure within the next 24 hours or my soonest available appointment.  Prevailing standard of care was able to be met in this audio-only visit.

## 2024-09-30 ENCOUNTER — PATIENT MESSAGE (OUTPATIENT)
Dept: SPORTS MEDICINE | Facility: CLINIC | Age: 59
End: 2024-09-30
Payer: MEDICARE

## 2024-10-02 ENCOUNTER — CLINICAL SUPPORT (OUTPATIENT)
Dept: REHABILITATION | Facility: HOSPITAL | Age: 59
End: 2024-10-02
Payer: MEDICARE

## 2024-10-02 DIAGNOSIS — M25.551 RIGHT HIP PAIN: Primary | ICD-10-CM

## 2024-10-02 DIAGNOSIS — R26.9 GAIT ABNORMALITY: ICD-10-CM

## 2024-10-02 PROCEDURE — 97112 NEUROMUSCULAR REEDUCATION: CPT | Mod: HCNC | Performed by: PHYSICAL THERAPIST

## 2024-10-02 PROCEDURE — 97110 THERAPEUTIC EXERCISES: CPT | Mod: HCNC | Performed by: PHYSICAL THERAPIST

## 2024-10-03 DIAGNOSIS — M16.11 PRIMARY OSTEOARTHRITIS OF RIGHT HIP: Primary | ICD-10-CM

## 2024-10-03 DIAGNOSIS — S76.011S TEAR OF RIGHT GLUTEUS MEDIUS TENDON, SEQUELA: ICD-10-CM

## 2024-10-04 NOTE — PROGRESS NOTES
Physical Therapy Daily Treatment Note     Name: Ida Orozco  Clinic Number: [id number]    Therapy Diagnosis:   Encounter Diagnoses   Name Primary?    Right hip pain Yes    Gait abnormality      Physician: Sylvain Quinn, *    Visit Date: 10/2/2024    Physician Orders: PT Eval and Treat  Medical Diagnosis from Referral: M25.551 (ICD-10-CM) - Pain of right hip   Evaluation Date: 9/12/2024  Authorization Period Expiration: 12/31/2024  Plan of Care Expiration: 12/12/2024  Visit # / Visits authorized: 1/10 (+ EVAL)     FOTO: 29  FOTO 1st Follow-up: NA  FOTO 2nd Follow-up: NA     Time In: 1200  Time Out: 1300  Total Billable Time: 60 minutes     Precautions: Standard    Subjective     Pt reports first return visit since original session. Has since had an MRI. Would like to discuss results and plans for PT moving forward..  She was compliant with home exercise program.  Response to previous treatment: Fair  Functional change: Increased pain in R hip    Pain: 7/10  Location: right hip and knee      Objective     Objective Measures updated at progress report unless specified    Treatment     Access Code: [email or web address]  Ida received the treatments listed below:       Therapeutic exercises to develop strength, endurance, ROM, flexibility, posture, and core stabilization for 30 minutes including:  - Supine Posterior Pelvic Tilt  - 1 x daily - 7 x weekly - 3 sets - 10 reps  - Hooklying Isometric Clamshell  - 1 x daily - 7 x weekly - 3 sets - 10 reps  - Bilateral Bent Leg Lift  - 1 x daily - 7 x weekly - 3 sets - 10 reps  - Diagonal Hip Extension with Resistance  - 1 x daily - 7 x weekly - 3 sets - 10 reps     Neuromuscular re-education activities to improve: Balance, Coordination, Kinesthetic, Sense, Proprioception, and Posture for 30 minutes. The following activities were included:   LAQ 2# 4 x 8  Reverse Clams 3 x 15  Marching RTB 4 x 8 ea     Therapeutic activities to improve functional performance for 0 minutes,  including:     Home Exercises and Patient Education Provided      Education provided:   - Stability program  - Prognosis, activity modification, goals for therapy, role of therapy for care, exercises/HEP     Written Home Exercises Provided:  Exercises were reviewed and Ida was able to demonstrate them prior to the end of the session.   Pt received a written copy of exercises to perform at home. Ida demonstrated good understanding of the education provided.      See EMR under patient instructions for exercises given.     Assessment     First return visit since original session. PT had exchanged messages with pt on portal and spoke on phone since last session. Pt has also reviewed results of imaging with ordering provider's staff on the portal. Overall, discussed intentions to attempt PT management for the case for the next 6 weeks. Pt remains significantly limited with tolerance to any loaded motions of the hips, but motivation remains high throughout session. Will continue to look for modifiable loading techniques. Pt will also seek referral for Aquatic PT.  Ida Is not progressing well towards her goals.   Pt prognosis is Fair.     Pt will continue to benefit from skilled outpatient physical therapy to address the deficits listed in the problem list box on initial evaluation, provide pt/family education and to maximize pt's level of independence in the home and community environment.     Pt's spiritual, cultural and educational needs considered and pt agreeable to plan of care and goals.    Anticipated barriers to physical therapy: chronic condition    GOALS   Short Term Goals: 6 weeks  Pt will report decreased R hip pain  < / =  4/10  to increase tolerance for ADL performance and recreational routine.  Pt will improve R hip ROM to WFL in order to be able to perform ADLs without difficulty.  Pt will improve R hip strength by 1/3 MMT grade to increase tolerance for ADL and work activities  Pt will demonstrate  tolerance to HEP to improve independence with ADL's     Long Term Goals: 12 weeks  Pt will report decreased R hip pain  < / =  2/10  to increase tolerance for ADL performance and recreational routine  Pt will improve R hip ROM to WNL in order to be able to perform ADLs without difficulty  Pt will improve R hip strength by 1/3 MMT grade to increase tolerance for ADL and work activities  Pt will report at CJ level (20-40% impaired) on FOTO Hip to demonstrate increase in LE function with every day tasks.     Plan     Continue per POC    Benigno Mayorga, PT, DPT, DPT  Board Certified in Orthopedic Physical Therapy

## 2024-10-07 ENCOUNTER — PATIENT MESSAGE (OUTPATIENT)
Dept: PSYCHIATRY | Facility: CLINIC | Age: 59
End: 2024-10-07
Payer: MEDICARE

## 2024-10-07 ENCOUNTER — OFFICE VISIT (OUTPATIENT)
Dept: PSYCHIATRY | Facility: CLINIC | Age: 59
End: 2024-10-07
Payer: MEDICARE

## 2024-10-07 ENCOUNTER — TELEPHONE (OUTPATIENT)
Dept: GASTROENTEROLOGY | Facility: CLINIC | Age: 59
End: 2024-10-07
Payer: MEDICARE

## 2024-10-07 VITALS
SYSTOLIC BLOOD PRESSURE: 94 MMHG | HEIGHT: 64 IN | BODY MASS INDEX: 37.09 KG/M2 | DIASTOLIC BLOOD PRESSURE: 53 MMHG | HEART RATE: 64 BPM | WEIGHT: 217.25 LBS

## 2024-10-07 DIAGNOSIS — F31.81 BIPOLAR 2 DISORDER, MAJOR DEPRESSIVE EPISODE: Primary | ICD-10-CM

## 2024-10-07 DIAGNOSIS — F41.9 ANXIETY: ICD-10-CM

## 2024-10-07 DIAGNOSIS — F51.01 PRIMARY INSOMNIA: ICD-10-CM

## 2024-10-07 DIAGNOSIS — G47.00 INSOMNIA DISORDER WITH NON-SLEEP DISORDER MENTAL COMORBIDITY: ICD-10-CM

## 2024-10-07 PROBLEM — F31.9 BIPOLAR 1 DISORDER: Status: ACTIVE | Noted: 2024-10-07

## 2024-10-07 PROCEDURE — 3044F HG A1C LEVEL LT 7.0%: CPT | Mod: CPTII,S$GLB,, | Performed by: NURSE PRACTITIONER

## 2024-10-07 PROCEDURE — 3074F SYST BP LT 130 MM HG: CPT | Mod: CPTII,S$GLB,, | Performed by: NURSE PRACTITIONER

## 2024-10-07 PROCEDURE — 3008F BODY MASS INDEX DOCD: CPT | Mod: CPTII,S$GLB,, | Performed by: NURSE PRACTITIONER

## 2024-10-07 PROCEDURE — 3078F DIAST BP <80 MM HG: CPT | Mod: CPTII,S$GLB,, | Performed by: NURSE PRACTITIONER

## 2024-10-07 PROCEDURE — 99205 OFFICE O/P NEW HI 60 MIN: CPT | Mod: S$GLB,,, | Performed by: NURSE PRACTITIONER

## 2024-10-07 PROCEDURE — 99999 PR PBB SHADOW E&M-EST. PATIENT-LVL III: CPT | Mod: PBBFAC,,, | Performed by: NURSE PRACTITIONER

## 2024-10-07 RX ORDER — CLONAZEPAM 1 MG/1
1 TABLET ORAL DAILY PRN
Qty: 90 TABLET | Refills: 1 | Status: SHIPPED | OUTPATIENT
Start: 2024-10-07

## 2024-10-07 RX ORDER — TOPIRAMATE 25 MG/1
25 TABLET ORAL 2 TIMES DAILY
Qty: 60 TABLET | Refills: 11 | Status: SHIPPED | OUTPATIENT
Start: 2024-10-07 | End: 2025-10-07

## 2024-10-07 RX ORDER — BUSPIRONE HYDROCHLORIDE 10 MG/1
10 TABLET ORAL 3 TIMES DAILY PRN
Qty: 90 TABLET | Refills: 11 | Status: SHIPPED | OUTPATIENT
Start: 2024-10-07

## 2024-10-07 RX ORDER — TRAZODONE HYDROCHLORIDE 150 MG/1
150 TABLET ORAL NIGHTLY
Qty: 90 TABLET | Refills: 1 | Status: SHIPPED | OUTPATIENT
Start: 2024-10-07

## 2024-10-07 RX ORDER — SERTRALINE HYDROCHLORIDE 50 MG/1
50 TABLET, FILM COATED ORAL DAILY
Qty: 90 TABLET | Refills: 1 | Status: SHIPPED | OUTPATIENT
Start: 2024-10-07

## 2024-10-07 NOTE — PATIENT INSTRUCTIONS
Restart ZOLOFT 50 MG daily  Restart TOPOMAX 25 MG twice daily  Increase to TRAZODONE 150 MG before bed as needed for sleep  Continue KLONOPIN 1 mg daily as needed for panic attcks  Increase to BUSPAR 10 mg 2-3 x daily for anxiety  Follow-up with me by virtual visit in 6-8 weeks

## 2024-10-07 NOTE — TELEPHONE ENCOUNTER
----- Message from Wendy sent at 10/7/2024 11:53 AM CDT -----  Regarding: APPT  Contact: PT@ [phone]  Pt is calling to speak to someone in the office to schedule her appt for; no available appts in Epic. ED FOLLOW UP states needs a sooner appt than 11/114Please call to advise. PT@ [phone].

## 2024-10-07 NOTE — TELEPHONE ENCOUNTER
She was seen in the Er this weekend. She is having abd pain.  She has an appointment in Nov. Please advise

## 2024-10-07 NOTE — PROGRESS NOTES
Outpatient Psychiatry Initial Visit (MD/NP)    10/7/2024    Ida Orozco, a 59 y.o. female, presenting for initial evaluation visit. Met with patient.    Reason for Encounter: self-referral. Patient complains of anxiety and bipolar disorder.    History of Present Illness:     Pt is a 59 year old female who presents for psychiatric evaluation.     Discussed life stressors of losing both mother and father over the past year from illness.  Pt reports unresolved grief.  Affect appears sad when talking about her mother with anxious mood.  Reports hx of panic attacks and takes Klonopin as needed.  She reports it has worsened since stopping her medications (Topomax and Zoloft) because her last MD wouldn't order until she established care and pt decided to come to [facility]. Thought processes appear racing.  Reports she started tx in 2014 at [facility] to start medication and counseling.     Psychiatric Review Of Systems:  sleep: decreased  appetite changes: decreased  weight changes: no changes  energy/anergy: excessive  interest/pleasure/anhedonia: decreased  somatic symptoms:   libido: not assessed  anxiety/panic: yes, frequent panic attacks  SI/HI: denies    Psychiatric Medications: currently taking  Buspar  Klonopin    Past trials include: Topomax, Zoloft    Medical History:       Past Medical History:   Diagnosis Date    Anxiety      Asthma       Last ER visit with covid    Bradycardia      Bronchitis      Depression      Esophageal ulcer 12/16/2014    Fatty liver disease, nonalcoholic      Gallstones      GERD (gastroesophageal reflux disease)      History of sleeve gastrectomy 06/24/2016    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, with liver fibrosis, 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    PTSD (post-traumatic stress disorder)      Seasonal allergies      Symptomatic abdominal panniculus      Ulcerative colitis      Vertigo      CMP  Review Of Systems:     GENERAL:  No weight gain or loss  SKIN:  No rashes or  "lacerations  HEAD:  No headaches  EYES:  No exophthalmos, jaundice or blindness  EARS:  No dizziness, tinnitus or hearing loss  NOSE:  No changes in smell  MOUTH & THROAT:  No dyskinetic movements or obvious goiter  CHEST:  No shortness of breath, hyperventilation or cough  CARDIOVASCULAR:  No tachycardia or chest pain  ABDOMEN:  No nausea, vomiting, pain, constipation or diarrhea  URINARY:  No frequency, dysuria or sexual dysfunction  ENDOCRINE:  No polydipsia, polyuria  MUSCULOSKELETAL:  No pain or stiffness of the joints  NEUROLOGIC:  No weakness, sensory changes, seizures, confusion, memory loss, tremor or other abnormal movements    Current Evaluation:     Nutritional Screening: Considering the patient's height and weight, medications, medical history and preferences, should a referral be made to the dietitian? no    Constitutional  Vitals:  Most recent vital signs, dated greater than 90 days prior to this appointment, were reviewed.    Vitals:    10/07/24 1027   BP: (!) 94/53   Pulse: 64   Weight: 98.5 kg (217 lb 4.2 oz)   Height: 5' 4" (1.626 m)        General:  unremarkable, age appropriate     Musculoskeletal  Muscle Strength/Tone:  not examined   Gait & Station:  non-ataxic     Psychiatric  Speech:  no latency; no press   Mood & Affect:  steady  congruent and appropriate   Thought Process:  normal and logical   Associations:  intact   Thought Content:  normal, no suicidality, no homicidality, delusions, or paranoia   Insight:  intact   Judgement: behavior is adequate to circumstances   Orientation:  grossly intact   Memory: intact for content of interview   Language: grossly intact   Attention Span & Concentration:  able to focus   Fund of Knowledge:  intact and appropriate to age and level of education       Relevant Elements of Neurological Exam: normal gait    Functioning in Relationships:  Spouse/partner: see above HPI  Peers: see above HPI  Employers: see above HPI    Laboratory Data  Admission on " 10/06/2024, Discharged on 10/06/2024   Component Date Value Ref Range Status    WBC 10/06/2024 6.07  3.90 - 12.70 K/uL Final    RBC 10/06/2024 4.80  4.00 - 5.40 M/uL Final    Hemoglobin 10/06/2024 14.1  12.0 - 16.0 g/dL Final    Hematocrit 10/06/2024 44.3  37.0 - 48.5 % Final    MCV 10/06/2024 92  82 - 98 fL Final    MCH 10/06/2024 29.4  27.0 - 31.0 pg Final    MCHC 10/06/2024 31.8 (L)  32.0 - 36.0 g/dL Final    RDW 10/06/2024 13.6  11.5 - 14.5 % Final    Platelets 10/06/2024 254  150 - 450 K/uL Final    MPV 10/06/2024 10.4  9.2 - 12.9 fL Final    Immature Granulocytes 10/06/2024 0.2  0.0 - 0.5 % Final    Gran # (ANC) 10/06/2024 3.7  1.8 - 7.7 K/uL Final    Immature Grans (Abs) 10/06/2024 0.01  0.00 - 0.04 K/uL Final    Lymph # 10/06/2024 1.3  1.0 - 4.8 K/uL Final    Mono # 10/06/2024 0.5  0.3 - 1.0 K/uL Final    Eos # 10/06/2024 0.5  0.0 - 0.5 K/uL Final    Baso # 10/06/2024 0.03  0.00 - 0.20 K/uL Final    nRBC 10/06/2024 0  0 /100 WBC Final    Gran % 10/06/2024 60.3  38.0 - 73.0 % Final    Lymph % 10/06/2024 21.9  18.0 - 48.0 % Final    Mono % 10/06/2024 8.9  4.0 - 15.0 % Final    Eosinophil % 10/06/2024 8.2 (H)  0.0 - 8.0 % Final    Basophil % 10/06/2024 0.5  0.0 - 1.9 % Final    Differential Method 10/06/2024 Automated   Final    Sodium 10/06/2024 139  136 - 145 mmol/L Final    Potassium 10/06/2024 4.0  3.5 - 5.1 mmol/L Final    Chloride 10/06/2024 109  95 - 110 mmol/L Final    CO2 10/06/2024 19 (L)  23 - 29 mmol/L Final    Glucose 10/06/2024 89  70 - 110 mg/dL Final    BUN 10/06/2024 12  6 - 20 mg/dL Final    Creatinine 10/06/2024 1.0  0.5 - 1.4 mg/dL Final    Calcium 10/06/2024 9.1  8.7 - 10.5 mg/dL Final    Total Protein 10/06/2024 7.0  6.0 - 8.4 g/dL Final    Albumin 10/06/2024 3.3 (L)  3.5 - 5.2 g/dL Final    Total Bilirubin 10/06/2024 0.7  0.1 - 1.0 mg/dL Final    Alkaline Phosphatase 10/06/2024 265 (H)  55 - 135 U/L Final    AST 10/06/2024 100 (H)  10 - 40 U/L Final    ALT 10/06/2024 110 (H)  10 - 44 U/L  Final    eGFR 10/06/2024 >60.0  >60 mL/min/1.73 m^2 Final    Anion Gap 10/06/2024 11  8 - 16 mmol/L Final    Lipase 10/06/2024 15  4 - 60 U/L Final    Specimen UA 10/06/2024 Urine, Unspecified   Final    Color, UA 10/06/2024 Yellow  Yellow, Straw, Pita Final    Appearance, UA 10/06/2024 Clear  Clear Final    pH, UA 10/06/2024 6.0  5.0 - 8.0 Final    Specific Gravity, UA 10/06/2024 1.020  1.005 - 1.030 Final    Protein, UA 10/06/2024 Trace (A)  Negative Final    Glucose, UA 10/06/2024 Negative  Negative Final    Ketones, UA 10/06/2024 Negative  Negative Final    Bilirubin (UA) 10/06/2024 Negative  Negative Final    Occult Blood UA 10/06/2024 Negative  Negative Final    Nitrite, UA 10/06/2024 Negative  Negative Final    Urobilinogen, UA 10/06/2024 Negative  Negative EU/dL Final    Leukocytes, UA 10/06/2024 3+ (A)  Negative Final    RBC, UA 10/06/2024 1  0 - 4 /hpf Final    WBC, UA 10/06/2024 48 (H)  0 - 5 /hpf Final    Bacteria 10/06/2024 Rare  None-Occ /hpf Final    Squam Epithel, UA 10/06/2024 0  /hpf Final    Microscopic Comment 10/06/2024 SEE COMMENT   Final   Admission on 10/06/2024, Discharged on 10/06/2024   Component Date Value Ref Range Status    Specimen UA 10/06/2024 Urine, Clean Catch   Final    Color, UA 10/06/2024 Yellow  Yellow, Straw, Pita Final    Appearance, UA 10/06/2024 Clear  Clear Final    pH, UA 10/06/2024 5.0  5.0 - 8.0 Final    Specific Gravity, UA 10/06/2024 1.010  1.005 - 1.030 Final    Protein, UA 10/06/2024 Negative  Negative Final    Glucose, UA 10/06/2024 Negative  Negative Final    Ketones, UA 10/06/2024 Negative  Negative Final    Bilirubin (UA) 10/06/2024 Negative  Negative Final    Occult Blood UA 10/06/2024 Negative  Negative Final    Nitrite, UA 10/06/2024 Negative  Negative Final    Urobilinogen, UA 10/06/2024 Negative  Negative EU/dL Final    Leukocytes, UA 10/06/2024 2+ (A)  Negative Final    RBC, UA 10/06/2024 1  0 - 4 /hpf Final    WBC, UA 10/06/2024 8 (H)  0 - 5 /hpf Final     Bacteria 10/06/2024 Rare  None-Occ /hpf Final    Squam Epithel, UA 10/06/2024 6  /hpf Final    Microscopic Comment 10/06/2024 SEE COMMENT   Final   Hospital Outpatient Visit on 10/04/2024   Component Date Value Ref Range Status    QRS Duration 10/04/2024 106  ms In process    [facility] QTC Calculation 10/04/2024 357  ms In process         Medications  Outpatient Encounter Medications as of 10/7/2024   Medication Sig Dispense Refill    busPIRone (BUSPAR) 10 MG tablet Take 1 tablet (10 mg total) by mouth 3 (three) times daily as needed (anxiety). 90 tablet 11    [] cephALEXin (KEFLEX) 500 MG capsule Take 1 capsule (500 mg total) by mouth every 12 (twelve) hours. for 5 days 10 capsule 0    clonazePAM (KLONOPIN) 1 MG tablet Take 1 tablet (1 mg total) by mouth daily as needed for Anxiety. 90 tablet 1    diphenoxylate-atropine 2.5-0.025 mg (LOMOTIL) 2.5-0.025 mg per tablet Take 1 tablet by mouth 4 (four) times daily as needed for Diarrhea. 30 tablet 2    ergocalciferol (VITAMIN D2) 50,000 unit Cap Take 1 capsule (50,000 Units total) by mouth every 7 days. 12 capsule 0    hyoscyamine (ANASPAZ,LEVSIN) 0.125 mg Tab Take 1 tablet (125 mcg total) by mouth every 6 (six) hours as needed (Abdominal spasms). (Patient not taking: Reported on 10/10/2024) 30 tablet 3    levocetirizine (XYZAL) 5 MG tablet Take 1 tablet (5 mg total) by mouth every evening. 90 tablet 3    loratadine (CLARITIN) 10 mg tablet Take 1 tablet (10 mg total) by mouth once daily. 90 tablet 3    meclizine (ANTIVERT) 25 mg tablet Take 1 tablet (25 mg total) by mouth 3 (three) times daily as needed for Dizziness or Nausea. 60 tablet 3    [] methocarbamoL (ROBAXIN) 750 MG Tab Take 1 tablet (750 mg total) by mouth 3 (three) times daily. for 10 days 30 tablet 0    nystatin (MYCOSTATIN) powder Apply topically 2 (two) times daily. 60 g 3    ondansetron (ZOFRAN) 4 MG tablet Take 1 tablet (4 mg total) by mouth every 6 (six) hours as needed for Nausea. 20 tablet  0    sertraline (ZOLOFT) 50 MG tablet Take 1 tablet (50 mg total) by mouth once daily. 90 tablet 1    topiramate (TOPAMAX) 25 MG tablet Take 1 tablet (25 mg total) by mouth 2 (two) times daily. 60 tablet 11    traZODone (DESYREL) 150 MG tablet Take 1 tablet (150 mg total) by mouth nightly. 90 tablet 1    [DISCONTINUED] adalimumab (HUMIRA,CF, PEN) 40 mg/0.4 mL PnKt Inject 0.4 mLs (40 mg total) into the skin every 14 (fourteen) days. 6 pen 2    [DISCONTINUED] busPIRone (BUSPAR) 5 MG Tab Take 1 tablet (5 mg total) by mouth 3 (three) times daily as needed. 90 tablet 0    [DISCONTINUED] clonazePAM (KLONOPIN) 1 MG tablet Take 1 tablet (1 mg total) by mouth daily as needed for Anxiety. 90 tablet 1    [DISCONTINUED] eszopiclone (LUNESTA) 2 MG Tab TAKE 1 TABLET (2 MG TOTAL) BY MOUTH EVERY EVENING. 30 tablet 3    [DISCONTINUED] traZODone (DESYREL) 50 MG tablet Take 1 tablet (50 mg total) by mouth nightly. 90 tablet 0    [] cefTRIAXone (Rocephin) 1 g in D5W 100 mL IVPB (MB+)       [] dicyclomine capsule 20 mg       [] iohexoL (OMNIPAQUE 350) injection 100 mL       [] morphine injection 4 mg       [] ondansetron injection 4 mg       [] simethicone chewable tablet 80 mg       [DISCONTINUED] morphine injection 4 mg       [DISCONTINUED] ondansetron injection 4 mg        No facility-administered encounter medications on file as of 10/7/2024.     Assessment - Diagnosis - Goals:     Impression:       ICD-10-CM ICD-9-CM   1. Anxiety  F41.9 300.00   2. Bipolar 1 disorder  F31.9 296.7       Strengths and Liabilities: Strength: Patient accepts guidance/feedback, Strength: Patient is expressive/articulate., Strength: Patient is intelligent., Strength: Patient is motivated for change.    Treatment Goals:  Specify outcomes written in observable, behavioral terms:   Anxiety: reducing negative automatic thoughts, reducing physical symptoms of anxiety, and reducing time spent worrying (<30  minutes/day)  Depression: increasing motivation, increasing self-reward for positive behaviors (one/day), increasing self-reward for positive thoughts (one/day), increasing social contacts (three/week), reducing excessive guilt, reducing fatigue, and reducing negative automatic thoughts    Treatment Plan/Recommendations:   Medication Management: The risks and benefits of medication were discussed with the patient.  The treatment plan and follow up plan were reviewed with the patient.  Reviewed available medical records and labs  Restart ZOLOFT 50 MG daily  Restart TOPOMAX 25 MG twice daily  Increase to TRAZODONE 150 MG before bed as needed for sleep  Continue KLONOPIN 1 mg daily as needed for panic attcks  Increase to BUSPAR 10 mg 2-3 x daily for anxiety  Follow-up with me by virtual visit in 6-8 weeks    Return to Clinic: 6 months    Counseling time: 36 minutes  Total time: 60 minutes

## 2024-10-08 ENCOUNTER — HOSPITAL ENCOUNTER (EMERGENCY)
Facility: HOSPITAL | Age: 59
Discharge: HOME | End: 2024-10-08
Attending: EMERGENCY MEDICINE
Payer: MEDICARE

## 2024-10-08 ENCOUNTER — NURSE TRIAGE (OUTPATIENT)
Dept: ADMINISTRATIVE | Facility: CLINIC | Age: 59
End: 2024-10-08
Payer: MEDICARE

## 2024-10-08 ENCOUNTER — PATIENT MESSAGE (OUTPATIENT)
Dept: GASTROENTEROLOGY | Facility: CLINIC | Age: 59
End: 2024-10-08
Payer: MEDICARE

## 2024-10-08 VITALS
HEIGHT: 64 IN | BODY MASS INDEX: 37.05 KG/M2 | HEART RATE: 58 BPM | SYSTOLIC BLOOD PRESSURE: 102 MMHG | OXYGEN SATURATION: 98 % | TEMPERATURE: 98 F | RESPIRATION RATE: 16 BRPM | DIASTOLIC BLOOD PRESSURE: 51 MMHG | WEIGHT: 217 LBS

## 2024-10-08 DIAGNOSIS — R19.7 DIARRHEA, UNSPECIFIED TYPE: ICD-10-CM

## 2024-10-08 DIAGNOSIS — R10.9 ABDOMINAL PAIN: Primary | ICD-10-CM

## 2024-10-08 LAB
ALBUMIN SERPL BCP-MCNC: 3.3 G/DL (ref 3.5–5.2)
ALP SERPL-CCNC: 270 U/L (ref 55–135)
ALT SERPL W/O P-5'-P-CCNC: 88 U/L (ref 10–44)
ANION GAP SERPL CALC-SCNC: 7 MMOL/L (ref 8–16)
AST SERPL-CCNC: 70 U/L (ref 10–40)
BASOPHILS # BLD AUTO: 0.06 K/UL (ref 0–0.2)
BASOPHILS NFR BLD: 0.9 % (ref 0–1.9)
BILIRUB SERPL-MCNC: 0.5 MG/DL (ref 0.1–1)
BUN SERPL-MCNC: 12 MG/DL (ref 6–20)
CALCIUM SERPL-MCNC: 9.2 MG/DL (ref 8.7–10.5)
CHLORIDE SERPL-SCNC: 108 MMOL/L (ref 95–110)
CO2 SERPL-SCNC: 23 MMOL/L (ref 23–29)
CREAT SERPL-MCNC: 0.9 MG/DL (ref 0.5–1.4)
CRP SERPL-MCNC: 8.9 MG/L (ref 0–8.2)
DIFFERENTIAL METHOD BLD: ABNORMAL
EOSINOPHIL # BLD AUTO: 0.4 K/UL (ref 0–0.5)
EOSINOPHIL NFR BLD: 5.2 % (ref 0–8)
ERYTHROCYTE [DISTWIDTH] IN BLOOD BY AUTOMATED COUNT: 14.2 % (ref 11.5–14.5)
EST. GFR  (NO RACE VARIABLE): >60 ML/MIN/1.73 M^2
GLUCOSE SERPL-MCNC: 104 MG/DL (ref 70–110)
HCT VFR BLD AUTO: 46.9 % (ref 37–48.5)
HGB BLD-MCNC: 14.9 G/DL (ref 12–16)
IMM GRANULOCYTES # BLD AUTO: 0.02 K/UL (ref 0–0.04)
IMM GRANULOCYTES NFR BLD AUTO: 0.3 % (ref 0–0.5)
LIPASE SERPL-CCNC: 22 U/L (ref 4–60)
LYMPHOCYTES # BLD AUTO: 2.2 K/UL (ref 1–4.8)
LYMPHOCYTES NFR BLD: 32 % (ref 18–48)
MCH RBC QN AUTO: 29 PG (ref 27–31)
MCHC RBC AUTO-ENTMCNC: 31.8 G/DL (ref 32–36)
MCV RBC AUTO: 91 FL (ref 82–98)
MONOCYTES # BLD AUTO: 0.5 K/UL (ref 0.3–1)
MONOCYTES NFR BLD: 7.1 % (ref 4–15)
NEUTROPHILS # BLD AUTO: 3.7 K/UL (ref 1.8–7.7)
NEUTROPHILS NFR BLD: 54.5 % (ref 38–73)
NRBC BLD-RTO: 0 /100 WBC
PLATELET # BLD AUTO: 312 K/UL (ref 150–450)
PMV BLD AUTO: 10.5 FL (ref 9.2–12.9)
POTASSIUM SERPL-SCNC: 3.3 MMOL/L (ref 3.5–5.1)
PROT SERPL-MCNC: 6.8 G/DL (ref 6–8.4)
RBC # BLD AUTO: 5.14 M/UL (ref 4–5.4)
SODIUM SERPL-SCNC: 138 MMOL/L (ref 136–145)
WBC # BLD AUTO: 6.78 K/UL (ref 3.9–12.7)

## 2024-10-08 PROCEDURE — 83690 ASSAY OF LIPASE: CPT | Performed by: EMERGENCY MEDICINE

## 2024-10-08 PROCEDURE — 93005 ELECTROCARDIOGRAM TRACING: CPT

## 2024-10-08 PROCEDURE — 86140 C-REACTIVE PROTEIN: CPT

## 2024-10-08 PROCEDURE — 99284 EMERGENCY DEPT VISIT MOD MDM: CPT | Mod: 25

## 2024-10-08 PROCEDURE — 85025 COMPLETE CBC W/AUTO DIFF WBC: CPT

## 2024-10-08 PROCEDURE — 96374 THER/PROPH/DIAG INJ IV PUSH: CPT

## 2024-10-08 PROCEDURE — 63600175 PHARM REV CODE 636 W HCPCS

## 2024-10-08 PROCEDURE — 93010 ELECTROCARDIOGRAM REPORT: CPT | Mod: ,,, | Performed by: INTERNAL MEDICINE

## 2024-10-08 PROCEDURE — 25000003 PHARM REV CODE 250

## 2024-10-08 PROCEDURE — 80053 COMPREHEN METABOLIC PANEL: CPT | Performed by: EMERGENCY MEDICINE

## 2024-10-08 RX ORDER — ONDANSETRON HYDROCHLORIDE 2 MG/ML
4 INJECTION, SOLUTION INTRAVENOUS
Status: COMPLETED | OUTPATIENT
Start: 2024-10-08 | End: 2024-10-08

## 2024-10-08 RX ORDER — DICYCLOMINE HYDROCHLORIDE 20 MG/1
20 TABLET ORAL 2 TIMES DAILY
Qty: 20 TABLET | Refills: 0 | Status: SHIPPED | OUTPATIENT
Start: 2024-10-08 | End: 2024-10-08

## 2024-10-08 RX ORDER — DICYCLOMINE HYDROCHLORIDE 20 MG/1
20 TABLET ORAL 2 TIMES DAILY
Qty: 20 TABLET | Refills: 0 | Status: SHIPPED | OUTPATIENT
Start: 2024-10-08 | End: 2024-11-07

## 2024-10-08 RX ORDER — DICYCLOMINE HYDROCHLORIDE 10 MG/1
20 CAPSULE ORAL
Status: COMPLETED | OUTPATIENT
Start: 2024-10-08 | End: 2024-10-08

## 2024-10-08 RX ADMIN — DICYCLOMINE HYDROCHLORIDE 20 MG: 10 CAPSULE ORAL at 06:10

## 2024-10-08 RX ADMIN — ONDANSETRON 4 MG: 2 INJECTION INTRAMUSCULAR; INTRAVENOUS at 07:10

## 2024-10-08 NOTE — ED PROVIDER NOTES
"Encounter Date: 10/8/2024       History     Chief Complaint   Patient presents with    Abdominal Pain     Pt c/o abdominal pain since Sunday.  States went to [place] ED on Monday-had w/u-told it was UTI.  Pt having upper abdominal pain.   Pt has ileiostomy-hx colectomy     59 y.o. female with a PMH of UC s/p total colectomy with ileostomy, fatty liver, cholecystectomy, PTSD, depression, anxiety, symptomatic abdominal panniculitis presents to [facility] for evaluation of upper abdominal pain, reports she has "spasms of her intestines".  Patient was seen in the ED 2 days ago for similar symptoms and had a normal CT scan at that time.  She was discharged home with antibiotics for UTI.  She reports her symptoms have not worsened but have also not improved.      She states these symptoms have been chronic but intermittent and each time she is able to see GI in clinic they have resolved; states "I needed to come to the ED to document these Crohn's flares".  States she was previously told she had Crohn's and ulcerative colitis for which she had a total colectomy, but GI reportedly now states she does not have IBD. When asked if they tell her she is not having an active flare or if they tell her the UC was cured by total colectomy and she does not have Crohn's, she is unsure.     She reports chronic diarrhea due to colectomy with ileostomy, denies blood in her stool. Endorses nausea but no vomiting. No fevers, chills, chest pain, dyspnea, dysuria, flank pain, or edema.         The history is provided by the patient and medical records.     Review of patient's allergies indicates:   Allergen Reactions    Adhesive      Cause blisters    Dilaudid [hydromorphone] Nausea And Vomiting    Humira [adalimumab] Hives    Sulfa (sulfonamide antibiotics) Hives    Surgical stainless steel      Had surgical staples, caused irritation and infection     Past Medical History:   Diagnosis Date    Anxiety     Asthma     Last ER " visit with covid    Bradycardia     Bronchitis     Depression     Esophageal ulcer 2014    Fatty liver disease, nonalcoholic     Gallstones     GERD (gastroesophageal reflux disease)     History of sleeve gastrectomy 2016    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, with liver fibrosis, on ursodiol 2023    PTSD (post-traumatic stress disorder)     Seasonal allergies     Symptomatic abdominal panniculus     Ulcerative colitis     Vertigo      Past Surgical History:   Procedure Laterality Date    APPENDECTOMY      CARPAL TUNNEL RELEASE Left 2024    Procedure: RELEASE, CARPAL TUNNEL;  Surgeon: Etelvina Nicholas MD;  Location: [facility] OR;  Service: Orthopedics;  Laterality: Left;    CATHETERIZATION OF BOTH LEFT AND RIGHT HEART Right 2019    Procedure: CATHETERIZATION, HEART, BOTH LEFT AND RIGHT;  Surgeon: Beto Malin MD;  Location: [facility] CATH LAB;  Service: Cardiology;  Laterality: Right;     SECTION, LOW TRANSVERSE      CHOLECYSTECTOMY      open    COLECTOMY      total- 2013    CYSTOSCOPY W/ RETROGRADES N/A 2018    Procedure: CYSTOSCOPY, WITH RETROGRADE PYELOGRAM;  Surgeon: Butch Banks MD;  Location: [facility] OR;  Service: Urology;  Laterality: N/A;  HANSEN SURGICAL CONFIRMED     DECOMPRESSION OF NERVE Left 2024    Procedure: DECOMPRESSION, NERVE ULNAR NERVE ELBOW;  Surgeon: Etelvina Nicholas MD;  Location: [facility];  Service: Orthopedics;  Laterality: Left;    ENDOSCOPIC ULTRASOUND OF UPPER GASTROINTESTINAL TRACT N/A 11/10/2021    Procedure: ULTRASOUND, UPPER GI TRACT, ENDOSCOPIC;  Surgeon: Nhan Dee MD;  Location: [facility] ([address] [place] [address]);  Service: Endoscopy;  Laterality: N/A;  MD Milena Whitehead PA-C; Quinn Whitman MD; Miladys Velasquez MA  Caller: Unspecified (3 days ago,  1:20 PM)  Fair enough. Neha, please schedule.       ----- Message -----   From: Milena Murillo PA-C   Sent: 2021  1    ESOPHAGOGASTRODUODENOSCOPY   04/11/2018    Dr. Castorena: LA Grade A reflux esophagitis, hiatal hernia; Ectopic gastric mucosa in the upper third of the esophagus; gastric sleeve intact with unremarkable findings, gastritis; biopsy: stomach- Mild chronic antral and oxyntic gastritis, no activity, negative for h pylori    ESOPHAGOGASTRODUODENOSCOPY N/A 11/10/2021    Procedure: EGD (ESOPHAGOGASTRODUODENOSCOPY);  Surgeon: Nhan Dee MD;  Location: [facility] (2ND FLR);  Service: Endoscopy;  Laterality: N/A;    ESOPHAGOGASTRODUODENOSCOPY N/A 1/23/2024    Procedure: EGD (ESOPHAGOGASTRODUODENOSCOPY);  Surgeon: Anatoly Singh MD;  Location: [facility] (4TH FLR);  Service: Endoscopy;  Laterality: N/A;  Ref By: Dr. Singh,instr sent via portal Half a miralax prep.AC  1/17-lvm for precall-MS  1/18-pt confirmed appt-Kpvt    FESS, WITH IMAGING GUIDANCE Bilateral 4/4/2023    Procedure: Pan Sinus FESS, WITH IMAGING GUIDANCE Disc loaded;  Surgeon: Jay Hernandez MD;  Location: [facility] OR;  Service: ENT;  Laterality: Bilateral;  balloon dilation of sinuses    FLEXIBLE SIGMOIDOSCOPY  5/24/2018    Procedure: SIGMOIDOSCOPY, FLEXIBLE;  Surgeon: JENNY Virgen MD;  Location: [facility] 2ND FLR;  Service: Colon and Rectal;;    FLEXIBLE SIGMOIDOSCOPY  04/11/2018    Dr. Castorena: Non-patent surgical anastomosis, characterized by friable mucosa.    GASTRIC SLEEVE       HAND ARTHROTOMY Right 6/20/2024    Procedure: ARTHROTOMY, HAND;  Surgeon: Etelvina Nicholas MD;  Location: [facility];  Service: Orthopedics;  Laterality: Right;    HYSTERECTOMY      ILEOSCOPY  04/11/2018    Dr. Castorena: Patient is status-post total colectomy with end ileostomy. unremarkable findings    ILEOSCOPY N/A 1/23/2024    Procedure: ILEOSCOPY;  Surgeon: Anatoly Singh MD;  Location: [facility] (4TH FLR);  Service: Endoscopy;  Laterality: N/A;    ILEOSTOMY REVISION      april 2014; august 2014    INJECTION OF STEROID Right 2/27/2024    Procedure: INJECTION, STEROID CARPAL TUNNEL;  Surgeon:  Etelvina Nicholas MD;  Location: [facility] OR;  Service: Orthopedics;  Laterality: Right;    INJECTION OF STEROID Right 6/20/2024    Procedure: INJECTION, STEROID Radiocarpal wrist;  Surgeon: Etelvina Nicholas MD;  Location: [facility] OR;  Service: Orthopedics;  Laterality: Right;    LAPAROSCOPIC PROCTECTOMY N/A 5/24/2018    Procedure: PROCTECTOMY-LAPAROSCOPIC/CONVERTED TO OPEN;  Surgeon: JENNY Virgen MD;  Location: [facility] OR 2ND FLR;  Service: Colon and Rectal;  Laterality: N/A;    LYSIS OF ADHESIONS  5/24/2018    Procedure: LYSIS, ADHESIONS/ more than 2hours;  Surgeon: JENNY Virgen MD;  Location: [facility] OR 2ND FLR;  Service: Colon and Rectal;;    OOPHORECTOMY      PANNICULECTOMY Bilateral 12/19/2019    Procedure: PANNICULECTOMY;  Surgeon: Andrea Alvarado MD;  Location: [facility] OR 2ND FLR;  Service: Plastics;  Laterality: Bilateral;    REVISION COLOSTOMY N/A 12/19/2019    Procedure: REVISION, COLOSTOMY;  Surgeon: JENNY Virgen MD;  Location: [facility] OR 2ND FLR;  Service: Colon and Rectal;  Laterality: N/A;    thumb surgery      TONSILLECTOMY, ADENOIDECTOMY      ULNAR COLLATERAL LIGAMENT RECONSTRUCTION Left 6/20/2024    Procedure: RECONSTRUCTION, LIGAMENT, ULNAR COLLATERAL;  Surgeon: Etelvina Nicholas MD;  Location: [facility] OR;  Service: Orthopedics;  Laterality: Left;    WISDOM TOOTH EXTRACTION       Family History   Problem Relation Name Age of Onset    Cancer Mother          skin    Cirrhosis Mother      Heart disease Father  67    Colon cancer Maternal Uncle      Colon cancer Maternal Uncle      Cancer Maternal Grandfather          colon     Colon cancer Maternal Grandfather      Cancer Paternal Grandfather          ?    Crohn's disease Neg Hx      Ulcerative colitis Neg Hx      Stomach cancer Neg Hx      Esophageal cancer Neg Hx      Celiac disease Neg Hx      Breast cancer Neg Hx      Ovarian cancer Neg Hx       Social History     Tobacco Use    Smoking status: Former     Current packs/day: 0.00     Average packs/day: 1  pack/day for 25.0 years (25.0 ttl pk-yrs)     Types: Cigarettes     Start date: 1984     Quit date: 2009     Years since quitting: 15.7    Smokeless tobacco: Never   Substance Use Topics    Alcohol use: Never     Comment: extremely rarely (once a year)    Drug use: Never     Frequency: 7.0 times per week     Types: Marijuana     Comment: gummies nightly     Review of Systems    Physical Exam     Initial Vitals [10/08/24 1400]   BP Pulse Resp Temp SpO2   117/61 65 18 98.4 °F (36.9 °C) 99 %      MAP       --         Physical Exam    Nursing note and vitals reviewed.  Constitutional: She appears well-developed and well-nourished. No distress.   HENT:   Head: Normocephalic. Mouth/Throat: Oropharynx is clear and moist.   Eyes: Conjunctivae are normal. Pupils are equal, round, and reactive to light. No scleral icterus.   Neck: Neck supple.   Cardiovascular:  Normal rate and regular rhythm.           Pulmonary/Chest: Breath sounds normal. No stridor. No respiratory distress.   Abdominal: Abdomen is soft. She exhibits no distension. There is no abdominal tenderness.   Ileostomy in place in RLQ without surrounding erythema, induration, or warmth. No appreciable abdominal tenderness on exam    Musculoskeletal:         General: No edema.      Cervical back: Neck supple.     Neurological: She is alert. GCS score is 15. GCS eye subscore is 4. GCS verbal subscore is 5. GCS motor subscore is 6.   Skin: Skin is warm and dry. No rash noted.         ED Course   Procedures  Labs Reviewed   CBC W/ AUTO DIFFERENTIAL - Abnormal       Result Value    WBC 6.78      RBC 5.14      Hemoglobin 14.9      Hematocrit 46.9      MCV 91      MCH 29.0      MCHC 31.8 (*)     RDW 14.2      Platelets 312      MPV 10.5      Immature Granulocytes 0.3      Gran # (ANC) 3.7      Immature Grans (Abs) 0.02      Lymph # 2.2      Mono # 0.5      Eos # 0.4      Baso # 0.06      nRBC 0      Gran % 54.5      Lymph % 32.0      Mono % 7.1      Eosinophil % 5.2       Basophil % 0.9      Differential Method Automated     C-REACTIVE PROTEIN - Abnormal    CRP 8.9 (*)    COMPREHENSIVE METABOLIC PANEL - Abnormal    Sodium 138      Potassium 3.3 (*)     Chloride 108      CO2 23      Glucose 104      BUN 12      Creatinine 0.9      Calcium 9.2      Total Protein 6.8      Albumin 3.3 (*)     Total Bilirubin 0.5      Alkaline Phosphatase 270 (*)     AST 70 (*)     ALT 88 (*)     eGFR >60.0      Anion Gap 7 (*)    LIPASE    Lipase 22          ECG Results              EKG 12-lead (Final result)        Collection Time Result Time QRS Duration OHS QTC Calculation    10/08/24 16:49:57 10/09/24 07:58:20 96 361                     Final result by Interface, Lab In [facility] (10/09/24 07:58:29)                   Narrative:    Test Reason : R10.9,    Vent. Rate : 049 BPM     Atrial Rate : 049 BPM     P-R Int : 182 ms          QRS Dur : 096 ms      QT Int : 400 ms       P-R-T Axes : 054 025 054 degrees     QTc Int : 361 ms    Sinus bradycardia  Septal infarct (cited on or before 04-OCT-2024)  Abnormal ECG  When compared with ECG of 04-OCT-2024 14:00,  T wave inversion no longer evident in Lateral leads  Confirmed by FLORINDA SNIDER MD ([id number]) on 10/9/2024 7:58:19 AM    Referred By: [email or web address]   SELF           Confirmed By:FLORINDA SNIDER MD                                  Imaging Results    None          Medications   dicyclomine capsule 20 mg (20 mg Oral Given 10/8/24 1826)   ondansetron injection 4 mg (4 mg Intravenous Given 10/8/24 1925)     Medical Decision Making  60 y/o presents with acute on chronic abdominal pain.     Patient is afebrile, hemodynamically stable, and in no acute distress on arrival.     Differential diagnoses considered include, but not limited to: Crohns flare, IBS, functional abdominal pain    She has had cholecystectomy, total colectomy, and appendectomy.  Having normal output through ileostomy.     Labs with slightly elevated CRP, normal cbc. CMP with LFT elevations, similar  to prior. I discussed with patient that I do not believe she is having an active crohns flare as her crp is not consistent with such. I discussed LFTs are elevated, which she says has been chronic due to fatty liver but I encouraged her to follow up with GI regarding these elevations. She reported some relief with bentyl so prescription provided. Discussed plan for discharge home, follow up with pcp and GI, and return precautions and patient expressed understanding and agreement.     Amount and/or Complexity of Data Reviewed  Labs: ordered.    Risk  Prescription drug management.                                      Clinical Impression:  Final diagnoses:  [R10.9] Abdominal pain (Primary)  [R19.7] Diarrhea, unspecified type          ED Disposition Condition    Discharge Stable          ED Prescriptions       Medication Sig Dispense Start Date End Date Auth. Provider    dicyclomine (BENTYL) 20 mg tablet  (Status: Discontinued) Take 1 tablet (20 mg total) by mouth 2 (two) times daily. 20 tablet 10/8/2024 10/8/2024 Willow Velarde MD    dicyclomine (BENTYL) 20 mg tablet Take 1 tablet (20 mg total) by mouth 2 (two) times daily. 20 tablet 10/8/2024 11/7/2024 Willow Velarde MD          Follow-up Information       Follow up With Specialties Details Why Contact Info Additional Information    [facility] 4th Fl Gastroenterology   [address] [id number] [phone] GI Center & Urology - Main Building, 4th Floor Please park in [facility] and take [facility] elevator    [facility] - Emergency Dept Emergency Medicine  If symptoms worsen [address] [id number] [phone]              Willow Velarde MD  Resident  10/09/24 8051

## 2024-10-08 NOTE — FIRST PROVIDER EVALUATION
"Medical screening examination initiated.  I have conducted a focused provider triage encounter, findings are as follows:    Brief history of present illness:  58 yo F w/ h/o colectomy secondary to IBD. Seen 10/5-6 for abdominal pain. Has diverting ileostomy. Reports anorexia, decreased ostomy output for 2 days but now normal  GI follow up 11/11/24    Vitals:    10/08/24 1400   BP: 117/61   Pulse: 65   Resp: 18   Temp: 98.4 °F (36.9 °C)   SpO2: 99%   Weight: 98.4 kg (217 lb)   Height: 5' 4" (1.626 m)       Pertinent physical exam:  appears uncomfortable.    Brief workup plan:  abdominal pain order set    Preliminary workup initiated; this workup will be continued and followed by the physician or advanced practice provider that is assigned to the patient when roomed.  "

## 2024-10-08 NOTE — ED TRIAGE NOTES
Pt arrives to ED with complaints of abdominal pain. Pt reports having this pain for the passed 4 days in her LUQ. Pt has hx of Crohn's disease. She also reports nausea the passed 4 days and vomited yesterday morning .Pt denies SOB and CP

## 2024-10-08 NOTE — TELEPHONE ENCOUNTER
OOC RN  Patient went to ED 3 days ago, ab pain with ileostomy,   she double up on her meds,  and still 7/10   levsin not working.     Care advise need to go to ED.  Now, she states she has a UTI,  Man voice from behind the phone.  Advised that she was already triage to the ED.  Patient VU./  Patent disconnected call and refused to be triaged.   Reason for Disposition   SEVERE abdominal pain (e.g., excruciating)    Additional Information   Negative: Abdominal pain and pregnant 20 or more weeks   Negative: Sounds like a life-threatening emergency to the triager   Negative: Shock suspected (e.g., cold/pale/clammy skin, too weak to stand, low BP, rapid pulse)   Negative: Passed out (e.g., fainted, lost consciousness, blacked out and was not responding)    Protocols used: Abdominal Pain - Female-A-[place]

## 2024-10-09 LAB
OHS QRS DURATION: 96 MS
OHS QTC CALCULATION: 361 MS

## 2024-10-09 NOTE — ED NOTES
Pt left prior to nurse discharge and removal of IV. Pt's discharge papers found ripped in half on the floor of the ED room. RN called pt to ask her to return to have her IV removed. Pt stated that she removed the IV and then proceeded to hang up. Pt refusing to answer any phone calls at this time. Charge RN aware.

## 2024-10-09 NOTE — DISCHARGE INSTRUCTIONS
Thank you for coming to [facility]. It was a pleasure taking care of you.     Your liver enzymes were slightly elevated today and imaging was concerning for cirrhosis. Please follow up with GI regarding these findings.

## 2024-10-22 ENCOUNTER — OFFICE VISIT (OUTPATIENT)
Dept: HEPATOLOGY | Facility: CLINIC | Age: 59
End: 2024-10-22
Payer: MEDICARE

## 2024-10-22 ENCOUNTER — TELEPHONE (OUTPATIENT)
Dept: HEPATOLOGY | Facility: CLINIC | Age: 59
End: 2024-10-22

## 2024-10-22 DIAGNOSIS — F43.0 ACUTE STRESS REACTION: ICD-10-CM

## 2024-10-22 DIAGNOSIS — K74.60 HEPATIC CIRRHOSIS, UNSPECIFIED HEPATIC CIRRHOSIS TYPE, UNSPECIFIED WHETHER ASCITES PRESENT: ICD-10-CM

## 2024-10-22 DIAGNOSIS — K76.0 FATTY LIVER: Primary | ICD-10-CM

## 2024-10-22 DIAGNOSIS — K73.9 CHRONIC HEPATITIS, UNSPECIFIED: ICD-10-CM

## 2024-10-22 DIAGNOSIS — R79.89 ELEVATED LIVER FUNCTION TESTS: ICD-10-CM

## 2024-10-22 DIAGNOSIS — K74.3 PRIMARY BILIARY CHOLANGITIS: ICD-10-CM

## 2024-10-22 PROCEDURE — 1160F RVW MEDS BY RX/DR IN RCRD: CPT | Mod: CPTII,95,, | Performed by: INTERNAL MEDICINE

## 2024-10-22 PROCEDURE — 1159F MED LIST DOCD IN RCRD: CPT | Mod: CPTII,95,, | Performed by: INTERNAL MEDICINE

## 2024-10-22 PROCEDURE — 99213 OFFICE O/P EST LOW 20 MIN: CPT | Mod: 95,,, | Performed by: INTERNAL MEDICINE

## 2024-10-22 PROCEDURE — 3044F HG A1C LEVEL LT 7.0%: CPT | Mod: CPTII,95,, | Performed by: INTERNAL MEDICINE

## 2024-10-22 RX ORDER — URSODIOL 300 MG/1
1500 CAPSULE ORAL DAILY
Qty: 150 CAPSULE | Refills: 11 | Status: SHIPPED | OUTPATIENT
Start: 2024-10-22

## 2024-10-22 NOTE — Clinical Note
--there are one time labs I would like her to do now --Then labs every 8 weeks- you will see standing orders --abdo US and AFP 04/25 --return 4 months

## 2024-10-22 NOTE — PATIENT INSTRUCTIONS
Restart zachariah 1500 mg daily  Labs every 8 weeks  Abdo US with AFP every 6 months-next due 04/25  EGD - next due 01/26  I will check an ammonia level with your next labs  Will recheck immunity to hep A  Get revaccinated for hep B  Return 4 months

## 2024-10-22 NOTE — PROGRESS NOTES
The patient location is: home  The chief complaint leading to consultation is: f/u AMA- PBC and elevated liver tests    Visit type: audiovisual    Face to Face time with patient: 15 minutes  30 minutes of total time spent on the encounter, which includes face to face time and non-face to face time preparing to see the patient (eg, review of tests), Obtaining and/or reviewing separately obtained history, Documenting clinical information in the electronic or other health record, Independently interpreting results (not separately reported) and communicating results to the patient/family/caregiver, or Care coordination (not separately reported).         Each patient to whom he or she provides medical services by telemedicine is:  (1) informed of the relationship between the physician and patient and the respective role of any other health care provider with respect to management of the patient; and (2) notified that he or she may decline to receive medical services by telemedicine and may withdraw from such care at any time.    Notes: HEPATOLOGY FOLLOW UP    Referring Physician: Cornell Correia MD  Current Corresponding Physician: Cornell Correia MD    Ida Orozco is here for follow up of Elevated Hepatic Enzymes      HPI  First visit with me. Saw [facility] in the past. Last visit 2022.    Ms. Orozco is a 59 y.o. female w/ complex PMH including IBD s/p multiple abdominal surgeries, initially presented with chronically elevated alk phos since at least 2014.    Liver biopsy 2021:  florid duct lesions with bile duct stasis and F3 fibrosis, minimal fatty liver  -reviewed in pathology conference 12/9/2021- suggested consistent with AMA negative PBC  --started on ursodiol 12/2/2021 with improvement of liver tests      MRCP 8/2021 suggested no retained stones, strictures, or dilatation of bile ducts  MRE Elastography 8/2021 suggested F1-2 fibrosis without fatty liver.      No history of hepatic decompensation previously or  sequelae portal hypertension, although mild splenomegaly (~13cm).    Interval History  Since Ida Orozco's last visit:  --stopped zachariah  --no zachariah since last visit with [facility] 2022  --dx w ADD-trouble concentrating  --labs have worsened:    Labs 10/22/24: ALT 57, AST 49, ALKP 227, Tbil 0.4    Abdo US 10/17/24: Liver: 14.2 cm, normal in size. Nodular contour with heterogeneous parenchymal echogenicity. no focal lesions. Spleen: 14 cm. Enlarged with homogeneous echotexture. No ascites        Outpatient Encounter Medications as of 10/22/2024   Medication Sig Dispense Refill    busPIRone (BUSPAR) 10 MG tablet Take 1 tablet (10 mg total) by mouth 3 (three) times daily as needed (anxiety). 90 tablet 11    ciprofloxacin HCl (CIPRO) 500 MG tablet Take 1 tablet (500 mg total) by mouth every 8 (eight) hours. for 10 days 30 tablet 0    ciprofloxacin-dexAMETHasone 0.3-0.1% (CIPRODEX) 0.3-0.1 % DrpS Place 4 drops into both ears 2 (two) times daily.      clonazePAM (KLONOPIN) 1 MG tablet Take 1 tablet (1 mg total) by mouth daily as needed for Anxiety. 90 tablet 1    dicyclomine (BENTYL) 20 mg tablet Take 1 tablet (20 mg total) by mouth 2 (two) times daily. 20 tablet 0    diphenoxylate-atropine 2.5-0.025 mg (LOMOTIL) 2.5-0.025 mg per tablet Take 1 tablet by mouth 4 (four) times daily as needed for Diarrhea. 30 tablet 2    ergocalciferol (VITAMIN D2) 50,000 unit Cap Take 1 capsule (50,000 Units total) by mouth every 7 days. 12 capsule 0    hyoscyamine (ANASPAZ,LEVSIN) 0.125 mg Tab Take 1 tablet (125 mcg total) by mouth every 6 (six) hours as needed (Abdominal spasms). 30 tablet 3    levocetirizine (XYZAL) 5 MG tablet Take 1 tablet (5 mg total) by mouth every evening. 90 tablet 3    loratadine (CLARITIN) 10 mg tablet Take 1 tablet (10 mg total) by mouth once daily. 90 tablet 3    meclizine (ANTIVERT) 25 mg tablet Take 1 tablet (25 mg total) by mouth 3 (three) times daily as needed for Dizziness or Nausea. 60 tablet 3    nystatin  (MYCOSTATIN) powder Apply topically 2 (two) times daily. 60 g 3    ondansetron (ZOFRAN) 4 MG tablet Take 1 tablet (4 mg total) by mouth every 6 (six) hours as needed for Nausea. 20 tablet 0    sertraline (ZOLOFT) 50 MG tablet Take 1 tablet (50 mg total) by mouth once daily. 90 tablet 1    topiramate (TOPAMAX) 25 MG tablet Take 1 tablet (25 mg total) by mouth 2 (two) times daily. 60 tablet 11    traZODone (DESYREL) 150 MG tablet Take 1 tablet (150 mg total) by mouth nightly. 90 tablet 1    [] cephALEXin (KEFLEX) 500 MG capsule Take 1 capsule (500 mg total) by mouth every 12 (twelve) hours. for 5 days 10 capsule 0    [DISCONTINUED] adalimumab (HUMIRA,CF, PEN) 40 mg/0.4 mL PnKt Inject 0.4 mLs (40 mg total) into the skin every 14 (fourteen) days. 6 pen 2     No facility-administered encounter medications on file as of 10/22/2024.     Review of patient's allergies indicates:   Allergen Reactions    Adhesive      Cause blisters    Dilaudid [hydromorphone] Nausea And Vomiting    Humira [adalimumab] Hives    Sulfa (sulfonamide antibiotics) Hives    Surgical stainless steel      Had surgical staples, caused irritation and infection     Past Medical History:   Diagnosis Date    Anxiety     Asthma     Last ER visit with covid    Bradycardia     Bronchitis     Depression     Elevated liver function tests 10/22/2024    Esophageal ulcer 2014    Fatty liver disease, nonalcoholic     Gallstones     GERD (gastroesophageal reflux disease)     History of sleeve gastrectomy 2016    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    HENT: Negative.     Eyes: Negative.    Respiratory: Negative.     Cardiovascular: Negative.    Gastrointestinal: Negative.    Genitourinary: Negative.     Musculoskeletal: Negative.    Skin: Negative.    Neurological: Negative.    Psychiatric/Behavioral: Negative.       There were no vitals filed for this visit.    Physical Exam    Computed MELD 3.0 unavailable. One or more values for this score either were not found within the given timeframe or did not fit some other criterion.  Computed MELD-Na unavailable. One or more values for this score either were not found within the given timeframe or did not fit some other criterion.      Lab Results   Component Value Date     (H) 10/10/2024    BUN 13 10/10/2024    CREATININE 1.1 10/10/2024    CALCIUM 9.2 10/10/2024     10/10/2024    K 3.8 10/10/2024     10/10/2024    PROT 7.3 10/10/2024    CO2 24 10/10/2024    ANIONGAP 9 10/10/2024    WBC 5.47 10/17/2024    RBC 4.79 10/17/2024    HGB 14.1 10/17/2024    HCT 45.0 10/17/2024    MCV 94 10/17/2024    MCH 29.4 10/17/2024    MCHC 31.3 (L) 10/17/2024     Lab Results   Component Value Date    RDW 13.2 10/17/2024     10/17/2024    MPV 9.9 10/17/2024    GRAN 2.7 10/17/2024    GRAN 49.7 10/17/2024    LYMPH 1.5 10/17/2024    LYMPH 27.6 10/17/2024    MONO 0.5 10/17/2024    MONO 9.7 10/17/2024    EOSINOPHIL 11.3 (H) 10/17/2024    BASOPHIL 1.5 10/17/2024    EOS 0.6 (H) 10/17/2024    BASO 0.08 10/17/2024    GROUPTRH O POS 12/19/2019    BNP 22 03/31/2020    CHOL 169 09/06/2024    TRIG 57 09/06/2024    HDL 69 09/06/2024    CHOLHDL 40.8 09/06/2024    TOTALCHOLEST 2.4 09/06/2024    ALBUMIN 3.6 10/10/2024    BILIDIR 0.2 05/02/2022    AST 68 (H) 10/10/2024    ALT 82 (H) 10/10/2024    ALKPHOS 256 (H) 10/10/2024    MG 1.9 05/03/2023    LABPROT 11.1 03/29/2022    INR 1.0 03/29/2022       Assessment and Plan:  Ida Orozco is a 59 y.o. female with Elevated Hepatic Enzymes  She has bx proven AMA- PBC. Liver enzymes normalized previously with zachariah. Pt discontinued and now liver tests are elevated again. Recommend restart zachariah 1500 mg daily. Imaging suggests progression to  cirrhosis but remains compensated. Recommend labs every 8 weeks and abdo US with AFP every 6 months (due next -4/25). Will check ammonia to make sure issues with concentration are not live related. Will also confirm pt is immune to both hep A and B. If she is not will recommend vaccination. Also recommend repeat EGD to screen for varices 01/26.    Return 4 months

## 2024-10-23 DIAGNOSIS — K74.60 HEPATIC CIRRHOSIS, UNSPECIFIED HEPATIC CIRRHOSIS TYPE, UNSPECIFIED WHETHER ASCITES PRESENT: Primary | ICD-10-CM

## 2024-10-25 ENCOUNTER — PATIENT MESSAGE (OUTPATIENT)
Dept: PSYCHIATRY | Facility: CLINIC | Age: 59
End: 2024-10-25
Payer: MEDICARE

## 2024-10-25 ENCOUNTER — OFFICE VISIT (OUTPATIENT)
Dept: CARDIOLOGY | Facility: CLINIC | Age: 59
End: 2024-10-25
Payer: MEDICARE

## 2024-10-25 VITALS
RESPIRATION RATE: 16 BRPM | BODY MASS INDEX: 35.35 KG/M2 | HEART RATE: 65 BPM | OXYGEN SATURATION: 97 % | WEIGHT: 212.19 LBS | SYSTOLIC BLOOD PRESSURE: 108 MMHG | HEIGHT: 65 IN | DIASTOLIC BLOOD PRESSURE: 70 MMHG

## 2024-10-25 DIAGNOSIS — R42 VERTIGO: ICD-10-CM

## 2024-10-25 DIAGNOSIS — E78.5 HYPERLIPIDEMIA, UNSPECIFIED HYPERLIPIDEMIA TYPE: ICD-10-CM

## 2024-10-25 DIAGNOSIS — R00.1 SINUS BRADYCARDIA: ICD-10-CM

## 2024-10-25 DIAGNOSIS — R00.2 PALPITATIONS: Primary | ICD-10-CM

## 2024-10-25 PROCEDURE — 99999 PR PBB SHADOW E&M-EST. PATIENT-LVL V: CPT | Mod: PBBFAC,,,

## 2024-10-25 RX ORDER — AZELASTINE 1 MG/ML
SPRAY, METERED NASAL
COMMUNITY
Start: 2024-10-17

## 2024-10-25 RX ORDER — FLUTICASONE PROPIONATE 50 MCG
SPRAY, SUSPENSION (ML) NASAL
COMMUNITY
Start: 2024-10-17

## 2024-10-25 RX ORDER — ROSUVASTATIN CALCIUM 10 MG/1
10 TABLET, COATED ORAL DAILY
Qty: 90 TABLET | Refills: 3 | Status: SHIPPED | OUTPATIENT
Start: 2024-10-25 | End: 2025-10-25

## 2024-10-25 RX ORDER — PREDNISONE 10 MG/1
TABLET ORAL
COMMUNITY
Start: 2024-10-17

## 2024-10-25 RX ORDER — TOPIRAMATE 100 MG/1
100 TABLET, FILM COATED ORAL 2 TIMES DAILY
COMMUNITY
Start: 2024-09-04

## 2024-10-25 RX ORDER — PANTOPRAZOLE SODIUM 40 MG/1
40 TABLET, DELAYED RELEASE ORAL
COMMUNITY
Start: 2024-07-18

## 2024-10-25 RX ORDER — HYDROXYZINE HYDROCHLORIDE 25 MG/1
25 TABLET, FILM COATED ORAL 3 TIMES DAILY
COMMUNITY
Start: 2024-09-16

## 2024-10-25 NOTE — PROGRESS NOTES
[facility] - Cardiology [facility] [address]  Cardiology Clinic Note      Chief Complaint  Chief Complaint   Patient presents with    Bradycardia       HPI:  Ms. Orozco is a 59-year-old female with a past medical history anxiety, bipolar 1 disorder, MDD, GERD, gastric sleeve, DDD, carpal tunnel syndrome, vertigo, hyperlipidemia, sinus bradycardia, asthma, GERD, osteoarthritis    Patient is new to me and here to establish care  Previously seen by Kelly Mata NP  Here today with spouse who offers a great deal of support with care  She has complaints of palpitations and sinus bradycardia (noted on home device)  Reports a history of vertigo in which she often has syncopal episodes  Denies chest pain, SOB, or difficulty breathing  Denies cough, LE edema, orthopnea, or PND  Denies falls or head injures  Denies easy bruising, hematochezia, or hemoptysis  Denies fever, chills, or NVD  Denies any recent travels or close contact with sick individuals  Denies tobacco or alcohol use  She is independent and active without any debilities     Medications  Current Outpatient Medications   Medication Sig Dispense Refill    azelastine (ASTELIN) 137 mcg (0.1 %) nasal spray 1 SPRAY EACH NOSTRIL NASALLY TWICE A DAY 30      busPIRone (BUSPAR) 10 MG tablet Take 1 tablet (10 mg total) by mouth 3 (three) times daily as needed (anxiety). 90 tablet 11    ciprofloxacin HCl (CIPRO) 500 MG tablet Take 1 tablet (500 mg total) by mouth every 8 (eight) hours. for 10 days 30 tablet 0    ciprofloxacin-dexAMETHasone 0.3-0.1% (CIPRODEX) 0.3-0.1 % DrpS Place 4 drops into both ears 2 (two) times daily.      clonazePAM (KLONOPIN) 1 MG tablet Take 1 tablet (1 mg total) by mouth daily as needed for Anxiety. 90 tablet 1    dicyclomine (BENTYL) 20 mg tablet Take 1 tablet (20 mg total) by mouth 2 (two) times daily. 20 tablet 0    diphenoxylate-atropine 2.5-0.025 mg (LOMOTIL) 2.5-0.025 mg per tablet Take 1 tablet by mouth 4 (four) times daily as needed for Diarrhea. 30  tablet 2    ergocalciferol (VITAMIN D2) 50,000 unit Cap Take 1 capsule (50,000 Units total) by mouth every 7 days. 12 capsule 0    fluticasone propionate (FLONASE) 50 mcg/actuation nasal spray 1 SPRAY IN EACH NOSTRIL NASALLY TWICE DAILY AS NEEDED 30      hydrOXYzine HCL (ATARAX) 25 MG tablet Take 25 mg by mouth 3 (three) times daily.      hyoscyamine (ANASPAZ,LEVSIN) 0.125 mg Tab Take 1 tablet (125 mcg total) by mouth every 6 (six) hours as needed (Abdominal spasms). 30 tablet 3    levocetirizine (XYZAL) 5 MG tablet Take 1 tablet (5 mg total) by mouth every evening. 90 tablet 3    loratadine (CLARITIN) 10 mg tablet Take 1 tablet (10 mg total) by mouth once daily. 90 tablet 3    meclizine (ANTIVERT) 25 mg tablet Take 1 tablet (25 mg total) by mouth 3 (three) times daily as needed for Dizziness or Nausea. 60 tablet 3    nystatin (MYCOSTATIN) powder Apply topically 2 (two) times daily. 60 g 3    ondansetron (ZOFRAN) 4 MG tablet Take 1 tablet (4 mg total) by mouth every 6 (six) hours as needed for Nausea. 20 tablet 0    pantoprazole (PROTONIX) 40 MG tablet Take 40 mg by mouth.      predniSONE (DELTASONE) 10 MG tablet 3 TABS ONCE DAILY FOR 3 DAYS, THEN 2 TABS ONCE DAILY FOR 3 DAYS, THEN 1 TAB ONCE DAILY FOR 3 DAYS ORALLY ONCE A DAY 9      sertraline (ZOLOFT) 50 MG tablet Take 1 tablet (50 mg total) by mouth once daily. 90 tablet 1    topiramate (TOPAMAX) 100 MG tablet Take 100 mg by mouth 2 (two) times daily.      topiramate (TOPAMAX) 25 MG tablet Take 1 tablet (25 mg total) by mouth 2 (two) times daily. 60 tablet 11    traZODone (DESYREL) 150 MG tablet Take 1 tablet (150 mg total) by mouth nightly. 90 tablet 1    ursodioL (ACTIGALL) 300 mg capsule Take 5 capsules (1,500 mg total) by mouth once daily. 150 capsule 11    rosuvastatin (CRESTOR) 10 MG tablet Take 1 tablet (10 mg total) by mouth once daily. 90 tablet 3     No current facility-administered medications for this visit.        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, with liver fibrosis, on ursodiol 2023    PTSD (post-traumatic stress disorder)     Seasonal allergies     Symptomatic abdominal panniculus     Ulcerative colitis     Vertigo      Past Surgical History:   Procedure Laterality Date    APPENDECTOMY      CARPAL TUNNEL RELEASE Left 2024    Procedure: RELEASE, CARPAL TUNNEL;  Surgeon: Etelvina Nicholas MD;  Location: [facility] OR;  Service: Orthopedics;  Laterality: Left;    CATHETERIZATION OF BOTH LEFT AND RIGHT HEART Right 2019    Procedure: CATHETERIZATION, HEART, BOTH LEFT AND RIGHT;  Surgeon: Beto Malin MD;  Location: [facility] CATH LAB;  Service: Cardiology;  Laterality: Right;     SECTION, LOW TRANSVERSE      CHOLECYSTECTOMY      open    COLECTOMY      total- 2013    CYSTOSCOPY W/ RETROGRADES N/A 2018    Procedure: CYSTOSCOPY, WITH RETROGRADE PYELOGRAM;  Surgeon: Butch Banks MD;  Location: [facility] OR;  Service: Urology;  Laterality: N/A;  HANSEN SURGICAL CONFIRMED     DECOMPRESSION OF NERVE Left 2024    Procedure: DECOMPRESSION, NERVE ULNAR NERVE ELBOW;  Surgeon: Etelvina Nicholas MD;  Location: [facility];  Service: Orthopedics;  Laterality: Left;    ENDOSCOPIC ULTRASOUND OF UPPER GASTROINTESTINAL TRACT N/A 11/10/2021    Procedure: ULTRASOUND, UPPER GI TRACT, ENDOSCOPIC;  Surgeon: Nhan Dee MD;  Location: [facility] ENDO ([address] [place] [address]);  Service: Endoscopy;  Laterality: N/A;  MD Milena Whitehead PA-C; Quinn Whitman MD; Miladys Velasquez MA  Caller: Unspecified (3 days ago,  1:20 PM)  Fair enough. Neha, please schedule.       -----  Message -----   From: Milena Murillo PA-C   Sent: 8/11/2021  1    ESOPHAGOGASTRODUODENOSCOPY  04/11/2018    Dr. Castorena: LA Grade A reflux esophagitis, hiatal hernia; Ectopic gastric mucosa in the upper third of the esophagus; gastric sleeve intact with unremarkable findings, gastritis; biopsy: stomach- Mild chronic antral and oxyntic gastritis, no activity, negative for h pylori    ESOPHAGOGASTRODUODENOSCOPY N/A 11/10/2021    Procedure: EGD (ESOPHAGOGASTRODUODENOSCOPY);  Surgeon: Nhan Dee MD;  Location: [facility] (2ND FLR);  Service: Endoscopy;  Laterality: N/A;    ESOPHAGOGASTRODUODENOSCOPY N/A 1/23/2024    Procedure: EGD (ESOPHAGOGASTRODUODENOSCOPY);  Surgeon: Anatoly Singh MD;  Location: [facility] (4TH FLR);  Service: Endoscopy;  Laterality: N/A;  Ref By: magdaleno Stover sent via portal Half a miralax prep.AC  1/17-lvm for precall-MS  1/18-pt confirmed appt-Kpvt    FESS, WITH IMAGING GUIDANCE Bilateral 4/4/2023    Procedure: Pan Sinus FESS, WITH IMAGING GUIDANCE Disc loaded;  Surgeon: Jay Hernandez MD;  Location: [facility] OR;  Service: ENT;  Laterality: Bilateral;  balloon dilation of sinuses    FLEXIBLE SIGMOIDOSCOPY  5/24/2018    Procedure: SIGMOIDOSCOPY, FLEXIBLE;  Surgeon: JENNY Virgen MD;  Location: [facility] 2ND FLR;  Service: Colon and Rectal;;    FLEXIBLE SIGMOIDOSCOPY  04/11/2018    Dr. Castorena: Non-patent surgical anastomosis, characterized by friable mucosa.    GASTRIC SLEEVE       HAND ARTHROTOMY Right 6/20/2024    Procedure: ARTHROTOMY, HAND;  Surgeon: Etelvina Nicholas MD;  Location: [facility];  Service: Orthopedics;  Laterality: Right;    HYSTERECTOMY      ILEOSCOPY  04/11/2018    Dr. Castorena: Patient is status-post total colectomy with end ileostomy. unremarkable findings    ILEOSCOPY N/A 1/23/2024    Procedure: ILEOSCOPY;  Surgeon: Anatoly Singh MD;  Location: [facility] (4TH FLR);  Service: Endoscopy;  Laterality: N/A;    ILEOSTOMY REVISION      april 2014; august 2014     INJECTION OF STEROID Right 2/27/2024    Procedure: INJECTION, STEROID CARPAL TUNNEL;  Surgeon: Etelvina Nicholas MD;  Location: [facility] OR;  Service: Orthopedics;  Laterality: Right;    INJECTION OF STEROID Right 6/20/2024    Procedure: INJECTION, STEROID Radiocarpal wrist;  Surgeon: Etelvina Nicholas MD;  Location: [facility] OR;  Service: Orthopedics;  Laterality: Right;    LAPAROSCOPIC PROCTECTOMY N/A 5/24/2018    Procedure: PROCTECTOMY-LAPAROSCOPIC/CONVERTED TO OPEN;  Surgeon: JENNY Virgen MD;  Location: [facility] OR 2ND FLR;  Service: Colon and Rectal;  Laterality: N/A;    LYSIS OF ADHESIONS  5/24/2018    Procedure: LYSIS, ADHESIONS/ more than 2hours;  Surgeon: JENNY Virgen MD;  Location: [facility] OR 2ND FLR;  Service: Colon and Rectal;;    OOPHORECTOMY      PANNICULECTOMY Bilateral 12/19/2019    Procedure: PANNICULECTOMY;  Surgeon: Andrea Alvarado MD;  [facility] 2ND FLR;  Service: Plastics;  Laterality: Bilateral;    REVISION COLOSTOMY N/A 12/19/2019    Procedure: REVISION, COLOSTOMY;  Surgeon: JENNY Virgen MD;  Location: [facility] OR 2ND FLR;  Service: Colon and Rectal;  Laterality: N/A;    thumb surgery      TONSILLECTOMY, ADENOIDECTOMY      ULNAR COLLATERAL LIGAMENT RECONSTRUCTION Left 6/20/2024    Procedure: RECONSTRUCTION, LIGAMENT, ULNAR COLLATERAL;  Surgeon: Etelvina Nicholas MD;  Location: [facility] OR;  Service: Orthopedics;  Laterality: Left;    WISDOM TOOTH EXTRACTION       Social History     Socioeconomic History    Marital status:    Tobacco Use    Smoking status: Former     Current packs/day: 0.00     Average packs/day: 1 pack/day for 25.0 years (25.0 ttl pk-yrs)     Types: Cigarettes     Start date: 1984     Quit date: 2009     Years since quitting: 15.8    Smokeless tobacco: Never   Substance and Sexual Activity    Alcohol use: Never     Comment: extremely rarely (once a year)    Drug use: Never     Frequency: 7.0 times per week     Types: Marijuana     Comment: gummies nightly    Sexual  activity: Not Currently     Birth control/protection: See Surgical Hx     Social Drivers of Health     Financial Resource Strain: Patient Declined (11/15/2023)    Overall Financial Resource Strain (CARDIA)     Difficulty of Paying Living Expenses: Patient declined   Food Insecurity: Patient Declined (11/15/2023)    Hunger Vital Sign     Worried About Running Out of Food in the Last Year: Patient declined     Ran Out of Food in the Last Year: Patient declined   Transportation Needs: Patient Declined (11/15/2023)    PRAPARE - Transportation     Lack of Transportation (Medical): Patient declined     Lack of Transportation (Non-Medical): Patient declined   Physical Activity: Unknown (11/15/2023)    Exercise Vital Sign     Days of Exercise per Week: Patient declined     Minutes of Exercise per Session: 0 min   Stress: Patient Declined (11/15/2023)    [employer] - Occupational Stress Questionnaire     Feeling of Stress : Patient declined   Housing Stability: Unknown (11/15/2023)    Housing Stability Vital Sign     Unable to Pay for Housing in the Last Year: Patient refused     Unstable Housing in the Last Year: Patient refused     Family History   Problem Relation Name Age of Onset    Cancer Mother          skin    Cirrhosis Mother      Heart disease Father  67    Colon cancer Maternal Uncle      Colon cancer Maternal Uncle      Cancer Maternal Grandfather          colon     Colon cancer Maternal Grandfather      Cancer Paternal Grandfather          ?    Crohn's disease Neg Hx      Ulcerative colitis Neg Hx      Stomach cancer Neg Hx      Esophageal cancer Neg Hx      Celiac disease Neg Hx      Breast cancer Neg Hx      Ovarian cancer Neg Hx          Allergies  Review of patient's allergies indicates:   Allergen Reactions    Adhesive      Cause blisters    Dilaudid [hydromorphone] Nausea And Vomiting    Humira [adalimumab] Hives    Sulfa (sulfonamide antibiotics) Hives    Surgical stainless  steel      Had surgical staples, caused irritation and infection       Review of Systems   Review of Systems   Constitutional: Negative for chills, decreased appetite, diaphoresis, fever, malaise/fatigue, weight gain and weight loss.   Eyes:  Negative for blurred vision.   Cardiovascular:  Positive for palpitations. Negative for chest pain, claudication, dyspnea on exertion, irregular heartbeat, leg swelling, near-syncope, orthopnea, paroxysmal nocturnal dyspnea and syncope.   Respiratory:  Negative for cough, shortness of breath, snoring, sputum production and wheezing.    Endocrine: Negative for cold intolerance, heat intolerance, polydipsia, polyphagia and polyuria.   Skin:  Negative for color change, dry skin, itching, nail changes and poor wound healing.   Musculoskeletal:  Negative for back pain, gout, joint pain and joint swelling.   Gastrointestinal:  Negative for bloating, abdominal pain, constipation, diarrhea, hematemesis, hematochezia, melena, nausea and vomiting.   Genitourinary:  Negative for dysuria and hematuria.   Neurological:  Positive for vertigo. Negative for dizziness, headaches, light-headedness, numbness, paresthesias and weakness.   Psychiatric/Behavioral:  Negative for altered mental status, depression and memory loss.        Physical Exam  Vitals:    10/25/24 1328   BP: 108/70   Pulse: 65   Resp: 16     Wt Readings from Last 1 Encounters:   10/25/24 96.2 kg (212 lb 3.1 oz)     Physical Exam  Constitutional:       General: She is not in acute distress.  HENT:      Head: Normocephalic and atraumatic.      Mouth/Throat:      Mouth: Mucous membranes are moist.   Eyes:      Extraocular Movements: Extraocular movements intact.      Pupils: Pupils are equal, round, and reactive to light.   Neck:      Vascular: No carotid bruit or JVD.   Cardiovascular:      Rate and Rhythm: Normal rate and regular rhythm.      Heart sounds: No murmur heard.     No friction rub. No gallop.   Pulmonary:       Effort: Pulmonary effort is normal.      Breath sounds: Normal breath sounds.   Abdominal:      General: Abdomen is flat.      Palpations: Abdomen is soft.   Musculoskeletal:      Right lower leg: No edema.      Left lower leg: No edema.   Skin:     General: Skin is warm.      Capillary Refill: Capillary refill takes less than 2 seconds.   Neurological:      General: No focal deficit present.   Psychiatric:         Mood and Affect: Mood normal.         Labs  Lab Visit on 10/22/2024   Component Date Value Ref Range Status    WBC 10/22/2024 9.85  3.90 - 12.70 K/uL Final    RBC 10/22/2024 4.98  4.00 - 5.40 M/uL Final    Hemoglobin 10/22/2024 14.8  12.0 - 16.0 g/dL Final    Hematocrit 10/22/2024 45.9  37.0 - 48.5 % Final    MCV 10/22/2024 92  82 - 98 fL Final    MCH 10/22/2024 29.7  27.0 - 31.0 pg Final    MCHC 10/22/2024 32.2  32.0 - 36.0 g/dL Final    RDW 10/22/2024 13.2  11.5 - 14.5 % Final    Platelets 10/22/2024 311  150 - 450 K/uL Final    MPV 10/22/2024 10.0  9.2 - 12.9 fL Final    Immature Granulocytes 10/22/2024 0.4  0.0 - 0.5 % Final    Gran # (ANC) 10/22/2024 8.4 (H)  1.8 - 7.7 K/uL Final    Immature Grans (Abs) 10/22/2024 0.04  0.00 - 0.04 K/uL Final    Comment: Mild elevation in immature granulocytes is non specific and   can be seen in a variety of conditions including stress response,   acute inflammation, trauma and pregnancy. Correlation with other   laboratory and clinical findings is essential.      Lymph # 10/22/2024 1.0  1.0 - 4.8 K/uL Final    Mono # 10/22/2024 0.3  0.3 - 1.0 K/uL Final    Eos # 10/22/2024 0.1  0.0 - 0.5 K/uL Final    Baso # 10/22/2024 0.07  0.00 - 0.20 K/uL Final    nRBC 10/22/2024 0  0 /100 WBC Final    Gran % 10/22/2024 84.8 (H)  38.0 - 73.0 % Final    Lymph % 10/22/2024 10.2 (L)  18.0 - 48.0 % Final    Mono % 10/22/2024 3.2 (L)  4.0 - 15.0 % Final    Eosinophil % 10/22/2024 0.7  0.0 - 8.0 % Final    Basophil % 10/22/2024 0.7  0.0 - 1.9 % Final    Differential Method 10/22/2024  Automated   Final    Sodium 10/22/2024 137  136 - 145 mmol/L Final    Potassium 10/22/2024 3.7  3.5 - 5.1 mmol/L Final    Chloride 10/22/2024 107  95 - 110 mmol/L Final    CO2 10/22/2024 19 (L)  23 - 29 mmol/L Final    Glucose 10/22/2024 143 (H)  70 - 110 mg/dL Final    BUN 10/22/2024 21 (H)  6 - 20 mg/dL Final    Creatinine 10/22/2024 1.1  0.5 - 1.4 mg/dL Final    Calcium 10/22/2024 8.8  8.7 - 10.5 mg/dL Final    Total Protein 10/22/2024 7.4  6.0 - 8.4 g/dL Final    Albumin 10/22/2024 3.5  3.5 - 5.2 g/dL Final    Total Bilirubin 10/22/2024 0.4  0.1 - 1.0 mg/dL Final    Comment: For infants and newborns, interpretation of results should be based  on gestational age, weight and in agreement with clinical  observations.    Premature Infant recommended reference ranges:  Up to 24 hours.............<8.0 mg/dL  Up to 48 hours............<12.0 mg/dL  3-5 days..................<15.0 mg/dL  6-29 days.................<15.0 mg/dL      Alkaline Phosphatase 10/22/2024 227 (H)  40 - 150 U/L Final    AST 10/22/2024 49 (H)  10 - 40 U/L Final    ALT 10/22/2024 57 (H)  10 - 44 U/L Final    eGFR 10/22/2024 58.6 (A)  >60 mL/min/1.73 m^2 Final    Anion Gap 10/22/2024 11  8 - 16 mmol/L Final    Prothrombin Time 10/22/2024 12.0  9.0 - 12.5 sec Final    INR 10/22/2024 1.1  0.8 - 1.2 Final    Comment: Coumadin Therapy:  2.0 - 3.0 for INR for all indicators except mechanical heart valves  and antiphospholipid syndromes which should use 2.5 - 3.5.  LOT^040^PT Inn^[id number]      Bilirubin, Direct 10/22/2024 0.2  0.1 - 0.3 mg/dL Final    PEth 16:0/18.1 (POPEth) 10/22/2024 <10  Cutoff: 10 ng/mL Final    Comment: Phosphatidylethanol (PEth) homologues result interpretation    PEth 16:0/18:1 (POPEth)  Less than 10 ng/mL: Not detected  10 - 19 ng/mL: Abstinence or light alcohol consumption  (<2 drinks per day for several days a week)  20 - 200 ng/mL: Moderate alcohol consumption  (up to 4 drinks per day for several days a week)  Greater than 200  ng/mL: Heavy alcohol consumption or   chronic alcohol use (at least 4 drinks per day several days   a week)    (Reference: MAG Kelly and MICAH Pa 2018 J. Forensic Sci)      PEth 16:0/18.2 (PLPEth) 10/22/2024 <10  Cutoff: 10 ng/mL Final    Comment: PEth 16:0/18:2 (PLPEth)  Reference ranges are not well established      PETH INTERPRETATION 10/22/2024 Negative.   Final    Comment: -------------------ADDITIONAL INFORMATION-------------------  This report is intended for use in clinical monitoring and   management of patients.  It is not intended for use in   employment-related testing.  This test was developed and its performance characteristics   determined by [facility] in a manner consistent with CLIA   requirements. This test has not been cleared or approved by   the U.S. Food and Drug Administration.    Test Performed by:  [facility] [place] [address]  : Castillo Quijano Ph.D.; CLIA# [id number]      IgG 10/22/2024 1167  650 - 1600 mg/dL Final    IgG Cord Blood Reference Range: 650-1600 mg/dL.    MAR Screen 10/22/2024 Negative <1:80  Negative <1:80 Final    Comment: MAR test was performed by Immunofluorescence on HEP2 cells.     software ordered another centromere control       HIV 1/2 Ag/Ab 10/22/2024 Non-reactive  Non-reactive Final    Hepatitis A Antibody IgG 10/22/2024 Non-reactive   Final    IgG anti-HAV not detected.    Hep B S Ab 10/22/2024 3.37  mIU/mL Final    Hep B S Ab 10/22/2024 Non-reactive   Final    Individual is considered not immune to HBV infection.    AFP 10/22/2024 3.5  0.0 - 8.4 ng/mL Final    Comment: The testing method is a chemiluminescent microparticle immunoassay   manufactured by [employer] and performed on the    or   [employer] system. Values obtained with different assay manufacturers   for   methods may be different and cannot be used interchangeably.      AFP 10/22/2024 3.5  0.0 - 8.4  ng/mL Final    Comment: The testing method is a chemiluminescent microparticle immunoassay   manufactured by [employer] and performed on the    or   [facility] system. Values obtained with different assay manufacturers   for   methods may be different and cannot be used interchangeably.      Bile Acids Total 10/22/2024 21 (H)  <=10 mcmol/L Final    Comment: Test Performed by:  [place] [address]  : Castillo Quijano Ph.D.; CLIA# [id number]      Ammonia 10/22/2024 31  10 - 50 umol/L Final   Lab Visit on 10/17/2024   Component Date Value Ref Range Status    CRP 10/17/2024 4.4  0.0 - 8.2 mg/L Final    Sed Rate 10/17/2024 15  0 - 20 mm/Hr Final    WBC 10/17/2024 5.47  3.90 - 12.70 K/uL Final    RBC 10/17/2024 4.79  4.00 - 5.40 M/uL Final    Hemoglobin 10/17/2024 14.1  12.0 - 16.0 g/dL Final    Hematocrit 10/17/2024 45.0  37.0 - 48.5 % Final    MCV 10/17/2024 94  82 - 98 fL Final    MCH 10/17/2024 29.4  27.0 - 31.0 pg Final    MCHC 10/17/2024 31.3 (L)  32.0 - 36.0 g/dL Final    RDW 10/17/2024 13.2  11.5 - 14.5 % Final    Platelets 10/17/2024 268  150 - 450 K/uL Final    MPV 10/17/2024 9.9  9.2 - 12.9 fL Final    Immature Granulocytes 10/17/2024 0.2  0.0 - 0.5 % Final    Gran # (ANC) 10/17/2024 2.7  1.8 - 7.7 K/uL Final    Immature Grans (Abs) 10/17/2024 0.01  0.00 - 0.04 K/uL Final    Comment: Mild elevation in immature granulocytes is non specific and   can be seen in a variety of conditions including stress response,   acute inflammation, trauma and pregnancy. Correlation with other   laboratory and clinical findings is essential.      Lymph # 10/17/2024 1.5  1.0 - 4.8 K/uL Final    Mono # 10/17/2024 0.5  0.3 - 1.0 K/uL Final    Eos # 10/17/2024 0.6 (H)  0.0 - 0.5 K/uL Final    Baso # 10/17/2024 0.08  0.00 - 0.20 K/uL Final    nRBC 10/17/2024 0  0 /100 WBC Final    Gran % 10/17/2024 49.7  38.0 - 73.0 % Final    Lymph %  10/17/2024 27.6  18.0 - 48.0 % Final    Mono % 10/17/2024 9.7  4.0 - 15.0 % Final    Eosinophil % 10/17/2024 11.3 (H)  0.0 - 8.0 % Final    Basophil % 10/17/2024 1.5  0.0 - 1.9 % Final    Differential Method 10/17/2024 Automated   Final   Lab Visit on 10/10/2024   Component Date Value Ref Range Status    Sodium 10/10/2024 139  136 - 145 mmol/L Final    Potassium 10/10/2024 3.8  3.5 - 5.1 mmol/L Final    Chloride 10/10/2024 106  95 - 110 mmol/L Final    CO2 10/10/2024 24  23 - 29 mmol/L Final    Glucose 10/10/2024 117 (H)  70 - 110 mg/dL Final    BUN 10/10/2024 13  6 - 20 mg/dL Final    Creatinine 10/10/2024 1.1  0.5 - 1.4 mg/dL Final    Calcium 10/10/2024 9.2  8.7 - 10.5 mg/dL Final    Total Protein 10/10/2024 7.3  6.0 - 8.4 g/dL Final    Albumin 10/10/2024 3.6  3.5 - 5.2 g/dL Final    Total Bilirubin 10/10/2024 0.5  0.1 - 1.0 mg/dL Final    Comment: For infants and newborns, interpretation of results should be based  on gestational age, weight and in agreement with clinical  observations.    Premature Infant recommended reference ranges:  Up to 24 hours.............<8.0 mg/dL  Up to 48 hours............<12.0 mg/dL  3-5 days..................<15.0 mg/dL  6-29 days.................<15.0 mg/dL      Alkaline Phosphatase 10/10/2024 256 (H)  55 - 135 U/L Final    AST 10/10/2024 68 (H)  10 - 40 U/L Final    ALT 10/10/2024 82 (H)  10 - 44 U/L Final    eGFR 10/10/2024 58.6 (A)  >60 mL/min/1.73 m^2 Final    Anion Gap 10/10/2024 9  8 - 16 mmol/L Final   Lab Visit on 10/10/2024   Component Date Value Ref Range Status    Specimen UA 10/10/2024 Urine, Clean Catch   Final    Color, UA 10/10/2024 Yellow  Yellow, Straw, Pita Final    Appearance, UA 10/10/2024 Clear  Clear Final    pH, UA 10/10/2024 6.0  5.0 - 8.0 Final    Specific Gravity, UA 10/10/2024 1.025  1.005 - 1.030 Final    Protein, UA 10/10/2024 Trace (A)  Negative Final    Comment: Recommend a 24 hour urine protein or a urine   protein/creatinine ratio if globulin  induced proteinuria is  clinically suspected.      Glucose, UA 10/10/2024 Negative  Negative Final    Ketones, UA 10/10/2024 Negative  Negative Final    Bilirubin (UA) 10/10/2024 Negative  Negative Final    Occult Blood UA 10/10/2024 Negative  Negative Final    Nitrite, UA 10/10/2024 Negative  Negative Final    Urobilinogen, UA 10/10/2024 Negative  Negative EU/dL Final    Leukocytes, UA 10/10/2024 1+ (A)  Negative Final    RBC, UA 10/10/2024 1  0 - 4 /hpf Final    WBC, UA 10/10/2024 1  0 - 5 /hpf Final    Bacteria 10/10/2024 Rare  None-Occ /hpf Final    Squam Epithel, UA 10/10/2024 1  /hpf Final    Microscopic Comment 10/10/2024 SEE COMMENT   Final    Comment: Other formed elements not mentioned in the report are not   present in the microscopic examination.      Admission on 10/08/2024, Discharged on 10/08/2024   Component Date Value Ref Range Status    WBC 10/08/2024 6.78  3.90 - 12.70 K/uL Final    RBC 10/08/2024 5.14  4.00 - 5.40 M/uL Final    Hemoglobin 10/08/2024 14.9  12.0 - 16.0 g/dL Final    Hematocrit 10/08/2024 46.9  37.0 - 48.5 % Final    MCV 10/08/2024 91  82 - 98 fL Final    MCH 10/08/2024 29.0  27.0 - 31.0 pg Final    MCHC 10/08/2024 31.8 (L)  32.0 - 36.0 g/dL Final    RDW 10/08/2024 14.2  11.5 - 14.5 % Final    Platelets 10/08/2024 312  150 - 450 K/uL Final    MPV 10/08/2024 10.5  9.2 - 12.9 fL Final    Immature Granulocytes 10/08/2024 0.3  0.0 - 0.5 % Final    Gran # (ANC) 10/08/2024 3.7  1.8 - 7.7 K/uL Final    Immature Grans (Abs) 10/08/2024 0.02  0.00 - 0.04 K/uL Final    Comment: Mild elevation in immature granulocytes is non specific and   can be seen in a variety of conditions including stress response,   acute inflammation, trauma and pregnancy. Correlation with other   laboratory and clinical findings is essential.      Lymph # 10/08/2024 2.2  1.0 - 4.8 K/uL Final    Mono # 10/08/2024 0.5  0.3 - 1.0 K/uL Final    Eos # 10/08/2024 0.4  0.0 - 0.5 K/uL Final    Baso # 10/08/2024 0.06  0.00 -  0.20 K/uL Final    nRBC 10/08/2024 0  0 /100 WBC Final    Gran % 10/08/2024 54.5  38.0 - 73.0 % Final    Lymph % 10/08/2024 32.0  18.0 - 48.0 % Final    Mono % 10/08/2024 7.1  4.0 - 15.0 % Final    Eosinophil % 10/08/2024 5.2  0.0 - 8.0 % Final    Basophil % 10/08/2024 0.9  0.0 - 1.9 % Final    Differential Method 10/08/2024 Automated   Final    QRS Duration 10/08/2024 96  ms Final    [facility] QTC Calculation 10/08/2024 361  ms Final    CRP 10/08/2024 8.9 (H)  0.0 - 8.2 mg/L Final    Sodium 10/08/2024 138  136 - 145 mmol/L Final    Potassium 10/08/2024 3.3 (L)  3.5 - 5.1 mmol/L Final    Chloride 10/08/2024 108  95 - 110 mmol/L Final    CO2 10/08/2024 23  23 - 29 mmol/L Final    Glucose 10/08/2024 104  70 - 110 mg/dL Final    BUN 10/08/2024 12  6 - 20 mg/dL Final    Creatinine 10/08/2024 0.9  0.5 - 1.4 mg/dL Final    Calcium 10/08/2024 9.2  8.7 - 10.5 mg/dL Final    Total Protein 10/08/2024 6.8  6.0 - 8.4 g/dL Final    Albumin 10/08/2024 3.3 (L)  3.5 - 5.2 g/dL Final    Total Bilirubin 10/08/2024 0.5  0.1 - 1.0 mg/dL Final    Comment: For infants and newborns, interpretation of results should be based  on gestational age, weight and in agreement with clinical  observations.    Premature Infant recommended reference ranges:  Up to 24 hours.............<8.0 mg/dL  Up to 48 hours............<12.0 mg/dL  3-5 days..................<15.0 mg/dL  6-29 days.................<15.0 mg/dL      Alkaline Phosphatase 10/08/2024 270 (H)  55 - 135 U/L Final    AST 10/08/2024 70 (H)  10 - 40 U/L Final    ALT 10/08/2024 88 (H)  10 - 44 U/L Final    eGFR 10/08/2024 >60.0  >60 mL/min/1.73 m^2 Final    Anion Gap 10/08/2024 7 (L)  8 - 16 mmol/L Final    Lipase 10/08/2024 22  4 - 60 U/L Final   Admission on 10/06/2024, Discharged on 10/06/2024   Component Date Value Ref Range Status    WBC 10/06/2024 6.07  3.90 - 12.70 K/uL Final    RBC 10/06/2024 4.80  4.00 - 5.40 M/uL Final    Hemoglobin 10/06/2024 14.1  12.0 - 16.0 g/dL Final    Hematocrit  10/06/2024 44.3  37.0 - 48.5 % Final    MCV 10/06/2024 92  82 - 98 fL Final    MCH 10/06/2024 29.4  27.0 - 31.0 pg Final    MCHC 10/06/2024 31.8 (L)  32.0 - 36.0 g/dL Final    RDW 10/06/2024 13.6  11.5 - 14.5 % Final    Platelets 10/06/2024 254  150 - 450 K/uL Final    MPV 10/06/2024 10.4  9.2 - 12.9 fL Final    Immature Granulocytes 10/06/2024 0.2  0.0 - 0.5 % Final    Gran # (ANC) 10/06/2024 3.7  1.8 - 7.7 K/uL Final    Immature Grans (Abs) 10/06/2024 0.01  0.00 - 0.04 K/uL Final    Comment: Mild elevation in immature granulocytes is non specific and   can be seen in a variety of conditions including stress response,   acute inflammation, trauma and pregnancy. Correlation with other   laboratory and clinical findings is essential.      Lymph # 10/06/2024 1.3  1.0 - 4.8 K/uL Final    Mono # 10/06/2024 0.5  0.3 - 1.0 K/uL Final    Eos # 10/06/2024 0.5  0.0 - 0.5 K/uL Final    Baso # 10/06/2024 0.03  0.00 - 0.20 K/uL Final    nRBC 10/06/2024 0  0 /100 WBC Final    Gran % 10/06/2024 60.3  38.0 - 73.0 % Final    Lymph % 10/06/2024 21.9  18.0 - 48.0 % Final    Mono % 10/06/2024 8.9  4.0 - 15.0 % Final    Eosinophil % 10/06/2024 8.2 (H)  0.0 - 8.0 % Final    Basophil % 10/06/2024 0.5  0.0 - 1.9 % Final    Differential Method 10/06/2024 Automated   Final    Sodium 10/06/2024 139  136 - 145 mmol/L Final    Potassium 10/06/2024 4.0  3.5 - 5.1 mmol/L Final    Chloride 10/06/2024 109  95 - 110 mmol/L Final    CO2 10/06/2024 19 (L)  23 - 29 mmol/L Final    Glucose 10/06/2024 89  70 - 110 mg/dL Final    BUN 10/06/2024 12  6 - 20 mg/dL Final    Creatinine 10/06/2024 1.0  0.5 - 1.4 mg/dL Final    Calcium 10/06/2024 9.1  8.7 - 10.5 mg/dL Final    Total Protein 10/06/2024 7.0  6.0 - 8.4 g/dL Final    Albumin 10/06/2024 3.3 (L)  3.5 - 5.2 g/dL Final    Total Bilirubin 10/06/2024 0.7  0.1 - 1.0 mg/dL Final    Comment: For infants and newborns, interpretation of results should be based  on gestational age, weight and in agreement with  clinical  observations.    Premature Infant recommended reference ranges:  Up to 24 hours.............<8.0 mg/dL  Up to 48 hours............<12.0 mg/dL  3-5 days..................<15.0 mg/dL  6-29 days.................<15.0 mg/dL      Alkaline Phosphatase 10/06/2024 265 (H)  55 - 135 U/L Final    AST 10/06/2024 100 (H)  10 - 40 U/L Final    ALT 10/06/2024 110 (H)  10 - 44 U/L Final    eGFR 10/06/2024 >60.0  >60 mL/min/1.73 m^2 Final    Anion Gap 10/06/2024 11  8 - 16 mmol/L Final    Lipase 10/06/2024 15  4 - 60 U/L Final    Specimen UA 10/06/2024 Urine, Unspecified   Final    Color, UA 10/06/2024 Yellow  Yellow, Straw, Pita Final    Appearance, UA 10/06/2024 Clear  Clear Final    pH, UA 10/06/2024 6.0  5.0 - 8.0 Final    Specific Gravity, UA 10/06/2024 1.020  1.005 - 1.030 Final    Protein, UA 10/06/2024 Trace (A)  Negative Final    Comment: Recommend a 24 hour urine protein or a urine   protein/creatinine ratio if globulin induced proteinuria is  clinically suspected.      Glucose, UA 10/06/2024 Negative  Negative Final    Ketones, UA 10/06/2024 Negative  Negative Final    Bilirubin (UA) 10/06/2024 Negative  Negative Final    Occult Blood UA 10/06/2024 Negative  Negative Final    Nitrite, UA 10/06/2024 Negative  Negative Final    Urobilinogen, UA 10/06/2024 Negative  Negative EU/dL Final    Leukocytes, UA 10/06/2024 3+ (A)  Negative Final    RBC, UA 10/06/2024 1  0 - 4 /hpf Final    WBC, UA 10/06/2024 48 (H)  0 - 5 /hpf Final    Bacteria 10/06/2024 Rare  None-Occ /hpf Final    Squam Epithel, UA 10/06/2024 0  /hpf Final    Microscopic Comment 10/06/2024 SEE COMMENT   Final    Comment: Other formed elements not mentioned in the report are not   present in the microscopic examination.       Urine Culture, Routine 10/06/2024 No significant growth   Final   Admission on 10/06/2024, Discharged on 10/06/2024   Component Date Value Ref Range Status    Specimen UA 10/06/2024 Urine, Clean Catch   Final    Color, UA  10/06/2024 Yellow  Yellow, Straw, Pita Final    Appearance, UA 10/06/2024 Clear  Clear Final    pH, UA 10/06/2024 5.0  5.0 - 8.0 Final    Specific Gravity, UA 10/06/2024 1.010  1.005 - 1.030 Final    Protein, UA 10/06/2024 Negative  Negative Final    Comment: Recommend a 24 hour urine protein or a urine   protein/creatinine ratio if globulin induced proteinuria is  clinically suspected.      Glucose, UA 10/06/2024 Negative  Negative Final    Ketones, UA 10/06/2024 Negative  Negative Final    Bilirubin (UA) 10/06/2024 Negative  Negative Final    Occult Blood UA 10/06/2024 Negative  Negative Final    Nitrite, UA 10/06/2024 Negative  Negative Final    Urobilinogen, UA 10/06/2024 Negative  Negative EU/dL Final    Leukocytes, UA 10/06/2024 2+ (A)  Negative Final    RBC, UA 10/06/2024 1  0 - 4 /hpf Final    WBC, UA 10/06/2024 8 (H)  0 - 5 /hpf Final    Bacteria 10/06/2024 Rare  None-Occ /hpf Final    Squam Epithel, UA 10/06/2024 6  /hpf Final    Microscopic Comment 10/06/2024 SEE COMMENT   Final    Comment: Other formed elements not mentioned in the report are not   present in the microscopic examination.      Hospital Outpatient Visit on 10/04/2024   Component Date Value Ref Range Status    QRS Duration 10/04/2024 106  ms Final    [facility] QTC Calculation 10/04/2024 357  ms Final   Lab Visit on 09/06/2024   Component Date Value Ref Range Status    WBC 09/06/2024 5.57  3.90 - 12.70 K/uL Final    RBC 09/06/2024 4.39  4.00 - 5.40 M/uL Final    Hemoglobin 09/06/2024 12.8  12.0 - 16.0 g/dL Final    Hematocrit 09/06/2024 40.1  37.0 - 48.5 % Final    MCV 09/06/2024 91  82 - 98 fL Final    MCH 09/06/2024 29.2  27.0 - 31.0 pg Final    MCHC 09/06/2024 31.9 (L)  32.0 - 36.0 g/dL Final    RDW 09/06/2024 13.6  11.5 - 14.5 % Final    Platelets 09/06/2024 274  150 - 450 K/uL Final    MPV 09/06/2024 10.3  9.2 - 12.9 fL Final    Immature Granulocytes 09/06/2024 0.4  0.0 - 0.5 % Final    Gran # (ANC) 09/06/2024 2.6  1.8 - 7.7 K/uL Final     Immature Grans (Abs) 09/06/2024 0.02  0.00 - 0.04 K/uL Final    Comment: Mild elevation in immature granulocytes is non specific and   can be seen in a variety of conditions including stress response,   acute inflammation, trauma and pregnancy. Correlation with other   laboratory and clinical findings is essential.      Lymph # 09/06/2024 1.8  1.0 - 4.8 K/uL Final    Mono # 09/06/2024 0.6  0.3 - 1.0 K/uL Final    Eos # 09/06/2024 0.5  0.0 - 0.5 K/uL Final    Baso # 09/06/2024 0.06  0.00 - 0.20 K/uL Final    nRBC 09/06/2024 0  0 /100 WBC Final    Gran % 09/06/2024 47.2  38.0 - 73.0 % Final    Lymph % 09/06/2024 31.4  18.0 - 48.0 % Final    Mono % 09/06/2024 11.3  4.0 - 15.0 % Final    Eosinophil % 09/06/2024 8.6 (H)  0.0 - 8.0 % Final    Basophil % 09/06/2024 1.1  0.0 - 1.9 % Final    Differential Method 09/06/2024 Automated   Final    Sodium 09/06/2024 139  136 - 145 mmol/L Final    Potassium 09/06/2024 3.7  3.5 - 5.1 mmol/L Final    Chloride 09/06/2024 110  95 - 110 mmol/L Final    CO2 09/06/2024 20 (L)  23 - 29 mmol/L Final    Glucose 09/06/2024 86  70 - 110 mg/dL Final    BUN 09/06/2024 17  6 - 20 mg/dL Final    Creatinine 09/06/2024 1.0  0.5 - 1.4 mg/dL Final    Calcium 09/06/2024 8.7  8.7 - 10.5 mg/dL Final    Total Protein 09/06/2024 6.7  6.0 - 8.4 g/dL Final    Albumin 09/06/2024 3.4 (L)  3.5 - 5.2 g/dL Final    Total Bilirubin 09/06/2024 0.8  0.1 - 1.0 mg/dL Final    Comment: For infants and newborns, interpretation of results should be based  on gestational age, weight and in agreement with clinical  observations.    Premature Infant recommended reference ranges:  Up to 24 hours.............<8.0 mg/dL  Up to 48 hours............<12.0 mg/dL  3-5 days..................<15.0 mg/dL  6-29 days.................<15.0 mg/dL      Alkaline Phosphatase 09/06/2024 129  55 - 135 U/L Final    AST 09/06/2024 34  10 - 40 U/L Final    ALT 09/06/2024 40  10 - 44 U/L Final    eGFR 09/06/2024 >60.0  >60 mL/min/1.73 m^2 Final     Anion Gap 09/06/2024 9  8 - 16 mmol/L Final    Cholesterol 09/06/2024 169  120 - 199 mg/dL Final    Comment: The National Cholesterol Education Program (NCEP) has set the  following guidelines (reference ranges) for Cholesterol:  Optimal.....................<200 mg/dL  Borderline High.............200-239 mg/dL  High........................> or = 240 mg/dL      Triglycerides 09/06/2024 57  30 - 150 mg/dL Final    Comment: The National Cholesterol Education Program (NCEP) has set the  following guidelines (reference values) for triglycerides:  Normal......................<150 mg/dL  Borderline High.............150-199 mg/dL  High........................200-499 mg/dL      HDL 09/06/2024 69  40 - 75 mg/dL Final    Comment: The National Cholesterol Education Program (NCEP) has set the  following guidelines (reference values) for HDL Cholesterol:  Low...............<40 mg/dL  Optimal...........>60 mg/dL      LDL Cholesterol 09/06/2024 88.6  63.0 - 159.0 mg/dL Final    Comment: The National Cholesterol Education Program (NCEP) has set the  following guidelines (reference values) for LDL Cholesterol:  Optimal.......................<130 mg/dL  Borderline High...............130-159 mg/dL  High..........................160-189 mg/dL  Very High.....................>190 mg/dL      HDL/Cholesterol Ratio 09/06/2024 40.8  20.0 - 50.0 % Final    Total Cholesterol/HDL Ratio 09/06/2024 2.4  2.0 - 5.0 Final    Non-HDL Cholesterol 09/06/2024 100  mg/dL Final    Comment: Risk category and Non-HDL cholesterol goals:  Coronary heart disease (CHD)or equivalent (10-year risk of CHD >20%):  Non-HDL cholesterol goal     <130 mg/dL  Two or more CHD risk factors and 10-year risk of CHD <= 20%:  Non-HDL cholesterol goal     <160 mg/dL  0 to 1 CHD risk factor:  Non-HDL cholesterol goal     <190 mg/dL      TSH 09/06/2024 0.737  0.400 - 4.000 uIU/mL Final    Hemoglobin A1C 09/06/2024 5.4  4.0 - 5.6 % Final    Comment: ADA Screening  Guidelines:  5.7-6.4%  Consistent with prediabetes  >or=6.5%  Consistent with diabetes    High levels of fetal hemoglobin interfere with the HbA1C  assay. Heterozygous hemoglobin variants (HbS, HgC, etc)do  not significantly interfere with this assay.   However, presence of multiple variants may affect accuracy.      Estimated Avg Glucose 09/06/2024 108  68 - 131 mg/dL Final    Vit D, 25-Hydroxy 09/06/2024 25 (L)  30 - 96 ng/mL Final    Comment: Vitamin D deficiency.........<10 ng/mL                              Vitamin D insufficiency......10-29 ng/mL       Vitamin D sufficiency........> or equal to 30 ng/mL  Vitamin D toxicity............>100 ng/mL     Lab Visit on 05/02/2024   Component Date Value Ref Range Status    TSH 05/02/2024 2.524  0.400 - 4.000 uIU/mL Final    GGT 05/02/2024 108 (H)  8 - 55 U/L Final    Vitamin B-12 05/02/2024 199 (L)  210 - 950 pg/mL Final    Folate 05/02/2024 10.0  4.0 - 24.0 ng/mL Final    Iron 05/02/2024 106  30 - 160 ug/dL Final    Zinc 05/02/2024 76  60 - 130 ug/dL Final    Comment: Elevated results may be due to sample collected in a   non-certified trace element-free tube.     This test was developed and the performance   characteristics determined by [facility].   It has not been cleared or approved by the FDA.   The laboratory is regulated under CLIA as qualified to   perform high-complexity testing. This test is used for   patient testing purposes. It should not be regarded   as investigational or for research.    Test performed at [facility],  [address], [place]  [address] [phone]  Agata Mac MD, PhD - Medical Director      Hep B S Ab 05/02/2024 4.09  mIU/mL Final    Hep B S Ab 05/02/2024 Non-reactive   Final    Individual is considered not immune to HBV infection.    Amylase 05/02/2024 75  20 - 110 U/L Final    Lipase 05/02/2024 25  4 - 60 U/L Final    NIL 05/02/2024 0.92851  IU/mL Final    Comment: The Nil tube value is  used to determine if the patient   has a preexisting immune response which could cause a   false-positive reading on the test.   For a test to be valid, the NIL tube must have a value   of less than or equal to 8.0 IU/mL.  The mitogen control tube is used to assure the patient   has a healthy immune status and also serves as a control   for correct blood handling and incubation. It is used to   detect false negative readings.   The TB antigen tubes are coated with the M. tuberculosis   specific antigens. For a test to be considered positive,   at least one of the TB antigen tube values minus the Nil   tube value must be greater than or equal to 0.35 IU/mL   and be greater than or equal to 25% of the Nil tube value.  Diagnosing or excluding tuberculosis disease, and assessing   the probability of LTNI, requires a combination of   epidemiological, historical, medical, and diagnostic   findings that should be considered when interpreting   the test results.       TB1 - Nil 05/02/2024 0.000  IU/mL Final    TB2 - Nil 05/02/2024 0.003  IU/mL Final    Mitogen - Nil 05/02/2024 5.104  IU/mL Final    TB Gold Plus 05/02/2024 Negative  Negative Final    M. tuberculosis infection NOT likely.    Varicella Zoster IgG 05/02/2024 460.50   Final    Varicella Interpretation 05/02/2024 Positive   Final    Comment: The assay performance in detecting antibodies to VZV   in individuals vaccinated with the FDA-licensed VZV  vaccine is unknown.  Presence of detectable VZV IgG antibodies. A positive   result generally indicates exposure to the pathogen   or administration of specific immunoglobulins, but   it is no indication of active infection or stage   of disease.     There may be more visits with results that are not included.       EKG  Sinus bradycardia    Echo   Results for orders placed or performed during the hospital encounter of 11/08/22   Echo   Result Value Ref Range    BSA 2.21 m2    TDI SEPTAL 0.08 m/s    LV LATERAL E/E'  RATIO 6.58 m/s    LV SEPTAL E/E' RATIO 9.88 m/s    IVC diameter 1.75 cm    Left Ventricular Outflow Tract Mean Velocity 1.01 cm/s    Left Ventricular Outflow Tract Mean Gradient 4.59 mmHg    AORTIC VALVE CUSP SEPERATION 2.05 cm    TDI LATERAL 0.12 m/s    PV PEAK VELOCITY 1.30 cm/s    LVIDd 4.87 3.5 - 6.0 cm    IVS 0.75 0.6 - 1.1 cm    PW 0.73 0.6 - 1.1 cm    Ao root annulus 2.94 cm    LVIDs 3.65 2.1 - 4.0 cm    FS 25 28 - 44 %    Sinus 2.63 cm    STJ 2.57 cm    Ascending aorta 2.61 cm    LV mass 117.53 g    LA size 3.56 cm    RVDD 2.60 cm    Left Ventricle Relative Wall Thickness 0.30 cm    AV Velocity Ratio 0.85     MV mean gradient 1 mmHg    MV valve area by continuity eq 3.41 cm2    E/A ratio 1.11     Mean e' 0.10 m/s    E wave deceleration time 217.11 msec    Pulm vein S/D ratio 1.12     LVOT diameter 2.01 cm    LVOT area 3.2 cm2    LVOT peak jericho 1.45 m/s    LVOT peak VTI 34.50 cm    Ao peak jericho 1.71 m/s    LVOT stroke volume 109.42 cm3    AV peak gradient 12 mmHg    MV peak gradient 3 mmHg    E/E' ratio 7.90 m/s    MV Peak E [place] 0.79 m/s    MV VTI 32.1 cm    MV Peak A [place] 0.71 m/s    PV Peak S [place] 0.55 m/s    PV Peak D [place] 0.49 m/s    LV Systolic Volume 56.25 mL    LV Systolic Volume Index 26.7 mL/m2    LV Diastolic Volume 111.44 mL    LV Diastolic Volume Index 52.82 mL/m2    LV Mass Index 56 g/m2    RA Major Axis 3.93 cm    Left Atrium Minor Axis 3.55 cm    Left Atrium Major Axis 5.78 cm    MARIO (MOD) 24.7 mL/m2    LA Vol (MOD) 52.16 cm3    RA Width 2.29 cm    Right Atrial Pressure (from IVC) 3 mmHg    EF 65 %    Narrative    · The left ventricle is normal in size with normal systolic function.  · The estimated ejection fraction is 65%.  · Normal left ventricular diastolic function.  · Normal right ventricular size with normal right ventricular systolic   function.  · Normal central venous pressure (3 mmHg).          Imaging  CT Head Without Contrast    Result Date: 10/18/2024  EXAMINATION: CT HEAD WITHOUT  CONTRAST CLINICAL HISTORY: severe pain behind right ear;  Otalgia, right ear TECHNIQUE: Multiple sequential 5 mm axial images of the head without contrast.  Coronal and sagittal reformatted imaging from the axial acquisition. COMPARISON: CT head 10/12/2022 FINDINGS: There is no evidence for acute intracranial hemorrhage or sulcal effacement.  The ventricles are normal in size without hydrocephalus.  There is no midline shift or mass effect.  Trace mucosal thickening right maxillary antra, remaining paranasal sinuses and mastoid air cells are clear.  Continued partially sella intracranial hypertension to considered.     No acute intracranial findings specifically without evidence for acute intracranial hemorrhage or hydrocephalus. Partially empty sella similar to prior intracranial hypertension to be considered in differential in setting. Electronically signed by: Jeffrey Sanford DO Date:    10/18/2024 Time:    13:04    US Abdomen Complete    Result Date: 10/17/2024  EXAMINATION: US ABDOMEN COMPLETE CLINICAL HISTORY: Other specified abnormal findings of blood chemistry TECHNIQUE: Complete abdominal ultrasound (including pancreas, aorta, liver, gallbladder, common bile duct, IVC, kidneys, and spleen) was performed. COMPARISON: None FINDINGS: Pancreas: The visualized portions of pancreas appear normal. Aorta: No aneurysm. Liver: 14.2 cm, normal in size. Nodular contour with heterogeneous parenchymal echogenicity. no focal lesions. Gallbladder: No calculi, wall thickening, or pericholecystic fluid.  Negative sonographic Arrieta's sign. Biliary system: 4 mm common bile duct.  No intrahepatic ductal dilatation. Inferior vena cava: Normal in appearance. Right kidney: 11.2 cm. No hydronephrosis. Left kidney: 11.2 cm. No hydronephrosis. Spleen: 14 cm.  Enlarged with homogeneous echotexture. Miscellaneous: No ascites.     1. Morphologic changes of cirrhosis.  No focal hepatic lesions. 2. Splenomegaly. Electronically signed  by: Fran Vázquez MD Date:    10/17/2024 Time:    10:41    CT Abdomen Pelvis With IV Contrast NO Oral Contrast    Result Date: 10/6/2024  EXAMINATION: CT ABDOMEN PELVIS WITH IV CONTRAST CLINICAL HISTORY: Abdominal pain, acute, nonlocalized; TECHNIQUE: Low dose axial images, sagittal and coronal reformations were obtained from the lung bases to the pubic symphysis following the IV administration of 100 mL of Omnipaque 350 .  Oral contrast was not administered. COMPARISON: 10/12/2022 FINDINGS: Abdomen: - Lower thorax: - Lung bases: No infiltrates and no nodules. - Liver: No focal mass.  Probable minimal cirrhotic changes of the liver.  Recommend clinical correlation. - Gallbladder: Status post cholecystectomy. Postop changes of the stomach. - Bile Ducts: No evidence of intra or extra hepatic biliary ductal dilation. - Spleen: Mild prominence of the spleen.  No focal abnormality. - Kidneys: No mass or hydronephrosis. - Adrenals: Unremarkable. - Pancreas: No mass or peripancreatic fat stranding. - Retroperitoneum:  No significant adenopathy. - Vascular: No abdominal aortic aneurysm. - Abdominal wall:  Right lower quadrant ileostomy similar to the prior study. Pelvis: Urinary bladder is incompletely distended. Status post hysterectomy. Bowel/Mesentery: Status post colectomy. No evidence of bowel obstruction. Right lower quadrant ostomy.  Recommend correlation with surgical history. Bones:  No acute osseous abnormality and no suspicious lytic or blastic lesion. No abscess or fluid collection.  No free air is detected.     1. No acute abnormality. 2. Postoperative changes. 3. See above comments. Electronically signed by: Zain Bermudez Date:    10/06/2024 Time:    15:38    US Soft Tissue, Lower Extremity, Right    Result Date: 10/4/2024  EXAMINATION: US SOFT TISSUE, LOWER EXTREMITY, RIGHT CLINICAL HISTORY: Contusion of right lower leg, initial encounter TECHNIQUE: Real-time grayscale images were performed through the  area of concern at the right lower extremity soft tissues. COMPARISON: None. FINDINGS: No discrete soft tissue collection or mass at the right lower extremity at the area of concern.     No significant abnormality. Electronically signed by: Rafael Davila Date:    10/04/2024 Time:    15:35    MRI Hip Without Contrast Right    Result Date: 9/30/2024  EXAMINATION: MRI HIP WITHOUT CONTRAST RIGHT CLINICAL HISTORY: Hip pain, chronic, articular cartilage eval, xray done;  Pain in right hip TECHNIQUE: MRI of the right hip without intravenous contrast. COMPARISON: Radiographs 08/12/2024; CT abdomen pelvis 10/12/2022 FINDINGS: Sequences are degraded by patient motion artifact. ENTIRE PELVIS: Degenerative disc disease at L5-S1.  No sacroiliitis or fracture.  Postoperative changes from colectomy, right lower quadrant end ileostomy, and hysterectomy. RIGHT HIP: BONE: No fracture, osteonecrosis, or marrow replacing lesion. JOINT: There is a moderate-sized region of partial-thickness cartilage loss over the femoral head superiorly (06:21).  There is subchondral bone marrow edema in the anterosuperior acetabulum.  No joint effusion or synovitis. LABRUM: Degeneration and nondisplaced tear of the anterosuperior labrum (07:21). TENDONS: Tendinopathy of gluteus minimus and medius at the greater trochanter.  Normal iliopsoas, rectus femoris, and proximal hamstring tendons. No bursal collection. SOFT TISSUES: Normal muscle bulk and signal intensity. The sciatic nerve is unremarkable. LEFT HIP (LIMITED EVALUATION): Tendinopathy of gluteus minimus and medius at the greater trochanter.  Trace fluid in the greater trochanteric bursa.     Degenerative changes in the right hip as described. Nondisplaced tear of the right anterosuperior labrum. Gluteal tendinopathy bilaterally. Left greater trochanteric bursitis. Electronically signed by: Praful Welsh Date:    09/30/2024 Time:    11:35      Prior coronary angiogram / intervention:  No  prior    Assessment and Plan  Ms. Orozco is a 59-year-old female with a past medical history anxiety, bipolar 1 disorder, MDD, GERD, gastric sleeve, DDD, carpal tunnel syndrome, vertigo, hyperlipidemia, sinus bradycardia, asthma, GERD, osteoarthritis    Palpitations  Reports intermittent heart palpitations with sinus bradycardia noted on home device  Also has a history of vertigo  Will get an Holter monitor  Consider referral to EP following results    Sinus bradycardic  Symptomatic with heart palpitations  Plan as above    Vertigo  As above    Hyperlipidemia  Consider rosuvastatin 10 mg given elevated liver enzymes    Follow Up  Follow up in about 4 weeks (around 11/22/2024), or if symptoms worsen or fail to improve.       Melani Guerrero, FNP-C [facility] - Cardiology    Total professional time spent for the encounter: 40 minutes  Time was spent preparing to see the patient, reviewing results of prior testing, obtaining and/or reviewing separately obtained history, performing a medically appropriate examination and interview, counseling and educating the patient/family, ordering medications/tests/procedures, referring and communicating with other health care professionals, documenting clinical information in the electronic health record, and independently interpreting results.

## 2024-11-07 ENCOUNTER — TELEPHONE (OUTPATIENT)
Dept: HEPATOLOGY | Facility: CLINIC | Age: 59
End: 2024-11-07
Payer: MEDICARE

## 2024-11-07 ENCOUNTER — CONFERENCE (OUTPATIENT)
Dept: TRANSPLANT | Facility: CLINIC | Age: 59
End: 2024-11-07
Payer: MEDICARE

## 2024-11-07 ENCOUNTER — PATIENT MESSAGE (OUTPATIENT)
Dept: HEPATOLOGY | Facility: CLINIC | Age: 59
End: 2024-11-07
Payer: MEDICARE

## 2024-11-07 NOTE — TELEPHONE ENCOUNTER
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

## 2024-11-08 ENCOUNTER — TELEPHONE (OUTPATIENT)
Dept: HEPATOLOGY | Facility: CLINIC | Age: 59
End: 2024-11-08
Payer: MEDICARE

## 2024-11-08 ENCOUNTER — PATIENT MESSAGE (OUTPATIENT)
Dept: SPORTS MEDICINE | Facility: CLINIC | Age: 59
End: 2024-11-08
Payer: MEDICARE

## 2024-11-08 DIAGNOSIS — E04.1 THYROID NODULE: Primary | ICD-10-CM

## 2024-11-08 DIAGNOSIS — R41.3 MEMORY PROBLEM: ICD-10-CM

## 2024-11-18 DIAGNOSIS — F41.9 ANXIETY: ICD-10-CM

## 2024-11-18 RX ORDER — CLONAZEPAM 1 MG/1
1 TABLET ORAL DAILY PRN
Qty: 30 TABLET | Refills: 0 | OUTPATIENT
Start: 2024-11-18

## 2024-11-19 NOTE — TELEPHONE ENCOUNTER
Provider Staff:  Please note Refusal of medication.     Action required for this patient.      Requested Prescriptions     Refused Prescriptions Disp Refills    clonazePAM (KLONOPIN) 1 MG tablet [Pharmacy Med Name: CLONAZEPAM 1 MG TABS 1 Tablet] 30 tablet 0     Sig: Take 1 tablet (1 mg total) by mouth daily as needed for Anxiety.     Refused By: ADRIANNA CHARLTON     Reason for Refusal: Patient no longer under prescriber care      Thanks!  [facility]   Note composed: 11/19/2024 9:35 AM

## 2024-11-19 NOTE — TELEPHONE ENCOUNTER
Refill Routing Note   Medication(s) are not appropriate for processing by [facility] for the following reason(s):        No PCP listed on profile; Routed to previous prescribing provider   Outside of protocol  Requirement: Established PCP participating in ORC program    [facility] action(s):                  Appointments  past 12m or future 3m with PCP    Date Provider   Last Visit   8/16/2024 Eliecer Jones MD   Next Visit   Visit date not found Eliecer Jones MD   ED visits in past 90 days: 4        Note composed:6:03 PM 11/18/2024

## 2024-11-29 ENCOUNTER — TELEPHONE (OUTPATIENT)
Dept: ORTHOPEDICS | Facility: CLINIC | Age: 59
End: 2024-11-29
Payer: MEDICARE

## 2024-11-29 NOTE — TELEPHONE ENCOUNTER
Patient communication     Notified patient to stop at [place] [facility] - 1st floor check in [address] [facility] [address] 30 minutes prior to your appointment time on 12.2.24 with Dr. Nicholas for x-rays.     Made them aware that this is not a scheduled xray appointment and they might be running behind as they are considered a walk-in xray.    Verbalized the Following:  *Please arrive at your informed time above, if you are more than 15 Minutes late to your appointment with Dr. Nicholas we will have to reschedule your appointment. This will allow you to be seen in a timely manor and be conscious to other patients being seen that same day*

## 2024-12-02 ENCOUNTER — HOSPITAL ENCOUNTER (OUTPATIENT)
Dept: RADIOLOGY | Facility: HOSPITAL | Age: 59
Discharge: HOME | End: 2024-12-02
Attending: ORTHOPAEDIC SURGERY
Payer: MEDICARE

## 2024-12-02 ENCOUNTER — OFFICE VISIT (OUTPATIENT)
Dept: ORTHOPEDICS | Facility: CLINIC | Age: 59
End: 2024-12-02
Payer: MEDICARE

## 2024-12-02 DIAGNOSIS — M25.532 LEFT WRIST PAIN: ICD-10-CM

## 2024-12-02 DIAGNOSIS — M25.531 RIGHT WRIST PAIN: ICD-10-CM

## 2024-12-02 DIAGNOSIS — M25.521 RIGHT ELBOW PAIN: ICD-10-CM

## 2024-12-02 DIAGNOSIS — M18.11 PRIMARY OSTEOARTHRITIS OF FIRST CARPOMETACARPAL JOINT OF RIGHT HAND: ICD-10-CM

## 2024-12-02 DIAGNOSIS — M25.521 RIGHT ELBOW PAIN: Primary | ICD-10-CM

## 2024-12-02 DIAGNOSIS — M54.12 CERVICAL RADICULOPATHY: ICD-10-CM

## 2024-12-02 DIAGNOSIS — M77.8 RIGHT WRIST TENDONITIS: ICD-10-CM

## 2024-12-02 PROCEDURE — 73110 X-RAY EXAM OF WRIST: CPT | Mod: 26,LT,, | Performed by: RADIOLOGY

## 2024-12-02 PROCEDURE — 73110 X-RAY EXAM OF WRIST: CPT | Mod: 26,RT,, | Performed by: RADIOLOGY

## 2024-12-02 PROCEDURE — 1159F MED LIST DOCD IN RCRD: CPT | Mod: CPTII,S$GLB,, | Performed by: ORTHOPAEDIC SURGERY

## 2024-12-02 PROCEDURE — 73080 X-RAY EXAM OF ELBOW: CPT | Mod: 26,RT,, | Performed by: RADIOLOGY

## 2024-12-02 PROCEDURE — 73110 X-RAY EXAM OF WRIST: CPT | Mod: TC,RT

## 2024-12-02 PROCEDURE — 99999 PR PBB SHADOW E&M-EST. PATIENT-LVL IV: CPT | Mod: PBBFAC,,, | Performed by: ORTHOPAEDIC SURGERY

## 2024-12-02 PROCEDURE — 1160F RVW MEDS BY RX/DR IN RCRD: CPT | Mod: CPTII,S$GLB,, | Performed by: ORTHOPAEDIC SURGERY

## 2024-12-02 PROCEDURE — 73080 X-RAY EXAM OF ELBOW: CPT | Mod: TC,RT

## 2024-12-02 PROCEDURE — 73110 X-RAY EXAM OF WRIST: CPT | Mod: TC,LT

## 2024-12-02 PROCEDURE — 99213 OFFICE O/P EST LOW 20 MIN: CPT | Mod: S$GLB,,, | Performed by: ORTHOPAEDIC SURGERY
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
## 2024-12-03 ENCOUNTER — PATIENT MESSAGE (OUTPATIENT)
Dept: ORTHOPEDICS | Facility: CLINIC | Age: 59
End: 2024-12-03
Payer: MEDICARE

## 2024-12-03 DIAGNOSIS — M54.12 CERVICAL RADICULOPATHY: Primary | ICD-10-CM

## 2024-12-05 ENCOUNTER — HOSPITAL ENCOUNTER (OUTPATIENT)
Dept: RADIOLOGY | Facility: HOSPITAL | Age: 59
Discharge: HOME | End: 2024-12-05
Attending: ORTHOPAEDIC SURGERY
Payer: MEDICARE

## 2024-12-05 ENCOUNTER — OFFICE VISIT (OUTPATIENT)
Dept: ORTHOPEDICS | Facility: CLINIC | Age: 59
End: 2024-12-05
Payer: MEDICARE

## 2024-12-05 VITALS — WEIGHT: 209 LBS | HEIGHT: 64 IN | BODY MASS INDEX: 35.68 KG/M2

## 2024-12-05 DIAGNOSIS — M54.12 CERVICAL RADICULOPATHY: ICD-10-CM

## 2024-12-05 PROCEDURE — 72050 X-RAY EXAM NECK SPINE 4/5VWS: CPT | Mod: 26,,, | Performed by: RADIOLOGY

## 2024-12-05 PROCEDURE — 99999 PR PBB SHADOW E&M-EST. PATIENT-LVL V: CPT | Mod: PBBFAC,,, | Performed by: ORTHOPAEDIC SURGERY

## 2024-12-05 PROCEDURE — 72050 X-RAY EXAM NECK SPINE 4/5VWS: CPT | Mod: TC

## 2024-12-05 NOTE — PROGRESS NOTES
DATE: 12/5/2024  PATIENT: Ida Orozco    Supervising Physician: Hiren Melgoza M.D.    CHIEF COMPLAINT: hand numbness    HISTORY:  Ida Orozco is a 59 y.o. female with a pmhx of bariatric surgery here for initial evaluation of neck and bilateral arm pain (Neck - 6, Arm - 6). The pain has been present for years, worsening over time. The patient describes the pain as aching in her neck and pins and needles in her hands R>L. The pain is worse with nothing and improved by nothing and is constant. There is positive associated numbness and tingling. There is positive subjective weakness. Prior treatments have included L carpal tunnel surgery and left ulnar nerve decompression with no relief, but no ESIs or neck surgery.     The patient ENDORSES myelopathic symptoms such as handwriting changes or difficulty with buttons/coins/keys. Denies perineal paresthesias, bowel/bladder dysfunction.    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, with liver fibrosis, 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    PTSD (post-traumatic stress disorder)     Seasonal allergies     Symptomatic abdominal panniculus     Ulcerative colitis     Vertigo      Past Surgical History:   Procedure Laterality Date    APPENDECTOMY      CARPAL TUNNEL RELEASE Left 2/27/2024    Procedure: RELEASE, CARPAL TUNNEL;  Surgeon: Etelvina Nicholas MD;  Location[facility] OR;  Service: Orthopedics;  Laterality: Left;    CATHETERIZATION OF BOTH LEFT AND RIGHT HEART Right 11/13/2019    Procedure: CATHETERIZATION, HEART, BOTH LEFT AND RIGHT;  Surgeon: Beto Malin MD;  Location: [facility] CATH LAB;   Service: Cardiology;  Laterality: Right;     SECTION, LOW TRANSVERSE      CHOLECYSTECTOMY      open    COLECTOMY      total- 2013    CYSTOSCOPY W/ RETROGRADES N/A 2018    Procedure: CYSTOSCOPY, WITH RETROGRADE PYELOGRAM;  Surgeon: Butch Banks MD;  Location: [facility];  Service: Urology;  Laterality: N/A;  [facility]ANSEN SURGICAL CONFIRMED     DECOMPRESSION OF NERVE Left 2024    Procedure: DECOMPRESSION, NERVE ULNAR NERVE ELBOW;  Surgeon: Etelvina Nicholas MD;  Location: [facility] OR;  Service: Orthopedics;  Laterality: Left;    ENDOSCOPIC ULTRASOUND OF UPPER GASTROINTESTINAL TRACT N/A 11/10/2021    Procedure: ULTRASOUND, UPPER GI TRACT, ENDOSCOPIC;  Surgeon: Nhan Dee MD;  Location: [facility] (2ND FLR);  Service: Endoscopy;  Laterality: N/A;  MD Milena Whitehead PA-C; Quinn Whitman MD; Miladys Velasquez MA  Caller: Unspecified (3 days ago,  1:20 PM)  Fair enough. Neha, please schedule.       ----- Message -----   From: Milena Murillo PA-C   Sent: 2021  1    ESOPHAGOGASTRODUODENOSCOPY  2018    Dr. Castorena: LA Grade A reflux esophagitis, hiatal hernia; Ectopic gastric mucosa in the upper third of the esophagus; gastric sleeve intact with unremarkable findings, gastritis; biopsy: stomach- Mild chronic antral and oxyntic gastritis, no activity, negative for h pylori    ESOPHAGOGASTRODUODENOSCOPY N/A 11/10/2021    Procedure: EGD (ESOPHAGOGASTRODUODENOSCOPY);  Surgeon: Nhan Dee MD;  Location: [facility] (2ND FLR);  Service: Endoscopy;  Laterality: N/A;    ESOPHAGOGASTRODUODENOSCOPY N/A 2024    Procedure: EGD (ESOPHAGOGASTRODUODENOSCOPY);  Surgeon: Anatoly Singh MD;  Location: [facility] ENDO (4TH FLR);  Service: Endoscopy;  Laterality: N/A;  Ref By: Dr. Singh,instr sent via portal Half a mirGovtodayx prep.  -lvm for precall-MS  -pt confirmed appt-Kpvt    FESS, WITH IMAGING GUIDANCE Bilateral 2023    Procedure: Pan Sinus FESS, WITH IMAGING  GUIDANCE Disc loaded;  Surgeon: Jay Hernandez MD;  Location: [facility] OR;  Service: ENT;  Laterality: Bilateral;  balloon dilation of sinuses    FLEXIBLE SIGMOIDOSCOPY  5/24/2018    Procedure: SIGMOIDOSCOPY, FLEXIBLE;  Surgeon: JENNY Virgen MD;  Location: [facility] OR 2ND FLR;  Service: Colon and Rectal;;    FLEXIBLE SIGMOIDOSCOPY  04/11/2018    Dr. Castorena: Non-patent surgical anastomosis, characterized by friable mucosa.    GASTRIC SLEEVE       HAND ARTHROTOMY Right 6/20/2024    Procedure: ARTHROTOMY, HAND;  Surgeon: Etelvina Nicholas MD;  Location: [facility] OR;  Service: Orthopedics;  Laterality: Right;    HYSTERECTOMY      ILEOSCOPY  04/11/2018    Dr. Castorena: Patient is status-post total colectomy with end ileostomy. unremarkable findings    ILEOSCOPY N/A 1/23/2024    Procedure: ILEOSCOPY;  Surgeon: Anatoly Singh MD;  Location: [facility] (4TH FLR);  Service: Endoscopy;  Laterality: N/A;    ILEOSTOMY REVISION      april 2014; august 2014    INJECTION OF STEROID Right 2/27/2024    Procedure: INJECTION, STEROID CARPAL TUNNEL;  Surgeon: Etelvina Nicholas MD;  Location: [facility] OR;  Service: Orthopedics;  Laterality: Right;    INJECTION OF STEROID Right 6/20/2024    Procedure: INJECTION, STEROID Radiocarpal wrist;  Surgeon: Etelvina Nicholas MD;  Location: [facility] OR;  Service: Orthopedics;  Laterality: Right;    LAPAROSCOPIC PROCTECTOMY N/A 5/24/2018    Procedure: PROCTECTOMY-LAPAROSCOPIC/CONVERTED TO OPEN;  Surgeon: JENNY Virgen MD;  Location: [facility] OR 2ND FLR;  Service: Colon and Rectal;  Laterality: N/A;    LYSIS OF ADHESIONS  5/24/2018    Procedure: LYSIS, ADHESIONS/ more than 2hours;  Surgeon: JENNY Virgen MD;  Location: [facility] OR 2ND FLR;  Service: Colon and Rectal;;    OOPHORECTOMY      PANNICULECTOMY Bilateral 12/19/2019    Procedure: PANNICULECTOMY;  Surgeon: Andrea Alvarado MD;  Location: [facility] OR 2ND FLR;  Service: Plastics;  Laterality: Bilateral;    REVISION COLOSTOMY N/A 12/19/2019    Procedure:  REVISION, COLOSTOMY;  Surgeon: JENNY Virgen MD;  [facility];  Service: Colon and Rectal;  Laterality: N/A;    thumb surgery      TONSILLECTOMY, ADENOIDECTOMY      ULNAR COLLATERAL LIGAMENT RECONSTRUCTION Left 6/20/2024    Procedure: RECONSTRUCTION, LIGAMENT, ULNAR COLLATERAL;  Surgeon: Etelvina Nicholas MD;  Location: [facility];  Service: Orthopedics;  Laterality: Left;    WISDOM TOOTH EXTRACTION         Medications:  Current Outpatient Medications on File Prior to Visit   Medication Sig Dispense Refill    azelastine (ASTELIN) 137 mcg (0.1 %) nasal spray 1 SPRAY EACH NOSTRIL NASALLY TWICE A DAY 30      busPIRone (BUSPAR) 10 MG tablet Take 1 tablet (10 mg total) by mouth 3 (three) times daily as needed (anxiety). 90 tablet 11    clonazePAM (KLONOPIN) 1 MG tablet Take 1 tablet (1 mg total) by mouth daily as needed for Anxiety. 90 tablet 1    diphenoxylate-atropine 2.5-0.025 mg (LOMOTIL) 2.5-0.025 mg per tablet Take 1 tablet by mouth 4 (four) times daily as needed for Diarrhea. 30 tablet 2    ergocalciferol (VITAMIN D2) 50,000 unit Cap Take 1 capsule (50,000 Units total) by mouth every 7 days. 12 capsule 0    fluticasone propionate (FLONASE) 50 mcg/actuation nasal spray 1 SPRAY IN EACH NOSTRIL NASALLY TWICE DAILY AS NEEDED 30      hydrOXYzine HCL (ATARAX) 25 MG tablet Take 25 mg by mouth 3 (three) times daily.      hyoscyamine (ANASPAZ,LEVSIN) 0.125 mg Tab Take 1 tablet (125 mcg total) by mouth every 6 (six) hours as needed (Abdominal spasms). 30 tablet 3    levocetirizine (XYZAL) 5 MG tablet Take 1 tablet (5 mg total) by mouth every evening. 90 tablet 3    LIDOcaine (LIDODERM) 5 % Place 1 patch onto the skin once daily. Remove & Discard patch within 12 hours or as directed by MD 10 patch 0    loratadine (CLARITIN) 10 mg tablet Take 1 tablet (10 mg total) by mouth once daily. 90 tablet 3    meclizine (ANTIVERT) 25 mg tablet Take 1 tablet (25 mg total) by mouth 3 (three) times daily as needed for  Dizziness or Nausea. 60 tablet 3    nystatin (MYCOSTATIN) powder Apply topically 2 (two) times daily. 60 g 3    ondansetron (ZOFRAN) 4 MG tablet Take 1 tablet (4 mg total) by mouth every 6 (six) hours as needed for Nausea. 20 tablet 0    pantoprazole (PROTONIX) 40 MG tablet Take 40 mg by mouth.      rosuvastatin (CRESTOR) 10 MG tablet Take 1 tablet (10 mg total) by mouth once daily. 90 tablet 3    sertraline (ZOLOFT) 50 MG tablet Take 1 tablet (50 mg total) by mouth once daily. 90 tablet 1    tiZANidine (ZANAFLEX) 4 MG tablet Take 1 tablet (4 mg total) by mouth 3 (three) times daily as needed (muscle pain). 45 tablet 0    topiramate (TOPAMAX) 100 MG tablet Take 100 mg by mouth 2 (two) times daily.      topiramate (TOPAMAX) 25 MG tablet Take 1 tablet (25 mg total) by mouth 2 (two) times daily. 60 tablet 11    traZODone (DESYREL) 150 MG tablet Take 1 tablet (150 mg total) by mouth nightly. 90 tablet 1    ursodioL (ACTIGALL) 300 mg capsule Take 5 capsules (1,500 mg total) by mouth once daily. 150 capsule 11    [DISCONTINUED] adalimumab (HUMIRA,CF, PEN) 40 mg/0.4 mL PnKt Inject 0.4 mLs (40 mg total) into the skin every 14 (fourteen) days. 6 pen 2     No current facility-administered medications on file prior to visit.       Social History:   Social History     Socioeconomic History    Marital status:    Tobacco Use    Smoking status: Former     Current packs/day: 0.00     Average packs/day: 1 pack/day for 25.0 years (25.0 ttl pk-yrs)     Types: Cigarettes     Start date: 1984     Quit date: 2009     Years since quitting: 15.9    Smokeless tobacco: Never   Substance and Sexual Activity    Alcohol use: Never     Comment: extremely rarely (once a year)    Drug use: Never     Frequency: 7.0 times per week     Types: Marijuana     Comment: gummies nightly    Sexual activity: Not Currently     Birth control/protection: See Surgical Hx     Social Drivers of Health     Financial Resource Strain: Medium Risk (12/3/2024)     Overall Financial Resource Strain (CARDIA)     Difficulty of Paying Living Expenses: Somewhat hard   Food Insecurity: Food Insecurity Present (12/3/2024)    Hunger Vital Sign     Worried About Running Out of Food in the Last Year: Often true     Ran Out of Food in the Last Year: Sometimes true   Transportation Needs: Patient Declined (11/15/2023)    PRAPARE - Transportation     Lack of Transportation (Medical): Patient declined     Lack of Transportation (Non-Medical): Patient declined   Physical Activity: Insufficiently Active (12/3/2024)    Exercise Vital Sign     Days of Exercise per Week: 2 days     Minutes of Exercise per Session: 10 min   Stress: No Stress Concern Present (12/3/2024)    [employer] [place] - Occupational Stress Questionnaire     Feeling of Stress : Only a little   Housing Stability: High Risk (12/3/2024)    Housing Stability Vital Sign     Unable to Pay for Housing in the Last Year: Yes       REVIEW OF SYSTEMS:  Constitution: Negative. Negative for chills, fever and night sweats.   Cardiovascular: Negative for chest pain and syncope.   Respiratory: Negative for cough and shortness of breath.   Gastrointestinal: See HPI. Negative for nausea/vomiting. Negative for abdominal pain.  Genitourinary: See HPI. Negative for discoloration or dysuria.  Skin: Negative for dry skin, itching and rash.   Hematologic/Lymphatic: Negative for bleeding problem. Does not bruise/bleed easily.   Musculoskeletal: Negative for falls and muscle weakness.   Neurological: See HPI. No seizures.   Endocrine: Negative for polydipsia, polyphagia and polyuria.   Allergic/Immunologic: Negative for hives and persistent infections.  Psychiatric/Behavioral: Negative for depression and insomnia.         EXAM:  There were no vitals taken for this visit.    General: The patient is a very pleasant 59 y.o. female in no apparent distress, the patient is oriented to person, place and time.  Psych: Normal mood  and affect  HEENT: Vision grossly intact, hearing intact to the spoken word.  Lungs: Respirations unlabored.  Gait: Normal station and gait, no difficulty with toe or heel walk.   Skin: Cervical skin negative for rashes, lesions, hairy patches and surgical scars.  Range of motion: Cervical range of motion is acceptable. There is negative tenderness to palpation.  Spinal Balance: Global saggital and coronal spinal balance acceptable, no significant for scoliosis and kyphosis.  Musculoskeletal: No pain with the range of motion of the bilateral shoulders and elbows. Normal bulk and contour of the bilateral hands.  Vascular: Bilateral hands warm and well perfused, radial pulses 2+ bilaterally.  Neurological: Normal strength and tone in all major motor groups in the bilateral upper and lower extremities. Normal sensation to light touch in the C5-T1 and L2-S1 dermatomes bilaterally.  Deep tendon reflexes symmetric 2+ in the bilateral upper and lower extremities.  Negative Inverted Radial Reflex and Talbot's bilaterally. Negative Babinski bilaterally.     IMAGING:   Today I personally reviewed AP, Lat and Flex/Ex  upright C-spine films that demonstrate mild degenerative changes     There is no height or weight on file to calculate BMI.    Hemoglobin A1C   Date Value Ref Range Status   09/06/2024 5.4 4.0 - 5.6 % Final     Comment:     ADA Screening Guidelines:  5.7-6.4%  Consistent with prediabetes  >or=6.5%  Consistent with diabetes    High levels of fetal hemoglobin interfere with the HbA1C  assay. Heterozygous hemoglobin variants (HbS, HgC, etc)do  not significantly interfere with this assay.   However, presence of multiple variants may affect accuracy.     05/03/2023 5.3 4.0 - 5.6 % Final     Comment:     ADA Screening Guidelines:  5.7-6.4%  Consistent with prediabetes  >or=6.5%  Consistent with diabetes    High levels of fetal hemoglobin interfere with the HbA1C  assay. Heterozygous hemoglobin variants (HbS, HgC,  etc)do  not significantly interfere with this assay.   However, presence of multiple variants may affect accuracy.     05/30/2019 5.6 4.0 - 5.6 % Final     Comment:     ADA Screening Guidelines:  5.7-6.4%  Consistent with prediabetes  >or=6.5%  Consistent with diabetes  High levels of fetal hemoglobin interfere with the HbA1C  assay. Heterozygous hemoglobin variants (HbS, HgC, etc)do  not significantly interfere with this assay.   However, presence of multiple variants may affect accuracy.             ASSESSMENT/PLAN:    There are no diagnoses linked to this encounter.    Today we discussed at length all of the different treatment options including anti-inflammatories, acetaminophen, rest, ice, heat, physical therapy including strengthening and stretching exercises, home exercises, ROM, aerobic conditioning, aqua therapy, other modalities including ultrasound, massage, and dry needling, epidural steroid injections and finally surgical intervention.      Pt presents with chronic hand paresthesias and possible myelopathic symptoms. Failure of conservative rx. Will obtain MRI to further evaluate and call with results. Will send PT orders to  [facility].

## 2024-12-09 NOTE — PROGRESS NOTES
"HISTORY OF PRESENT ILLNESS   12/11/24  Ida Orozco, a 59 y.o. female, presents today for follow up evaluation of her right hip. At last appointment, 8/28/2024, patient underwent intra-articular hip CSI and lateral glute tendon CSI & pain/symptoms did improve. Pain returned just over a month. Pain is moderate to severe at present & up to moderate to severe with provacative activity including ADLs. Patient has been doing physical therapy on and off. Patient reports she is "sick of medicine" and reports a lack of trust in doctors.  Patient reports that she thinks she dislocated her hip when she had a recent fall and believes it was reduced when she was transferred into a hospital bed at the ED. Patient is frustrated by PT and would like to go to aquatic therapy but [place].     8/28/24  Ida Orozco, a 59 y.o. female, presents today for follow up evaluation of her right hip. At last appointment, 9/27/2023, patient underwent lateral glute tendon CSI & pain/symptoms did improve. Pain returned on 8/12 following a fall. Patient went to the ED and received hip x-rays and tib/fib x-rays. Patient would like knee and femur x-rays today. Pain is 7/10 at present & up to 10/10 with provacative activity including ADLs and walking. Patient has not been doing physical therapy.    9/27/23  Ida Orozco, a 59 y.o. female, presents today for evaluation of her bilateral HIP.    Patient reports onset of chronic and insidious  pain beginning 6 months ago. Patient reports no known injury or trauma. Pain is located along lateral aspect of HIP. Pain is 6/10 at present & up to 8/10 with provacative activity including all activity including ADLs, walking, and sitting to standing. Pain is described as sharp, ache, and dull. Patient states pain does not radiate.     Associated symptoms include instability, weakness, popping, and clicking. Pain is aggravated by activities above & occur daily . Symptoms do " interfere with sleep. Patient reports pain & symptoms are staying the same . Patient reports  no prior surgery  to HIP. She is status post gastric sleeve & has become more active with weight loss. She also reports bilateral foot pain.    Prior treatment Ida Orozco has tried   OTC Acetaminophen - Yes  OTC NSAID - No - s/p gastric sleeve  Rx NSAID - No - s/p gastric sleeve  Rx Narcotic/Other - No   Brace - No   Injection - Cortisone - No   Injection - Biologics - No   Activity Modification - Yes  Physical Therapy - Yes - current   Home Exercise Program - Yes  Assistive Device - No  Other - ice and heat    Review of systems (ROS):  A 10+ review of systems was performed with pertinent positives and negatives noted above in the history of present illness. Other systems were negative unless otherwise specified.    PHYSICAL EXAMINATION  General:  The patient is alert and oriented x 3.  Mood is pleasant.  Observation of ears, eyes and nose reveal no gross abnormalities.  HEENT: NCAT, sclera nonicteric  Lungs: Respirations are equal and unlabored.   Gait is coordinated. Patient can toe walk and heel walk without difficulty.    HIP/PELVIS EXAMINATION    Observation/Inspection  Gait:   Antalgic   Alignment:  Neutral   Scars:   None   Muscle atrophy: None   Effusion:  None   Warmth:  None   Discoloration:   None   Leg lengths:   Equal   Pelvis:    Level     Tenderness/Crepitus (T/C):      T / C  Lateral Gluteal region  + / -  Trochanteric bursa   + / -  Piriformis    - / -  SI joint    - / -  Psoas tendon   - / -  Rectus insertion  - / -  Adductor insertion  - / -  Pubic symphysis  - / -    ROM: (* = pain)    Flexion:      120 degrees  External rotation:   40 degrees  Internal rotation with axial load:  30 degrees  Internal rotation without axial load:  40 degrees  Abduction:    45 degrees  Adduction:     20 degrees    Special Tests:  Pain w/ forced internal rotation (FADIR):  +  Pain w/ forced external rotation  (MOHIT):  +   Circumduction test:     -  Stinchfield test:     -   Log roll:       -   Snapping hip (internal):    -   Sit-up pain:      NT  Resisted sit-up pain:     NT  Resisted sit-up with adductor contraction pain:  NT  Step-down test:     NT  Trendelenburg test:     NT  Bridge test      NT    Extremity Neuro-vascular Examination:   Sensation:  Grossly intact to light touch all dermatomal regions.   Motor Function:  Fully intact motor function at hip, knee, foot and ankle    DTRs;  quadriceps and  achilles 2+.  No clonus and downgoing Babinski.    Vascular status:  DP and PT pulses 2+, brisk capillary refill, symmetric.    Skin:  intact, compartments soft.    Other Findings:    ASSESSMENT & PLAN  Assessment  #1 Tonnis Grade II osteoarthritis of hip, right   W/ tendinosis of lateral gluteal tendons    No evidence of neurologic pathology  No evidence of vascular pathology    Imaging studies reviewed:  X-ray spine lumbar, 24.12  X-ray pelvis and hip, right, 24.11  MRI hip right 24.09  X-ray femur right 24.08    Plan  Overall reassuring eval. I'd like her to try again to make fPT work. She is not interested in that intervention at this time.    We discussed the importance of appropriate diet, weight, and regular exercise    We discussed options including    Watchful waiting / relative rest    Physical therapy X  discussed; deferred by pt   Injection therapy Csi lat glut right  Csi iahip right   Consultation    The patient chooses As above   x = prescribed  CSI = corticosteroid injection  VSI = viscosupplement injection  PRPI = platelet rich plasma injection  ia = intra articular  R = right  L = left  B = bilateral   nfSx = surgical consultation was recommended, but patient is not interested in consultation at this time    Physical Therapy        Formal (fPT), @ [facility] facility    Formal (fPT), @ [facility] facility        Homegoing (hgPT), per concurrent fPT recommendations    Homegoing (hgPT), per prior fPT  recommendations    Homegoing (hgPT), handout provided        w/  (atPT)    [blank] = not prescribed  x = prescribed  b = prescribed, and begin as indicated  t = continue as indicated  r = prescribed, and restart as indicated  p = completed prior as indicated  hs = prescribed, and with high school   col = prescribed, and with college or university   nfPT = physical therapy was recommended, but patient is not interested in PT at this time    Activity (e.g. sports, work) restrictions    [blank] = as tolerated  pt = per physical therapist  at = per   NWB = non weight bearing on affected lower extremity, with crutches assistance for ambulation    Bracing    [blank] = not prescribed  r = recommended, but not fit with at todays visit  f = prescribed and fit with at todays visit  t = continue as indicated  d = d/c  p = as needed  rare = use on rare, as-needed basis; advised against chronic use    Pain management    [blank] = No prescription necessary. A handout detailing dosing of appropriate   over-the-counter musculoskeletal analgesics was made available to the patient.   m = meloxicam x 14 days  mp = 14 day course of meloxicam prescribed prior    Follow up 12 weeks   [blank] = as needed  [number] = in [number] weeks  CSI = for corticosteroid injection  VSI = for viscosupplement injection or injection series  PRP = for platelet rich plasma injection or injection series  MRI = after MRI imaging  ns = should surgical options be deferred (no surgery)  o = appointment offered, deferred by patient    Should symptoms worsen or fail to resolve, consider    Revisiting the above options and / or Mri hip   Vs  MRI lumbar     Vocation:

## 2024-12-11 ENCOUNTER — OFFICE VISIT (OUTPATIENT)
Dept: SPORTS MEDICINE | Facility: CLINIC | Age: 59
End: 2024-12-11
Payer: MEDICARE

## 2024-12-11 ENCOUNTER — HOSPITAL ENCOUNTER (OUTPATIENT)
Dept: RADIOLOGY | Facility: HOSPITAL | Age: 59
Discharge: HOME | End: 2024-12-11
Attending: FAMILY MEDICINE
Payer: MEDICARE

## 2024-12-11 ENCOUNTER — OFFICE VISIT (OUTPATIENT)
Dept: PSYCHIATRY | Facility: CLINIC | Age: 59
End: 2024-12-11
Payer: MEDICARE

## 2024-12-11 VITALS — TEMPERATURE: 98 F

## 2024-12-11 DIAGNOSIS — S76.011S TEAR OF RIGHT GLUTEUS MEDIUS TENDON, SEQUELA: ICD-10-CM

## 2024-12-11 DIAGNOSIS — M67.80 TENDINOSIS: ICD-10-CM

## 2024-12-11 DIAGNOSIS — G47.00 INSOMNIA DISORDER WITH NON-SLEEP DISORDER MENTAL COMORBIDITY: ICD-10-CM

## 2024-12-11 DIAGNOSIS — F41.9 ANXIETY: Primary | ICD-10-CM

## 2024-12-11 DIAGNOSIS — F31.81 BIPOLAR 2 DISORDER, MAJOR DEPRESSIVE EPISODE: ICD-10-CM

## 2024-12-11 DIAGNOSIS — S76.011S TEAR OF RIGHT GLUTEUS MINIMUS TENDON, SEQUELA: ICD-10-CM

## 2024-12-11 DIAGNOSIS — M25.551 RIGHT HIP PAIN: Primary | ICD-10-CM

## 2024-12-11 DIAGNOSIS — M16.11 PRIMARY OSTEOARTHRITIS OF RIGHT HIP: ICD-10-CM

## 2024-12-11 DIAGNOSIS — M47.816 LUMBAR SPONDYLOSIS: ICD-10-CM

## 2024-12-11 DIAGNOSIS — M25.551 RIGHT HIP PAIN: ICD-10-CM

## 2024-12-11 PROCEDURE — 3044F HG A1C LEVEL LT 7.0%: CPT | Mod: CPTII,95,, | Performed by: NURSE PRACTITIONER

## 2024-12-11 PROCEDURE — 72110 X-RAY EXAM L-2 SPINE 4/>VWS: CPT | Mod: TC

## 2024-12-11 PROCEDURE — 99999 PR PBB SHADOW E&M-EST. PATIENT-LVL IV: CPT | Mod: PBBFAC,,, | Performed by: FAMILY MEDICINE

## 2024-12-11 PROCEDURE — 72110 X-RAY EXAM L-2 SPINE 4/>VWS: CPT | Mod: 26,,, | Performed by: RADIOLOGY

## 2024-12-11 PROCEDURE — 99214 OFFICE O/P EST MOD 30 MIN: CPT | Mod: 95,,, | Performed by: NURSE PRACTITIONER

## 2024-12-11 RX ORDER — BUSPIRONE HYDROCHLORIDE 10 MG/1
10 TABLET ORAL 3 TIMES DAILY PRN
Qty: 90 TABLET | Refills: 11 | Status: SHIPPED | OUTPATIENT
Start: 2024-12-11

## 2024-12-11 RX ORDER — TRIAMCINOLONE ACETONIDE 40 MG/ML
40 INJECTION, SUSPENSION INTRA-ARTICULAR; INTRAMUSCULAR
Status: DISCONTINUED | OUTPATIENT
Start: 2024-12-11 | End: 2024-12-11 | Stop reason: HOSPADM

## 2024-12-11 RX ORDER — TOPIRAMATE 50 MG/1
50 TABLET, FILM COATED ORAL 2 TIMES DAILY
Qty: 60 TABLET | Refills: 11 | Status: SHIPPED | OUTPATIENT
Start: 2024-12-11

## 2024-12-11 RX ORDER — SERTRALINE HYDROCHLORIDE 100 MG/1
100 TABLET, FILM COATED ORAL DAILY
Qty: 90 TABLET | Refills: 2 | Status: SHIPPED | OUTPATIENT
Start: 2024-12-11

## 2024-12-11 RX ORDER — CLONAZEPAM 1 MG/1
1 TABLET ORAL DAILY PRN
Qty: 90 TABLET | Refills: 1 | Status: SHIPPED | OUTPATIENT
Start: 2024-12-11

## 2024-12-11 RX ADMIN — TRIAMCINOLONE ACETONIDE 40 MG: 40 INJECTION, SUSPENSION INTRA-ARTICULAR; INTRAMUSCULAR at 01:12

## 2024-12-11 NOTE — PROGRESS NOTES
Outpatient Psychiatry Follow-Up Visit (MD/NP)    12/11/2024    Clinical Status of Patient:  Outpatient (Ambulatory)    Chief Complaint:  Ida Orozco is a 59 y.o. female who presents today for follow-up of anxiety.  Met with patient.      Last visit was: 10/07/24. Chart and  reviewed.   The patient location is: home  The chief complaint leading to consultation is: anxiety    Visit type: audiovisual    Face to Face time with patient: 30 minutes  35 minutes of total time spent on the encounter, which includes face to face time and non-face to face time preparing to see the patient (eg, review of tests), Obtaining and/or reviewing separately obtained history, Documenting clinical information in the electronic or other health record, Independently interpreting results (not separately reported) and communicating results to the patient/family/caregiver, or Care coordination (not separately reported).     Each patient to whom he or she provides medical services by telemedicine is:  (1) informed of the relationship between the physician and patient and the respective role of any other health care provider with respect to management of the patient; and (2) notified that he or she may decline to receive medical services by telemedicine and may withdraw from such care at any time.    Interval History and Content of Current Session:  Current Psychiatric Medications/changes  Restart ZOLOFT 50 MG daily  Restart TOPOMAX 25 MG twice daily  Increase to TRAZODONE 150 MG before bed as needed for sleep  Continue KLONOPIN 1 mg daily as needed for panic attcks  Increase to BUSPAR 10 mg 2-3 x daily for anxiety  Follow-up with me by virtual visit in 6-8 weeks    [facility] Mood appears anxious and distressed although she claims her depression improved. Pt discussed situational stressors and coping struggles. Thinks she is misdiagnosed and bleieves she has ADHD but symptoms presentation is more consistent with  anxiety and mood disorder. Will increase Zoloft and Topamax.     Psychotherapy:  Target symptoms: anxiety   Why chosen therapy is appropriate versus another modality: relevant to diagnosis  Outcome monitoring methods: self-report  Therapeutic intervention type: insight oriented psychotherapy  Topics discussed/themes: building skills sets for symptom management, symptom recognition  The patient's response to the intervention is accepting. The patient's progress toward treatment goals is fair.   Duration of intervention: 15 minutes.    Review of Systems   PSYCHIATRIC: Pertinant items are noted in the narrative.  CONSTITUTIONAL: No weight gain or loss.   MUSCULOSKELETAL: No pain or stiffness of the joints.  NEUROLOGIC: No weakness, sensory changes, seizures, confusion, memory loss, tremor or other abnormal movements.  ENDOCRINE: No polydipsia or polyuria.  INTEGUMENTARY: No rashes or lacerations.  EYES: No exophthalmos, jaundice or blindness.  ENT: No dizziness, tinnitus or hearing loss.  RESPIRATORY: No shortness of breath.  CARDIOVASCULAR: No tachycardia or chest pain.  GASTROINTESTINAL: No nausea, vomiting, pain, constipation or diarrhea.  GENITOURINARY: No frequency, dysuria or sexual dysfunction.  HEMATOLOGIC/LYMPHATIC: No excessive bleeding, prolonged or excessive bleeding after dental extraction/injury.  ALLERGIC/IMMUNOLOGIC: No allergic response to materials, foods or animals at this time.    Past Medical, Family and Social History: The patient's past medical, family and social history have been reviewed and updated as appropriate within the electronic medical record - see encounter notes.    Compliance: yes    Side effects: see above    Risk Parameters:  Patient reports no suicidal ideation  Patient reports no homicidal ideation  Patient reports no self-injurious behavior  Patient reports no violent behavior    Exam (detailed: at least 9 elements; comprehensive: all 15 elements)   Constitutional  Vitals:  Most  recent vital signs, dated greater than 90 days prior to this appointment, were reviewed.   There were no vitals filed for this visit.     General:  unremarkable, age appropriate     Musculoskeletal  Muscle Strength/Tone:  not examined   Gait & Station:  non-ataxic     Psychiatric  Speech:  no latency; no press   Mood & Affect:  steady  congruent and appropriate   Thought Process:  normal and logical   Associations:  intact   Thought Content:  normal, no suicidality, no homicidality, delusions, or paranoia   Insight:  intact   Judgement: behavior is adequate to circumstances   Orientation:  grossly intact   Memory: intact for content of interview   Language: grossly intact   Attention Span & Concentration:  able to focus   Fund of Knowledge:  intact and appropriate to age and level of education     Assessment and Diagnosis   Status/Progress: Based on the examination today, the patient's problem(s) is/are adequately but not ideally controlled.  New problems have not been presented today.   Co-morbidities and Lack of compliance are not complicating management of the primary condition.  There are no active rule-out diagnoses for this patient at this time.     General Impression:       ICD-10-CM ICD-9-CM   1. Anxiety  F41.9 300.00   2. Bipolar 2 disorder, major depressive episode  F31.81 296.89   3. Insomnia disorder with non-sleep disorder mental comorbidity  G47.00 780.52       Intervention/Counseling/Treatment Plan   Medication Management: The risks and benefits of medication were discussed with the patient.  Increase to  ZOLOFT 100 MG daily  Increase tp TOPOMAX 50 MG twice daily  Increase to TRAZODONE 150 MG before bed as needed for sleep  Continue KLONOPIN 1 mg daily as needed for panic attcks  Increase to BUSPAR 10 mg 2-3 x daily for anxiety  Follow-up with me by virtual visit in 6-8 weeks    Return to Clinic: 2 months    Risks, benefits, side effects and alternative treatments discussed with patient. Patient  agrees with the current plan as documented.  Encouraged Patient to keep future appointments.  Take medications as prescribed and abstain from substance abuse.  Pt to present to ED for thoughts to harm herself or others

## 2024-12-11 NOTE — PROCEDURES
"Large Joint Aspiration/Injection: R hip joint    Date/Time: 12/11/2024 1:00 PM    Performed by: Sylvain Quinn MD  Authorized by: Sylvain Quinn MD    Consent Done?:  Yes (Verbal)  Indications:  Pain  Site marked: the procedure site was marked    Timeout: prior to procedure the correct patient, procedure, and site was verified    Prep: patient was prepped and draped in usual sterile fashion    Local anesthesia used?: No      Details:  Needle Size:  22 G  Ultrasonic Guidance for needle placement?: Yes    Images are saved and documented.  Approach:  Anterior  Location:  Hip  Site:  R hip joint  Medications:  40 mg triamcinolone acetonide 40 mg/mL  Patient tolerance:  Patient tolerated the procedure well with no immediate complications     Description of ultrasound utilization for needle guidance:   Ultrasound guidance used for needle localization. Images saved and stored for documentation. The hip joint was visualized. Dynamic visualization of the 22g x 3.5" needle was continuous throughout the procedure.    Precautionary:  The patient's femoral artery, vein, and nerve were identified, and visualized throughout the procedure, so as to avoid needle contact with those structures.     "

## 2024-12-11 NOTE — PROCEDURES
"Tendon Origin: R hip joint    Date/Time: 12/11/2024 1:00 PM    Performed by: Sylvain Quinn MD  Authorized by: Sylvain Quinn MD    Consent Done?:  Yes (Verbal)  Timeout: prior to procedure the correct patient, procedure, and site was verified    Indications:  Pain  Site marked: the procedure site was marked    Timeout: prior to procedure the correct patient, procedure, and site was verified    Location:  Hip  Site:  R hip joint  Prep: patient was prepped and draped in usual sterile fashion    Ultrasonic Guidance for Needle Placement?: Yes    Needle size:  22 G  Approach:  Posterolateral  Medications:  40 mg triamcinolone acetonide 40 mg/mL  Patient tolerance:  Patient tolerated the procedure well with no immediate complications   Gluteus medius and gluteus minimus muscles, tendon sheaths, and tendon insertions injection    Description of ultrasound utilization for needle guidance:   Ultrasound guidance used for needle localization. Images saved and stored for documentation. The gluteus medius and minimus muscles and tendons were visualized at their insertions on the greater trochanter. Dynamic visualization of the 22g x 3.5" needle was continuous throughout the procedure.    "

## 2024-12-16 NOTE — PROGRESS NOTES
Established Patient - Audio Only Telehealth Visit     The patient location is: home  The chief complaint leading to consultation is: MRI results  Visit type: Virtual visit with audio only (telephone)  Total time spent with patient: 10 min     The reason for the audio only service rather than synchronous audio and video virtual visit was related to technical difficulties or patient preference/necessity.     Each patient to whom I provide medical services by telemedicine is:  (1) informed of the relationship between the physician and patient and the respective role of any other health care provider with respect to management of the patient; and (2) notified that they may decline to receive medical services by telemedicine and may withdraw from such care at any time. Patient verbally consented to receive this service via voice-only telephone call.    DATE: 12/16/2024  PATIENT: Ida Orozco    Attending Physician: Hiren Melgoza M.D.    HISTORY:  Ida Orozco is a 59 y.o. female who returns to me today for MRI results.  She was last seen by me 12/5/2024.  Today she is doing well but notes she continues to have neck and bilateral arm pain (Neck - 6, Arm - 6). The pain has been present for years, worsening over time. The patient describes the pain as aching in her neck and pins and needles in her hands R>L. The pain is worse with nothing and improved by nothing and is constant. There is positive associated numbness and tingling. There is positive subjective weakness. Prior treatments have included L carpal tunnel surgery and left ulnar nerve decompression with no relief, but no ESIs or neck surgery.      The patient ENDORSES myelopathic symptoms such as handwriting changes or difficulty with buttons/coins/keys. Denies perineal paresthesias, bowel/bladder dysfunction.     The Patient denies myelopathic symptoms such as handwriting changes or difficulty with buttons/coins/keys. Denies perineal paresthesias,  bowel/bladder dysfunction.      EXAM:  There were no vitals taken for this visit.    My physical examination was notable for the following findings:     Musculoskeletal and neuro exam stable      IMAGING:    Today I personally re- reviewed AP, Lat and Flex/Ex  upright C-spine films that demonstrate mild degenerative changes     MRI cervical demonstrates mild degenerative changes without stenosis    There is no height or weight on file to calculate BMI.    Hemoglobin A1C   Date Value Ref Range Status   09/06/2024 5.4 4.0 - 5.6 % Final     Comment:     ADA Screening Guidelines:  5.7-6.4%  Consistent with prediabetes  >or=6.5%  Consistent with diabetes    High levels of fetal hemoglobin interfere with the HbA1C  assay. Heterozygous hemoglobin variants (HbS, HgC, etc)do  not significantly interfere with this assay.   However, presence of multiple variants may affect accuracy.     05/03/2023 5.3 4.0 - 5.6 % Final     Comment:     ADA Screening Guidelines:  5.7-6.4%  Consistent with prediabetes  >or=6.5%  Consistent with diabetes    High levels of fetal hemoglobin interfere with the HbA1C  assay. Heterozygous hemoglobin variants (HbS, HgC, etc)do  not significantly interfere with this assay.   However, presence of multiple variants may affect accuracy.     05/30/2019 5.6 4.0 - 5.6 % Final     Comment:     ADA Screening Guidelines:  5.7-6.4%  Consistent with prediabetes  >or=6.5%  Consistent with diabetes  High levels of fetal hemoglobin interfere with the HbA1C  assay. Heterozygous hemoglobin variants (HbS, HgC, etc)do  not significantly interfere with this assay.   However, presence of multiple variants may affect accuracy.           ASSESSMENT/PLAN:    There are no diagnoses linked to this encounter.    Today we discussed at length all of the different treatment options including anti-inflammatories, acetaminophen, rest, ice, heat, physical therapy including strengthening and stretching exercises, home exercises, ROM,  aerobic conditioning, aqua therapy, other modalities including ultrasound, massage, and dry needling, epidural steroid injections and finally surgical intervention.      Pt presents with neck and arm pain with weakness and balance problems. No cervical spine pathology on MRI. Will consider PT and put in neurology referral for further evaluation.                          This service was not originating from a related E/M service provided within the previous 7 days nor will  to an E/M service or procedure within the next 24 hours or my soonest available appointment.  Prevailing standard of care was able to be met in this audio-only visit.

## 2024-12-17 ENCOUNTER — OFFICE VISIT (OUTPATIENT)
Dept: ORTHOPEDICS | Facility: CLINIC | Age: 59
End: 2024-12-17
Payer: MEDICARE

## 2024-12-17 DIAGNOSIS — R27.0 ATAXIA: Primary | ICD-10-CM

## 2024-12-17 PROBLEM — M54.12 CERVICAL RADICULOPATHY: Status: ACTIVE | Noted: 2024-12-17

## 2024-12-23 ENCOUNTER — TELEPHONE (OUTPATIENT)
Dept: NEUROLOGY | Facility: CLINIC | Age: 59
End: 2024-12-23
Payer: MEDICARE

## 2025-02-16 ENCOUNTER — RESULTS FOLLOW-UP (OUTPATIENT)
Dept: TRANSPLANT | Facility: CLINIC | Age: 60
End: 2025-02-16

## 2025-02-21 ENCOUNTER — TELEPHONE (OUTPATIENT)
Dept: NEUROLOGY | Facility: CLINIC | Age: 60
End: 2025-02-21
Payer: MEDICARE

## 2025-02-21 NOTE — TELEPHONE ENCOUNTER
Staff called patient informing her that Dr. Baltazar does not see movement disorders. Patient states she had this problem before, and that someone put her with Dr. Baltazar. Staff explained to patient she can go to [facility] to see a provider, patient asked if staff can schedule it?  Staff explained to patient I can give her the number. Patient stated that alright and hung up the phone.

## 2025-02-25 DIAGNOSIS — R42 VERTIGO: ICD-10-CM

## 2025-02-25 RX ORDER — MECLIZINE HYDROCHLORIDE 25 MG/1
25 TABLET ORAL 3 TIMES DAILY PRN
Qty: 60 TABLET | Refills: 3 | OUTPATIENT
Start: 2025-02-25

## 2025-02-25 NOTE — TELEPHONE ENCOUNTER
Refill Routing Note   Medication(s) are not appropriate for processing by [facility] for the following reason(s):        Outside of protocol  Responsible provider unclear    [facility] action(s):  Route        Medication Therapy Plan: No assigned PCP      Appointments  past 12m or future 3m with PCP    Date Provider   Last Visit   8/16/2024 Eliecer Jones MD   Next Visit   Visit date not found Eliecer Jones MD   ED visits in past 90 days: 0        Note composed:10:34 AM 02/25/2025

## 2025-03-13 ENCOUNTER — PATIENT MESSAGE (OUTPATIENT)
Dept: RHEUMATOLOGY | Facility: CLINIC | Age: 60
End: 2025-03-13
Payer: MEDICARE

## 2025-03-18 ENCOUNTER — TELEPHONE (OUTPATIENT)
Dept: HEPATOLOGY | Facility: CLINIC | Age: 60
End: 2025-03-18

## 2025-03-18 ENCOUNTER — OFFICE VISIT (OUTPATIENT)
Dept: HEPATOLOGY | Facility: CLINIC | Age: 60
End: 2025-03-18
Payer: MEDICARE

## 2025-03-18 DIAGNOSIS — K74.3 HEPATIC CIRRHOSIS DUE TO PRIMARY BILIARY CHOLANGITIS: ICD-10-CM

## 2025-03-18 DIAGNOSIS — M81.0 OSTEOPOROSIS WITHOUT CURRENT PATHOLOGICAL FRACTURE, UNSPECIFIED OSTEOPOROSIS TYPE: ICD-10-CM

## 2025-03-18 DIAGNOSIS — K74.69 COMPENSATED LIVER DISEASE: ICD-10-CM

## 2025-03-18 DIAGNOSIS — R50.9 FEVER, UNSPECIFIED FEVER CAUSE: ICD-10-CM

## 2025-03-18 DIAGNOSIS — Z98.890 HISTORY OF LIVER BIOPSY: ICD-10-CM

## 2025-03-18 PROCEDURE — 1159F MED LIST DOCD IN RCRD: CPT | Mod: CPTII,95,, | Performed by: INTERNAL MEDICINE

## 2025-03-18 PROCEDURE — 98006 SYNCH AUDIO-VIDEO EST MOD 30: CPT | Mod: 95,,, | Performed by: INTERNAL MEDICINE

## 2025-03-18 PROCEDURE — 1160F RVW MEDS BY RX/DR IN RCRD: CPT | Mod: CPTII,95,, | Performed by: INTERNAL MEDICINE

## 2025-03-18 NOTE — TELEPHONE ENCOUNTER
[facility] linked to next lab appt. Recall placed for f/u in 6mos.    Montse Cooper MA    ----- Message from Dot Chavarria MD sent at 3/18/2025 10:07 AM CDT -----  Please link [facility] to lab appt in April. RTC in 6 months in person. Thanks.

## 2025-03-21 DIAGNOSIS — E55.9 VITAMIN D DEFICIENCY: ICD-10-CM

## 2025-03-21 RX ORDER — ERGOCALCIFEROL 1.25 MG/1
50000 CAPSULE ORAL
Qty: 12 CAPSULE | Refills: 1 | Status: SHIPPED | OUTPATIENT
Start: 2025-03-21

## 2025-03-21 NOTE — TELEPHONE ENCOUNTER
Refill Routing Note   Medication(s) are not appropriate for processing by [facility] for the following reason(s):        Responsible provider unclear  Outside of protocol    [facility] action(s):  Route             Appointments  past 12m or future 3m with PCP    Date Provider   Last Visit   8/16/2024 Eliecer Jones MD   Next Visit   Visit date not found Eliecer Jones MD   ED visits in past 90 days: 0        Note composed:11:57 AM 03/21/2025

## 2025-04-05 ENCOUNTER — RESULTS FOLLOW-UP (OUTPATIENT)
Dept: TRANSPLANT | Facility: CLINIC | Age: 60
End: 2025-04-05

## 2025-04-14 ENCOUNTER — OFFICE VISIT (OUTPATIENT)
Dept: NEUROLOGY | Facility: CLINIC | Age: 60
End: 2025-04-14
Payer: MEDICARE

## 2025-04-14 DIAGNOSIS — R00.1 SINUS BRADYCARDIA: ICD-10-CM

## 2025-04-14 DIAGNOSIS — R42 VERTIGO: ICD-10-CM

## 2025-04-14 DIAGNOSIS — R55 SYNCOPE, UNSPECIFIED SYNCOPE TYPE: Primary | ICD-10-CM

## 2025-04-14 PROCEDURE — 3044F HG A1C LEVEL LT 7.0%: CPT | Mod: CPTII,95,, | Performed by: PSYCHIATRY & NEUROLOGY

## 2025-04-14 PROCEDURE — 98001 SYNCH AUDIO-VIDEO NEW LOW 30: CPT | Mod: 95,,, | Performed by: PSYCHIATRY & NEUROLOGY

## 2025-04-14 PROCEDURE — 1159F MED LIST DOCD IN RCRD: CPT | Mod: CPTII,95,, | Performed by: PSYCHIATRY & NEUROLOGY

## 2025-04-14 NOTE — Clinical Note
MRI and MRA Labs  F/u after in 1-2 mo- will need in person evalaution in resident clinic to eval for brit and orthostatics

## 2025-04-14 NOTE — PROGRESS NOTES
Neurology Telemedicine Note     Name: Ida Orozco  MRN: [id number]   CSN: [id number]      Date: 04/14/2025    The patient location is: Home  The chief complaint leading to consultation is: syncope  Visit type: Virtual visit with synchronous audio and video    TOTAL TIME SPENT: 35 mins    Each patient to whom I provide medical services by telemedicine is:  (1) informed of the relationship between the physician and patient and the respective role of any other health care provider with respect to management of the patient; and (2) notified that they may decline to receive medical services by telemedicine and may withdraw from such care at any time.    History of Present Illness (HPI): Patient is a 59 yr old female with history as below who presents to tele-neurology clinic due to pre-syncope and syncope symptoms when she moves her head and looks up or down. Patient states that she has lost consciousness 3 times this past year and feels like she will pass out when she moves her head. She does not endorse any prolonged confusion after the event. She has had an MI of her neck which is negative She saw cardiology for bradycardia. She states she told cardiologist about this issue. She has not had any brain or vessel imaging. I will order MRI brain and MRA brain and neck to make sure no intracranial athero or obstructive disease causing VBI or lowering threshold for syncope. She has never gotten orthostatic blood pressures done. She has a history of vertigo but denies any ties to these complaints.     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    Social History: The patient  reports that she quit smoking about 16 years ago. Her smoking use included cigarettes. She started smoking about 41 years ago. She has a 25 pack-year smoking history. She has never used smokeless tobacco. She reports that she does not drink alcohol and does not use drugs.    Family History: Their family history includes Cancer in her maternal grandfather, mother, and paternal grandfather; Cirrhosis in her mother; Colon cancer in her maternal grandfather, maternal uncle, and maternal uncle; Heart disease (age of onset: 67) in her father.    Allergies: Adhesive, Dilaudid [hydromorphone], Humira [adalimumab], Sulfa (sulfonamide antibiotics), and Surgical stainless steel     Meds: Medications Ordered Prior to Encounter[1]    Exam:  Vital Signs deferred with home visit    Constitutional  Well-developed, well-nourished, appears stated age   Eyes  No scleral icterus   ENT  Moist oral mucosa   Cardiovascular  No lower extremity edema    Respiratory  No labored breathing    Skin  No rashes   Hematologic  No bruising   Psychiatric  Normal mood and affect   Other  GI/ deferred    Neurological     * Mental status  Alert and oriented to person, place, time, and situation; no dysarthria; no aphasia; normal recent and remote memory; follows commands   * Cranial nerves     - CN II  Pupils midposition and symmetric   - CN III, IV, VI  Extraocular movements full, no nystagmus visualized   - CN V  Unable to test   - CN VII  Face strong and symmetric bilaterally    - CN VIII  Hearing grossly intact with conversation    - CN IX, X  Palate raises midline and symmetric    - CN XI  Strong shoulder shrug B    - CN XII  Tongue appears midline    * Motor  Normal bulk by appearance, no drift    * Sensory  Not tested  objectively, no changes described by the patient   * Deep tendon reflexes  Not tested   * Coord/Movemt/Gait No hypophonic speech.  No facial masking.  No B bradykinesia.  No tremor with rest, posture, kinesis, or intention.   No chorea, athetosis, dystonia, myoclonus, or tics.   No motor impersistence.  Gait is deferred for safety.       Medical Record Review:  - Lab Results:  Lab Visit on 04/04/2025   Component Date Value Ref Range Status    Sodium 04/04/2025 141  136 - 145 mmol/L Final    Potassium 04/04/2025 3.9  3.5 - 5.1 mmol/L Final    Chloride 04/04/2025 110  95 - 110 mmol/L Final    CO2 04/04/2025 24  23 - 29 mmol/L Final    Glucose 04/04/2025 88  70 - 110 mg/dL Final    BUN 04/04/2025 10  6 - 20 mg/dL Final    Creatinine 04/04/2025 1.0  0.5 - 1.4 mg/dL Final    Calcium 04/04/2025 8.3 (L)  8.7 - 10.5 mg/dL Final    Protein Total 04/04/2025 6.9  6.0 - 8.4 gm/dL Final    Albumin 04/04/2025 3.2 (L)  3.5 - 5.2 g/dL Final    Bilirubin Total 04/04/2025 0.3  0.1 - 1.0 mg/dL Final    ALP 04/04/2025 124  40 - 150 unit/L Final    AST 04/04/2025 38  11 - 45 unit/L Final    ALT 04/04/2025 30  10 - 44 unit/L Final    Anion Gap 04/04/2025 7 (L)  8 - 16 mmol/L Final    eGFR 04/04/2025 >60  >60 mL/min/1.73/m2 Final    PT 04/04/2025 11.9  9.0 - 12.5 seconds Final    INR 04/04/2025 1.1  0.8 - 1.2 Final    Bilirubin Direct 04/04/2025 0.2  0.1 - 0.3 mg/dL Final    Cholesterol Total 04/04/2025 193  120 - 199 mg/dL Final    Triglyceride 04/04/2025 90  30 - 150 mg/dL Final    HDL Cholesterol 04/04/2025 85 (H)  40 - 75 mg/dL Final    LDL Cholesterol 04/04/2025 90.0  63.0 - 159.0 mg/dL Final    HDL/Cholesterol Ratio 04/04/2025 44.0  20.0 - 50.0 % Final    Cholesterol/HDL Ratio 04/04/2025 2.3  2.0 - 5.0 Final    Non HDL Cholesterol 04/04/2025 108  mg/dL Final    TSH 04/04/2025 2.149  0.400 - 4.000 uIU/mL Final    Vitamin D 04/04/2025 27 (L)  30 - 96 ng/mL Final    Hemoglobin A1c 04/04/2025 5.3  4.0 - 5.6 % Final    Estimated Average  Glucose 04/04/2025 105  68 - 131 mg/dL Final    WBC 04/04/2025 6.95  3.90 - 12.70 K/uL Final    RBC 04/04/2025 4.62  4.00 - 5.40 M/uL Final    HGB 04/04/2025 13.8  12.0 - 16.0 gm/dL Final    HCT 04/04/2025 43.0  37.0 - 48.5 % Final    MCV 04/04/2025 93  82 - 98 fL Final    MCH 04/04/2025 29.9  27.0 - 31.0 pg Final    MCHC 04/04/2025 32.1  32.0 - 36.0 g/dL Final    RDW 04/04/2025 13.3  11.5 - 14.5 % Final    Platelet Count 04/04/2025 235  150 - 450 K/uL Final    MPV 04/04/2025 10.0  9.2 - 12.9 fL Final    Nucleated RBC 04/04/2025 0  <=0 /100 WBC Final    Neut % 04/04/2025 58.0  38 - 73 % Final    Lymph % 04/04/2025 24.0  18 - 48 % Final    Mono % 04/04/2025 7.9  4 - 15 % Final    Eos % 04/04/2025 8.9 (H)  <=8 % Final    Basophil % 04/04/2025 0.9  <=1.9 % Final    Imm Grans % 04/04/2025 0.3  0.0 - 0.5 % Final    Neut # 04/04/2025 4.03  1.8 - 7.7 K/uL Final    Lymph # 04/04/2025 1.67  1 - 4.8 K/uL Final    Mono # 04/04/2025 0.55  0.3 - 1 K/uL Final    Eos # 04/04/2025 0.62 (H)  <=0.5 K/uL Final    Baso # 04/04/2025 0.06  <=0.2 K/uL Final    Imm Grans # 04/04/2025 0.02  0.00 - 0.04 K/uL Final   Lab Visit on 02/11/2025   Component Date Value Ref Range Status    WBC 02/11/2025 5.81  3.90 - 12.70 K/uL Final    RBC 02/11/2025 4.51  4.00 - 5.40 M/uL Final    Hemoglobin 02/11/2025 13.3  12.0 - 16.0 g/dL Final    Hematocrit 02/11/2025 43.2  37.0 - 48.5 % Final    MCV 02/11/2025 96  82 - 98 fL Final    MCH 02/11/2025 29.5  27.0 - 31.0 pg Final    MCHC 02/11/2025 30.8 (L)  32.0 - 36.0 g/dL Final    RDW 02/11/2025 13.7  11.5 - 14.5 % Final    Platelets 02/11/2025 232  150 - 450 K/uL Final    MPV 02/11/2025 9.8  9.2 - 12.9 fL Final    Immature Granulocytes 02/11/2025 0.3  0.0 - 0.5 % Final    Gran # (ANC) 02/11/2025 3.3  1.8 - 7.7 K/uL Final    Immature Grans (Abs) 02/11/2025 0.02  0.00 - 0.04 K/uL Final    Lymph # 02/11/2025 1.4  1.0 - 4.8 K/uL Final    Mono # 02/11/2025 0.5  0.3 - 1.0 K/uL Final    Eos # 02/11/2025 0.5  0.0 -  0.5 K/uL Final    Baso # 02/11/2025 0.06  0.00 - 0.20 K/uL Final    nRBC 02/11/2025 0  0 /100 WBC Final    Gran % 02/11/2025 57.2  38.0 - 73.0 % Final    Lymph % 02/11/2025 23.4  18.0 - 48.0 % Final    Mono % 02/11/2025 9.3  4.0 - 15.0 % Final    Eosinophil % 02/11/2025 8.8 (H)  0.0 - 8.0 % Final    Basophil % 02/11/2025 1.0  0.0 - 1.9 % Final    Differential Method 02/11/2025 Automated   Final    Sodium 02/11/2025 138  136 - 145 mmol/L Final    Potassium 02/11/2025 4.2  3.5 - 5.1 mmol/L Final    Chloride 02/11/2025 110  95 - 110 mmol/L Final    CO2 02/11/2025 21 (L)  23 - 29 mmol/L Final    Glucose 02/11/2025 71  70 - 110 mg/dL Final    BUN 02/11/2025 14  6 - 20 mg/dL Final    Creatinine 02/11/2025 1.0  0.5 - 1.4 mg/dL Final    Calcium 02/11/2025 8.2 (L)  8.7 - 10.5 mg/dL Final    Total Protein 02/11/2025 6.8  6.0 - 8.4 g/dL Final    Albumin 02/11/2025 3.1 (L)  3.5 - 5.2 g/dL Final    Total Bilirubin 02/11/2025 0.3  0.1 - 1.0 mg/dL Final    Alkaline Phosphatase 02/11/2025 121  40 - 150 U/L Final    AST 02/11/2025 32  10 - 40 U/L Final    ALT 02/11/2025 30  10 - 44 U/L Final    eGFR 02/11/2025 >60.0  >60 mL/min/1.73 m^2 Final    Anion Gap 02/11/2025 7 (L)  8 - 16 mmol/L Final    Prothrombin Time 02/11/2025 12.1  9.0 - 12.5 sec Final    INR 02/11/2025 1.1  0.8 - 1.2 Final    Bilirubin, Direct 02/11/2025 0.1  0.1 - 0.3 mg/dL Final       Diagnoses:   Patient is a 59 yr old female with history as below who presents to tele-neurology clinic due to pre-syncope and syncope symptoms when she moves her head and looks up or down. Patient states that she has lost consciousness 3 times this past year and feels like she will pass out when she moves her head. She does not endorse any prolonged confusion after the event. She has had an MI of her neck which is negative She saw cardiology for bradycardia. She states she told cardiologist about this issue. She has not had any brain or vessel imaging. I will order MRI brain and MRA  brain and neck to make sure no intracranial athero or obstructive disease causing VBI or lowering threshold for syncope. She has never gotten orthostatic blood pressures done. She has a history of vertigo but denies any ties to these complaints.     Medical Decision Makin) MRI brain  2) MA brain and neck  3) orthostatics- please see PCP  4) focus on hydration- she will do this  5) Paraneoplastic labs- history of smoking  6) B12 supplementation- can cause dysautonomia      F/u after imaging in 1-2 mo      I spent 35 minutes with the patient with >50% of the time spent with counseling and education regarding:    This is a consult performed through audio-visual using [employer]son. Pt and provider are in different locations. History and physical exam are limited due to the nature of this encounter.       Kaley Wolf MD             [1]   Current Outpatient Medications on File Prior to Visit   Medication Sig Dispense Refill    azelastine (ASTELIN) 137 mcg (0.1 %) nasal spray 1 SPRAY EACH NOSTRIL NASALLY TWICE A DAY 30      butalbital-acetaminophen-caffeine -40 mg (FIORICET, ESGIC) -40 mg per tablet Take 1 tablet by mouth every 6 (six) hours as needed for Pain. 20 tablet 1    clonazePAM (KLONOPIN) 1 MG tablet Take 1 tablet (1 mg total) by mouth daily as needed for Anxiety. 90 tablet 1    diphenoxylate-atropine 2.5-0.025 mg (LOMOTIL) 2.5-0.025 mg per tablet Take 1 tablet by mouth 4 (four) times daily as needed for Diarrhea. 30 tablet 2    ergocalciferol (ERGOCALCIFEROL) 50,000 unit Cap TAKE 1 CAPSULE BY MOUTH EVERY 7 DAYS 12 capsule 1    fluticasone propionate (FLONASE) 50 mcg/actuation nasal spray 1 SPRAY IN EACH NOSTRIL NASALLY TWICE DAILY AS NEEDED 30      levocetirizine (XYZAL) 5 MG tablet Take 1 tablet (5 mg total) by mouth every evening. 90 tablet 3    nystatin (MYCOSTATIN) powder Apply topically 2 (two) times daily. 60 g 3    ondansetron (ZOFRAN) 4 MG tablet Take 1 tablet (4 mg total)  by mouth every 6 (six) hours as needed for Nausea. 20 tablet 0    sertraline (ZOLOFT) 100 MG tablet Take 1 tablet (100 mg total) by mouth once daily. 90 tablet 2    topiramate (TOPAMAX) 100 MG tablet Take 100 mg by mouth 2 (two) times daily.      traZODone (DESYREL) 150 MG tablet Take 1 tablet (150 mg total) by mouth nightly. 90 tablet 1    ursodioL (ACTIGALL) 300 mg capsule Take 5 capsules (1,500 mg total) by mouth once daily. 150 capsule 11    [DISCONTINUED] adalimumab (HUMIRA,CF, PEN) 40 mg/0.4 mL PnKt Inject 0.4 mLs (40 mg total) into the skin every 14 (fourteen) days. 6 pen 2     No current facility-administered medications on file prior to visit.

## 2025-05-02 DIAGNOSIS — F51.01 PRIMARY INSOMNIA: ICD-10-CM

## 2025-05-02 RX ORDER — TRAZODONE HYDROCHLORIDE 150 MG/1
150 TABLET ORAL NIGHTLY
Qty: 90 TABLET | Refills: 2 | Status: SHIPPED | OUTPATIENT
Start: 2025-05-02

## 2025-05-16 ENCOUNTER — PATIENT MESSAGE (OUTPATIENT)
Dept: ORTHOPEDICS | Facility: CLINIC | Age: 60
End: 2025-05-16
Payer: MEDICARE

## 2025-05-19 ENCOUNTER — PATIENT MESSAGE (OUTPATIENT)
Dept: SPORTS MEDICINE | Facility: CLINIC | Age: 60
End: 2025-05-19
Payer: MEDICARE

## 2025-05-19 DIAGNOSIS — M79.641 RIGHT HAND PAIN: Primary | ICD-10-CM

## 2025-05-23 ENCOUNTER — TELEPHONE (OUTPATIENT)
Dept: ORTHOPEDICS | Facility: CLINIC | Age: 60
End: 2025-05-23
Payer: MEDICARE

## 2025-05-23 NOTE — TELEPHONE ENCOUNTER
Patient communication     Notified patient to stop at [facility] Location - 1st Floor [address] [facility], Please park in [address] [place] [address] and use Oral elevators 30 minutes prior to your appointment time for X-ray. After your X-ray please proceed to 9th Floor [address] for Appointment on 5/28/2025 with Ani Contreras PA-C for x-rays.     Cathy Gamboa MA  [facility]  P: [phone]  F: [phone]

## 2025-05-29 ENCOUNTER — TELEPHONE (OUTPATIENT)
Dept: ORTHOPEDICS | Facility: CLINIC | Age: 60
End: 2025-05-29
Payer: MEDICARE

## 2025-05-29 ENCOUNTER — OFFICE VISIT (OUTPATIENT)
Dept: SPORTS MEDICINE | Facility: CLINIC | Age: 60
End: 2025-05-29
Payer: MEDICARE

## 2025-05-29 VITALS — WEIGHT: 213.5 LBS | BODY MASS INDEX: 36.45 KG/M2 | HEIGHT: 64 IN

## 2025-05-29 DIAGNOSIS — G89.29 CHRONIC RIGHT HIP PAIN: Primary | ICD-10-CM

## 2025-05-29 DIAGNOSIS — R26.89 ANTALGIC GAIT: ICD-10-CM

## 2025-05-29 DIAGNOSIS — M25.551 CHRONIC RIGHT HIP PAIN: Primary | ICD-10-CM

## 2025-05-29 DIAGNOSIS — R52 MECHANICAL PAIN: ICD-10-CM

## 2025-05-29 DIAGNOSIS — M16.11 PRIMARY OSTEOARTHRITIS OF RIGHT HIP: ICD-10-CM

## 2025-05-29 PROCEDURE — 99999 PR PBB SHADOW E&M-EST. PATIENT-LVL III: CPT | Mod: PBBFAC,,, | Performed by: FAMILY MEDICINE

## 2025-05-29 NOTE — PROGRESS NOTES
"HISTORY OF PRESENT ILLNESS  12/11/24  Ida Orozco, a 59 y.o. female, presents today for follow up evaluation of her right hip. At last appointment, 8/28/2024, patient underwent intra-articular hip CSI and lateral glute tendon CSI & pain/symptoms did improve. Pain returned just over a month. Pain is moderate to severe at present & up to moderate to severe with provacative activity including ADLs. Patient has been doing physical therapy on and off. Patient reports she is "sick of medicine" and reports a lack of trust in doctors.  Patient reports that she thinks she dislocated her hip when she had a recent fall and believes it was reduced when she was transferred into a hospital bed at the ED. Patient is frustrated by PT and would like to go to aquatic therapy but cannot [place].     8/28/24  Ida Orozco, a 59 y.o. female, presents today for follow up evaluation of her right hip. At last appointment, 9/27/2023, patient underwent lateral glute tendon CSI & pain/symptoms did improve. Pain returned on 8/12 following a fall. Patient went to the ED and received hip x-rays and tib/fib x-rays. Patient would like knee and femur x-rays today. Pain is 7/10 at present & up to 10/10 with provacative activity including ADLs and walking. Patient has not been doing physical therapy.    9/27/23  Ida Orozco, a 59 y.o. female, presents today for evaluation of her bilateral HIP.    Patient reports onset of chronic and insidious  pain beginning 6 months ago. Patient reports no known injury or trauma. Pain is located along lateral aspect of HIP. Pain is 6/10 at present & up to 8/10 with provacative activity including all activity including ADLs, walking, and sitting to standing. Pain is described as sharp, ache, and dull. Patient states pain does not radiate.     Associated symptoms include instability, weakness, popping, and clicking. Pain is aggravated by activities above & occur daily . Symptoms do " interfere with sleep. Patient reports pain & symptoms are staying the same . Patient reports no prior surgery to HIP. She is status post gastric sleeve & has become more active with weight loss. She also reports bilateral foot pain.    Prior treatment Ida Orozco has tried   OTC Acetaminophen - Yes  OTC NSAID - No - s/p gastric sleeve  Rx NSAID - No - s/p gastric sleeve  Rx Narcotic/Other - No   Brace - No   Injection - Cortisone - No   Injection - Biologics - No   Activity Modification - Yes  Physical Therapy - Yes - current   Home Exercise Program - Yes  Assistive Device - No  Other - ice and heat    Review of systems (ROS):  A 10+ review of systems was performed with pertinent positives and negatives noted above in the history of present illness. Other systems were negative unless otherwise specified.    PHYSICAL EXAMINATION  General:  The patient is alert and oriented x 3.  Mood is pleasant.  Observation of ears, eyes and nose reveal no gross abnormalities.  HEENT: NCAT, sclera nonicteric  Lungs: Respirations are equal and unlabored.   Gait is coordinated. Patient can toe walk and heel walk without difficulty.    HIP/PELVIS EXAMINATION    Observation/Inspection  Gait:   Antalgic   Alignment:  Neutral   Scars:   None   Muscle atrophy: None   Effusion:  None   Warmth:  None   Discoloration:   None   Leg lengths:   Equal   Pelvis:    Level     Tenderness/Crepitus (T/C):      T / C  Lateral Gluteal region  + / -  Trochanteric bursa   + / -  Piriformis    - / -  SI joint    - / -  Psoas tendon   - / -  Rectus insertion  - / -  Adductor insertion  - / -  Pubic symphysis  - / -    ROM: (* = pain)    Flexion:      120 degrees  External rotation:   40 degrees  Internal rotation with axial load:  30 degrees  Internal rotation without axial load:  40 degrees  Abduction:    45 degrees  Adduction:     20 degrees    Special Tests:  Pain w/ forced internal rotation (FADIR):  +  Pain w/ forced external rotation (MOHIT):  +    Circumduction test:     -  Stinchfield test:     -   Log roll:       -   Snapping hip (internal):    -   Sit-up pain:      NT  Resisted sit-up pain:     NT  Resisted sit-up with adductor contraction pain:  NT  Step-down test:     NT  Trendelenburg test:     NT  Bridge test      NT    Extremity Neuro-vascular Examination:   Sensation:  Grossly intact to light touch all dermatomal regions.   Motor Function:  Fully intact motor function at hip, knee, foot and ankle    DTRs;  quadriceps and  achilles 2+.  No clonus and downgoing Babinski.    Vascular status:  DP and PT pulses 2+, brisk capillary refill, symmetric.    Skin:  intact, compartments soft.    Other Findings:    ASSESSMENT & PLAN  Assessment  #1 mechanical hip pain right  Excruciating pain    No evidence of vascular pathology    Imaging studies reviewed:   X-ray spine lumbar, 24.12  X-ray pelvis and hip, right, 24.11  MRI hip right 24.09  X-ray femur right 24.08    Plan  Given extreme dysfunction and discomfort, and non-diagnostic x-ray imaging, we will obtain MRI imaging for further evaluation of the soft tissue structures of the hip.     We discussed options including    Watchful waiting / relative rest x   Physical therapy    Injection therapy    Consultation    The patient chooses As above   x = prescribed  CSI = corticosteroid injection  VSI = viscosupplement injection  PRPI = platelet rich plasma injection  ia = intra articular  R = right  L = left  B = bilateral   nfSx = surgical consultation was recommended, but patient is not interested in consultation at this time    Physical Therapy        Formal (fPT), @ [facility] facility    Formal (fPT), @ [facility] facility        Homegoing (hgPT), per concurrent fPT recommendations    Homegoing (hgPT), per prior fPT recommendations    Homegoing (hgPT), handout provided        w/  (atPT)    [blank] = not prescribed  x = prescribed  b = prescribed, and begin as indicated  t = continue as indicated  r =  prescribed, and restart as indicated  p = completed prior as indicated  hs = prescribed, and with high school   col = prescribed, and with college or university   nfPT = physical therapy was recommended, but patient is not interested in PT at this time    Activity (e.g. sports, work) restrictions    [blank] = as tolerated  pt = per physical therapist  at = per   NWB = non weight bearing on affected lower extremity, with crutches assistance for ambulation    Bracing    [blank] = not prescribed  r = recommended, but not fit with at todays visit  f = prescribed and fit with at todays visit  t = continue as indicated  d = d/c  p = as needed  rare = use on rare, as-needed basis; advised against chronic use    Pain management    [blank] = No prescription necessary. A handout detailing dosing of appropriate   over-the-counter musculoskeletal analgesics was made available to the patient.   m = meloxicam x 14 days  mp = 14 day course of meloxicam prescribed prior    Follow up MRI  And anticipate to [facility]   [blank] = as needed  [number] = in [number] weeks  CSI = for corticosteroid injection  VSI = for viscosupplement injection or injection series  PRP = for platelet rich plasma injection or injection series  MRI = after MRI imaging  ns = should surgical options be deferred (no surgery)  o = appointment offered, deferred by patient    Should symptoms worsen or fail to resolve, consider    Revisiting the above options and / or      Vocation:

## 2025-06-04 ENCOUNTER — PATIENT MESSAGE (OUTPATIENT)
Dept: SPORTS MEDICINE | Facility: CLINIC | Age: 60
End: 2025-06-04
Payer: MEDICARE

## 2025-06-06 ENCOUNTER — OFFICE VISIT (OUTPATIENT)
Dept: PSYCHIATRY | Facility: CLINIC | Age: 60
End: 2025-06-06
Payer: MEDICARE

## 2025-06-06 ENCOUNTER — OFFICE VISIT (OUTPATIENT)
Dept: ORTHOPEDICS | Facility: CLINIC | Age: 60
End: 2025-06-06
Payer: MEDICARE

## 2025-06-06 ENCOUNTER — PATIENT MESSAGE (OUTPATIENT)
Dept: ORTHOPEDICS | Facility: CLINIC | Age: 60
End: 2025-06-06
Payer: MEDICARE

## 2025-06-06 ENCOUNTER — PATIENT MESSAGE (OUTPATIENT)
Dept: PSYCHIATRY | Facility: CLINIC | Age: 60
End: 2025-06-06
Payer: MEDICARE

## 2025-06-06 ENCOUNTER — HOSPITAL ENCOUNTER (OUTPATIENT)
Dept: RADIOLOGY | Facility: OTHER | Age: 60
Discharge: HOME | End: 2025-06-06
Payer: MEDICARE

## 2025-06-06 VITALS
HEIGHT: 64 IN | SYSTOLIC BLOOD PRESSURE: 111 MMHG | WEIGHT: 214.94 LBS | BODY MASS INDEX: 36.7 KG/M2 | HEART RATE: 59 BPM | DIASTOLIC BLOOD PRESSURE: 71 MMHG

## 2025-06-06 DIAGNOSIS — F31.9 BIPOLAR 1 DISORDER: Primary | ICD-10-CM

## 2025-06-06 DIAGNOSIS — G89.29 CHRONIC PAIN OF BOTH SHOULDERS: ICD-10-CM

## 2025-06-06 DIAGNOSIS — F51.01 PRIMARY INSOMNIA: ICD-10-CM

## 2025-06-06 DIAGNOSIS — M79.641 RIGHT HAND PAIN: ICD-10-CM

## 2025-06-06 DIAGNOSIS — G89.29 CHRONIC PAIN OF RIGHT HAND: Primary | ICD-10-CM

## 2025-06-06 DIAGNOSIS — M25.511 CHRONIC PAIN OF BOTH SHOULDERS: ICD-10-CM

## 2025-06-06 DIAGNOSIS — F41.9 ANXIETY: ICD-10-CM

## 2025-06-06 DIAGNOSIS — G56.01 CARPAL TUNNEL SYNDROME OF RIGHT WRIST: ICD-10-CM

## 2025-06-06 DIAGNOSIS — M25.512 CHRONIC PAIN OF BOTH SHOULDERS: ICD-10-CM

## 2025-06-06 DIAGNOSIS — M79.641 CHRONIC PAIN OF RIGHT HAND: Primary | ICD-10-CM

## 2025-06-06 PROCEDURE — 73130 X-RAY EXAM OF HAND: CPT | Mod: 26,RT,, | Performed by: RADIOLOGY

## 2025-06-06 PROCEDURE — 99214 OFFICE O/P EST MOD 30 MIN: CPT | Mod: S$GLB,,,

## 2025-06-06 PROCEDURE — 3044F HG A1C LEVEL LT 7.0%: CPT | Mod: CPTII,S$GLB,,

## 2025-06-06 PROCEDURE — 3044F HG A1C LEVEL LT 7.0%: CPT | Mod: CPTII,95,, | Performed by: NURSE PRACTITIONER

## 2025-06-06 PROCEDURE — 99999 PR PBB SHADOW E&M-EST. PATIENT-LVL IV: CPT | Mod: PBBFAC,,,

## 2025-06-06 PROCEDURE — 3078F DIAST BP <80 MM HG: CPT | Mod: CPTII,S$GLB,,

## 2025-06-06 PROCEDURE — 98006 SYNCH AUDIO-VIDEO EST MOD 30: CPT | Mod: 95,,, | Performed by: NURSE PRACTITIONER

## 2025-06-06 PROCEDURE — 73130 X-RAY EXAM OF HAND: CPT | Mod: TC,FY,RT

## 2025-06-06 PROCEDURE — 3074F SYST BP LT 130 MM HG: CPT | Mod: CPTII,S$GLB,,

## 2025-06-06 PROCEDURE — 3008F BODY MASS INDEX DOCD: CPT | Mod: CPTII,S$GLB,,

## 2025-06-06 NOTE — PROGRESS NOTES
"Ida Orozco presents for follow up evaluation of   Encounter Diagnoses   Name Primary?    Chronic pain of right hand Yes    Carpal tunnel syndrome of right wrist     Chronic pain of both shoulders      HPI:  Patient is an established patient of Dr. Nicholas who presents today for follow-up evaluation of chronic right hand, wrist, and elbow pain that has worsened. Patient reports a new symptom of spasms and involuntary movements in right hand, referred to as a "trigger finger", where all of the fingers lock and stiffen, causing temporary immobility extending from fingers into the palm. Patient states this has occurs at least once a week for the past 3-4 months and it happens while using the hand. It is very painful and index finger is the worst.     Patient reports continued carpal tunnel symptoms in right hand with numbness, tingling, and tremors. Patient states sharp pains radiate dorsally up the arm, starting between the index and long fingers up to the lateral elbow and into the shoulder. Patient has tenderness in specific areas of the hand and wrist. Patient notes difficulty with range of motion in the wrist, elbow, and shoulder. Patient has mild carpal tunnel syndrome on right, shown on prior EMG/NCS 1/9/2024.     On November 7th, patient fell, exacerbating her symptoms, particularly on the right side. Patient states the fall caused patient to land entirely on the right side, leading to increased pain. Patient mentions shoulder pain and limited range of motion, noting they cannot touch her shoulder or lift their arm fully.    Patient states they regularly performs carpal tunnel / hand exercises at home using a ball, adán, and bands. Patient states their pain management options are limited as they has cirrhosis of liver and cannot take Tylenol and cannot take NSAIDs due to bowel issues. Patient states the only method of relief is applying nicotine patches directly to areas of pain.     Of note, patient is s/p " 6.20.24 Left thumb UCL reconstruction, arthrotomy / Right dorsal wrist steroid injection and 2.27.24 Left elbow ulnar n. decompression, Left recurrent carpal tunnel release, Right carpal tunnel CSI by Dr. Nicholas and doing well.     Review of Systems:  As per HPI, otherwise noncontributory.    Physical Exam:  Vitals:    06/06/25 1320   PainSc:   7      AA&O x 4.  NAD  HEENT:  NCAT, sclera nonicteric  Lungs:  Respirations are equal and unlabored.  CV:  2+ bilateral upper and lower extremity pulses.  MSK: see below  HAND/WRIST/ELBOW EXAMINATION:  Hand: Exquisite TTP throughout the right hand. Full hand ROM bilaterally. Able to make full composite fist bilaterally. No catching, triggering, or locking with active/passive flexion and extension of each digit tested in isolation or when opening/closing fist.  Wrist: Diffuse TTP right wrist, most prominent midline of volar wrist. Decreased right wrist extension, painful right wrist ROM all planes of motion, otherwise full wrist ROM bilaterally.   Elbow: TTP right medial epicondyle. Full and painless elbow ROM bilaterally.   + pain in the right upper arm with resisted wrist flexion and resisted forearm pronation and supination.     Special Tests:  Finkelstein's Test   Neg  WHAT Test    Neg  CMC grind    Neg  Circumduction test   Neg  Snuff box Tenderness   Neg  Sabillon's Test    Neg  TFCC Compression Test  Neg  Hook of [place]ate Tenderness  Neg  Median Nerve Compression Test Positive right  Tinel's Test - Carpal Tunnel  Positive right  Phalen's Test    Positive right   Tinel's Test - Cubital Tunnel  Positive right   Elbow Flexion Test    Neg    Neurovascular Exam:  Digits WWP, brisk CR < 3s throughout  NVI motor/LTS to M/R/U nerves, radial pulse 2+  2+ biceps and brachioradialis reflexes     Diagnostic studies and other clinical records review:  I independently viewed the patient's imaging as well as the radiology report.    My personal interpretation of X-rays AP, lateral,  oblique right hand taken today demonstrate Eaton stage II basilar thumb arthritis with no acute fracture or dislocation.    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  There is focal demyelination across the elbow.  There is no sensory axonal loss.  There are not neuropathic changes in the ulnar innervated hand muscles.  This is graded as Mild in severity on the Left.    There is electrophysiologic evidence of a bilateral sensory median mononeuropathy across the wrist (I.e. Carpal tunnel syndrome).  There is no motor axonal loss.  There is no active denervation.  This is graded as Mild in severity bilaterally.    ___________________________  Tino Bach D.O.    Assessment/Plan:   Encounter Diagnoses   Name Primary?    Chronic pain of right hand Yes    Carpal tunnel syndrome of right wrist     Chronic pain of both shoulders      The patient and I had a thorough discussion today. We discussed the working diagnosis as well as several other potential alternative diagnoses. Treatment options were discussed, both conservative and surgical. Conservative treatment options would include things such as activity modifications, workplace modifications, a period of rest, oral vs topical OTC and prescription anti-inflammatory medications, occupational therapy, splinting/bracing, immobilization, corticosteroid injections, and others. Surgical options were discussed as well.     I recommend referral to occupational therapy for new exercise program for the right hand including nerve glides, tendon glides, ROM, and multimodal therapy. Patient declined referral as patient performs therapy exercises at home. Continue motion, nerve glides, and tendon glide exercises at home, avoiding strengthening exercises on the right hand including repetitive gripping and squeezing with stress ball/putty as this can aggravate carpal  tunnel symptoms on non-operative right hand.  Use carpal tunnel brace at nighttime.   I have ordered updated EMG/NCS of RUE to evaluate for worsening carpal tunnel syndrome and potential ulnar nerve involvement.   Placed referral to sports medicine for shoulder evaluation  F/U with Dr. Nicholas after EMG/NCS  Call with any questions/concerns in the interim    Ani Contreras PA-C  Hand and Upper Extremity     This note was generated with the assistance of ambient listening technology. Verbal consent was obtained by the patient and accompanying visitor(s) for the recording of patient appointment to facilitate this note. I attest to having reviewed and edited the generated note for accuracy, though some syntax or spelling errors may persist. Please contact the author of this note for any clarification.     Please be aware that this note has been generated with the assistance of MModal voice-to-text. Please excuse any spelling or grammatical errors.

## 2025-06-06 NOTE — PROGRESS NOTES
"TOutpatient Psychiatry Follow-Up Visit (MD/NP)    6/6/2025    Clinical Status of Patient:  Outpatient (Ambulatory)    Chief Complaint:  Ida Orozco is a 59 y.o. female who presents today for follow-up of anxiety.  Met with patient.      Last visit was: 10/07/24. Chart and  reviewed.   The patient location is: home  The chief complaint leading to consultation is: anxiety    Visit type: audiovisual    Face to Face time with patient: 20 minutes  35 minutes of total time spent on the encounter, which includes face to face time and non-face to face time preparing to see the patient (eg, review of tests), Obtaining and/or reviewing separately obtained history, Documenting clinical information in the electronic or other health record, Independently interpreting results (not separately reported) and communicating results to the patient/family/caregiver, or Care coordination (not separately reported).     Each patient to whom he or she provides medical services by telemedicine is:  (1) informed of the relationship between the physician and patient and the respective role of any other health care provider with respect to management of the patient; and (2) notified that he or she may decline to receive medical services by telemedicine and may withdraw from such care at any time.    Interval History and Content of Current Session:  Current Psychiatric Medications/changes  Increase to  ZOLOFT 100 MG daily  Increase tp TOPOMAX 50 MG twice daily  Increase to TRAZODONE 150 MG before bed as needed for sleep  Continue KLONOPIN 1 mg daily as needed for panic attcks  Increase to BUSPAR 10 mg 2-3 x daily for anxiety  Follow-up with me by virtual visit in 6-8 weeks    Virtual Visit:  Pt presents agitated and angry upon onset of interview.  Pt states that I'm not listening to her and that she looked at ADHD self-test online and demands that she in put on ADHD medication. PT stated, "I feel like I have five people in my head".  Pt " "noted " I can't sleep, food tastes terrible, no appetite, brain is on fast forward, I forget what I'm doing, I am not depressed I am angry".  Pt became upset when I explained that her symptoms in my opinion are not consistent with ADHD and appear related to mood disorder and anxiety. PT asked to be referred to someone who will listen to her and re evaluate her for ADHD without seeing her current psych records. I told her she would need to find someone outside department for a second opinion that didn't have EPIC access but they will probably still want a copy of her past records. Then the patient abruptly logged off the call.        Psychotherapy:  Target symptoms: anxiety   Why chosen therapy is appropriate versus another modality: relevant to diagnosis  Outcome monitoring methods: self-report  Therapeutic intervention type: insight oriented psychotherapy  Topics discussed/themes: building skills sets for symptom management, symptom recognition  The patient's response to the intervention is accepting. The patient's progress toward treatment goals is fair.   Duration of intervention: 15 minutes.    Review of Systems   PSYCHIATRIC: Pertinant items are noted in the narrative.  CONSTITUTIONAL: No weight gain or loss.   MUSCULOSKELETAL: No pain or stiffness of the joints.  NEUROLOGIC: No weakness, sensory changes, seizures, confusion, memory loss, tremor or other abnormal movements.  ENDOCRINE: No polydipsia or polyuria.  INTEGUMENTARY: No rashes or lacerations.  EYES: No exophthalmos, jaundice or blindness.  ENT: No dizziness, tinnitus or hearing loss.  RESPIRATORY: No shortness of breath.  CARDIOVASCULAR: No tachycardia or chest pain.  GASTROINTESTINAL: No nausea, vomiting, pain, constipation or diarrhea.  GENITOURINARY: No frequency, dysuria or sexual dysfunction.  HEMATOLOGIC/LYMPHATIC: No excessive bleeding, prolonged or excessive bleeding after dental extraction/injury.  ALLERGIC/IMMUNOLOGIC: No allergic response " to materials, foods or animals at this time.    Past Medical, Family and Social History: The patient's past medical, family and social history have been reviewed and updated as appropriate within the electronic medical record - see encounter notes.    Compliance: yes    Side effects: see above    Risk Parameters:  Patient reports no suicidal ideation  Patient reports no homicidal ideation  Patient reports no self-injurious behavior  Patient reports no violent behavior    Exam (detailed: at least 9 elements; comprehensive: all 15 elements)   Constitutional  Vitals:  Most recent vital signs, dated greater than 90 days prior to this appointment, were reviewed.   There were no vitals filed for this visit.     General:  unremarkable, age appropriate     Musculoskeletal  Muscle Strength/Tone:  not examined   Gait & Station:  non-ataxic     Psychiatric  Speech:  no latency; no press   Mood & Affect:  steady  congruent and appropriate   Thought Process:  normal and logical   Associations:  intact   Thought Content:  normal, no suicidality, no homicidality, delusions, or paranoia   Insight:  intact   Judgement: behavior is adequate to circumstances   Orientation:  grossly intact   Memory: intact for content of interview   Language: grossly intact   Attention Span & Concentration:  able to focus   Fund of Knowledge:  intact and appropriate to age and level of education     Assessment and Diagnosis   Status/Progress: Based on the examination today, the patient's problem(s) is/are adequately but not ideally controlled.  New problems have not been presented today.   Co-morbidities and Lack of compliance are not complicating management of the primary condition.  There are no active rule-out diagnoses for this patient at this time.     General Impression:       ICD-10-CM ICD-9-CM   1. Bipolar 1 disorder  F31.9 296.7   2. Anxiety  F41.9 300.00   3. Primary insomnia  F51.01 307.42     Intervention/Counseling/Treatment Plan    Medication Management: The risks and benefits of medication were discussed with the patient. No refills ordered this visit.    ZOLOFT 100 MG daily (not taking)  TOPOMAX 100 MG twice daily (not taking)  TRAZODONE 150 MG before bed as needed for sleep  KLONOPIN 1 mg daily as needed for panic attcks  BUSPAR 10 mg 2-3 x daily for anxiety  Follow-up with me by virtual visit in 6-8 weeks    Return to Clinic: as scheduled    Risks, benefits, side effects and alternative treatments discussed with patient. Patient agrees with the current plan as documented.  Encouraged Patient to keep future appointments.  Take medications as prescribed and abstain from substance abuse.  Pt to present to ED for thoughts to harm herself or others

## 2025-06-07 ENCOUNTER — PATIENT MESSAGE (OUTPATIENT)
Dept: ORTHOPEDICS | Facility: CLINIC | Age: 60
End: 2025-06-07
Payer: MEDICARE

## 2025-06-07 ENCOUNTER — PATIENT MESSAGE (OUTPATIENT)
Dept: SPORTS MEDICINE | Facility: CLINIC | Age: 60
End: 2025-06-07
Payer: MEDICARE

## 2025-06-07 RX ORDER — TRAZODONE HYDROCHLORIDE 150 MG/1
150 TABLET ORAL NIGHTLY
Qty: 90 TABLET | Refills: 2 | Status: SHIPPED | OUTPATIENT
Start: 2025-06-07

## 2025-06-07 RX ORDER — CLONAZEPAM 1 MG/1
1 TABLET ORAL DAILY PRN
Qty: 90 TABLET | Refills: 1 | Status: SHIPPED | OUTPATIENT
Start: 2025-06-07

## 2025-06-09 ENCOUNTER — PATIENT MESSAGE (OUTPATIENT)
Dept: NEUROLOGY | Facility: CLINIC | Age: 60
End: 2025-06-09
Payer: MEDICARE

## 2025-06-09 ENCOUNTER — TELEPHONE (OUTPATIENT)
Dept: SPORTS MEDICINE | Facility: CLINIC | Age: 60
End: 2025-06-09
Payer: MEDICARE

## 2025-06-09 ENCOUNTER — PROCEDURE VISIT (OUTPATIENT)
Dept: NEUROLOGY | Facility: CLINIC | Age: 60
End: 2025-06-09
Payer: MEDICARE

## 2025-06-09 DIAGNOSIS — G56.21 CUBITAL TUNNEL SYNDROME ON RIGHT: ICD-10-CM

## 2025-06-09 DIAGNOSIS — G89.29 CHRONIC HIP PAIN, UNSPECIFIED LATERALITY: Primary | ICD-10-CM

## 2025-06-09 DIAGNOSIS — G56.01 CARPAL TUNNEL SYNDROME OF RIGHT WRIST: ICD-10-CM

## 2025-06-09 DIAGNOSIS — M25.559 CHRONIC HIP PAIN, UNSPECIFIED LATERALITY: Primary | ICD-10-CM

## 2025-06-09 DIAGNOSIS — M25.551 RIGHT HIP PAIN: Primary | ICD-10-CM

## 2025-06-09 PROCEDURE — 95886 MUSC TEST DONE W/N TEST COMP: CPT | Mod: S$GLB,,, | Performed by: PHYSICAL MEDICINE & REHABILITATION

## 2025-06-09 PROCEDURE — 95909 NRV CNDJ TST 5-6 STUDIES: CPT | Mod: S$GLB,,, | Performed by: PHYSICAL MEDICINE & REHABILITATION

## 2025-06-09 RX ORDER — TRAMADOL HYDROCHLORIDE 50 MG/1
50 TABLET, FILM COATED ORAL EVERY 6 HOURS
Qty: 30 TABLET | Refills: 0 | Status: SHIPPED | OUTPATIENT
Start: 2025-06-09

## 2025-06-09 NOTE — PROCEDURES
Test Date:  2025    Patient: ida mejia : 1965 Physician: Tino Bach D.O.   ID#: [id number] Sex: Female Ref. Phys: Ani Contreras PA-C     HPI: Ida Mejia is a 59 y.o.female who presents for NCS/EMG to evaluate for right cts.      PROCEDURE:  Prior to the procedure, the procedure was discussed in detail with the patient.  All questions were answered, and verbal consent was obtained.  For nerve conduction studies, a combination of surface electrodes, bar electrodes, and/or ring electrodes were used as needed.  For needle EMG, each site was cleaned and prepped in usual fashion with an alcohol pad.  A monopolar needle (28G) was used.  There was no significant bleeding, and bandages were applied as needed.  The procedure was tolerated without adverse effect.  The patient was instructed on post-procedure care including ice if needed for 10-15 minutes up to 4 times/day for any sore muscles.  I discussed with the patient that the data would be reviewed and a report sent to the referring provider, where any follow up questions regarding next steps should be directed.        NCV & EMG Findings:  Evaluation of the right Median-Radial (Dig I) sensory nerve showed abnormal peak latency difference ((Median Wrist-Dig I)-(Radial Wrist)).  All remaining nerves (as indicated in the following tables) were within normal limits.  All examined muscles (as indicated in the following table) showed no evidence of electrical instability.      IMPRESSIONS:  There is electrophysiologic evidence of a right sensory median mononeuropathy across the wrist (I.e. Carpal tunnel syndrome).  There is no motor axonal loss.  There is no active denervation.  This is graded as Mild in severity on the right.          ___________________________  Tino Bach D.O.        NCS+  Motor Nerve Results      Latency Amplitude F-Lat Segment Distance CV Comment   Site (ms) Norm (mV) Norm (ms)  (cm) (m/s) Norm    Right Median (APB)    Wrist 3.3  < 4.4 9.3  > 4.2         Elbow 7.1 - 9.1 -  Elbow-Wrist 25 66  > 51    Right Ulnar (ADM)   Wrist 2.4  < 3.7 12.4  > 3.0         Bel Elbow 6.0 - 10.9 -  Bel Elbow-Wrist 23 63  > 52    Abv Elbow 7.6 - 9.5 -  Abv Elbow-Bel Elbow 10 63  > 43      Sensory Sites      Latency (O) Latency (P) Amp (P-T) Segment Distance Velocity Comment   Site (ms) Norm (ms) Norm (µV) Norm  (cm) (m/s)    Right Median   Wrist-Dig I 2.3 - 3.0 - 35 - Wrist-Dig I 10 43    Wrist-Dig II 2.6  < 3.3 3.6  < 4.0 48  > 8 Wrist-Dig II 14 53    Palm-Wrist 1.38 - 1.83 - 76 - Palm-Wrist - -    Right Ulnar (Rec:Wrist)   Palm 1.13 - 1.78 - 11 - Palm-Wrist - -    Right Radial (Rec:Dig I)   Wrist 1.90 - 2.4 - 6 - Wrist-Dig I 10 53      Inter-Nerve Comparisons     Nerve 1 Value 1 Nerve 2 Value 2 Parameter Result Normal   Sensory Sites   R Median Wrist-Dig I 3.0 ms R Radial Wrist-Dig I 2.4 ms Peak Lat Diff *0.60 ms <0.40   R Median Palm-Wrist 1.83 ms R Ulnar Palm-Wrist 1.78 ms Peak Lat Diff 0.05 ms <0.30     EMG+     Side Muscle Nerve Root Ins Act Fibs Psw Amp Dur Poly Recrt Int Pat Comment   Right Deltoid Axillary C5-C6 Nml Nml Nml Nml Nml 0 Nml Nml    Right Biceps Musculocut C5-C6 Nml Nml Nml Nml Nml 0 Nml Nml    Right Triceps Radial C6-C8 Nml Nml Nml Nml Nml 0 Nml Nml    Right Pronator Teres Median C6-C7 Nml Nml Nml Nml Nml 0 Nml Nml    Right APB Median C8-T1 Nml Nml Nml Nml Nml 0 Nml Nml            Waveforms:    Motor       Sensory

## 2025-06-12 NOTE — PROGRESS NOTES
Assessment:  Encounter Diagnoses   Name Primary?    Degenerative tear of acetabular labrum of right hip Yes    Arthritis of right hip        Plan:  -We discussed that she has mild arthritis as well as some labral degeneration. Her mechanical symptoms are most c/w labral etiology.   - Hip replacement not recommended at this time given overall preservation of the hip space on standing XR.   - Recommend continued conservative treatment for degenerative labral tear & mild arthritis: PT, NSAIDs, Tylenol, CSI, nerve block/ablation.         Patient ID: Ida Orozco is a 59 y.o. adult.  Chief Complaint   Patient presents with    Right Hip - Pain        History of Present Illness    CHIEF COMPLAINT:  - Right hip pain    HPI:  Ms. Orozco presents with right hip pain following two falls. After the second fall, she experienced a sensation of dislocation in her hip. She felt significant pain and inability to move her leg, prompting an emergency room visit. During the six-hour ER wait, a paramedic noted an indentation in her hip area. She recalls hearing a loud pop when being moved, followed by loss of consciousness after receiving fentanyl.    Pain is localized primarily to the front and side of the hip in a C-shaped distribution. She reports ongoing hip issues, including numbness, a sensation of the hip pushing forward when walking, and the hip giving way. These symptoms now affect her knee and opposite hip.    She has difficulty sleeping on the affected side and has adjusted her sleeping position, using pillows between her legs for comfort. Pain interferes with daily activities, including wearing undergarments comfortably.    Prior to this visit, she saw Dr. Quinn for her hip issues and received injections, which provided relief for about a month. She believes her labrum is torn based on her symptoms. She mentions a history of sciatica.    She is unable to take NSAIDs due to previous gastric sleeve surgery and cannot  take Tylenol due to cirrhosis of the liver. She expresses frustration with pain management, stating she had to request pain medication from Dr. Quinn as her primary care physician was unwilling to prescribe anything.    She denies pain in the left hip, left knee, or down the leg towards the foot or ankle.    PREVIOUS TREATMENTS:  - Ms. Orozco received injections from Dr. Quinn for hip pain, which helped for about a month    SURGICAL HISTORY:  - Gastric sleeve surgery  - Hysterectomy  - C-sections  - Ovaries removed  - Appendix removed  - Colon removed         CC: right Hip Pain     Pain score: 8/10 ; 10% of normal     Pain location: anterior and lateral aspect of the hip   Pain quality: Sharp / Stabbing, Dull / Aching, and Tingling / Numbness    Pain duration: 2 years following dislocation after a ground level fall in her home   Progressive?: yes    Walking ability: < 1 block before needing to stop due to pain     Assistive device: none     Limitations: General walking, difficulty going up/down steps, difficulty getting in/out of the car, difficulty sleeping at night , difficulty rising from sitting , difficulty putting on shoes/socks, and sitting    Physical therapy: previously, requesting aquatic therapy    Previous conservative treatments: R IA hip CSI 08/28/2024, tramadol    Past surgical hx: R knee plica removal    Hx of DVT, heart attack, blood thinners: none    Hx of osteoporosis or fragility fx: previously tx for osteoporosis    Occupation: on disability (previous auto )    Social support: The patient stated that they live at home alone. The patient stated that their  would be able to help take care of them if they were to have surgery.       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, with liver fibrosis, on ursodiol 2023    PTSD (post-traumatic stress disorder)     Seasonal allergies     Symptomatic abdominal panniculus     Ulcerative colitis     Vertigo         Surgical History:  Past Surgical History:   Procedure Laterality Date    APPENDECTOMY      CARPAL TUNNEL RELEASE Left 2024    Procedure: RELEASE, CARPAL TUNNEL;  Surgeon: Etelvina Nicholas MD;  Location: [facility] OR;  Service: Orthopedics;  Laterality: Left;    CATHETERIZATION OF BOTH LEFT AND RIGHT HEART Right 2019    Procedure: CATHETERIZATION, HEART, BOTH LEFT AND RIGHT;  Surgeon: Beto Malin MD;  Location: [facility] CATH LAB;  Service: Cardiology;  Laterality: Right;     SECTION, LOW TRANSVERSE      CHOLECYSTECTOMY      open    COLECTOMY      total- 2013    CYSTOSCOPY W/ RETROGRADES N/A 2018    Procedure: CYSTOSCOPY, WITH RETROGRADE PYELOGRAM;  Surgeon: Butch Banks MD;  Location: [facility] OR;  Service: Urology;  Laterality: N/A;  HANSEN SURGICAL CONFIRMED     DECOMPRESSION OF NERVE Left 2024    Procedure: DECOMPRESSION, NERVE ULNAR NERVE ELBOW;  Surgeon: Etelvina Nicholas MD;  Location: [facility] OR;  Service: Orthopedics;  Laterality: Left;    ENDOSCOPIC ULTRASOUND OF UPPER GASTROINTESTINAL TRACT N/A 11/10/2021    Procedure: ULTRASOUND, UPPER GI TRACT, ENDOSCOPIC;  Surgeon: Nhan Dee MD;  Location: [address]);  Service: Endoscopy;  Laterality: N/A;  MD Milena Whitehead PA-C; Quinn Whitman MD; Miladys Velasquez MA  Caller: Unspecified (3 days ago,  1:20 PM)  Fair enough. Neha, please schedule.       ----- Message -----   From: Milena Murillo PA-C   Sent: 2021  1    ESOPHAGOGASTRODUODENOSCOPY  2018    Dr. Castorena: LA Grade A reflux esophagitis, hiatal hernia; Ectopic  gastric mucosa in the upper third of the esophagus; gastric sleeve intact with unremarkable findings, gastritis; biopsy: stomach- Mild chronic antral and oxyntic gastritis, no activity, negative for h pylori    ESOPHAGOGASTRODUODENOSCOPY N/A 11/10/2021    Procedure: EGD (ESOPHAGOGASTRODUODENOSCOPY);  Surgeon: Nhan Dee MD;  Location: [facility] ENDO (2ND FLR);  Service: Endoscopy;  Laterality: N/A;    ESOPHAGOGASTRODUODENOSCOPY N/A 1/23/2024    Procedure: EGD (ESOPHAGOGASTRODUODENOSCOPY);  Surgeon: Anatoly Singh MD;  Location: [facility] ENDO (4TH FLR);  Service: Endoscopy;  Laterality: N/A;  Ref By: magdaleno Stover sent via portal [employer] prep.AC  1/17-lvm for precall-MS  1/18-pt confirmed appt-Kpvt    FESS, WITH IMAGING GUIDANCE Bilateral 4/4/2023    Procedure: Pan Sinus FESS, WITH IMAGING GUIDANCE Disc loaded;  Surgeon: Jay Hernandez MD;  Location: [facility] OR;  Service: ENT;  Laterality: Bilateral;  balloon dilation of sinuses    FLEXIBLE SIGMOIDOSCOPY  5/24/2018    Procedure: SIGMOIDOSCOPY, FLEXIBLE;  Surgeon: JENNY Virgen MD;  Location: [facility] 2ND FLR;  Service: Colon and Rectal;;    FLEXIBLE SIGMOIDOSCOPY  04/11/2018    Dr. Castorena: Non-patent surgical anastomosis, characterized by friable mucosa.    GASTRIC SLEEVE       HAND ARTHROTOMY Right 6/20/2024    Procedure: ARTHROTOMY, HAND;  Surgeon: Etelvina Nicholas MD;  Location: [facility] OR;  Service: Orthopedics;  Laterality: Right;    HYSTERECTOMY      ILEOSCOPY  04/11/2018    Dr. Castorena: Patient is status-post total colectomy with end ileostomy. unremarkable findings    ILEOSCOPY N/A 1/23/2024    Procedure: ILEOSCOPY;  Surgeon: Anatoly Singh MD;  Location: [facility] (4TH FLR);  Service: Endoscopy;  Laterality: N/A;    ILEOSTOMY REVISION      april 2014; august 2014    INJECTION OF STEROID Right 2/27/2024    Procedure: INJECTION, STEROID CARPAL TUNNEL;  Surgeon: Etelvina Nicholas MD;  Location: [facility] OR;  Service: Orthopedics;  Laterality:  Right;    INJECTION OF STEROID Right 6/20/2024    Procedure: INJECTION, STEROID Radiocarpal wrist;  Surgeon: Etelvina Nicholas MD;  Location: [facility] OR;  Service: Orthopedics;  Laterality: Right;    LAPAROSCOPIC PROCTECTOMY N/A 5/24/2018    Procedure: PROCTECTOMY-LAPAROSCOPIC/CONVERTED TO OPEN;  Surgeon: JENNY Virgen MD;  Location: [facility] OR 2ND FLR;  Service: Colon and Rectal;  Laterality: N/A;    LYSIS OF ADHESIONS  5/24/2018    Procedure: LYSIS, ADHESIONS/ more than 2hours;  Surgeon: JENNY Virgen MD;  Location: [facility] OR 2ND FLR;  Service: Colon and Rectal;;    OOPHORECTOMY      PANNICULECTOMY Bilateral 12/19/2019    Procedure: PANNICULECTOMY;  Surgeon: Andrea Alvarado MD;  Location: [facility] OR 2ND FLR;  Service: Plastics;  Laterality: Bilateral;    REVISION COLOSTOMY N/A 12/19/2019    Procedure: REVISION, COLOSTOMY;  Surgeon: JENNY Virgen MD;  Location: [facility] OR 2ND FLR;  Service: Colon and Rectal;  Laterality: N/A;    thumb surgery      TONSILLECTOMY, ADENOIDECTOMY      ULNAR COLLATERAL LIGAMENT RECONSTRUCTION Left 6/20/2024    Procedure: RECONSTRUCTION, LIGAMENT, ULNAR COLLATERAL;  Surgeon: Etelvina Nicholas MD;  Location: [facility] OR;  Service: Orthopedics;  Laterality: Left;    WISDOM TOOTH EXTRACTION          Social History:  Ida reports that Ida quit smoking about 16 years ago. Ida's smoking use included cigarettes. Ida started smoking about 41 years ago. Ida has a 25 pack-year smoking history. Ida has never used smokeless tobacco. Ida reports that Ida does not drink alcohol and does not use drugs.     Family History:  family history includes Cancer in Ida's maternal grandfather, mother, and paternal grandfather; Cirrhosis in Ida's mother; Colon cancer in Ida's maternal grandfather, maternal uncle, and maternal uncle; Heart disease (age of onset: 67) in Ida's father.     Medications Ordered Prior to Encounter[1]  Review of patient's allergies indicates:   Allergen Reactions     "Adhesive      Cause blisters    Dilaudid [hydromorphone] Nausea And Vomiting    Humira [adalimumab] Hives    Sulfa (sulfonamide antibiotics) Hives    Surgical stainless steel      Had surgical staples, caused irritation and infection          Physical exam:  Ht 5' 4" (1.626 m)   Wt 94.3 kg (207 lb 14.3 oz)   BMI 35.68 kg/m²    General: no apparent distress      Gait: + antalgic, + limp, - use of assistive devices    Physical Exam    MSK: Spine - Hip: Left hip: Pain on anterior palpation. Right hip: Pain with passive knee flexion. Right hip: Deep palpation pain. Right hip: Pain in anterior crease.  Musculoskeletal: Left leg: Normal strength against resistance. Normal dorsiflexion and plantarflexion bilaterally.  MSK: Knee - Left: Left knee: Normal range of motion.  Neurological: Normal sensation in feet and ankles.  IMAGING:  - XR Both Hips: good joint space between the ball and socket  - MRI Hip: some focal areas of arthritis, but overall the hip is in good shape in terms of arthritis          Right hip:   Diffusely TTP   Skin: intact, no erythema or swelling  Range of motion: 80 degrees of flexion, 15 degrees internal rotation, 30 degrees external rotation  Strength: 5-/5 with abduction, 4/5 with flexion  Provocative testing: +Stinchfield, + FADIR, + MOHIT, + logroll, + SLR  Neurovascular: + DP pulse, Light touch sensation intact    Relevant Results:  Imaging:  Plain x-rays of the  right hip and pelvis were obtained and independently reviewed by me today, 6/13/2025 , and demonstrate no narrowing superiorly, psb mild narrowing inferomedial, no significant osteophytes, sclerosis, no subchondral cysts consistent with very minimal arthritic change   MRI R hip 6/2/25 labral degeneration, small areas of focal cartilage loss and edema c/w early arthritis     Labs:  Hb:  13.8  Cr:  1.0  Alb:  3.2  A1c: 5.3    This note was generated with the assistance of ambient listening technology. Verbal consent was obtained by the " patient and accompanying visitor(s) for the recording of patient appointment to facilitate this note. I attest to having reviewed and edited the generated note for accuracy, though some syntax or spelling errors may persist. Please contact the author of this note for any clarification.                 [1]   Current Outpatient Medications on File Prior to Visit   Medication Sig Dispense Refill    amoxicillin-clavulanate 500-125mg (AUGMENTIN) 500-125 mg Tab Take 1 tablet (500 mg total) by mouth 2 (two) times daily. (Patient not taking: Reported on 6/9/2025) 10 tablet 0    atogepant (QULIPTA) 60 mg Tab Take 1 tablet (60 mg total) by mouth once daily. (Patient not taking: Reported on 6/9/2025) 90 tablet 0    azelastine (ASTELIN) 137 mcg (0.1 %) nasal spray 1 SPRAY EACH NOSTRIL NASALLY TWICE A DAY 30      clonazePAM (KLONOPIN) 1 MG tablet Take 1 tablet (1 mg total) by mouth daily as needed for Anxiety. 90 tablet 1    diphenoxylate-atropine 2.5-0.025 mg (LOMOTIL) 2.5-0.025 mg per tablet Take 1 tablet by mouth 4 (four) times daily as needed for Diarrhea. 30 tablet 2    ergocalciferol (ERGOCALCIFEROL) 50,000 unit Cap TAKE 1 CAPSULE BY MOUTH EVERY 7 DAYS 12 capsule 1    fluticasone propionate (FLONASE) 50 mcg/actuation nasal spray 1 SPRAY IN EACH NOSTRIL NASALLY TWICE DAILY AS NEEDED 30 (Patient not taking: Reported on 6/9/2025)      levocetirizine (XYZAL) 5 MG tablet Take 1 tablet (5 mg total) by mouth every evening. 90 tablet 3    nystatin (MYCOSTATIN) powder Apply topically 2 (two) times daily. 60 g 3    ondansetron (ZOFRAN) 4 MG tablet Take 1 tablet (4 mg total) by mouth every 6 (six) hours as needed for Nausea. 20 tablet 0    traMADoL (ULTRAM) 50 mg tablet Take 1 tablet (50 mg total) by mouth every 6 (six) hours. (Patient not taking: Reported on 6/9/2025) 30 tablet 0    traZODone (DESYREL) 150 MG tablet Take 1 tablet (150 mg total) by mouth nightly. 90 tablet 2    ursodioL (ACTIGALL) 300 mg capsule Take 5 capsules  (1,500 mg total) by mouth once daily. 150 capsule 11    [DISCONTINUED] adalimumab (HUMIRA,CF, PEN) 40 mg/0.4 mL PnKt Inject 0.4 mLs (40 mg total) into the skin every 14 (fourteen) days. 6 pen 2     No current facility-administered medications on file prior to visit.

## 2025-06-13 ENCOUNTER — OFFICE VISIT (OUTPATIENT)
Dept: ORTHOPEDICS | Facility: CLINIC | Age: 60
End: 2025-06-13
Payer: MEDICARE

## 2025-06-13 ENCOUNTER — HOSPITAL ENCOUNTER (OUTPATIENT)
Dept: RADIOLOGY | Facility: HOSPITAL | Age: 60
Discharge: HOME | End: 2025-06-13
Attending: STUDENT IN AN ORGANIZED HEALTH CARE EDUCATION/TRAINING PROGRAM
Payer: MEDICARE

## 2025-06-13 VITALS — WEIGHT: 207.88 LBS | HEIGHT: 64 IN | BODY MASS INDEX: 35.49 KG/M2

## 2025-06-13 DIAGNOSIS — G89.29 CHRONIC PAIN OF BOTH SHOULDERS: ICD-10-CM

## 2025-06-13 DIAGNOSIS — M25.511 CHRONIC PAIN OF BOTH SHOULDERS: ICD-10-CM

## 2025-06-13 DIAGNOSIS — M24.151 DEGENERATIVE TEAR OF ACETABULAR LABRUM OF RIGHT HIP: Primary | ICD-10-CM

## 2025-06-13 DIAGNOSIS — M25.551 RIGHT HIP PAIN: ICD-10-CM

## 2025-06-13 DIAGNOSIS — M16.11 ARTHRITIS OF RIGHT HIP: ICD-10-CM

## 2025-06-13 DIAGNOSIS — M25.512 CHRONIC PAIN OF BOTH SHOULDERS: ICD-10-CM

## 2025-06-13 PROCEDURE — 73502 X-RAY EXAM HIP UNI 2-3 VIEWS: CPT | Mod: 26,RT,, | Performed by: RADIOLOGY

## 2025-06-13 PROCEDURE — 3008F BODY MASS INDEX DOCD: CPT | Mod: CPTII,S$GLB,, | Performed by: STUDENT IN AN ORGANIZED HEALTH CARE EDUCATION/TRAINING PROGRAM

## 2025-06-13 PROCEDURE — 99214 OFFICE O/P EST MOD 30 MIN: CPT | Mod: S$GLB,,, | Performed by: STUDENT IN AN ORGANIZED HEALTH CARE EDUCATION/TRAINING PROGRAM

## 2025-06-13 PROCEDURE — 73502 X-RAY EXAM HIP UNI 2-3 VIEWS: CPT | Mod: TC,RT

## 2025-06-13 PROCEDURE — 1160F RVW MEDS BY RX/DR IN RCRD: CPT | Mod: CPTII,S$GLB,, | Performed by: STUDENT IN AN ORGANIZED HEALTH CARE EDUCATION/TRAINING PROGRAM

## 2025-06-13 PROCEDURE — 1159F MED LIST DOCD IN RCRD: CPT | Mod: CPTII,S$GLB,, | Performed by: STUDENT IN AN ORGANIZED HEALTH CARE EDUCATION/TRAINING PROGRAM

## 2025-06-13 PROCEDURE — 99999 PR PBB SHADOW E&M-EST. PATIENT-LVL III: CPT | Mod: PBBFAC,,, | Performed by: STUDENT IN AN ORGANIZED HEALTH CARE EDUCATION/TRAINING PROGRAM

## 2025-06-13 PROCEDURE — 3044F HG A1C LEVEL LT 7.0%: CPT | Mod: CPTII,S$GLB,, | Performed by: STUDENT IN AN ORGANIZED HEALTH CARE EDUCATION/TRAINING PROGRAM

## 2025-06-14 ENCOUNTER — PATIENT MESSAGE (OUTPATIENT)
Dept: ORTHOPEDICS | Facility: CLINIC | Age: 60
End: 2025-06-14
Payer: MEDICARE

## 2025-06-14 ENCOUNTER — PATIENT MESSAGE (OUTPATIENT)
Dept: SPORTS MEDICINE | Facility: CLINIC | Age: 60
End: 2025-06-14
Payer: MEDICARE

## 2025-06-16 DIAGNOSIS — M25.559 CHRONIC HIP PAIN, UNSPECIFIED LATERALITY: Primary | ICD-10-CM

## 2025-06-16 DIAGNOSIS — G89.29 CHRONIC HIP PAIN, UNSPECIFIED LATERALITY: Primary | ICD-10-CM

## 2025-06-26 DIAGNOSIS — K74.69 COMPENSATED LIVER DISEASE: Primary | ICD-10-CM

## 2025-06-26 DIAGNOSIS — K74.3 PRIMARY BILIARY CHOLANGITIS: ICD-10-CM

## 2025-06-27 ENCOUNTER — PATIENT MESSAGE (OUTPATIENT)
Dept: SPORTS MEDICINE | Facility: CLINIC | Age: 60
End: 2025-06-27
Payer: MEDICARE

## 2025-07-02 ENCOUNTER — RESULTS FOLLOW-UP (OUTPATIENT)
Dept: TRANSPLANT | Facility: HOSPITAL | Age: 60
End: 2025-07-02

## 2025-07-02 ENCOUNTER — PATIENT MESSAGE (OUTPATIENT)
Dept: URGENT CARE | Facility: CLINIC | Age: 60
End: 2025-07-02

## 2025-07-05 ENCOUNTER — PATIENT MESSAGE (OUTPATIENT)
Dept: RHEUMATOLOGY | Facility: CLINIC | Age: 60
End: 2025-07-05
Payer: MEDICARE

## 2025-07-09 ENCOUNTER — TELEPHONE (OUTPATIENT)
Dept: RHEUMATOLOGY | Facility: CLINIC | Age: 60
End: 2025-07-09
Payer: MEDICARE

## 2025-07-09 NOTE — TELEPHONE ENCOUNTER
Spoke to pt she stated she was having a flare up and she has not found an outside Rheumatologist and she will try to find one due to her complications that she has going on.

## 2025-08-12 ENCOUNTER — TELEPHONE (OUTPATIENT)
Dept: PRIMARY CARE CLINIC | Facility: CLINIC | Age: 60
End: 2025-08-12
Payer: MEDICARE

## 2025-08-15 ENCOUNTER — OFFICE VISIT (OUTPATIENT)
Dept: PRIMARY CARE CLINIC | Facility: CLINIC | Age: 60
End: 2025-08-15
Payer: MEDICARE

## 2025-08-15 VITALS
WEIGHT: 204.94 LBS | BODY MASS INDEX: 34.15 KG/M2 | SYSTOLIC BLOOD PRESSURE: 118 MMHG | HEART RATE: 60 BPM | DIASTOLIC BLOOD PRESSURE: 66 MMHG | HEIGHT: 65 IN | OXYGEN SATURATION: 97 % | TEMPERATURE: 98 F | RESPIRATION RATE: 16 BRPM

## 2025-08-15 DIAGNOSIS — D52.0 DIETARY FOLATE DEFICIENCY ANEMIA: ICD-10-CM

## 2025-08-15 DIAGNOSIS — Z98.84 BARIATRIC SURGERY STATUS: ICD-10-CM

## 2025-08-15 DIAGNOSIS — K74.3 HEPATIC CIRRHOSIS DUE TO PRIMARY BILIARY CHOLANGITIS: ICD-10-CM

## 2025-08-15 DIAGNOSIS — F31.9 BIPOLAR 1 DISORDER: ICD-10-CM

## 2025-08-15 DIAGNOSIS — F51.04 CHRONIC INSOMNIA: ICD-10-CM

## 2025-08-15 DIAGNOSIS — K74.3 PRIMARY BILIARY CIRRHOSIS: ICD-10-CM

## 2025-08-15 DIAGNOSIS — J45.40 MODERATE PERSISTENT ASTHMA WITHOUT COMPLICATION: ICD-10-CM

## 2025-08-15 DIAGNOSIS — K74.69 COMPENSATED LIVER DISEASE: ICD-10-CM

## 2025-08-15 DIAGNOSIS — D53.9 NUTRITIONAL ANEMIA, UNSPECIFIED: ICD-10-CM

## 2025-08-15 DIAGNOSIS — Z00.00 ANNUAL PHYSICAL EXAM: ICD-10-CM

## 2025-08-15 DIAGNOSIS — E78.5 HYPERLIPIDEMIA, UNSPECIFIED HYPERLIPIDEMIA TYPE: ICD-10-CM

## 2025-08-15 DIAGNOSIS — Z93.2 ILEOSTOMY IN PLACE: ICD-10-CM

## 2025-08-15 DIAGNOSIS — E61.1 DIETARY IRON DEFICIENCY: ICD-10-CM

## 2025-08-15 DIAGNOSIS — Z76.89 ENCOUNTER TO ESTABLISH CARE: Primary | ICD-10-CM

## 2025-08-15 DIAGNOSIS — R53.83 FATIGUE, UNSPECIFIED TYPE: ICD-10-CM

## 2025-08-15 DIAGNOSIS — F41.9 ANXIETY: ICD-10-CM

## 2025-08-15 DIAGNOSIS — E53.8 VITAMIN B12 DEFICIENCY: ICD-10-CM

## 2025-08-15 DIAGNOSIS — Z93.2 ILEOSTOMY STATUS: ICD-10-CM

## 2025-08-15 PROBLEM — F33.0 MILD EPISODE OF RECURRENT MAJOR DEPRESSIVE DISORDER: Status: RESOLVED | Noted: 2020-03-03 | Resolved: 2025-08-15

## 2025-08-15 PROBLEM — M79.604 LEG PAIN, ANTERIOR, RIGHT: Status: RESOLVED | Noted: 2024-08-12 | Resolved: 2025-08-15

## 2025-08-15 PROBLEM — M25.642 DECREASED RANGE OF MOTION OF LEFT THUMB: Status: RESOLVED | Noted: 2024-06-27 | Resolved: 2025-08-15

## 2025-08-15 PROBLEM — R26.9 GAIT ABNORMALITY: Status: RESOLVED | Noted: 2024-09-12 | Resolved: 2025-08-15

## 2025-08-15 PROBLEM — M70.61 TROCHANTERIC BURSITIS, RIGHT HIP: Status: RESOLVED | Noted: 2023-10-02 | Resolved: 2025-08-15

## 2025-08-15 PROBLEM — K76.0 FATTY LIVER: Status: RESOLVED | Noted: 2021-07-13 | Resolved: 2025-08-15

## 2025-08-15 PROBLEM — G56.01 CARPAL TUNNEL SYNDROME OF RIGHT WRIST: Status: RESOLVED | Noted: 2024-02-27 | Resolved: 2025-08-15

## 2025-08-15 PROBLEM — G56.02 CARPAL TUNNEL SYNDROME OF LEFT WRIST: Status: RESOLVED | Noted: 2024-02-27 | Resolved: 2025-08-15

## 2025-08-15 PROBLEM — S63.642A RUPTURE OF RADIAL COLLATERAL LIGAMENT OF LEFT THUMB: Status: RESOLVED | Noted: 2024-06-20 | Resolved: 2025-08-15

## 2025-08-15 PROBLEM — G89.29 INSOMNIA SECONDARY TO CHRONIC PAIN: Status: RESOLVED | Noted: 2023-10-02 | Resolved: 2025-08-15

## 2025-08-15 PROBLEM — R19.7 DIARRHEA: Status: RESOLVED | Noted: 2020-04-03 | Resolved: 2025-08-15

## 2025-08-15 PROBLEM — M25.551 RIGHT HIP PAIN: Status: RESOLVED | Noted: 2024-09-12 | Resolved: 2025-08-15

## 2025-08-15 PROBLEM — R79.89 ELEVATED LIVER FUNCTION TESTS: Status: RESOLVED | Noted: 2024-10-22 | Resolved: 2025-08-15

## 2025-08-15 PROBLEM — G47.01 INSOMNIA SECONDARY TO CHRONIC PAIN: Status: RESOLVED | Noted: 2023-10-02 | Resolved: 2025-08-15
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
## 2025-08-15 PROBLEM — B37.2 CANDIDAL INTERTRIGO: Status: RESOLVED | Noted: 2019-04-11 | Resolved: 2025-08-15

## 2025-08-15 PROBLEM — R00.1 SINUS BRADYCARDIA: Status: RESOLVED | Noted: 2021-01-04 | Resolved: 2025-08-15

## 2025-08-15 PROBLEM — G56.22 CUBITAL TUNNEL SYNDROME ON LEFT: Status: RESOLVED | Noted: 2024-02-27 | Resolved: 2025-08-15

## 2025-08-15 PROBLEM — S89.91XA RIGHT LEG INJURY, INITIAL ENCOUNTER: Status: RESOLVED | Noted: 2024-08-12 | Resolved: 2025-08-15

## 2025-08-15 PROBLEM — R00.2 PALPITATIONS: Status: RESOLVED | Noted: 2024-10-25 | Resolved: 2025-08-15

## 2025-08-15 PROBLEM — R42 VERTIGO: Status: RESOLVED | Noted: 2022-11-07 | Resolved: 2025-08-15

## 2025-08-15 PROBLEM — L98.7 REDUNDANT SKIN OF ABDOMEN: Status: RESOLVED | Noted: 2018-03-15 | Resolved: 2025-08-15

## 2025-08-15 PROCEDURE — 99999 PR PBB SHADOW E&M-EST. PATIENT-LVL IV: CPT | Mod: PBBFAC,,, | Performed by: NURSE PRACTITIONER

## 2025-08-15 RX ORDER — DOXEPIN HYDROCHLORIDE 10 MG/1
10 CAPSULE ORAL NIGHTLY
Qty: 30 CAPSULE | Refills: 11 | Status: SHIPPED | OUTPATIENT
Start: 2025-08-15 | End: 2026-08-15

## 2025-08-17 ENCOUNTER — PATIENT MESSAGE (OUTPATIENT)
Dept: ORTHOPEDICS | Facility: CLINIC | Age: 60
End: 2025-08-17
Payer: MEDICARE

## 2025-08-18 ENCOUNTER — PATIENT MESSAGE (OUTPATIENT)
Dept: OBSTETRICS AND GYNECOLOGY | Facility: CLINIC | Age: 60
End: 2025-08-18
Payer: MEDICARE

## 2025-08-20 ENCOUNTER — OFFICE VISIT (OUTPATIENT)
Dept: ORTHOPEDICS | Facility: CLINIC | Age: 60
End: 2025-08-20
Payer: MEDICARE

## 2025-08-20 VITALS — BODY MASS INDEX: 33.43 KG/M2 | HEIGHT: 65 IN | WEIGHT: 200.63 LBS

## 2025-08-20 DIAGNOSIS — S61.459A: ICD-10-CM
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
## 2025-08-20 DIAGNOSIS — M18.11 PRIMARY OSTEOARTHRITIS OF FIRST CARPOMETACARPAL JOINT OF RIGHT HAND: ICD-10-CM

## 2025-08-20 DIAGNOSIS — M79.644 PAIN OF RIGHT THUMB: Primary | ICD-10-CM

## 2025-08-20 DIAGNOSIS — M77.8 TENDINITIS OF THUMB: ICD-10-CM

## 2025-08-20 PROCEDURE — 99214 OFFICE O/P EST MOD 30 MIN: CPT | Mod: S$GLB,,,

## 2025-08-20 PROCEDURE — 3008F BODY MASS INDEX DOCD: CPT | Mod: CPTII,S$GLB,,

## 2025-08-20 PROCEDURE — 1160F RVW MEDS BY RX/DR IN RCRD: CPT | Mod: CPTII,S$GLB,,

## 2025-08-20 PROCEDURE — 99999 PR PBB SHADOW E&M-EST. PATIENT-LVL III: CPT | Mod: PBBFAC,,,

## 2025-08-20 PROCEDURE — 1159F MED LIST DOCD IN RCRD: CPT | Mod: CPTII,S$GLB,,

## 2025-08-20 PROCEDURE — 3044F HG A1C LEVEL LT 7.0%: CPT | Mod: CPTII,S$GLB,,

## 2025-08-26 ENCOUNTER — OFFICE VISIT (OUTPATIENT)
Dept: PRIMARY CARE CLINIC | Facility: CLINIC | Age: 60
End: 2025-08-26
Payer: MEDICARE

## 2025-08-26 VITALS
HEART RATE: 73 BPM | SYSTOLIC BLOOD PRESSURE: 108 MMHG | RESPIRATION RATE: 16 BRPM | DIASTOLIC BLOOD PRESSURE: 70 MMHG | TEMPERATURE: 99 F | OXYGEN SATURATION: 99 % | BODY MASS INDEX: 33.82 KG/M2 | HEIGHT: 65 IN | WEIGHT: 203 LBS

## 2025-08-26 DIAGNOSIS — M79.641 HAND PAIN, RIGHT: Primary | ICD-10-CM

## 2025-08-26 PROCEDURE — 3078F DIAST BP <80 MM HG: CPT | Mod: CPTII,S$GLB,,

## 2025-08-26 PROCEDURE — 3008F BODY MASS INDEX DOCD: CPT | Mod: CPTII,S$GLB,,

## 2025-08-26 PROCEDURE — 3044F HG A1C LEVEL LT 7.0%: CPT | Mod: CPTII,S$GLB,,

## 2025-08-26 PROCEDURE — 1159F MED LIST DOCD IN RCRD: CPT | Mod: CPTII,S$GLB,,

## 2025-08-26 PROCEDURE — 99999 PR PBB SHADOW E&M-EST. PATIENT-LVL IV: CPT | Mod: PBBFAC,,,

## 2025-08-26 PROCEDURE — 1160F RVW MEDS BY RX/DR IN RCRD: CPT | Mod: CPTII,S$GLB,,

## 2025-08-26 PROCEDURE — 3074F SYST BP LT 130 MM HG: CPT | Mod: CPTII,S$GLB,,

## 2025-08-26 PROCEDURE — 99213 OFFICE O/P EST LOW 20 MIN: CPT | Mod: S$GLB,,,

## 2025-08-31 ENCOUNTER — PATIENT MESSAGE (OUTPATIENT)
Dept: PRIMARY CARE CLINIC | Facility: CLINIC | Age: 60
End: 2025-08-31
Payer: MEDICARE

## 2025-09-03 ENCOUNTER — CLINICAL SUPPORT (OUTPATIENT)
Dept: OBSTETRICS AND GYNECOLOGY | Facility: CLINIC | Age: 60
End: 2025-09-03
Payer: MEDICARE

## 2025-09-03 DIAGNOSIS — N95.1 MENOPAUSAL SYMPTOMS: Primary | ICD-10-CM

## 2025-09-05 RX ORDER — DOXEPIN HYDROCHLORIDE 25 MG/1
25 CAPSULE ORAL NIGHTLY
Qty: 30 CAPSULE | Refills: 11 | OUTPATIENT
Start: 2025-09-05 | End: 2026-09-05

## (undated) DEVICE — SEE MEDLINE ITEM 157144

## (undated) DEVICE — SUT CTD VICRYL VIL BR SH 27

## (undated) DEVICE — NDL BOX COUNTER

## (undated) DEVICE — CLIPPER BLADE MOD 4406 (CAREF)

## (undated) DEVICE — NDL INSUF ULTRA VERESS 120MM

## (undated) DEVICE — COVER LIGHT HANDLE 80/CA

## (undated) DEVICE — SUT MONOCRYL 4-0 PS-1 UND

## (undated) DEVICE — APPLIER CLIP LIAGCLIP 9.375IN

## (undated) DEVICE — Device

## (undated) DEVICE — SOL NORMAL USPCA 0.9%

## (undated) DEVICE — SET IRR URLGY 2LINE UNIV SPIKE

## (undated) DEVICE — ADHESIVE DERMABOND ADVANCED

## (undated) DEVICE — SYR 30CC LUER LOCK

## (undated) DEVICE — TOURNIQUET SB QC DP 18X4IN

## (undated) DEVICE — LEGGINGS 48X31 INCH

## (undated) DEVICE — SEE MEDLINE ITEM 157117

## (undated) DEVICE — TRACKER PATIENT NON INVASIVE

## (undated) DEVICE — SEE MEDLINE ITEM 152487

## (undated) DEVICE — SUT FIBERWIRE 18IN BLUE 3-0

## (undated) DEVICE — SPONGE GAUZE 16PLY 4X4

## (undated) DEVICE — NDL PNEUMOPERITONEUM 150MM

## (undated) DEVICE — KIT PREVENA PLUS

## (undated) DEVICE — CATH POLLACK OPEN-END FLEXI-TI

## (undated) DEVICE — SUT SILK 2-0 PS 18IN BLACK

## (undated) DEVICE — TRAY FOLEY 16FR INFECTION CONT

## (undated) DEVICE — ELECTRODE REM PLYHSV RETURN 9

## (undated) DEVICE — SPONGE LAP 18X18 PREWASHED

## (undated) DEVICE — DRESSING N ADH OIL EMUL 3X3

## (undated) DEVICE — SUT CTD VICRYL 2-0 VIL BR

## (undated) DEVICE — BLADE QUADCUT STRAIGHT 4.3MM

## (undated) DEVICE — DRESSING AQUACEL AG ADV 3.5X12

## (undated) DEVICE — DRAPE ABDOMINAL TIBURON 14X11

## (undated) DEVICE — NDL 22GA X1 1/2 REG BEVEL

## (undated) DEVICE — SPLINT INTRANASAL POSISEP .6X2

## (undated) DEVICE — TRAY MINOR GEN SURG

## (undated) DEVICE — SPONGE COTTON TRAY 4X4IN

## (undated) DEVICE — BINDER ABDOMINAL 9 46-62

## (undated) DEVICE — HOOK STAY ELAS 5MM 8EA/PK

## (undated) DEVICE — SOL NS 1000CC

## (undated) DEVICE — GLOVE SENSICARE PI MICRO 7.5

## (undated) DEVICE — KIT ANTIFOG

## (undated) DEVICE — TROCAR ENDOPATH XCEL 5X100MM

## (undated) DEVICE — CANISTER SUCTION 3000CC

## (undated) DEVICE — SPONGE PATTY SURGICAL .5X3IN

## (undated) DEVICE — SYR ONLY LUER LOCK 20CC

## (undated) DEVICE — GOWN ECLIPSE REINF LVL4 TWL XL

## (undated) DEVICE — UNDERPAD ULTRASORB 300LB 30X36

## (undated) DEVICE — BNDG COFLEX FOAM LF2 ST 3X5YD

## (undated) DEVICE — CORD BIPOLAR 12 FOOT

## (undated) DEVICE — SUT PROLENE 2-0 30 SH

## (undated) DEVICE — PAD CAST SPECIALIST STRL 3

## (undated) DEVICE — SPLINT PLASTER F.S 4INX15IN

## (undated) DEVICE — GLOVE BIOGEL PI MICRO SZ 7

## (undated) DEVICE — DRAPE STERI-DRAPE 1000 17X11IN

## (undated) DEVICE — UNDERGLOVES BIOGEL PI SIZE 8

## (undated) DEVICE — PAD CURAD NONADH 3X4IN

## (undated) DEVICE — TOWEL OR XRAY BLUE 17X26IN

## (undated) DEVICE — SYR B-D DISP CONTROL 10CC100/C

## (undated) DEVICE — SUT CTD VICRYL VIL BR CR/SH

## (undated) DEVICE — SLING ARM LARGE FOAM STRAP

## (undated) DEVICE — PACK UNIVERSAL SPLIT II

## (undated) DEVICE — GAUZE SPONGE 4X4 12PLY

## (undated) DEVICE — SEE MEDLINE ITEM 157181

## (undated) DEVICE — SYR 3CC LUER LOC

## (undated) DEVICE — GLOVE BIOGEL PI MICRO SZ 8

## (undated) DEVICE — SUT MONOCRYL 3-0 PS-2 UND

## (undated) DEVICE — DRAIN CHANNEL ROUND 19FR

## (undated) DEVICE — PAD CAST 2 IN X 4YDS STERILE

## (undated) DEVICE — SEE MEDLINE ITEM 146417

## (undated) DEVICE — RETRACTOR LONE STAR 28.3X18.3

## (undated) DEVICE — BLADE SURG CARBON STEEL #10

## (undated) DEVICE — NDL 27G X 1 1/4

## (undated) DEVICE — CONNECTOR Y 3/8X3/8X3/8

## (undated) DEVICE — SYR LUER LOCK STERILE 10ML

## (undated) DEVICE — BANDAGE COFLEX LF2 TAN 3X5YD

## (undated) DEVICE — TUBING XPS IRRIG TO STRAIGHTSH

## (undated) DEVICE — GOWN AERO CHROME W/ TOWEL XL

## (undated) DEVICE — SUT 4/0 18IN PDS II CLR MO

## (undated) DEVICE — BOVIE SUCTION

## (undated) DEVICE — EVACUATOR WOUND BULB 100CC

## (undated) DEVICE — SEE L#95700

## (undated) DEVICE — COVER CAMERA OPERATING ROOM

## (undated) DEVICE — NDL HYPODERMIC BLUNT 18G 1.5IN

## (undated) DEVICE — TAPE UMBILICAL 1/8X36IN WHITE

## (undated) DEVICE — SEE MEDLINE ITEM 159592

## (undated) DEVICE — CONTAINER SPECIMEN STRL 4OZ

## (undated) DEVICE — NDL 18GA X1 1/2 REG BEVEL

## (undated) DEVICE — TAPE MICRO SURG SGL USE 1

## (undated) DEVICE — IRRIGATOR ENDOSCOPY DISP.

## (undated) DEVICE — COVER PROXIMA MAYO STAND

## (undated) DEVICE — BLLN SYS SIMUPLASTY 6MM

## (undated) DEVICE — SEE MEDLINE ITEM 157128

## (undated) DEVICE — POUCH SENSURA MIO 3/8X2 1/8IN

## (undated) DEVICE — SEE MEDLINE ITEM 152512

## (undated) DEVICE — SEE MEDLINE ITEM 156902

## (undated) DEVICE — SOCKINETTE DOUBLE PLY 4X48IN

## (undated) DEVICE — SET DECANTER MEDICHOICE

## (undated) DEVICE — SKINMARKER & RULER REGULAR X-F

## (undated) DEVICE — FORCEP STRAIGHT DISP

## (undated) DEVICE — DRESSING EYE OVAL LF

## (undated) DEVICE — KNIFE CARPAL TUNNEL

## (undated) DEVICE — GOWN X-LG STERILE BACK

## (undated) DEVICE — GLOVE BIOGEL PI MICRO INDIC 7

## (undated) DEVICE — STAPLER SKIN SUBCUTICULAR

## (undated) DEVICE — TUBE CONNECTING 3/16INX6FT

## (undated) DEVICE — SUT MCRYL PLUS 4-0 PS2 27IN

## (undated) DEVICE — SUT 1 48IN PDS II VIO MONO

## (undated) DEVICE — BLANKET FULL BODY 85.8X50IN

## (undated) DEVICE — NDL HYPO STD REG BVL 18GX1.5IN

## (undated) DEVICE — NDL ECLIPSE SAFETY 23G 1.5IN

## (undated) DEVICE — SYR 50ML CATH TIP

## (undated) DEVICE — SOL NACL IRR 1000ML BTL

## (undated) DEVICE — PAD ALOCOHOL PREP MD STRL 2PLY

## (undated) DEVICE — DRESSING LEUKOPLAST FLEX 1X3IN

## (undated) DEVICE — DEVICE INFLATION SE SINUS BLLN

## (undated) DEVICE — GOWN SURGICAL X-LARGE

## (undated) DEVICE — PAD EYE OVAL CNTOUR 1.62X2.62

## (undated) DEVICE — SEE MEDLINE ITEM 146347

## (undated) DEVICE — SEE MEDLINE ITEM 154981

## (undated) DEVICE — SUT PROLENE 3-0 FS-2 18

## (undated) DEVICE — POWDER ARISTA AH 3G

## (undated) DEVICE — SYR BULB EAR/ULCER STER 3OZ

## (undated) DEVICE — GLOVE SENSICARE PI GRN 7.5

## (undated) DEVICE — BLADE SURG #15 CARBON STEEL

## (undated) DEVICE — SYR ONLY ET 20CC

## (undated) DEVICE — KIT ANTIFOG W/SPONG & FLUID

## (undated) DEVICE — SUT MONOCRYL PLUS 4-0 P3

## (undated) DEVICE — SKIN MARKER STER DUAL TIP

## (undated) DEVICE — SUT STRATAFIX SPRL PS-2 3-0

## (undated) DEVICE — HOSE DUAL W/CPC CONNECTORS

## (undated) DEVICE — RETRACTOR LONE STAR 14.1X14.1

## (undated) DEVICE — TUBING HF INSUFFLATION W/ FLTR

## (undated) DEVICE — LUBRICANT SURGILUBE 2 OZ

## (undated) DEVICE — TRACKER ENT INSTRUMENT

## (undated) DEVICE — SYR LUER LOCK 1CC

## (undated) DEVICE — SOL SALINE STER BOTTLE 500ML

## (undated) DEVICE — APPLICATOR ARISTA FLEX XL

## (undated) DEVICE — DRESSING TRANS 2X2 TEGADERM

## (undated) DEVICE — SUT 3-0 VICRYL SH CR/8 18

## (undated) DEVICE — PAD ABD 8X10 STERILE

## (undated) DEVICE — MANIFOLD 4 PORT

## (undated) DEVICE — STAPLER SKIN ROTATING HEAD

## (undated) DEVICE — PACK HEAD & NECK

## (undated) DEVICE — SHEARS HARMONIC 5CM 36CM